# Patient Record
Sex: FEMALE | Race: WHITE | Employment: OTHER | URBAN - METROPOLITAN AREA
[De-identification: names, ages, dates, MRNs, and addresses within clinical notes are randomized per-mention and may not be internally consistent; named-entity substitution may affect disease eponyms.]

---

## 2023-02-03 PROBLEM — B37.31 YEAST VAGINITIS: Status: ACTIVE | Noted: 2023-02-03

## 2023-02-03 PROBLEM — M54.9 BACK PAIN: Status: ACTIVE | Noted: 2023-02-03

## 2023-02-03 PROBLEM — R01.1 CARDIAC MURMUR: Status: ACTIVE | Noted: 2023-02-03

## 2023-02-03 PROBLEM — K21.9 GERD WITHOUT ESOPHAGITIS: Status: ACTIVE | Noted: 2023-02-03

## 2023-02-03 PROBLEM — N39.0 ACUTE UTI: Status: ACTIVE | Noted: 2023-02-03

## 2023-02-03 PROBLEM — N28.9 RENAL INSUFFICIENCY: Status: ACTIVE | Noted: 2023-02-03

## 2023-02-03 PROBLEM — R06.02 EXERTIONAL SHORTNESS OF BREATH: Status: ACTIVE | Noted: 2023-02-03

## 2023-02-03 PROBLEM — I70.1 RENAL ARTERY STENOSIS (CMS-HCC): Status: ACTIVE | Noted: 2023-02-03

## 2023-02-03 PROBLEM — I49.1 ATRIAL PREMATURE COMPLEX: Status: ACTIVE | Noted: 2023-02-03

## 2023-02-03 PROBLEM — E11.9 DIABETES (MULTI): Status: ACTIVE | Noted: 2023-02-03

## 2023-02-03 PROBLEM — R07.9 CHEST PAIN: Status: ACTIVE | Noted: 2023-02-03

## 2023-02-03 PROBLEM — R10.9 ABDOMINAL PAIN: Status: ACTIVE | Noted: 2023-02-03

## 2023-02-03 PROBLEM — I47.10 PAROXYSMAL SVT (SUPRAVENTRICULAR TACHYCARDIA) (CMS-HCC): Status: ACTIVE | Noted: 2023-02-03

## 2023-02-03 PROBLEM — R53.83 FATIGUE: Status: ACTIVE | Noted: 2023-02-03

## 2023-02-03 PROBLEM — R30.0 DYSURIA: Status: ACTIVE | Noted: 2023-02-03

## 2023-02-03 PROBLEM — I10 BENIGN ESSENTIAL HTN: Status: ACTIVE | Noted: 2023-02-03

## 2023-02-03 PROBLEM — R00.2 PALPITATIONS: Status: ACTIVE | Noted: 2023-02-03

## 2023-02-03 PROBLEM — E78.5 DYSLIPIDEMIA: Status: ACTIVE | Noted: 2023-02-03

## 2023-02-03 PROBLEM — B02.9 SHINGLES: Status: ACTIVE | Noted: 2023-02-03

## 2023-02-03 PROBLEM — R51.9 SINUS HEADACHE: Status: ACTIVE | Noted: 2023-02-03

## 2023-02-03 PROBLEM — I49.8 ATRIAL BIGEMINY: Status: ACTIVE | Noted: 2023-02-03

## 2023-02-03 PROBLEM — Z23 ENCOUNTER FOR IMMUNIZATION: Status: ACTIVE | Noted: 2023-02-03

## 2023-02-03 PROBLEM — I35.0 AORTIC STENOSIS: Status: ACTIVE | Noted: 2023-02-03

## 2023-02-03 RX ORDER — LISINOPRIL AND HYDROCHLOROTHIAZIDE 20; 25 MG/1; MG/1
1 TABLET ORAL DAILY
COMMUNITY
Start: 2015-08-31 | End: 2023-07-10 | Stop reason: SINTOL

## 2023-02-03 RX ORDER — CHOLECALCIFEROL (VITAMIN D3) 50 MCG
1 TABLET ORAL DAILY
COMMUNITY
Start: 2018-10-10 | End: 2023-10-03 | Stop reason: ALTCHOICE

## 2023-02-03 RX ORDER — METOPROLOL TARTRATE 50 MG/1
1 TABLET ORAL 2 TIMES DAILY
COMMUNITY
Start: 2015-08-18 | End: 2023-10-03 | Stop reason: ALTCHOICE

## 2023-02-03 RX ORDER — ASCORBIC ACID 500 MG
1 TABLET,CHEWABLE ORAL DAILY
COMMUNITY
End: 2023-10-03 | Stop reason: ALTCHOICE

## 2023-02-03 RX ORDER — INSULIN HUMAN 100 [IU]/ML
40 INJECTION, SUSPENSION SUBCUTANEOUS EVERY MORNING
COMMUNITY
Start: 2015-06-03 | End: 2023-07-10 | Stop reason: SDUPTHER

## 2023-02-03 RX ORDER — LISINOPRIL 10 MG/1
0.5 TABLET ORAL DAILY
COMMUNITY
End: 2023-07-10 | Stop reason: ALTCHOICE

## 2023-02-03 RX ORDER — OMEPRAZOLE 40 MG/1
1 CAPSULE, DELAYED RELEASE ORAL DAILY
COMMUNITY
Start: 2022-12-06 | End: 2023-04-10 | Stop reason: WASHOUT

## 2023-02-03 RX ORDER — BLOOD SUGAR DIAGNOSTIC
3 STRIP MISCELLANEOUS 3 TIMES DAILY
COMMUNITY
Start: 2015-11-12 | End: 2023-05-22 | Stop reason: SDUPTHER

## 2023-03-27 DIAGNOSIS — N39.0 ACUTE UTI: Primary | ICD-10-CM

## 2023-03-27 LAB
ALBUMIN (MG/L) IN URINE: 1255.6 MG/L
ALBUMIN/CREATININE (UG/MG) IN URINE: 6573.8 UG/MG CRT (ref 0–30)
CREATININE (MG/DL) IN URINE: 19.1 MG/DL (ref 20–320)

## 2023-03-29 ENCOUNTER — OFFICE VISIT (OUTPATIENT)
Dept: PRIMARY CARE | Facility: CLINIC | Age: 88
End: 2023-03-29
Payer: COMMERCIAL

## 2023-03-29 VITALS — SYSTOLIC BLOOD PRESSURE: 145 MMHG | DIASTOLIC BLOOD PRESSURE: 79 MMHG | BODY MASS INDEX: 29.41 KG/M2 | WEIGHT: 166 LBS

## 2023-03-29 DIAGNOSIS — N30.00 ACUTE CYSTITIS WITHOUT HEMATURIA: Primary | ICD-10-CM

## 2023-03-29 LAB
POC APPEARANCE, URINE: ABNORMAL
POC BILIRUBIN, URINE: NEGATIVE
POC BLOOD, URINE: ABNORMAL
POC COLOR, URINE: YELLOW
POC GLUCOSE, URINE: ABNORMAL MG/DL
POC KETONES, URINE: NEGATIVE MG/DL
POC LEUKOCYTES, URINE: ABNORMAL
POC NITRITE,URINE: POSITIVE
POC PH, URINE: 6 PH
POC PROTEIN, URINE: ABNORMAL MG/DL
POC SPECIFIC GRAVITY, URINE: 1.02
POC UROBILINOGEN, URINE: 0.2 EU/DL

## 2023-03-29 PROCEDURE — 3078F DIAST BP <80 MM HG: CPT | Performed by: FAMILY MEDICINE

## 2023-03-29 PROCEDURE — 81002 URINALYSIS NONAUTO W/O SCOPE: CPT | Performed by: FAMILY MEDICINE

## 2023-03-29 PROCEDURE — 99213 OFFICE O/P EST LOW 20 MIN: CPT | Performed by: FAMILY MEDICINE

## 2023-03-29 PROCEDURE — 87077 CULTURE AEROBIC IDENTIFY: CPT

## 2023-03-29 PROCEDURE — 3077F SYST BP >= 140 MM HG: CPT | Performed by: FAMILY MEDICINE

## 2023-03-29 PROCEDURE — 87186 SC STD MICRODIL/AGAR DIL: CPT

## 2023-03-29 PROCEDURE — 87086 URINE CULTURE/COLONY COUNT: CPT

## 2023-03-29 RX ORDER — NITROFURANTOIN 25; 75 MG/1; MG/1
100 CAPSULE ORAL 2 TIMES DAILY
Qty: 14 CAPSULE | Refills: 0 | Status: SHIPPED | OUTPATIENT
Start: 2023-03-29 | End: 2023-04-10

## 2023-03-29 ASSESSMENT — PATIENT HEALTH QUESTIONNAIRE - PHQ9
SUM OF ALL RESPONSES TO PHQ9 QUESTIONS 1 AND 2: 0
1. LITTLE INTEREST OR PLEASURE IN DOING THINGS: NOT AT ALL
2. FEELING DOWN, DEPRESSED OR HOPELESS: NOT AT ALL

## 2023-03-29 NOTE — PROGRESS NOTES
Subjective   Patient ID: Melody Martin is a 87 y.o. female who presents for UTI and Results.    HPI   pt noticed low abd tenderness, dysuria, urinary frequency and urgency 5 days ago.  no hematuria, flank pain, fever or chills. No nausea, vomiting. No cp, sob, HA. Normal appetite. Symptoms persisted today   Review of Systems    Objective   Wt 75.3 kg (166 lb)   BMI 29.41 kg/m²     Physical Exam  NAD, well groomed, No sclera icterus. Moist mouth mucosa, neck: supple, lungs: CTA b/l, heart: RRR, abd: soft, there was a mild suprapubic tenderness, no rebound or guarding, BS+, no CVA percussion tenderness.  good balance.   Assessment/Plan     UTI, UA showed + for nitrite and blood. Patient is likely to have UTI due to typical symptoms. Will send urine out for culture. Start antibiotics as above. Increase fluid intake. Call office if worsening symptoms, sob, cp, flank pain, nausea, fever or chills occur.

## 2023-03-31 LAB — URINE CULTURE: ABNORMAL

## 2023-03-31 RX ORDER — SULFAMETHOXAZOLE AND TRIMETHOPRIM 800; 160 MG/1; MG/1
1 TABLET ORAL DAILY
Qty: 3 TABLET | Refills: 0 | Status: SHIPPED | OUTPATIENT
Start: 2023-03-31 | End: 2023-04-10

## 2023-04-03 ENCOUNTER — APPOINTMENT (OUTPATIENT)
Dept: PRIMARY CARE | Facility: CLINIC | Age: 88
End: 2023-04-03
Payer: COMMERCIAL

## 2023-04-10 ENCOUNTER — OFFICE VISIT (OUTPATIENT)
Dept: PRIMARY CARE | Facility: CLINIC | Age: 88
End: 2023-04-10
Payer: COMMERCIAL

## 2023-04-10 VITALS
DIASTOLIC BLOOD PRESSURE: 79 MMHG | RESPIRATION RATE: 14 BRPM | HEART RATE: 78 BPM | WEIGHT: 168 LBS | BODY MASS INDEX: 29.76 KG/M2 | SYSTOLIC BLOOD PRESSURE: 137 MMHG

## 2023-04-10 DIAGNOSIS — E11.9 TYPE 2 DIABETES MELLITUS WITHOUT COMPLICATION, WITH LONG-TERM CURRENT USE OF INSULIN (MULTI): ICD-10-CM

## 2023-04-10 DIAGNOSIS — R42 DIZZINESS: ICD-10-CM

## 2023-04-10 DIAGNOSIS — B37.0 ORAL THRUSH: Primary | ICD-10-CM

## 2023-04-10 DIAGNOSIS — I10 BENIGN ESSENTIAL HTN: ICD-10-CM

## 2023-04-10 DIAGNOSIS — Z79.4 TYPE 2 DIABETES MELLITUS WITHOUT COMPLICATION, WITH LONG-TERM CURRENT USE OF INSULIN (MULTI): ICD-10-CM

## 2023-04-10 DIAGNOSIS — N18.32 STAGE 3B CHRONIC KIDNEY DISEASE (MULTI): ICD-10-CM

## 2023-04-10 DIAGNOSIS — Z00.00 ROUTINE MEDICAL EXAM: ICD-10-CM

## 2023-04-10 DIAGNOSIS — I47.10 PAROXYSMAL SVT (SUPRAVENTRICULAR TACHYCARDIA) (CMS-HCC): ICD-10-CM

## 2023-04-10 PROBLEM — N28.9 RENAL INSUFFICIENCY: Status: RESOLVED | Noted: 2023-02-03 | Resolved: 2023-04-10

## 2023-04-10 PROBLEM — R30.0 DYSURIA: Status: RESOLVED | Noted: 2023-02-03 | Resolved: 2023-04-10

## 2023-04-10 PROBLEM — R10.9 ABDOMINAL PAIN: Status: RESOLVED | Noted: 2023-02-03 | Resolved: 2023-04-10

## 2023-04-10 PROBLEM — I70.1 RENAL ARTERY STENOSIS (CMS-HCC): Status: RESOLVED | Noted: 2023-02-03 | Resolved: 2023-04-10

## 2023-04-10 PROBLEM — B37.31 YEAST VAGINITIS: Status: RESOLVED | Noted: 2023-02-03 | Resolved: 2023-04-10

## 2023-04-10 PROBLEM — N39.0 ACUTE UTI: Status: RESOLVED | Noted: 2023-02-03 | Resolved: 2023-04-10

## 2023-04-10 PROBLEM — R07.9 CHEST PAIN: Status: RESOLVED | Noted: 2023-02-03 | Resolved: 2023-04-10

## 2023-04-10 PROBLEM — K21.9 GERD WITHOUT ESOPHAGITIS: Status: RESOLVED | Noted: 2023-02-03 | Resolved: 2023-04-10

## 2023-04-10 PROBLEM — M54.9 BACK PAIN: Status: RESOLVED | Noted: 2023-02-03 | Resolved: 2023-04-10

## 2023-04-10 PROBLEM — B02.9 SHINGLES: Status: RESOLVED | Noted: 2023-02-03 | Resolved: 2023-04-10

## 2023-04-10 PROCEDURE — 1159F MED LIST DOCD IN RCRD: CPT | Performed by: FAMILY MEDICINE

## 2023-04-10 PROCEDURE — 3075F SYST BP GE 130 - 139MM HG: CPT | Performed by: FAMILY MEDICINE

## 2023-04-10 PROCEDURE — 1170F FXNL STATUS ASSESSED: CPT | Performed by: FAMILY MEDICINE

## 2023-04-10 PROCEDURE — G0439 PPPS, SUBSEQ VISIT: HCPCS | Performed by: FAMILY MEDICINE

## 2023-04-10 PROCEDURE — 99214 OFFICE O/P EST MOD 30 MIN: CPT | Performed by: FAMILY MEDICINE

## 2023-04-10 PROCEDURE — 1036F TOBACCO NON-USER: CPT | Performed by: FAMILY MEDICINE

## 2023-04-10 PROCEDURE — 1160F RVW MEDS BY RX/DR IN RCRD: CPT | Performed by: FAMILY MEDICINE

## 2023-04-10 PROCEDURE — 3078F DIAST BP <80 MM HG: CPT | Performed by: FAMILY MEDICINE

## 2023-04-10 RX ORDER — NYSTATIN 100000 [USP'U]/ML
5 SUSPENSION ORAL 4 TIMES DAILY
Qty: 120 ML | Refills: 1 | Status: SHIPPED | OUTPATIENT
Start: 2023-04-10 | End: 2023-10-09 | Stop reason: HOSPADM

## 2023-04-10 ASSESSMENT — PATIENT HEALTH QUESTIONNAIRE - PHQ9
2. FEELING DOWN, DEPRESSED OR HOPELESS: NOT AT ALL
1. LITTLE INTEREST OR PLEASURE IN DOING THINGS: NOT AT ALL
SUM OF ALL RESPONSES TO PHQ9 QUESTIONS 1 AND 2: 0

## 2023-04-10 ASSESSMENT — ACTIVITIES OF DAILY LIVING (ADL)
GROCERY_SHOPPING: NEEDS ASSISTANCE
BATHING: INDEPENDENT
DRESSING: INDEPENDENT
DOING_HOUSEWORK: NEEDS ASSISTANCE
MANAGING_FINANCES: INDEPENDENT
TAKING_MEDICATION: INDEPENDENT

## 2023-04-10 NOTE — ASSESSMENT & PLAN NOTE
DMII, Continue current medications. Check A1C, advise eye exam by an OD yearly and checking bilateral feet for skin lesions qhs. Healthy diet and regular exercise.

## 2023-04-10 NOTE — PROGRESS NOTES
Subjective   Patient ID: Melody Martin is a 87 y.o. female who presents for Follow-up and Medicare Annual Wellness Visit Subsequent.    HPI   Pt noticed white thrush on her tongue in the past few weeks. Pt tool abx recently. Patient has been compliant with taking all  current medications. Pt had occ hypoglycemic episodes. No polydipsia, polyuria or significant weight changes. No lower extremities skin lesion. No LE numbness or tingling. No blurry vision. No CP, HA, pt cont to have dizziness but no heart palpitation. No  nocturia or LE edema. No falls. Good mood.     Review of Systems    Objective   /79   Pulse 78   Resp 14   Wt 76.2 kg (168 lb)   BMI 29.76 kg/m²     Physical Exam  NAD, well groomed, No sclera icterus. ENT were normal except thrush at tongue, neck: supple, no cervical or axillary lymphadenopathy,  lungs: CTA b/l, heart: RRR, No LE edema, normal pedal pulses, abd: soft, no tenderness, BS+, normal strength and sensation  at bilateral lower extremities. No skin lesions at bilateral feet. Good balance. CNII-XII were grossly intact, good judgment and memory. No depressed mood.    Assessment/Plan   Problem List Items Addressed This Visit          Circulatory    Benign essential HTN     BP has been controlled. Continue BP pills. keep a daily  bp log and bring in the log at the next office visit. Call office if BP is persistently over 130/80. DASH diet and regular exercise.          Paroxysmal SVT (supraventricular tachycardia) (CMS/[facility])     Normal HR today. Fu with cardiologist            Genitourinary    Stage 3b chronic kidney disease     No NSAidS, FU WITH NEPHRO            Endocrine/Metabolic    Diabetes (CMS/[facility])     DMII, Continue current medications. Check A1C, advise eye exam by an OD yearly and checking bilateral feet for skin lesions qhs. Healthy diet and regular exercise.          Relevant Orders    Hemoglobin A1C    Hepatic Function Panel       Infectious/Inflammatory    Oral thrush -  Primary     Nystatin solution as dir. Call office if symptoms do not resolve in 10 days           Relevant Medications    nystatin (Mycostatin) 100,000 unit/mL suspension       Other    Routine medical exam    Dizziness    Relevant Orders    Referral to ENT        Medicare wellness visit: pt was capable of performing all his ADLs and some IADLs. Pt has good memory and cognitive function. pt is overweight,  Recommend healthy diet and regular exercise. pt declined to see a nutritionist for eval. Advise eye exam by an OD yearly for glaucoma screen and dental exam every 6 months.   will monitor blood pressure and weight regularly. Recommend shingle vaccines, tetanus vaccine, pcv20 shot. Recommend pt to clear throw rugs at home and keep good lighting at night to avoid falls. Keep hot water for shower below 120F to avoid burn injury.  recommend grab bar at bathtub.   recommend to update living will and DPOA  pt stated that he had been taking all current  medications as prescribed.

## 2023-04-10 NOTE — ASSESSMENT & PLAN NOTE
BP has been controlled. Continue BP pills. keep a daily  bp log and bring in the log at the next office visit. Call office if BP is persistently over 130/80. DASH diet and regular exercise.

## 2023-05-22 DIAGNOSIS — E11.9 TYPE 2 DIABETES MELLITUS WITHOUT COMPLICATION, WITH LONG-TERM CURRENT USE OF INSULIN (MULTI): Primary | ICD-10-CM

## 2023-05-22 DIAGNOSIS — Z79.4 TYPE 2 DIABETES MELLITUS WITHOUT COMPLICATION, WITH LONG-TERM CURRENT USE OF INSULIN (MULTI): Primary | ICD-10-CM

## 2023-05-22 RX ORDER — BLOOD SUGAR DIAGNOSTIC
1 STRIP MISCELLANEOUS 3 TIMES DAILY
Qty: 300 STRIP | Refills: 3 | Status: SHIPPED | OUTPATIENT
Start: 2023-05-22

## 2023-06-30 ENCOUNTER — OFFICE VISIT (OUTPATIENT)
Dept: PRIMARY CARE | Facility: CLINIC | Age: 88
End: 2023-06-30
Payer: COMMERCIAL

## 2023-06-30 VITALS — SYSTOLIC BLOOD PRESSURE: 132 MMHG | RESPIRATION RATE: 14 BRPM | HEART RATE: 88 BPM | DIASTOLIC BLOOD PRESSURE: 79 MMHG

## 2023-06-30 DIAGNOSIS — N30.00 ACUTE CYSTITIS WITHOUT HEMATURIA: Primary | ICD-10-CM

## 2023-06-30 LAB
POC APPEARANCE, URINE: ABNORMAL
POC BILIRUBIN, URINE: NEGATIVE
POC BLOOD, URINE: NEGATIVE
POC COLOR, URINE: YELLOW
POC GLUCOSE, URINE: NEGATIVE MG/DL
POC KETONES, URINE: NEGATIVE MG/DL
POC LEUKOCYTES, URINE: ABNORMAL
POC NITRITE,URINE: POSITIVE
POC PH, URINE: 6 PH
POC PROTEIN, URINE: ABNORMAL MG/DL
POC SPECIFIC GRAVITY, URINE: 1.02
POC UROBILINOGEN, URINE: 0.2 EU/DL

## 2023-06-30 PROCEDURE — 3078F DIAST BP <80 MM HG: CPT | Performed by: FAMILY MEDICINE

## 2023-06-30 PROCEDURE — 81002 URINALYSIS NONAUTO W/O SCOPE: CPT | Performed by: FAMILY MEDICINE

## 2023-06-30 PROCEDURE — 1159F MED LIST DOCD IN RCRD: CPT | Performed by: FAMILY MEDICINE

## 2023-06-30 PROCEDURE — 1036F TOBACCO NON-USER: CPT | Performed by: FAMILY MEDICINE

## 2023-06-30 PROCEDURE — 3075F SYST BP GE 130 - 139MM HG: CPT | Performed by: FAMILY MEDICINE

## 2023-06-30 PROCEDURE — 1160F RVW MEDS BY RX/DR IN RCRD: CPT | Performed by: FAMILY MEDICINE

## 2023-06-30 PROCEDURE — 99213 OFFICE O/P EST LOW 20 MIN: CPT | Performed by: FAMILY MEDICINE

## 2023-06-30 RX ORDER — NITROFURANTOIN 25; 75 MG/1; MG/1
100 CAPSULE ORAL 2 TIMES DAILY
Qty: 14 CAPSULE | Refills: 0 | Status: SHIPPED | OUTPATIENT
Start: 2023-06-30 | End: 2023-07-10 | Stop reason: ALTCHOICE

## 2023-06-30 NOTE — PROGRESS NOTES
Subjective   Patient ID: Melody Martin is a 87 y.o. female who presents for uti symptoms    HPI   pt noticed  dysuria, urinary frequency and urgency a few days ago.  no hematuria, no flank pain, fever or chills. No nausea, vomiting. No cp, sob, HA. Normal appetite. Symptoms persisted today    Review of Systems    Objective   /79   Pulse 88   Resp 14     Physical Exam  NAD, well groomed, No sclera icterus. Moist mouth mucosa, neck: supple, lungs: CTA b/l, heart: RRR, abd: soft, there was no suprapubic tenderness, no rebound or guarding, BS+, no CVA percussion tenderness.  good balance.   Assessment/Plan       UTI, UA showed + for nitrite and ragini. Patient is likely to have UTI due to typical symptoms.  Start antibiotics as above. Increase fluid intake. Call office if worsening symptoms, sob, cp, flank pain, nausea, fever or chills occur.

## 2023-07-10 ENCOUNTER — LAB (OUTPATIENT)
Dept: LAB | Facility: LAB | Age: 88
End: 2023-07-10
Payer: COMMERCIAL

## 2023-07-10 ENCOUNTER — TELEPHONE (OUTPATIENT)
Dept: PRIMARY CARE | Facility: CLINIC | Age: 88
End: 2023-07-10

## 2023-07-10 ENCOUNTER — OFFICE VISIT (OUTPATIENT)
Dept: PRIMARY CARE | Facility: CLINIC | Age: 88
End: 2023-07-10
Payer: COMMERCIAL

## 2023-07-10 VITALS — DIASTOLIC BLOOD PRESSURE: 78 MMHG | SYSTOLIC BLOOD PRESSURE: 145 MMHG | HEART RATE: 82 BPM

## 2023-07-10 DIAGNOSIS — Z79.4 TYPE 2 DIABETES MELLITUS WITHOUT COMPLICATION, WITH LONG-TERM CURRENT USE OF INSULIN (MULTI): ICD-10-CM

## 2023-07-10 DIAGNOSIS — I10 BENIGN ESSENTIAL HTN: Primary | ICD-10-CM

## 2023-07-10 DIAGNOSIS — I10 BENIGN ESSENTIAL HTN: ICD-10-CM

## 2023-07-10 DIAGNOSIS — N18.32 STAGE 3B CHRONIC KIDNEY DISEASE (MULTI): ICD-10-CM

## 2023-07-10 DIAGNOSIS — E11.9 TYPE 2 DIABETES MELLITUS WITHOUT COMPLICATION, WITH LONG-TERM CURRENT USE OF INSULIN (MULTI): Primary | ICD-10-CM

## 2023-07-10 DIAGNOSIS — E11.9 TYPE 2 DIABETES MELLITUS WITHOUT COMPLICATION, WITH LONG-TERM CURRENT USE OF INSULIN (MULTI): ICD-10-CM

## 2023-07-10 DIAGNOSIS — Z79.4 TYPE 2 DIABETES MELLITUS WITHOUT COMPLICATION, WITH LONG-TERM CURRENT USE OF INSULIN (MULTI): Primary | ICD-10-CM

## 2023-07-10 DIAGNOSIS — R82.90 ABNORMAL URINALYSIS: ICD-10-CM

## 2023-07-10 DIAGNOSIS — N30.00 ACUTE CYSTITIS WITHOUT HEMATURIA: ICD-10-CM

## 2023-07-10 LAB
ALANINE AMINOTRANSFERASE (SGPT) (U/L) IN SER/PLAS: 22 U/L (ref 7–45)
ALBUMIN (G/DL) IN SER/PLAS: 3.4 G/DL (ref 3.4–5)
ALKALINE PHOSPHATASE (U/L) IN SER/PLAS: 73 U/L (ref 33–136)
ANION GAP IN SER/PLAS: 14 MMOL/L (ref 10–20)
APPEARANCE, URINE: ABNORMAL
ASPARTATE AMINOTRANSFERASE (SGOT) (U/L) IN SER/PLAS: 32 U/L (ref 9–39)
BACTERIA, URINE: ABNORMAL /HPF
BILIRUBIN DIRECT (MG/DL) IN SER/PLAS: 0.1 MG/DL (ref 0–0.3)
BILIRUBIN TOTAL (MG/DL) IN SER/PLAS: 0.3 MG/DL (ref 0–1.2)
BILIRUBIN, URINE: NEGATIVE
BLOOD, URINE: NEGATIVE
CALCIUM (MG/DL) IN SER/PLAS: 8.8 MG/DL (ref 8.6–10.3)
CARBON DIOXIDE, TOTAL (MMOL/L) IN SER/PLAS: 25 MMOL/L (ref 21–32)
CHLORIDE (MMOL/L) IN SER/PLAS: 106 MMOL/L (ref 98–107)
COLOR, URINE: YELLOW
CREATININE (MG/DL) IN SER/PLAS: 1.79 MG/DL (ref 0.5–1.05)
GFR FEMALE: 27 ML/MIN/1.73M2
GLUCOSE (MG/DL) IN SER/PLAS: 171 MG/DL (ref 74–99)
GLUCOSE, URINE: NEGATIVE MG/DL
KETONES, URINE: NEGATIVE MG/DL
LEUKOCYTE ESTERASE, URINE: ABNORMAL
MUCUS, URINE: ABNORMAL /LPF
NITRITE, URINE: NEGATIVE
PH, URINE: 5 (ref 5–8)
POTASSIUM (MMOL/L) IN SER/PLAS: 4.5 MMOL/L (ref 3.5–5.3)
PROTEIN TOTAL: 6 G/DL (ref 6.4–8.2)
PROTEIN, URINE: ABNORMAL MG/DL
RBC, URINE: 8 /HPF (ref 0–5)
SODIUM (MMOL/L) IN SER/PLAS: 140 MMOL/L (ref 136–145)
SPECIFIC GRAVITY, URINE: 1.02 (ref 1–1.03)
SQUAMOUS EPITHELIAL CELLS, URINE: 5 /HPF
UREA NITROGEN (MG/DL) IN SER/PLAS: 49 MG/DL (ref 6–23)
UROBILINOGEN, URINE: <2 MG/DL (ref 0–1.9)
WBC CLUMPS, URINE: ABNORMAL /HPF
WBC, URINE: >182 /HPF (ref 0–5)

## 2023-07-10 PROCEDURE — 80053 COMPREHEN METABOLIC PANEL: CPT

## 2023-07-10 PROCEDURE — 81001 URINALYSIS AUTO W/SCOPE: CPT

## 2023-07-10 PROCEDURE — 82248 BILIRUBIN DIRECT: CPT

## 2023-07-10 PROCEDURE — 1159F MED LIST DOCD IN RCRD: CPT | Performed by: FAMILY MEDICINE

## 2023-07-10 PROCEDURE — 99214 OFFICE O/P EST MOD 30 MIN: CPT | Performed by: FAMILY MEDICINE

## 2023-07-10 PROCEDURE — 3078F DIAST BP <80 MM HG: CPT | Performed by: FAMILY MEDICINE

## 2023-07-10 PROCEDURE — 36415 COLL VENOUS BLD VENIPUNCTURE: CPT

## 2023-07-10 PROCEDURE — 3077F SYST BP >= 140 MM HG: CPT | Performed by: FAMILY MEDICINE

## 2023-07-10 PROCEDURE — 1160F RVW MEDS BY RX/DR IN RCRD: CPT | Performed by: FAMILY MEDICINE

## 2023-07-10 PROCEDURE — 83036 HEMOGLOBIN GLYCOSYLATED A1C: CPT

## 2023-07-10 PROCEDURE — 1036F TOBACCO NON-USER: CPT | Performed by: FAMILY MEDICINE

## 2023-07-10 RX ORDER — LISINOPRIL 40 MG/1
40 TABLET ORAL DAILY
Qty: 90 TABLET | Refills: 3 | Status: SHIPPED | OUTPATIENT
Start: 2023-07-10 | End: 2023-10-09 | Stop reason: HOSPADM

## 2023-07-10 RX ORDER — INSULIN HUMAN 100 [IU]/ML
40 INJECTION, SUSPENSION SUBCUTANEOUS EVERY MORNING
Qty: 50 ML | Refills: 3 | Status: SHIPPED | OUTPATIENT
Start: 2023-07-10 | End: 2023-10-03 | Stop reason: ALTCHOICE

## 2023-07-10 RX ORDER — LISINOPRIL 40 MG/1
40 TABLET ORAL DAILY
Qty: 90 TABLET | Refills: 3 | Status: SHIPPED | OUTPATIENT
Start: 2023-07-10 | End: 2023-07-10 | Stop reason: SDUPTHER

## 2023-07-10 NOTE — PROGRESS NOTES
Subjective   Patient ID: Melody Martin is a 87 y.o. female who presents for fu    HPI   Patient has been compliant with taking all  current medications. Pt had more frequent urination while on lisinopril /hydrochlorothiazide.  No hypoglycemia. No CP, HA, dizziness, heart palpitation. No claudication or cold LE.  mild LE edema. No falls. Good mood.     Review of Systems    Objective   /78   Pulse 82     Physical Exam  NAD, well groomed, No sclera icterus. neck: supple, no cervical or axillary lymphadenopathy,  lungs: CTA b/l, heart: RRR, mild  LE edema, normal pedal pulses, abd: soft, no tenderness, BS+, normal strength and sensation  at bilateral lower extremities. No skin lesions at bilateral feet. Good balance. CNII-XII were grossly intact, good judgment and memory. No depressed mood.    Assessment/Plan   Problem List Items Addressed This Visit       Benign essential HTN - Primary     Pt urinated too frequently while on lisinopril-hydrochlorothiazide. Change to lisinopril 40mg. Pt declined to add hydralazine.  Dash diet and call if bp cont to be above 140/90         Relevant Medications    lisinopril 40 mg tablet    lisinopril 40 mg tablet    Diabetes (CMS/[facility])    Relevant Medications    insulin NPH and regular human (HumuLIN 70/30 U-100 Insulin) 100 unit/mL (70-30) injection    Other Relevant Orders    Hemoglobin A1C    Urinalysis with Reflex Microscopic    Stage 3b chronic kidney disease     No NSAIDs, check cr         Relevant Orders    Comprehensive Metabolic Panel

## 2023-07-10 NOTE — ASSESSMENT & PLAN NOTE
Pt urinated too frequently while on lisinopril-hydrochlorothiazide. Change to lisinopril 40mg. Pt declined to add hydralazine.  Dash diet and call if bp cont to be above 140/90

## 2023-07-10 NOTE — ASSESSMENT & PLAN NOTE
DMII, controlled. Continue current medications. Check A1C, urine albumin. advise eye exam by an OD yearly and checking bilateral feet for skin lesions qhs. Healthy diet and regular exercise.

## 2023-07-10 NOTE — TELEPHONE ENCOUNTER
Patients medication was called in to [facility] for Lisinopril 40 mg 1x daily. That needs sent to [facility] instead thank you

## 2023-07-11 ENCOUNTER — LAB (OUTPATIENT)
Dept: LAB | Facility: LAB | Age: 88
End: 2023-07-11
Payer: COMMERCIAL

## 2023-07-11 DIAGNOSIS — R82.90 ABNORMAL URINALYSIS: ICD-10-CM

## 2023-07-11 LAB
ESTIMATED AVERAGE GLUCOSE FOR HBA1C: 183 MG/DL
HEMOGLOBIN A1C/HEMOGLOBIN TOTAL IN BLOOD: 8 %

## 2023-07-11 PROCEDURE — 87086 URINE CULTURE/COLONY COUNT: CPT

## 2023-07-11 PROCEDURE — 87077 CULTURE AEROBIC IDENTIFY: CPT

## 2023-07-11 PROCEDURE — 87186 SC STD MICRODIL/AGAR DIL: CPT

## 2023-07-12 ENCOUNTER — TELEPHONE (OUTPATIENT)
Dept: PRIMARY CARE | Facility: CLINIC | Age: 88
End: 2023-07-12
Payer: COMMERCIAL

## 2023-07-12 NOTE — TELEPHONE ENCOUNTER
From your conversation the other day by phone the patient is under the impression that there should be an antibiotic called in for her UTI. Can you please call the daughter at [phone] Nikole Dumont.

## 2023-07-13 LAB — URINE CULTURE: ABNORMAL

## 2023-07-14 RX ORDER — NITROFURANTOIN 25; 75 MG/1; MG/1
100 CAPSULE ORAL 2 TIMES DAILY
Qty: 14 CAPSULE | Refills: 0 | Status: SHIPPED | OUTPATIENT
Start: 2023-07-14 | End: 2023-07-21

## 2023-08-07 ENCOUNTER — LAB (OUTPATIENT)
Dept: LAB | Facility: LAB | Age: 88
End: 2023-08-07
Payer: COMMERCIAL

## 2023-08-07 ENCOUNTER — TELEPHONE (OUTPATIENT)
Dept: PRIMARY CARE | Facility: CLINIC | Age: 88
End: 2023-08-07
Payer: COMMERCIAL

## 2023-08-07 DIAGNOSIS — N30.00 ACUTE CYSTITIS WITHOUT HEMATURIA: Primary | ICD-10-CM

## 2023-08-07 DIAGNOSIS — R30.0 DYSURIA: ICD-10-CM

## 2023-08-07 LAB
APPEARANCE, URINE: ABNORMAL
BACTERIA, URINE: ABNORMAL /HPF
BILIRUBIN, URINE: NEGATIVE
BLOOD, URINE: NEGATIVE
COLOR, URINE: YELLOW
GLUCOSE, URINE: ABNORMAL MG/DL
KETONES, URINE: NEGATIVE MG/DL
LEUKOCYTE ESTERASE, URINE: ABNORMAL
NITRITE, URINE: NEGATIVE
PH, URINE: 6 (ref 5–8)
PROTEIN, URINE: ABNORMAL MG/DL
RBC, URINE: 1 /HPF (ref 0–5)
SPECIFIC GRAVITY, URINE: 1.01 (ref 1–1.03)
SQUAMOUS EPITHELIAL CELLS, URINE: 3 /HPF
UROBILINOGEN, URINE: <2 MG/DL (ref 0–1.9)
WBC, URINE: 80 /HPF (ref 0–5)

## 2023-08-07 PROCEDURE — 87077 CULTURE AEROBIC IDENTIFY: CPT

## 2023-08-07 PROCEDURE — 87086 URINE CULTURE/COLONY COUNT: CPT

## 2023-08-07 PROCEDURE — 87186 SC STD MICRODIL/AGAR DIL: CPT

## 2023-08-07 PROCEDURE — 81001 URINALYSIS AUTO W/SCOPE: CPT

## 2023-08-07 NOTE — TELEPHONE ENCOUNTER
Melody has a UTI again and would like to know if she can get an order put in for a urine test as she is going to be at the hospital today. Can we put this in for her before 11 am? Thank you.

## 2023-08-09 LAB — URINE CULTURE: ABNORMAL

## 2023-08-09 RX ORDER — NITROFURANTOIN 25; 75 MG/1; MG/1
100 CAPSULE ORAL 2 TIMES DAILY
Qty: 14 CAPSULE | Refills: 0 | Status: SHIPPED | OUTPATIENT
Start: 2023-08-09 | End: 2023-08-16

## 2023-08-14 ENCOUNTER — TELEPHONE (OUTPATIENT)
Dept: PRIMARY CARE | Facility: CLINIC | Age: 88
End: 2023-08-14
Payer: COMMERCIAL

## 2023-08-14 NOTE — TELEPHONE ENCOUNTER
Patient is having dizziness and chills from the medication she is on for her UTI. Can we prescribe her something different? Patient as her son in law was in an accident. Her feet are swollen with the Lisinopril hydrochlorothiazide change we made last time she was in as well. Thank you

## 2023-08-18 LAB
APPEARANCE, URINE: ABNORMAL
BACTERIA, URINE: ABNORMAL /HPF
BILIRUBIN, URINE: NEGATIVE
BLOOD, URINE: NEGATIVE
COLOR, URINE: YELLOW
GLUCOSE, URINE: ABNORMAL MG/DL
HYALINE CASTS, URINE: ABNORMAL /LPF
KETONES, URINE: NEGATIVE MG/DL
LEUKOCYTE ESTERASE, URINE: NEGATIVE
MUCUS, URINE: ABNORMAL /LPF
NITRITE, URINE: NEGATIVE
PH, URINE: 5 (ref 5–8)
PROTEIN, URINE: ABNORMAL MG/DL
RBC, URINE: 1 /HPF (ref 0–5)
SPECIFIC GRAVITY, URINE: 1.02 (ref 1–1.03)
SQUAMOUS EPITHELIAL CELLS, URINE: 5 /HPF
UROBILINOGEN, URINE: <2 MG/DL (ref 0–1.9)
WBC, URINE: 3 /HPF (ref 0–5)

## 2023-08-20 LAB — URINE CULTURE: NORMAL

## 2023-09-25 LAB
ANION GAP IN SER/PLAS: 12 MMOL/L (ref 10–20)
CALCIUM (MG/DL) IN SER/PLAS: 8.4 MG/DL (ref 8.6–10.3)
CARBON DIOXIDE, TOTAL (MMOL/L) IN SER/PLAS: 28 MMOL/L (ref 21–32)
CHLORIDE (MMOL/L) IN SER/PLAS: 100 MMOL/L (ref 98–107)
CREATININE (MG/DL) IN SER/PLAS: 2.95 MG/DL (ref 0.5–1.05)
ERYTHROCYTE DISTRIBUTION WIDTH (RATIO) BY AUTOMATED COUNT: 14.2 % (ref 11.5–14.5)
ERYTHROCYTE MEAN CORPUSCULAR HEMOGLOBIN CONCENTRATION (G/DL) BY AUTOMATED: 32 G/DL (ref 32–36)
ERYTHROCYTE MEAN CORPUSCULAR VOLUME (FL) BY AUTOMATED COUNT: 94 FL (ref 80–100)
ERYTHROCYTES (10*6/UL) IN BLOOD BY AUTOMATED COUNT: 3.21 X10E12/L (ref 4–5.2)
GFR FEMALE: 15 ML/MIN/1.73M2
GLUCOSE (MG/DL) IN SER/PLAS: 178 MG/DL (ref 74–99)
HEMATOCRIT (%) IN BLOOD BY AUTOMATED COUNT: 30.3 % (ref 36–46)
HEMOGLOBIN (G/DL) IN BLOOD: 9.7 G/DL (ref 12–16)
LEUKOCYTES (10*3/UL) IN BLOOD BY AUTOMATED COUNT: 10.8 X10E9/L (ref 4.4–11.3)
PLATELETS (10*3/UL) IN BLOOD AUTOMATED COUNT: 352 X10E9/L (ref 150–450)
POTASSIUM (MMOL/L) IN SER/PLAS: 4.4 MMOL/L (ref 3.5–5.3)
SODIUM (MMOL/L) IN SER/PLAS: 136 MMOL/L (ref 136–145)
UREA NITROGEN (MG/DL) IN SER/PLAS: 30 MG/DL (ref 6–23)

## 2023-09-28 LAB
ANION GAP IN SER/PLAS: 10 MMOL/L (ref 10–20)
CALCIUM (MG/DL) IN SER/PLAS: 7.5 MG/DL (ref 8.6–10.3)
CARBON DIOXIDE, TOTAL (MMOL/L) IN SER/PLAS: 32 MMOL/L (ref 21–32)
CHLORIDE (MMOL/L) IN SER/PLAS: 99 MMOL/L (ref 98–107)
CREATININE (MG/DL) IN SER/PLAS: 1.85 MG/DL (ref 0.5–1.05)
ERYTHROCYTE DISTRIBUTION WIDTH (RATIO) BY AUTOMATED COUNT: 14.3 % (ref 11.5–14.5)
ERYTHROCYTE MEAN CORPUSCULAR HEMOGLOBIN CONCENTRATION (G/DL) BY AUTOMATED: 31.5 G/DL (ref 32–36)
ERYTHROCYTE MEAN CORPUSCULAR VOLUME (FL) BY AUTOMATED COUNT: 94 FL (ref 80–100)
ERYTHROCYTES (10*6/UL) IN BLOOD BY AUTOMATED COUNT: 2.47 X10E12/L (ref 4–5.2)
GFR FEMALE: 26 ML/MIN/1.73M2
GLUCOSE (MG/DL) IN SER/PLAS: 58 MG/DL (ref 74–99)
HEMATOCRIT (%) IN BLOOD BY AUTOMATED COUNT: 23.2 % (ref 36–46)
HEMOGLOBIN (G/DL) IN BLOOD: 7.3 G/DL (ref 12–16)
LEUKOCYTES (10*3/UL) IN BLOOD BY AUTOMATED COUNT: 8.2 X10E9/L (ref 4.4–11.3)
PLATELETS (10*3/UL) IN BLOOD AUTOMATED COUNT: 249 X10E9/L (ref 150–450)
POTASSIUM (MMOL/L) IN SER/PLAS: 3.9 MMOL/L (ref 3.5–5.3)
SODIUM (MMOL/L) IN SER/PLAS: 137 MMOL/L (ref 136–145)
UREA NITROGEN (MG/DL) IN SER/PLAS: 20 MG/DL (ref 6–23)

## 2023-10-02 ENCOUNTER — HOSPITAL ENCOUNTER (INPATIENT)
Facility: HOSPITAL | Age: 88
LOS: 6 days | Discharge: NURSING, REHAB OR LONG-TERM CARE | DRG: 377 | End: 2023-10-09
Attending: EMERGENCY MEDICINE | Admitting: INTERNAL MEDICINE
Payer: COMMERCIAL

## 2023-10-02 ENCOUNTER — APPOINTMENT (OUTPATIENT)
Dept: RADIOLOGY | Facility: HOSPITAL | Age: 88
DRG: 377 | End: 2023-10-02
Payer: COMMERCIAL

## 2023-10-02 DIAGNOSIS — D64.9 ANEMIA: ICD-10-CM

## 2023-10-02 DIAGNOSIS — Z79.4 TYPE 2 DIABETES MELLITUS WITHOUT COMPLICATION, WITH LONG-TERM CURRENT USE OF INSULIN (MULTI): ICD-10-CM

## 2023-10-02 DIAGNOSIS — J18.9 PNEUMONIA OF LEFT LOWER LOBE DUE TO INFECTIOUS ORGANISM: ICD-10-CM

## 2023-10-02 DIAGNOSIS — E11.9 TYPE 2 DIABETES MELLITUS WITHOUT COMPLICATION, WITH LONG-TERM CURRENT USE OF INSULIN (MULTI): ICD-10-CM

## 2023-10-02 DIAGNOSIS — D64.9 ANEMIA, UNSPECIFIED TYPE: ICD-10-CM

## 2023-10-02 DIAGNOSIS — R06.02 EXERTIONAL SHORTNESS OF BREATH: Primary | ICD-10-CM

## 2023-10-02 LAB
ALBUMIN SERPL BCP-MCNC: 2.6 G/DL (ref 3.4–5)
ALP SERPL-CCNC: 99 U/L (ref 33–136)
ALT SERPL W P-5'-P-CCNC: 16 U/L (ref 7–45)
ANION GAP SERPL CALC-SCNC: 11 MMOL/L (ref 10–20)
AST SERPL W P-5'-P-CCNC: 26 U/L (ref 9–39)
BASOPHILS # BLD AUTO: 0.06 X10*3/UL (ref 0–0.1)
BASOPHILS NFR BLD AUTO: 0.4 %
BILIRUB SERPL-MCNC: 0.4 MG/DL (ref 0–1.2)
BUN SERPL-MCNC: 19 MG/DL (ref 6–23)
CALCIUM SERPL-MCNC: 7.4 MG/DL (ref 8.6–10.3)
CHLORIDE SERPL-SCNC: 96 MMOL/L (ref 98–107)
CO2 SERPL-SCNC: 30 MMOL/L (ref 21–32)
CREAT SERPL-MCNC: 1.57 MG/DL (ref 0.5–1.05)
EOSINOPHIL # BLD AUTO: 0.16 X10*3/UL (ref 0–0.4)
EOSINOPHIL NFR BLD AUTO: 1.1 %
ERYTHROCYTE [DISTWIDTH] IN BLOOD BY AUTOMATED COUNT: 14.5 % (ref 11.5–14.5)
GFR SERPL CREATININE-BSD FRML MDRD: 32 ML/MIN/1.73M*2
GLUCOSE SERPL-MCNC: 199 MG/DL (ref 74–99)
HCT VFR BLD AUTO: 21 % (ref 36–46)
HGB BLD-MCNC: 6.8 G/DL (ref 12–16)
HYPOCHROMIA BLD QL SMEAR: NORMAL
IMM GRANULOCYTES # BLD AUTO: 0.21 X10*3/UL (ref 0–0.5)
IMM GRANULOCYTES NFR BLD AUTO: 1.4 % (ref 0–0.9)
LACTATE SERPL-SCNC: 2.6 MMOL/L (ref 0.4–2)
LYMPHOCYTES # BLD AUTO: 2.98 X10*3/UL (ref 0.8–3)
LYMPHOCYTES NFR BLD AUTO: 20.5 %
MCH RBC QN AUTO: 30.2 PG (ref 26–34)
MCHC RBC AUTO-ENTMCNC: 32.4 G/DL (ref 32–36)
MCV RBC AUTO: 93 FL (ref 80–100)
MONOCYTES # BLD AUTO: 1.5 X10*3/UL (ref 0.05–0.8)
MONOCYTES NFR BLD AUTO: 10.3 %
NEUTROPHILS # BLD AUTO: 9.64 X10*3/UL (ref 1.6–5.5)
NEUTROPHILS NFR BLD AUTO: 66.3 %
NRBC BLD-RTO: 0 /100 WBCS (ref 0–0)
PLATELET # BLD AUTO: 243 X10*3/UL (ref 150–450)
PMV BLD AUTO: 9.2 FL (ref 7.5–11.5)
POLYCHROMASIA BLD QL SMEAR: NORMAL
POTASSIUM SERPL-SCNC: 3.9 MMOL/L (ref 3.5–5.3)
PROT SERPL-MCNC: 5.4 G/DL (ref 6.4–8.2)
RBC # BLD AUTO: 2.25 X10*6/UL (ref 4–5.2)
RBC MORPH BLD: NORMAL
RSV RNA RESP QL NAA+PROBE: NOT DETECTED
SARS-COV-2 RNA RESP QL NAA+PROBE: NOT DETECTED
SODIUM SERPL-SCNC: 133 MMOL/L (ref 136–145)
WBC # BLD AUTO: 14.6 X10*3/UL (ref 4.4–11.3)

## 2023-10-02 PROCEDURE — 96375 TX/PRO/DX INJ NEW DRUG ADDON: CPT

## 2023-10-02 PROCEDURE — 71045 X-RAY EXAM CHEST 1 VIEW: CPT | Mod: FY,FR

## 2023-10-02 PROCEDURE — 86900 BLOOD TYPING SEROLOGIC ABO: CPT | Performed by: EMERGENCY MEDICINE

## 2023-10-02 PROCEDURE — 80053 COMPREHEN METABOLIC PANEL: CPT | Performed by: EMERGENCY MEDICINE

## 2023-10-02 PROCEDURE — 99285 EMERGENCY DEPT VISIT HI MDM: CPT | Performed by: EMERGENCY MEDICINE

## 2023-10-02 PROCEDURE — 36415 COLL VENOUS BLD VENIPUNCTURE: CPT | Performed by: EMERGENCY MEDICINE

## 2023-10-02 PROCEDURE — 86920 COMPATIBILITY TEST SPIN: CPT

## 2023-10-02 PROCEDURE — 87634 RSV DNA/RNA AMP PROBE: CPT | Performed by: EMERGENCY MEDICINE

## 2023-10-02 PROCEDURE — 96365 THER/PROPH/DIAG IV INF INIT: CPT

## 2023-10-02 PROCEDURE — 85025 COMPLETE CBC W/AUTO DIFF WBC: CPT | Performed by: EMERGENCY MEDICINE

## 2023-10-02 PROCEDURE — 87635 SARS-COV-2 COVID-19 AMP PRB: CPT | Performed by: EMERGENCY MEDICINE

## 2023-10-02 PROCEDURE — 71045 X-RAY EXAM CHEST 1 VIEW: CPT | Mod: FOREIGN READ | Performed by: RADIOLOGY

## 2023-10-02 PROCEDURE — 96366 THER/PROPH/DIAG IV INF ADDON: CPT

## 2023-10-02 PROCEDURE — 96367 TX/PROPH/DG ADDL SEQ IV INF: CPT

## 2023-10-02 PROCEDURE — 30233N1 TRANSFUSION OF NONAUTOLOGOUS RED BLOOD CELLS INTO PERIPHERAL VEIN, PERCUTANEOUS APPROACH: ICD-10-PCS | Performed by: EMERGENCY MEDICINE

## 2023-10-02 PROCEDURE — 83605 ASSAY OF LACTIC ACID: CPT | Performed by: EMERGENCY MEDICINE

## 2023-10-02 ASSESSMENT — PAIN SCALES - GENERAL
PAINLEVEL_OUTOF10: 4
PAINLEVEL_OUTOF10: 0 - NO PAIN
PAINLEVEL_OUTOF10: 4
PAINLEVEL_OUTOF10: 0 - NO PAIN

## 2023-10-02 ASSESSMENT — COLUMBIA-SUICIDE SEVERITY RATING SCALE - C-SSRS
2. HAVE YOU ACTUALLY HAD ANY THOUGHTS OF KILLING YOURSELF?: NO
1. IN THE PAST MONTH, HAVE YOU WISHED YOU WERE DEAD OR WISHED YOU COULD GO TO SLEEP AND NOT WAKE UP?: NO
6. HAVE YOU EVER DONE ANYTHING, STARTED TO DO ANYTHING, OR PREPARED TO DO ANYTHING TO END YOUR LIFE?: NO

## 2023-10-02 ASSESSMENT — LIFESTYLE VARIABLES
HAVE PEOPLE ANNOYED YOU BY CRITICIZING YOUR DRINKING: NO
EVER FELT BAD OR GUILTY ABOUT YOUR DRINKING: NO
EVER HAD A DRINK FIRST THING IN THE MORNING TO STEADY YOUR NERVES TO GET RID OF A HANGOVER: NO
HAVE YOU EVER FELT YOU SHOULD CUT DOWN ON YOUR DRINKING: NO

## 2023-10-02 ASSESSMENT — PAIN - FUNCTIONAL ASSESSMENT: PAIN_FUNCTIONAL_ASSESSMENT: 0-10

## 2023-10-03 PROBLEM — J18.9 PNEUMONIA: Status: ACTIVE | Noted: 2023-10-03

## 2023-10-03 PROBLEM — D64.9 ANEMIA, UNSPECIFIED TYPE: Status: ACTIVE | Noted: 2023-10-03

## 2023-10-03 PROBLEM — D50.0 IRON DEFICIENCY ANEMIA DUE TO CHRONIC BLOOD LOSS: Status: ACTIVE | Noted: 2023-10-03

## 2023-10-03 LAB
ABO GROUP (TYPE) IN BLOOD: NORMAL
ABO GROUP (TYPE) IN BLOOD: NORMAL
ANION GAP SERPL CALC-SCNC: 8 MMOL/L (ref 10–20)
ANTIBODY SCREEN: NORMAL
ANTIBODY SCREEN: NORMAL
BASOPHILS # BLD AUTO: 0.04 X10*3/UL (ref 0–0.1)
BASOPHILS NFR BLD AUTO: 0.4 %
BLOOD EXPIRATION DATE: NORMAL
BUN SERPL-MCNC: 22 MG/DL (ref 6–23)
CALCIUM SERPL-MCNC: 7.6 MG/DL (ref 8.6–10.3)
CHLORIDE SERPL-SCNC: 97 MMOL/L (ref 98–107)
CO2 SERPL-SCNC: 33 MMOL/L (ref 21–32)
CREAT SERPL-MCNC: 1.81 MG/DL (ref 0.5–1.05)
DISPENSE STATUS: NORMAL
EOSINOPHIL # BLD AUTO: 0.32 X10*3/UL (ref 0–0.4)
EOSINOPHIL NFR BLD AUTO: 3.1 %
ERYTHROCYTE [DISTWIDTH] IN BLOOD BY AUTOMATED COUNT: 14 % (ref 11.5–14.5)
ERYTHROCYTE [DISTWIDTH] IN BLOOD BY AUTOMATED COUNT: 14.4 % (ref 11.5–14.5)
GFR SERPL CREATININE-BSD FRML MDRD: 27 ML/MIN/1.73M*2
GLUCOSE SERPL-MCNC: 194 MG/DL (ref 74–99)
HCT VFR BLD AUTO: 21.8 % (ref 36–46)
HCT VFR BLD AUTO: 21.8 % (ref 36–46)
HCT VFR BLD AUTO: 23.3 % (ref 36–46)
HCT VFR BLD AUTO: 23.7 % (ref 36–46)
HGB BLD-MCNC: 7.1 G/DL (ref 12–16)
HGB BLD-MCNC: 7.1 G/DL (ref 12–16)
HGB BLD-MCNC: 7.5 G/DL (ref 12–16)
HGB BLD-MCNC: 7.8 G/DL (ref 12–16)
IMM GRANULOCYTES # BLD AUTO: 0.15 X10*3/UL (ref 0–0.5)
IMM GRANULOCYTES NFR BLD AUTO: 1.4 % (ref 0–0.9)
INR PPP: 1.5 (ref 0.9–1.1)
LACTATE SERPL-SCNC: 1.2 MMOL/L (ref 0.4–2)
LACTATE SERPL-SCNC: 2.5 MMOL/L (ref 0.4–2)
LYMPHOCYTES # BLD AUTO: 2.15 X10*3/UL (ref 0.8–3)
LYMPHOCYTES NFR BLD AUTO: 20.8 %
MCH RBC QN AUTO: 30 PG (ref 26–34)
MCH RBC QN AUTO: 30.6 PG (ref 26–34)
MCHC RBC AUTO-ENTMCNC: 32.2 G/DL (ref 32–36)
MCHC RBC AUTO-ENTMCNC: 32.6 G/DL (ref 32–36)
MCV RBC AUTO: 93 FL (ref 80–100)
MCV RBC AUTO: 94 FL (ref 80–100)
MONOCYTES # BLD AUTO: 1.14 X10*3/UL (ref 0.05–0.8)
MONOCYTES NFR BLD AUTO: 11 %
NEUTROPHILS # BLD AUTO: 6.55 X10*3/UL (ref 1.6–5.5)
NEUTROPHILS NFR BLD AUTO: 63.3 %
NRBC BLD-RTO: 0 /100 WBCS (ref 0–0)
NRBC BLD-RTO: 0 /100 WBCS (ref 0–0)
PLATELET # BLD AUTO: 242 X10*3/UL (ref 150–450)
PLATELET # BLD AUTO: 254 X10*3/UL (ref 150–450)
PMV BLD AUTO: 9.4 FL (ref 7.5–11.5)
PMV BLD AUTO: 9.4 FL (ref 7.5–11.5)
POTASSIUM SERPL-SCNC: 3.9 MMOL/L (ref 3.5–5.3)
PRODUCT BLOOD TYPE: 6200
PRODUCT CODE: NORMAL
PROTHROMBIN TIME: 16.5 SECONDS (ref 9.8–12.8)
RBC # BLD AUTO: 2.32 X10*6/UL (ref 4–5.2)
RBC # BLD AUTO: 2.5 X10*6/UL (ref 4–5.2)
RH FACTOR (ANTIGEN D): NORMAL
RH FACTOR (ANTIGEN D): NORMAL
SODIUM SERPL-SCNC: 134 MMOL/L (ref 136–145)
UNIT ABO: NORMAL
UNIT NUMBER: NORMAL
UNIT RH: NORMAL
UNIT VOLUME: 350
WBC # BLD AUTO: 10.4 X10*3/UL (ref 4.4–11.3)
WBC # BLD AUTO: 11.6 X10*3/UL (ref 4.4–11.3)
XM INTEP: NORMAL

## 2023-10-03 PROCEDURE — 99233 SBSQ HOSP IP/OBS HIGH 50: CPT | Performed by: PHYSICIAN ASSISTANT

## 2023-10-03 PROCEDURE — 2500000004 HC RX 250 GENERAL PHARMACY W/ HCPCS (ALT 636 FOR OP/ED): Performed by: PHYSICIAN ASSISTANT

## 2023-10-03 PROCEDURE — 85025 COMPLETE CBC W/AUTO DIFF WBC: CPT | Performed by: INTERNAL MEDICINE

## 2023-10-03 PROCEDURE — P9016 RBC LEUKOCYTES REDUCED: HCPCS

## 2023-10-03 PROCEDURE — 36430 TRANSFUSION BLD/BLD COMPNT: CPT

## 2023-10-03 PROCEDURE — 36415 COLL VENOUS BLD VENIPUNCTURE: CPT | Performed by: EMERGENCY MEDICINE

## 2023-10-03 PROCEDURE — 80048 BASIC METABOLIC PNL TOTAL CA: CPT | Performed by: INTERNAL MEDICINE

## 2023-10-03 PROCEDURE — 2500000005 HC RX 250 GENERAL PHARMACY W/O HCPCS: Performed by: EMERGENCY MEDICINE

## 2023-10-03 PROCEDURE — 85018 HEMOGLOBIN: CPT | Performed by: PHYSICIAN ASSISTANT

## 2023-10-03 PROCEDURE — 86920 COMPATIBILITY TEST SPIN: CPT

## 2023-10-03 PROCEDURE — 2500000004 HC RX 250 GENERAL PHARMACY W/ HCPCS (ALT 636 FOR OP/ED): Performed by: INTERNAL MEDICINE

## 2023-10-03 PROCEDURE — 2500000002 HC RX 250 W HCPCS SELF ADMINISTERED DRUGS (ALT 637 FOR MEDICARE OP, ALT 636 FOR OP/ED): Performed by: INTERNAL MEDICINE

## 2023-10-03 PROCEDURE — 87040 BLOOD CULTURE FOR BACTERIA: CPT | Mod: CMCLAB,PORLAB | Performed by: EMERGENCY MEDICINE

## 2023-10-03 PROCEDURE — 2060000001 HC INTERMEDIATE ICU ROOM DAILY

## 2023-10-03 PROCEDURE — 85014 HEMATOCRIT: CPT | Performed by: PHYSICIAN ASSISTANT

## 2023-10-03 PROCEDURE — 2500000004 HC RX 250 GENERAL PHARMACY W/ HCPCS (ALT 636 FOR OP/ED): Performed by: EMERGENCY MEDICINE

## 2023-10-03 PROCEDURE — 87075 CULTR BACTERIA EXCEPT BLOOD: CPT | Mod: CMCLAB,PORLAB | Performed by: EMERGENCY MEDICINE

## 2023-10-03 PROCEDURE — 99254 IP/OBS CNSLTJ NEW/EST MOD 60: CPT | Performed by: INTERNAL MEDICINE

## 2023-10-03 PROCEDURE — 85027 COMPLETE CBC AUTOMATED: CPT | Performed by: INTERNAL MEDICINE

## 2023-10-03 PROCEDURE — 2500000001 HC RX 250 WO HCPCS SELF ADMINISTERED DRUGS (ALT 637 FOR MEDICARE OP): Performed by: INTERNAL MEDICINE

## 2023-10-03 PROCEDURE — 94760 N-INVAS EAR/PLS OXIMETRY 1: CPT

## 2023-10-03 PROCEDURE — 99223 1ST HOSP IP/OBS HIGH 75: CPT | Performed by: INTERNAL MEDICINE

## 2023-10-03 PROCEDURE — 86900 BLOOD TYPING SEROLOGIC ABO: CPT | Performed by: INTERNAL MEDICINE

## 2023-10-03 PROCEDURE — 2500000004 HC RX 250 GENERAL PHARMACY W/ HCPCS (ALT 636 FOR OP/ED)

## 2023-10-03 PROCEDURE — 85610 PROTHROMBIN TIME: CPT | Performed by: INTERNAL MEDICINE

## 2023-10-03 PROCEDURE — C9113 INJ PANTOPRAZOLE SODIUM, VIA: HCPCS | Performed by: EMERGENCY MEDICINE

## 2023-10-03 PROCEDURE — C9113 INJ PANTOPRAZOLE SODIUM, VIA: HCPCS | Performed by: PHYSICIAN ASSISTANT

## 2023-10-03 PROCEDURE — 83605 ASSAY OF LACTIC ACID: CPT | Performed by: PHYSICIAN ASSISTANT

## 2023-10-03 RX ORDER — ATORVASTATIN CALCIUM 80 MG/1
80 TABLET, FILM COATED ORAL NIGHTLY
COMMUNITY
Start: 2023-09-07

## 2023-10-03 RX ORDER — CEFTRIAXONE 1 G/50ML
1 INJECTION, SOLUTION INTRAVENOUS EVERY 24 HOURS
Status: DISCONTINUED | OUTPATIENT
Start: 2023-10-03 | End: 2023-10-09 | Stop reason: HOSPADM

## 2023-10-03 RX ORDER — INSULIN LISPRO 100 [IU]/ML
40 INJECTION, SUSPENSION SUBCUTANEOUS EVERY MORNING
COMMUNITY
Start: 2023-09-07

## 2023-10-03 RX ORDER — GUAIFENESIN/DEXTROMETHORPHAN 100-10MG/5
5 SYRUP ORAL EVERY 4 HOURS PRN
Status: DISCONTINUED | OUTPATIENT
Start: 2023-10-03 | End: 2023-10-06

## 2023-10-03 RX ORDER — ACETAMINOPHEN 325 MG/1
650 TABLET ORAL EVERY 4 HOURS PRN
Status: DISCONTINUED | OUTPATIENT
Start: 2023-10-03 | End: 2023-10-09 | Stop reason: HOSPADM

## 2023-10-03 RX ORDER — METOPROLOL SUCCINATE 50 MG/1
50 TABLET, EXTENDED RELEASE ORAL DAILY
Status: DISCONTINUED | OUTPATIENT
Start: 2023-10-03 | End: 2023-10-09 | Stop reason: HOSPADM

## 2023-10-03 RX ORDER — PANTOPRAZOLE SODIUM 40 MG/10ML
40 INJECTION, POWDER, LYOPHILIZED, FOR SOLUTION INTRAVENOUS
Status: DISCONTINUED | OUTPATIENT
Start: 2023-10-04 | End: 2023-10-05

## 2023-10-03 RX ORDER — CLOPIDOGREL BISULFATE 75 MG/1
75 TABLET ORAL DAILY
COMMUNITY
Start: 2023-09-07

## 2023-10-03 RX ORDER — PANTOPRAZOLE SODIUM 40 MG/1
40 TABLET, DELAYED RELEASE ORAL
COMMUNITY

## 2023-10-03 RX ORDER — ADHESIVE BANDAGE
30 BANDAGE TOPICAL DAILY PRN
COMMUNITY

## 2023-10-03 RX ORDER — CYANOCOBALAMIN (VITAMIN B-12) 500 MCG
2 TABLET ORAL NIGHTLY PRN
COMMUNITY

## 2023-10-03 RX ORDER — CEFTRIAXONE 2 G/50ML
2 INJECTION, SOLUTION INTRAVENOUS ONCE
Status: COMPLETED | OUTPATIENT
Start: 2023-10-03 | End: 2023-10-03

## 2023-10-03 RX ORDER — CLOPIDOGREL BISULFATE 75 MG/1
75 TABLET ORAL DAILY
Status: DISCONTINUED | OUTPATIENT
Start: 2023-10-03 | End: 2023-10-09 | Stop reason: HOSPADM

## 2023-10-03 RX ORDER — ACETAMINOPHEN 650 MG/1
650 SUPPOSITORY RECTAL EVERY 4 HOURS PRN
Status: DISCONTINUED | OUTPATIENT
Start: 2023-10-03 | End: 2023-10-09 | Stop reason: HOSPADM

## 2023-10-03 RX ORDER — ONDANSETRON HYDROCHLORIDE 2 MG/ML
INJECTION, SOLUTION INTRAVENOUS
Status: COMPLETED
Start: 2023-10-03 | End: 2023-10-03

## 2023-10-03 RX ORDER — ACETAMINOPHEN 325 MG/1
650 TABLET ORAL EVERY 6 HOURS PRN
Status: DISCONTINUED | OUTPATIENT
Start: 2023-10-03 | End: 2023-10-09 | Stop reason: HOSPADM

## 2023-10-03 RX ORDER — ACETAMINOPHEN 160 MG/5ML
650 SUSPENSION ORAL EVERY 4 HOURS PRN
Status: DISCONTINUED | OUTPATIENT
Start: 2023-10-03 | End: 2023-10-09 | Stop reason: HOSPADM

## 2023-10-03 RX ORDER — METOPROLOL SUCCINATE 50 MG/1
50 TABLET, EXTENDED RELEASE ORAL DAILY
COMMUNITY

## 2023-10-03 RX ORDER — ISOSORBIDE DINITRATE 10 MG/1
10 TABLET ORAL 3 TIMES DAILY
Status: DISCONTINUED | OUTPATIENT
Start: 2023-10-03 | End: 2023-10-09 | Stop reason: HOSPADM

## 2023-10-03 RX ORDER — PANTOPRAZOLE SODIUM 40 MG/10ML
40 INJECTION, POWDER, LYOPHILIZED, FOR SOLUTION INTRAVENOUS 2 TIMES DAILY
Status: DISCONTINUED | OUTPATIENT
Start: 2023-10-03 | End: 2023-10-05

## 2023-10-03 RX ORDER — SODIUM CHLORIDE 9 MG/ML
75 INJECTION, SOLUTION INTRAVENOUS CONTINUOUS
Status: DISCONTINUED | OUTPATIENT
Start: 2023-10-03 | End: 2023-10-03

## 2023-10-03 RX ORDER — BISACODYL 10 MG/1
10 SUPPOSITORY RECTAL DAILY PRN
COMMUNITY

## 2023-10-03 RX ORDER — POLYETHYLENE GLYCOL 3350 17 G/17G
17 POWDER, FOR SOLUTION ORAL DAILY PRN
COMMUNITY

## 2023-10-03 RX ORDER — ATORVASTATIN CALCIUM 40 MG/1
80 TABLET, FILM COATED ORAL NIGHTLY
Status: DISCONTINUED | OUTPATIENT
Start: 2023-10-03 | End: 2023-10-09 | Stop reason: HOSPADM

## 2023-10-03 RX ORDER — POLYETHYLENE GLYCOL 3350 17 G/17G
17 POWDER, FOR SOLUTION ORAL DAILY
Status: DISCONTINUED | OUTPATIENT
Start: 2023-10-03 | End: 2023-10-09 | Stop reason: HOSPADM

## 2023-10-03 RX ORDER — NYSTATIN 100000 [USP'U]/ML
5 SUSPENSION ORAL 4 TIMES DAILY
Status: DISCONTINUED | OUTPATIENT
Start: 2023-10-03 | End: 2023-10-09 | Stop reason: HOSPADM

## 2023-10-03 RX ORDER — AMIODARONE HYDROCHLORIDE 200 MG/1
200 TABLET ORAL EVERY 24 HOURS
Status: DISCONTINUED | OUTPATIENT
Start: 2023-10-03 | End: 2023-10-09 | Stop reason: HOSPADM

## 2023-10-03 RX ORDER — AMIODARONE HYDROCHLORIDE 200 MG/1
200 TABLET ORAL EVERY 24 HOURS
COMMUNITY
Start: 2023-09-07

## 2023-10-03 RX ORDER — SODIUM CHLORIDE 0.9 % (FLUSH) 0.9 %
SYRINGE (ML) INJECTION
Status: COMPLETED
Start: 2023-10-03 | End: 2023-10-03

## 2023-10-03 RX ORDER — PANTOPRAZOLE SODIUM 40 MG/1
40 TABLET, DELAYED RELEASE ORAL
Status: DISCONTINUED | OUTPATIENT
Start: 2023-10-04 | End: 2023-10-05

## 2023-10-03 RX ORDER — SIMETHICONE 80 MG
80 TABLET,CHEWABLE ORAL EVERY 6 HOURS PRN
COMMUNITY

## 2023-10-03 RX ORDER — LISINOPRIL 20 MG/1
40 TABLET ORAL DAILY
Status: DISCONTINUED | OUTPATIENT
Start: 2023-10-03 | End: 2023-10-09 | Stop reason: HOSPADM

## 2023-10-03 RX ORDER — ACETAMINOPHEN 325 MG/1
650 TABLET ORAL EVERY 6 HOURS PRN
COMMUNITY
Start: 2023-09-07

## 2023-10-03 RX ORDER — PANTOPRAZOLE SODIUM 40 MG/1
40 TABLET, DELAYED RELEASE ORAL
Status: DISCONTINUED | OUTPATIENT
Start: 2023-10-04 | End: 2023-10-03 | Stop reason: SDUPTHER

## 2023-10-03 RX ORDER — ALUMINUM HYDROXIDE, MAGNESIUM HYDROXIDE, AND SIMETHICONE 1200; 120; 1200 MG/30ML; MG/30ML; MG/30ML
30 SUSPENSION ORAL EVERY 4 HOURS PRN
COMMUNITY

## 2023-10-03 RX ORDER — CEFTRIAXONE 2 G/50ML
2 INJECTION, SOLUTION INTRAVENOUS EVERY 24 HOURS
Status: DISCONTINUED | OUTPATIENT
Start: 2023-10-03 | End: 2023-10-03 | Stop reason: SDUPTHER

## 2023-10-03 RX ORDER — PANTOPRAZOLE SODIUM 40 MG/10ML
40 INJECTION, POWDER, LYOPHILIZED, FOR SOLUTION INTRAVENOUS ONCE
Status: COMPLETED | OUTPATIENT
Start: 2023-10-03 | End: 2023-10-03

## 2023-10-03 RX ORDER — ONDANSETRON HYDROCHLORIDE 2 MG/ML
4 INJECTION, SOLUTION INTRAVENOUS ONCE
Status: COMPLETED | OUTPATIENT
Start: 2023-10-03 | End: 2023-10-03

## 2023-10-03 RX ORDER — LIDOCAINE 50 MG/G
1 PATCH TOPICAL DAILY
COMMUNITY

## 2023-10-03 RX ORDER — ISOSORBIDE DINITRATE 10 MG/1
1 TABLET ORAL 3 TIMES DAILY
COMMUNITY
Start: 2023-09-07

## 2023-10-03 RX ORDER — DOXYCYCLINE 100 MG/1
100 CAPSULE ORAL EVERY 12 HOURS SCHEDULED
Status: DISCONTINUED | OUTPATIENT
Start: 2023-10-03 | End: 2023-10-09 | Stop reason: HOSPADM

## 2023-10-03 RX ADMIN — ISOSORBIDE DINITRATE 10 MG: 10 TABLET ORAL at 16:33

## 2023-10-03 RX ADMIN — PANTOPRAZOLE SODIUM 40 MG: 40 INJECTION, POWDER, FOR SOLUTION INTRAVENOUS at 23:10

## 2023-10-03 RX ADMIN — CEFTRIAXONE SODIUM 2 G: 2 INJECTION, SOLUTION INTRAVENOUS at 06:24

## 2023-10-03 RX ADMIN — AMIODARONE HYDROCHLORIDE 200 MG: 200 TABLET ORAL at 16:32

## 2023-10-03 RX ADMIN — PANTOPRAZOLE SODIUM 40 MG: 40 INJECTION, POWDER, FOR SOLUTION INTRAVENOUS at 05:19

## 2023-10-03 RX ADMIN — ONDANSETRON 4 MG: 2 INJECTION INTRAMUSCULAR; INTRAVENOUS at 02:09

## 2023-10-03 RX ADMIN — ONDANSETRON HYDROCHLORIDE 4 MG: 2 INJECTION, SOLUTION INTRAVENOUS at 02:09

## 2023-10-03 RX ADMIN — CLOPIDOGREL BISULFATE 75 MG: 75 TABLET ORAL at 15:00

## 2023-10-03 RX ADMIN — PANTOPRAZOLE SODIUM 40 MG: 40 INJECTION, POWDER, FOR SOLUTION INTRAVENOUS at 15:01

## 2023-10-03 RX ADMIN — NYSTATIN 500000 UNITS: 100000 SUSPENSION ORAL at 23:09

## 2023-10-03 RX ADMIN — LISINOPRIL 40 MG: 20 TABLET ORAL at 16:33

## 2023-10-03 RX ADMIN — ATORVASTATIN CALCIUM 80 MG: 40 TABLET, FILM COATED ORAL at 23:10

## 2023-10-03 RX ADMIN — Medication 10 ML: at 23:12

## 2023-10-03 RX ADMIN — DOXYCYCLINE 100 MG: 100 CAPSULE ORAL at 23:09

## 2023-10-03 RX ADMIN — DOXYCYCLINE 100 MG: 100 INJECTION, POWDER, LYOPHILIZED, FOR SOLUTION INTRAVENOUS at 08:17

## 2023-10-03 RX ADMIN — METOPROLOL SUCCINATE 50 MG: 50 TABLET, EXTENDED RELEASE ORAL at 16:33

## 2023-10-03 RX ADMIN — CEFTRIAXONE SODIUM 1 G: 1 INJECTION, SOLUTION INTRAVENOUS at 15:00

## 2023-10-03 SDOH — SOCIAL STABILITY: SOCIAL INSECURITY: ABUSE: ADULT

## 2023-10-03 SDOH — SOCIAL STABILITY: SOCIAL INSECURITY: HAS ANYONE EVER THREATENED TO HURT YOUR FAMILY OR YOUR PETS?: NO

## 2023-10-03 SDOH — SOCIAL STABILITY: SOCIAL INSECURITY: DO YOU FEEL UNSAFE GOING BACK TO THE PLACE WHERE YOU ARE LIVING?: NO

## 2023-10-03 SDOH — HEALTH STABILITY: MENTAL HEALTH: HOW OFTEN DO YOU HAVE 6 OR MORE DRINKS ON ONE OCCASION?: NEVER

## 2023-10-03 SDOH — SOCIAL STABILITY: SOCIAL INSECURITY: DOES ANYONE TRY TO KEEP YOU FROM HAVING/CONTACTING OTHER FRIENDS OR DOING THINGS OUTSIDE YOUR HOME?: NO

## 2023-10-03 SDOH — SOCIAL STABILITY: SOCIAL INSECURITY: WERE YOU ABLE TO COMPLETE ALL THE BEHAVIORAL HEALTH SCREENINGS?: YES

## 2023-10-03 SDOH — SOCIAL STABILITY: SOCIAL INSECURITY: DO YOU FEEL ANYONE HAS EXPLOITED OR TAKEN ADVANTAGE OF YOU FINANCIALLY OR OF YOUR PERSONAL PROPERTY?: NO

## 2023-10-03 SDOH — HEALTH STABILITY: MENTAL HEALTH: HOW OFTEN DO YOU HAVE A DRINK CONTAINING ALCOHOL?: NEVER

## 2023-10-03 SDOH — HEALTH STABILITY: MENTAL HEALTH: HOW MANY STANDARD DRINKS CONTAINING ALCOHOL DO YOU HAVE ON A TYPICAL DAY?: PATIENT DOES NOT DRINK

## 2023-10-03 SDOH — SOCIAL STABILITY: SOCIAL INSECURITY: HAVE YOU HAD THOUGHTS OF HARMING ANYONE ELSE?: NO

## 2023-10-03 SDOH — SOCIAL STABILITY: SOCIAL INSECURITY: ARE YOU OR HAVE YOU BEEN THREATENED OR ABUSED PHYSICALLY, EMOTIONALLY, OR SEXUALLY BY ANYONE?: NO

## 2023-10-03 SDOH — SOCIAL STABILITY: SOCIAL INSECURITY: ARE THERE ANY APPARENT SIGNS OF INJURIES/BEHAVIORS THAT COULD BE RELATED TO ABUSE/NEGLECT?: NO

## 2023-10-03 ASSESSMENT — ENCOUNTER SYMPTOMS
EYE PAIN: 0
WOUND: 0
POLYDIPSIA: 0
SORE THROAT: 0
FACIAL SWELLING: 0
ABDOMINAL PAIN: 0
BACK PAIN: 0
HALLUCINATIONS: 0
CHILLS: 0
BRUISES/BLEEDS EASILY: 0
SHORTNESS OF BREATH: 1
NECK PAIN: 0
WEAKNESS: 1
COUGH: 1
DIZZINESS: 0
APPETITE CHANGE: 0
SPEECH DIFFICULTY: 0
PALPITATIONS: 0
SEIZURES: 0
CONSTIPATION: 0
DIAPHORESIS: 0
FEVER: 0
FREQUENCY: 0
HEMATURIA: 0
DYSURIA: 0
WHEEZING: 0
EYES NEGATIVE: 1
DIARRHEA: 0
BLOOD IN STOOL: 0
CONFUSION: 0
NUMBNESS: 0
JOINT SWELLING: 0
HEADACHES: 0
FATIGUE: 1
TROUBLE SWALLOWING: 0
VOMITING: 0
CHEST TIGHTNESS: 0
FLANK PAIN: 0
BLOOD IN STOOL: 1
WEAKNESS: 0
SHORTNESS OF BREATH: 0
LIGHT-HEADEDNESS: 0
NAUSEA: 0

## 2023-10-03 ASSESSMENT — COGNITIVE AND FUNCTIONAL STATUS - GENERAL
STANDING UP FROM CHAIR USING ARMS: A LOT
DRESSING REGULAR UPPER BODY CLOTHING: A LOT
DRESSING REGULAR LOWER BODY CLOTHING: TOTAL
MOVING FROM LYING ON BACK TO SITTING ON SIDE OF FLAT BED WITH BEDRAILS: A LOT
TURNING FROM BACK TO SIDE WHILE IN FLAT BAD: A LOT
CLIMB 3 TO 5 STEPS WITH RAILING: TOTAL
DAILY ACTIVITIY SCORE: 13
HELP NEEDED FOR BATHING: A LOT
MOVING TO AND FROM BED TO CHAIR: TOTAL
PATIENT BASELINE BEDBOUND: NO
PERSONAL GROOMING: A LITTLE
TOILETING: A LOT
MOBILITY SCORE: 9
EATING MEALS: A LITTLE
WALKING IN HOSPITAL ROOM: TOTAL

## 2023-10-03 ASSESSMENT — PAIN SCALES - GENERAL
PAINLEVEL_OUTOF10: 0 - NO PAIN

## 2023-10-03 ASSESSMENT — ACTIVITIES OF DAILY LIVING (ADL)
FEEDING YOURSELF: NEEDS ASSISTANCE
HEARING - LEFT EAR: FUNCTIONAL
TOILETING: NEEDS ASSISTANCE
WALKS IN HOME: DEPENDENT
GROOMING: NEEDS ASSISTANCE
ADEQUATE_TO_COMPLETE_ADL: YES
PATIENT'S MEMORY ADEQUATE TO SAFELY COMPLETE DAILY ACTIVITIES?: YES
DRESSING YOURSELF: NEEDS ASSISTANCE
BATHING: NEEDS ASSISTANCE
JUDGMENT_ADEQUATE_SAFELY_COMPLETE_DAILY_ACTIVITIES: YES
ASSISTIVE_DEVICE: WALKER;WHEELCHAIR
HEARING - RIGHT EAR: FUNCTIONAL

## 2023-10-03 ASSESSMENT — LIFESTYLE VARIABLES
HOW OFTEN DO YOU HAVE A DRINK CONTAINING ALCOHOL: NEVER
AUDIT-C TOTAL SCORE: 0
SKIP TO QUESTIONS 9-10: 1
HOW MANY STANDARD DRINKS CONTAINING ALCOHOL DO YOU HAVE ON A TYPICAL DAY: PATIENT DOES NOT DRINK
SKIP TO QUESTIONS 9-10: 1
AUDIT-C TOTAL SCORE: 0
AUDIT-C TOTAL SCORE: 0
HOW OFTEN DO YOU HAVE 6 OR MORE DRINKS ON ONE OCCASION: NEVER

## 2023-10-03 ASSESSMENT — PAIN - FUNCTIONAL ASSESSMENT
PAIN_FUNCTIONAL_ASSESSMENT: 0-10
PAIN_FUNCTIONAL_ASSESSMENT: 0-10

## 2023-10-03 ASSESSMENT — PATIENT HEALTH QUESTIONNAIRE - PHQ9
1. LITTLE INTEREST OR PLEASURE IN DOING THINGS: NOT AT ALL
SUM OF ALL RESPONSES TO PHQ9 QUESTIONS 1 & 2: 0
2. FEELING DOWN, DEPRESSED OR HOPELESS: NOT AT ALL

## 2023-10-03 NOTE — ASSESSMENT & PLAN NOTE
Patient has infiltrate on chest x-ray  Has minimal sputum production no significant hypoxia  Treat empirically with Rocephin and doxycycline  Supportive treatments  Lactic acidosis has resolved

## 2023-10-03 NOTE — ED TRIAGE NOTES
Pt arrives via EMS from the [facility], pt has had SOB and dry cough for roughly 7 days. When EMS arrived pt had a dark tsrry BM. Pt is on eliquis but denies abdominal pain at this time

## 2023-10-03 NOTE — PROGRESS NOTES
Pharmacy Medication History Review    Melody Martin is a 88 y.o. female admitted for No Principal Problem: There is no principal problem currently on the Problem List. Pharmacy reviewed the patient's [id number] medications and allergies for accuracy.    The list below reflects the updated PTA list. Please review each medication in order reconciliation for additional clarification and justification.    No current facility-administered medications on file prior to encounter.     Current Outpatient Medications on File Prior to Encounter   Medication Sig Dispense Refill    acetaminophen (Tylenol) 325 mg tablet Take 2 tablets (650 mg) by mouth every 6 hours if needed for mild pain (1 - 3).      amiodarone (Pacerone) 200 mg tablet Take 1 tablet (200 mg) by mouth once every 24 hours.      apixaban (Eliquis) 2.5 mg tablet Take 1 tablet (2.5 mg) by mouth 2 times a day.      atorvastatin (Lipitor) 80 mg tablet Take 1 tablet (80 mg) by mouth once daily at bedtime.      clopidogrel (Plavix) 75 mg tablet Take 1 tablet (75 mg) by mouth once daily.      insulin lispro protamin-lispro (HumaLOG Mix 75-25) 100 unit/mL (75-25) injection Inject 40 Units under the skin once daily in the morning. And inject 20 units under the skin once daily at night      isosorbide dinitrate (Isordil) 10 mg tablet Take 1 tablet (10 mg) by mouth 3 times a day.      alum-mag hydroxide-simeth (Mylanta) 200-200-20 mg/5 mL oral suspension Take 30 mL by mouth every 4 hours if needed for indigestion or heartburn.      bisacodyl (Dulcolax, bisacodyl,) 10 mg suppository Insert 1 suppository (10 mg) into the rectum once daily as needed for constipation.      blood sugar diagnostic (OneTouch Ultra Test) strip 1 strip by Does not apply route 3 times a day. 300 strip 3    insulin NPH and regular human (HumuLIN 70/30 U-100 Insulin) 100 unit/mL (70-30) injection Inject 40 Units under the skin once daily in the morning. And 20 units with dinner (Patient not  taking: Reported on 10/3/2023) 50 mL 3    lidocaine (Lidoderm) 5 % patch Place 1 patch on the skin once daily. Remove & discard patch within 12 hours or as directed by MD. Apply to left hip.      lisinopril 40 mg tablet Take 1 tablet (40 mg) by mouth once daily. (Patient not taking: Reported on 10/3/2023) 90 tablet 3    lisinopril 40 mg tablet Take 1 tablet (40 mg) by mouth once daily. (Patient not taking: Reported on 10/3/2023) 90 tablet 3    magnesium hydroxide (Milk of Magnesia) 400 mg/5 mL suspension Take 30 mL by mouth once daily as needed for constipation.      melatonin tablet Take 2 tablets (2 mg) by mouth as needed at bedtime for sleep.      metoprolol succinate XL (Toprol-XL) 50 mg 24 hr tablet Take 1 tablet (50 mg) by mouth once daily. Do not crush or chew.      nystatin (Mycostatin) 100,000 unit/mL suspension Take 5 mL (500,000 Units) by mouth in the morning and 5 mL (500,000 Units) at noon and 5 mL (500,000 Units) in the evening and 5 mL (500,000 Units) before bedtime. Swish in mouth and spit out.. (Patient not taking: Reported on 10/3/2023) 120 mL 1    pantoprazole (ProtoNix) 40 mg EC tablet Take 1 tablet (40 mg) by mouth once daily in the morning. Take before meals. Do not crush, chew, or split.      polyethylene glycol (Glycolax, Miralax) 17 gram/dose powder Take 17 g by mouth once daily as needed (constipation).      PSYLLIUM HUSK, ASPARTAME, ORAL Take 1 packet by mouth once daily as needed (constipation).      simethicone (Mylicon) 80 mg chewable tablet Chew 1 tablet (80 mg) every 6 hours if needed for flatulence.      [DISCONTINUED] ascorbic acid (Vitamin C) 500 mg chewable tablet Chew 1 tablet (500 mg) once daily.      [DISCONTINUED] cholecalciferol (Vitamin D-3) 50 MCG (2000 UT) tablet Take 1 tablet (50 mcg) by mouth in the morning.      [DISCONTINUED] metoprolol tartrate (Lopressor) 50 mg tablet Take 1 tablet by mouth 2 times a day.            The list below reflectives the updated allergy  list. Please review each documented allergy for additional clarification and justification.  Allergies  Reviewed by Kaleigh Castillo RN on 10/2/2023        Severity Reactions Comments    Aspirin High Shortness of breath     Amlodipine Not Specified Dizziness     Levofloxacin Not Specified Unknown     Sulfamethoxazole-trimethoprim Not Specified Diarrhea, Other             Sources: [facility] Order Summary Report 10/2/23    Alana Frey, PharmD

## 2023-10-03 NOTE — PROGRESS NOTES
I spoke with patient & daughter, Nikole to discuss discharge planning and prior level of function after introducing myself and explaining role of TCC. Pt currently receiving HD at [facility] using R chest HD cath.  Pt states planning on returning to [place] to complete rehab.  Referral given to [facility].

## 2023-10-03 NOTE — ASSESSMENT & PLAN NOTE
Patient placed on scale insulin coverage  Will allow DM diet  Add lantus 10u nightly given hyperglycemia  A1c 6.1

## 2023-10-03 NOTE — H&P
History Of Present Illness  Melody Martin is a 88 y.o. female with a past medical history that is significant for coronary artery disease with recent stenting, recent lower GI bleed, diabetes mellitus type 2 insulin requiring, hypertension, stroke with residual left-sided weakness, hypertension, accessible A-fib on anticoagulation, aortic stenosis, end-stage renal disease on dialysis Monday Wednesday Friday presenting with increasing amount of shortness of breath cough black tarry stool.  Patient has had increasing amount of fatigue.  Having a mostly dry cough but he had some green-tinged sputum today is not had fevers and chills but is wrapped in four blankets.  Patient was noted to have a black bowel movement today.  Patient is hemodynamically stable upon arrival to the emergency room.  Chest x-ray shows patient have a left lower lobe infiltrate.  Patient's blood work shows 14,600 white count with a H&H of 6.8 and 21.0.  Glucose is 199 sodium 133 potassium 3.9 bicarb is 30 patient's BUN is 19 creatinine of 1.57.  Patient's lactic acid was 2.6 and upon repeat 2.5.  Patient was transfused 1 unit of packed red blood cells in the emergency room.  Patient is being admitted to the hospital for shortness of breath which appears to be multifactorial including possible mild pneumonia patient is not significantly hypoxic contributed to also from patient's anemia.     Past Medical History  See history of present illness for listing of past medical history    She has a past medical history of Chronic kidney disease, Heart murmur, Hypertension, Myocardial infarction (CMS/[facility]), Other conditions influencing health status (12/06/2022), Other conditions influencing health status (04/06/2016), Personal history of other diseases of the circulatory system, Personal history of other diseases of the female genital tract, Personal history of other diseases of the nervous system and sense organs (10/15/2021), Personal history of other  endocrine, nutritional and metabolic disease (01/26/2018), and Stroke (CMS/[facility]).    Surgical History  She has a past surgical history that includes Hysterectomy (10/10/2018); Back surgery (10/10/2018); IR CVC tunneled (8/28/2023); MR angio neck wo IV contrast (8/31/2023); and MR angio head wo IV contrast (8/31/2023).     Social History  She reports that she has never smoked. She has never used smokeless tobacco. She reports that she does not drink alcohol and does not use drugs.    Family History  Family History   Problem Relation Name Age of Onset    No Known Problems Mother      No Known Problems Father          Allergies  Aspirin, Amlodipine, Levofloxacin, and Sulfamethoxazole-trimethoprim    Review of Systems   Constitutional:  Positive for fatigue. Negative for chills and fever.   HENT:  Negative for congestion and sore throat.    Eyes: Negative.    Respiratory:  Positive for cough and shortness of breath. Negative for chest tightness and wheezing.    Cardiovascular:  Negative for palpitations and leg swelling.   Gastrointestinal:  Positive for blood in stool. Negative for abdominal pain, nausea and vomiting.   Endocrine: Negative for polydipsia and polyuria.   Genitourinary:  Negative for dysuria and flank pain.   Musculoskeletal:  Positive for gait problem. Negative for back pain and neck pain.   Skin: Negative.    Neurological:  Positive for weakness (Left sided weakness). Negative for dizziness, seizures, speech difficulty and numbness.   Psychiatric/Behavioral:  Negative for confusion.         Physical Exam  Constitutional:       Appearance: Normal appearance.   HENT:      Head: Normocephalic and atraumatic.      Mouth/Throat:      Mouth: Mucous membranes are moist.   Eyes:      Extraocular Movements: Extraocular movements intact.      Pupils: Pupils are equal, round, and reactive to light.   Cardiovascular:      Rate and Rhythm: Normal rate. Rhythm irregular.      Pulses: Normal pulses.      Heart sounds:  "Murmur (Grade 2 systolic murmur best at apex) heard.   Pulmonary:      Effort: Pulmonary effort is normal.      Breath sounds: Normal breath sounds. No wheezing or rhonchi.   Abdominal:      General: Abdomen is flat. Bowel sounds are normal.      Palpations: Abdomen is soft.      Tenderness: There is no abdominal tenderness.   Musculoskeletal:         General: Normal range of motion.   Skin:     Coloration: Skin is not jaundiced.      Findings: No erythema or rash.   Neurological:      Mental Status: She is alert and oriented to person, place, and time.      Motor: Weakness (1+ strength left leg) present.      Gait: Gait abnormal.   Psychiatric:         Mood and Affect: Mood normal.         Thought Content: Thought content normal.         Judgment: Judgment normal.          Last Recorded Vitals  Blood pressure 139/53, pulse 70, temperature 37 °C (98.6 °F), temperature source Tympanic, resp. rate 18, height 1.626 m (5' 4\"), weight 84 kg (185 lb 3 oz), SpO2 98 %.    Relevant Results    Current Facility-Administered Medications:     cefTRIAXone (Rocephin) 2 g IV in dextrose 5% 50 mL, 2 g, intravenous, Once, Prem Rivas MD    doxycycline (Vibramycin) in dextrose 5 % in water (D5W) 100 mL  mg, 100 mg, intravenous, Once, Prem Rivas MD    Current Outpatient Medications:     acetaminophen (Tylenol) 325 mg tablet, Take 2 tablets (650 mg) by mouth every 6 hours if needed for mild pain (1 - 3)., Disp: , Rfl:     amiodarone (Pacerone) 200 mg tablet, Take 1 tablet (200 mg) by mouth once every 24 hours., Disp: , Rfl:     apixaban (Eliquis) 2.5 mg tablet, Take 1 tablet (2.5 mg) by mouth 2 times a day., Disp: , Rfl:     atorvastatin (Lipitor) 80 mg tablet, Take 1 tablet (80 mg) by mouth once daily at bedtime., Disp: , Rfl:     clopidogrel (Plavix) 75 mg tablet, Take 1 tablet (75 mg) by mouth once daily., Disp: , Rfl:     insulin lispro protamin-lispro (HumaLOG Mix 75-25) 100 unit/mL (75-25) injection, Inject 40 Units " under the skin once daily in the morning. And inject 20 units under the skin once daily at night, Disp: , Rfl:     isosorbide dinitrate (Isordil) 10 mg tablet, Take 1 tablet (10 mg) by mouth 3 times a day., Disp: , Rfl:     alum-mag hydroxide-simeth (Mylanta) 200-200-20 mg/5 mL oral suspension, Take 30 mL by mouth every 4 hours if needed for indigestion or heartburn., Disp: , Rfl:     bisacodyl (Dulcolax, bisacodyl,) 10 mg suppository, Insert 1 suppository (10 mg) into the rectum once daily as needed for constipation., Disp: , Rfl:     blood sugar diagnostic (OneTouch Ultra Test) strip, 1 strip by Does not apply route 3 times a day., Disp: 300 strip, Rfl: 3    lidocaine (Lidoderm) 5 % patch, Place 1 patch on the skin once daily. Remove & discard patch within 12 hours or as directed by MD. Apply to left hip., Disp: , Rfl:     lisinopril 40 mg tablet, Take 1 tablet (40 mg) by mouth once daily. (Patient not taking: Reported on 10/3/2023), Disp: 90 tablet, Rfl: 3    lisinopril 40 mg tablet, Take 1 tablet (40 mg) by mouth once daily. (Patient not taking: Reported on 10/3/2023), Disp: 90 tablet, Rfl: 3    magnesium hydroxide (Milk of Magnesia) 400 mg/5 mL suspension, Take 30 mL by mouth once daily as needed for constipation., Disp: , Rfl:     melatonin tablet, Take 2 tablets (2 mg) by mouth as needed at bedtime for sleep., Disp: , Rfl:     metoprolol succinate XL (Toprol-XL) 50 mg 24 hr tablet, Take 1 tablet (50 mg) by mouth once daily. Do not crush or chew., Disp: , Rfl:     nystatin (Mycostatin) 100,000 unit/mL suspension, Take 5 mL (500,000 Units) by mouth in the morning and 5 mL (500,000 Units) at noon and 5 mL (500,000 Units) in the evening and 5 mL (500,000 Units) before bedtime. Swish in mouth and spit out.. (Patient not taking: Reported on 10/3/2023), Disp: 120 mL, Rfl: 1    pantoprazole (ProtoNix) 40 mg EC tablet, Take 1 tablet (40 mg) by mouth once daily in the morning. Take before meals. Do not crush, chew, or  split., Disp: , Rfl:     polyethylene glycol (Glycolax, Miralax) 17 gram/dose powder, Take 17 g by mouth once daily as needed (constipation)., Disp: , Rfl:     PSYLLIUM HUSK, ASPARTAME, ORAL, Take 1 packet by mouth once daily as needed (constipation)., Disp: , Rfl:     simethicone (Mylicon) 80 mg chewable tablet, Chew 1 tablet (80 mg) every 6 hours if needed for flatulence., Disp: , Rfl:      Results for orders placed or performed during the hospital encounter of 10/02/23 (from the past 24 hour(s))   CBC and Auto Differential   Result Value Ref Range    WBC 14.6 (H) 4.4 - 11.3 x10*3/uL    nRBC 0.0 0.0 - 0.0 /100 WBCs    RBC 2.25 (L) 4.00 - 5.20 x10*6/uL    Hemoglobin 6.8 (L) 12.0 - 16.0 g/dL    Hematocrit 21.0 (L) 36.0 - 46.0 %    MCV 93 80 - 100 fL    MCH 30.2 26.0 - 34.0 pg    MCHC 32.4 32.0 - 36.0 g/dL    RDW 14.5 11.5 - 14.5 %    Platelets 243 150 - 450 x10*3/uL    MPV 9.2 7.5 - 11.5 fL    Neutrophils % 66.3 40.0 - 80.0 %    Immature Granulocytes %, Automated 1.4 (H) 0.0 - 0.9 %    Lymphocytes % 20.5 13.0 - 44.0 %    Monocytes % 10.3 2.0 - 10.0 %    Eosinophils % 1.1 0.0 - 6.0 %    Basophils % 0.4 0.0 - 2.0 %    Neutrophils Absolute 9.64 (H) 1.60 - 5.50 x10*3/uL    Immature Granulocytes Absolute, Automated 0.21 0.00 - 0.50 x10*3/uL    Lymphocytes Absolute 2.98 0.80 - 3.00 x10*3/uL    Monocytes Absolute 1.50 (H) 0.05 - 0.80 x10*3/uL    Eosinophils Absolute 0.16 0.00 - 0.40 x10*3/uL    Basophils Absolute 0.06 0.00 - 0.10 x10*3/uL   Comprehensive metabolic panel   Result Value Ref Range    Glucose 199 (H) 74 - 99 mg/dL    Sodium 133 (L) 136 - 145 mmol/L    Potassium 3.9 3.5 - 5.3 mmol/L    Chloride 96 (L) 98 - 107 mmol/L    Bicarbonate 30 21 - 32 mmol/L    Anion Gap 11 10 - 20 mmol/L    Urea Nitrogen 19 6 - 23 mg/dL    Creatinine 1.57 (H) 0.50 - 1.05 mg/dL    eGFR 32 (L) >60 mL/min/1.73m*2    Calcium 7.4 (L) 8.6 - 10.3 mg/dL    Albumin 2.6 (L) 3.4 - 5.0 g/dL    Alkaline Phosphatase 99 33 - 136 U/L    Total Protein  5.4 (L) 6.4 - 8.2 g/dL    AST 26 9 - 39 U/L    Bilirubin, Total 0.4 0.0 - 1.2 mg/dL    ALT 16 7 - 45 U/L   Lactate   Result Value Ref Range    Lactate 2.6 (H) 0.4 - 2.0 mmol/L   Sars-CoV-2 PCR, Symptomatic   Result Value Ref Range    Coronavirus 2019, PCR Not Detected Not Detected   RSV PCR   Result Value Ref Range    RSV PCR Not Detected Not Detected   Morphology   Result Value Ref Range    RBC Morphology See Below     Polychromasia Mild     Hypochromia Mild    Lactate   Result Value Ref Range    Lactate 2.5 (H) 0.4 - 2.0 mmol/L   Type and screen   Result Value Ref Range    ABO TYPE AB     Rh TYPE POS     ANTIBODY SCREEN NEG    Prepare RBC: 1 Units   Result Value Ref Range    PRODUCT CODE T5521V02     Unit Number [id number]     Unit ABO A     Unit RH POS     XM INTEP COMP     Dispense Status TR     Blood Expiration Date October 19, 2023 23:59 EDT     PRODUCT BLOOD TYPE 6200     UNIT VOLUME 350    Protime-INR   Result Value Ref Range    Protime 16.5 (H) 9.8 - 12.8 seconds    INR 1.5 (H) 0.9 - 1.1           Assessment/Plan   Anemia, unspecified type  Patient with recent GI blood losses had colonoscopy showing just hemorrhoidal bleed last month.  Patient now is having black bowel movements.  Hemoglobin is drifted down to 6.3.  Was transfused 1 unit in the ER.  Patient is on treatment for recent cardiac stenting so is actually on Plavix and will continue his history of recent stent but patient has been on Eliquis for paroxysmal A-fib.  That will be held.  Follow-up patient's H&H is.  Patient on PPI.    Pneumonia  Patient has infiltrate on chest x-ray.  Has minimal sputum production no significant hypoxia.  Will be however treated empirically with Rocephin and doxycycline.  Supportive treatments.    Diabetes (CMS/HCC)  Patient placed on/scale insulin coverage as patient will be n.p.o. for possible GI intervention.    Stage 3b chronic kidney disease (CMS/HCC)  Patient currently is actually receiving hemodialysis  Monday Wednesday Friday after having acute renal failure after heart catheterization with IVP dye.  Patient's renal function appears to be improving.  Patient will have consultation with nephrology service for continued dialysis needs if necessary.       Chase Brown MD

## 2023-10-03 NOTE — CONSULTS
"Inpatient consult to Gastroenterology  Consult performed by: Greg Ford MD  Consult ordered by: Duarte Rick MD        Reason For Consult  \"Black stools, anemia\"      [facility] Gastroenterology Consultation Note    ASSESSMENT and PLAN:       Melody Martin is a 88 y.o. female with a significant past medical history of HTN, DM2, and CKD who was recently hospitalized (discharged 9/9/2023) with ESRD, CAD, NSTEMI, and CVA who presented with shortness of breath. GI was consulted for \"Black stools, anemia\".       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 Unclear if true melena was seen that would be concerning for upper GI bleeding. Hgb has decreased, but has now improved with transfusion and BUN is normal. She has not had any evidence of bleeding since arrival. She is also complaining of cough/shortness of breath and has been found to have pneumonia. Additionally she is anticoagulated on Eliquis as well as Plavix. Without an emergent need for endoscopy I would recommend waiting for effects of Eliquis to clear and for further treatment of her pneumonia before considering endoscopic evaluation. In the meantime would agree with PPI and nursing should monitor BMs closely.  - monitor H&H and transfuse as needed  - twice daily PPI  - nursing should monitor BMs closely and document all BMs in the flowsheet including a description of color (describing BMs as \"bloody\" is not helpful as that reflects nursing interpretation of a finding rather than an actual objective report)  - Eliquis being held          Greg Ford MD        Gastroenterology    [facility]    Clinical   [facility]        HISTORY OF PRESENT ILLNESS:     History Of Present Illness:    Melody Martin is a 88 y.o. female with a significant past medical " "history of HTN, DM2, and CKD who was recently hospitalized (discharged 9/9/2023) with ESRD, CAD, NSTEMI, and CVA who presented with shortness of breath. GI was consulted for \"Black stools, anemia\".    Ms. Martin is a somewhat limited historian. She says that she was told that her bowel movement recently was \"black\". She thinks that this only happened one time. She is not sure if it was tarry/sticky, but she does not think that there was any bright red blood in her stool. She says that recently she has felt more short of breath and she has also been coughing. Her cough has been productive of green sputum and has resulted in muscle soreness from frequent coughing. She denies any fevers.    Admission H&P states that she was \"noted to have a black bowel movement today\", but no further information is available from her nursing facility. Nursing reports that she has not a BM since arrival.      Endoscopy History:  9/21/2023 - Colonoscopy: External hemorrhoids, diverticulosis      Review of systems:     Patient denies any heartburn/GERD, N/V, dysphagia, odynophagia, abdominal pain, diarrhea, constipation, hematemesis, hematochezia, melena, or weight loss.    I performed a complete 10 point review of systems and it is negative except as noted in HPI or above.    All other systems have been reviewed and are negative.        PAST HISTORIES:       Past Medical History:  She has a past medical history of Chronic kidney disease, Heart murmur, Hypertension, Myocardial infarction (CMS/HCC), Other conditions influencing health status (12/06/2022), Other conditions influencing health status (04/06/2016), Personal history of other diseases of the circulatory system, Personal history of other diseases of the female genital tract, Personal history of other diseases of the nervous system and sense organs (10/15/2021), Personal history of other endocrine, nutritional and metabolic disease (01/26/2018), and Stroke (CMS/HCC).    Past " "Surgical History:  She has a past surgical history that includes Hysterectomy (10/10/2018); Back surgery (10/10/2018); IR CVC tunneled (8/28/2023); MR angio neck wo IV contrast (8/31/2023); and MR angio head wo IV contrast (8/31/2023).      Social History:  She reports that she has never smoked. She has never used smokeless tobacco. She reports that she does not drink alcohol and does not use drugs.    Family History:  No known GI disease, specifically denies pancreatitis, Crohn's, colon cancer, gastroesophageal cancer, or ulcerative colitis.    Family History   Problem Relation Name Age of Onset    No Known Problems Mother      No Known Problems Father          Allergies:  Aspirin, Amlodipine, Levofloxacin, and Sulfamethoxazole-trimethoprim      OBJECTIVE:       Last Recorded Vitals:  Vitals:    10/03/23 0524 10/03/23 0600 10/03/23 0615 10/03/23 0650   BP:  150/54 (!) 150/46 (!) 139/45   BP Location:   Left arm    Patient Position:   Lying    Pulse: 68 62 65 64   Resp: (!) 32 23 20 23   Temp:       TempSrc:       SpO2: 98% 100% 100% 100%   Weight:       Height:         BP (!) 139/45   Pulse 64   Temp 37 °C (98.6 °F) (Tympanic)   Resp 23   Ht 1.626 m (5' 4\")   Wt 84 kg (185 lb 3 oz)   SpO2 100%   BMI 31.79 kg/m²      Physical Exam:    Physical Exam  Vitals reviewed.   Constitutional:       General: She is not in acute distress.     Appearance: She is obese. She is not ill-appearing.   HENT:      Head: Normocephalic and atraumatic.   Eyes:      General: No scleral icterus.  Cardiovascular:      Rate and Rhythm: Normal rate. Rhythm irregular.      Pulses: Normal pulses.      Heart sounds: Murmur heard.   Pulmonary:      Effort: Pulmonary effort is normal. No respiratory distress.      Breath sounds: Normal breath sounds. No wheezing.      Comments: Appears short of breath and coughs intermittently  Abdominal:      General: Bowel sounds are normal.      Palpations: Abdomen is soft.      Tenderness: There is no " abdominal tenderness. There is no rebound.   Musculoskeletal:         General: No swelling or deformity.   Skin:     General: Skin is warm and dry.      Coloration: Skin is not jaundiced.      Findings: No rash.   Neurological:      General: No focal deficit present.      Mental Status: She is alert and oriented to person, place, and time.   Psychiatric:         Mood and Affect: Mood normal.         Behavior: Behavior normal.         Thought Content: Thought content normal.         Judgment: Judgment normal.           Inpatient Medications:  doxycycline, 100 mg, intravenous, Once  pantoprazole, 40 mg, intravenous, BID        Outpatient Medications:  Prior to Admission medications    Medication Sig Start Date End Date Taking? Authorizing Provider   acetaminophen (Tylenol) 325 mg tablet Take 2 tablets (650 mg) by mouth every 6 hours if needed for mild pain (1 - 3). 9/7/23  Yes Historical Provider, MD   amiodarone (Pacerone) 200 mg tablet Take 1 tablet (200 mg) by mouth once every 24 hours. 9/7/23  Yes Historical Provider, MD   apixaban (Eliquis) 2.5 mg tablet Take 1 tablet (2.5 mg) by mouth 2 times a day. 9/7/23  Yes Historical Provider, MD   atorvastatin (Lipitor) 80 mg tablet Take 1 tablet (80 mg) by mouth once daily at bedtime. 9/7/23  Yes Historical Provider, MD   clopidogrel (Plavix) 75 mg tablet Take 1 tablet (75 mg) by mouth once daily. 9/7/23  Yes Historical Provider, MD   insulin lispro protamin-lispro (HumaLOG Mix 75-25) 100 unit/mL (75-25) injection Inject 40 Units under the skin once daily in the morning. And inject 20 units under the skin once daily at night 9/7/23  Yes Historical Provider, MD   isosorbide dinitrate (Isordil) 10 mg tablet Take 1 tablet (10 mg) by mouth 3 times a day. 9/7/23  Yes Historical Provider, MD   alum-mag hydroxide-simeth (Mylanta) 200-200-20 mg/5 mL oral suspension Take 30 mL by mouth every 4 hours if needed for indigestion or heartburn.    Historical Provider, MD   bisacodyl  (Dulcolax, bisacodyl,) 10 mg suppository Insert 1 suppository (10 mg) into the rectum once daily as needed for constipation.    Historical Provider, MD   blood sugar diagnostic (OneTouch Ultra Test) strip 1 strip by Does not apply route 3 times a day. 5/22/23   Addy Villela MD PhD   lidocaine (Lidoderm) 5 % patch Place 1 patch on the skin once daily. Remove & discard patch within 12 hours or as directed by MD. Apply to left hip.    Historical Provider, MD   lisinopril 40 mg tablet Take 1 tablet (40 mg) by mouth once daily.  Patient not taking: Reported on 10/3/2023 7/10/23   Addy Villela MD PhD   lisinopril 40 mg tablet Take 1 tablet (40 mg) by mouth once daily.  Patient not taking: Reported on 10/3/2023 7/10/23   Addy Villela MD PhD   magnesium hydroxide (Milk of Magnesia) 400 mg/5 mL suspension Take 30 mL by mouth once daily as needed for constipation.    Historical Provider, MD   melatonin tablet Take 2 tablets (2 mg) by mouth as needed at bedtime for sleep.    Historical Provider, MD   metoprolol succinate XL (Toprol-XL) 50 mg 24 hr tablet Take 1 tablet (50 mg) by mouth once daily. Do not crush or chew.    Historical Provider, MD   nystatin (Mycostatin) 100,000 unit/mL suspension Take 5 mL (500,000 Units) by mouth in the morning and 5 mL (500,000 Units) at noon and 5 mL (500,000 Units) in the evening and 5 mL (500,000 Units) before bedtime. Swish in mouth and spit out..  Patient not taking: Reported on 10/3/2023 4/10/23   Addy Villela MD PhD   pantoprazole (ProtoNix) 40 mg EC tablet Take 1 tablet (40 mg) by mouth once daily in the morning. Take before meals. Do not crush, chew, or split.    Historical Provider, MD   polyethylene glycol (Glycolax, Miralax) 17 gram/dose powder Take 17 g by mouth once daily as needed (constipation).    Historical Provider, MD   PSYLLIUM HUSK, ASPARTAME, ORAL Take 1 packet by mouth once daily as needed (constipation).    Historical Provider, MD   simethicone (Mylicon) 80 mg  chewable tablet Chew 1 tablet (80 mg) every 6 hours if needed for flatulence.    Historical Provider, MD   ascorbic acid (Vitamin C) 500 mg chewable tablet Chew 1 tablet (500 mg) once daily.  10/3/23  Historical Provider, MD   cholecalciferol (Vitamin D-3) 50 MCG (2000 UT) tablet Take 1 tablet (50 mcg) by mouth in the morning. 10/10/18 10/3/23  Historical Provider, MD   insulin NPH and regular human (HumuLIN 70/30 U-100 Insulin) 100 unit/mL (70-30) injection Inject 40 Units under the skin once daily in the morning. And 20 units with dinner  Patient not taking: Reported on 10/3/2023 7/10/23 10/3/23  Addy Villela MD PhD   metoprolol tartrate (Lopressor) 50 mg tablet Take 1 tablet by mouth 2 times a day. 8/18/15 10/3/23  Historical Provider, MD       LABS AND IMAGING:     Last Labs:  CBC - 10/3/2023:  5:13 AM  11.6 7.5 254    23.3      CMP - 10/3/2023:  5:13 AM  7.6 5.4 26 --- 0.4   CANCELED 2.6 16 99      PTT - 9/17/2023: 12:31 PM  1.5   16.5 32       Lab Results   Component Value Date    WBC 11.6 (H) 10/03/2023    WBC 14.6 (H) 10/02/2023    HGB 7.5 (L) 10/03/2023    HGB 6.8 (L) 10/02/2023    HCT 23.3 (L) 10/03/2023    HCT 21.0 (L) 10/02/2023    MCV 93 10/03/2023    MCV 93 10/02/2023     10/03/2023     10/02/2023          XR chest 1 view    Result Date: 10/2/2023  STUDY: Chest Radiograph;  10/2/2023 11:12 PM INDICATION: Shortness of breath. COMPARISON: 8/30/2023 XR Chest one view ACCESSION NUMBER(S): [id number] ORDERING CLINICIAN: CHASE BUTLER TECHNIQUE:  Frontal chest was obtained at 23:12 hours. FINDINGS: CARDIOMEDIASTINAL SILHOUETTE: Cardiomediastinal silhouette is stable in size and configuration. Right IJ central line is unchanged.  LUNGS: Hazy opacity at the left lung base suggests small left-sided pleural effusion.  Left basilar atelectasis and/or airspace disease is not excluded.  Right lung is grossly clear.  No pneumothorax.  ABDOMEN: No remarkable upper abdominal findings.  BONES: No  acute osseous changes.    Hazy opacity at the left lung base suggesting small left-sided pleural effusion.  Left basilar atelectasis and/or airspace disease is not excluded. Signed by Gio Corona MD      CT abdomen pelvis wo IV contrast    Result Date: 9/23/2023  Interpreted By:  ARMEN DESAI DO MRN: [id number] Patient Name: NIRU SIMS  STUDY: CT ABDOMEN AND PELVIS WO CONTRAST;  9/23/2023 11:58 am  INDICATION: New left hip pain after resuming anticoagulation, eval hematoma for change.  COMPARISON: CT abdomen and pelvis 09/17/2023  ACCESSION NUMBER(S): [id number]  ORDERING CLINICIAN: DANY KAT  TECHNIQUE: CT of the abdomen and pelvis was performed without intravenous contrast. Standard contiguous axial images were obtained at 3 mm slice thickness through the abdomen and pelvis. Coronal and sagittal reconstructions at 3 mm slice thickness were performed.  FINDINGS: LOWER CHEST: There is increased size of small right and moderate left pleural effusions with adjacent atelectasis since 09/17/2023.  ABDOMEN:  LIVER: No focal liver lesions are seen.  BILE DUCTS: The intrahepatic and extrahepatic ducts are not dilated.  GALLBLADDER: The gallbladder contains high density material which could represent sludge or micro calculi. No evidence of acute cholecystitis.  PANCREAS: The pancreas appears unremarkable without evidence of ductal dilatation or masses.  SPLEEN: The spleen is normal in size.  ADRENAL GLANDS: There is a stable 3 cm right adrenal adenoma. The left adrenal is unremarkable.  KIDNEYS AND URETERS: Bilateral multifocal cortical scarring is are again seen. There is a stable 1.8 cm right interpolar cyst. An amorphous 5 mm hyperdense focus in the left upper pole may represent a nonobstructing calculus (series 2, image 53), unchanged. No hydronephrosis is seen.  PELVIS:  BLADDER: The bladder is markedly distended with unchanged nondependent air locules which could be postprocedural.  REPRODUCTIVE ORGANS:  Hysterectomy.  BOWEL: There is no bowel wall thickening or dilation. The appendix is not visualized. No pericecal inflammation is seen.  VESSELS: Mild-to-moderate atherosclerotic calcifications are seen within the abdominal aorta without aneurysmal dilation.  PERITONEUM/RETROPERITONEUM/LYMPH NODES: There is interval development of trace nonspecific left paracolic gutter and pelvic ascites without free air or fluid collection. There is no retroperitoneal hematoma.  Multiple upper abdominal and retroperitoneal shotty, likely reactive lymph nodes are again seen. No progressive abdominopelvic lymphadenopathy.  ABDOMINAL WALL AND BONES : There is increased anasarca. Left gluteal fluid collection is not significantly changed, measuring 8.8 x 3.1 x 10 cm (series 2, image 114; image 4, image 65).  No suspicious osseous lesions are identified. There is lumbar scoliosis with moderate-to-severe multilevel degenerative disc disease.      Stable size left gluteal fluid collection/evolving hematoma compared to 09/17/2023.  No intra abdominopelvic or retroperitoneal hematoma.  Increased body wall anasarca and bilateral pleural effusions.  MACRO: None        CT abdomen pelvis wo IV contrast    Result Date: 9/17/2023  Interpreted By:  LORENZO GUADARRAMA MD MRN: [id number] Patient Name: NIRU SIMS  STUDY: CT ABDOMEN AND PELVIS WO CONTRAST;  9/17/2023 1:43 pm  INDICATION: abd pain .  COMPARISON: 08/27/2023  ACCESSION NUMBER(S): [id number]  ORDERING CLINICIAN: DANY COBIAN  TECHNIQUE: CT of the abdomen and pelvis was performed.  Standard contiguous axial images were obtained at 3 mm slice thickness through the abdomen and pelvis. Coronal and sagittal reconstructions at 3 mm slice thickness were performed.  FINDINGS: Limited study without IV contrast.  LOWER CHEST: Lung bases demonstrate small to moderate bilateral pleural effusions and associated atelectasis or infiltrate, worse on the left.  Right breast cutaneous thickening, nonspecific  in etiology.  ABDOMEN:  LIVER: Liver is normal in size. No focal lesions are seen.  BILE DUCTS: Biliary system is nondilated.  GALLBLADDER: The gallbladder is not seen and may have been surgically removed.  PANCREAS: No focal lesions. No peripancreatic fat stranding.  SPLEEN: The spleen is normal in size. No focal abnormalities are seen.  ADRENAL GLANDS: There is a complex mass within the right adrenal gland similar to prior study with fat components which may represent fat containing adrenal adenoma versus myelolipoma measuring up to 3 cm in size, stable.  KIDNEYS AND URETERS: Lobulated contours of bilateral kidneys with associated focal cortical scarring. There is a low-attenuation lesion involving the interpolar region of the right kidney posteriorly measuring approximately 16 mm in size. This is suggestive of a cyst but incompletely characterized due to small size and lack of IV contrast.  PELVIS:  BLADDER: The urinary bladder is over distended. There is air within the bladder which may be related to recent bladder instrumentation although infectious process can not be excluded. No bladder calculi or masses are seen within this limited study.  REPRODUCTIVE ORGANS: The uterus is not seen and may have been surgically removed.  BOWEL: The small and large bowel are nondilated.  The appendix is not positively identified, however, no findings to suggest acute appendicitis is seen.  Normal caliber fluid-filled loops of small bowel and colon with air-fluid levels which may represent an ileus type pattern such as related to enteritis. No bowel obstruction or free air.  Few colonic diverticula but no CT evidence for acute diverticulitis. Findings most prominent in the area of sigmoid.  VESSELS: Diffuse wall calcification of the aorta and its main branches. No aneurysm.  PERITONEUM/RETROPERITONEUM/LYMPH NODES: No retroperitoneal masses are seen.  No lymphadenopathy.  BONES AND ABDOMINAL WALL: There is no evidence for  destructive lytic or blastic bone lesions identified.  Multilevel discogenic degenerative changes throughout the spine.  Diffuse subcutaneous edema particularly within bilateral flanks, worse on the right. There is fluid collection within left posterior gluteal region which may represent a hematoma although incompletely characterized in this study. The area measures approximately 9 x 5 cm in size.      1.  Small-bowel areas probably related to enteritis. No bowel obstruction or free air. Clinical correlation and follow-up recommended to document resolution. 2. Over distended urinary bladder and associated small amount of bladder air which may be due to bladder instrumentation, however, infectious process can not be excluded. Clinical correlation and with urinalysis recommended. 3. Diffuse subcutaneous edema concerning for anasarca. Recommend clinical correlation. 4. Left gluteal fluid collection suggestive of hematoma, slightly decreased in size since previous exam. Recommend clinical correlation and follow-up. 5. Right breast cutaneous edema. Correlation with mammographic findings recommended. 6. Bilateral pleural effusions and associated atelectasis or infiltrate worse on the left. Recommend clinical correlation and follow-up to document resolution. 7. Additional findings as detailed.

## 2023-10-03 NOTE — H&P (VIEW-ONLY)
"Inpatient consult to Gastroenterology  Consult performed by: Greg Ford MD  Consult ordered by: Duarte Rick MD        Reason For Consult  \"Black stools, anemia\"      [facility] Gastroenterology Consultation Note    ASSESSMENT and PLAN:       Melody Martin is a 88 y.o. female with a significant past medical history of HTN, DM2, and CKD who was recently hospitalized (discharged 9/9/2023) with ESRD, CAD, NSTEMI, and CVA who presented with shortness of breath. GI was consulted for \"Black stools, anemia\".       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 Unclear if true melena was seen that would be concerning for upper GI bleeding. Hgb has decreased, but has now improved with transfusion and BUN is normal. She has not had any evidence of bleeding since arrival. She is also complaining of cough/shortness of breath and has been found to have pneumonia. Additionally she is anticoagulated on Eliquis as well as Plavix. Without an emergent need for endoscopy I would recommend waiting for effects of Eliquis to clear and for further treatment of her pneumonia before considering endoscopic evaluation. In the meantime would agree with PPI and nursing should monitor BMs closely.  - monitor H&H and transfuse as needed  - twice daily PPI  - nursing should monitor BMs closely and document all BMs in the flowsheet including a description of color (describing BMs as \"bloody\" is not helpful as that reflects nursing interpretation of a finding rather than an actual objective report)  - Eliquis being held          Greg Ford MD        Gastroenterology    [facility]    Clinical   [facility]        HISTORY OF PRESENT ILLNESS:     History Of Present Illness:    Melody Martin is a 88 y.o. female with a significant past medical " "history of HTN, DM2, and CKD who was recently hospitalized (discharged 9/9/2023) with ESRD, CAD, NSTEMI, and CVA who presented with shortness of breath. GI was consulted for \"Black stools, anemia\".    Ms. Martin is a somewhat limited historian. She says that she was told that her bowel movement recently was \"black\". She thinks that this only happened one time. She is not sure if it was tarry/sticky, but she does not think that there was any bright red blood in her stool. She says that recently she has felt more short of breath and she has also been coughing. Her cough has been productive of green sputum and has resulted in muscle soreness from frequent coughing. She denies any fevers.    Admission H&P states that she was \"noted to have a black bowel movement today\", but no further information is available from her nursing facility. Nursing reports that she has not a BM since arrival.      Endoscopy History:  9/21/2023 - Colonoscopy: External hemorrhoids, diverticulosis      Review of systems:     Patient denies any heartburn/GERD, N/V, dysphagia, odynophagia, abdominal pain, diarrhea, constipation, hematemesis, hematochezia, melena, or weight loss.    I performed a complete 10 point review of systems and it is negative except as noted in HPI or above.    All other systems have been reviewed and are negative.        PAST HISTORIES:       Past Medical History:  She has a past medical history of Chronic kidney disease, Heart murmur, Hypertension, Myocardial infarction (CMS/HCC), Other conditions influencing health status (12/06/2022), Other conditions influencing health status (04/06/2016), Personal history of other diseases of the circulatory system, Personal history of other diseases of the female genital tract, Personal history of other diseases of the nervous system and sense organs (10/15/2021), Personal history of other endocrine, nutritional and metabolic disease (01/26/2018), and Stroke (CMS/HCC).    Past " "Surgical History:  She has a past surgical history that includes Hysterectomy (10/10/2018); Back surgery (10/10/2018); IR CVC tunneled (8/28/2023); MR angio neck wo IV contrast (8/31/2023); and MR angio head wo IV contrast (8/31/2023).      Social History:  She reports that she has never smoked. She has never used smokeless tobacco. She reports that she does not drink alcohol and does not use drugs.    Family History:  No known GI disease, specifically denies pancreatitis, Crohn's, colon cancer, gastroesophageal cancer, or ulcerative colitis.    Family History   Problem Relation Name Age of Onset    No Known Problems Mother      No Known Problems Father          Allergies:  Aspirin, Amlodipine, Levofloxacin, and Sulfamethoxazole-trimethoprim      OBJECTIVE:       Last Recorded Vitals:  Vitals:    10/03/23 0524 10/03/23 0600 10/03/23 0615 10/03/23 0650   BP:  150/54 (!) 150/46 (!) 139/45   BP Location:   Left arm    Patient Position:   Lying    Pulse: 68 62 65 64   Resp: (!) 32 23 20 23   Temp:       TempSrc:       SpO2: 98% 100% 100% 100%   Weight:       Height:         BP (!) 139/45   Pulse 64   Temp 37 °C (98.6 °F) (Tympanic)   Resp 23   Ht 1.626 m (5' 4\")   Wt 84 kg (185 lb 3 oz)   SpO2 100%   BMI 31.79 kg/m²      Physical Exam:    Physical Exam  Vitals reviewed.   Constitutional:       General: She is not in acute distress.     Appearance: She is obese. She is not ill-appearing.   HENT:      Head: Normocephalic and atraumatic.   Eyes:      General: No scleral icterus.  Cardiovascular:      Rate and Rhythm: Normal rate. Rhythm irregular.      Pulses: Normal pulses.      Heart sounds: Murmur heard.   Pulmonary:      Effort: Pulmonary effort is normal. No respiratory distress.      Breath sounds: Normal breath sounds. No wheezing.      Comments: Appears short of breath and coughs intermittently  Abdominal:      General: Bowel sounds are normal.      Palpations: Abdomen is soft.      Tenderness: There is no " abdominal tenderness. There is no rebound.   Musculoskeletal:         General: No swelling or deformity.   Skin:     General: Skin is warm and dry.      Coloration: Skin is not jaundiced.      Findings: No rash.   Neurological:      General: No focal deficit present.      Mental Status: She is alert and oriented to person, place, and time.   Psychiatric:         Mood and Affect: Mood normal.         Behavior: Behavior normal.         Thought Content: Thought content normal.         Judgment: Judgment normal.           Inpatient Medications:  doxycycline, 100 mg, intravenous, Once  pantoprazole, 40 mg, intravenous, BID        Outpatient Medications:  Prior to Admission medications    Medication Sig Start Date End Date Taking? Authorizing Provider   acetaminophen (Tylenol) 325 mg tablet Take 2 tablets (650 mg) by mouth every 6 hours if needed for mild pain (1 - 3). 9/7/23  Yes Historical Provider, MD   amiodarone (Pacerone) 200 mg tablet Take 1 tablet (200 mg) by mouth once every 24 hours. 9/7/23  Yes Historical Provider, MD   apixaban (Eliquis) 2.5 mg tablet Take 1 tablet (2.5 mg) by mouth 2 times a day. 9/7/23  Yes Historical Provider, MD   atorvastatin (Lipitor) 80 mg tablet Take 1 tablet (80 mg) by mouth once daily at bedtime. 9/7/23  Yes Historical Provider, MD   clopidogrel (Plavix) 75 mg tablet Take 1 tablet (75 mg) by mouth once daily. 9/7/23  Yes Historical Provider, MD   insulin lispro protamin-lispro (HumaLOG Mix 75-25) 100 unit/mL (75-25) injection Inject 40 Units under the skin once daily in the morning. And inject 20 units under the skin once daily at night 9/7/23  Yes Historical Provider, MD   isosorbide dinitrate (Isordil) 10 mg tablet Take 1 tablet (10 mg) by mouth 3 times a day. 9/7/23  Yes Historical Provider, MD   alum-mag hydroxide-simeth (Mylanta) 200-200-20 mg/5 mL oral suspension Take 30 mL by mouth every 4 hours if needed for indigestion or heartburn.    Historical Provider, MD   bisacodyl  (Dulcolax, bisacodyl,) 10 mg suppository Insert 1 suppository (10 mg) into the rectum once daily as needed for constipation.    Historical Provider, MD   blood sugar diagnostic (OneTouch Ultra Test) strip 1 strip by Does not apply route 3 times a day. 5/22/23   Addy Villela MD PhD   lidocaine (Lidoderm) 5 % patch Place 1 patch on the skin once daily. Remove & discard patch within 12 hours or as directed by MD. Apply to left hip.    Historical Provider, MD   lisinopril 40 mg tablet Take 1 tablet (40 mg) by mouth once daily.  Patient not taking: Reported on 10/3/2023 7/10/23   Addy Villela MD PhD   lisinopril 40 mg tablet Take 1 tablet (40 mg) by mouth once daily.  Patient not taking: Reported on 10/3/2023 7/10/23   Addy Villela MD PhD   magnesium hydroxide (Milk of Magnesia) 400 mg/5 mL suspension Take 30 mL by mouth once daily as needed for constipation.    Historical Provider, MD   melatonin tablet Take 2 tablets (2 mg) by mouth as needed at bedtime for sleep.    Historical Provider, MD   metoprolol succinate XL (Toprol-XL) 50 mg 24 hr tablet Take 1 tablet (50 mg) by mouth once daily. Do not crush or chew.    Historical Provider, MD   nystatin (Mycostatin) 100,000 unit/mL suspension Take 5 mL (500,000 Units) by mouth in the morning and 5 mL (500,000 Units) at noon and 5 mL (500,000 Units) in the evening and 5 mL (500,000 Units) before bedtime. Swish in mouth and spit out..  Patient not taking: Reported on 10/3/2023 4/10/23   Addy Villela MD PhD   pantoprazole (ProtoNix) 40 mg EC tablet Take 1 tablet (40 mg) by mouth once daily in the morning. Take before meals. Do not crush, chew, or split.    Historical Provider, MD   polyethylene glycol (Glycolax, Miralax) 17 gram/dose powder Take 17 g by mouth once daily as needed (constipation).    Historical Provider, MD   PSYLLIUM HUSK, ASPARTAME, ORAL Take 1 packet by mouth once daily as needed (constipation).    Historical Provider, MD   simethicone (Mylicon) 80 mg  chewable tablet Chew 1 tablet (80 mg) every 6 hours if needed for flatulence.    Historical Provider, MD   ascorbic acid (Vitamin C) 500 mg chewable tablet Chew 1 tablet (500 mg) once daily.  10/3/23  Historical Provider, MD   cholecalciferol (Vitamin D-3) 50 MCG (2000 UT) tablet Take 1 tablet (50 mcg) by mouth in the morning. 10/10/18 10/3/23  Historical Provider, MD   insulin NPH and regular human (HumuLIN 70/30 U-100 Insulin) 100 unit/mL (70-30) injection Inject 40 Units under the skin once daily in the morning. And 20 units with dinner  Patient not taking: Reported on 10/3/2023 7/10/23 10/3/23  Addy Villela MD PhD   metoprolol tartrate (Lopressor) 50 mg tablet Take 1 tablet by mouth 2 times a day. 8/18/15 10/3/23  Historical Provider, MD       LABS AND IMAGING:     Last Labs:  CBC - 10/3/2023:  5:13 AM  11.6 7.5 254    23.3      CMP - 10/3/2023:  5:13 AM  7.6 5.4 26 --- 0.4   CANCELED 2.6 16 99      PTT - 9/17/2023: 12:31 PM  1.5   16.5 32       Lab Results   Component Value Date    WBC 11.6 (H) 10/03/2023    WBC 14.6 (H) 10/02/2023    HGB 7.5 (L) 10/03/2023    HGB 6.8 (L) 10/02/2023    HCT 23.3 (L) 10/03/2023    HCT 21.0 (L) 10/02/2023    MCV 93 10/03/2023    MCV 93 10/02/2023     10/03/2023     10/02/2023          XR chest 1 view    Result Date: 10/2/2023  STUDY: Chest Radiograph;  10/2/2023 11:12 PM INDICATION: Shortness of breath. COMPARISON: 8/30/2023 XR Chest one view ACCESSION NUMBER(S): [id number] ORDERING CLINICIAN: CHASE BUTLER TECHNIQUE:  Frontal chest was obtained at 23:12 hours. FINDINGS: CARDIOMEDIASTINAL SILHOUETTE: Cardiomediastinal silhouette is stable in size and configuration. Right IJ central line is unchanged.  LUNGS: Hazy opacity at the left lung base suggests small left-sided pleural effusion.  Left basilar atelectasis and/or airspace disease is not excluded.  Right lung is grossly clear.  No pneumothorax.  ABDOMEN: No remarkable upper abdominal findings.  BONES: No  acute osseous changes.    Hazy opacity at the left lung base suggesting small left-sided pleural effusion.  Left basilar atelectasis and/or airspace disease is not excluded. Signed by Gio Corona MD      CT abdomen pelvis wo IV contrast    Result Date: 9/23/2023  Interpreted By:  ARMEN DESAI DO MRN: [id number] Patient Name: NIRU SIMS  STUDY: CT ABDOMEN AND PELVIS WO CONTRAST;  9/23/2023 11:58 am  INDICATION: New left hip pain after resuming anticoagulation, eval hematoma for change.  COMPARISON: CT abdomen and pelvis 09/17/2023  ACCESSION NUMBER(S): [id number]  ORDERING CLINICIAN: DANY KAT  TECHNIQUE: CT of the abdomen and pelvis was performed without intravenous contrast. Standard contiguous axial images were obtained at 3 mm slice thickness through the abdomen and pelvis. Coronal and sagittal reconstructions at 3 mm slice thickness were performed.  FINDINGS: LOWER CHEST: There is increased size of small right and moderate left pleural effusions with adjacent atelectasis since 09/17/2023.  ABDOMEN:  LIVER: No focal liver lesions are seen.  BILE DUCTS: The intrahepatic and extrahepatic ducts are not dilated.  GALLBLADDER: The gallbladder contains high density material which could represent sludge or micro calculi. No evidence of acute cholecystitis.  PANCREAS: The pancreas appears unremarkable without evidence of ductal dilatation or masses.  SPLEEN: The spleen is normal in size.  ADRENAL GLANDS: There is a stable 3 cm right adrenal adenoma. The left adrenal is unremarkable.  KIDNEYS AND URETERS: Bilateral multifocal cortical scarring is are again seen. There is a stable 1.8 cm right interpolar cyst. An amorphous 5 mm hyperdense focus in the left upper pole may represent a nonobstructing calculus (series 2, image 53), unchanged. No hydronephrosis is seen.  PELVIS:  BLADDER: The bladder is markedly distended with unchanged nondependent air locules which could be postprocedural.  REPRODUCTIVE ORGANS:  Hysterectomy.  BOWEL: There is no bowel wall thickening or dilation. The appendix is not visualized. No pericecal inflammation is seen.  VESSELS: Mild-to-moderate atherosclerotic calcifications are seen within the abdominal aorta without aneurysmal dilation.  PERITONEUM/RETROPERITONEUM/LYMPH NODES: There is interval development of trace nonspecific left paracolic gutter and pelvic ascites without free air or fluid collection. There is no retroperitoneal hematoma.  Multiple upper abdominal and retroperitoneal shotty, likely reactive lymph nodes are again seen. No progressive abdominopelvic lymphadenopathy.  ABDOMINAL WALL AND BONES : There is increased anasarca. Left gluteal fluid collection is not significantly changed, measuring 8.8 x 3.1 x 10 cm (series 2, image 114; image 4, image 65).  No suspicious osseous lesions are identified. There is lumbar scoliosis with moderate-to-severe multilevel degenerative disc disease.      Stable size left gluteal fluid collection/evolving hematoma compared to 09/17/2023.  No intra abdominopelvic or retroperitoneal hematoma.  Increased body wall anasarca and bilateral pleural effusions.  MACRO: None        CT abdomen pelvis wo IV contrast    Result Date: 9/17/2023  Interpreted By:  LORENZO GUADARRAMA MD MRN: [id number] Patient Name: NIRU SIMS  STUDY: CT ABDOMEN AND PELVIS WO CONTRAST;  9/17/2023 1:43 pm  INDICATION: abd pain .  COMPARISON: 08/27/2023  ACCESSION NUMBER(S): [id number]  ORDERING CLINICIAN: DANY COBIAN  TECHNIQUE: CT of the abdomen and pelvis was performed.  Standard contiguous axial images were obtained at 3 mm slice thickness through the abdomen and pelvis. Coronal and sagittal reconstructions at 3 mm slice thickness were performed.  FINDINGS: Limited study without IV contrast.  LOWER CHEST: Lung bases demonstrate small to moderate bilateral pleural effusions and associated atelectasis or infiltrate, worse on the left.  Right breast cutaneous thickening, nonspecific  in etiology.  ABDOMEN:  LIVER: Liver is normal in size. No focal lesions are seen.  BILE DUCTS: Biliary system is nondilated.  GALLBLADDER: The gallbladder is not seen and may have been surgically removed.  PANCREAS: No focal lesions. No peripancreatic fat stranding.  SPLEEN: The spleen is normal in size. No focal abnormalities are seen.  ADRENAL GLANDS: There is a complex mass within the right adrenal gland similar to prior study with fat components which may represent fat containing adrenal adenoma versus myelolipoma measuring up to 3 cm in size, stable.  KIDNEYS AND URETERS: Lobulated contours of bilateral kidneys with associated focal cortical scarring. There is a low-attenuation lesion involving the interpolar region of the right kidney posteriorly measuring approximately 16 mm in size. This is suggestive of a cyst but incompletely characterized due to small size and lack of IV contrast.  PELVIS:  BLADDER: The urinary bladder is over distended. There is air within the bladder which may be related to recent bladder instrumentation although infectious process can not be excluded. No bladder calculi or masses are seen within this limited study.  REPRODUCTIVE ORGANS: The uterus is not seen and may have been surgically removed.  BOWEL: The small and large bowel are nondilated.  The appendix is not positively identified, however, no findings to suggest acute appendicitis is seen.  Normal caliber fluid-filled loops of small bowel and colon with air-fluid levels which may represent an ileus type pattern such as related to enteritis. No bowel obstruction or free air.  Few colonic diverticula but no CT evidence for acute diverticulitis. Findings most prominent in the area of sigmoid.  VESSELS: Diffuse wall calcification of the aorta and its main branches. No aneurysm.  PERITONEUM/RETROPERITONEUM/LYMPH NODES: No retroperitoneal masses are seen.  No lymphadenopathy.  BONES AND ABDOMINAL WALL: There is no evidence for  destructive lytic or blastic bone lesions identified.  Multilevel discogenic degenerative changes throughout the spine.  Diffuse subcutaneous edema particularly within bilateral flanks, worse on the right. There is fluid collection within left posterior gluteal region which may represent a hematoma although incompletely characterized in this study. The area measures approximately 9 x 5 cm in size.      1.  Small-bowel areas probably related to enteritis. No bowel obstruction or free air. Clinical correlation and follow-up recommended to document resolution. 2. Over distended urinary bladder and associated small amount of bladder air which may be due to bladder instrumentation, however, infectious process can not be excluded. Clinical correlation and with urinalysis recommended. 3. Diffuse subcutaneous edema concerning for anasarca. Recommend clinical correlation. 4. Left gluteal fluid collection suggestive of hematoma, slightly decreased in size since previous exam. Recommend clinical correlation and follow-up. 5. Right breast cutaneous edema. Correlation with mammographic findings recommended. 6. Bilateral pleural effusions and associated atelectasis or infiltrate worse on the left. Recommend clinical correlation and follow-up to document resolution. 7. Additional findings as detailed.

## 2023-10-03 NOTE — ED PROVIDER NOTES
HPI   Chief Complaint   Patient presents with    Cough    Shortness of Breath    Black or Bloody Stool       Patient presents with cough and shortness of breath.  Her symptoms have been going on for the past 3 days.  Patient has a history of end-stage renal disease she is on dialysis Monday, Wednesday and Friday.  She had dialysis today.  Patient is from a skilled nursing facility.  She has a history atrial fibrillation, insulin-dependent diabetes and hypertension.  She anticoagulated on apixaban is also on Plavix.                          Get Coma Scale Score: 15                  Patient History   Past Medical History:   Diagnosis Date    Other conditions influencing health status 12/06/2022    History of cough    Other conditions influencing health status 04/06/2016    Diabetes mellitus type 1, uncontrolled    Personal history of other diseases of the circulatory system     History of hypertension    Personal history of other diseases of the female genital tract     History of endometriosis    Personal history of other diseases of the nervous system and sense organs 10/15/2021    History of acute otitis media    Personal history of other endocrine, nutritional and metabolic disease 01/26/2018    History of diabetes mellitus     Past Surgical History:   Procedure Laterality Date    BACK SURGERY  10/10/2018    Back Surgery    HYSTERECTOMY  10/10/2018    Hysterectomy    IR CVC TUNNELED  8/28/2023    IR CVC TUNNELED 8/28/2023 POR ANGIO    MR HEAD ANGIO WO IV CONTRAST  8/31/2023    MR HEAD ANGIO WO IV CONTRAST 8/31/2023 POR MRI    MR NECK ANGIO WO IV CONTRAST  8/31/2023    MR NECK ANGIO WO IV CONTRAST 8/31/2023 POR MRI     Family History   Problem Relation Name Age of Onset    No Known Problems Mother      No Known Problems Father       Social History     Tobacco Use    Smoking status: Never    Smokeless tobacco: Never   Substance Use Topics    Alcohol use: Never    Drug use: Never       Physical Exam   ED Triage  Vitals   Temp Heart Rate Resp BP   10/02/23 2154 10/02/23 2152 10/02/23 2152 10/02/23 2152   36.6 °C (97.9 °F) 75 20 162/57      SpO2 Temp src Heart Rate Source Patient Position   10/02/23 2152 -- -- --   93 %         BP Location FiO2 (%)     -- --             Physical Exam  Vitals and nursing note reviewed.   Constitutional:       General: She is not in acute distress.     Appearance: She is well-developed.   HENT:      Head: Normocephalic and atraumatic.   Eyes:      Conjunctiva/sclera: Conjunctivae normal.   Cardiovascular:      Rate and Rhythm: Normal rate and regular rhythm.      Heart sounds: No murmur heard.  Pulmonary:      Effort: Pulmonary effort is normal. No respiratory distress.      Breath sounds: Normal breath sounds.   Abdominal:      Palpations: Abdomen is soft.      Tenderness: There is no abdominal tenderness.   Musculoskeletal:         General: No swelling.      Cervical back: Neck supple.   Skin:     General: Skin is warm and dry.      Capillary Refill: Capillary refill takes less than 2 seconds.   Neurological:      Mental Status: She is alert.   Psychiatric:         Mood and Affect: Mood normal.         ED Course & MDM   Diagnoses as of 10/03/23 0403   Anemia, unspecified type   Pneumonia of left lower lobe due to infectious organism       Medical Decision Making  Twelve-lead EKG is normal sinus rhythm with a rate of 75.  There is no ischemia or injury pattern.  Nonspecific ST changes laterally.  Is interpreted by emergency physician.    Differential diagnosis is GI bleed, pneumonia, volume overload, etc.  Patient chest x-ray shows a left lower lobe infiltrate.  She was given Rocephin and doxycycline.  Avoided azithromycin given the fact that she is on amiodarone.  Patient's O2 sat is 93%.  Patient's hemoglobin is less than 7.  They are reporting black stools.  She was given a unit of packed red blood cells.  Patient is anticoagulated on apixaban.  Prior medical records were reviewed.  He was  given Protonix 40 mg IV.  She had a recent colonoscopy was largely unremarkable showed hemorrhoids.  Discussed case with hospitalist and the patient be admitted to the hospital.        Procedure  Procedures     Prem Rivas MD  10/03/23 7572

## 2023-10-03 NOTE — PROGRESS NOTES
Melody Martin is a 88 y.o. female on day 0 of admission presenting with Anemia, unspecified type.      Subjective   10/03/23: No acute events overnight. Vitals stable, placed on undocumented amount of supplemental oxygen. Hemoglobin at 7.5 this morning after 1u PRBC transfusion overnight. Seen by GI, no plans for EGD currently but will monitor stools, BUN, hgb, hold eliquis and continue IV PPI BID.          Review of Systems   Constitutional:  Positive for fatigue. Negative for appetite change, chills, diaphoresis and fever.        Generalized weakness   HENT:  Negative for congestion, ear pain, facial swelling, hearing loss, nosebleeds, sore throat, tinnitus and trouble swallowing.    Eyes:  Negative for pain.   Respiratory:  Positive for cough. Negative for chest tightness, shortness of breath and wheezing.    Cardiovascular:  Negative for chest pain, palpitations and leg swelling.   Gastrointestinal:  Negative for abdominal pain, blood in stool, constipation, diarrhea, nausea and vomiting.   Genitourinary:  Negative for dysuria, flank pain, frequency, hematuria and urgency.   Musculoskeletal:  Negative for back pain and joint swelling.        Edema BUE   Skin:  Negative for rash and wound.   Neurological:  Negative for dizziness, syncope, weakness, light-headedness, numbness and headaches.   Hematological:  Does not bruise/bleed easily.   Psychiatric/Behavioral:  Negative for behavioral problems, hallucinations and suicidal ideas.           Objective     Last Recorded Vitals  /60   Pulse 72   Temp 37 °C (98.6 °F) (Tympanic)   Resp 18   Wt 84 kg (185 lb 3 oz)   SpO2 99%     Image Results  XR chest 1 view    Result Date: 10/2/2023  STUDY: Chest Radiograph;  10/2/2023 11:12 PM INDICATION: Shortness of breath. COMPARISON: 8/30/2023 XR Chest one view ACCESSION NUMBER(S): [id number] ORDERING CLINICIAN: CHASE BUTLER TECHNIQUE:  Frontal chest was obtained at 23:12 hours. FINDINGS: CARDIOMEDIASTINAL  SILHOUETTE: Cardiomediastinal silhouette is stable in size and configuration. Right IJ central line is unchanged.  LUNGS: Hazy opacity at the left lung base suggests small left-sided pleural effusion.  Left basilar atelectasis and/or airspace disease is not excluded.  Right lung is grossly clear.  No pneumothorax.  ABDOMEN: No remarkable upper abdominal findings.  BONES: No acute osseous changes.    Hazy opacity at the left lung base suggesting small left-sided pleural effusion.  Left basilar atelectasis and/or airspace disease is not excluded. Signed by Gio Corona MD    CT abdomen pelvis wo IV contrast    Result Date: 9/23/2023  Interpreted By:  GISELLE KALEEDO ANNA MRN: [id number] Patient Name: NIRU SIMS  STUDY: CT ABDOMEN AND PELVIS WO CONTRAST;  9/23/2023 11:58 am  INDICATION: New left hip pain after resuming anticoagulation, eval hematoma for change.  COMPARISON: CT abdomen and pelvis 09/17/2023  ACCESSION NUMBER(S): [id number]  ORDERING CLINICIAN: DANY KAT  TECHNIQUE: CT of the abdomen and pelvis was performed without intravenous contrast. Standard contiguous axial images were obtained at 3 mm slice thickness through the abdomen and pelvis. Coronal and sagittal reconstructions at 3 mm slice thickness were performed.  FINDINGS: LOWER CHEST: There is increased size of small right and moderate left pleural effusions with adjacent atelectasis since 09/17/2023.  ABDOMEN:  LIVER: No focal liver lesions are seen.  BILE DUCTS: The intrahepatic and extrahepatic ducts are not dilated.  GALLBLADDER: The gallbladder contains high density material which could represent sludge or micro calculi. No evidence of acute cholecystitis.  PANCREAS: The pancreas appears unremarkable without evidence of ductal dilatation or masses.  SPLEEN: The spleen is normal in size.  ADRENAL GLANDS: There is a stable 3 cm right adrenal adenoma. The left adrenal is unremarkable.  KIDNEYS AND URETERS: Bilateral multifocal cortical scarring is  are again seen. There is a stable 1.8 cm right interpolar cyst. An amorphous 5 mm hyperdense focus in the left upper pole may represent a nonobstructing calculus (series 2, image 53), unchanged. No hydronephrosis is seen.  PELVIS:  BLADDER: The bladder is markedly distended with unchanged nondependent air locules which could be postprocedural.  REPRODUCTIVE ORGANS: Hysterectomy.  BOWEL: There is no bowel wall thickening or dilation. The appendix is not visualized. No pericecal inflammation is seen.  VESSELS: Mild-to-moderate atherosclerotic calcifications are seen within the abdominal aorta without aneurysmal dilation.  PERITONEUM/RETROPERITONEUM/LYMPH NODES: There is interval development of trace nonspecific left paracolic gutter and pelvic ascites without free air or fluid collection. There is no retroperitoneal hematoma.  Multiple upper abdominal and retroperitoneal shotty, likely reactive lymph nodes are again seen. No progressive abdominopelvic lymphadenopathy.  ABDOMINAL WALL AND BONES : There is increased anasarca. Left gluteal fluid collection is not significantly changed, measuring 8.8 x 3.1 x 10 cm (series 2, image 114; image 4, image 65).  No suspicious osseous lesions are identified. There is lumbar scoliosis with moderate-to-severe multilevel degenerative disc disease.      Stable size left gluteal fluid collection/evolving hematoma compared to 09/17/2023.  No intra abdominopelvic or retroperitoneal hematoma.  Increased body wall anasarca and bilateral pleural effusions.  MACRO: None    Venous Duplex Ultrasound DVT    Result Date: 9/22/2023  [place] [address] Phone [phone] Fax [phone] Vascular Lab Report Upper Venous Duplex Ultrasound Patient Name:     NIRU Jessica Physician:  [id number] Aayush Hernandez MD, [facility] Study Date:       9/22/2023     Referring           DANY KAT Physician: MRN/PID:           [id number]      PCP: Accession/Order#: [id number]     CC Report to: YOB: 1935     Technologist:       Marissa Monreal RVT Gender:           F             Technologist 2: Admission Status: Outpatient    Location Performed: [facility] Diagnosis/ICD: R60.1-Generalized edema (anasarca) Procedure/CPT: 66230 Peripheral venous duplex scan for DVT Limited-[id number] CONCLUSIONS: Right Upper Venous: No evidence of acute deep vein thrombus visualized in the right upper extremity. There is acute occlusive superficial thomobosis noted in the right median cubital vein just proximal to the iv site. The right subclavian vein was not visualized due to line placement. Unable to compress the left subclavian vein due to high venous pressure; however, spontaneous flow was noted. Left Upper Venous: The subclavian vein demonstrates a normal spontaneous and phasic flow. Imaging & Doppler Findings: Right            Compressible Thrombus Internal Jugular     Yes        None Axillary             Yes        None Brachial             Yes        None Cephalic             Yes        None Basilic              Yes        None Left       Thrombus        Flow Subclavian   None   Spontaneous/Phasic [id number] Aayush Hernandez MD, RPVI Electronically signed by [id number] Aayush Hernandez MD, RPVI on 9/22/2023 at 8:26:46 AM     Colonoscopy    Result Date: 9/21/2023  Patient Name: Melody Martin Procedure Date: 9/21/2023 10:02 AM MRN: [id number] Account Number: [id number] YOB: 1935 Admit Type: Inpatient Site: [place] [facility] Ethnicity: Patient Declined Race: White Attending MD: Enrike Huggins DO, [id number] Procedure:             Colonoscopy Indications:           Rectal bleeding Providers:             Enrike Huggins DO (Doctor) Referring:             Medicines:             Monitored Anesthesia Care, See the Anesthesia note for                        documentation of the administered medications Complications:         No  immediate complications. Procedure:             Pre-Anesthesia Assessment:                        - Prior to the procedure, a History and Physical was                        performed, and patient medications and allergies were                        reviewed. The patient's tolerance of previous                        anesthesia was also reviewed. The risks and benefits                        of the procedure and the sedation options and risks                        were discussed with the patient. All questions were                        answered, and informed consent was obtained. Prior                        Anticoagulants: The patient has taken Plavix                        (clopidogrel), last dose was day of procedure. ASA                        Grade Assessment: III - A patient with severe systemic                        disease. After reviewing the risks and benefits, the                        patient was deemed in satisfactory condition to                        undergo the procedure.                        After I obtained informed consent, the scope was                        passed under direct vision. Throughout the procedure,                        the patient's blood pressure, pulse, and oxygen                        saturations were monitored continuously. The pediatric                        colonoscope was introduced through the anus and                        advanced to 2 cm into the ileum. The colonoscopy was                        performed without difficulty. The patient tolerated                        the procedure well. The quality of the bowel                        preparation was evaluated using the BBPS ([place] Bowel                        Preparation Scale) with scores of: Right Colon = 1                        (portion of mucosa seen, but other areas not well seen                        due to staining, residual stool and/or opaque liquid),                        Transverse Colon = 2  (minor amount of residual                        staining, small fragments of stool and/or opaque                        liquid, but mucosa seen well) and Left Colon = 2                        (minor amount of residual staining, small fragments of                        stool and/or opaque liquid, but mucosa seen well). The                        total BBPS score equals 5. The quality of the bowel                        preparation was fair. The quality of the bowel                        preparation was fair. The terminal ileum, ileocecal                        valve, appendiceal orifice, and rectum were                        photographed. Findings:      Hemorrhoids were found on perianal exam.      A moderate amount of semi-solid stool was found in the rectum,      interfering with visualization. Lavage of the area was performed using a      large amount of sterile water, resulting in clearance with fair      visualization.      Multiple small-mouthed diverticula were found in the sigmoid colon and      descending colon.      The terminal ileum appeared normal.      The exam was otherwise without abnormality on direct and retroflexion      views. Impression:            - Preparation of the colon was fair.                        - Hemorrhoids found on perianal exam.                        - Stool in the rectum.                        - Diverticulosis in the sigmoid colon and in the                        descending colon.                        - The examined portion of the ileum was normal.                        - The examination was otherwise normal on direct and                        retroflexion views.                        - No specimens collected. Recommendation:        - Return patient to hospital [facility] for ongoing care.                        - High fiber diet.                        - Miralax 1 capful (17 grams) in 8 ounces of water PO                        BID.                        - Continue  present medications.                        - Repeat colonoscopy is not recommended due to current                        age (75 years or older) for surveillance. Procedure Code(s):     --- Professional ---                        56471, Colonoscopy, flexible; diagnostic, including                        collection of specimen(s) by brushing or washing, when                        performed (separate procedure) Diagnosis Code(s):     --- Professional ---                        K64.9, Unspecified hemorrhoids                        K62.5, Hemorrhage of anus and rectum                        K57.30, Diverticulosis of large intestine without                        perforation or abscess without bleeding CPT copyright 2021 American [employer] Association. All rights reserved. The codes documented in this report are preliminary and upon  review may be revised to meet current compliance requirements. Attending Participation:      I personally performed the entire procedure. DO Enrike Bartholomew DO 9/21/2023 10:53:31 AM This report has been signed electronically. Number of Addenda: 0 Note Initiated On: 9/21/2023 10:02 AM Scope Withdrawal Time 0 hours 8 minutes 14 seconds Total Procedure Duration Time 0 hours 14 minutes 37 seconds Estimated Blood Loss:      Estimated blood loss: none.    Electrocardiogram 12 Lead    Result Date: 9/20/2023  Sinus rhythm with Premature supraventricular complexes Abnormal QRS-T angle, consider primary T wave abnormality Abnormal ECG When compared with ECG of 28-AUG-2023 17:03, PREVIOUS ECG IS PRESENT Confirmed by Derek Sinha ([id number]) on 9/20/2023 2:38:44 PM    CT abdomen pelvis wo IV contrast    Result Date: 9/17/2023  Interpreted By:  LORENZO GUADARRAMA MD MRN: [id number] Patient Name: NIRU SIMS  STUDY: CT ABDOMEN AND PELVIS WO CONTRAST;  9/17/2023 1:43 pm  INDICATION: abd pain .  COMPARISON: 08/27/2023  ACCESSION NUMBER(S): [id number]  ORDERING CLINICIAN: DANY COBIAN   TECHNIQUE: CT of the abdomen and pelvis was performed.  Standard contiguous axial images were obtained at 3 mm slice thickness through the abdomen and pelvis. Coronal and sagittal reconstructions at 3 mm slice thickness were performed.  FINDINGS: Limited study without IV contrast.  LOWER CHEST: Lung bases demonstrate small to moderate bilateral pleural effusions and associated atelectasis or infiltrate, worse on the left.  Right breast cutaneous thickening, nonspecific in etiology.  ABDOMEN:  LIVER: Liver is normal in size. No focal lesions are seen.  BILE DUCTS: Biliary system is nondilated.  GALLBLADDER: The gallbladder is not seen and may have been surgically removed.  PANCREAS: No focal lesions. No peripancreatic fat stranding.  SPLEEN: The spleen is normal in size. No focal abnormalities are seen.  ADRENAL GLANDS: There is a complex mass within the right adrenal gland similar to prior study with fat components which may represent fat containing adrenal adenoma versus myelolipoma measuring up to 3 cm in size, stable.  KIDNEYS AND URETERS: Lobulated contours of bilateral kidneys with associated focal cortical scarring. There is a low-attenuation lesion involving the interpolar region of the right kidney posteriorly measuring approximately 16 mm in size. This is suggestive of a cyst but incompletely characterized due to small size and lack of IV contrast.  PELVIS:  BLADDER: The urinary bladder is over distended. There is air within the bladder which may be related to recent bladder instrumentation although infectious process can not be excluded. No bladder calculi or masses are seen within this limited study.  REPRODUCTIVE ORGANS: The uterus is not seen and may have been surgically removed.  BOWEL: The small and large bowel are nondilated.  The appendix is not positively identified, however, no findings to suggest acute appendicitis is seen.  Normal caliber fluid-filled loops of small bowel and colon with  air-fluid levels which may represent an ileus type pattern such as related to enteritis. No bowel obstruction or free air.  Few colonic diverticula but no CT evidence for acute diverticulitis. Findings most prominent in the area of sigmoid.  VESSELS: Diffuse wall calcification of the aorta and its main branches. No aneurysm.  PERITONEUM/RETROPERITONEUM/LYMPH NODES: No retroperitoneal masses are seen.  No lymphadenopathy.  BONES AND ABDOMINAL WALL: There is no evidence for destructive lytic or blastic bone lesions identified.  Multilevel discogenic degenerative changes throughout the spine.  Diffuse subcutaneous edema particularly within bilateral flanks, worse on the right. There is fluid collection within left posterior gluteal region which may represent a hematoma although incompletely characterized in this study. The area measures approximately 9 x 5 cm in size.      1.  Small-bowel areas probably related to enteritis. No bowel obstruction or free air. Clinical correlation and follow-up recommended to document resolution. 2. Over distended urinary bladder and associated small amount of bladder air which may be due to bladder instrumentation, however, infectious process can not be excluded. Clinical correlation and with urinalysis recommended. 3. Diffuse subcutaneous edema concerning for anasarca. Recommend clinical correlation. 4. Left gluteal fluid collection suggestive of hematoma, slightly decreased in size since previous exam. Recommend clinical correlation and follow-up. 5. Right breast cutaneous edema. Correlation with mammographic findings recommended. 6. Bilateral pleural effusions and associated atelectasis or infiltrate worse on the left. Recommend clinical correlation and follow-up to document resolution. 7. Additional findings as detailed.    FL replacement tunneled CVC same site    Result Date: 9/8/2023  Interpreted By:  JACKSON DUKE MD MRN: [id number] Patient Name: NIRU SIMS  STUDY: [facility]  CVC SAME SITE;  9/6/2023 2:27 pm  INDICATION: dialysis .  COMPARISON: None.  ACCESSION NUMBER(S): [id number]  ORDERING CLINICIAN: ERNESTINE ESPARZA  TECHNIQUE:  CONSENT: The patient/patient's POA/next of kin was informed of the nature of the proposed procedure. The purposes, alternatives, risks, and benefits were explained and discussed. All questions were answered and consent was obtained.  RADIATION EXPOSURE: Fluoroscopy time: 1.1 min. Dose: 7.9 mGy. Dose Area Product (DAP): 1.7  SEDATION: Moderate conscious IV sedation services (supervision of administration, induction, and maintenance) were provided by the physician performing the procedure with intravenous fentanyl 50 mcg 10 minutes. The physician was assisted by an independent trained observer, an interventional radiology nurse, in the continuous monitoring of patient level of consciousness and physiologic status.  MEDICATION/CONTRAST: No additional  TIME OUT: A time out was performed immediately prior to procedure start with the interventional team, correctly identifying the patient name, date of birth, MRN, procedure, anatomy (including marking of site and side), patient position, procedure consent form, relevant laboratory and imaging test results, antibiotic administration, safety precautions, and procedure-specific equipment needs.  COMPLICATIONS: No immediate adverse events identified.  FINDINGS: Patient presents for conversion of a temporary hemodialysis catheter within the right internal jugular vein to a tunneled hemodialysis catheter. Patient was placed in the supine position on the angiographic table in the right temporary line was prepped and draped in usual sterile fashion. A skin tract along the anterior chest wall was anesthetized and formed. A 19 cm tunneled hemodialysis catheter was then tunneled through the tract to exit the site of the current temporary dialysis catheter. The temporary catheter was removed over a stiff Glidewire which was  advanced into the inferior vena cava under fluoroscopic guidance. A new peel-away sheath was advanced over the wire. The new hemodialysis catheter was advanced through the peel-away sheath and the sheath was removed. Final imaging shows the catheter to be positioned in satisfactory position with the distal tip in the upper right atrium. The port was flushed and loaded with heparin. Single suture was used to secure the line. No immediate complications encountered.      Conversion of the patient's temporary hemodialysis catheter to a tunneled hemodialysis catheter as above. Catheter is ready for immediate use.   Performed and dictated at [facility].  MACRO: None       Lab Results  Results for orders placed or performed during the hospital encounter of 10/02/23 (from the past 24 hour(s))   CBC and Auto Differential   Result Value Ref Range    WBC 14.6 (H) 4.4 - 11.3 x10*3/uL    nRBC 0.0 0.0 - 0.0 /100 WBCs    RBC 2.25 (L) 4.00 - 5.20 x10*6/uL    Hemoglobin 6.8 (L) 12.0 - 16.0 g/dL    Hematocrit 21.0 (L) 36.0 - 46.0 %    MCV 93 80 - 100 fL    MCH 30.2 26.0 - 34.0 pg    MCHC 32.4 32.0 - 36.0 g/dL    RDW 14.5 11.5 - 14.5 %    Platelets 243 150 - 450 x10*3/uL    MPV 9.2 7.5 - 11.5 fL    Neutrophils % 66.3 40.0 - 80.0 %    Immature Granulocytes %, Automated 1.4 (H) 0.0 - 0.9 %    Lymphocytes % 20.5 13.0 - 44.0 %    Monocytes % 10.3 2.0 - 10.0 %    Eosinophils % 1.1 0.0 - 6.0 %    Basophils % 0.4 0.0 - 2.0 %    Neutrophils Absolute 9.64 (H) 1.60 - 5.50 x10*3/uL    Immature Granulocytes Absolute, Automated 0.21 0.00 - 0.50 x10*3/uL    Lymphocytes Absolute 2.98 0.80 - 3.00 x10*3/uL    Monocytes Absolute 1.50 (H) 0.05 - 0.80 x10*3/uL    Eosinophils Absolute 0.16 0.00 - 0.40 x10*3/uL    Basophils Absolute 0.06 0.00 - 0.10 x10*3/uL   Comprehensive metabolic panel   Result Value Ref Range    Glucose 199 (H) 74 - 99 mg/dL    Sodium 133 (L) 136 - 145 mmol/L    Potassium 3.9 3.5 - 5.3 mmol/L     Chloride 96 (L) 98 - 107 mmol/L    Bicarbonate 30 21 - 32 mmol/L    Anion Gap 11 10 - 20 mmol/L    Urea Nitrogen 19 6 - 23 mg/dL    Creatinine 1.57 (H) 0.50 - 1.05 mg/dL    eGFR 32 (L) >60 mL/min/1.73m*2    Calcium 7.4 (L) 8.6 - 10.3 mg/dL    Albumin 2.6 (L) 3.4 - 5.0 g/dL    Alkaline Phosphatase 99 33 - 136 U/L    Total Protein 5.4 (L) 6.4 - 8.2 g/dL    AST 26 9 - 39 U/L    Bilirubin, Total 0.4 0.0 - 1.2 mg/dL    ALT 16 7 - 45 U/L   Lactate   Result Value Ref Range    Lactate 2.6 (H) 0.4 - 2.0 mmol/L   Sars-CoV-2 PCR, Symptomatic   Result Value Ref Range    Coronavirus 2019, PCR Not Detected Not Detected   RSV PCR   Result Value Ref Range    RSV PCR Not Detected Not Detected   Morphology   Result Value Ref Range    RBC Morphology See Below     Polychromasia Mild     Hypochromia Mild    Lactate   Result Value Ref Range    Lactate 2.5 (H) 0.4 - 2.0 mmol/L   Type and screen   Result Value Ref Range    ABO TYPE AB     Rh TYPE POS     ANTIBODY SCREEN NEG    Prepare RBC: 1 Units   Result Value Ref Range    PRODUCT CODE A8187A99     Unit Number [id number]     Unit ABO A     Unit RH POS     XM INTEP COMP     Dispense Status TR     Blood Expiration Date October 19, 2023 23:59 EDT     PRODUCT BLOOD TYPE 6200     UNIT VOLUME 350    Blood Culture    Specimen: Peripheral Venipuncture; Blood culture   Result Value Ref Range    Blood Culture Loaded on Instrument - Culture in progress    Protime-INR   Result Value Ref Range    Protime 16.5 (H) 9.8 - 12.8 seconds    INR 1.5 (H) 0.9 - 1.1   CBC   Result Value Ref Range    WBC 11.6 (H) 4.4 - 11.3 x10*3/uL    nRBC 0.0 0.0 - 0.0 /100 WBCs    RBC 2.50 (L) 4.00 - 5.20 x10*6/uL    Hemoglobin 7.5 (L) 12.0 - 16.0 g/dL    Hematocrit 23.3 (L) 36.0 - 46.0 %    MCV 93 80 - 100 fL    MCH 30.0 26.0 - 34.0 pg    MCHC 32.2 32.0 - 36.0 g/dL    RDW 14.0 11.5 - 14.5 %    Platelets 254 150 - 450 x10*3/uL    MPV 9.4 7.5 - 11.5 fL   Basic metabolic panel   Result Value Ref Range    Glucose 194 (H)  74 - 99 mg/dL    Sodium 134 (L) 136 - 145 mmol/L    Potassium 3.9 3.5 - 5.3 mmol/L    Chloride 97 (L) 98 - 107 mmol/L    Bicarbonate 33 (H) 21 - 32 mmol/L    Anion Gap 8 (L) 10 - 20 mmol/L    Urea Nitrogen 22 6 - 23 mg/dL    Creatinine 1.81 (H) 0.50 - 1.05 mg/dL    eGFR 27 (L) >60 mL/min/1.73m*2    Calcium 7.6 (L) 8.6 - 10.3 mg/dL        Medications  Scheduled medications:  pantoprazole, 40 mg, intravenous, BID      Continuous medications:     PRN medications:       Physical Exam  Constitutional:       General: She is not in acute distress.     Appearance: Normal appearance.   HENT:      Head: Normocephalic and atraumatic.      Right Ear: External ear normal.      Left Ear: External ear normal.      Nose: Nose normal.      Mouth/Throat:      Mouth: Mucous membranes are moist.      Pharynx: Oropharynx is clear.   Eyes:      Extraocular Movements: Extraocular movements intact.      Conjunctiva/sclera: Conjunctivae normal.      Pupils: Pupils are equal, round, and reactive to light.   Neck:      Comments: HD catheter right upper chest  Cardiovascular:      Rate and Rhythm: Normal rate and regular rhythm.      Pulses: Normal pulses.      Heart sounds: Normal heart sounds.      Comments: Edema BUE  Pulmonary:      Effort: Pulmonary effort is normal. No respiratory distress.      Breath sounds: Rales (LLL) present. No wheezing or rhonchi.   Abdominal:      General: Abdomen is flat. Bowel sounds are normal.      Palpations: Abdomen is soft.      Tenderness: There is no abdominal tenderness. There is no right CVA tenderness, left CVA tenderness, guarding or rebound.   Musculoskeletal:         General: No swelling. Normal range of motion.      Cervical back: Normal range of motion and neck supple.   Skin:     General: Skin is warm and dry.      Capillary Refill: Capillary refill takes less than 2 seconds.      Findings: No lesion or rash.   Neurological:      General: No focal deficit present.      Mental Status: She is  alert and oriented to person, place, and time. Mental status is at baseline.   Psychiatric:         Mood and Affect: Mood normal.         Behavior: Behavior normal.                  Code Status  No Order     Assessment/Plan        Anemia, unspecified type  Patient with recent GI blood losses had colonoscopy showing just hemorrhoidal bleed last month  Patient now is having black bowel movements  Was transfused 1 unit in the ER  Patient is on treatment for recent cardiac stenting so is actually on Plavix and will continue his history of recent stent  Patient has been on Eliquis for paroxysmal A-fib- will hold  GI consult  Follow H&H q6h  Monitor vitals closely- appears stable, likely does not need acute scope  IV PPI BID  GI consultation  BUN is relatively normal    Pneumonia  Patient has infiltrate on chest x-ray  Has minimal sputum production no significant hypoxia  Treat empirically with Rocephin and doxycycline  Supportive treatments  Follow lactate    Diabetes (CMS/[facility])  Patient placed on/scale insulin coverage  Will allow DM diet as no plans for scope currently     Stage 3b chronic kidney disease (CMS/[facility])  Patient currently is actually receiving hemodialysis Monday Wednesday Friday after having acute renal failure after heart catheterization with IVP contrast  Patient's renal function appears to be improving  Patient will have consultation with nephrology service for continued dialysis needs if necessary        DVT ppx: Avoid pharmacological ppx given concern for UGIB       Bridget Hayes PA-C

## 2023-10-03 NOTE — CONSULTS
Nephrology Consult Note                                                                                                                                         Inpatient consult to Nephrology  Consult performed by: Mally Lucas DO  Consult ordered by: Duarte Rick MD                                                                                                             Patient is a 88 y.o. female who is admitted to hospital with complaints of   cough, weakness, GI bleed. Nephrology consulted in view of ESRD.  Patient is well-known to me from office in previous admission where she had developed LATANYA requiring dialysis on CKD.  The patient has been in acute rehab and then sent to a SNF and is being transported to [facility] ([facility] for dialysis.  The patient has been volume overloaded.  Her creatinine was 1.5 yesterday and is now 1.8.  She has had some black tarry stools.  I did review her hemoglobin as an outpatient it was 9.5 on 9/25 then 8.5 on 9/27.  Patient admitted with hemoglobin of 6.5.  Patient has productive cough, no nausea/vomiting or diarrhea.  She is not making much urine      Past Medical History:   Diagnosis Date    Chronic kidney disease     Heart murmur     Hypertension     Myocardial infarction (CMS/HCC)     Other conditions influencing health status 12/06/2022    History of cough    Other conditions influencing health status 04/06/2016    Diabetes mellitus type 1, uncontrolled    Personal history of other diseases of the circulatory system     History of hypertension    Personal history of other diseases of the female genital tract     History of endometriosis    Personal history of other diseases of the nervous system and sense organs 10/15/2021    History of acute otitis media    Personal history of other endocrine, nutritional and metabolic disease 01/26/2018     History of diabetes mellitus    Stroke (CMS/[facility])       Social History     Socioeconomic History    Marital status:      Spouse name: Not on file    Number of children: Not on file    Years of education: Not on file    Highest education level: Not on file   Occupational History    Not on file   Tobacco Use    Smoking status: Never    Smokeless tobacco: Never   Substance and Sexual Activity    Alcohol use: Never    Drug use: Never    Sexual activity: Not on file   Other Topics Concern    Not on file   Social History Narrative    Not on file     Social Determinants of Health     Financial Resource Strain: Not on file   Food Insecurity: Not on file   Transportation Needs: Not on file   Physical Activity: Not on file   Stress: Not on file   Social Connections: Not on file   Intimate Partner Violence: Not on file   Housing Stability: Not on file      Family History   Problem Relation Name Age of Onset    No Known Problems Mother      No Known Problems Father        No current facility-administered medications on file prior to encounter.     Current Outpatient Medications on File Prior to Encounter   Medication Sig Dispense Refill    acetaminophen (Tylenol) 325 mg tablet Take 2 tablets (650 mg) by mouth every 6 hours if needed for mild pain (1 - 3).      amiodarone (Pacerone) 200 mg tablet Take 1 tablet (200 mg) by mouth once every 24 hours.      apixaban (Eliquis) 2.5 mg tablet Take 1 tablet (2.5 mg) by mouth 2 times a day.      atorvastatin (Lipitor) 80 mg tablet Take 1 tablet (80 mg) by mouth once daily at bedtime.      clopidogrel (Plavix) 75 mg tablet Take 1 tablet (75 mg) by mouth once daily.      insulin lispro protamin-lispro (HumaLOG Mix 75-25) 100 unit/mL (75-25) injection Inject 40 Units under the skin once daily in the morning. And inject 20 units under the skin once daily at night      isosorbide dinitrate (Isordil) 10 mg tablet Take 1 tablet (10 mg) by mouth 3 times a day.      alum-mag  hydroxide-simeth (Mylanta) 200-200-20 mg/5 mL oral suspension Take 30 mL by mouth every 4 hours if needed for indigestion or heartburn.      bisacodyl (Dulcolax, bisacodyl,) 10 mg suppository Insert 1 suppository (10 mg) into the rectum once daily as needed for constipation.      blood sugar diagnostic (OneTouch Ultra Test) strip 1 strip by Does not apply route 3 times a day. 300 strip 3    lidocaine (Lidoderm) 5 % patch Place 1 patch on the skin once daily. Remove & discard patch within 12 hours or as directed by MD. Apply to left hip.      lisinopril 40 mg tablet Take 1 tablet (40 mg) by mouth once daily. (Patient not taking: Reported on 10/3/2023) 90 tablet 3    lisinopril 40 mg tablet Take 1 tablet (40 mg) by mouth once daily. (Patient not taking: Reported on 10/3/2023) 90 tablet 3    magnesium hydroxide (Milk of Magnesia) 400 mg/5 mL suspension Take 30 mL by mouth once daily as needed for constipation.      melatonin tablet Take 2 tablets (2 mg) by mouth as needed at bedtime for sleep.      metoprolol succinate XL (Toprol-XL) 50 mg 24 hr tablet Take 1 tablet (50 mg) by mouth once daily. Do not crush or chew.      nystatin (Mycostatin) 100,000 unit/mL suspension Take 5 mL (500,000 Units) by mouth in the morning and 5 mL (500,000 Units) at noon and 5 mL (500,000 Units) in the evening and 5 mL (500,000 Units) before bedtime. Swish in mouth and spit out.. (Patient not taking: Reported on 10/3/2023) 120 mL 1    pantoprazole (ProtoNix) 40 mg EC tablet Take 1 tablet (40 mg) by mouth once daily in the morning. Take before meals. Do not crush, chew, or split.      polyethylene glycol (Glycolax, Miralax) 17 gram/dose powder Take 17 g by mouth once daily as needed (constipation).      PSYLLIUM HUSK, ASPARTAME, ORAL Take 1 packet by mouth once daily as needed (constipation).      simethicone (Mylicon) 80 mg chewable tablet Chew 1 tablet (80 mg) every 6 hours if needed for flatulence.      [DISCONTINUED] ascorbic acid  "(Vitamin C) 500 mg chewable tablet Chew 1 tablet (500 mg) once daily.      [DISCONTINUED] cholecalciferol (Vitamin D-3) 50 MCG (2000 UT) tablet Take 1 tablet (50 mcg) by mouth in the morning.      [DISCONTINUED] insulin NPH and regular human (HumuLIN 70/30 U-100 Insulin) 100 unit/mL (70-30) injection Inject 40 Units under the skin once daily in the morning. And 20 units with dinner (Patient not taking: Reported on 10/3/2023) 50 mL 3    [DISCONTINUED] metoprolol tartrate (Lopressor) 50 mg tablet Take 1 tablet by mouth 2 times a day.        Scheduled medications  amiodarone, 200 mg, oral, q24h  atorvastatin, 80 mg, oral, Nightly  cefTRIAXone, 1 g, intravenous, q24h  clopidogrel, 75 mg, oral, Daily  doxycycline, 100 mg, oral, q12h MARIA E  isosorbide dinitrate, 10 mg, oral, TID  lisinopril, 40 mg, oral, Daily  metoprolol succinate XL, 50 mg, oral, Daily  nystatin, 5 mL, oral, 4x daily  [START ON 10/4/2023] pantoprazole, 40 mg, oral, Daily before breakfast   Or  [START ON 10/4/2023] pantoprazole, 40 mg, intravenous, Daily before breakfast  pantoprazole, 40 mg, intravenous, BID  polyethylene glycol, 17 g, oral, Daily      Continuous medications     PRN medications  PRN medications: acetaminophen **OR** acetaminophen **OR** acetaminophen, acetaminophen **OR** acetaminophen **OR** acetaminophen, acetaminophen, benzocaine-menthol, dextromethorphan-guaifenesin, oxygen     Review of systems as per HPI otherwise 10 point review systems negative    /63   Pulse 74   Temp 36.7 °C (98 °F)   Resp 16   Ht 1.626 m (5' 4\")   Wt 84 kg (185 lb 3 oz)   SpO2 96%   BMI 31.79 kg/m²     Input / Output:  24 HR:   Intake/Output Summary (Last 24 hours) at 10/3/2023 9865  Last data filed at 10/3/2023 0338  Gross per 24 hour   Intake 1050 ml   Output --   Net 1050 ml       Physical Exam   Alert and oriented x 32, NAD  RIJ TDC bag  EOMI please call my office today  OP clear  Neck: supple, No JVD  CV: RRR without m/r/g  Lungs: course  Abd: " soft NT/ND +BS  Ext: 2+ lower extremity edema   Neuro: grossly intact  Skin: no rashes    Results from last 7 days   Lab Units 10/03/23  0513 10/02/23  2220   SODIUM mmol/L 134* 133*   POTASSIUM mmol/L 3.9 3.9   CHLORIDE mmol/L 97* 96*   CO2 mmol/L 33* 30   BUN mg/dL 22 19   CREATININE mg/dL 1.81* 1.57*   CALCIUM mg/dL 7.6* 7.4*   PROTEIN TOTAL g/dL  --  5.4*   BILIRUBIN TOTAL mg/dL  --  0.4   ALK PHOS U/L  --  99   ALT U/L  --  16   AST U/L  --  26   GLUCOSE mg/dL 194* 199*           Results from last 7 days   Lab Units 10/03/23  1439 10/03/23  0513 10/02/23  2220 09/28/23  0520   WBC AUTO x10*3/uL  --  11.6* 14.6* 8.2   HEMOGLOBIN g/dL 7.8* 7.5* 6.8* 7.3*   HEMATOCRIT % 23.7* 23.3* 21.0* 23.2*   PLATELETS AUTO x10*3/uL  --  254 243 249        Assessment:   Patient is 88 y.o. female who is admitted to hospital with complaints of  cough, blacktarry stools. Nephrology consulted in view of ESRD.    Acute kidney injury on CKD stage 3b-has been dialysis dependent  Since August 28, 2023  -Access is right [facility]  -Patient on hemodialysis Monday Wednesday Friday at [facility]  -Patient did dialyze yesterday  -Patient does have a history of urinary retention so we will check a bladder scan    Anemia of blood loss and CKD  -Patient had colonoscopy last admission  -May have EGD  -Transfuse PRBCs per primary service    Volume overload    Recommendations  -Hemodialysis tomorrow  -Trying to establish dry weight  -Check bladder scan    Please message me through EPIC chat with any questions or concerns.     Mally Lucas DO  10/3/2023  7:07 PM         [facility]    [address], [address]   [place] [address]  Office: [phone]

## 2023-10-03 NOTE — PROGRESS NOTES
Notified by  Gladys Castillo RN [facility]   , that patient's preference for Skilled nursing facility is   [facility]  .  Referral will be submitted for review.       [employer] has approved ,Auth recieved

## 2023-10-03 NOTE — ASSESSMENT & PLAN NOTE
Patient currently is actually receiving hemodialysis Monday Wednesday Friday after having acute renal failure after heart catheterization with IVP contrast  Patient's renal function appears to be improving  Patient will have consultation with nephrology service for continued dialysis needs  MELVINA GOODRICH

## 2023-10-04 ENCOUNTER — APPOINTMENT (OUTPATIENT)
Dept: DIALYSIS | Facility: HOSPITAL | Age: 88
End: 2023-10-04
Payer: COMMERCIAL

## 2023-10-04 PROBLEM — E87.20 LACTIC ACIDOSIS: Status: ACTIVE | Noted: 2023-10-04

## 2023-10-04 LAB
ANION GAP SERPL CALC-SCNC: 8 MMOL/L (ref 10–20)
BUN SERPL-MCNC: 34 MG/DL (ref 6–23)
CALCIUM SERPL-MCNC: 8 MG/DL (ref 8.6–10.3)
CHLORIDE SERPL-SCNC: 96 MMOL/L (ref 98–107)
CO2 SERPL-SCNC: 33 MMOL/L (ref 21–32)
CREAT SERPL-MCNC: 2.35 MG/DL (ref 0.5–1.05)
GFR SERPL CREATININE-BSD FRML MDRD: 20 ML/MIN/1.73M*2
GLUCOSE SERPL-MCNC: 236 MG/DL (ref 74–99)
HBV SURFACE AB SER-ACNC: <3.1 MIU/ML
HBV SURFACE AG SERPL QL IA: NONREACTIVE
HCT VFR BLD AUTO: 22.7 % (ref 36–46)
HCT VFR BLD AUTO: 23.9 % (ref 36–46)
HGB BLD-MCNC: 7.2 G/DL (ref 12–16)
HGB BLD-MCNC: 7.9 G/DL (ref 12–16)
MAGNESIUM SERPL-MCNC: 2.17 MG/DL (ref 1.6–2.4)
POTASSIUM SERPL-SCNC: 4.1 MMOL/L (ref 3.5–5.3)
SODIUM SERPL-SCNC: 133 MMOL/L (ref 136–145)

## 2023-10-04 PROCEDURE — 2500000002 HC RX 250 W HCPCS SELF ADMINISTERED DRUGS (ALT 637 FOR MEDICARE OP, ALT 636 FOR OP/ED): Performed by: INTERNAL MEDICINE

## 2023-10-04 PROCEDURE — P9016 RBC LEUKOCYTES REDUCED: HCPCS

## 2023-10-04 PROCEDURE — 36415 COLL VENOUS BLD VENIPUNCTURE: CPT | Performed by: INTERNAL MEDICINE

## 2023-10-04 PROCEDURE — 2060000001 HC INTERMEDIATE ICU ROOM DAILY

## 2023-10-04 PROCEDURE — 94760 N-INVAS EAR/PLS OXIMETRY 1: CPT

## 2023-10-04 PROCEDURE — 83735 ASSAY OF MAGNESIUM: CPT | Performed by: INTERNAL MEDICINE

## 2023-10-04 PROCEDURE — 36430 TRANSFUSION BLD/BLD COMPNT: CPT

## 2023-10-04 PROCEDURE — 5A1D70Z PERFORMANCE OF URINARY FILTRATION, INTERMITTENT, LESS THAN 6 HOURS PER DAY: ICD-10-PCS | Performed by: INTERNAL MEDICINE

## 2023-10-04 PROCEDURE — 87340 HEPATITIS B SURFACE AG IA: CPT | Mod: CMCLAB,PORLAB | Performed by: INTERNAL MEDICINE

## 2023-10-04 PROCEDURE — 86706 HEP B SURFACE ANTIBODY: CPT | Mod: CMCLAB,PORLAB | Performed by: INTERNAL MEDICINE

## 2023-10-04 PROCEDURE — 99232 SBSQ HOSP IP/OBS MODERATE 35: CPT | Performed by: PHYSICIAN ASSISTANT

## 2023-10-04 PROCEDURE — 85018 HEMOGLOBIN: CPT | Performed by: PHYSICIAN ASSISTANT

## 2023-10-04 PROCEDURE — 82374 ASSAY BLOOD CARBON DIOXIDE: CPT | Performed by: INTERNAL MEDICINE

## 2023-10-04 PROCEDURE — 2500000004 HC RX 250 GENERAL PHARMACY W/ HCPCS (ALT 636 FOR OP/ED): Performed by: PHYSICIAN ASSISTANT

## 2023-10-04 PROCEDURE — 85014 HEMATOCRIT: CPT | Performed by: PHYSICIAN ASSISTANT

## 2023-10-04 PROCEDURE — 80048 BASIC METABOLIC PNL TOTAL CA: CPT | Performed by: INTERNAL MEDICINE

## 2023-10-04 PROCEDURE — 2500000004 HC RX 250 GENERAL PHARMACY W/ HCPCS (ALT 636 FOR OP/ED): Performed by: INTERNAL MEDICINE

## 2023-10-04 PROCEDURE — 97162 PT EVAL MOD COMPLEX 30 MIN: CPT | Mod: GP

## 2023-10-04 PROCEDURE — 99233 SBSQ HOSP IP/OBS HIGH 50: CPT | Performed by: PHYSICIAN ASSISTANT

## 2023-10-04 PROCEDURE — C9113 INJ PANTOPRAZOLE SODIUM, VIA: HCPCS | Performed by: PHYSICIAN ASSISTANT

## 2023-10-04 PROCEDURE — 8010000001 HC DIALYSIS - HEMODIALYSIS PER DAY

## 2023-10-04 PROCEDURE — 2500000005 HC RX 250 GENERAL PHARMACY W/O HCPCS: Performed by: PHYSICIAN ASSISTANT

## 2023-10-04 PROCEDURE — 2500000001 HC RX 250 WO HCPCS SELF ADMINISTERED DRUGS (ALT 637 FOR MEDICARE OP): Performed by: INTERNAL MEDICINE

## 2023-10-04 PROCEDURE — 2500000001 HC RX 250 WO HCPCS SELF ADMINISTERED DRUGS (ALT 637 FOR MEDICARE OP): Performed by: PHYSICIAN ASSISTANT

## 2023-10-04 RX ORDER — SODIUM CHLORIDE 0.9 % (FLUSH) 0.9 %
SYRINGE (ML) INJECTION
Status: COMPLETED
Start: 2023-10-04 | End: 2023-10-04

## 2023-10-04 RX ORDER — HEPARIN SODIUM 1000 [USP'U]/ML
2000 INJECTION, SOLUTION INTRAVENOUS; SUBCUTANEOUS AS NEEDED
Status: DISCONTINUED | OUTPATIENT
Start: 2023-10-04 | End: 2023-10-09 | Stop reason: HOSPADM

## 2023-10-04 RX ORDER — DEXTROSE MONOHYDRATE 100 MG/ML
0.3 INJECTION, SOLUTION INTRAVENOUS ONCE AS NEEDED
Status: DISCONTINUED | OUTPATIENT
Start: 2023-10-04 | End: 2023-10-09 | Stop reason: HOSPADM

## 2023-10-04 RX ORDER — LIDOCAINE 560 MG/1
1 PATCH PERCUTANEOUS; TOPICAL; TRANSDERMAL DAILY
Status: DISCONTINUED | OUTPATIENT
Start: 2023-10-04 | End: 2023-10-09 | Stop reason: HOSPADM

## 2023-10-04 RX ORDER — INSULIN LISPRO 100 [IU]/ML
0-15 INJECTION, SOLUTION INTRAVENOUS; SUBCUTANEOUS
Status: DISCONTINUED | OUTPATIENT
Start: 2023-10-05 | End: 2023-10-09 | Stop reason: HOSPADM

## 2023-10-04 RX ORDER — SODIUM CHLORIDE 0.9 % (FLUSH) 0.9 %
SYRINGE (ML) INJECTION
Status: DISPENSED
Start: 2023-10-04 | End: 2023-10-04

## 2023-10-04 RX ORDER — OXYCODONE HYDROCHLORIDE 5 MG/1
5 TABLET ORAL EVERY 6 HOURS PRN
Status: DISCONTINUED | OUTPATIENT
Start: 2023-10-04 | End: 2023-10-09 | Stop reason: HOSPADM

## 2023-10-04 RX ORDER — DEXTROSE 50 % IN WATER (D50W) INTRAVENOUS SYRINGE
25
Status: DISCONTINUED | OUTPATIENT
Start: 2023-10-04 | End: 2023-10-09 | Stop reason: HOSPADM

## 2023-10-04 RX ADMIN — LIDOCAINE 1 PATCH: 4 PATCH TOPICAL at 13:11

## 2023-10-04 RX ADMIN — Medication 10 ML: at 20:37

## 2023-10-04 RX ADMIN — NYSTATIN 500000 UNITS: 100000 SUSPENSION ORAL at 12:35

## 2023-10-04 RX ADMIN — NYSTATIN 500000 UNITS: 100000 SUSPENSION ORAL at 20:23

## 2023-10-04 RX ADMIN — OXYCODONE HYDROCHLORIDE 5 MG: 5 TABLET ORAL at 13:10

## 2023-10-04 RX ADMIN — LISINOPRIL 40 MG: 20 TABLET ORAL at 12:36

## 2023-10-04 RX ADMIN — DOXYCYCLINE 100 MG: 100 CAPSULE ORAL at 20:23

## 2023-10-04 RX ADMIN — PANTOPRAZOLE SODIUM 40 MG: 40 INJECTION, POWDER, FOR SOLUTION INTRAVENOUS at 12:35

## 2023-10-04 RX ADMIN — GUAIFENESIN AND DEXTROMETHORPHAN 5 ML: 100; 10 SYRUP ORAL at 02:09

## 2023-10-04 RX ADMIN — ISOSORBIDE DINITRATE 10 MG: 10 TABLET ORAL at 20:23

## 2023-10-04 RX ADMIN — POLYETHYLENE GLYCOL 3350 17 G: 17 POWDER, FOR SOLUTION ORAL at 12:36

## 2023-10-04 RX ADMIN — METOPROLOL SUCCINATE 50 MG: 50 TABLET, EXTENDED RELEASE ORAL at 12:36

## 2023-10-04 RX ADMIN — ISOSORBIDE DINITRATE 10 MG: 10 TABLET ORAL at 16:45

## 2023-10-04 RX ADMIN — DOXYCYCLINE 100 MG: 100 CAPSULE ORAL at 12:36

## 2023-10-04 RX ADMIN — CEFTRIAXONE SODIUM 1 G: 1 INJECTION, SOLUTION INTRAVENOUS at 16:40

## 2023-10-04 RX ADMIN — AMIODARONE HYDROCHLORIDE 200 MG: 200 TABLET ORAL at 16:47

## 2023-10-04 RX ADMIN — ATORVASTATIN CALCIUM 80 MG: 40 TABLET, FILM COATED ORAL at 20:23

## 2023-10-04 RX ADMIN — OXYCODONE HYDROCHLORIDE 5 MG: 5 TABLET ORAL at 20:23

## 2023-10-04 RX ADMIN — NYSTATIN 500000 UNITS: 100000 SUSPENSION ORAL at 16:39

## 2023-10-04 RX ADMIN — ISOSORBIDE DINITRATE 10 MG: 10 TABLET ORAL at 12:36

## 2023-10-04 RX ADMIN — CLOPIDOGREL BISULFATE 75 MG: 75 TABLET ORAL at 12:35

## 2023-10-04 RX ADMIN — PANTOPRAZOLE SODIUM 40 MG: 40 INJECTION, POWDER, FOR SOLUTION INTRAVENOUS at 20:23

## 2023-10-04 ASSESSMENT — COGNITIVE AND FUNCTIONAL STATUS - GENERAL
PERSONAL GROOMING: A LOT
HELP NEEDED FOR BATHING: TOTAL
EATING MEALS: A LITTLE
CLIMB 3 TO 5 STEPS WITH RAILING: TOTAL
MOVING TO AND FROM BED TO CHAIR: TOTAL
STANDING UP FROM CHAIR USING ARMS: TOTAL
CLIMB 3 TO 5 STEPS WITH RAILING: TOTAL
MOBILITY SCORE: 6
MOVING TO AND FROM BED TO CHAIR: TOTAL
HELP NEEDED FOR BATHING: TOTAL
DRESSING REGULAR LOWER BODY CLOTHING: TOTAL
MOVING TO AND FROM BED TO CHAIR: TOTAL
TURNING FROM BACK TO SIDE WHILE IN FLAT BAD: TOTAL
DAILY ACTIVITIY SCORE: 10
MOVING FROM LYING ON BACK TO SITTING ON SIDE OF FLAT BED WITH BEDRAILS: TOTAL
MOVING FROM LYING ON BACK TO SITTING ON SIDE OF FLAT BED WITH BEDRAILS: TOTAL
DRESSING REGULAR LOWER BODY CLOTHING: A LOT
WALKING IN HOSPITAL ROOM: TOTAL
STANDING UP FROM CHAIR USING ARMS: TOTAL
STANDING UP FROM CHAIR USING ARMS: TOTAL
PERSONAL GROOMING: A LOT
DRESSING REGULAR UPPER BODY CLOTHING: TOTAL
TURNING FROM BACK TO SIDE WHILE IN FLAT BAD: A LOT
TOILETING: TOTAL
CLIMB 3 TO 5 STEPS WITH RAILING: TOTAL
MOVING FROM LYING ON BACK TO SITTING ON SIDE OF FLAT BED WITH BEDRAILS: A LOT
EATING MEALS: A LITTLE
TURNING FROM BACK TO SIDE WHILE IN FLAT BAD: TOTAL
WALKING IN HOSPITAL ROOM: TOTAL
TOILETING: TOTAL
WALKING IN HOSPITAL ROOM: TOTAL
MOBILITY SCORE: 8
DAILY ACTIVITIY SCORE: 10
DRESSING REGULAR UPPER BODY CLOTHING: A LOT
MOBILITY SCORE: 6

## 2023-10-04 ASSESSMENT — ENCOUNTER SYMPTOMS
DIARRHEA: 0
CONSTIPATION: 0
FREQUENCY: 0
SORE THROAT: 0
CHEST TIGHTNESS: 0
TROUBLE SWALLOWING: 0
BLOOD IN STOOL: 0
BACK PAIN: 0
HEMATURIA: 0
DIAPHORESIS: 0
WOUND: 0
HALLUCINATIONS: 0
WEAKNESS: 0
HEADACHES: 0
JOINT SWELLING: 0
ABDOMINAL PAIN: 0
WHEEZING: 0
PALPITATIONS: 0
BRUISES/BLEEDS EASILY: 0
FATIGUE: 1
SHORTNESS OF BREATH: 0
COUGH: 1
APPETITE CHANGE: 0
FACIAL SWELLING: 0
FLANK PAIN: 0
NAUSEA: 0
CHILLS: 0
VOMITING: 0
LIGHT-HEADEDNESS: 0
NUMBNESS: 0
DYSURIA: 0
DIZZINESS: 0
FEVER: 0
EYE PAIN: 0

## 2023-10-04 ASSESSMENT — PAIN SCALES - GENERAL
PAINLEVEL_OUTOF10: 3
PAINLEVEL_OUTOF10: 7
PAINLEVEL_OUTOF10: 9
PAINLEVEL_OUTOF10: 0 - NO PAIN
PAINLEVEL_OUTOF10: 8
PAINLEVEL_OUTOF10: 8

## 2023-10-04 ASSESSMENT — PAIN - FUNCTIONAL ASSESSMENT
PAIN_FUNCTIONAL_ASSESSMENT: 0-10

## 2023-10-04 NOTE — PROGRESS NOTES
Melody Martin is a 88 y.o. female on day 1 of admission presenting with Anemia, unspecified type.      Subjective   10/04/23: No acute events overnight. Vitals stable,. Hemoglobin at 7.2 this morning we will give 1 unit packed red blood cell and hemodialysis today.  Lactic acidosis resolved.  Patient remains on 2 L oxygen-we will wean if able.  Follow blood cultures and bladder scan.         Review of Systems   Constitutional:  Positive for fatigue. Negative for appetite change, chills, diaphoresis and fever.        Generalized weakness   HENT:  Negative for congestion, ear pain, facial swelling, hearing loss, nosebleeds, sore throat, tinnitus and trouble swallowing.    Eyes:  Negative for pain.   Respiratory:  Positive for cough. Negative for chest tightness, shortness of breath and wheezing.    Cardiovascular:  Negative for chest pain, palpitations and leg swelling.   Gastrointestinal:  Negative for abdominal pain, blood in stool, constipation, diarrhea, nausea and vomiting.   Genitourinary:  Negative for dysuria, flank pain, frequency, hematuria and urgency.   Musculoskeletal:  Negative for back pain and joint swelling.        Edema BUE   Skin:  Negative for rash and wound.   Neurological:  Negative for dizziness, syncope, weakness, light-headedness, numbness and headaches.   Hematological:  Does not bruise/bleed easily.   Psychiatric/Behavioral:  Negative for behavioral problems, hallucinations and suicidal ideas.           Objective     Last Recorded Vitals  /66 (BP Location: Left arm, Patient Position: Lying)   Pulse 68   Temp 36.8 °C (98.2 °F) (Temporal)   Resp 18   Wt 84 kg (185 lb 3 oz)   SpO2 95%     Image Results  XR chest 1 view    Result Date: 10/2/2023  STUDY: Chest Radiograph;  10/2/2023 11:12 PM INDICATION: Shortness of breath. COMPARISON: 8/30/2023 XR Chest one view ACCESSION NUMBER(S): [id number] ORDERING CLINICIAN: CHASE BUTLER TECHNIQUE:  Frontal chest was obtained at 23:12  hours. FINDINGS: CARDIOMEDIASTINAL SILHOUETTE: Cardiomediastinal silhouette is stable in size and configuration. Right IJ central line is unchanged.  LUNGS: Hazy opacity at the left lung base suggests small left-sided pleural effusion.  Left basilar atelectasis and/or airspace disease is not excluded.  Right lung is grossly clear.  No pneumothorax.  ABDOMEN: No remarkable upper abdominal findings.  BONES: No acute osseous changes.    Hazy opacity at the left lung base suggesting small left-sided pleural effusion.  Left basilar atelectasis and/or airspace disease is not excluded. Signed by Gio Corona MD    CT abdomen pelvis wo IV contrast    Result Date: 9/23/2023  Interpreted By:  KALEE DESAIDO ANNA MRN: [id number] Patient Name: NIRU SIMS  STUDY: CT ABDOMEN AND PELVIS WO CONTRAST;  9/23/2023 11:58 am  INDICATION: New left hip pain after resuming anticoagulation, eval hematoma for change.  COMPARISON: CT abdomen and pelvis 09/17/2023  ACCESSION NUMBER(S): [id number]  ORDERING CLINICIAN: DANY KAT  TECHNIQUE: CT of the abdomen and pelvis was performed without intravenous contrast. Standard contiguous axial images were obtained at 3 mm slice thickness through the abdomen and pelvis. Coronal and sagittal reconstructions at 3 mm slice thickness were performed.  FINDINGS: LOWER CHEST: There is increased size of small right and moderate left pleural effusions with adjacent atelectasis since 09/17/2023.  ABDOMEN:  LIVER: No focal liver lesions are seen.  BILE DUCTS: The intrahepatic and extrahepatic ducts are not dilated.  GALLBLADDER: The gallbladder contains high density material which could represent sludge or micro calculi. No evidence of acute cholecystitis.  PANCREAS: The pancreas appears unremarkable without evidence of ductal dilatation or masses.  SPLEEN: The spleen is normal in size.  ADRENAL GLANDS: There is a stable 3 cm right adrenal adenoma. The left adrenal is unremarkable.  KIDNEYS AND URETERS: Bilateral  multifocal cortical scarring is are again seen. There is a stable 1.8 cm right interpolar cyst. An amorphous 5 mm hyperdense focus in the left upper pole may represent a nonobstructing calculus (series 2, image 53), unchanged. No hydronephrosis is seen.  PELVIS:  BLADDER: The bladder is markedly distended with unchanged nondependent air locules which could be postprocedural.  REPRODUCTIVE ORGANS: Hysterectomy.  BOWEL: There is no bowel wall thickening or dilation. The appendix is not visualized. No pericecal inflammation is seen.  VESSELS: Mild-to-moderate atherosclerotic calcifications are seen within the abdominal aorta without aneurysmal dilation.  PERITONEUM/RETROPERITONEUM/LYMPH NODES: There is interval development of trace nonspecific left paracolic gutter and pelvic ascites without free air or fluid collection. There is no retroperitoneal hematoma.  Multiple upper abdominal and retroperitoneal shotty, likely reactive lymph nodes are again seen. No progressive abdominopelvic lymphadenopathy.  ABDOMINAL WALL AND BONES : There is increased anasarca. Left gluteal fluid collection is not significantly changed, measuring 8.8 x 3.1 x 10 cm (series 2, image 114; image 4, image 65).  No suspicious osseous lesions are identified. There is lumbar scoliosis with moderate-to-severe multilevel degenerative disc disease.      Stable size left gluteal fluid collection/evolving hematoma compared to 09/17/2023.  No intra abdominopelvic or retroperitoneal hematoma.  Increased body wall anasarca and bilateral pleural effusions.  MACRO: None    Venous Duplex Ultrasound DVT    Result Date: 9/22/2023  [place] [id number] Phone [phone] Fax [phone] Vascular Lab Report Upper Venous Duplex Ultrasound Patient Name:     NIRU Jessica Physician:  [id number] Aayush Hernandez MD, [facility] Study Date:       9/22/2023     Referring           DANY KAT  Physician: MRN/PID:          [id number]      PCP: Accession/Order#: [id number]     CC Report to: YOB: 1935     Technologist:       Marissa Monreal RVT Gender:           F             Technologist 2: Admission Status: Outpatient    Location Performed: [facility] Diagnosis/ICD: R60.1-Generalized edema (anasarca) Procedure/CPT: 99230 Peripheral venous duplex scan for DVT Limited-83311 CONCLUSIONS: Right Upper Venous: No evidence of acute deep vein thrombus visualized in the right upper extremity. There is acute occlusive superficial thomobosis noted in the right median cubital vein just proximal to the iv site. The right subclavian vein was not visualized due to line placement. Unable to compress the left subclavian vein due to high venous pressure; however, spontaneous flow was noted. Left Upper Venous: The subclavian vein demonstrates a normal spontaneous and phasic flow. Imaging & Doppler Findings: Right            Compressible Thrombus Internal Jugular     Yes        None Axillary             Yes        None Brachial             Yes        None Cephalic             Yes        None Basilic              Yes        None Left       Thrombus        Flow Subclavian   None   Spontaneous/Phasic [id number] Aayush Hernandez MD, RPVI Electronically signed by [id number] Aayush Hernandez MD, RPVI on 9/22/2023 at 8:26:46 AM     Colonoscopy    Result Date: 9/21/2023  Patient Name: Melody Martin Procedure Date: 9/21/2023 10:02 AM MRN: [id number] Account Number: [id number] YOB: 1935 Admit Type: Inpatient Site: [facility] Ethnicity: Patient Declined Race: White Attending MD: Enrike Huggins DO, [id number] Procedure:             Colonoscopy Indications:           Rectal bleeding Providers:             Enrike Huggins DO (Doctor) Referring:             Medicines:             Monitored Anesthesia Care, See the Anesthesia note for                        documentation of the administered medications  Complications:         No immediate complications. Procedure:             Pre-Anesthesia Assessment:                        - Prior to the procedure, a History and Physical was                        performed, and patient medications and allergies were                        reviewed. The patient's tolerance of previous                        anesthesia was also reviewed. The risks and benefits                        of the procedure and the sedation options and risks                        were discussed with the patient. All questions were                        answered, and informed consent was obtained. Prior                        Anticoagulants: The patient has taken Plavix                        (clopidogrel), last dose was day of procedure. ASA                        Grade Assessment: III - A patient with severe systemic                        disease. After reviewing the risks and benefits, the                        patient was deemed in satisfactory condition to                        undergo the procedure.                        After I obtained informed consent, the scope was                        passed under direct vision. Throughout the procedure,                        the patient's blood pressure, pulse, and oxygen                        saturations were monitored continuously. The pediatric                        colonoscope was introduced through the anus and                        advanced to 2 cm into the ileum. The colonoscopy was                        performed without difficulty. The patient tolerated                        the procedure well. The quality of the bowel                        preparation was evaluated using the BBPS ([place] Bowel                        Preparation Scale) with scores of: Right Colon = 1                        (portion of mucosa seen, but other areas not well seen                        due to staining, residual stool and/or opaque liquid),                         Transverse Colon = 2 (minor amount of residual                        staining, small fragments of stool and/or opaque                        liquid, but mucosa seen well) and Left Colon = 2                        (minor amount of residual staining, small fragments of                        stool and/or opaque liquid, but mucosa seen well). The                        total BBPS score equals 5. The quality of the bowel                        preparation was fair. The quality of the bowel                        preparation was fair. The terminal ileum, ileocecal                        valve, appendiceal orifice, and rectum were                        photographed. Findings:      Hemorrhoids were found on perianal exam.      A moderate amount of semi-solid stool was found in the rectum,      interfering with visualization. Lavage of the area was performed using a      large amount of sterile water, resulting in clearance with fair      visualization.      Multiple small-mouthed diverticula were found in the sigmoid colon and      descending colon.      The terminal ileum appeared normal.      The exam was otherwise without abnormality on direct and retroflexion      views. Impression:            - Preparation of the colon was fair.                        - Hemorrhoids found on perianal exam.                        - Stool in the rectum.                        - Diverticulosis in the sigmoid colon and in the                        descending colon.                        - The examined portion of the ileum was normal.                        - The examination was otherwise normal on direct and                        retroflexion views.                        - No specimens collected. Recommendation:        - Return patient to hospital [facility] for ongoing care.                        - High fiber diet.                        - Miralax 1 capful (17 grams) in 8 ounces of water PO                        BID.                         - Continue present medications.                        - Repeat colonoscopy is not recommended due to current                        age (75 years or older) for surveillance. Procedure Code(s):     --- Professional ---                        99216, Colonoscopy, flexible; diagnostic, including                        collection of specimen(s) by brushing or washing, when                        performed (separate procedure) Diagnosis Code(s):     --- Professional ---                        K64.9, Unspecified hemorrhoids                        K62.5, Hemorrhage of anus and rectum                        K57.30, Diverticulosis of large intestine without                        perforation or abscess without bleeding CPT copyright 2021 [employer]. All rights reserved. The codes documented in this report are preliminary and upon  review may be revised to meet current compliance requirements. Attending Participation:      I personally performed the entire procedure. DO Enrike Bartholomew DO 9/21/2023 10:53:31 AM This report has been signed electronically. Number of Addenda: 0 Note Initiated On: 9/21/2023 10:02 AM Scope Withdrawal Time 0 hours 8 minutes 14 seconds Total Procedure Duration Time 0 hours 14 minutes 37 seconds Estimated Blood Loss:      Estimated blood loss: none.    Electrocardiogram 12 Lead    Result Date: 9/20/2023  Sinus rhythm with Premature supraventricular complexes Abnormal QRS-T angle, consider primary T wave abnormality Abnormal ECG When compared with ECG of 28-AUG-2023 17:03, PREVIOUS ECG IS PRESENT Confirmed by Derek Sinha ([id number]) on 9/20/2023 2:38:44 PM    CT abdomen pelvis wo IV contrast    Result Date: 9/17/2023  Interpreted By:  LORENZO GUADARRAMA MD MRN: [id number] Patient Name: NIRU SIMS  STUDY: CT ABDOMEN AND PELVIS WO CONTRAST;  9/17/2023 1:43 pm  INDICATION: abd pain .  COMPARISON: 08/27/2023  ACCESSION NUMBER(S): [id number]  ORDERING  CLINICIAN: DANY COBIAN  TECHNIQUE: CT of the abdomen and pelvis was performed.  Standard contiguous axial images were obtained at 3 mm slice thickness through the abdomen and pelvis. Coronal and sagittal reconstructions at 3 mm slice thickness were performed.  FINDINGS: Limited study without IV contrast.  LOWER CHEST: Lung bases demonstrate small to moderate bilateral pleural effusions and associated atelectasis or infiltrate, worse on the left.  Right breast cutaneous thickening, nonspecific in etiology.  ABDOMEN:  LIVER: Liver is normal in size. No focal lesions are seen.  BILE DUCTS: Biliary system is nondilated.  GALLBLADDER: The gallbladder is not seen and may have been surgically removed.  PANCREAS: No focal lesions. No peripancreatic fat stranding.  SPLEEN: The spleen is normal in size. No focal abnormalities are seen.  ADRENAL GLANDS: There is a complex mass within the right adrenal gland similar to prior study with fat components which may represent fat containing adrenal adenoma versus myelolipoma measuring up to 3 cm in size, stable.  KIDNEYS AND URETERS: Lobulated contours of bilateral kidneys with associated focal cortical scarring. There is a low-attenuation lesion involving the interpolar region of the right kidney posteriorly measuring approximately 16 mm in size. This is suggestive of a cyst but incompletely characterized due to small size and lack of IV contrast.  PELVIS:  BLADDER: The urinary bladder is over distended. There is air within the bladder which may be related to recent bladder instrumentation although infectious process can not be excluded. No bladder calculi or masses are seen within this limited study.  REPRODUCTIVE ORGANS: The uterus is not seen and may have been surgically removed.  BOWEL: The small and large bowel are nondilated.  The appendix is not positively identified, however, no findings to suggest acute appendicitis is seen.  Normal caliber fluid-filled loops of small bowel  and colon with air-fluid levels which may represent an ileus type pattern such as related to enteritis. No bowel obstruction or free air.  Few colonic diverticula but no CT evidence for acute diverticulitis. Findings most prominent in the area of sigmoid.  VESSELS: Diffuse wall calcification of the aorta and its main branches. No aneurysm.  PERITONEUM/RETROPERITONEUM/LYMPH NODES: No retroperitoneal masses are seen.  No lymphadenopathy.  BONES AND ABDOMINAL WALL: There is no evidence for destructive lytic or blastic bone lesions identified.  Multilevel discogenic degenerative changes throughout the spine.  Diffuse subcutaneous edema particularly within bilateral flanks, worse on the right. There is fluid collection within left posterior gluteal region which may represent a hematoma although incompletely characterized in this study. The area measures approximately 9 x 5 cm in size.      1.  Small-bowel areas probably related to enteritis. No bowel obstruction or free air. Clinical correlation and follow-up recommended to document resolution. 2. Over distended urinary bladder and associated small amount of bladder air which may be due to bladder instrumentation, however, infectious process can not be excluded. Clinical correlation and with urinalysis recommended. 3. Diffuse subcutaneous edema concerning for anasarca. Recommend clinical correlation. 4. Left gluteal fluid collection suggestive of hematoma, slightly decreased in size since previous exam. Recommend clinical correlation and follow-up. 5. Right breast cutaneous edema. Correlation with mammographic findings recommended. 6. Bilateral pleural effusions and associated atelectasis or infiltrate worse on the left. Recommend clinical correlation and follow-up to document resolution. 7. Additional findings as detailed.    FL replacement tunneled CVC same site    Result Date: 9/8/2023  Interpreted By:  JACKSON DUKE MD MRN: [id number] Patient Name: NIRU SIMS   STUDY: REPLACE MERLY CVC SAME SITE;  9/6/2023 2:27 pm  INDICATION: dialysis .  COMPARISON: None.  ACCESSION NUMBER(S): [id number]  ORDERING CLINICIAN: ERNESTINE ESPARZA  TECHNIQUE:  CONSENT: The patient/patient's POA/next of kin was informed of the nature of the proposed procedure. The purposes, alternatives, risks, and benefits were explained and discussed. All questions were answered and consent was obtained.  RADIATION EXPOSURE: Fluoroscopy time: 1.1 min. Dose: 7.9 mGy. Dose Area Product (DAP): 1.7  SEDATION: Moderate conscious IV sedation services (supervision of administration, induction, and maintenance) were provided by the physician performing the procedure with intravenous fentanyl 50 mcg 10 minutes. The physician was assisted by an independent trained observer, an interventional radiology nurse, in the continuous monitoring of patient level of consciousness and physiologic status.  MEDICATION/CONTRAST: No additional  TIME OUT: A time out was performed immediately prior to procedure start with the interventional team, correctly identifying the patient name, date of birth, MRN, procedure, anatomy (including marking of site and side), patient position, procedure consent form, relevant laboratory and imaging test results, antibiotic administration, safety precautions, and procedure-specific equipment needs.  COMPLICATIONS: No immediate adverse events identified.  FINDINGS: Patient presents for conversion of a temporary hemodialysis catheter within the right internal jugular vein to a tunneled hemodialysis catheter. Patient was placed in the supine position on the angiographic table in the right temporary line was prepped and draped in usual sterile fashion. A skin tract along the anterior chest wall was anesthetized and formed. A 19 cm tunneled hemodialysis catheter was then tunneled through the tract to exit the site of the current temporary dialysis catheter. The temporary catheter was removed over a stiff  Glidewire which was advanced into the inferior vena cava under fluoroscopic guidance. A new peel-away sheath was advanced over the wire. The new hemodialysis catheter was advanced through the peel-away sheath and the sheath was removed. Final imaging shows the catheter to be positioned in satisfactory position with the distal tip in the upper right atrium. The port was flushed and loaded with heparin. Single suture was used to secure the line. No immediate complications encountered.      Conversion of the patient's temporary hemodialysis catheter to a tunneled hemodialysis catheter as above. Catheter is ready for immediate use.   Performed and dictated at [facility].  MACRO: None       Lab Results  Results for orders placed or performed during the hospital encounter of 10/02/23 (from the past 24 hour(s))   CBC and Auto Differential   Result Value Ref Range    WBC 10.4 4.4 - 11.3 x10*3/uL    nRBC 0.0 0.0 - 0.0 /100 WBCs    RBC 2.32 (L) 4.00 - 5.20 x10*6/uL    Hemoglobin 7.1 (L) 12.0 - 16.0 g/dL    Hematocrit 21.8 (L) 36.0 - 46.0 %    MCV 94 80 - 100 fL    MCH 30.6 26.0 - 34.0 pg    MCHC 32.6 32.0 - 36.0 g/dL    RDW 14.4 11.5 - 14.5 %    Platelets 242 150 - 450 x10*3/uL    MPV 9.4 7.5 - 11.5 fL    Neutrophils % 63.3 40.0 - 80.0 %    Immature Granulocytes %, Automated 1.4 (H) 0.0 - 0.9 %    Lymphocytes % 20.8 13.0 - 44.0 %    Monocytes % 11.0 2.0 - 10.0 %    Eosinophils % 3.1 0.0 - 6.0 %    Basophils % 0.4 0.0 - 2.0 %    Neutrophils Absolute 6.55 (H) 1.60 - 5.50 x10*3/uL    Immature Granulocytes Absolute, Automated 0.15 0.00 - 0.50 x10*3/uL    Lymphocytes Absolute 2.15 0.80 - 3.00 x10*3/uL    Monocytes Absolute 1.14 (H) 0.05 - 0.80 x10*3/uL    Eosinophils Absolute 0.32 0.00 - 0.40 x10*3/uL    Basophils Absolute 0.04 0.00 - 0.10 x10*3/uL   Hemoglobin and hematocrit, blood   Result Value Ref Range    Hemoglobin 7.1 (L) 12.0 - 16.0 g/dL    Hematocrit 21.8 (L) 36.0 - 46.0 %   Basic  metabolic panel   Result Value Ref Range    Glucose 236 (H) 74 - 99 mg/dL    Sodium 133 (L) 136 - 145 mmol/L    Potassium 4.1 3.5 - 5.3 mmol/L    Chloride 96 (L) 98 - 107 mmol/L    Bicarbonate 33 (H) 21 - 32 mmol/L    Anion Gap 8 (L) 10 - 20 mmol/L    Urea Nitrogen 34 (H) 6 - 23 mg/dL    Creatinine 2.35 (H) 0.50 - 1.05 mg/dL    eGFR 20 (L) >60 mL/min/1.73m*2    Calcium 8.0 (L) 8.6 - 10.3 mg/dL   Magnesium   Result Value Ref Range    Magnesium 2.17 1.60 - 2.40 mg/dL   Hemoglobin and hematocrit, blood   Result Value Ref Range    Hemoglobin 7.2 (L) 12.0 - 16.0 g/dL    Hematocrit 22.7 (L) 36.0 - 46.0 %   Prepare RBC: 1 Units   Result Value Ref Range    PRODUCT CODE P1437L74     Unit Number [id number]     Unit ABO A     Unit RH POS     XM INTEP COMP     Dispense Status IS     Blood Expiration Date October 24, 2023 23:59 EDT     PRODUCT BLOOD TYPE 6200     UNIT VOLUME 350    Hemoglobin and hematocrit, blood   Result Value Ref Range    Hemoglobin 7.9 (L) 12.0 - 16.0 g/dL    Hematocrit 23.9 (L) 36.0 - 46.0 %        Medications  Scheduled medications:  amiodarone, 200 mg, oral, q24h  atorvastatin, 80 mg, oral, Nightly  cefTRIAXone, 1 g, intravenous, q24h  clopidogrel, 75 mg, oral, Daily  doxycycline, 100 mg, oral, q12h MARIA E  isosorbide dinitrate, 10 mg, oral, TID  lidocaine, 1 patch, transdermal, Daily  lisinopril, 40 mg, oral, Daily  metoprolol succinate XL, 50 mg, oral, Daily  nystatin, 5 mL, oral, 4x daily  pantoprazole, 40 mg, oral, Daily before breakfast   Or  pantoprazole, 40 mg, intravenous, Daily before breakfast  pantoprazole, 40 mg, intravenous, BID  polyethylene glycol, 17 g, oral, Daily  sodium chloride 0.9%, , ,       Continuous medications:     PRN medications:       Physical Exam  Constitutional:       General: She is not in acute distress.     Appearance: Normal appearance.   HENT:      Head: Normocephalic and atraumatic.      Right Ear: External ear normal.      Left Ear: External ear normal.      Nose:  Nose normal.      Mouth/Throat:      Mouth: Mucous membranes are moist.      Pharynx: Oropharynx is clear.   Eyes:      Extraocular Movements: Extraocular movements intact.      Conjunctiva/sclera: Conjunctivae normal.      Pupils: Pupils are equal, round, and reactive to light.   Neck:      Comments: HD catheter right upper chest  Cardiovascular:      Rate and Rhythm: Normal rate and regular rhythm.      Pulses: Normal pulses.      Heart sounds: Normal heart sounds.      Comments: Edema BUE  Pulmonary:      Effort: Pulmonary effort is normal. No respiratory distress.      Breath sounds: Rales (LLL) present. No wheezing or rhonchi.   Abdominal:      General: Abdomen is flat. Bowel sounds are normal.      Palpations: Abdomen is soft.      Tenderness: There is no abdominal tenderness. There is no right CVA tenderness, left CVA tenderness, guarding or rebound.   Musculoskeletal:         General: No swelling. Normal range of motion.      Cervical back: Normal range of motion and neck supple.   Skin:     General: Skin is warm and dry.      Capillary Refill: Capillary refill takes less than 2 seconds.      Findings: No lesion or rash.   Neurological:      General: No focal deficit present.      Mental Status: She is alert and oriented to person, place, and time. Mental status is at baseline.   Psychiatric:         Mood and Affect: Mood normal.         Behavior: Behavior normal.                  Code Status  Full Code     Assessment/Plan   This patient currently has cardiac telemetry ordered; if you would like to modify or discontinue the telemetry order, click here to go to the orders activity to modify/discontinue the order.    Anemia, unspecified type  Patient with recent GI blood losses had colonoscopy showing just hemorrhoidal bleed last month  Patient now is having black bowel movements  Was transfused 1 unit in the ER- will transfuse 1u with HD 10/4/23  Patient is on treatment for recent cardiac stenting so is  actually on Plavix and will continue his history of recent stent  Patient has been on Eliquis for paroxysmal A-fib- will hold  GI consult  Follow H&H q6h  Monitor vitals closely- appears stable, likely does not need acute scope  IV PPI BID  GI consultation  BUN is relatively normal    Pneumonia  Patient has infiltrate on chest x-ray  Has minimal sputum production no significant hypoxia  Treat empirically with Rocephin and doxycycline  Supportive treatments  Lactic acidosis has resolved    Diabetes (CMS/[facility])  Patient placed on/scale insulin coverage  Will allow DM diet as no plans for scope currently     Stage 3b chronic kidney disease (CMS/[facility])  Patient currently is actually receiving hemodialysis Monday Wednesday Friday after having acute renal failure after heart catheterization with IVP contrast  Patient's renal function appears to be improving  Patient will have consultation with nephrology service for continued dialysis needs  HD M.W.F    Iron deficiency anemia due to chronic blood loss  As above    Lactic acidosis  Resolved with blood and treatment of PNA        DVT ppx: Avoid pharmacological ppx given concern for UGIB       Bridget Hayes PA-C

## 2023-10-04 NOTE — PROGRESS NOTES
Physical Therapy                 Therapy Communication Note    Patient Name: Melody Martin  MRN: [id number]  Today's Date: 10/4/2023     Discipline: Physical Therapy    Missed Visit Reason:      Missed Time: Attempt    Comment:attempted gladysal [id number], pt. Currently receiving HD, will try back at a later time

## 2023-10-04 NOTE — PROGRESS NOTES
"      Nephrology Progress Note      Nephrology following for ESRD.   Events over night: none  Just dialyzed with 2 L UF      /58   Pulse 64   Temp 36.4 °C (97.5 °F) (Temporal)   Resp 18   Ht 1.626 m (5' 4.02\")   Wt 84 kg (185 lb 3 oz)   SpO2 95%   BMI 31.77 kg/m²     Input / Output:  24 HR:   Intake/Output Summary (Last 24 hours) at 10/4/2023 1305  Last data filed at 10/4/2023 1242  Gross per 24 hour   Intake 750 ml   Output 2000 ml   Net -1250 ml       Physical Exam  Vitals and nursing note reviewed.   Constitutional:       General: She is not in acute distress.     Appearance: She is not ill-appearing or toxic-appearing.   Cardiovascular:      Rate and Rhythm: Normal rate and regular rhythm.      Heart sounds: No murmur heard.     No friction rub. No gallop.   Pulmonary:      Effort: Pulmonary effort is normal.      Breath sounds: No wheezing, rhonchi or rales.   Abdominal:      General: There is no distension.      Palpations: Abdomen is soft.      Tenderness: There is no abdominal tenderness.   Musculoskeletal:      Cervical back: Neck supple.      Right lower leg: Edema present.      Left lower leg: Edema present.   Neurological:      Mental Status: She is alert.          Scheduled medications  amiodarone, 200 mg, oral, q24h  atorvastatin, 80 mg, oral, Nightly  cefTRIAXone, 1 g, intravenous, q24h  clopidogrel, 75 mg, oral, Daily  doxycycline, 100 mg, oral, q12h MARIA E  isosorbide dinitrate, 10 mg, oral, TID  lidocaine, 1 patch, transdermal, Daily  lisinopril, 40 mg, oral, Daily  metoprolol succinate XL, 50 mg, oral, Daily  nystatin, 5 mL, oral, 4x daily  pantoprazole, 40 mg, oral, Daily before breakfast   Or  pantoprazole, 40 mg, intravenous, Daily before breakfast  pantoprazole, 40 mg, intravenous, BID  polyethylene glycol, 17 g, oral, Daily  sodium chloride 0.9%, , ,       Continuous medications     PRN medications  PRN medications: acetaminophen **OR** acetaminophen **OR** acetaminophen, " acetaminophen **OR** acetaminophen **OR** acetaminophen, acetaminophen, benzocaine-menthol, dextromethorphan-guaifenesin, heparin, heparin, oxyCODONE, oxygen, sodium chloride 0.9%   Results from last 7 days   Lab Units 10/04/23  0504 10/03/23  0513 10/02/23  2220   SODIUM mmol/L 133*   < > 133*   POTASSIUM mmol/L 4.1   < > 3.9   CHLORIDE mmol/L 96*   < > 96*   CO2 mmol/L 33*   < > 30   BUN mg/dL 34*   < > 19   CREATININE mg/dL 2.35*   < > 1.57*   CALCIUM mg/dL 8.0*   < > 7.4*   PROTEIN TOTAL g/dL  --   --  5.4*   BILIRUBIN TOTAL mg/dL  --   --  0.4   ALK PHOS U/L  --   --  99   ALT U/L  --   --  16   AST U/L  --   --  26   GLUCOSE mg/dL 236*   < > 199*    < > = values in this interval not displayed.      Results from last 7 days   Lab Units 10/04/23  0504   MAGNESIUM mg/dL 2.17      Results from last 7 days   Lab Units 10/04/23  1107 10/04/23  0504 10/03/23  2326 10/03/23  1439 10/03/23  0513 10/02/23  2220   WBC AUTO x10*3/uL  --   --  10.4  --  11.6* 14.6*   HEMOGLOBIN g/dL 7.9* 7.2* 7.1*  7.1*   < > 7.5* 6.8*   HEMATOCRIT % 23.9* 22.7* 21.8*  21.8*   < > 23.3* 21.0*   PLATELETS AUTO x10*3/uL  --   --  242  --  254 243    < > = values in this interval not displayed.        Assessment & Plan:   Patient is 88 y.o. female who is admitted to hospital with complaints of  cough, blacktarry stools. Nephrology consulted in view of ESRD.     Acute kidney injury on CKD stage 3b-has been dialysis dependent  Since August 28, 2023  -Access is right IJ TDC  -Patient on hemodialysis Monday Wednesday Friday at [facility]    Anemia of blood loss and CKD  -Patient had colonoscopy last admission  -May have EGD  -Transfuse PRBCs per primary service     Volume overload  -cont UF with HD    HTN  -cont lisinopril and metoprolol and Isordil     Recommendations  -Hemodialysis ttoday, next HD 10/6  -Trying to establish dry weight  -Check bladder scan      Please message me through [place] chat with any questions or concerns.     Mally FERMIN  DO Lance  10/4/2023  1:05 PM     [facility]    [address], [address]   [place] [address]  Office: [phone]

## 2023-10-04 NOTE — PROGRESS NOTES
Physical Therapy    Physical Therapy Evaluation    Patient Name: Melody Martin  MRN: [id number]  Today's Date: 10/4/2023   Time Calculation  Start Time: 1430  Stop Time: 1453  Time Calculation (min): 23 min    Assessment/Plan   PT Assessment  PT Assessment Results: Decreased strength, Decreased endurance, Impaired balance, Decreased mobility, Decreased coordination, Impaired hearing, Pain  Rehab Prognosis: Fair  Evaluation/Treatment Tolerance: Patient limited by fatigue, Patient limited by pain  Medical Staff Made Aware: Yes  Strengths: Support of extended family/friends  End of Session Communication: Bedside nurse  Assessment Comment:  (POOR ENDURACNE DECREASED BED MOBITYABLE TO SIT EOB WITH A FOR 4 MIN,  LEANS R IN SITTIGN, MAX X2 FOR ALL BED M OBITLY, HIGH FALLRISK NEEDS SKILLED REHAB ON DISCH)  End of Session Patient Position: Bed, 3 rail up, Alarm on (DAUGHTER PRESENT  SET UP WITH HER FOOD ADN SHE WAS EATING)  IP OR SWING BED PT PLAN  Inpatient or Swing Bed: Inpatient  PT Plan  Treatment/Interventions: Bed mobility, Balance training, Strengthening  PT Plan: Skilled PT, OT consult  PT Frequency: 5 times per week  PT Discharge Recommendations: Moderate intensity level of continued care  Equipment Recommended upon Discharge:  (TBD)  PT Recommended Transfer Status: Assist x2 (BED MOBILTIY, BED INCHAIR POSITION 3X/DAY)      Subjective   General Visit Information:  General  Reason for Referral: IMPAIRED MOBITILY, GAIT TRAINING  Referred By: RICH  Past Medical History Relevant to Rehab: COUGH, TARRY STOOLS; HX: ESRD/HD, AFIB, DM, HTN, BACK SURG, GIB, CVA- RECENT, MI, FALL  Family/Caregiver Present: Yes  Caregiver Feedback:  (DAUGHTER PRESENT DURING SESIN, [facility] STATES OPT. HAS BEEN IN AND OUT OF HOSPITAL AND REHAB FREQUENTLY  IN PAST 1-2 MONTHS)  Prior to Session Communication: Bedside nurse  Patient Position Received: Bed, 3 rail up, Alarm on (2L O2 NC)  General Comment:  (ATTEMPTED EARLIER TODAY AND [facility] WAS  RECEIVING HD, SE AGREES TO THERAPY THIS PM, IS ANXIOUS TO MOBILIZE, SE C/O CONSTIPATION)  Home Living:  Home Living  Home Living Comments: PT. TRANSFERRED FROM [facility],PRIOR TO RECENT ILLNESSES SHE WAS ALONEIN 1 LEVEL HOME, 3 STEP RAIL, INDEPS WITH AMB AND ADL.DIS HER OWN CHORES A FROM [employer]       Precautions:  Precautions  Precautions Comment: O2 NC AND FALL RISK         Objective   Pain:  Pain Assessment  Pain Assessment: 0-10  Pain Score: 7  Pain Type:  (WITH TOUCH TO HER LLE)  Pain Location:  (LLE)  Cognition:  Cognition  Overall Cognitive Status: Within Functional Limits  Orientation Level: Oriented X4  Attention: Within Functional Limits  Memory: Exceptions to WFL  Long-Term Memory:  (WFL)  Short-Term Memory:  (MILD WITH EVENTS SURROUNDING THIS ADM)  Abstract Reasoning: Within Functional Limits  Safety/Judgement: Within Functional Limits  Processing Speed: Within funtional limits                    Activity Tolerance  Endurance: Tolerates less than 10 min exercise, no significant change in vital signs  Activity Tolerance Comments: MILD SOBWITH ACTIVITY, VERY FATIGUED WITH ACTIVITY DONE TODAY    Sensation  Sensation Comment: NO C/O; HAS 3+ EDEMA IN LEGS AND ARMS    Strength  Strength Comments: 3-/5 IN UE'S POOR ENDURANCE, ROM UE'S 3/4 ROM; LEGS ROM WFL; LLE 2-/5 IN PLANTAR FLEXIN, 0/5 IN DORSIFLEXION 2+/5 IN L KNEE EXTENSION AND HIP FLEXION; 3-/5 IN RLE, POOR MUSCULAR ENDURANCE, MOVING  BRINGS ON MILD/MOD SOB, TURNK FAIR- WITH POOR ENDURANCE           Coordination  Coordination Comment: TOO FAITUGED TO ATTEMPT TODAY         Static Sitting Balance  Static Sitting-Comment/Number of Minutes: SAT EOB FOR 4 MIN WITH MOD A FOR BALANCE TENDS TO LEAN R  Dynamic Sitting Balance  Dynamic Sitting-Comments: POOR+         Bed Mobility  Bed Mobility: Yes  Bed Mobility 1  Bed Mobility Comments 1: SUPINE<.SIT MAX X2, SAT EOB 4 MIN MOD A FOR BALACNE, VERY FATIGUED LEANS R,, ROLLING R AND L MAX X2 UP IN BED MAX  X2                      Outcome Measures:  [facility] Basic Mobility  Turning from your back to your side while in a flat bed without using bedrails: Total  Moving from lying on your back to sitting on the side of a flat bed without using bedrails: Total  Moving to and from bed to chair (including a wheelchair): Total  Standing up from a chair using your arms (e.g. wheelchair or bedside chair): Total  To walk in hospital room: Total  Climbing 3-5 steps with railing: Total  Basic Mobility - Total Score: 6    Encounter Problems       Encounter Problems (Active)       Balance       SIT BALANCE (Not Progressing)       Start:  10/04/23    Expected End:  10/18/23       SIT EOB FOR 12 MIN WITH CGA WHILE COMPLETING EX AND FUNCTIONAL ACTIVITIES TO IMPROVE ENDURANCE, BALANCE AND STRENGTH FOR PROGRESSION OF ACTIVITY LEVEL            Mobility       Goal 1 (Not Progressing)       Start:  10/04/23    Expected End:  10/25/23       20+ REPS AAROM/AROM EX INCREASING STRENGTH  FOR PROGRESS OF ACTIVITY LEVEL TOWARDS OOB            Transfers       STG - Patient will roll (Not Progressing)       Start:  10/04/23    Expected End:  10/11/23       MOD X2 A         SUPINE<>SIT (Not Progressing)       Start:  10/04/23    Expected End:  10/18/23       SUPINE<>SIT WITH MOD X1-2 A                Education Documentation  Mobility Training, taught by Jaz Castañeda PT at 10/4/2023  4:46 PM.  Learner: Family, Patient  Readiness: Acceptance  Method: Explanation, Demonstration  Response: Verbalizes Understanding, Demonstrated Understanding  Comment: JOHN PAUL WARD, ROLE OF IP P.T.    Education Comments  No comments found.

## 2023-10-04 NOTE — CARE PLAN
Problem: Balance  Goal: SIT BALANCE  Description: SIT EOB FOR 12 MIN WITH CGA WHILE COMPLETING EX AND FUNCTIONAL ACTIVITIES TO IMPROVE ENDURANCE, BALANCE AND STRENGTH FOR PROGRESSION OF ACTIVITY LEVEL  Outcome: Not Progressing     Problem: Mobility  Goal: Goal 1  Description: 20+ REPS AAROM/AROM EX INCREASING STRENGTH  FOR PROGRESS OF ACTIVITY LEVEL TOWARDS OOB  Outcome: Not Progressing     Problem: Transfers  Goal: STG - Patient will roll  Description: MOD X2 A  Outcome: Not Progressing  Goal: SUPINE<>SIT  Description: SUPINE<>SIT WITH MOD X1-2 A  Outcome: Not Progressing     Problem: Balance  Goal: SIT BALANCE  Description: SIT EOB FOR 12 MIN WITH CGA WHILE COMPLETING EX AND FUNCTIONAL ACTIVITIES TO IMPROVE ENDURANCE, BALANCE AND STRENGTH FOR PROGRESSION OF ACTIVITY LEVEL  Outcome: Not Progressing     Problem: Mobility  Goal: Goal 1  Description: 20+ REPS AAROM/AROM EX INCREASING STRENGTH  FOR PROGRESS OF ACTIVITY LEVEL TOWARDS OOB  Outcome: Not Progressing     Problem: Transfers  Goal: STG - Patient will roll  Description: MOD X2 A  Outcome: Not Progressing  Goal: SUPINE<>SIT  Description: SUPINE<>SIT WITH MOD X1-2 A  Outcome: Not Progressing

## 2023-10-04 NOTE — CARE PLAN
PT TOLERATED TX WELL REMOVED 2 LITERS.                                                                 ALLY ESTRADA

## 2023-10-04 NOTE — PROGRESS NOTES
"Melody Martin is a 88 y.o. female on day 1 of admission presenting with Anemia, unspecified type.    Subjective   Patient seen on dialysis. She reports feeling very cold this morning. She is also hungry and slightly nauseous. There has been no report of GI bleeding since admission. She denies significant SOB, dizziness, vomiting. +slight cough.     10 point ROS obtained and negative other than discussed above.       Objective     Physical Exam  Vitals reviewed.   Constitutional:       General: She is not in acute distress.     Appearance: Normal appearance.      Comments: Currently receiving dialysis at time of visit.   Cardiovascular:      Rate and Rhythm: Normal rate and regular rhythm.   Pulmonary:      Effort: Pulmonary effort is normal.      Comments: Diminished breath sounds bilaterally.  Abdominal:      General: Bowel sounds are normal. There is no distension.      Palpations: Abdomen is soft.      Tenderness: There is no abdominal tenderness. There is no guarding or rebound.   Neurological:      General: No focal deficit present.      Mental Status: She is alert and oriented to person, place, and time.   Psychiatric:         Mood and Affect: Mood normal.         Behavior: Behavior normal.         Last Recorded Vitals  Blood pressure 167/50, pulse 62, temperature 36.2 °C (97.2 °F), temperature source Temporal, resp. rate 18, height 1.626 m (5' 4.02\"), weight 84 kg (185 lb 3 oz), SpO2 95 %.  Intake/Output last 3 Shifts:  I/O last 3 completed shifts:  In: 1050 (12.5 mL/kg) [Blood:1050]  Out: - (0 mL/kg)   Weight: 84 kg     Relevant Results      Scheduled medications  amiodarone, 200 mg, oral, q24h  atorvastatin, 80 mg, oral, Nightly  cefTRIAXone, 1 g, intravenous, q24h  clopidogrel, 75 mg, oral, Daily  doxycycline, 100 mg, oral, q12h MARIA E  isosorbide dinitrate, 10 mg, oral, TID  lisinopril, 40 mg, oral, Daily  metoprolol succinate XL, 50 mg, oral, Daily  nystatin, 5 mL, oral, 4x daily  pantoprazole, 40 mg, " oral, Daily before breakfast   Or  pantoprazole, 40 mg, intravenous, Daily before breakfast  pantoprazole, 40 mg, intravenous, BID  polyethylene glycol, 17 g, oral, Daily  sodium chloride 0.9%, , ,       Continuous medications     PRN medications  PRN medications: acetaminophen **OR** acetaminophen **OR** acetaminophen, acetaminophen **OR** acetaminophen **OR** acetaminophen, acetaminophen, benzocaine-menthol, dextromethorphan-guaifenesin, oxygen, sodium chloride 0.9%  Results for orders placed or performed during the hospital encounter of 10/02/23 (from the past 24 hour(s))   Lactate   Result Value Ref Range    Lactate 1.2 0.4 - 2.0 mmol/L   Hemoglobin and hematocrit, blood   Result Value Ref Range    Hemoglobin 7.8 (L) 12.0 - 16.0 g/dL    Hematocrit 23.7 (L) 36.0 - 46.0 %   Type And Screen   Result Value Ref Range    ABO TYPE AB     Rh TYPE POS     ANTIBODY SCREEN NEG    CBC and Auto Differential   Result Value Ref Range    WBC 10.4 4.4 - 11.3 x10*3/uL    nRBC 0.0 0.0 - 0.0 /100 WBCs    RBC 2.32 (L) 4.00 - 5.20 x10*6/uL    Hemoglobin 7.1 (L) 12.0 - 16.0 g/dL    Hematocrit 21.8 (L) 36.0 - 46.0 %    MCV 94 80 - 100 fL    MCH 30.6 26.0 - 34.0 pg    MCHC 32.6 32.0 - 36.0 g/dL    RDW 14.4 11.5 - 14.5 %    Platelets 242 150 - 450 x10*3/uL    MPV 9.4 7.5 - 11.5 fL    Neutrophils % 63.3 40.0 - 80.0 %    Immature Granulocytes %, Automated 1.4 (H) 0.0 - 0.9 %    Lymphocytes % 20.8 13.0 - 44.0 %    Monocytes % 11.0 2.0 - 10.0 %    Eosinophils % 3.1 0.0 - 6.0 %    Basophils % 0.4 0.0 - 2.0 %    Neutrophils Absolute 6.55 (H) 1.60 - 5.50 x10*3/uL    Immature Granulocytes Absolute, Automated 0.15 0.00 - 0.50 x10*3/uL    Lymphocytes Absolute 2.15 0.80 - 3.00 x10*3/uL    Monocytes Absolute 1.14 (H) 0.05 - 0.80 x10*3/uL    Eosinophils Absolute 0.32 0.00 - 0.40 x10*3/uL    Basophils Absolute 0.04 0.00 - 0.10 x10*3/uL   Hemoglobin and hematocrit, blood   Result Value Ref Range    Hemoglobin 7.1 (L) 12.0 - 16.0 g/dL    Hematocrit 21.8  (L) 36.0 - 46.0 %   Basic metabolic panel   Result Value Ref Range    Glucose 236 (H) 74 - 99 mg/dL    Sodium 133 (L) 136 - 145 mmol/L    Potassium 4.1 3.5 - 5.3 mmol/L    Chloride 96 (L) 98 - 107 mmol/L    Bicarbonate 33 (H) 21 - 32 mmol/L    Anion Gap 8 (L) 10 - 20 mmol/L    Urea Nitrogen 34 (H) 6 - 23 mg/dL    Creatinine 2.35 (H) 0.50 - 1.05 mg/dL    eGFR 20 (L) >60 mL/min/1.73m*2    Calcium 8.0 (L) 8.6 - 10.3 mg/dL   Magnesium   Result Value Ref Range    Magnesium 2.17 1.60 - 2.40 mg/dL   Hemoglobin and hematocrit, blood   Result Value Ref Range    Hemoglobin 7.2 (L) 12.0 - 16.0 g/dL    Hematocrit 22.7 (L) 36.0 - 46.0 %   Prepare RBC: 1 Units   Result Value Ref Range    PRODUCT CODE [id number]     Unit Number [id number]     Unit ABO A     Unit RH POS     XM INTEP COMP     Dispense Status IS     Blood Expiration Date October 24, 2023 23:59 EDT     PRODUCT BLOOD TYPE 6200     UNIT VOLUME 350    Hemoglobin and hematocrit, blood   Result Value Ref Range    Hemoglobin 7.9 (L) 12.0 - 16.0 g/dL    Hematocrit 23.9 (L) 36.0 - 46.0 %       XR chest 1 view    Result Date: 10/2/2023  Hazy opacity at the left lung base suggesting small left-sided pleural effusion.  Left basilar atelectasis and/or airspace disease is not excluded. Signed by Gio Corona MD    CT abdomen pelvis wo IV contrast  Stable size left gluteal fluid collection/evolving hematoma compared to 09/17/2023.  No intra abdominopelvic or retroperitoneal hematoma.  Increased body wall anasarca and bilateral pleural effusions.  MACRO: None      * Cannot find OR log *  Last relevant procedure:        This patient currently has cardiac telemetry ordered; if you would like to modify or discontinue the telemetry order, click here to go to the orders activity to modify/discontinue the order.                 Assessment/Plan   Principal Problem:    Anemia, unspecified type  Active Problems:    Diabetes (CMS/[facility])    Stage 3b chronic kidney disease (CMS/[facility])     "Pneumonia    Iron deficiency anemia due to chronic blood loss    Anemia  Somewhat unclear reports of \"black stool\" recently in the setting of chronic anemia secondary to renal disease/anemia of chronic disease. Unclear if true melena was seen that would be concerning for upper GI bleeding. Hemoglobin stable today at 7.2. She has not had any evidence of bleeding since arrival. She is also complaining of cough/shortness of breath and has been found to have pneumonia. Additionally she is chronically anticoagulated on Eliquis as well as Plavix. Eliquis is currently being held.   - monitor H&H and transfuse as needed  - twice daily PPI  - nursing should monitor BMs closely and document all BMs in the flowsheet including a description of color (describing BMs as \"bloody\" is not helpful as that reflects nursing interpretation of a finding rather than an actual objective report)  - Continue to hold Eliquis  - Plan for EGD tomorrow 10/5. NPO except medications after midnight.         Case was reviewed with Dr. Ford.      Seda Huff PA-C      "

## 2023-10-05 ENCOUNTER — APPOINTMENT (OUTPATIENT)
Dept: OPERATING ROOM | Facility: HOSPITAL | Age: 88
DRG: 377 | End: 2023-10-05
Payer: COMMERCIAL

## 2023-10-05 ENCOUNTER — ANESTHESIA EVENT (OUTPATIENT)
Dept: OPERATING ROOM | Facility: HOSPITAL | Age: 88
DRG: 377 | End: 2023-10-05
Payer: COMMERCIAL

## 2023-10-05 ENCOUNTER — ANESTHESIA (OUTPATIENT)
Dept: OPERATING ROOM | Facility: HOSPITAL | Age: 88
DRG: 377 | End: 2023-10-05
Payer: COMMERCIAL

## 2023-10-05 ENCOUNTER — TELEPHONE (OUTPATIENT)
Dept: CARDIOLOGY | Facility: CLINIC | Age: 88
End: 2023-10-05

## 2023-10-05 LAB
ANION GAP SERPL CALC-SCNC: 10 MMOL/L (ref 10–20)
BLOOD EXPIRATION DATE: NORMAL
BUN SERPL-MCNC: 26 MG/DL (ref 6–23)
CALCIUM SERPL-MCNC: 7.9 MG/DL (ref 8.6–10.3)
CHLORIDE SERPL-SCNC: 100 MMOL/L (ref 98–107)
CO2 SERPL-SCNC: 31 MMOL/L (ref 21–32)
CREAT SERPL-MCNC: 1.87 MG/DL (ref 0.5–1.05)
DISPENSE STATUS: NORMAL
ERYTHROCYTE [DISTWIDTH] IN BLOOD BY AUTOMATED COUNT: 15.2 % (ref 11.5–14.5)
GFR SERPL CREATININE-BSD FRML MDRD: 26 ML/MIN/1.73M*2
GLUCOSE BLD MANUAL STRIP-MCNC: 227 MG/DL (ref 74–99)
GLUCOSE BLD MANUAL STRIP-MCNC: 242 MG/DL (ref 74–99)
GLUCOSE BLD MANUAL STRIP-MCNC: 267 MG/DL (ref 74–99)
GLUCOSE BLD MANUAL STRIP-MCNC: 369 MG/DL (ref 74–99)
GLUCOSE SERPL-MCNC: 337 MG/DL (ref 74–99)
HCT VFR BLD AUTO: 26.4 % (ref 36–46)
HGB BLD-MCNC: 8.5 G/DL (ref 12–16)
MAGNESIUM SERPL-MCNC: 1.98 MG/DL (ref 1.6–2.4)
MCH RBC QN AUTO: 29.7 PG (ref 26–34)
MCHC RBC AUTO-ENTMCNC: 32.2 G/DL (ref 32–36)
MCV RBC AUTO: 92 FL (ref 80–100)
NRBC BLD-RTO: 0 /100 WBCS (ref 0–0)
PLATELET # BLD AUTO: 290 X10*3/UL (ref 150–450)
PMV BLD AUTO: 9.1 FL (ref 7.5–11.5)
POTASSIUM SERPL-SCNC: 3.8 MMOL/L (ref 3.5–5.3)
PRODUCT BLOOD TYPE: 6200
PRODUCT CODE: NORMAL
RBC # BLD AUTO: 2.86 X10*6/UL (ref 4–5.2)
SODIUM SERPL-SCNC: 137 MMOL/L (ref 136–145)
UNIT ABO: NORMAL
UNIT NUMBER: NORMAL
UNIT RH: NORMAL
UNIT VOLUME: 350
WBC # BLD AUTO: 8.4 X10*3/UL (ref 4.4–11.3)
XM INTEP: NORMAL

## 2023-10-05 PROCEDURE — 2500000001 HC RX 250 WO HCPCS SELF ADMINISTERED DRUGS (ALT 637 FOR MEDICARE OP): Performed by: PHYSICIAN ASSISTANT

## 2023-10-05 PROCEDURE — 2500000001 HC RX 250 WO HCPCS SELF ADMINISTERED DRUGS (ALT 637 FOR MEDICARE OP): Performed by: INTERNAL MEDICINE

## 2023-10-05 PROCEDURE — 2500000004 HC RX 250 GENERAL PHARMACY W/ HCPCS (ALT 636 FOR OP/ED): Performed by: NURSE ANESTHETIST, CERTIFIED REGISTERED

## 2023-10-05 PROCEDURE — 2500000005 HC RX 250 GENERAL PHARMACY W/O HCPCS: Performed by: INTERNAL MEDICINE

## 2023-10-05 PROCEDURE — 96372 THER/PROPH/DIAG INJ SC/IM: CPT | Performed by: INTERNAL MEDICINE

## 2023-10-05 PROCEDURE — 82947 ASSAY GLUCOSE BLOOD QUANT: CPT

## 2023-10-05 PROCEDURE — 3700000001 HC GENERAL ANESTHESIA TIME - INITIAL BASE CHARGE: Performed by: INTERNAL MEDICINE

## 2023-10-05 PROCEDURE — 7100000001 HC RECOVERY ROOM TIME - INITIAL BASE CHARGE: Performed by: INTERNAL MEDICINE

## 2023-10-05 PROCEDURE — 7100000002 HC RECOVERY ROOM TIME - EACH INCREMENTAL 1 MINUTE: Performed by: INTERNAL MEDICINE

## 2023-10-05 PROCEDURE — 80048 BASIC METABOLIC PNL TOTAL CA: CPT | Performed by: PHYSICIAN ASSISTANT

## 2023-10-05 PROCEDURE — 2060000001 HC INTERMEDIATE ICU ROOM DAILY

## 2023-10-05 PROCEDURE — 0DJ08ZZ INSPECTION OF UPPER INTESTINAL TRACT, VIA NATURAL OR ARTIFICIAL OPENING ENDOSCOPIC: ICD-10-PCS | Performed by: INTERNAL MEDICINE

## 2023-10-05 PROCEDURE — 85027 COMPLETE CBC AUTOMATED: CPT | Performed by: PHYSICIAN ASSISTANT

## 2023-10-05 PROCEDURE — 6350000001 HC RX 635 EPOETIN >10,000 UNITS: Mod: JZ | Performed by: INTERNAL MEDICINE

## 2023-10-05 PROCEDURE — 43235 EGD DIAGNOSTIC BRUSH WASH: CPT | Performed by: INTERNAL MEDICINE

## 2023-10-05 PROCEDURE — 2580000001 HC RX 258 IV SOLUTIONS

## 2023-10-05 PROCEDURE — 99233 SBSQ HOSP IP/OBS HIGH 50: CPT | Performed by: PHYSICIAN ASSISTANT

## 2023-10-05 PROCEDURE — 83735 ASSAY OF MAGNESIUM: CPT | Performed by: PHYSICIAN ASSISTANT

## 2023-10-05 PROCEDURE — 82374 ASSAY BLOOD CARBON DIOXIDE: CPT | Performed by: PHYSICIAN ASSISTANT

## 2023-10-05 PROCEDURE — 2500000002 HC RX 250 W HCPCS SELF ADMINISTERED DRUGS (ALT 637 FOR MEDICARE OP, ALT 636 FOR OP/ED): Performed by: INTERNAL MEDICINE

## 2023-10-05 PROCEDURE — 97530 THERAPEUTIC ACTIVITIES: CPT | Mod: GP,CQ

## 2023-10-05 PROCEDURE — 2580000001 HC RX 258 IV SOLUTIONS: Performed by: ANESTHESIOLOGY

## 2023-10-05 PROCEDURE — 2500000004 HC RX 250 GENERAL PHARMACY W/ HCPCS (ALT 636 FOR OP/ED): Performed by: ANESTHESIOLOGY

## 2023-10-05 PROCEDURE — 3700000002 HC GENERAL ANESTHESIA TIME - EACH INCREMENTAL 1 MINUTE: Performed by: INTERNAL MEDICINE

## 2023-10-05 PROCEDURE — 2500000004 HC RX 250 GENERAL PHARMACY W/ HCPCS (ALT 636 FOR OP/ED): Performed by: INTERNAL MEDICINE

## 2023-10-05 PROCEDURE — 36415 COLL VENOUS BLD VENIPUNCTURE: CPT | Performed by: PHYSICIAN ASSISTANT

## 2023-10-05 PROCEDURE — 3600000002 HC OR TIME - INITIAL BASE CHARGE - PROCEDURE LEVEL TWO: Performed by: INTERNAL MEDICINE

## 2023-10-05 PROCEDURE — 97110 THERAPEUTIC EXERCISES: CPT | Mod: GP,CQ

## 2023-10-05 RX ORDER — FAMOTIDINE 10 MG/ML
20 INJECTION INTRAVENOUS ONCE
Status: COMPLETED | OUTPATIENT
Start: 2023-10-05 | End: 2023-10-05

## 2023-10-05 RX ORDER — SODIUM CHLORIDE, SODIUM LACTATE, POTASSIUM CHLORIDE, CALCIUM CHLORIDE 600; 310; 30; 20 MG/100ML; MG/100ML; MG/100ML; MG/100ML
100 INJECTION, SOLUTION INTRAVENOUS CONTINUOUS
Status: DISCONTINUED | OUTPATIENT
Start: 2023-10-05 | End: 2023-10-05

## 2023-10-05 RX ORDER — SODIUM CHLORIDE, SODIUM LACTATE, POTASSIUM CHLORIDE, CALCIUM CHLORIDE 600; 310; 30; 20 MG/100ML; MG/100ML; MG/100ML; MG/100ML
100 INJECTION, SOLUTION INTRAVENOUS CONTINUOUS
Status: CANCELLED | OUTPATIENT
Start: 2023-10-05

## 2023-10-05 RX ORDER — LIDOCAINE HYDROCHLORIDE 10 MG/ML
0.1 INJECTION, SOLUTION EPIDURAL; INFILTRATION; INTRACAUDAL; PERINEURAL ONCE
Status: CANCELLED | OUTPATIENT
Start: 2023-10-05 | End: 2023-10-05

## 2023-10-05 RX ORDER — FAMOTIDINE 10 MG/ML
20 INJECTION INTRAVENOUS ONCE
Status: CANCELLED | OUTPATIENT
Start: 2023-10-05 | End: 2023-10-05

## 2023-10-05 RX ORDER — PANTOPRAZOLE SODIUM 40 MG/1
40 TABLET, DELAYED RELEASE ORAL
Status: DISCONTINUED | OUTPATIENT
Start: 2023-10-06 | End: 2023-10-09 | Stop reason: HOSPADM

## 2023-10-05 RX ORDER — SIMETHICONE 80 MG
80 TABLET,CHEWABLE ORAL 4 TIMES DAILY PRN
Status: DISCONTINUED | OUTPATIENT
Start: 2023-10-05 | End: 2023-10-09 | Stop reason: HOSPADM

## 2023-10-05 RX ORDER — INSULIN GLARGINE 100 [IU]/ML
10 INJECTION, SOLUTION SUBCUTANEOUS NIGHTLY
Status: DISCONTINUED | OUTPATIENT
Start: 2023-10-05 | End: 2023-10-06

## 2023-10-05 RX ORDER — SODIUM CHLORIDE, SODIUM LACTATE, POTASSIUM CHLORIDE, CALCIUM CHLORIDE 600; 310; 30; 20 MG/100ML; MG/100ML; MG/100ML; MG/100ML
100 INJECTION, SOLUTION INTRAVENOUS CONTINUOUS
Status: DISCONTINUED | OUTPATIENT
Start: 2023-10-05 | End: 2023-10-05 | Stop reason: HOSPADM

## 2023-10-05 RX ORDER — MORPHINE SULFATE 2 MG/ML
2 INJECTION, SOLUTION INTRAMUSCULAR; INTRAVENOUS EVERY 5 MIN PRN
Status: CANCELLED | OUTPATIENT
Start: 2023-10-05

## 2023-10-05 RX ORDER — MORPHINE SULFATE 2 MG/ML
2 INJECTION, SOLUTION INTRAMUSCULAR; INTRAVENOUS EVERY 5 MIN PRN
Status: DISCONTINUED | OUTPATIENT
Start: 2023-10-05 | End: 2023-10-05 | Stop reason: HOSPADM

## 2023-10-05 RX ORDER — PROPOFOL 10 MG/ML
INJECTION, EMULSION INTRAVENOUS AS NEEDED
Status: DISCONTINUED | OUTPATIENT
Start: 2023-10-05 | End: 2023-10-05

## 2023-10-05 RX ORDER — OXYCODONE HYDROCHLORIDE 5 MG/1
5 TABLET ORAL EVERY 4 HOURS PRN
Status: DISCONTINUED | OUTPATIENT
Start: 2023-10-05 | End: 2023-10-05 | Stop reason: HOSPADM

## 2023-10-05 RX ORDER — BUMETANIDE 1 MG/1
1 TABLET ORAL
Status: DISCONTINUED | OUTPATIENT
Start: 2023-10-05 | End: 2023-10-09 | Stop reason: HOSPADM

## 2023-10-05 RX ORDER — LIDOCAINE HYDROCHLORIDE 10 MG/ML
0.1 INJECTION, SOLUTION EPIDURAL; INFILTRATION; INTRACAUDAL; PERINEURAL ONCE
Status: DISCONTINUED | OUTPATIENT
Start: 2023-10-05 | End: 2023-10-05 | Stop reason: HOSPADM

## 2023-10-05 RX ORDER — OXYCODONE HYDROCHLORIDE 5 MG/1
5 TABLET ORAL EVERY 4 HOURS PRN
Status: CANCELLED | OUTPATIENT
Start: 2023-10-05

## 2023-10-05 RX ADMIN — SIMETHICONE 80 MG: 80 TABLET, CHEWABLE ORAL at 15:30

## 2023-10-05 RX ADMIN — ISOSORBIDE DINITRATE 10 MG: 10 TABLET ORAL at 22:01

## 2023-10-05 RX ADMIN — INSULIN LISPRO 3 UNITS: 100 INJECTION, SOLUTION INTRAVENOUS; SUBCUTANEOUS at 13:59

## 2023-10-05 RX ADMIN — CLOPIDOGREL BISULFATE 75 MG: 75 TABLET ORAL at 13:48

## 2023-10-05 RX ADMIN — METOPROLOL SUCCINATE 50 MG: 50 TABLET, EXTENDED RELEASE ORAL at 13:46

## 2023-10-05 RX ADMIN — ATORVASTATIN CALCIUM 80 MG: 40 TABLET, FILM COATED ORAL at 22:00

## 2023-10-05 RX ADMIN — AMIODARONE HYDROCHLORIDE 200 MG: 200 TABLET ORAL at 13:46

## 2023-10-05 RX ADMIN — CEFTRIAXONE SODIUM 1 G: 1 INJECTION, SOLUTION INTRAVENOUS at 13:48

## 2023-10-05 RX ADMIN — FAMOTIDINE 20 MG: 10 INJECTION, SOLUTION INTRAVENOUS at 10:37

## 2023-10-05 RX ADMIN — INSULIN GLARGINE 10 UNITS: 100 INJECTION, SOLUTION SUBCUTANEOUS at 22:01

## 2023-10-05 RX ADMIN — SODIUM CHLORIDE, POTASSIUM CHLORIDE, SODIUM LACTATE AND CALCIUM CHLORIDE 100 ML/HR: 600; 310; 30; 20 INJECTION, SOLUTION INTRAVENOUS at 10:37

## 2023-10-05 RX ADMIN — INSULIN LISPRO 15 UNITS: 100 INJECTION, SOLUTION INTRAVENOUS; SUBCUTANEOUS at 22:08

## 2023-10-05 RX ADMIN — SODIUM CHLORIDE, POTASSIUM CHLORIDE, SODIUM LACTATE AND CALCIUM CHLORIDE 100 ML/HR: 600; 310; 30; 20 INJECTION, SOLUTION INTRAVENOUS at 10:03

## 2023-10-05 RX ADMIN — BUMETANIDE 1 MG: 1 TABLET ORAL at 15:27

## 2023-10-05 RX ADMIN — NYSTATIN 500000 UNITS: 100000 SUSPENSION ORAL at 17:10

## 2023-10-05 RX ADMIN — DOXYCYCLINE 100 MG: 100 CAPSULE ORAL at 22:30

## 2023-10-05 RX ADMIN — PROPOFOL 20 MG: 10 INJECTION, EMULSION INTRAVENOUS at 10:53

## 2023-10-05 RX ADMIN — LIDOCAINE 1 PATCH: 4 PATCH TOPICAL at 13:59

## 2023-10-05 RX ADMIN — INSULIN LISPRO 6 UNITS: 100 INJECTION, SOLUTION INTRAVENOUS; SUBCUTANEOUS at 17:10

## 2023-10-05 RX ADMIN — POLYETHYLENE GLYCOL 3350 17 G: 17 POWDER, FOR SOLUTION ORAL at 14:03

## 2023-10-05 RX ADMIN — SODIUM CHLORIDE, SODIUM LACTATE, POTASSIUM CHLORIDE, AND CALCIUM CHLORIDE: 600; 310; 30; 20 INJECTION, SOLUTION INTRAVENOUS at 10:40

## 2023-10-05 RX ADMIN — MORPHINE SULFATE 2 MG: 2 INJECTION, SOLUTION INTRAMUSCULAR; INTRAVENOUS at 11:43

## 2023-10-05 RX ADMIN — EPOETIN ALFA-EPBX 10000 UNITS: 10000 INJECTION, SOLUTION INTRAVENOUS; SUBCUTANEOUS at 20:00

## 2023-10-05 RX ADMIN — OXYCODONE HYDROCHLORIDE 5 MG: 5 TABLET ORAL at 22:26

## 2023-10-05 RX ADMIN — LISINOPRIL 40 MG: 20 TABLET ORAL at 13:47

## 2023-10-05 RX ADMIN — NYSTATIN 500000 UNITS: 100000 SUSPENSION ORAL at 13:46

## 2023-10-05 RX ADMIN — PROPOFOL 20 MG: 10 INJECTION, EMULSION INTRAVENOUS at 10:51

## 2023-10-05 RX ADMIN — PROPOFOL 50 MG: 10 INJECTION, EMULSION INTRAVENOUS at 10:50

## 2023-10-05 RX ADMIN — NYSTATIN 500000 UNITS: 100000 SUSPENSION ORAL at 22:00

## 2023-10-05 RX ADMIN — ISOSORBIDE DINITRATE 10 MG: 10 TABLET ORAL at 15:27

## 2023-10-05 SDOH — HEALTH STABILITY: MENTAL HEALTH: CURRENT SMOKER: 0

## 2023-10-05 ASSESSMENT — COGNITIVE AND FUNCTIONAL STATUS - GENERAL
TOILETING: TOTAL
MOVING TO AND FROM BED TO CHAIR: TOTAL
WALKING IN HOSPITAL ROOM: TOTAL
TOILETING: TOTAL
EATING MEALS: A LITTLE
MOBILITY SCORE: 7
WALKING IN HOSPITAL ROOM: TOTAL
DRESSING REGULAR UPPER BODY CLOTHING: TOTAL
DRESSING REGULAR UPPER BODY CLOTHING: TOTAL
MOVING TO AND FROM BED TO CHAIR: TOTAL
CLIMB 3 TO 5 STEPS WITH RAILING: TOTAL
DRESSING REGULAR LOWER BODY CLOTHING: TOTAL
EATING MEALS: A LOT
DAILY ACTIVITIY SCORE: 7
STANDING UP FROM CHAIR USING ARMS: TOTAL
MOVING FROM LYING ON BACK TO SITTING ON SIDE OF FLAT BED WITH BEDRAILS: A LOT
MOBILITY SCORE: 7
CLIMB 3 TO 5 STEPS WITH RAILING: TOTAL
STANDING UP FROM CHAIR USING ARMS: TOTAL
HELP NEEDED FOR BATHING: A LOT
HELP NEEDED FOR BATHING: TOTAL
DAILY ACTIVITIY SCORE: 10
DRESSING REGULAR LOWER BODY CLOTHING: TOTAL
PERSONAL GROOMING: A LOT
STANDING UP FROM CHAIR USING ARMS: TOTAL
TURNING FROM BACK TO SIDE WHILE IN FLAT BAD: TOTAL
WALKING IN HOSPITAL ROOM: TOTAL
MOVING TO AND FROM BED TO CHAIR: TOTAL
MOVING FROM LYING ON BACK TO SITTING ON SIDE OF FLAT BED WITH BEDRAILS: TOTAL
MOVING FROM LYING ON BACK TO SITTING ON SIDE OF FLAT BED WITH BEDRAILS: A LOT
PERSONAL GROOMING: TOTAL
MOBILITY SCORE: 6
TURNING FROM BACK TO SIDE WHILE IN FLAT BAD: TOTAL
TURNING FROM BACK TO SIDE WHILE IN FLAT BAD: TOTAL
CLIMB 3 TO 5 STEPS WITH RAILING: TOTAL

## 2023-10-05 ASSESSMENT — ENCOUNTER SYMPTOMS
BLOOD IN STOOL: 0
CHEST TIGHTNESS: 0
WHEEZING: 0
DIARRHEA: 0
FLANK PAIN: 0
WOUND: 0
FREQUENCY: 0
SHORTNESS OF BREATH: 0
VOMITING: 0
ABDOMINAL PAIN: 0
TROUBLE SWALLOWING: 0
JOINT SWELLING: 0
FACIAL SWELLING: 0
HEMATURIA: 0
FATIGUE: 1
DYSURIA: 0
APPETITE CHANGE: 0
EYE PAIN: 0
NAUSEA: 0
BRUISES/BLEEDS EASILY: 0
FEVER: 0
HALLUCINATIONS: 0
PALPITATIONS: 0
COUGH: 1
NUMBNESS: 0
BACK PAIN: 0
CHILLS: 0
DIAPHORESIS: 0
HEADACHES: 0
DIZZINESS: 0
SORE THROAT: 0
LIGHT-HEADEDNESS: 0
WEAKNESS: 0
CONSTIPATION: 0

## 2023-10-05 ASSESSMENT — PAIN SCALES - GENERAL
PAINLEVEL_OUTOF10: 6
PAINLEVEL_OUTOF10: 0 - NO PAIN
PAINLEVEL_OUTOF10: 3
PAINLEVEL_OUTOF10: 5 - MODERATE PAIN

## 2023-10-05 ASSESSMENT — PAIN - FUNCTIONAL ASSESSMENT: PAIN_FUNCTIONAL_ASSESSMENT: 0-10

## 2023-10-05 NOTE — CONSULTS
Reason For Consult  Decubitus ulcer on left heel    History Of Present Illness  Melody Martin is a 88 y.o. female presenting with a pressure ulcer on the left heel. Her daughter was also present during the visit. She reports the wound occurred during some point in her recent hospitalizations and rehab. She had several procedures, and a reaction to the intravenous dye for testing. The wound was noted soon afterwards. She has not been weightbearing very much since that time due to her decompensated state. Daughter reports patient did not always have the offloading boots on in rehab.     Past Medical History  She has a past medical history of Chronic kidney disease, Heart murmur, Hypertension, Myocardial infarction (CMS/[facility]), Other conditions influencing health status (12/06/2022), Other conditions influencing health status (04/06/2016), Personal history of other diseases of the circulatory system, Personal history of other diseases of the female genital tract, Personal history of other diseases of the nervous system and sense organs (10/15/2021), Personal history of other endocrine, nutritional and metabolic disease (01/26/2018), and Stroke (CMS/[facility]).    Surgical History  She has a past surgical history that includes Hysterectomy (10/10/2018); Back surgery (10/10/2018); IR CVC tunneled (8/28/2023); MR angio neck wo IV contrast (8/31/2023); and MR angio head wo IV contrast (8/31/2023).     Social History  She reports that she has never smoked. She has never used smokeless tobacco. She reports that she does not drink alcohol and does not use drugs.    Family History  Family History   Problem Relation Name Age of Onset    No Known Problems Mother      No Known Problems Father          Allergies  Aspirin, Amlodipine, Levofloxacin, and Sulfamethoxazole-trimethoprim    Review of Systems  Alert and oriented. Stable mood. No acute distress. Pleasant demeanor.   Breathing room air. No respiratory distress.   Denies fever,  "chills, nausea, vomiting, or general malaise.   Reports general weakness, not a new issue.       Physical Exam  Stage 3 pressure ulcer on the left posterior heel. No sign of infection. Stable eschar. Some underlying granulating noted on closer exam.   Early friction/pressure injury on right heel. Area tender to palpation. Skin is otherwise intact.   Periwound skin perfusion is adequate. Pedal pulses are patent. Skin warm.      Last Recorded Vitals  Blood pressure 175/75, pulse 70, temperature 37.1 °C (98.8 °F), temperature source Temporal, resp. rate 18, height 1.626 m (5' 4.02\"), weight 86.3 kg (190 lb 4.1 oz), SpO2 91 %.    Relevant Results      Scheduled medications  amiodarone, 200 mg, oral, q24h  atorvastatin, 80 mg, oral, Nightly  bumetanide, 1 mg, oral, BID  cefTRIAXone, 1 g, intravenous, q24h  clopidogrel, 75 mg, oral, Daily  doxycycline, 100 mg, oral, q12h MARIA E  epoetin jose or biosimilar, 10,000 Units, subcutaneous, Once  insulin glargine, 10 Units, subcutaneous, Nightly  insulin lispro, 0-15 Units, subcutaneous, Before meals & nightly  isosorbide dinitrate, 10 mg, oral, TID  lidocaine, 1 patch, transdermal, Daily  lisinopril, 40 mg, oral, Daily  metoprolol succinate XL, 50 mg, oral, Daily  nystatin, 5 mL, oral, 4x daily  [START ON 10/6/2023] pantoprazole, 40 mg, oral, Daily before breakfast  polyethylene glycol, 17 g, oral, Daily      Continuous medications     PRN medications  PRN medications: acetaminophen **OR** acetaminophen **OR** acetaminophen, acetaminophen **OR** acetaminophen **OR** acetaminophen, acetaminophen, benzocaine-menthol, dextromethorphan-guaifenesin, dextrose 10 % in water (D10W), dextrose, glucagon, heparin, heparin, oxyCODONE, oxygen, simethicone  Results for orders placed or performed during the hospital encounter of 10/02/23 (from the past 24 hour(s))   CBC   Result Value Ref Range    WBC 8.4 4.4 - 11.3 x10*3/uL    nRBC 0.0 0.0 - 0.0 /100 WBCs    RBC 2.86 (L) 4.00 - 5.20 x10*6/uL    " Hemoglobin 8.5 (L) 12.0 - 16.0 g/dL    Hematocrit 26.4 (L) 36.0 - 46.0 %    MCV 92 80 - 100 fL    MCH 29.7 26.0 - 34.0 pg    MCHC 32.2 32.0 - 36.0 g/dL    RDW 15.2 (H) 11.5 - 14.5 %    Platelets 290 150 - 450 x10*3/uL    MPV 9.1 7.5 - 11.5 fL   Basic Metabolic Panel   Result Value Ref Range    Glucose 337 (H) 74 - 99 mg/dL    Sodium 137 136 - 145 mmol/L    Potassium 3.8 3.5 - 5.3 mmol/L    Chloride 100 98 - 107 mmol/L    Bicarbonate 31 21 - 32 mmol/L    Anion Gap 10 10 - 20 mmol/L    Urea Nitrogen 26 (H) 6 - 23 mg/dL    Creatinine 1.87 (H) 0.50 - 1.05 mg/dL    eGFR 26 (L) >60 mL/min/1.73m*2    Calcium 7.9 (L) 8.6 - 10.3 mg/dL   Magnesium   Result Value Ref Range    Magnesium 1.98 1.60 - 2.40 mg/dL   Prepare RBC: 1 Units   Result Value Ref Range    PRODUCT CODE F8614M34     Unit Number [id number]     Unit ABO A     Unit RH POS     XM INTEP COMP     Dispense Status TR     Blood Expiration Date October 24, 2023 23:59 EDT     PRODUCT BLOOD TYPE 6200     UNIT VOLUME 350    POCT GLUCOSE   Result Value Ref Range    POCT Glucose 267 (H) 74 - 99 mg/dL   POCT GLUCOSE   Result Value Ref Range    POCT Glucose 242 (H) 74 - 99 mg/dL        Assessment/Plan     Stage 3 decubitus ulcer on left heel.   -Currently stable. No debridement at this time.   -Continue to pad and protect with bordered foam dressing, as ordered by  nurse  -Use offloading boots, pillows and floating heels.   -Patient can be managed by SNF wound care, and then follow up with  center after discharge. Daughter given the contact information.     Raymond Veloz DPM

## 2023-10-05 NOTE — POST-PROCEDURE NOTE
Upper endoscopy (EGD) completed. Full report in procedure tab.      EGD revealed a small hiatal hernia, but was otherwise normal with no evidence of bleeding or source of blood loss.      Recommendations:  - advance diet as tolerated  - repeat EGD not needed        This patient was on anticoagulation/antiplatelet medications prior to presentation. In practice there is no established pathway to provide “GI clearance” for these medications and there is limited data to guide the optimal timing for resumption of anticoagulant/antiplatelet medications. Risk of recurrent GI bleeding varies widely by etiology and an exact risk is difficult to determine as validated prediction models do not exist.    The decision to restart anticoagulation/antiplatelet medications involves weighing the risk of recurrent bleeding with the risk of poor outcome related to thromboembolic disease. This decision is best made by internal medicine ([facility]) so that both of these considerations can be given proper weight. I would suggest that scoring systems (i.e. HAS-BLED or ATRIA) be considered when deciding if continued use of these medications is indicated. An episode of bleeding is often a good opportunity to examine the indication for these medications in the first place to ensure that they continue to be indicated.     GI guidelines suggest restarting needed antiplatelet medications and Coumadin once hemostasis is achieved assuming that the rebleeding risk is low. Given the rapid onset of action, DOACs (Rivaroxaban, Apixaban, etc) are often held for 48-72 hours after hemostasis, but data in these settings is extremely limited.        I will sign off at this time, but please call with questions.        Greg Ford MD    Gastroenterology    [facility]    Clinical   [facility]

## 2023-10-05 NOTE — ANESTHESIA POSTPROCEDURE EVALUATION
Patient: Melody Martin    Procedure Summary       Date: 10/05/23 Room / Location: [facility] OR    Anesthesia Start: 1046 Anesthesia Stop: 1103    Procedure: EGD Diagnosis: Anemia    Scheduled Providers: Greg Ford MD Responsible Provider: Pollo Bejarano MD    Anesthesia Type: MAC ASA Status: 3            Anesthesia Type: MAC    Vitals Value Taken Time   /52 10/05/23 1104   Temp 97.3 10/05/23 1104   Pulse 63 10/05/23 1104   Resp 18 10/05/23 1104   SpO2 100 10/05/23 1104       Anesthesia Post Evaluation    No notable events documented.

## 2023-10-05 NOTE — PERIOPERATIVE NURSING NOTE
Handoff report given to THIERRY Saravia. Notified RN that patient has continuous cough and recommended tessalon pearls if able to get MD order.

## 2023-10-05 NOTE — PRE-SEDATION DOCUMENTATION
Patient: Melody [facility]  MRN: [id number]    Pre-sedation Evaluation:  Sedation necessary for: Analgesia  Requesting service: GI    History of Present Illness: 88 year old with recent melena.     Past Medical History:   Diagnosis Date    Chronic kidney disease     Heart murmur     Hypertension     Myocardial infarction (CMS/[facility])     Other conditions influencing health status 12/06/2022    History of cough    Other conditions influencing health status 04/06/2016    Diabetes mellitus type 1, uncontrolled    Personal history of other diseases of the circulatory system     History of hypertension    Personal history of other diseases of the female genital tract     History of endometriosis    Personal history of other diseases of the nervous system and sense organs 10/15/2021    History of acute otitis media    Personal history of other endocrine, nutritional and metabolic disease 01/26/2018    History of diabetes mellitus    Stroke (CMS/[facility])        Principle problems:  Patient Active Problem List    Diagnosis Date Noted    Lactic acidosis 10/04/2023    Pneumonia 10/03/2023    Iron deficiency anemia due to chronic blood loss 10/03/2023    Anemia, unspecified type 10/03/2023    Oral thrush 04/10/2023    Stage 3b chronic kidney disease (CMS/HCC) 04/10/2023    Dizziness 04/10/2023    Aortic stenosis 02/03/2023    Atrial bigeminy 02/03/2023    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/2023    Benign essential HTN 02/03/2023    Cardiac murmur 02/03/2023    Diabetes (CMS/HCC) 02/03/2023    Dyslipidemia 02/03/2023    Exertional shortness of breath 02/03/2023    Fatigue 02/03/2023    Palpitations 02/03/2023    Paroxysmal SVT (supraventricular tachycardia) 02/03/2023    Sinus headache 02/03/2023    Routine medical exam 02/03/2023     Allergies:  Allergies   Allergen Reactions    Aspirin Shortness of breath    Amlodipine Dizziness    Levofloxacin Unknown    Sulfamethoxazole-Trimethoprim Diarrhea and Other     PTA/Current  Medications:  Medications Prior to Admission   Medication Sig Dispense Refill Last Dose    acetaminophen (Tylenol) 325 mg tablet Take 2 tablets (650 mg) by mouth every 6 hours if needed for mild pain (1 - 3).   Unknown    amiodarone (Pacerone) 200 mg tablet Take 1 tablet (200 mg) by mouth once every 24 hours.   Unknown    apixaban (Eliquis) 2.5 mg tablet Take 1 tablet (2.5 mg) by mouth 2 times a day.   Unknown    atorvastatin (Lipitor) 80 mg tablet Take 1 tablet (80 mg) by mouth once daily at bedtime.   Unknown    clopidogrel (Plavix) 75 mg tablet Take 1 tablet (75 mg) by mouth once daily.   Unknown    insulin lispro protamin-lispro (HumaLOG Mix 75-25) 100 unit/mL (75-25) injection Inject 40 Units under the skin once daily in the morning. And inject 20 units under the skin once daily at night   Unknown    isosorbide dinitrate (Isordil) 10 mg tablet Take 1 tablet (10 mg) by mouth 3 times a day.   Unknown    alum-mag hydroxide-simeth (Mylanta) 200-200-20 mg/5 mL oral suspension Take 30 mL by mouth every 4 hours if needed for indigestion or heartburn.   Unknown    bisacodyl (Dulcolax, bisacodyl,) 10 mg suppository Insert 1 suppository (10 mg) into the rectum once daily as needed for constipation.   Unknown    blood sugar diagnostic (OneTouch Ultra Test) strip 1 strip by Does not apply route 3 times a day. (Patient not taking: Reported on 10/3/2023) 300 strip 3 Unknown    lidocaine (Lidoderm) 5 % patch Place 1 patch on the skin once daily. Remove & discard patch within 12 hours or as directed by MD. Apply to left hip.   Unknown    lisinopril 40 mg tablet Take 1 tablet (40 mg) by mouth once daily. (Patient not taking: Reported on 10/3/2023) 90 tablet 3 Not Taking    lisinopril 40 mg tablet Take 1 tablet (40 mg) by mouth once daily. (Patient not taking: Reported on 10/3/2023) 90 tablet 3 Not Taking    magnesium hydroxide (Milk of Magnesia) 400 mg/5 mL suspension Take 30 mL by mouth once daily as needed for constipation.        melatonin tablet Take 2 tablets (2 mg) by mouth as needed at bedtime for sleep.       metoprolol succinate XL (Toprol-XL) 50 mg 24 hr tablet Take 1 tablet (50 mg) by mouth once daily. Do not crush or chew.       nystatin (Mycostatin) 100,000 unit/mL suspension Take 5 mL (500,000 Units) by mouth in the morning and 5 mL (500,000 Units) at noon and 5 mL (500,000 Units) in the evening and 5 mL (500,000 Units) before bedtime. Swish in mouth and spit out.. (Patient not taking: Reported on 10/3/2023) 120 mL 1 Not Taking    pantoprazole (ProtoNix) 40 mg EC tablet Take 1 tablet (40 mg) by mouth once daily in the morning. Take before meals. Do not crush, chew, or split.   Unknown    polyethylene glycol (Glycolax, Miralax) 17 gram/dose powder Take 17 g by mouth once daily as needed (constipation).   Unknown    PSYLLIUM HUSK, ASPARTAME, ORAL Take 1 packet by mouth once daily as needed (constipation).   Unknown    simethicone (Mylicon) 80 mg chewable tablet Chew 1 tablet (80 mg) every 6 hours if needed for flatulence.   Unknown     Current Facility-Administered Medications   Medication Dose Route Frequency Provider Last Rate Last Admin    acetaminophen (Tylenol) tablet 650 mg  650 mg oral q4h PRN Chase Brown MD        Or    acetaminophen (Tylenol) suspension 650 mg  650 mg nasogastric tube q4h PRN Chase Brown MD        Or    acetaminophen (Tylenol) suppository 650 mg  650 mg rectal q4h PRN Chase Brown MD        acetaminophen (Tylenol) tablet 650 mg  650 mg oral q4h PRN Chase Brown MD        Or    acetaminophen (Tylenol) suspension 650 mg  650 mg oral q4h PRN Chase Brown MD        Or    acetaminophen (Tylenol) suppository 650 mg  650 mg rectal q4h PRN Chase Brown MD        acetaminophen (Tylenol) tablet 650 mg  650 mg oral q6h PRN Chase Brown MD        amiodarone (Pacerone) tablet 200 mg  200 mg oral q24h Chase Brown MD   200 mg at 10/04/23 1647    atorvastatin (Lipitor) tablet 80 mg   80 mg oral Nightly Chase Brown MD   80 mg at 10/04/23 2023    benzocaine-menthol (Cepastat Sore Throat) 15-3.6 mg lozenge 1 lozenge  1 lozenge Mouth/Throat q2h PRN Chase Brown MD        cefTRIAXone (Rocephin) IVPB 1 g  1 g intravenous q24h Chase Brown MD   Stopped at 10/04/23 1710    clopidogrel (Plavix) tablet 75 mg  75 mg oral Daily Chase Brown MD   75 mg at 10/04/23 1235    dextromethorphan-guaifenesin (Robitussin DM)  mg/5 mL oral liquid 5 mL  5 mL oral q4h PRN Chase Brown MD   5 mL at 10/04/23 0209    dextrose 10 % in water (D10W) infusion  0.3 g/kg/hr intravenous Once PRN Keyana Rico PA-C        dextrose 50 % injection 25 g  25 g intravenous q15 min PRN Keyana Rico PA-C        doxycycline (Vibramycin) capsule 100 mg  100 mg oral q12h MARIA E Chase Brown MD   100 mg at 10/04/23 2023    glucagon (Glucagen) injection 1 mg  1 mg intramuscular q15 min PRN Keyana Rico PA-C        heparin 1,000 unit/mL injection 2,000 Units  2,000 Units intra-catheter PRN Mally Lucas DO        heparin 1,000 unit/mL injection 2,000 Units  2,000 Units intra-catheter PRN Mallyfabio Lucas DO        insulin glargine (Lantus) injection 10 Units  10 Units subcutaneous Nightly Bridget Rodriguez PA-C        insulin lispro (HumaLOG) injection 0-15 Units  0-15 Units subcutaneous Before meals & nightly Keyana Rico PA-C        isosorbide dinitrate (Isordil) tablet 10 mg  10 mg oral TID Chase Brown MD   10 mg at 10/04/23 2023    lactated Ringer's infusion  100 mL/hr intravenous Continuous Pollo Bejarano  mL/hr at 10/05/23 1037 100 mL/hr at 10/05/23 1037    lidocaine 4 % patch 1 patch  1 patch transdermal Daily Bridget Rodriguez PA-C   1 patch at 10/04/23 1311    lisinopril tablet 40 mg  40 mg oral Daily Chase Brown MD   40 mg at 10/04/23 1236    metoprolol succinate XL (Toprol-XL) 24 hr tablet 50 mg  50 mg oral Daily Chase Brown MD   50 mg at 10/04/23  1236    nystatin (Mycostatin) 100,000 unit/mL suspension 500,000 Units  5 mL oral 4x daily Chase Brown MD   500,000 Units at 10/04/23 2023    oxyCODONE (Roxicodone) immediate release tablet 5 mg  5 mg oral q6h PRN Bridget Rodriguez PA-C   5 mg at 10/04/23 2023    oxygen (O2) therapy   inhalation Continuous PRN - O2/gases Chase Brown MD   2 L/min at 10/03/23 1937    pantoprazole (ProtoNix) injection 40 mg  40 mg intravenous BID Bridget Rodriguez PA-C   40 mg at 10/04/23 2023    polyethylene glycol (Glycolax, Miralax) packet 17 g  17 g oral Daily Chase Brown MD   17 g at 10/04/23 1236     Past Surgical History:   has a past surgical history that includes Hysterectomy (10/10/2018); Back surgery (10/10/2018); IR CVC tunneled (8/28/2023); MR angio neck wo IV contrast (8/31/2023); and MR angio head wo IV contrast (8/31/2023).    Recent sedation/surgery (24 hours) No    Review of Systems:  Please check all that apply: Cardiac Disease    Pregnancy test completed prior to procedure on any menstruating female: none        NPO guidelines met: Yes    Physical Exam    Airway  Mallampati: III  Neck ROM: full  Comments: Normal mouth opening.   Cardiovascular   Rhythm: regular  Rate: normal  (-) murmur     Dental    Pulmonary   Breath sounds clear to auscultation  (-) wheezes       Other findings: Patient is calm appearing and in no apparent distress.    Patient is awake and alert and oriented x4.      Plan    ASA 3     Moderate   (Sedation medications to be delivered via monitored anesthesia care (MAC).    This evaluation serves as my H&P.    Outpatient medication list and allergies have been reviewed.  Pre-procedure/stephanie procedure antibiotics not needed.    Pre-procedure evaluation completed by physician.    Surgeon has reviewed key risks related to the risk of camron COVID-19 and if they contract COVID-19 what the risks are.)

## 2023-10-05 NOTE — PROGRESS NOTES
Spiritual Care Visit    Clinical Encounter Type  Visited With: Patient  Routine Visit: Follow-up  Referral From: Nurse

## 2023-10-05 NOTE — CONSULTS
Wound Care Consult     Visit Date: 10/5/2023      Patient Name: Melody Martin         MRN: [id number]           YOB: 1935     Reason for Consult: Left heel DTI     Pertinent Labs:   Albumin   Date Value Ref Range Status   10/02/2023 2.6 (L) 3.4 - 5.0 g/dL Final     ALBUMIN (MG/L) IN URINE   Date Value Ref Range Status   03/27/2023 1,255.6 Not Established mg/L Final       Wound Assessment:  Wound 10/03/23 Pressure Injury Heel Left (Active)   Wound Image   10/05/23 0952   Site Assessment Purple;Eschar 10/05/23 0952   Pascale-Wound Assessment Dry;Red 10/05/23 0952   Pressure Injury Stage DTPI 10/05/23 0952   Shape round 10/04/23 2014   Wound Length (cm) 3.4 cm 10/05/23 0952   Wound Width (cm) 6.5 cm 10/05/23 0952   Wound Surface Area (cm^2) 22.1 cm^2 10/05/23 0952   State of Healing Eschar 10/05/23 0952   Wound Bed Eschar (%) 100 % 10/05/23 0952   Drainage Description Serosanguineous 10/05/23 0952   Drainage Amount Scant 10/05/23 0952   Dressing Non adherent;Foam 10/05/23 0952   Dressing Status Other (Comment) 10/05/23 0952       Wound 10/05/23 Pressure Injury Sacrum (Active)   Wound Image   10/05/23 0952   Site Assessment Non-blanchable erythema 10/05/23 0952   Pressure Injury Stage 1 10/05/23 0952   Wound Length (cm) 4 cm 10/05/23 0952   Wound Width (cm) 9 cm 10/05/23 0952   Wound Surface Area (cm^2) 36 cm^2 10/05/23 0952   Drainage Description None 10/05/23 0952   Dressing Foam 10/05/23 0952   Dressing Status Other (Comment) 10/05/23 0952     Patient seen with bedside RN Rani to assist with positioning during exam. Patient reports pain in Left lower foot, bedside RN aware. Patient alert and oriented, forgetful at times. Patient is known to this RN from previous wound consult on prior admission in August of this year. Deep tissue injury to left heel appears worse then the last time I saw patient. Podiatry consult and dressing care ordered per VMarceO from Bridget Hayes after communicating  recommendations. During exam stage 1 pressure injury noted to sacrum. See above wound care assessment for details. Skin hygiene and dressing care provided.  Preventative foam placed on right heel, heel boots placed on patient during exam.     Plan:  Cleanse sacrum with normal saline, apply mepilex foam border every 3 days/prn if soiled.   2. Cleanse left heel with normal saline, apply adaptic, cover with mepilex foam border every other day/prn if soiled until further recommendations from podiatry.  3. Podiatry consult.  4. Heel boots when in bed. Staff to continue to turn and reposition patient atleast every 2 hours.      Natalie Kauffman RN  10/5/2023  10:30 AM

## 2023-10-05 NOTE — PROGRESS NOTES
"      Nephrology Progress Note      Nephrology following for ESRD.   Events over night: EGD no sign/source of bleeding    Pt feels better today        /80 (BP Location: Left arm, Patient Position: Lying)   Pulse 63   Temp 36.4 °C (97.5 °F) (Temporal)   Resp 18   Ht 1.626 m (5' 4.02\")   Wt 86.3 kg (190 lb 4.1 oz)   SpO2 98%   BMI 32.64 kg/m²     Input / Output:  24 HR:   Intake/Output Summary (Last 24 hours) at 10/5/2023 1451  Last data filed at 10/5/2023 1059  Gross per 24 hour   Intake 940 ml   Output --   Net 940 ml         Physical Exam  Vitals and nursing note reviewed.   Constitutional:       General: She is not in acute distress.     Appearance: She is not ill-appearing or toxic-appearing.   Cardiovascular:      Rate and Rhythm: Normal rate and regular rhythm.      Heart sounds: No murmur heard.     No friction rub. No gallop.   Pulmonary:      Effort: Pulmonary effort is normal.      Breath sounds: No wheezing, rhonchi or rales.   Abdominal:      General: There is no distension.      Palpations: Abdomen is soft.      Tenderness: There is no abdominal tenderness.   Musculoskeletal:      Cervical back: Neck supple.      Right lower leg: Edema present.      Left lower leg: Edema present.   Neurological:      Mental Status: She is alert.          Scheduled medications  amiodarone, 200 mg, oral, q24h  atorvastatin, 80 mg, oral, Nightly  cefTRIAXone, 1 g, intravenous, q24h  clopidogrel, 75 mg, oral, Daily  doxycycline, 100 mg, oral, q12h MARIA E  insulin glargine, 10 Units, subcutaneous, Nightly  insulin lispro, 0-15 Units, subcutaneous, Before meals & nightly  isosorbide dinitrate, 10 mg, oral, TID  lidocaine, 1 patch, transdermal, Daily  lisinopril, 40 mg, oral, Daily  metoprolol succinate XL, 50 mg, oral, Daily  nystatin, 5 mL, oral, 4x daily  [START ON 10/6/2023] pantoprazole, 40 mg, oral, Daily before breakfast  polyethylene glycol, 17 g, oral, Daily      Continuous medications     PRN " medications  PRN medications: acetaminophen **OR** acetaminophen **OR** acetaminophen, acetaminophen **OR** acetaminophen **OR** acetaminophen, acetaminophen, benzocaine-menthol, dextromethorphan-guaifenesin, dextrose 10 % in water (D10W), dextrose, glucagon, heparin, heparin, oxyCODONE, oxygen, simethicone   Results from last 7 days   Lab Units 10/05/23  0500 10/03/23  0513 10/02/23  2220   SODIUM mmol/L 137   < > 133*   POTASSIUM mmol/L 3.8   < > 3.9   CHLORIDE mmol/L 100   < > 96*   CO2 mmol/L 31   < > 30   BUN mg/dL 26*   < > 19   CREATININE mg/dL 1.87*   < > 1.57*   CALCIUM mg/dL 7.9*   < > 7.4*   PROTEIN TOTAL g/dL  --   --  5.4*   BILIRUBIN TOTAL mg/dL  --   --  0.4   ALK PHOS U/L  --   --  99   ALT U/L  --   --  16   AST U/L  --   --  26   GLUCOSE mg/dL 337*   < > 199*    < > = values in this interval not displayed.        Results from last 7 days   Lab Units 10/05/23  0500 10/04/23  0504   MAGNESIUM mg/dL 1.98 2.17        Results from last 7 days   Lab Units 10/05/23  0500 10/04/23  1107 10/04/23  0504 10/03/23  2326 10/03/23  1439 10/03/23  0513   WBC AUTO x10*3/uL 8.4  --   --  10.4  --  11.6*   HEMOGLOBIN g/dL 8.5* 7.9* 7.2* 7.1*  7.1*   < > 7.5*   HEMATOCRIT % 26.4* 23.9* 22.7* 21.8*  21.8*   < > 23.3*   PLATELETS AUTO x10*3/uL 290  --   --  242  --  254    < > = values in this interval not displayed.          Assessment & Plan:   Patient is 88 y.o. female who is admitted to hospital with complaints of  cough, blacktarry stools. Nephrology consulted in view of ESRD.     Acute kidney injury on CKD stage 3b-has been dialysis dependent  Since August 28, 2023  -Access is right [facility]  -Patient on hemodialysis Monday Wednesday Friday at [facility]    Anemia of blood loss and CKD  -Patient had colonoscopy last admission  -May have EGD  -Transfuse PRBCs per primary service  -pt is on IV Fe load as outpatient     Volume overload  -cont UF with HD    HTN  -cont lisinopril and metoprolol and Isordil      Recommendations  -Hemodialysis today, next HD tomorrow  -dose Retacrit x 1  -cont IV Fe load and NOAH as outpatient  -Trying to establish dry weight  -start Bumex po     Please message me through [employer] chat with any questions or concerns.     Mally Lucas DO  10/5/2023  2:51 PM     [facility]    [address], [address]   [place] [address]  Office: [phone]

## 2023-10-05 NOTE — TELEPHONE ENCOUNTER
10/5/23  8255  Daughter of patient called in with concern over patient.  Patient was recently started on Plavix and Eliquis due to an MI with stent and Afib.  Since then patient been anemic with a Hgb <7 per which where PRBC transfusions were needed.    Daughter reported that patient's blood counts appeared to do up when off the medications in preparation for endoscopic procedures and wanted to know the pros and cons of staying on the Plavix Eliquis.    Gave the pros and cons to daughter for staying on and coming off the medications and that nurse would speak with Dr. Peace about this.    Daughter verbalized understanding.    Will ask Dr. Peace about patient.

## 2023-10-05 NOTE — PROGRESS NOTES
Melody Martin is a 88 y.o. female on day 2 of admission presenting with Anemia, unspecified type.      Subjective   10/05/23: No acute events overnight. Vitals stable. Glucose elevated 337- add lantus 10u nightly. Cr 1.87 today- had HD yesterday. Hgb 8.5 after 1u PRBC transfusion yesterday. Planning EGD today. She reports weakness but denies any other complaints.          Review of Systems   Constitutional:  Positive for fatigue. Negative for appetite change, chills, diaphoresis and fever.        Generalized weakness   HENT:  Negative for congestion, ear pain, facial swelling, hearing loss, nosebleeds, sore throat, tinnitus and trouble swallowing.    Eyes:  Negative for pain.   Respiratory:  Positive for cough. Negative for chest tightness, shortness of breath and wheezing.    Cardiovascular:  Negative for chest pain, palpitations and leg swelling.   Gastrointestinal:  Negative for abdominal pain, blood in stool, constipation, diarrhea, nausea and vomiting.   Genitourinary:  Negative for dysuria, flank pain, frequency, hematuria and urgency.   Musculoskeletal:  Negative for back pain and joint swelling.        Edema BUE   Skin:  Negative for rash and wound.   Neurological:  Negative for dizziness, syncope, weakness, light-headedness, numbness and headaches.   Hematological:  Does not bruise/bleed easily.   Psychiatric/Behavioral:  Negative for behavioral problems, hallucinations and suicidal ideas.           Objective     Last Recorded Vitals  /80 (BP Location: Left arm, Patient Position: Lying)   Pulse 63   Temp 36.4 °C (97.5 °F) (Temporal)   Resp 18   Wt 86.3 kg (190 lb 4.1 oz)   SpO2 98%     Image Results  XR chest 1 view    Result Date: 10/2/2023  STUDY: Chest Radiograph;  10/2/2023 11:12 PM INDICATION: Shortness of breath. COMPARISON: 8/30/2023 XR Chest one view ACCESSION NUMBER(S): [id number] ORDERING CLINICIAN: CHASE BUTLER TECHNIQUE:  Frontal chest was obtained at 23:12 hours. FINDINGS:  CARDIOMEDIASTINAL SILHOUETTE: Cardiomediastinal silhouette is stable in size and configuration. Right IJ central line is unchanged.  LUNGS: Hazy opacity at the left lung base suggests small left-sided pleural effusion.  Left basilar atelectasis and/or airspace disease is not excluded.  Right lung is grossly clear.  No pneumothorax.  ABDOMEN: No remarkable upper abdominal findings.  BONES: No acute osseous changes.    Hazy opacity at the left lung base suggesting small left-sided pleural effusion.  Left basilar atelectasis and/or airspace disease is not excluded. Signed by Gio Corona MD    CT abdomen pelvis wo IV contrast    Result Date: 9/23/2023  Interpreted By:  ARMEN DESAI DO MRN: [id number] Patient Name: NIRU SIMS  STUDY: CT ABDOMEN AND PELVIS WO CONTRAST;  9/23/2023 11:58 am  INDICATION: New left hip pain after resuming anticoagulation, eval hematoma for change.  COMPARISON: CT abdomen and pelvis 09/17/2023  ACCESSION NUMBER(S): [id number]  ORDERING CLINICIAN: DANY KAT  TECHNIQUE: CT of the abdomen and pelvis was performed without intravenous contrast. Standard contiguous axial images were obtained at 3 mm slice thickness through the abdomen and pelvis. Coronal and sagittal reconstructions at 3 mm slice thickness were performed.  FINDINGS: LOWER CHEST: There is increased size of small right and moderate left pleural effusions with adjacent atelectasis since 09/17/2023.  ABDOMEN:  LIVER: No focal liver lesions are seen.  BILE DUCTS: The intrahepatic and extrahepatic ducts are not dilated.  GALLBLADDER: The gallbladder contains high density material which could represent sludge or micro calculi. No evidence of acute cholecystitis.  PANCREAS: The pancreas appears unremarkable without evidence of ductal dilatation or masses.  SPLEEN: The spleen is normal in size.  ADRENAL GLANDS: There is a stable 3 cm right adrenal adenoma. The left adrenal is unremarkable.  KIDNEYS AND URETERS: Bilateral multifocal  cortical scarring is are again seen. There is a stable 1.8 cm right interpolar cyst. An amorphous 5 mm hyperdense focus in the left upper pole may represent a nonobstructing calculus (series 2, image 53), unchanged. No hydronephrosis is seen.  PELVIS:  BLADDER: The bladder is markedly distended with unchanged nondependent air locules which could be postprocedural.  REPRODUCTIVE ORGANS: Hysterectomy.  BOWEL: There is no bowel wall thickening or dilation. The appendix is not visualized. No pericecal inflammation is seen.  VESSELS: Mild-to-moderate atherosclerotic calcifications are seen within the abdominal aorta without aneurysmal dilation.  PERITONEUM/RETROPERITONEUM/LYMPH NODES: There is interval development of trace nonspecific left paracolic gutter and pelvic ascites without free air or fluid collection. There is no retroperitoneal hematoma.  Multiple upper abdominal and retroperitoneal shotty, likely reactive lymph nodes are again seen. No progressive abdominopelvic lymphadenopathy.  ABDOMINAL WALL AND BONES : There is increased anasarca. Left gluteal fluid collection is not significantly changed, measuring 8.8 x 3.1 x 10 cm (series 2, image 114; image 4, image 65).  No suspicious osseous lesions are identified. There is lumbar scoliosis with moderate-to-severe multilevel degenerative disc disease.      Stable size left gluteal fluid collection/evolving hematoma compared to 09/17/2023.  No intra abdominopelvic or retroperitoneal hematoma.  Increased body wall anasarca and bilateral pleural effusions.  MACRO: None    Venous Duplex Ultrasound DVT    Result Date: 9/22/2023  [place] [id number] Phone [phone] Fax [phone] Vascular Lab Report Upper Venous Duplex Ultrasound Patient Name:     NIRU Jessica Physician:  [id number] Aayush Hernandez MD, [facility] Study Date:       9/22/2023     Referring           DANY KAT Physician:  MRN/PID:          [id number]      PCP: Accession/Order#: [id number]     CC Report to: YOB: 1935     Technologist:       Marissa Monreal RVT Gender:           F             Technologist 2: Admission Status: Outpatient    Location Performed: [facility] Diagnosis/ICD: R60.1-Generalized edema (anasarca) Procedure/CPT: 11159 Peripheral venous duplex scan for DVT Limited-16285 CONCLUSIONS: Right Upper Venous: No evidence of acute deep vein thrombus visualized in the right upper extremity. There is acute occlusive superficial thomobosis noted in the right median cubital vein just proximal to the iv site. The right subclavian vein was not visualized due to line placement. Unable to compress the left subclavian vein due to high venous pressure; however, spontaneous flow was noted. Left Upper Venous: The subclavian vein demonstrates a normal spontaneous and phasic flow. Imaging & Doppler Findings: Right            Compressible Thrombus Internal Jugular     Yes        None Axillary             Yes        None Brachial             Yes        None Cephalic             Yes        None Basilic              Yes        None Left       Thrombus        Flow Subclavian   None   Spontaneous/Phasic [id number] Aayush Hernandez MD, RPVI Electronically signed by [id number] Aayush Hernandez MD, RPVI on 9/22/2023 at 8:26:46 AM     Colonoscopy    Result Date: 9/21/2023  Patient Name: Melody Martin Procedure Date: 9/21/2023 10:02 AM MRN: [id number] Account Number: [id number] YOB: 1935 Admit Type: Inpatient Site: [place] [facility] Ethnicity: Patient Declined Race: White Attending MD: Enrike Huggins DO, [id number] Procedure:             Colonoscopy Indications:           Rectal bleeding Providers:             Enrike Huggins DO (Doctor) Referring:             Medicines:             Monitored Anesthesia Care, See the Anesthesia note for                        documentation of the administered medications  Complications:         No immediate complications. Procedure:             Pre-Anesthesia Assessment:                        - Prior to the procedure, a History and Physical was                        performed, and patient medications and allergies were                        reviewed. The patient's tolerance of previous                        anesthesia was also reviewed. The risks and benefits                        of the procedure and the sedation options and risks                        were discussed with the patient. All questions were                        answered, and informed consent was obtained. Prior                        Anticoagulants: The patient has taken Plavix                        (clopidogrel), last dose was day of procedure. ASA                        Grade Assessment: III - A patient with severe systemic                        disease. After reviewing the risks and benefits, the                        patient was deemed in satisfactory condition to                        undergo the procedure.                        After I obtained informed consent, the scope was                        passed under direct vision. Throughout the procedure,                        the patient's blood pressure, pulse, and oxygen                        saturations were monitored continuously. The pediatric                        colonoscope was introduced through the anus and                        advanced to 2 cm into the ileum. The colonoscopy was                        performed without difficulty. The patient tolerated                        the procedure well. The quality of the bowel                        preparation was evaluated using the BBPS ([place] Bowel                        Preparation Scale) with scores of: Right Colon = 1                        (portion of mucosa seen, but other areas not well seen                        due to staining, residual stool and/or opaque liquid),                         Transverse Colon = 2 (minor amount of residual                        staining, small fragments of stool and/or opaque                        liquid, but mucosa seen well) and Left Colon = 2                        (minor amount of residual staining, small fragments of                        stool and/or opaque liquid, but mucosa seen well). The                        total BBPS score equals 5. The quality of the bowel                        preparation was fair. The quality of the bowel                        preparation was fair. The terminal ileum, ileocecal                        valve, appendiceal orifice, and rectum were                        photographed. Findings:      Hemorrhoids were found on perianal exam.      A moderate amount of semi-solid stool was found in the rectum,      interfering with visualization. Lavage of the area was performed using a      large amount of sterile water, resulting in clearance with fair      visualization.      Multiple small-mouthed diverticula were found in the sigmoid colon and      descending colon.      The terminal ileum appeared normal.      The exam was otherwise without abnormality on direct and retroflexion      views. Impression:            - Preparation of the colon was fair.                        - Hemorrhoids found on perianal exam.                        - Stool in the rectum.                        - Diverticulosis in the sigmoid colon and in the                        descending colon.                        - The examined portion of the ileum was normal.                        - The examination was otherwise normal on direct and                        retroflexion views.                        - No specimens collected. Recommendation:        - Return patient to hospital [facility] for ongoing care.                        - High fiber diet.                        - Miralax 1 capful (17 grams) in 8 ounces of water PO                        BID.                         - Continue present medications.                        - Repeat colonoscopy is not recommended due to current                        age (75 years or older) for surveillance. Procedure Code(s):     --- Professional ---                        67149, Colonoscopy, flexible; diagnostic, including                        collection of specimen(s) by brushing or washing, when                        performed (separate procedure) Diagnosis Code(s):     --- Professional ---                        K64.9, Unspecified hemorrhoids                        K62.5, Hemorrhage of anus and rectum                        K57.30, Diverticulosis of large intestine without                        perforation or abscess without bleeding CPT copyright 2021 [employer]. All rights reserved. The codes documented in this report are preliminary and upon  review may be revised to meet current compliance requirements. Attending Participation:      I personally performed the entire procedure. DO Enrike Bartholomew DO 9/21/2023 10:53:31 AM This report has been signed electronically. Number of Addenda: 0 Note Initiated On: 9/21/2023 10:02 AM Scope Withdrawal Time 0 hours 8 minutes 14 seconds Total Procedure Duration Time 0 hours 14 minutes 37 seconds Estimated Blood Loss:      Estimated blood loss: none.    Electrocardiogram 12 Lead    Result Date: 9/20/2023  Sinus rhythm with Premature supraventricular complexes Abnormal QRS-T angle, consider primary T wave abnormality Abnormal ECG When compared with ECG of 28-AUG-2023 17:03, PREVIOUS ECG IS PRESENT Confirmed by Derek Sinha ([id number]) on 9/20/2023 2:38:44 PM    CT abdomen pelvis wo IV contrast    Result Date: 9/17/2023  Interpreted By:  LORENZO GUADARRAMA MD MRN: [id number] Patient Name: NIRU SIMS  STUDY: CT ABDOMEN AND PELVIS WO CONTRAST;  9/17/2023 1:43 pm  INDICATION: abd pain .  COMPARISON: 08/27/2023  ACCESSION NUMBER(S): [id number]  ORDERING  CLINICIAN: DANY COBIAN  TECHNIQUE: CT of the abdomen and pelvis was performed.  Standard contiguous axial images were obtained at 3 mm slice thickness through the abdomen and pelvis. Coronal and sagittal reconstructions at 3 mm slice thickness were performed.  FINDINGS: Limited study without IV contrast.  LOWER CHEST: Lung bases demonstrate small to moderate bilateral pleural effusions and associated atelectasis or infiltrate, worse on the left.  Right breast cutaneous thickening, nonspecific in etiology.  ABDOMEN:  LIVER: Liver is normal in size. No focal lesions are seen.  BILE DUCTS: Biliary system is nondilated.  GALLBLADDER: The gallbladder is not seen and may have been surgically removed.  PANCREAS: No focal lesions. No peripancreatic fat stranding.  SPLEEN: The spleen is normal in size. No focal abnormalities are seen.  ADRENAL GLANDS: There is a complex mass within the right adrenal gland similar to prior study with fat components which may represent fat containing adrenal adenoma versus myelolipoma measuring up to 3 cm in size, stable.  KIDNEYS AND URETERS: Lobulated contours of bilateral kidneys with associated focal cortical scarring. There is a low-attenuation lesion involving the interpolar region of the right kidney posteriorly measuring approximately 16 mm in size. This is suggestive of a cyst but incompletely characterized due to small size and lack of IV contrast.  PELVIS:  BLADDER: The urinary bladder is over distended. There is air within the bladder which may be related to recent bladder instrumentation although infectious process can not be excluded. No bladder calculi or masses are seen within this limited study.  REPRODUCTIVE ORGANS: The uterus is not seen and may have been surgically removed.  BOWEL: The small and large bowel are nondilated.  The appendix is not positively identified, however, no findings to suggest acute appendicitis is seen.  Normal caliber fluid-filled loops of small bowel  and colon with air-fluid levels which may represent an ileus type pattern such as related to enteritis. No bowel obstruction or free air.  Few colonic diverticula but no CT evidence for acute diverticulitis. Findings most prominent in the area of sigmoid.  VESSELS: Diffuse wall calcification of the aorta and its main branches. No aneurysm.  PERITONEUM/RETROPERITONEUM/LYMPH NODES: No retroperitoneal masses are seen.  No lymphadenopathy.  BONES AND ABDOMINAL WALL: There is no evidence for destructive lytic or blastic bone lesions identified.  Multilevel discogenic degenerative changes throughout the spine.  Diffuse subcutaneous edema particularly within bilateral flanks, worse on the right. There is fluid collection within left posterior gluteal region which may represent a hematoma although incompletely characterized in this study. The area measures approximately 9 x 5 cm in size.      1.  Small-bowel areas probably related to enteritis. No bowel obstruction or free air. Clinical correlation and follow-up recommended to document resolution. 2. Over distended urinary bladder and associated small amount of bladder air which may be due to bladder instrumentation, however, infectious process can not be excluded. Clinical correlation and with urinalysis recommended. 3. Diffuse subcutaneous edema concerning for anasarca. Recommend clinical correlation. 4. Left gluteal fluid collection suggestive of hematoma, slightly decreased in size since previous exam. Recommend clinical correlation and follow-up. 5. Right breast cutaneous edema. Correlation with mammographic findings recommended. 6. Bilateral pleural effusions and associated atelectasis or infiltrate worse on the left. Recommend clinical correlation and follow-up to document resolution. 7. Additional findings as detailed.    FL replacement tunneled CVC same site    Result Date: 9/8/2023  Interpreted By:  JACKSON DUKE MD MRN: [id number] Patient Name: NIRU SIMS   STUDY: REPLACE MERLY CVC SAME SITE;  9/6/2023 2:27 pm  INDICATION: dialysis .  COMPARISON: None.  ACCESSION NUMBER(S): [id number]  ORDERING CLINICIAN: ERNESTINE ESPARZA  TECHNIQUE:  CONSENT: The patient/patient's POA/next of kin was informed of the nature of the proposed procedure. The purposes, alternatives, risks, and benefits were explained and discussed. All questions were answered and consent was obtained.  RADIATION EXPOSURE: Fluoroscopy time: 1.1 min. Dose: 7.9 mGy. Dose Area Product (DAP): 1.7  SEDATION: Moderate conscious IV sedation services (supervision of administration, induction, and maintenance) were provided by the physician performing the procedure with intravenous fentanyl 50 mcg 10 minutes. The physician was assisted by an independent trained observer, an interventional radiology nurse, in the continuous monitoring of patient level of consciousness and physiologic status.  MEDICATION/CONTRAST: No additional  TIME OUT: A time out was performed immediately prior to procedure start with the interventional team, correctly identifying the patient name, date of birth, MRN, procedure, anatomy (including marking of site and side), patient position, procedure consent form, relevant laboratory and imaging test results, antibiotic administration, safety precautions, and procedure-specific equipment needs.  COMPLICATIONS: No immediate adverse events identified.  FINDINGS: Patient presents for conversion of a temporary hemodialysis catheter within the right internal jugular vein to a tunneled hemodialysis catheter. Patient was placed in the supine position on the angiographic table in the right temporary line was prepped and draped in usual sterile fashion. A skin tract along the anterior chest wall was anesthetized and formed. A 19 cm tunneled hemodialysis catheter was then tunneled through the tract to exit the site of the current temporary dialysis catheter. The temporary catheter was removed over a stiff  Glidewire which was advanced into the inferior vena cava under fluoroscopic guidance. A new peel-away sheath was advanced over the wire. The new hemodialysis catheter was advanced through the peel-away sheath and the sheath was removed. Final imaging shows the catheter to be positioned in satisfactory position with the distal tip in the upper right atrium. The port was flushed and loaded with heparin. Single suture was used to secure the line. No immediate complications encountered.      Conversion of the patient's temporary hemodialysis catheter to a tunneled hemodialysis catheter as above. Catheter is ready for immediate use.   Performed and dictated at [facility].  MACRO: None       Lab Results  Results for orders placed or performed during the hospital encounter of 10/02/23 (from the past 24 hour(s))   CBC   Result Value Ref Range    WBC 8.4 4.4 - 11.3 x10*3/uL    nRBC 0.0 0.0 - 0.0 /100 WBCs    RBC 2.86 (L) 4.00 - 5.20 x10*6/uL    Hemoglobin 8.5 (L) 12.0 - 16.0 g/dL    Hematocrit 26.4 (L) 36.0 - 46.0 %    MCV 92 80 - 100 fL    MCH 29.7 26.0 - 34.0 pg    MCHC 32.2 32.0 - 36.0 g/dL    RDW 15.2 (H) 11.5 - 14.5 %    Platelets 290 150 - 450 x10*3/uL    MPV 9.1 7.5 - 11.5 fL   Basic Metabolic Panel   Result Value Ref Range    Glucose 337 (H) 74 - 99 mg/dL    Sodium 137 136 - 145 mmol/L    Potassium 3.8 3.5 - 5.3 mmol/L    Chloride 100 98 - 107 mmol/L    Bicarbonate 31 21 - 32 mmol/L    Anion Gap 10 10 - 20 mmol/L    Urea Nitrogen 26 (H) 6 - 23 mg/dL    Creatinine 1.87 (H) 0.50 - 1.05 mg/dL    eGFR 26 (L) >60 mL/min/1.73m*2    Calcium 7.9 (L) 8.6 - 10.3 mg/dL   Magnesium   Result Value Ref Range    Magnesium 1.98 1.60 - 2.40 mg/dL   Prepare RBC: 1 Units   Result Value Ref Range    PRODUCT CODE [id number]     Unit Number [id number]     Unit ABO A     Unit RH POS     XM INTEP COMP     Dispense Status TR     Blood Expiration Date October 24, 2023 23:59 EDT     PRODUCT BLOOD TYPE  6200     UNIT VOLUME 350    POCT GLUCOSE   Result Value Ref Range    POCT Glucose 267 (H) 74 - 99 mg/dL   POCT GLUCOSE   Result Value Ref Range    POCT Glucose 242 (H) 74 - 99 mg/dL        Medications  Scheduled medications:  amiodarone, 200 mg, oral, q24h  atorvastatin, 80 mg, oral, Nightly  cefTRIAXone, 1 g, intravenous, q24h  clopidogrel, 75 mg, oral, Daily  doxycycline, 100 mg, oral, q12h MARIA E  insulin glargine, 10 Units, subcutaneous, Nightly  insulin lispro, 0-15 Units, subcutaneous, Before meals & nightly  isosorbide dinitrate, 10 mg, oral, TID  lidocaine, 1 patch, transdermal, Daily  lisinopril, 40 mg, oral, Daily  metoprolol succinate XL, 50 mg, oral, Daily  nystatin, 5 mL, oral, 4x daily  pantoprazole, 40 mg, intravenous, BID  polyethylene glycol, 17 g, oral, Daily      Continuous medications:     PRN medications:       Physical Exam  Constitutional:       General: She is not in acute distress.     Appearance: Normal appearance.   HENT:      Head: Normocephalic and atraumatic.      Right Ear: External ear normal.      Left Ear: External ear normal.      Nose: Nose normal.      Mouth/Throat:      Mouth: Mucous membranes are moist.      Pharynx: Oropharynx is clear.   Eyes:      Extraocular Movements: Extraocular movements intact.      Conjunctiva/sclera: Conjunctivae normal.      Pupils: Pupils are equal, round, and reactive to light.   Neck:      Comments: HD catheter right upper chest  Cardiovascular:      Rate and Rhythm: Normal rate and regular rhythm.      Pulses: Normal pulses.      Heart sounds: Normal heart sounds.      Comments: Edema BUE  Pulmonary:      Effort: Pulmonary effort is normal. No respiratory distress.      Breath sounds: Rales (LLL) present. No wheezing or rhonchi.   Abdominal:      General: Abdomen is flat. Bowel sounds are normal.      Palpations: Abdomen is soft.      Tenderness: There is no abdominal tenderness. There is no right CVA tenderness, left CVA tenderness, guarding or  rebound.   Musculoskeletal:         General: No swelling. Normal range of motion.      Cervical back: Normal range of motion and neck supple.   Skin:     General: Skin is warm and dry.      Capillary Refill: Capillary refill takes less than 2 seconds.      Findings: Bruising present. No lesion or rash.   Neurological:      General: No focal deficit present.      Mental Status: She is alert and oriented to person, place, and time. Mental status is at baseline.   Psychiatric:         Mood and Affect: Mood normal.         Behavior: Behavior normal.                  Code Status  Full Code     Assessment/Plan   This patient currently has cardiac telemetry ordered; if you would like to modify or discontinue the telemetry order, click here to go to the orders activity to modify/discontinue the order.    Anemia, unspecified type  Patient with recent GI blood losses had colonoscopy showing just hemorrhoidal bleed last month  Patient now is having black bowel movements  Was transfused 1 unit in the ER- will transfuse 1u with HD 10/4/23  Patient is on treatment for recent cardiac stenting so is actually on Plavix and will continue his history of recent stent  Patient has been on Eliquis for paroxysmal A-fib- will hold  GI consult  Follow H&H q6h  Monitor vitals closely- appears stable, likely does not need acute scope  IV PPI BID  GI consultation  BUN is relatively normal  Plan EGD 10/5/23    Pneumonia  Patient has infiltrate on chest x-ray  Has minimal sputum production no significant hypoxia  Treat empirically with Rocephin and doxycycline  Supportive treatments  Lactic acidosis has resolved    Diabetes (CMS/HCC)  Patient placed on scale insulin coverage  Will allow DM diet  Add lantus 10u nightly given hyperglycemia  A1c 6.1    Stage 3b chronic kidney disease (CMS/HCC)  Patient currently is actually receiving hemodialysis Monday Wednesday Friday after having acute renal failure after heart catheterization with IVP  contrast  Patient's renal function appears to be improving  Patient will have consultation with nephrology service for continued dialysis needs  HD M.WMarceF    Iron deficiency anemia due to chronic blood loss  As above    Lactic acidosis  Resolved with blood and treatment of PNA        DVT ppx: Avoid pharmacological ppx given concern for UGIB       Bridget Hayes PA-C

## 2023-10-05 NOTE — CONSULTS
Vascular Access Team  Consult     Visit Date: 10/5/2023      Patient Name: Melody Martin         MRN: [id number]                Reason for Consult: Request for US IV due to difficult vascular access       Assessment: US-guided 20g 1.88in peripheral IV placed in right forearm x1 attempt in aseptic fashion. Line flushes and draws without difficultly. Pt tolerated well.        Divya De La Rosa RN  10/5/2023  10:39 AM

## 2023-10-05 NOTE — ANESTHESIA PREPROCEDURE EVALUATION
Patient: Melody Martin    Procedure Information       Date/Time: 10/05/23 1100    Scheduled providers: Greg Ford MD    Procedure: EGD    Location: [facility] OR            Relevant Problems   Cardiovascular   (+) Aortic stenosis   (+) Atrial bigeminy   (+) Atrial premature complex   (+) Benign essential HTN   (+) Cardiac murmur   (+) Paroxysmal SVT (supraventricular tachycardia)      /Renal   (+) Stage 3b chronic kidney disease (CMS/HCC)      Pulmonary   (+) Exertional shortness of breath   (+) Pneumonia      Hematology   (+) Anemia, unspecified type   (+) Iron deficiency anemia due to chronic blood loss      Infectious Disease   (+) Oral thrush       Clinical information reviewed:   Tobacco  Allergies  Meds   Med Hx  Surg Hx   Fam Hx  Soc Hx        NPO Detail:  NPO/Void Status  Date of Last Liquid: 10/05/23  Time of Last Liquid: 1000  Date of Last Solid: 10/04/23  Time of Last Solid: 1200         Physical Exam    Airway  Mallampati: III  TM distance: <3 FB  Neck ROM: limited     Cardiovascular   Rhythm: regular  Rate: normal     Dental        Pulmonary    Abdominal            Anesthesia Plan    ASA 3     MAC     The patient is not a current smoker.  Patient was not previously instructed to abstain from smoking on day of procedure.  Patient did not smoke on day of procedure.    intravenous induction   Anesthetic plan and risks discussed with patient.  Use of blood products discussed with patient who consented to blood products.    Plan discussed with CRNA and attending.

## 2023-10-05 NOTE — PROGRESS NOTES
Physical Therapy    Physical Therapy Treatment    Patient Name: Melody Martin  MRN: [id number]  Today's Date: 10/5/2023  Time Calculation  Start Time: 0924  Stop Time: 0948  Time Calculation (min): 24 min       Assessment/Plan   PT Assessment  PT Assessment Results: Decreased strength, Decreased endurance, Impaired balance, Decreased mobility, Decreased coordination, Impaired hearing, Pain  Rehab Prognosis: Good  Evaluation/Treatment Tolerance: Patient limited by fatigue, Patient limited by pain  Medical Staff Made Aware: Yes  Strengths: Support of extended family/friends  End of Session Communication: Bedside nurse  Assessment Comment:  (pt reports pain in L ankle with movement. pt was positioned onto R side and stated here let hip was bothering her. pt scooted up in bd nadd postioned in bed iwth bed in chair postion.)  End of Session Patient Position: Alarm on, Bed, 3 rail up     PT Plan  Treatment/Interventions: Bed mobility, Balance training, Strengthening  PT Plan: Skilled PT, OT consult  PT Frequency: 5 times per week  PT Discharge Recommendations: Moderate intensity level of continued care  Equipment Recommended upon Discharge:  (TBD)  PT Recommended Transfer Status: Assist x2 (BED MOBILTIY, BED INCHAIR POSITION 3X/DAY)      General Visit Information:   PT  Visit  PT Received On: 10/05/23       Subjective   Precautions:  Precautions  Precautions Comment: O2 NC AND FALL RISK      Objective   Pain:  Pain Assessment  Pain Score:  (L hip was hurting not rated)  Cognition:  Cognition  Orientation Level: Oriented X4    Treatments:  Therapeutic Exercise  Therapeutic Exercise Performed: Yes  Therapeutic Exercise Activity 1:  (AROM RLE, max PROM LLE, heel slides, ankle pumps, SLR, SAQ, abd, x12 each)    Bed Mobility  Bed Mobility: Yes  Bed Mobility 1  Bed Mobility 1: Rolling right, Rolling left, Scooting  Level of Assistance 1: Maximum assistance (x2)    Outcome Measures:  [facility] Basic Mobility  Turning from your back  to your side while in a flat bed without using bedrails: A lot  Moving from lying on your back to sitting on the side of a flat bed without using bedrails: Total  Moving to and from bed to chair (including a wheelchair): Total  Standing up from a chair using your arms (e.g. wheelchair or bedside chair): Total  To walk in hospital room: Total  Climbing 3-5 steps with railing: Total  Basic Mobility - Total Score: 7    Education Documentation  Mobility Training, taught by Olya Moise PTA at 10/5/2023 10:57 AM.  Learner: Patient  Readiness: Acceptance  Method: Demonstration  Response: Verbalizes Understanding, Demonstrated Understanding    Education Comments  No comments found.        Encounter Problems       Encounter Problems (Active)       Balance       SIT BALANCE (Progressing)       Start:  10/04/23    Expected End:  10/18/23       SIT EOB FOR 12 MIN WITH CGA WHILE COMPLETING EX AND FUNCTIONAL ACTIVITIES TO IMPROVE ENDURANCE, BALANCE AND STRENGTH FOR PROGRESSION OF ACTIVITY LEVEL            Mobility       Goal 1 (Progressing)       Start:  10/04/23    Expected End:  10/25/23       20+ REPS AAROM/AROM EX INCREASING STRENGTH  FOR PROGRESS OF ACTIVITY LEVEL TOWARDS OOB            Transfers       STG - Patient will roll (Progressing)       Start:  10/04/23    Expected End:  10/11/23       MOD X2 A         SUPINE<>SIT (Progressing)       Start:  10/04/23    Expected End:  10/18/23       SUPINE<>SIT WITH MOD X1-2 A

## 2023-10-06 ENCOUNTER — TELEPHONE (OUTPATIENT)
Dept: CARDIOLOGY | Facility: CLINIC | Age: 88
End: 2023-10-06

## 2023-10-06 ENCOUNTER — APPOINTMENT (OUTPATIENT)
Dept: DIALYSIS | Facility: HOSPITAL | Age: 88
End: 2023-10-06
Payer: COMMERCIAL

## 2023-10-06 LAB
ANION GAP SERPL CALC-SCNC: 9 MMOL/L (ref 10–20)
BUN SERPL-MCNC: 28 MG/DL (ref 6–23)
CALCIUM SERPL-MCNC: 7.8 MG/DL (ref 8.6–10.3)
CHLORIDE SERPL-SCNC: 101 MMOL/L (ref 98–107)
CO2 SERPL-SCNC: 31 MMOL/L (ref 21–32)
CREAT SERPL-MCNC: 2.18 MG/DL (ref 0.5–1.05)
ERYTHROCYTE [DISTWIDTH] IN BLOOD BY AUTOMATED COUNT: 14.5 % (ref 11.5–14.5)
GFR SERPL CREATININE-BSD FRML MDRD: 21 ML/MIN/1.73M*2
GLUCOSE BLD MANUAL STRIP-MCNC: 202 MG/DL (ref 74–99)
GLUCOSE BLD MANUAL STRIP-MCNC: 295 MG/DL (ref 74–99)
GLUCOSE BLD MANUAL STRIP-MCNC: 300 MG/DL (ref 74–99)
GLUCOSE SERPL-MCNC: 163 MG/DL (ref 74–99)
HCT VFR BLD AUTO: 27.7 % (ref 36–46)
HGB BLD-MCNC: 8.9 G/DL (ref 12–16)
MAGNESIUM SERPL-MCNC: 1.86 MG/DL (ref 1.6–2.4)
MCH RBC QN AUTO: 29.6 PG (ref 26–34)
MCHC RBC AUTO-ENTMCNC: 32.1 G/DL (ref 32–36)
MCV RBC AUTO: 92 FL (ref 80–100)
NRBC BLD-RTO: 0 /100 WBCS (ref 0–0)
PHOSPHATE SERPL-MCNC: 3.2 MG/DL (ref 2.5–4.9)
PLATELET # BLD AUTO: 347 X10*3/UL (ref 150–450)
PMV BLD AUTO: 9 FL (ref 7.5–11.5)
POTASSIUM SERPL-SCNC: 3.7 MMOL/L (ref 3.5–5.3)
RBC # BLD AUTO: 3.01 X10*6/UL (ref 4–5.2)
SODIUM SERPL-SCNC: 137 MMOL/L (ref 136–145)
WBC # BLD AUTO: 9.7 X10*3/UL (ref 4.4–11.3)

## 2023-10-06 PROCEDURE — 85027 COMPLETE CBC AUTOMATED: CPT | Performed by: INTERNAL MEDICINE

## 2023-10-06 PROCEDURE — 36415 COLL VENOUS BLD VENIPUNCTURE: CPT | Performed by: INTERNAL MEDICINE

## 2023-10-06 PROCEDURE — 2500000002 HC RX 250 W HCPCS SELF ADMINISTERED DRUGS (ALT 637 FOR MEDICARE OP, ALT 636 FOR OP/ED): Performed by: INTERNAL MEDICINE

## 2023-10-06 PROCEDURE — 83735 ASSAY OF MAGNESIUM: CPT | Performed by: INTERNAL MEDICINE

## 2023-10-06 PROCEDURE — 2500000001 HC RX 250 WO HCPCS SELF ADMINISTERED DRUGS (ALT 637 FOR MEDICARE OP): Performed by: INTERNAL MEDICINE

## 2023-10-06 PROCEDURE — 82947 ASSAY GLUCOSE BLOOD QUANT: CPT

## 2023-10-06 PROCEDURE — 2500000005 HC RX 250 GENERAL PHARMACY W/O HCPCS: Performed by: INTERNAL MEDICINE

## 2023-10-06 PROCEDURE — 8010000001 HC DIALYSIS - HEMODIALYSIS PER DAY

## 2023-10-06 PROCEDURE — 84100 ASSAY OF PHOSPHORUS: CPT | Performed by: INTERNAL MEDICINE

## 2023-10-06 PROCEDURE — 82435 ASSAY OF BLOOD CHLORIDE: CPT | Performed by: INTERNAL MEDICINE

## 2023-10-06 PROCEDURE — 80048 BASIC METABOLIC PNL TOTAL CA: CPT | Performed by: INTERNAL MEDICINE

## 2023-10-06 PROCEDURE — 2500000001 HC RX 250 WO HCPCS SELF ADMINISTERED DRUGS (ALT 637 FOR MEDICARE OP): Performed by: PHYSICIAN ASSISTANT

## 2023-10-06 PROCEDURE — 96372 THER/PROPH/DIAG INJ SC/IM: CPT | Performed by: INTERNAL MEDICINE

## 2023-10-06 PROCEDURE — 2500000002 HC RX 250 W HCPCS SELF ADMINISTERED DRUGS (ALT 637 FOR MEDICARE OP, ALT 636 FOR OP/ED): Performed by: PHYSICIAN ASSISTANT

## 2023-10-06 PROCEDURE — 87205 SMEAR GRAM STAIN: CPT | Mod: CMCLAB,PORLAB | Performed by: INTERNAL MEDICINE

## 2023-10-06 PROCEDURE — 2500000004 HC RX 250 GENERAL PHARMACY W/ HCPCS (ALT 636 FOR OP/ED): Performed by: INTERNAL MEDICINE

## 2023-10-06 PROCEDURE — 87075 CULTR BACTERIA EXCEPT BLOOD: CPT | Mod: CMCLAB,PORLAB | Performed by: INTERNAL MEDICINE

## 2023-10-06 PROCEDURE — 2060000001 HC INTERMEDIATE ICU ROOM DAILY

## 2023-10-06 PROCEDURE — 96372 THER/PROPH/DIAG INJ SC/IM: CPT | Performed by: PHYSICIAN ASSISTANT

## 2023-10-06 PROCEDURE — 99233 SBSQ HOSP IP/OBS HIGH 50: CPT | Performed by: PHYSICIAN ASSISTANT

## 2023-10-06 PROCEDURE — 2500000004 HC RX 250 GENERAL PHARMACY W/ HCPCS (ALT 636 FOR OP/ED): Performed by: PHYSICIAN ASSISTANT

## 2023-10-06 RX ORDER — INSULIN GLARGINE 100 [IU]/ML
12 INJECTION, SOLUTION SUBCUTANEOUS NIGHTLY
Status: DISCONTINUED | OUTPATIENT
Start: 2023-10-06 | End: 2023-10-07

## 2023-10-06 RX ORDER — HYDRALAZINE HYDROCHLORIDE 20 MG/ML
25 INJECTION INTRAMUSCULAR; INTRAVENOUS EVERY 8 HOURS PRN
Status: DISCONTINUED | OUTPATIENT
Start: 2023-10-06 | End: 2023-10-09 | Stop reason: HOSPADM

## 2023-10-06 RX ORDER — GUAIFENESIN/DEXTROMETHORPHAN 100-10MG/5
5 SYRUP ORAL EVERY 4 HOURS PRN
Status: DISCONTINUED | OUTPATIENT
Start: 2023-10-06 | End: 2023-10-09 | Stop reason: HOSPADM

## 2023-10-06 RX ADMIN — CLOPIDOGREL BISULFATE 75 MG: 75 TABLET ORAL at 09:24

## 2023-10-06 RX ADMIN — BUMETANIDE 1 MG: 1 TABLET ORAL at 15:05

## 2023-10-06 RX ADMIN — GUAIFENESIN AND DEXTROMETHORPHAN 5 ML: 100; 10 SYRUP ORAL at 15:04

## 2023-10-06 RX ADMIN — DOXYCYCLINE 100 MG: 100 CAPSULE ORAL at 09:24

## 2023-10-06 RX ADMIN — INSULIN LISPRO 9 UNITS: 100 INJECTION, SOLUTION INTRAVENOUS; SUBCUTANEOUS at 22:38

## 2023-10-06 RX ADMIN — ISOSORBIDE DINITRATE 10 MG: 10 TABLET ORAL at 09:24

## 2023-10-06 RX ADMIN — ISOSORBIDE DINITRATE 10 MG: 10 TABLET ORAL at 22:44

## 2023-10-06 RX ADMIN — AMIODARONE HYDROCHLORIDE 200 MG: 200 TABLET ORAL at 15:05

## 2023-10-06 RX ADMIN — LIDOCAINE 1 PATCH: 4 PATCH TOPICAL at 09:22

## 2023-10-06 RX ADMIN — NYSTATIN 500000 UNITS: 100000 SUSPENSION ORAL at 09:23

## 2023-10-06 RX ADMIN — POLYETHYLENE GLYCOL 3350 17 G: 17 POWDER, FOR SOLUTION ORAL at 09:23

## 2023-10-06 RX ADMIN — INSULIN GLARGINE 12 UNITS: 100 INJECTION, SOLUTION SUBCUTANEOUS at 22:42

## 2023-10-06 RX ADMIN — BUMETANIDE 1 MG: 1 TABLET ORAL at 09:24

## 2023-10-06 RX ADMIN — PANTOPRAZOLE SODIUM 40 MG: 40 TABLET, DELAYED RELEASE ORAL at 09:24

## 2023-10-06 RX ADMIN — OXYCODONE HYDROCHLORIDE 5 MG: 5 TABLET ORAL at 22:28

## 2023-10-06 RX ADMIN — SIMETHICONE 80 MG: 80 TABLET, CHEWABLE ORAL at 15:12

## 2023-10-06 RX ADMIN — ATORVASTATIN CALCIUM 80 MG: 40 TABLET, FILM COATED ORAL at 22:27

## 2023-10-06 RX ADMIN — INSULIN LISPRO 6 UNITS: 100 INJECTION, SOLUTION INTRAVENOUS; SUBCUTANEOUS at 09:28

## 2023-10-06 RX ADMIN — HYDRALAZINE HYDROCHLORIDE 25 MG: 20 INJECTION INTRAMUSCULAR; INTRAVENOUS at 11:54

## 2023-10-06 RX ADMIN — LISINOPRIL 40 MG: 20 TABLET ORAL at 09:23

## 2023-10-06 RX ADMIN — GUAIFENESIN AND DEXTROMETHORPHAN 5 ML: 100; 10 SYRUP ORAL at 22:30

## 2023-10-06 RX ADMIN — ISOSORBIDE DINITRATE 10 MG: 10 TABLET ORAL at 15:05

## 2023-10-06 RX ADMIN — DOXYCYCLINE 100 MG: 100 CAPSULE ORAL at 22:27

## 2023-10-06 RX ADMIN — METOPROLOL SUCCINATE 50 MG: 50 TABLET, EXTENDED RELEASE ORAL at 09:24

## 2023-10-06 RX ADMIN — INSULIN LISPRO 9 UNITS: 100 INJECTION, SOLUTION INTRAVENOUS; SUBCUTANEOUS at 17:40

## 2023-10-06 RX ADMIN — CEFTRIAXONE SODIUM 1 G: 1 INJECTION, SOLUTION INTRAVENOUS at 15:04

## 2023-10-06 ASSESSMENT — COGNITIVE AND FUNCTIONAL STATUS - GENERAL
MOVING FROM LYING ON BACK TO SITTING ON SIDE OF FLAT BED WITH BEDRAILS: TOTAL
DRESSING REGULAR LOWER BODY CLOTHING: TOTAL
MOBILITY SCORE: 6
HELP NEEDED FOR BATHING: A LOT
DRESSING REGULAR UPPER BODY CLOTHING: TOTAL
TURNING FROM BACK TO SIDE WHILE IN FLAT BAD: TOTAL
DAILY ACTIVITIY SCORE: 10
STANDING UP FROM CHAIR USING ARMS: TOTAL
MOVING TO AND FROM BED TO CHAIR: TOTAL
TOILETING: TOTAL
WALKING IN HOSPITAL ROOM: TOTAL
EATING MEALS: A LITTLE
PERSONAL GROOMING: A LOT
CLIMB 3 TO 5 STEPS WITH RAILING: TOTAL

## 2023-10-06 ASSESSMENT — ENCOUNTER SYMPTOMS
BLOOD IN STOOL: 0
COUGH: 1
FREQUENCY: 0
SHORTNESS OF BREATH: 0
NAUSEA: 0
ABDOMINAL PAIN: 0
NUMBNESS: 0
FACIAL SWELLING: 0
JOINT SWELLING: 0
LIGHT-HEADEDNESS: 0
TROUBLE SWALLOWING: 0
BRUISES/BLEEDS EASILY: 0
BACK PAIN: 0
DIARRHEA: 0
PALPITATIONS: 0
VOMITING: 0
WOUND: 0
WEAKNESS: 0
HEADACHES: 0
FEVER: 0
HALLUCINATIONS: 0
FATIGUE: 1
CHEST TIGHTNESS: 0
DYSURIA: 0
EYE PAIN: 0
HEMATURIA: 0
APPETITE CHANGE: 0
DIZZINESS: 0
SORE THROAT: 0
CONSTIPATION: 0
CHILLS: 0
DIAPHORESIS: 0
WHEEZING: 0
FLANK PAIN: 0

## 2023-10-06 ASSESSMENT — PAIN SCALES - GENERAL: PAINLEVEL_OUTOF10: 6

## 2023-10-06 NOTE — TELEPHONE ENCOUNTER
10/6/23  1101  Talked with Dr. Peace regarding daughter's concern over low blood counts while on Plavix and Eliquis.  Dr. Peace reported patient be able to come off of Plavix in six months.    Called and informed daughter of this. Also discussed with daughter that patient's anemia may also be contributed by her renal status.    Daughter of patient verbalized understanding.

## 2023-10-06 NOTE — CARE PLAN
Problem: Pain  Goal: My pain/discomfort is manageable  Outcome: Progressing     Problem: Safety  Goal: Patient will be injury free during hospitalization  Outcome: Progressing  Goal: I will remain free of falls  Outcome: Progressing     Problem: Daily Care  Goal: Daily care needs are met  Outcome: Progressing     Problem: Psychosocial Needs  Goal: Demonstrates ability to cope with hospitalization/illness  Outcome: Progressing  Goal: Collaborate with me, my family, and caregiver to identify my specific goals  Outcome: Progressing     Problem: Discharge Barriers  Goal: My discharge needs are met  Outcome: Progressing

## 2023-10-06 NOTE — PROGRESS NOTES
Physical Therapy                 Therapy Communication Note    Patient Name: Melody Martin  MRN: [id number]  Today's Date: 10/6/2023     Discipline: Physical Therapy    Missed Visit Reason:  OOR    Missed Time: Attempt    Comment: pt OOR in dialysis at this time

## 2023-10-06 NOTE — PROGRESS NOTES
Patient has acceptance to return to [facility] approved. Patient not medically ready for discharge, Monitor Vitals and labs.

## 2023-10-06 NOTE — PROGRESS NOTES
"      Nephrology Progress Note      Nephrology following for ESRD.   Events over night: none    Seen on dialysis today, she did have a blood pressure spike up to 200 and she was given IV hydralazine  She did receive her blood pressure medicines this morning        BP (!) 199/83   Pulse 70   Temp 36.9 °C (98.4 °F)   Resp 18   Ht 1.626 m (5' 4.02\")   Wt 87.3 kg (192 lb 7.4 oz)   SpO2 91%   BMI 33.02 kg/m²     Input / Output:  24 HR:   Intake/Output Summary (Last 24 hours) at 10/6/2023 1438  Last data filed at 10/6/2023 1319  Gross per 24 hour   Intake 1240 ml   Output 2002 ml   Net -762 ml         Physical Exam  Vitals and nursing note reviewed.   Constitutional:       General: She is not in acute distress.     Appearance: She is not ill-appearing or toxic-appearing.   Cardiovascular:      Rate and Rhythm: Normal rate and regular rhythm.      Heart sounds: No murmur heard.     No friction rub. No gallop.   Pulmonary:      Effort: Pulmonary effort is normal.      Breath sounds: No wheezing, rhonchi or rales.   Abdominal:      General: There is no distension.      Palpations: Abdomen is soft.      Tenderness: There is no abdominal tenderness.   Musculoskeletal:      Cervical back: Neck supple.      Right lower leg: Edema present.      Left lower leg: Edema present.   Neurological:      Mental Status: She is alert.          Scheduled medications  amiodarone, 200 mg, oral, q24h  atorvastatin, 80 mg, oral, Nightly  bumetanide, 1 mg, oral, BID  cefTRIAXone, 1 g, intravenous, q24h  clopidogrel, 75 mg, oral, Daily  doxycycline, 100 mg, oral, q12h MARIA E  insulin glargine, 12 Units, subcutaneous, Nightly  insulin lispro, 0-15 Units, subcutaneous, Before meals & nightly  isosorbide dinitrate, 10 mg, oral, TID  lidocaine, 1 patch, transdermal, Daily  lisinopril, 40 mg, oral, Daily  metoprolol succinate XL, 50 mg, oral, Daily  nystatin, 5 mL, oral, 4x daily  pantoprazole, 40 mg, oral, Daily before breakfast  polyethylene " glycol, 17 g, oral, Daily      Continuous medications     PRN medications  PRN medications: acetaminophen **OR** acetaminophen **OR** acetaminophen, acetaminophen **OR** acetaminophen **OR** acetaminophen, acetaminophen, benzocaine-menthol, dextromethorphan-guaifenesin, dextrose 10 % in water (D10W), dextrose, glucagon, heparin, heparin, hydrALAZINE, oxyCODONE, oxygen, simethicone   Results from last 7 days   Lab Units 10/06/23  0502 10/03/23  0513 10/02/23  2220   SODIUM mmol/L 137   < > 133*   POTASSIUM mmol/L 3.7   < > 3.9   CHLORIDE mmol/L 101   < > 96*   CO2 mmol/L 31   < > 30   BUN mg/dL 28*   < > 19   CREATININE mg/dL 2.18*   < > 1.57*   CALCIUM mg/dL 7.8*   < > 7.4*   PROTEIN TOTAL g/dL  --   --  5.4*   BILIRUBIN TOTAL mg/dL  --   --  0.4   ALK PHOS U/L  --   --  99   ALT U/L  --   --  16   AST U/L  --   --  26   GLUCOSE mg/dL 163*   < > 199*    < > = values in this interval not displayed.        Results from last 7 days   Lab Units 10/06/23  0502 10/05/23  0500 10/04/23  0504   MAGNESIUM mg/dL 1.86 1.98 2.17        Results from last 7 days   Lab Units 10/06/23  0502 10/05/23  0500 10/04/23  1107 10/04/23  0504 10/03/23  2326   WBC AUTO x10*3/uL 9.7 8.4  --   --  10.4   HEMOGLOBIN g/dL 8.9* 8.5* 7.9*   < > 7.1*  7.1*   HEMATOCRIT % 27.7* 26.4* 23.9*   < > 21.8*  21.8*   PLATELETS AUTO x10*3/uL 347 290  --   --  242    < > = values in this interval not displayed.          Assessment & Plan:   Patient is 88 y.o. female who is admitted to hospital with complaints of  cough, blacktarry stools. Nephrology consulted in view of ESRD.     Acute kidney injury on CKD stage 3b-has been dialysis dependent  Since August 28, 2023  -Access is right IJ TDC  -Patient on hemodialysis Monday Wednesday Friday at [facility]  -Remains dialysis dependent to manage volume, oligoanuric    Anemia of blood loss and CKD  -Patient had colonoscopy last admission  -May have EGD  -Transfuse PRBCs per primary service  -pt is on IV Fe load  as outpatient     Volume overload  -cont UF with HD    HTN  -cont lisinopril and metoprolol and Isordil     Recommendations  -Hemodialysis today, next 10/9  -UF goal 2.5 L today   -Trying to establish dry weight  -cont Bumex po   -IV hydralazine added as needed, may need to add another agent such as amlodipine if accelerated hypertension persists    Please message me through [facility] chat with any questions or concerns.     Mally Lucas DO  10/6/2023  2:38 PM     [facility]    [address] [place] [address], [address]   [place] [address]  Office: [phone]

## 2023-10-06 NOTE — PROGRESS NOTES
Melody Martin is a 88 y.o. female on day 3 of admission presenting with Anemia, unspecified type.      Subjective   Melody Martin is a 88 y.o. female with a past medical history that is significant for coronary artery disease with recent stenting, recent lower GI bleed, diabetes mellitus type 2 insulin requiring, hypertension, stroke with residual left-sided weakness, hypertension, accessible A-fib on anticoagulation, aortic stenosis, end-stage renal disease on dialysis Monday Wednesday Friday presented 10/2/23 with increasing amount of shortness of breath cough black tarry stool.   Transfused 2u PRBC in total. Continued HD M/W/F. Rocephin/doxy for LLL Pneumonia. EGD revealed a small hiatal hernia, but was otherwise normal with no evidence of bleeding or source of blood loss.  10/06/23: No acute events overnight. Vitals stable, BP slightly elevated 155/73 (175/75)- add PRN hydralazine, nephro taking off fluid during HD today. Cr rising, due for HD today. Hgb improving at 8.9. Glucose remains elevated, recent A1c 6.1, will adjust insulin dose.          Review of Systems   Constitutional:  Positive for fatigue. Negative for appetite change, chills, diaphoresis and fever.        Generalized weakness   HENT:  Negative for congestion, ear pain, facial swelling, hearing loss, nosebleeds, sore throat, tinnitus and trouble swallowing.    Eyes:  Negative for pain.   Respiratory:  Positive for cough. Negative for chest tightness, shortness of breath and wheezing.    Cardiovascular:  Negative for chest pain, palpitations and leg swelling.   Gastrointestinal:  Negative for abdominal pain, blood in stool, constipation, diarrhea, nausea and vomiting.   Genitourinary:  Negative for dysuria, flank pain, frequency, hematuria and urgency.   Musculoskeletal:  Negative for back pain and joint swelling.        Edema BUE   Skin:  Negative for rash and wound.   Neurological:  Negative for dizziness, syncope, weakness,  light-headedness, numbness and headaches.   Hematological:  Does not bruise/bleed easily.   Psychiatric/Behavioral:  Negative for behavioral problems, hallucinations and suicidal ideas.           Objective     Last Recorded Vitals  BP (!) 199/83   Pulse 70   Temp 36.9 °C (98.4 °F)   Resp 18   Wt 87.3 kg (192 lb 7.4 oz)   SpO2 91%     Image Results  XR chest 1 view    Result Date: 10/2/2023  STUDY: Chest Radiograph;  10/2/2023 11:12 PM INDICATION: Shortness of breath. COMPARISON: 8/30/2023 XR Chest one view ACCESSION NUMBER(S): [id number] ORDERING CLINICIAN: CHASE BUTLER TECHNIQUE:  Frontal chest was obtained at 23:12 hours. FINDINGS: CARDIOMEDIASTINAL SILHOUETTE: Cardiomediastinal silhouette is stable in size and configuration. Right IJ central line is unchanged.  LUNGS: Hazy opacity at the left lung base suggests small left-sided pleural effusion.  Left basilar atelectasis and/or airspace disease is not excluded.  Right lung is grossly clear.  No pneumothorax.  ABDOMEN: No remarkable upper abdominal findings.  BONES: No acute osseous changes.    Hazy opacity at the left lung base suggesting small left-sided pleural effusion.  Left basilar atelectasis and/or airspace disease is not excluded. Signed by Gio Corona MD    CT abdomen pelvis wo IV contrast    Result Date: 9/23/2023  Interpreted By:  ARMEN DESAI DO MRN: [id number] Patient Name: NIRU SIMS  STUDY: CT ABDOMEN AND PELVIS WO CONTRAST;  9/23/2023 11:58 am  INDICATION: New left hip pain after resuming anticoagulation, eval hematoma for change.  COMPARISON: CT abdomen and pelvis 09/17/2023  ACCESSION NUMBER(S): [id number]  ORDERING CLINICIAN: DANY KAT  TECHNIQUE: CT of the abdomen and pelvis was performed without intravenous contrast. Standard contiguous axial images were obtained at 3 mm slice thickness through the abdomen and pelvis. Coronal and sagittal reconstructions at 3 mm slice thickness were performed.  FINDINGS: LOWER CHEST: There is  increased size of small right and moderate left pleural effusions with adjacent atelectasis since 09/17/2023.  ABDOMEN:  LIVER: No focal liver lesions are seen.  BILE DUCTS: The intrahepatic and extrahepatic ducts are not dilated.  GALLBLADDER: The gallbladder contains high density material which could represent sludge or micro calculi. No evidence of acute cholecystitis.  PANCREAS: The pancreas appears unremarkable without evidence of ductal dilatation or masses.  SPLEEN: The spleen is normal in size.  ADRENAL GLANDS: There is a stable 3 cm right adrenal adenoma. The left adrenal is unremarkable.  KIDNEYS AND URETERS: Bilateral multifocal cortical scarring is are again seen. There is a stable 1.8 cm right interpolar cyst. An amorphous 5 mm hyperdense focus in the left upper pole may represent a nonobstructing calculus (series 2, image 53), unchanged. No hydronephrosis is seen.  PELVIS:  BLADDER: The bladder is markedly distended with unchanged nondependent air locules which could be postprocedural.  REPRODUCTIVE ORGANS: Hysterectomy.  BOWEL: There is no bowel wall thickening or dilation. The appendix is not visualized. No pericecal inflammation is seen.  VESSELS: Mild-to-moderate atherosclerotic calcifications are seen within the abdominal aorta without aneurysmal dilation.  PERITONEUM/RETROPERITONEUM/LYMPH NODES: There is interval development of trace nonspecific left paracolic gutter and pelvic ascites without free air or fluid collection. There is no retroperitoneal hematoma.  Multiple upper abdominal and retroperitoneal shotty, likely reactive lymph nodes are again seen. No progressive abdominopelvic lymphadenopathy.  ABDOMINAL WALL AND BONES : There is increased anasarca. Left gluteal fluid collection is not significantly changed, measuring 8.8 x 3.1 x 10 cm (series 2, image 114; image 4, image 65).  No suspicious osseous lesions are identified. There is lumbar scoliosis with moderate-to-severe multilevel  degenerative disc disease.      Stable size left gluteal fluid collection/evolving hematoma compared to 09/17/2023.  No intra abdominopelvic or retroperitoneal hematoma.  Increased body wall anasarca and bilateral pleural effusions.  MACRO: None    Venous Duplex Ultrasound DVT    Result Date: 9/22/2023  [address] [address] Phone [phone] Fax [phone] Vascular Lab Report Upper Venous Duplex Ultrasound Patient Name:     NIRU          [place] Physician:  [id number] Hernandez MD, [facility]                         RPVI Study Date:       9/22/2023     Referring           DANY SHEY Physician: MRN/PID:          [id number]      PCP: Accession/Order#: [id number]     CC Report to: YOB: 1935     Technologist:       Marissa Monreal RVT Gender:           F             Technologist 2: Admission Status: Outpatient    Location Performed: [facility] Diagnosis/ICD: R60.1-Generalized edema (anasarca) Procedure/CPT: 26134 Peripheral venous duplex scan for DVT Limited-02783 CONCLUSIONS: Right Upper Venous: No evidence of acute deep vein thrombus visualized in the right upper extremity. There is acute occlusive superficial thomobosis noted in the right median cubital vein just proximal to the iv site. The right subclavian vein was not visualized due to line placement. Unable to compress the left subclavian vein due to high venous pressure; however, spontaneous flow was noted. Left Upper Venous: The subclavian vein demonstrates a normal spontaneous and phasic flow. Imaging & Doppler Findings: Right            Compressible Thrombus Internal Jugular     Yes        None Axillary             Yes        None Brachial             Yes        None Cephalic             Yes        None Basilic              Yes        None Left       Thrombus        Flow Subclavian   None   Spontaneous/Phasic [id number] Hernandez MD, RPVI Electronically signed by [id number] Hernandez  MD, KEYONNA on 9/22/2023 at 8:26:46 AM     Colonoscopy    Result Date: 9/21/2023  Patient Name: Melody Martin Procedure Date: 9/21/2023 10:02 AM MRN: [id number] Account Number: [id number] YOB: 1935 Admit Type: Inpatient Site: [place] [facility] Ethnicity: Patient Declined Race: White Attending MD: Enrike Huggins DO, [id number] Procedure:             Colonoscopy Indications:           Rectal bleeding Providers:             Enrike Huggins DO (Doctor) Referring:             Medicines:             Monitored Anesthesia Care, See the Anesthesia note for                        documentation of the administered medications Complications:         No immediate complications. Procedure:             Pre-Anesthesia Assessment:                        - Prior to the procedure, a History and Physical was                        performed, and patient medications and allergies were                        reviewed. The patient's tolerance of previous                        anesthesia was also reviewed. The risks and benefits                        of the procedure and the sedation options and risks                        were discussed with the patient. All questions were                        answered, and informed consent was obtained. Prior                        Anticoagulants: The patient has taken Plavix                        (clopidogrel), last dose was day of procedure. ASA                        Grade Assessment: III - A patient with severe systemic                        disease. After reviewing the risks and benefits, the                        patient was deemed in satisfactory condition to                        undergo the procedure.                        After I obtained informed consent, the scope was                        passed under direct vision. Throughout the procedure,                        the patient's blood pressure, pulse, and oxygen                        saturations were monitored  continuously. The pediatric                        colonoscope was introduced through the anus and                        advanced to 2 cm into the ileum. The colonoscopy was                        performed without difficulty. The patient tolerated                        the procedure well. The quality of the bowel                        preparation was evaluated using the BBPS (Loop Bowel                        Preparation Scale) with scores of: Right Colon = 1                        (portion of mucosa seen, but other areas not well seen                        due to staining, residual stool and/or opaque liquid),                        Transverse Colon = 2 (minor amount of residual                        staining, small fragments of stool and/or opaque                        liquid, but mucosa seen well) and Left Colon = 2                        (minor amount of residual staining, small fragments of                        stool and/or opaque liquid, but mucosa seen well). The                        total BBPS score equals 5. The quality of the bowel                        preparation was fair. The quality of the bowel                        preparation was fair. The terminal ileum, ileocecal                        valve, appendiceal orifice, and rectum were                        photographed. Findings:      Hemorrhoids were found on perianal exam.      A moderate amount of semi-solid stool was found in the rectum,      interfering with visualization. Lavage of the area was performed using a      large amount of sterile water, resulting in clearance with fair      visualization.      Multiple small-mouthed diverticula were found in the sigmoid colon and      descending colon.      The terminal ileum appeared normal.      The exam was otherwise without abnormality on direct and retroflexion      views. Impression:            - Preparation of the colon was fair.                        - Hemorrhoids found on  perianal exam.                        - Stool in the rectum.                        - Diverticulosis in the sigmoid colon and in the                        descending colon.                        - The examined portion of the ileum was normal.                        - The examination was otherwise normal on direct and                        retroflexion views.                        - No specimens collected. Recommendation:        - Return patient to hospital [facility] for ongoing care.                        - High fiber diet.                        - Miralax 1 capful (17 grams) in 8 ounces of water PO                        BID.                        - Continue present medications.                        - Repeat colonoscopy is not recommended due to current                        age (75 years or older) for surveillance. Procedure Code(s):     --- Professional ---                        53785, Colonoscopy, flexible; diagnostic, including                        collection of specimen(s) by brushing or washing, when                        performed (separate procedure) Diagnosis Code(s):     --- Professional ---                        K64.9, Unspecified hemorrhoids                        K62.5, Hemorrhage of anus and rectum                        K57.30, Diverticulosis of large intestine without                        perforation or abscess without bleeding CPT copyright 2021 [employer]. All rights reserved. The codes documented in this report are preliminary and upon  review may be revised to meet current compliance requirements. Attending Participation:      I personally performed the entire procedure. DO Enrike Bartholomew DO 9/21/2023 10:53:31 AM This report has been signed electronically. Number of Addenda: 0 Note Initiated On: 9/21/2023 10:02 AM Scope Withdrawal Time 0 hours 8 minutes 14 seconds Total Procedure Duration Time 0 hours 14 minutes 37 seconds Estimated  Blood Loss:      Estimated blood loss: none.    Electrocardiogram 12 Lead    Result Date: 9/20/2023  Sinus rhythm with Premature supraventricular complexes Abnormal QRS-T angle, consider primary T wave abnormality Abnormal ECG When compared with ECG of 28-AUG-2023 17:03, PREVIOUS ECG IS PRESENT Confirmed by Derek Sinha ([id number]) on 9/20/2023 2:38:44 PM    CT abdomen pelvis wo IV contrast    Result Date: 9/17/2023  Interpreted By:  LORENZO GUADARRAMA MD MRN: [id number] Patient Name: NIRU SIMS  STUDY: CT ABDOMEN AND PELVIS WO CONTRAST;  9/17/2023 1:43 pm  INDICATION: abd pain .  COMPARISON: 08/27/2023  ACCESSION NUMBER(S): [id number]  ORDERING CLINICIAN: DANY COBIAN  TECHNIQUE: CT of the abdomen and pelvis was performed.  Standard contiguous axial images were obtained at 3 mm slice thickness through the abdomen and pelvis. Coronal and sagittal reconstructions at 3 mm slice thickness were performed.  FINDINGS: Limited study without IV contrast.  LOWER CHEST: Lung bases demonstrate small to moderate bilateral pleural effusions and associated atelectasis or infiltrate, worse on the left.  Right breast cutaneous thickening, nonspecific in etiology.  ABDOMEN:  LIVER: Liver is normal in size. No focal lesions are seen.  BILE DUCTS: Biliary system is nondilated.  GALLBLADDER: The gallbladder is not seen and may have been surgically removed.  PANCREAS: No focal lesions. No peripancreatic fat stranding.  SPLEEN: The spleen is normal in size. No focal abnormalities are seen.  ADRENAL GLANDS: There is a complex mass within the right adrenal gland similar to prior study with fat components which may represent fat containing adrenal adenoma versus myelolipoma measuring up to 3 cm in size, stable.  KIDNEYS AND URETERS: Lobulated contours of bilateral kidneys with associated focal cortical scarring. There is a low-attenuation lesion involving the interpolar region of the right kidney posteriorly measuring approximately 16 mm in size.  This is suggestive of a cyst but incompletely characterized due to small size and lack of IV contrast.  PELVIS:  BLADDER: The urinary bladder is over distended. There is air within the bladder which may be related to recent bladder instrumentation although infectious process can not be excluded. No bladder calculi or masses are seen within this limited study.  REPRODUCTIVE ORGANS: The uterus is not seen and may have been surgically removed.  BOWEL: The small and large bowel are nondilated.  The appendix is not positively identified, however, no findings to suggest acute appendicitis is seen.  Normal caliber fluid-filled loops of small bowel and colon with air-fluid levels which may represent an ileus type pattern such as related to enteritis. No bowel obstruction or free air.  Few colonic diverticula but no CT evidence for acute diverticulitis. Findings most prominent in the area of sigmoid.  VESSELS: Diffuse wall calcification of the aorta and its main branches. No aneurysm.  PERITONEUM/RETROPERITONEUM/LYMPH NODES: No retroperitoneal masses are seen.  No lymphadenopathy.  BONES AND ABDOMINAL WALL: There is no evidence for destructive lytic or blastic bone lesions identified.  Multilevel discogenic degenerative changes throughout the spine.  Diffuse subcutaneous edema particularly within bilateral flanks, worse on the right. There is fluid collection within left posterior gluteal region which may represent a hematoma although incompletely characterized in this study. The area measures approximately 9 x 5 cm in size.      1.  Small-bowel areas probably related to enteritis. No bowel obstruction or free air. Clinical correlation and follow-up recommended to document resolution. 2. Over distended urinary bladder and associated small amount of bladder air which may be due to bladder instrumentation, however, infectious process can not be excluded. Clinical correlation and with urinalysis recommended. 3. Diffuse  subcutaneous edema concerning for anasarca. Recommend clinical correlation. 4. Left gluteal fluid collection suggestive of hematoma, slightly decreased in size since previous exam. Recommend clinical correlation and follow-up. 5. Right breast cutaneous edema. Correlation with mammographic findings recommended. 6. Bilateral pleural effusions and associated atelectasis or infiltrate worse on the left. Recommend clinical correlation and follow-up to document resolution. 7. Additional findings as detailed.    FL replacement tunneled CVC same site    Result Date: 9/8/2023  Interpreted By:  JACKSON DUKE MD MRN: [id number] Patient Name: NIRU SIMS  STUDY: REPLACE MERLY CVC SAME SITE;  9/6/2023 2:27 pm  INDICATION: dialysis .  COMPARISON: None.  ACCESSION NUMBER(S): [id number]  ORDERING CLINICIAN: ERNESTINE ESPARZA  TECHNIQUE:  CONSENT: The patient/patient's POA/next of kin was informed of the nature of the proposed procedure. The purposes, alternatives, risks, and benefits were explained and discussed. All questions were answered and consent was obtained.  RADIATION EXPOSURE: Fluoroscopy time: 1.1 min. Dose: 7.9 mGy. Dose Area Product (DAP): 1.7  SEDATION: Moderate conscious IV sedation services (supervision of administration, induction, and maintenance) were provided by the physician performing the procedure with intravenous fentanyl 50 mcg 10 minutes. The physician was assisted by an independent trained observer, an interventional radiology nurse, in the continuous monitoring of patient level of consciousness and physiologic status.  MEDICATION/CONTRAST: No additional  TIME OUT: A time out was performed immediately prior to procedure start with the interventional team, correctly identifying the patient name, date of birth, MRN, procedure, anatomy (including marking of site and side), patient position, procedure consent form, relevant laboratory and imaging test results, antibiotic administration, safety precautions, and  procedure-specific equipment needs.  COMPLICATIONS: No immediate adverse events identified.  FINDINGS: Patient presents for conversion of a temporary hemodialysis catheter within the right internal jugular vein to a tunneled hemodialysis catheter. Patient was placed in the supine position on the angiographic table in the right temporary line was prepped and draped in usual sterile fashion. A skin tract along the anterior chest wall was anesthetized and formed. A 19 cm tunneled hemodialysis catheter was then tunneled through the tract to exit the site of the current temporary dialysis catheter. The temporary catheter was removed over a stiff Glidewire which was advanced into the inferior vena cava under fluoroscopic guidance. A new peel-away sheath was advanced over the wire. The new hemodialysis catheter was advanced through the peel-away sheath and the sheath was removed. Final imaging shows the catheter to be positioned in satisfactory position with the distal tip in the upper right atrium. The port was flushed and loaded with heparin. Single suture was used to secure the line. No immediate complications encountered.      Conversion of the patient's temporary hemodialysis catheter to a tunneled hemodialysis catheter as above. Catheter is ready for immediate use.   Performed and dictated at [facility].  MACRO: None       Lab Results  Results for orders placed or performed during the hospital encounter of 10/02/23 (from the past 24 hour(s))   POCT GLUCOSE   Result Value Ref Range    POCT Glucose 227 (H) 74 - 99 mg/dL   POCT GLUCOSE   Result Value Ref Range    POCT Glucose 369 (H) 74 - 99 mg/dL   CBC   Result Value Ref Range    WBC 9.7 4.4 - 11.3 x10*3/uL    nRBC 0.0 0.0 - 0.0 /100 WBCs    RBC 3.01 (L) 4.00 - 5.20 x10*6/uL    Hemoglobin 8.9 (L) 12.0 - 16.0 g/dL    Hematocrit 27.7 (L) 36.0 - 46.0 %    MCV 92 80 - 100 fL    MCH 29.6 26.0 - 34.0 pg    MCHC 32.1 32.0 - 36.0 g/dL    RDW  14.5 11.5 - 14.5 %    Platelets 347 150 - 450 x10*3/uL    MPV 9.0 7.5 - 11.5 fL   Basic Metabolic Panel   Result Value Ref Range    Glucose 163 (H) 74 - 99 mg/dL    Sodium 137 136 - 145 mmol/L    Potassium 3.7 3.5 - 5.3 mmol/L    Chloride 101 98 - 107 mmol/L    Bicarbonate 31 21 - 32 mmol/L    Anion Gap 9 (L) 10 - 20 mmol/L    Urea Nitrogen 28 (H) 6 - 23 mg/dL    Creatinine 2.18 (H) 0.50 - 1.05 mg/dL    eGFR 21 (L) >60 mL/min/1.73m*2    Calcium 7.8 (L) 8.6 - 10.3 mg/dL   Magnesium   Result Value Ref Range    Magnesium 1.86 1.60 - 2.40 mg/dL   Phosphorus   Result Value Ref Range    Phosphorus 3.2 2.5 - 4.9 mg/dL   POCT GLUCOSE   Result Value Ref Range    POCT Glucose 202 (H) 74 - 99 mg/dL        Medications  Scheduled medications:  amiodarone, 200 mg, oral, q24h  atorvastatin, 80 mg, oral, Nightly  bumetanide, 1 mg, oral, BID  cefTRIAXone, 1 g, intravenous, q24h  clopidogrel, 75 mg, oral, Daily  doxycycline, 100 mg, oral, q12h MARIA E  insulin glargine, 12 Units, subcutaneous, Nightly  insulin lispro, 0-15 Units, subcutaneous, Before meals & nightly  isosorbide dinitrate, 10 mg, oral, TID  lidocaine, 1 patch, transdermal, Daily  lisinopril, 40 mg, oral, Daily  metoprolol succinate XL, 50 mg, oral, Daily  nystatin, 5 mL, oral, 4x daily  pantoprazole, 40 mg, oral, Daily before breakfast  polyethylene glycol, 17 g, oral, Daily      Continuous medications:     PRN medications:       Physical Exam  Constitutional:       General: She is not in acute distress.     Appearance: Normal appearance.   HENT:      Head: Normocephalic and atraumatic.      Right Ear: External ear normal.      Left Ear: External ear normal.      Nose: Nose normal.      Mouth/Throat:      Mouth: Mucous membranes are moist.      Pharynx: Oropharynx is clear.   Eyes:      Extraocular Movements: Extraocular movements intact.      Conjunctiva/sclera: Conjunctivae normal.      Pupils: Pupils are equal, round, and reactive to light.   Neck:      Comments: HD  catheter right upper chest  Cardiovascular:      Rate and Rhythm: Normal rate and regular rhythm.      Pulses: Normal pulses.      Heart sounds: Normal heart sounds.      Comments: Edema BUE  Pulmonary:      Effort: Pulmonary effort is normal. No respiratory distress.      Breath sounds: Rales (LLL) present. No wheezing or rhonchi.   Abdominal:      General: Abdomen is flat. Bowel sounds are normal.      Palpations: Abdomen is soft.      Tenderness: There is no abdominal tenderness. There is no right CVA tenderness, left CVA tenderness, guarding or rebound.   Musculoskeletal:         General: No swelling. Normal range of motion.      Cervical back: Normal range of motion and neck supple.   Skin:     General: Skin is warm and dry.      Capillary Refill: Capillary refill takes less than 2 seconds.      Findings: Bruising present. No lesion or rash.      Comments: Stage 3 decubitus ulcer on left heel.   Neurological:      General: No focal deficit present.      Mental Status: She is alert and oriented to person, place, and time. Mental status is at baseline.   Psychiatric:         Mood and Affect: Mood normal.         Behavior: Behavior normal.                  Code Status  Full Code     Assessment/Plan   This patient currently has cardiac telemetry ordered; if you would like to modify or discontinue the telemetry order, click here to go to the orders activity to modify/discontinue the order.    Anemia, unspecified type  Patient with recent GI blood losses had colonoscopy showing just hemorrhoidal bleed last month  Patient now is having black bowel movements  Was transfused 1 unit in the ER- will transfuse 1u with HD 10/4/23  Patient is on treatment for recent cardiac stenting so is actually on Plavix and will continue his history of recent stent  Patient has been on Eliquis for paroxysmal A-fib- will hold  GI consult  Follow H&H  Monitor vitals closely- appears stable, likely does not need acute scope  IV PPI BID  GI  consultation  BUN is relatively normal  EGD 10/5/23 without any source of bleeding    Pneumonia  Patient has infiltrate on chest x-ray  Has minimal sputum production no significant hypoxia  Treat empirically with Rocephin and doxycycline  Supportive treatments  Lactic acidosis has resolved    Diabetes (CMS/[facility])  Patient placed on scale insulin coverage  Will allow DM diet  Add lantus 10u nightly given hyperglycemia  A1c 6.1    Stage 3b chronic kidney disease (CMS/[facility])  Patient currently is actually receiving hemodialysis Monday Wednesday Friday after having acute renal failure after heart catheterization with IVP contrast  Patient's renal function appears to be improving  Patient will have consultation with nephrology service for continued dialysis needs  HD M.W.F    Iron deficiency anemia due to chronic blood loss  As above    Lactic acidosis  Resolved with blood and treatment of PNA        DVT ppx: Avoid pharmacological ppx given concern for UGIB       Bridget Hayes PA-C

## 2023-10-07 LAB
ANION GAP SERPL CALC-SCNC: 9 MMOL/L (ref 10–20)
BACTERIA BLD CULT: NORMAL
BUN SERPL-MCNC: 18 MG/DL (ref 6–23)
CALCIUM SERPL-MCNC: 7.8 MG/DL (ref 8.6–10.3)
CHLORIDE SERPL-SCNC: 103 MMOL/L (ref 98–107)
CO2 SERPL-SCNC: 30 MMOL/L (ref 21–32)
CREAT SERPL-MCNC: 1.75 MG/DL (ref 0.5–1.05)
ERYTHROCYTE [DISTWIDTH] IN BLOOD BY AUTOMATED COUNT: 14.3 % (ref 11.5–14.5)
GFR SERPL CREATININE-BSD FRML MDRD: 28 ML/MIN/1.73M*2
GLUCOSE BLD MANUAL STRIP-MCNC: 199 MG/DL (ref 74–99)
GLUCOSE BLD MANUAL STRIP-MCNC: 213 MG/DL (ref 74–99)
GLUCOSE BLD MANUAL STRIP-MCNC: 251 MG/DL (ref 74–99)
GLUCOSE BLD MANUAL STRIP-MCNC: 275 MG/DL (ref 74–99)
GLUCOSE BLD MANUAL STRIP-MCNC: 311 MG/DL (ref 74–99)
GLUCOSE SERPL-MCNC: 184 MG/DL (ref 74–99)
HCT VFR BLD AUTO: 27.6 % (ref 36–46)
HGB BLD-MCNC: 9.1 G/DL (ref 12–16)
MAGNESIUM SERPL-MCNC: 1.69 MG/DL (ref 1.6–2.4)
MCH RBC QN AUTO: 30.2 PG (ref 26–34)
MCHC RBC AUTO-ENTMCNC: 33 G/DL (ref 32–36)
MCV RBC AUTO: 92 FL (ref 80–100)
NRBC BLD-RTO: 0 /100 WBCS (ref 0–0)
PLATELET # BLD AUTO: 296 X10*3/UL (ref 150–450)
PMV BLD AUTO: 8.9 FL (ref 7.5–11.5)
POTASSIUM SERPL-SCNC: 3.4 MMOL/L (ref 3.5–5.3)
RBC # BLD AUTO: 3.01 X10*6/UL (ref 4–5.2)
SODIUM SERPL-SCNC: 139 MMOL/L (ref 136–145)
WBC # BLD AUTO: 10.9 X10*3/UL (ref 4.4–11.3)

## 2023-10-07 PROCEDURE — 2500000001 HC RX 250 WO HCPCS SELF ADMINISTERED DRUGS (ALT 637 FOR MEDICARE OP): Performed by: INTERNAL MEDICINE

## 2023-10-07 PROCEDURE — 96372 THER/PROPH/DIAG INJ SC/IM: CPT | Performed by: INTERNAL MEDICINE

## 2023-10-07 PROCEDURE — 2500000004 HC RX 250 GENERAL PHARMACY W/ HCPCS (ALT 636 FOR OP/ED): Performed by: PHYSICIAN ASSISTANT

## 2023-10-07 PROCEDURE — 97110 THERAPEUTIC EXERCISES: CPT | Mod: GP,CQ | Performed by: PHYSICAL THERAPY ASSISTANT

## 2023-10-07 PROCEDURE — 2500000004 HC RX 250 GENERAL PHARMACY W/ HCPCS (ALT 636 FOR OP/ED): Performed by: INTERNAL MEDICINE

## 2023-10-07 PROCEDURE — 2500000002 HC RX 250 W HCPCS SELF ADMINISTERED DRUGS (ALT 637 FOR MEDICARE OP, ALT 636 FOR OP/ED): Performed by: INTERNAL MEDICINE

## 2023-10-07 PROCEDURE — 82947 ASSAY GLUCOSE BLOOD QUANT: CPT

## 2023-10-07 PROCEDURE — 2060000001 HC INTERMEDIATE ICU ROOM DAILY

## 2023-10-07 PROCEDURE — 83735 ASSAY OF MAGNESIUM: CPT | Performed by: INTERNAL MEDICINE

## 2023-10-07 PROCEDURE — 85027 COMPLETE CBC AUTOMATED: CPT | Performed by: INTERNAL MEDICINE

## 2023-10-07 PROCEDURE — 36415 COLL VENOUS BLD VENIPUNCTURE: CPT | Performed by: INTERNAL MEDICINE

## 2023-10-07 PROCEDURE — 82435 ASSAY OF BLOOD CHLORIDE: CPT | Performed by: INTERNAL MEDICINE

## 2023-10-07 PROCEDURE — 2500000005 HC RX 250 GENERAL PHARMACY W/O HCPCS: Performed by: INTERNAL MEDICINE

## 2023-10-07 PROCEDURE — 99233 SBSQ HOSP IP/OBS HIGH 50: CPT | Performed by: INTERNAL MEDICINE

## 2023-10-07 RX ORDER — INSULIN GLARGINE 100 [IU]/ML
20 INJECTION, SOLUTION SUBCUTANEOUS NIGHTLY
Status: DISCONTINUED | OUTPATIENT
Start: 2023-10-07 | End: 2023-10-09 | Stop reason: HOSPADM

## 2023-10-07 RX ORDER — SODIUM CHLORIDE 0.9 % (FLUSH) 0.9 %
SYRINGE (ML) INJECTION
Status: COMPLETED
Start: 2023-10-07 | End: 2023-10-07

## 2023-10-07 RX ADMIN — METOPROLOL SUCCINATE 50 MG: 50 TABLET, EXTENDED RELEASE ORAL at 10:05

## 2023-10-07 RX ADMIN — DOXYCYCLINE 100 MG: 100 CAPSULE ORAL at 20:22

## 2023-10-07 RX ADMIN — NYSTATIN 500000 UNITS: 100000 SUSPENSION ORAL at 10:06

## 2023-10-07 RX ADMIN — Medication 21 %: at 12:14

## 2023-10-07 RX ADMIN — INSULIN LISPRO 12 UNITS: 100 INJECTION, SOLUTION INTRAVENOUS; SUBCUTANEOUS at 17:13

## 2023-10-07 RX ADMIN — BUMETANIDE 1 MG: 1 TABLET ORAL at 14:35

## 2023-10-07 RX ADMIN — CEFTRIAXONE SODIUM 1 G: 1 INJECTION, SOLUTION INTRAVENOUS at 14:35

## 2023-10-07 RX ADMIN — AMIODARONE HYDROCHLORIDE 200 MG: 200 TABLET ORAL at 14:35

## 2023-10-07 RX ADMIN — ACETAMINOPHEN 650 MG: 325 TABLET ORAL at 10:04

## 2023-10-07 RX ADMIN — OXYCODONE HYDROCHLORIDE 5 MG: 5 TABLET ORAL at 04:50

## 2023-10-07 RX ADMIN — NYSTATIN 500000 UNITS: 100000 SUSPENSION ORAL at 17:13

## 2023-10-07 RX ADMIN — INSULIN LISPRO 6 UNITS: 100 INJECTION, SOLUTION INTRAVENOUS; SUBCUTANEOUS at 20:23

## 2023-10-07 RX ADMIN — GUAIFENESIN AND DEXTROMETHORPHAN 5 ML: 100; 10 SYRUP ORAL at 17:23

## 2023-10-07 RX ADMIN — NYSTATIN 500000 UNITS: 100000 SUSPENSION ORAL at 20:22

## 2023-10-07 RX ADMIN — CLOPIDOGREL BISULFATE 75 MG: 75 TABLET ORAL at 10:05

## 2023-10-07 RX ADMIN — ISOSORBIDE DINITRATE 10 MG: 10 TABLET ORAL at 10:06

## 2023-10-07 RX ADMIN — ATORVASTATIN CALCIUM 80 MG: 40 TABLET, FILM COATED ORAL at 20:22

## 2023-10-07 RX ADMIN — INSULIN LISPRO 9 UNITS: 100 INJECTION, SOLUTION INTRAVENOUS; SUBCUTANEOUS at 14:36

## 2023-10-07 RX ADMIN — LISINOPRIL 40 MG: 20 TABLET ORAL at 10:05

## 2023-10-07 RX ADMIN — DOXYCYCLINE 100 MG: 100 CAPSULE ORAL at 10:05

## 2023-10-07 RX ADMIN — GUAIFENESIN AND DEXTROMETHORPHAN 5 ML: 100; 10 SYRUP ORAL at 04:50

## 2023-10-07 RX ADMIN — POLYETHYLENE GLYCOL 3350 17 G: 17 POWDER, FOR SOLUTION ORAL at 10:05

## 2023-10-07 RX ADMIN — OXYCODONE HYDROCHLORIDE 5 MG: 5 TABLET ORAL at 10:48

## 2023-10-07 RX ADMIN — INSULIN GLARGINE 20 UNITS: 100 INJECTION, SOLUTION SUBCUTANEOUS at 20:23

## 2023-10-07 RX ADMIN — NYSTATIN 500000 UNITS: 100000 SUSPENSION ORAL at 14:35

## 2023-10-07 RX ADMIN — PANTOPRAZOLE SODIUM 40 MG: 40 TABLET, DELAYED RELEASE ORAL at 10:05

## 2023-10-07 RX ADMIN — LIDOCAINE 1 PATCH: 4 PATCH TOPICAL at 10:06

## 2023-10-07 RX ADMIN — Medication 10 ML: at 10:08

## 2023-10-07 RX ADMIN — ISOSORBIDE DINITRATE 10 MG: 10 TABLET ORAL at 20:22

## 2023-10-07 RX ADMIN — BUMETANIDE 1 MG: 1 TABLET ORAL at 10:05

## 2023-10-07 RX ADMIN — ISOSORBIDE DINITRATE 10 MG: 10 TABLET ORAL at 14:35

## 2023-10-07 ASSESSMENT — COGNITIVE AND FUNCTIONAL STATUS - GENERAL
HELP NEEDED FOR BATHING: TOTAL
MOVING FROM LYING ON BACK TO SITTING ON SIDE OF FLAT BED WITH BEDRAILS: A LOT
PERSONAL GROOMING: A LOT
CLIMB 3 TO 5 STEPS WITH RAILING: TOTAL
MOBILITY SCORE: 8
MOVING TO AND FROM BED TO CHAIR: TOTAL
MOVING TO AND FROM BED TO CHAIR: TOTAL
MOVING FROM LYING ON BACK TO SITTING ON SIDE OF FLAT BED WITH BEDRAILS: A LOT
EATING MEALS: A LITTLE
STANDING UP FROM CHAIR USING ARMS: TOTAL
CLIMB 3 TO 5 STEPS WITH RAILING: TOTAL
STANDING UP FROM CHAIR USING ARMS: TOTAL
CLIMB 3 TO 5 STEPS WITH RAILING: TOTAL
TURNING FROM BACK TO SIDE WHILE IN FLAT BAD: A LOT
DAILY ACTIVITIY SCORE: 9
MOBILITY SCORE: 8
WALKING IN HOSPITAL ROOM: TOTAL
PERSONAL GROOMING: A LOT
MOBILITY SCORE: 6
STANDING UP FROM CHAIR USING ARMS: TOTAL
TURNING FROM BACK TO SIDE WHILE IN FLAT BAD: A LOT
TOILETING: A LOT
WALKING IN HOSPITAL ROOM: TOTAL
MOVING FROM LYING ON BACK TO SITTING ON SIDE OF FLAT BED WITH BEDRAILS: TOTAL
EATING MEALS: A LITTLE
DRESSING REGULAR LOWER BODY CLOTHING: TOTAL
HELP NEEDED FOR BATHING: A LOT
DRESSING REGULAR UPPER BODY CLOTHING: A LOT
DRESSING REGULAR UPPER BODY CLOTHING: TOTAL
MOVING TO AND FROM BED TO CHAIR: TOTAL
DRESSING REGULAR LOWER BODY CLOTHING: A LOT
WALKING IN HOSPITAL ROOM: TOTAL
DAILY ACTIVITIY SCORE: 13
TURNING FROM BACK TO SIDE WHILE IN FLAT BAD: TOTAL
TOILETING: TOTAL

## 2023-10-07 ASSESSMENT — PAIN SCALES - GENERAL
PAINLEVEL_OUTOF10: 3
PAINLEVEL_OUTOF10: 0 - NO PAIN
PAINLEVEL_OUTOF10: 2
PAINLEVEL_OUTOF10: 8
PAINLEVEL_OUTOF10: 0 - NO PAIN
PAINLEVEL_OUTOF10: 8

## 2023-10-07 ASSESSMENT — PAIN - FUNCTIONAL ASSESSMENT
PAIN_FUNCTIONAL_ASSESSMENT: 0-10
PAIN_FUNCTIONAL_ASSESSMENT: 0-10

## 2023-10-07 ASSESSMENT — ENCOUNTER SYMPTOMS
WOUND: 0
FEVER: 0
FACIAL SWELLING: 0
SHORTNESS OF BREATH: 0
DIARRHEA: 0
DIZZINESS: 0
HALLUCINATIONS: 0
BRUISES/BLEEDS EASILY: 0
FREQUENCY: 0
WHEEZING: 0
DYSURIA: 0
SORE THROAT: 0
CHILLS: 0
NUMBNESS: 0
BLOOD IN STOOL: 0
JOINT SWELLING: 0
TROUBLE SWALLOWING: 0
HEADACHES: 0
BACK PAIN: 0
EYE PAIN: 0
ABDOMINAL PAIN: 0
CHEST TIGHTNESS: 0
FLANK PAIN: 0
CONSTIPATION: 0
VOMITING: 0
LIGHT-HEADEDNESS: 0
PALPITATIONS: 0
WEAKNESS: 0
COUGH: 1
APPETITE CHANGE: 0
HEMATURIA: 0
DIAPHORESIS: 0
NAUSEA: 0
FATIGUE: 1

## 2023-10-07 NOTE — PROGRESS NOTES
Melody Martin is a 88 y.o. female on day 4 of admission presenting with Anemia, unspecified type.      Subjective   Melody Martin is a 88 y.o. female with a past medical history that is significant for coronary artery disease with recent stenting, recent lower GI bleed, diabetes mellitus type 2 insulin requiring, hypertension, stroke with residual left-sided weakness, hypertension, accessible A-fib on anticoagulation, aortic stenosis, end-stage renal disease on dialysis Monday Wednesday Friday presented 10/2/23 with increasing amount of shortness of breath cough black tarry stool.   Transfused 2u PRBC in total. Continued HD M/W/F. Rocephin/doxy for LLL Pneumonia. EGD revealed a small hiatal hernia, but was otherwise normal with no evidence of bleeding or source of blood loss.  10/06/23: No acute events overnight. Vitals stable, BP slightly elevated 155/73 (175/75)- add PRN hydralazine, nephro taking off fluid during HD today. Cr rising, due for HD today. Hgb improving at 8.9. Glucose remains elevated, recent A1c 6.1, will adjust insulin dose.   10/07: Patient was seen and examined, continues to have cough but no production of sputum.  Complains of shortness of breath.  No fever or chills, no nausea or vomiting.         Review of Systems   Constitutional:  Positive for fatigue. Negative for appetite change, chills, diaphoresis and fever.        Generalized weakness   HENT:  Negative for congestion, ear pain, facial swelling, hearing loss, nosebleeds, sore throat, tinnitus and trouble swallowing.    Eyes:  Negative for pain.   Respiratory:  Positive for cough. Negative for chest tightness, shortness of breath and wheezing.    Cardiovascular:  Negative for chest pain, palpitations and leg swelling.   Gastrointestinal:  Negative for abdominal pain, blood in stool, constipation, diarrhea, nausea and vomiting.   Genitourinary:  Negative for dysuria, flank pain, frequency, hematuria and urgency.   Musculoskeletal:   Negative for back pain and joint swelling.        Edema BUE   Skin:  Negative for rash and wound.   Neurological:  Negative for dizziness, syncope, weakness, light-headedness, numbness and headaches.   Hematological:  Does not bruise/bleed easily.   Psychiatric/Behavioral:  Negative for behavioral problems, hallucinations and suicidal ideas.           Objective     Last Recorded Vitals  /56   Pulse 67   Temp 36.9 °C (98.5 °F) (Temporal)   Resp 18   Wt 85.9 kg (189 lb 6 oz)   SpO2 91%     Image Results  XR chest 1 view    Result Date: 10/2/2023  STUDY: Chest Radiograph;  10/2/2023 11:12 PM INDICATION: Shortness of breath. COMPARISON: 8/30/2023 XR Chest one view ACCESSION NUMBER(S): [id number] ORDERING CLINICIAN: CHASE BUTLER TECHNIQUE:  Frontal chest was obtained at 23:12 hours. FINDINGS: CARDIOMEDIASTINAL SILHOUETTE: Cardiomediastinal silhouette is stable in size and configuration. Right IJ central line is unchanged.  LUNGS: Hazy opacity at the left lung base suggests small left-sided pleural effusion.  Left basilar atelectasis and/or airspace disease is not excluded.  Right lung is grossly clear.  No pneumothorax.  ABDOMEN: No remarkable upper abdominal findings.  BONES: No acute osseous changes.    Hazy opacity at the left lung base suggesting small left-sided pleural effusion.  Left basilar atelectasis and/or airspace disease is not excluded. Signed by Gio Corona MD    CT abdomen pelvis wo IV contrast    Result Date: 9/23/2023  Interpreted By:  ARMEN DESAI DO MRN: [id number] Patient Name: NIRU SIMS  STUDY: CT ABDOMEN AND PELVIS WO CONTRAST;  9/23/2023 11:58 am  INDICATION: New left hip pain after resuming anticoagulation, eval hematoma for change.  COMPARISON: CT abdomen and pelvis 09/17/2023  ACCESSION NUMBER(S): [id number]  ORDERING CLINICIAN: DANY KAT  TECHNIQUE: CT of the abdomen and pelvis was performed without intravenous contrast. Standard contiguous axial images were obtained at 3  mm slice thickness through the abdomen and pelvis. Coronal and sagittal reconstructions at 3 mm slice thickness were performed.  FINDINGS: LOWER CHEST: There is increased size of small right and moderate left pleural effusions with adjacent atelectasis since 09/17/2023.  ABDOMEN:  LIVER: No focal liver lesions are seen.  BILE DUCTS: The intrahepatic and extrahepatic ducts are not dilated.  GALLBLADDER: The gallbladder contains high density material which could represent sludge or micro calculi. No evidence of acute cholecystitis.  PANCREAS: The pancreas appears unremarkable without evidence of ductal dilatation or masses.  SPLEEN: The spleen is normal in size.  ADRENAL GLANDS: There is a stable 3 cm right adrenal adenoma. The left adrenal is unremarkable.  KIDNEYS AND URETERS: Bilateral multifocal cortical scarring is are again seen. There is a stable 1.8 cm right interpolar cyst. An amorphous 5 mm hyperdense focus in the left upper pole may represent a nonobstructing calculus (series 2, image 53), unchanged. No hydronephrosis is seen.  PELVIS:  BLADDER: The bladder is markedly distended with unchanged nondependent air locules which could be postprocedural.  REPRODUCTIVE ORGANS: Hysterectomy.  BOWEL: There is no bowel wall thickening or dilation. The appendix is not visualized. No pericecal inflammation is seen.  VESSELS: Mild-to-moderate atherosclerotic calcifications are seen within the abdominal aorta without aneurysmal dilation.  PERITONEUM/RETROPERITONEUM/LYMPH NODES: There is interval development of trace nonspecific left paracolic gutter and pelvic ascites without free air or fluid collection. There is no retroperitoneal hematoma.  Multiple upper abdominal and retroperitoneal shotty, likely reactive lymph nodes are again seen. No progressive abdominopelvic lymphadenopathy.  ABDOMINAL WALL AND BONES : There is increased anasarca. Left gluteal fluid collection is not significantly changed, measuring 8.8 x 3.1 x  10 cm (series 2, image 114; image 4, image 65).  No suspicious osseous lesions are identified. There is lumbar scoliosis with moderate-to-severe multilevel degenerative disc disease.      Stable size left gluteal fluid collection/evolving hematoma compared to 09/17/2023.  No intra abdominopelvic or retroperitoneal hematoma.  Increased body wall anasarca and bilateral pleural effusions.  MACRO: None    Venous Duplex Ultrasound DVT    Result Date: 9/22/2023  [place] [address] Phone [phone] Fax [phone] Vascular Lab Report Upper Venous Duplex Ultrasound Patient Name:     NIRU Jessica Physician:  [id number] Aayush Hernandez MD, [facility]                         RPVI Study Date:       9/22/2023     Referring           DANY KAT Physician: MRN/PID:          [id number]      PCP: Accession/Order#: [id number]     CC Report to: YOB: 1935     Technologist:       Marissa Monreal RVLAMAR Gender:           F             Technologist 2: Admission Status: Outpatient    Location Performed: [facility] Diagnosis/ICD: R60.1-Generalized edema (anasarca) Procedure/CPT: 52044 Peripheral venous duplex scan for DVT Limited-53496 CONCLUSIONS: Right Upper Venous: No evidence of acute deep vein thrombus visualized in the right upper extremity. There is acute occlusive superficial thomobosis noted in the right median cubital vein just proximal to the iv site. The right subclavian vein was not visualized due to line placement. Unable to compress the left subclavian vein due to high venous pressure; however, spontaneous flow was noted. Left Upper Venous: The subclavian vein demonstrates a normal spontaneous and phasic flow. Imaging & Doppler Findings: Right            Compressible Thrombus Internal Jugular     Yes        None Axillary             Yes        None Brachial             Yes        None Cephalic             Yes        None Basilic              Yes         None Left       Thrombus        Flow Subclavian   None   Spontaneous/Phasic [id number] Aayush Hernandez MD, RPKVNG Electronically signed by [id number] Aayush Hernandez MD, RPKVNG on 9/22/2023 at 8:26:46 AM     Colonoscopy    Result Date: 9/21/2023  Patient Name: Melody Martin Procedure Date: 9/21/2023 10:02 AM MRN: [id number] Account Number: [id number] YOB: 1935 Admit Type: Inpatient Site: [facility] Ethnicity: Patient Declined Race: White Attending MD: Enrike Huggins DO, [id number] Procedure:             Colonoscopy Indications:           Rectal bleeding Providers:             Enrike Huggins DO (Doctor) Referring:             Medicines:             Monitored Anesthesia Care, See the Anesthesia note for                        documentation of the administered medications Complications:         No immediate complications. Procedure:             Pre-Anesthesia Assessment:                        - Prior to the procedure, a History and Physical was                        performed, and patient medications and allergies were                        reviewed. The patient's tolerance of previous                        anesthesia was also reviewed. The risks and benefits                        of the procedure and the sedation options and risks                        were discussed with the patient. All questions were                        answered, and informed consent was obtained. Prior                        Anticoagulants: The patient has taken Plavix                        (clopidogrel), last dose was day of procedure. ASA                        Grade Assessment: III - A patient with severe systemic                        disease. After reviewing the risks and benefits, the                        patient was deemed in satisfactory condition to                        undergo the procedure.                        After I obtained informed consent, the scope was                        passed under direct  vision. Throughout the procedure,                        the patient's blood pressure, pulse, and oxygen                        saturations were monitored continuously. The pediatric                        colonoscope was introduced through the anus and                        advanced to 2 cm into the ileum. The colonoscopy was                        performed without difficulty. The patient tolerated                        the procedure well. The quality of the bowel                        preparation was evaluated using the BBPS ([place] Bowel                        Preparation Scale) with scores of: Right Colon = 1                        (portion of mucosa seen, but other areas not well seen                        due to staining, residual stool and/or opaque liquid),                        Transverse Colon = 2 (minor amount of residual                        staining, small fragments of stool and/or opaque                        liquid, but mucosa seen well) and Left Colon = 2                        (minor amount of residual staining, small fragments of                        stool and/or opaque liquid, but mucosa seen well). The                        total BBPS score equals 5. The quality of the bowel                        preparation was fair. The quality of the bowel                        preparation was fair. The terminal ileum, ileocecal                        valve, appendiceal orifice, and rectum were                        photographed. Findings:      Hemorrhoids were found on perianal exam.      A moderate amount of semi-solid stool was found in the rectum,      interfering with visualization. Lavage of the area was performed using a      large amount of sterile water, resulting in clearance with fair      visualization.      Multiple small-mouthed diverticula were found in the sigmoid colon and      descending colon.      The terminal ileum appeared normal.      The exam was otherwise without  abnormality on direct and retroflexion      views. Impression:            - Preparation of the colon was fair.                        - Hemorrhoids found on perianal exam.                        - Stool in the rectum.                        - Diverticulosis in the sigmoid colon and in the                        descending colon.                        - The examined portion of the ileum was normal.                        - The examination was otherwise normal on direct and                        retroflexion views.                        - No specimens collected. Recommendation:        - Return patient to hospital [facility] for ongoing care.                        - High fiber diet.                        - Miralax 1 capful (17 grams) in 8 ounces of water PO                        BID.                        - Continue present medications.                        - Repeat colonoscopy is not recommended due to current                        age (75 years or older) for surveillance. Procedure Code(s):     --- Professional ---                        06271, Colonoscopy, flexible; diagnostic, including                        collection of specimen(s) by brushing or washing, when                        performed (separate procedure) Diagnosis Code(s):     --- Professional ---                        K64.9, Unspecified hemorrhoids                        K62.5, Hemorrhage of anus and rectum                        K57.30, Diverticulosis of large intestine without                        perforation or abscess without bleeding CPT copyright 2021 American [employer] Association. All rights reserved. The codes documented in this report are preliminary and upon  review may be revised to meet current compliance requirements. Attending Participation:      I personally performed the entire procedure. DO Enrike Bartholomew DO 9/21/2023 10:53:31 AM This report has been signed electronically. Number of Addenda: 0 Note  Initiated On: 9/21/2023 10:02 AM Scope Withdrawal Time 0 hours 8 minutes 14 seconds Total Procedure Duration Time 0 hours 14 minutes 37 seconds Estimated Blood Loss:      Estimated blood loss: none.    Electrocardiogram 12 Lead    Result Date: 9/20/2023  Sinus rhythm with Premature supraventricular complexes Abnormal QRS-T angle, consider primary T wave abnormality Abnormal ECG When compared with ECG of 28-AUG-2023 17:03, PREVIOUS ECG IS PRESENT Confirmed by Derek Sinha ([id number]) on 9/20/2023 2:38:44 PM    CT abdomen pelvis wo IV contrast    Result Date: 9/17/2023  Interpreted By:  LORENZO GUADARRAMA MD MRN: [id number] Patient Name: NIRU SIMS  STUDY: CT ABDOMEN AND PELVIS WO CONTRAST;  9/17/2023 1:43 pm  INDICATION: abd pain .  COMPARISON: 08/27/2023  ACCESSION NUMBER(S): [id number]  ORDERING CLINICIAN: DANY COBIAN  TECHNIQUE: CT of the abdomen and pelvis was performed.  Standard contiguous axial images were obtained at 3 mm slice thickness through the abdomen and pelvis. Coronal and sagittal reconstructions at 3 mm slice thickness were performed.  FINDINGS: Limited study without IV contrast.  LOWER CHEST: Lung bases demonstrate small to moderate bilateral pleural effusions and associated atelectasis or infiltrate, worse on the left.  Right breast cutaneous thickening, nonspecific in etiology.  ABDOMEN:  LIVER: Liver is normal in size. No focal lesions are seen.  BILE DUCTS: Biliary system is nondilated.  GALLBLADDER: The gallbladder is not seen and may have been surgically removed.  PANCREAS: No focal lesions. No peripancreatic fat stranding.  SPLEEN: The spleen is normal in size. No focal abnormalities are seen.  ADRENAL GLANDS: There is a complex mass within the right adrenal gland similar to prior study with fat components which may represent fat containing adrenal adenoma versus myelolipoma measuring up to 3 cm in size, stable.  KIDNEYS AND URETERS: Lobulated contours of bilateral kidneys with associated focal  cortical scarring. There is a low-attenuation lesion involving the interpolar region of the right kidney posteriorly measuring approximately 16 mm in size. This is suggestive of a cyst but incompletely characterized due to small size and lack of IV contrast.  PELVIS:  BLADDER: The urinary bladder is over distended. There is air within the bladder which may be related to recent bladder instrumentation although infectious process can not be excluded. No bladder calculi or masses are seen within this limited study.  REPRODUCTIVE ORGANS: The uterus is not seen and may have been surgically removed.  BOWEL: The small and large bowel are nondilated.  The appendix is not positively identified, however, no findings to suggest acute appendicitis is seen.  Normal caliber fluid-filled loops of small bowel and colon with air-fluid levels which may represent an ileus type pattern such as related to enteritis. No bowel obstruction or free air.  Few colonic diverticula but no CT evidence for acute diverticulitis. Findings most prominent in the area of sigmoid.  VESSELS: Diffuse wall calcification of the aorta and its main branches. No aneurysm.  PERITONEUM/RETROPERITONEUM/LYMPH NODES: No retroperitoneal masses are seen.  No lymphadenopathy.  BONES AND ABDOMINAL WALL: There is no evidence for destructive lytic or blastic bone lesions identified.  Multilevel discogenic degenerative changes throughout the spine.  Diffuse subcutaneous edema particularly within bilateral flanks, worse on the right. There is fluid collection within left posterior gluteal region which may represent a hematoma although incompletely characterized in this study. The area measures approximately 9 x 5 cm in size.      1.  Small-bowel areas probably related to enteritis. No bowel obstruction or free air. Clinical correlation and follow-up recommended to document resolution. 2. Over distended urinary bladder and associated small amount of bladder air which may  be due to bladder instrumentation, however, infectious process can not be excluded. Clinical correlation and with urinalysis recommended. 3. Diffuse subcutaneous edema concerning for anasarca. Recommend clinical correlation. 4. Left gluteal fluid collection suggestive of hematoma, slightly decreased in size since previous exam. Recommend clinical correlation and follow-up. 5. Right breast cutaneous edema. Correlation with mammographic findings recommended. 6. Bilateral pleural effusions and associated atelectasis or infiltrate worse on the left. Recommend clinical correlation and follow-up to document resolution. 7. Additional findings as detailed.    FL replacement tunneled CVC same site    Result Date: 9/8/2023  Interpreted By:  JACKSON DUKE MD MRN: [id number] Patient Name: NIRU SIMS  STUDY: REPLACE MERLY CVC SAME SITE;  9/6/2023 2:27 pm  INDICATION: dialysis .  COMPARISON: None.  ACCESSION NUMBER(S): [id number]  ORDERING CLINICIAN: ERNESTINE ESPARZA  TECHNIQUE:  CONSENT: The patient/patient's POA/next of kin was informed of the nature of the proposed procedure. The purposes, alternatives, risks, and benefits were explained and discussed. All questions were answered and consent was obtained.  RADIATION EXPOSURE: Fluoroscopy time: 1.1 min. Dose: 7.9 mGy. Dose Area Product (DAP): 1.7  SEDATION: Moderate conscious IV sedation services (supervision of administration, induction, and maintenance) were provided by the physician performing the procedure with intravenous fentanyl 50 mcg 10 minutes. The physician was assisted by an independent trained observer, an interventional radiology nurse, in the continuous monitoring of patient level of consciousness and physiologic status.  MEDICATION/CONTRAST: No additional  TIME OUT: A time out was performed immediately prior to procedure start with the interventional team, correctly identifying the patient name, date of birth, MRN, procedure, anatomy (including marking of site  and side), patient position, procedure consent form, relevant laboratory and imaging test results, antibiotic administration, safety precautions, and procedure-specific equipment needs.  COMPLICATIONS: No immediate adverse events identified.  FINDINGS: Patient presents for conversion of a temporary hemodialysis catheter within the right internal jugular vein to a tunneled hemodialysis catheter. Patient was placed in the supine position on the angiographic table in the right temporary line was prepped and draped in usual sterile fashion. A skin tract along the anterior chest wall was anesthetized and formed. A 19 cm tunneled hemodialysis catheter was then tunneled through the tract to exit the site of the current temporary dialysis catheter. The temporary catheter was removed over a stiff Glidewire which was advanced into the inferior vena cava under fluoroscopic guidance. A new peel-away sheath was advanced over the wire. The new hemodialysis catheter was advanced through the peel-away sheath and the sheath was removed. Final imaging shows the catheter to be positioned in satisfactory position with the distal tip in the upper right atrium. The port was flushed and loaded with heparin. Single suture was used to secure the line. No immediate complications encountered.      Conversion of the patient's temporary hemodialysis catheter to a tunneled hemodialysis catheter as above. Catheter is ready for immediate use.   Performed and dictated at [facility].  MACRO: None       Lab Results  Results for orders placed or performed during the hospital encounter of 10/02/23 (from the past 24 hour(s))   POCT GLUCOSE   Result Value Ref Range    POCT Glucose 295 (H) 74 - 99 mg/dL   POCT GLUCOSE   Result Value Ref Range    POCT Glucose 300 (H) 74 - 99 mg/dL   CBC   Result Value Ref Range    WBC 10.9 4.4 - 11.3 x10*3/uL    nRBC 0.0 0.0 - 0.0 /100 WBCs    RBC 3.01 (L) 4.00 - 5.20 x10*6/uL     Hemoglobin 9.1 (L) 12.0 - 16.0 g/dL    Hematocrit 27.6 (L) 36.0 - 46.0 %    MCV 92 80 - 100 fL    MCH 30.2 26.0 - 34.0 pg    MCHC 33.0 32.0 - 36.0 g/dL    RDW 14.3 11.5 - 14.5 %    Platelets 296 150 - 450 x10*3/uL    MPV 8.9 7.5 - 11.5 fL   Basic Metabolic Panel   Result Value Ref Range    Glucose 184 (H) 74 - 99 mg/dL    Sodium 139 136 - 145 mmol/L    Potassium 3.4 (L) 3.5 - 5.3 mmol/L    Chloride 103 98 - 107 mmol/L    Bicarbonate 30 21 - 32 mmol/L    Anion Gap 9 (L) 10 - 20 mmol/L    Urea Nitrogen 18 6 - 23 mg/dL    Creatinine 1.75 (H) 0.50 - 1.05 mg/dL    eGFR 28 (L) >60 mL/min/1.73m*2    Calcium 7.8 (L) 8.6 - 10.3 mg/dL   Magnesium   Result Value Ref Range    Magnesium 1.69 1.60 - 2.40 mg/dL   POCT GLUCOSE   Result Value Ref Range    POCT Glucose 199 (H) 74 - 99 mg/dL        Medications  Scheduled medications:  amiodarone, 200 mg, oral, q24h  atorvastatin, 80 mg, oral, Nightly  bumetanide, 1 mg, oral, BID  cefTRIAXone, 1 g, intravenous, q24h  clopidogrel, 75 mg, oral, Daily  doxycycline, 100 mg, oral, q12h MARIA E  insulin glargine, 12 Units, subcutaneous, Nightly  insulin lispro, 0-15 Units, subcutaneous, Before meals & nightly  isosorbide dinitrate, 10 mg, oral, TID  lidocaine, 1 patch, transdermal, Daily  lisinopril, 40 mg, oral, Daily  metoprolol succinate XL, 50 mg, oral, Daily  nystatin, 5 mL, oral, 4x daily  pantoprazole, 40 mg, oral, Daily before breakfast  polyethylene glycol, 17 g, oral, Daily      Continuous medications:     PRN medications:       Physical Exam  Constitutional:       General: She is not in acute distress.     Appearance: Normal appearance.   HENT:      Head: Normocephalic and atraumatic.      Right Ear: External ear normal.      Left Ear: External ear normal.      Nose: Nose normal.      Mouth/Throat:      Mouth: Mucous membranes are moist.      Pharynx: Oropharynx is clear.   Eyes:      Extraocular Movements: Extraocular movements intact.      Conjunctiva/sclera: Conjunctivae normal.       Pupils: Pupils are equal, round, and reactive to light.   Neck:      Comments: HD catheter right upper chest  Cardiovascular:      Rate and Rhythm: Normal rate and regular rhythm.      Pulses: Normal pulses.      Heart sounds: Normal heart sounds.      Comments: Edema BUE  Pulmonary:      Effort: Pulmonary effort is normal. No respiratory distress.      Breath sounds: Rales (LLL) present. No wheezing or rhonchi.   Abdominal:      General: Abdomen is flat. Bowel sounds are normal.      Palpations: Abdomen is soft.      Tenderness: There is no abdominal tenderness. There is no right CVA tenderness, left CVA tenderness, guarding or rebound.   Musculoskeletal:         General: No swelling. Normal range of motion.      Cervical back: Normal range of motion and neck supple.   Skin:     General: Skin is warm and dry.      Capillary Refill: Capillary refill takes less than 2 seconds.      Findings: Bruising present. No lesion or rash.      Comments: Stage 3 decubitus ulcer on left heel.   Neurological:      General: No focal deficit present.      Mental Status: She is alert and oriented to person, place, and time. Mental status is at baseline.   Psychiatric:         Mood and Affect: Mood normal.         Behavior: Behavior normal.                  Code Status  Full Code     Assessment/Plan   This patient currently has cardiac telemetry ordered; if you would like to modify or discontinue the telemetry order, click here to go to the orders activity to modify/discontinue the order.    Anemia, unspecified type  Patient with recent GI blood losses had colonoscopy showing just hemorrhoidal bleed last month  Patient presented with black stool  Status post PRBC transfusion in ER  Patient is on treatment for recent cardiac stenting so is actually on Plavix and will continue his history of recent stent  Patient has been on Eliquis for paroxysmal A-fib- will hold  GI consult-underwent EGD which was normal and did not show any active  bleeding  Monitor H&H-has remained fairly stable  Discharge planning back to ECF once patient condition improved and H&H remained stable    Pneumonia:  Patient has infiltrate on chest x-ray  Has minimal sputum production no significant hypoxia  Treat empirically with Rocephin and doxycycline  Supportive treatments  Lactic acidosis has resolved    Diabetes (CMS/[facility])  Patient placed on scale insulin coverage  Will allow DM diet  Add lantus 10u nightly given hyperglycemia  A1c 6.1    Stage 3b chronic kidney disease (CMS/[facility])-has been dialysis dependent  Patient currently is actually receiving hemodialysis Monday Wednesday Friday after having acute renal failure after heart catheterization with IVP contrast  Patient's renal function appears to be improving  Nephrology on board  HD M.W.F    Iron deficiency anemia due to chronic blood loss  As above    Lactic acidosis  Resolved with blood and treatment of PNA        DVT ppx: Avoid pharmacological ppx given concern for UGIB       Keegan Saldana MD

## 2023-10-07 NOTE — PROGRESS NOTES
Physical Therapy    Physical Therapy Treatment    Patient Name: Melody Martin  MRN: [id number]  Today's Date: 10/7/2023  Time Calculation  Start Time: 1530  Stop Time: 1545  Time Calculation (min): 15 min       Assessment/Plan   PT Assessment  PT Assessment Results: Decreased strength, Decreased endurance, Impaired balance, Decreased mobility, Decreased coordination, Impaired hearing, Pain  Rehab Prognosis: Good  Evaluation/Treatment Tolerance: Patient limited by fatigue  Medical Staff Made Aware: Yes  Strengths: Support of extended family/friends  End of Session Communication: PCT/NA/CTA  Assessment Comment: pt was fatigued this date and only agreeable to ther-ex. pt required AAROM on LLE with all exercises. cues were provided for good technique and to assist as able.  End of Session Patient Position:  (bed in chair position with alarm on)     PT Plan  Treatment/Interventions: Bed mobility, Balance training, Strengthening  PT Plan: Skilled PT, OT consult  PT Frequency: 5 times per week  PT Discharge Recommendations: Moderate intensity level of continued care  Equipment Recommended upon Discharge:  (TBD)  PT Recommended Transfer Status: Assist x2 (BED MOBILTIY, BED INCHAIR POSITION 3X/DAY)      General Visit Information:   PT  Visit  PT Received On: 10/07/23  General  Reason for Referral: IMPAIRED MOBITILY, GAIT TRAINING  Referred By: RICH  Past Medical History Relevant to Rehab: COUGH, TARRY STOOLS; HX: ESRD/HD, AFIB, DM, HTN, BACK SURG, GIB, CVA- RECENT, MI, FALL  Prior to Session Communication: PCT/NA/CTA  Patient Position Received: Bed, 3 rail up, Alarm off, not on at start of session  General Comment: pt agreeable to ther-ex. Reported she had not slept so declined to sit EOB.    Subjective pt agreeable to there-ex but to tired to perform bed mobility. Reported she has not been sleeping.   Precautions:  Precautions  Precautions Comment: O2 NC AND FALL RISK         Pain:  Pain Assessment  Pain Assessment:  0-10  Pain Score: 2  Pain Type: Acute pain  Pain Location:  (LEs but felt better during ther-ex. ot reporting the pain feels good because it is like her legs are stiff.)       Treatments:  Therapeutic Exercise  Therapeutic Exercise Performed: Yes  Therapeutic Exercise Activity 1: ankle pumps x15 (AROM on R AAROM on L)  Therapeutic Exercise Activity 2: quad sets x10 (AROM)  Therapeutic Exercise Activity 3: glut setsx10 (AROM)  Therapeutic Exercise Activity 4: hip ab/adduction x10 each (AROM on RLE AAROM on LLE)  Therapeutic Exercise Activity 5: ankle circles x10 clockwise and counter clockwise. (LLE AAROM and RLE AROM)  Therapeutic Exercise Activity 6: SAQx1 (AROM RLE and AAROM LLE)  Therapeutic Exercise Activity 7: heel slides x10 (AROM RLE and AAROM LLE)    Outcome Measures:  [facility] Basic Mobility  Turning from your back to your side while in a flat bed without using bedrails: A lot  Moving from lying on your back to sitting on the side of a flat bed without using bedrails: A lot  Moving to and from bed to chair (including a wheelchair): Total  Standing up from a chair using your arms (e.g. wheelchair or bedside chair): Total  To walk in hospital room: Total  Climbing 3-5 steps with railing: Total  Basic Mobility - Total Score: 8    Education Documentation  Mobility Training, taught by Angella Howell PTA at 10/7/2023  4:08 PM.  Learner: Patient  Readiness: Acceptance  Method: Explanation  Response: Verbalizes Understanding    Mobility Training, taught by Angella Howell PTA at 10/7/2023  4:02 PM.  Learner: Patient  Readiness: Acceptance  Method: Explanation  Response: Verbalizes Understanding    Mobility Training, taught by Angella Howell PTA at 10/7/2023  3:57 PM.  Learner: Patient  Readiness: Acceptance  Method: Explanation  Response: Verbalizes Understanding    Education Comments  No comments found.             Encounter Problems       Encounter Problems (Active)       Balance       SIT BALANCE (Not Progressing)        Start:  10/04/23    Expected End:  10/18/23       SIT EOB FOR 12 MIN WITH CGA WHILE COMPLETING EX AND FUNCTIONAL ACTIVITIES TO IMPROVE ENDURANCE, BALANCE AND STRENGTH FOR PROGRESSION OF ACTIVITY LEVEL            Mobility       Goal 1 (Not Progressing)       Start:  10/04/23    Expected End:  10/25/23       20+ REPS AAROM/AROM EX INCREASING STRENGTH  FOR PROGRESS OF ACTIVITY LEVEL TOWARDS OOB            Transfers       STG - Patient will roll (Not Progressing)       Start:  10/04/23    Expected End:  10/11/23       MOD X2 A         SUPINE<>SIT (Not Progressing)       Start:  10/04/23    Expected End:  10/18/23       SUPINE<>SIT WITH MOD X1-2 A

## 2023-10-07 NOTE — PROGRESS NOTES
"      Nephrology Progress Note      Nephrology following for ESRD.   Events over night: none  Patient has acceptance to return to [facility]       /56   Pulse 67   Temp 36.9 °C (98.5 °F) (Temporal)   Resp 18   Ht 1.626 m (5' 4.02\")   Wt 85.9 kg (189 lb 6 oz)   SpO2 91%   BMI 32.49 kg/m²     Input / Output:  24 HR:   Intake/Output Summary (Last 24 hours) at 10/7/2023 0812  Last data filed at 10/6/2023 1500  Gross per 24 hour   Intake 1360 ml   Output 2002 ml   Net -642 ml         Physical Exam  Vitals and nursing note reviewed.   Constitutional:       General: She is not in acute distress.     Appearance: She is not ill-appearing or toxic-appearing.   Cardiovascular:      Rate and Rhythm: Normal rate and regular rhythm.      Heart sounds: No murmur heard.     No friction rub. No gallop.   Pulmonary:      Effort: Pulmonary effort is normal.      Breath sounds: No wheezing, rhonchi or rales.   Abdominal:      General: There is no distension.      Palpations: Abdomen is soft.      Tenderness: There is no abdominal tenderness.   Musculoskeletal:      Cervical back: Neck supple.      Right lower leg: Edema present.      Left lower leg: Edema present.   Neurological:      Mental Status: She is alert.          Scheduled medications  amiodarone, 200 mg, oral, q24h  atorvastatin, 80 mg, oral, Nightly  bumetanide, 1 mg, oral, BID  cefTRIAXone, 1 g, intravenous, q24h  clopidogrel, 75 mg, oral, Daily  doxycycline, 100 mg, oral, q12h MARIA E  insulin glargine, 12 Units, subcutaneous, Nightly  insulin lispro, 0-15 Units, subcutaneous, Before meals & nightly  isosorbide dinitrate, 10 mg, oral, TID  lidocaine, 1 patch, transdermal, Daily  lisinopril, 40 mg, oral, Daily  metoprolol succinate XL, 50 mg, oral, Daily  nystatin, 5 mL, oral, 4x daily  pantoprazole, 40 mg, oral, Daily before breakfast  polyethylene glycol, 17 g, oral, Daily      Continuous medications     PRN medications  PRN medications: acetaminophen **OR** " acetaminophen **OR** acetaminophen, acetaminophen **OR** acetaminophen **OR** acetaminophen, acetaminophen, benzocaine-menthol, dextromethorphan-guaifenesin, dextrose 10 % in water (D10W), dextrose, glucagon, heparin, heparin, hydrALAZINE, oxyCODONE, oxygen, simethicone   Results from last 7 days   Lab Units 10/07/23  0450 10/03/23  0513 10/02/23  2220   SODIUM mmol/L 139   < > 133*   POTASSIUM mmol/L 3.4*   < > 3.9   CHLORIDE mmol/L 103   < > 96*   CO2 mmol/L 30   < > 30   BUN mg/dL 18   < > 19   CREATININE mg/dL 1.75*   < > 1.57*   CALCIUM mg/dL 7.8*   < > 7.4*   PROTEIN TOTAL g/dL  --   --  5.4*   BILIRUBIN TOTAL mg/dL  --   --  0.4   ALK PHOS U/L  --   --  99   ALT U/L  --   --  16   AST U/L  --   --  26   GLUCOSE mg/dL 184*   < > 199*    < > = values in this interval not displayed.        Results from last 7 days   Lab Units 10/07/23  0450 10/06/23  0502 10/05/23  0500   MAGNESIUM mg/dL 1.69 1.86 1.98        Results from last 7 days   Lab Units 10/07/23  0450 10/06/23  0502 10/05/23  0500   WBC AUTO x10*3/uL 10.9 9.7 8.4   HEMOGLOBIN g/dL 9.1* 8.9* 8.5*   HEMATOCRIT % 27.6* 27.7* 26.4*   PLATELETS AUTO x10*3/uL 296 347 290          Assessment & Plan:   Patient is 88 y.o. female who is admitted to hospital with complaints of  cough, blacktarry stools. Nephrology consulted in view of ESRD.     Acute kidney injury on CKD stage 3b-has been dialysis dependent  Since August 28, 2023  -Access is right IJ TDC  -Patient on hemodialysis Monday Wednesday Friday at [facility]  -Remains dialysis dependent to manage volume, oligoanuric    Anemia of blood loss and CKD  -Patient had colonoscopy last admission  -May have EGD  -Transfuse PRBCs per primary service  -pt is on IV Fe load as outpatient     Volume overload  -cont UF with HD    HTN  -cont lisinopril and metoprolol and Isordil     Recommendations  -Hemodialysis next 10/9  -Trying to establish dry weight  -cont Bumex po   -IV hydralazine added as needed, may need to add  another agent such as amlodipine if accelerated hypertension persists    Please message me through EPIC chat with any questions or concerns.     Jose Henry MD  10/7/2023  8:12 AM     [facility]    [address], [address]   [place] [address]  Office: [phone]

## 2023-10-08 LAB
ANION GAP SERPL CALC-SCNC: 9 MMOL/L (ref 10–20)
BASOPHILS # BLD AUTO: 0.07 X10*3/UL (ref 0–0.1)
BASOPHILS NFR BLD AUTO: 0.6 %
BUN SERPL-MCNC: 26 MG/DL (ref 6–23)
CALCIUM SERPL-MCNC: 7.9 MG/DL (ref 8.6–10.3)
CHLORIDE SERPL-SCNC: 103 MMOL/L (ref 98–107)
CO2 SERPL-SCNC: 31 MMOL/L (ref 21–32)
CREAT SERPL-MCNC: 2.22 MG/DL (ref 0.5–1.05)
EOSINOPHIL # BLD AUTO: 0.44 X10*3/UL (ref 0–0.4)
EOSINOPHIL NFR BLD AUTO: 4.1 %
ERYTHROCYTE [DISTWIDTH] IN BLOOD BY AUTOMATED COUNT: 14.3 % (ref 11.5–14.5)
GFR SERPL CREATININE-BSD FRML MDRD: 21 ML/MIN/1.73M*2
GLUCOSE BLD MANUAL STRIP-MCNC: 162 MG/DL (ref 74–99)
GLUCOSE BLD MANUAL STRIP-MCNC: 180 MG/DL (ref 74–99)
GLUCOSE BLD MANUAL STRIP-MCNC: 234 MG/DL (ref 74–99)
GLUCOSE BLD MANUAL STRIP-MCNC: 250 MG/DL (ref 74–99)
GLUCOSE SERPL-MCNC: 178 MG/DL (ref 74–99)
HCT VFR BLD AUTO: 27.7 % (ref 36–46)
HGB BLD-MCNC: 9.1 G/DL (ref 12–16)
IMM GRANULOCYTES # BLD AUTO: 0.21 X10*3/UL (ref 0–0.5)
IMM GRANULOCYTES NFR BLD AUTO: 1.9 % (ref 0–0.9)
LYMPHOCYTES # BLD AUTO: 2.49 X10*3/UL (ref 0.8–3)
LYMPHOCYTES NFR BLD AUTO: 23.1 %
MAGNESIUM SERPL-MCNC: 1.66 MG/DL (ref 1.6–2.4)
MCH RBC QN AUTO: 30.4 PG (ref 26–34)
MCHC RBC AUTO-ENTMCNC: 32.9 G/DL (ref 32–36)
MCV RBC AUTO: 93 FL (ref 80–100)
MONOCYTES # BLD AUTO: 0.9 X10*3/UL (ref 0.05–0.8)
MONOCYTES NFR BLD AUTO: 8.3 %
NEUTROPHILS # BLD AUTO: 6.67 X10*3/UL (ref 1.6–5.5)
NEUTROPHILS NFR BLD AUTO: 62 %
NRBC BLD-RTO: 0 /100 WBCS (ref 0–0)
PLATELET # BLD AUTO: 304 X10*3/UL (ref 150–450)
PMV BLD AUTO: 9 FL (ref 7.5–11.5)
POTASSIUM SERPL-SCNC: 3.6 MMOL/L (ref 3.5–5.3)
RBC # BLD AUTO: 2.99 X10*6/UL (ref 4–5.2)
SODIUM SERPL-SCNC: 139 MMOL/L (ref 136–145)
WBC # BLD AUTO: 10.8 X10*3/UL (ref 4.4–11.3)

## 2023-10-08 PROCEDURE — 2500000001 HC RX 250 WO HCPCS SELF ADMINISTERED DRUGS (ALT 637 FOR MEDICARE OP): Performed by: PHYSICIAN ASSISTANT

## 2023-10-08 PROCEDURE — 83735 ASSAY OF MAGNESIUM: CPT | Performed by: INTERNAL MEDICINE

## 2023-10-08 PROCEDURE — 80048 BASIC METABOLIC PNL TOTAL CA: CPT | Performed by: INTERNAL MEDICINE

## 2023-10-08 PROCEDURE — 99233 SBSQ HOSP IP/OBS HIGH 50: CPT | Performed by: INTERNAL MEDICINE

## 2023-10-08 PROCEDURE — 36415 COLL VENOUS BLD VENIPUNCTURE: CPT | Performed by: INTERNAL MEDICINE

## 2023-10-08 PROCEDURE — 2500000001 HC RX 250 WO HCPCS SELF ADMINISTERED DRUGS (ALT 637 FOR MEDICARE OP): Performed by: INTERNAL MEDICINE

## 2023-10-08 PROCEDURE — 2500000004 HC RX 250 GENERAL PHARMACY W/ HCPCS (ALT 636 FOR OP/ED): Performed by: PHYSICIAN ASSISTANT

## 2023-10-08 PROCEDURE — 96372 THER/PROPH/DIAG INJ SC/IM: CPT | Performed by: INTERNAL MEDICINE

## 2023-10-08 PROCEDURE — 82947 ASSAY GLUCOSE BLOOD QUANT: CPT

## 2023-10-08 PROCEDURE — 97530 THERAPEUTIC ACTIVITIES: CPT | Mod: GP,CQ

## 2023-10-08 PROCEDURE — 85025 COMPLETE CBC W/AUTO DIFF WBC: CPT | Performed by: INTERNAL MEDICINE

## 2023-10-08 PROCEDURE — 2500000002 HC RX 250 W HCPCS SELF ADMINISTERED DRUGS (ALT 637 FOR MEDICARE OP, ALT 636 FOR OP/ED): Performed by: INTERNAL MEDICINE

## 2023-10-08 PROCEDURE — 2500000005 HC RX 250 GENERAL PHARMACY W/O HCPCS: Performed by: INTERNAL MEDICINE

## 2023-10-08 PROCEDURE — 2500000004 HC RX 250 GENERAL PHARMACY W/ HCPCS (ALT 636 FOR OP/ED): Performed by: INTERNAL MEDICINE

## 2023-10-08 PROCEDURE — 2060000001 HC INTERMEDIATE ICU ROOM DAILY

## 2023-10-08 RX ORDER — BISACODYL 10 MG/1
10 SUPPOSITORY RECTAL ONCE
Status: COMPLETED | OUTPATIENT
Start: 2023-10-08 | End: 2023-10-08

## 2023-10-08 RX ADMIN — INSULIN LISPRO 3 UNITS: 100 INJECTION, SOLUTION INTRAVENOUS; SUBCUTANEOUS at 08:48

## 2023-10-08 RX ADMIN — METOPROLOL SUCCINATE 50 MG: 50 TABLET, EXTENDED RELEASE ORAL at 08:45

## 2023-10-08 RX ADMIN — LIDOCAINE 1 PATCH: 4 PATCH TOPICAL at 08:44

## 2023-10-08 RX ADMIN — AMIODARONE HYDROCHLORIDE 200 MG: 200 TABLET ORAL at 16:00

## 2023-10-08 RX ADMIN — INSULIN LISPRO 6 UNITS: 100 INJECTION, SOLUTION INTRAVENOUS; SUBCUTANEOUS at 16:09

## 2023-10-08 RX ADMIN — ISOSORBIDE DINITRATE 10 MG: 10 TABLET ORAL at 16:00

## 2023-10-08 RX ADMIN — NYSTATIN 500000 UNITS: 100000 SUSPENSION ORAL at 21:04

## 2023-10-08 RX ADMIN — ACETAMINOPHEN 650 MG: 325 TABLET ORAL at 21:04

## 2023-10-08 RX ADMIN — PANTOPRAZOLE SODIUM 40 MG: 40 TABLET, DELAYED RELEASE ORAL at 08:44

## 2023-10-08 RX ADMIN — DOXYCYCLINE 100 MG: 100 CAPSULE ORAL at 21:05

## 2023-10-08 RX ADMIN — BUMETANIDE 1 MG: 1 TABLET ORAL at 16:00

## 2023-10-08 RX ADMIN — INSULIN LISPRO 6 UNITS: 100 INJECTION, SOLUTION INTRAVENOUS; SUBCUTANEOUS at 21:05

## 2023-10-08 RX ADMIN — CLOPIDOGREL BISULFATE 75 MG: 75 TABLET ORAL at 08:45

## 2023-10-08 RX ADMIN — POLYETHYLENE GLYCOL 3350 17 G: 17 POWDER, FOR SOLUTION ORAL at 08:44

## 2023-10-08 RX ADMIN — ATORVASTATIN CALCIUM 80 MG: 40 TABLET, FILM COATED ORAL at 21:05

## 2023-10-08 RX ADMIN — INSULIN GLARGINE 20 UNITS: 100 INJECTION, SOLUTION SUBCUTANEOUS at 21:05

## 2023-10-08 RX ADMIN — ISOSORBIDE DINITRATE 10 MG: 10 TABLET ORAL at 09:00

## 2023-10-08 RX ADMIN — SIMETHICONE 80 MG: 80 TABLET, CHEWABLE ORAL at 08:37

## 2023-10-08 RX ADMIN — GUAIFENESIN AND DEXTROMETHORPHAN 5 ML: 100; 10 SYRUP ORAL at 05:24

## 2023-10-08 RX ADMIN — GUAIFENESIN AND DEXTROMETHORPHAN 5 ML: 100; 10 SYRUP ORAL at 21:17

## 2023-10-08 RX ADMIN — BUMETANIDE 1 MG: 1 TABLET ORAL at 08:45

## 2023-10-08 RX ADMIN — CEFTRIAXONE SODIUM 1 G: 1 INJECTION, SOLUTION INTRAVENOUS at 16:00

## 2023-10-08 RX ADMIN — DOXYCYCLINE 100 MG: 100 CAPSULE ORAL at 08:45

## 2023-10-08 RX ADMIN — BISACODYL 10 MG: 10 SUPPOSITORY RECTAL at 11:13

## 2023-10-08 RX ADMIN — INSULIN LISPRO 3 UNITS: 100 INJECTION, SOLUTION INTRAVENOUS; SUBCUTANEOUS at 13:14

## 2023-10-08 RX ADMIN — NYSTATIN 500000 UNITS: 100000 SUSPENSION ORAL at 16:00

## 2023-10-08 RX ADMIN — NYSTATIN 500000 UNITS: 100000 SUSPENSION ORAL at 08:44

## 2023-10-08 RX ADMIN — LISINOPRIL 40 MG: 20 TABLET ORAL at 08:45

## 2023-10-08 ASSESSMENT — COGNITIVE AND FUNCTIONAL STATUS - GENERAL
DRESSING REGULAR LOWER BODY CLOTHING: A LOT
STANDING UP FROM CHAIR USING ARMS: A LOT
CLIMB 3 TO 5 STEPS WITH RAILING: TOTAL
WALKING IN HOSPITAL ROOM: A LOT
TURNING FROM BACK TO SIDE WHILE IN FLAT BAD: A LOT
TURNING FROM BACK TO SIDE WHILE IN FLAT BAD: A LOT
PERSONAL GROOMING: A LITTLE
MOVING TO AND FROM BED TO CHAIR: A LOT
WALKING IN HOSPITAL ROOM: TOTAL
CLIMB 3 TO 5 STEPS WITH RAILING: TOTAL
DRESSING REGULAR UPPER BODY CLOTHING: A LITTLE
HELP NEEDED FOR BATHING: A LOT
DAILY ACTIVITIY SCORE: 16
MOBILITY SCORE: 11
TOILETING: A LOT
MOVING FROM LYING ON BACK TO SITTING ON SIDE OF FLAT BED WITH BEDRAILS: A LOT
STANDING UP FROM CHAIR USING ARMS: A LOT
MOVING FROM LYING ON BACK TO SITTING ON SIDE OF FLAT BED WITH BEDRAILS: A LOT
MOBILITY SCORE: 10
MOVING TO AND FROM BED TO CHAIR: A LOT

## 2023-10-08 ASSESSMENT — PAIN SCALES - GENERAL
PAINLEVEL_OUTOF10: 9
PAINLEVEL_OUTOF10: 0 - NO PAIN
PAINLEVEL_OUTOF10: 2
PAINLEVEL_OUTOF10: 0 - NO PAIN
PAINLEVEL_OUTOF10: 5 - MODERATE PAIN
PAINLEVEL_OUTOF10: 5 - MODERATE PAIN
PAINLEVEL_OUTOF10: 2

## 2023-10-08 ASSESSMENT — PAIN - FUNCTIONAL ASSESSMENT
PAIN_FUNCTIONAL_ASSESSMENT: 0-10

## 2023-10-08 NOTE — PROGRESS NOTES
"Physical Therapy    Physical Therapy Treatment    Patient Name: Melody Martin  MRN: [id number]  Today's Date: 10/8/2023  Time Calculation  Start Time: 1320  Stop Time: 1358  Time Calculation (min): 38 min       Assessment/Plan   PT Assessment  PT Assessment Results:  (pt.up in recliner upon arrival. pt. increased mobiilty today tolerating 2 transfers with Max A of 2, and 3 assist to provide hygiene and equipment support. pt. limited by decreased strength, and motivation, but is very thankful for therapists assistance.)  Rehab Prognosis: Good  Evaluation/Treatment Tolerance: Patient limited by fatigue  Medical Staff Made Aware: Yes  Strengths: Support of extended family/friends  End of Session Communication: Bedside nurse  Assessment Comment: pt was fatiqued this date and only agreeabole to ther-ex. pt required AAROM on LLE with all exercises. cues were provided for good technqiue and to assist as able.  End of Session Patient Position:  (Recliner chair with seat alarmed, call light in reach.)     PT Plan  Treatment/Interventions: Bed mobility, Balance training, Strengthening  PT Plan: Skilled PT, OT consult  PT Frequency: 5 times per week  PT Discharge Recommendations: Moderate intensity level of continued care  Equipment Recommended upon Discharge:  (TBD)  PT Recommended Transfer Status: Assist x2 (BED MOBILTIY, BED INCHAIR POSITION 3X/DAY)      General Visit Information:   PT  Visit  PT Received On: 10/08/23  General  Prior to Session Communication: Bedside nurse    Subjective   Precautions:  Precautions  Precautions Comment: O2 NC AND FALL RISK  Vital Signs:       Objective   Pain:  Pain Assessment  Pain Assessment:  (Moans with all movement but does not quantify on pain scale. Reports that \"my rectum hurts\", and reports she was given an enema.)  Cognition:  Cognition  Overall Cognitive Status: Within Functional Limits        Activity Tolerance:  Activity Tolerance  Endurance: Tolerates 10 - 20 min exercise with " multiple rests  Treatments:       Transfers  Transfer:  (Sit<--->Stand transfers from recliner chair, SPT to and from [facility] x's 2  Max A of 2 with vc's for safe hand placement.  Stood x's about 1-2 minutes prior to transferring back to recliner chair with mod A of 2 and Poor static standing balance.)    Outcome Measures:  [facility] Basic Mobility  Turning from your back to your side while in a flat bed without using bedrails: A lot  Moving from lying on your back to sitting on the side of a flat bed without using bedrails: A lot  Moving to and from bed to chair (including a wheelchair): A lot  Standing up from a chair using your arms (e.g. wheelchair or bedside chair): A lot  To walk in hospital room: Total  Climbing 3-5 steps with railing: Total  Basic Mobility - Total Score: 10    Education Documentation  Mobility Training, taught by Kerry Guevara PTA at 10/8/2023  3:18 PM.  Learner: Patient  Readiness: Acceptance  Method: Explanation  Response: Verbalizes Understanding, Needs Reinforcement  Comment: Increase Moblity    Education Comments  No comments found.        OP EDUCATION:       Encounter Problems       Encounter Problems (Active)       Balance       SIT BALANCE (Not Progressing)       Start:  10/04/23    Expected End:  10/18/23       SIT EOB FOR 12 MIN WITH CGA WHILE COMPLETING EX AND FUNCTIONAL ACTIVITIES TO IMPROVE ENDURANCE, BALANCE AND STRENGTH FOR PROGRESSION OF ACTIVITY LEVEL            Mobility       Goal 1 (Not Progressing)       Start:  10/04/23    Expected End:  10/25/23       20+ REPS AAROM/AROM EX INCREASING STRENGTH  FOR PROGRESS OF ACTIVITY LEVEL TOWARDS OOB            Transfers       STG - Patient will roll (Progressing)       Start:  10/04/23    Expected End:  10/11/23       MOD X2 A         SUPINE<>SIT (Progressing)       Start:  10/04/23    Expected End:  10/18/23       SUPINE<>SIT WITH MOD X1-2 A

## 2023-10-08 NOTE — PROGRESS NOTES
Melody Martni is a 88 y.o. female on day 5 of admission presenting with Anemia, unspecified type.      Subjective   Melody Martin is a 88 y.o. female with a past medical history that is significant for coronary artery disease with recent stenting, recent lower GI bleed, diabetes mellitus type 2 insulin requiring, hypertension, stroke with residual left-sided weakness, hypertension, accessible A-fib on anticoagulation, aortic stenosis, end-stage renal disease on dialysis Monday Wednesday Friday presented 10/2/23 with increasing amount of shortness of breath cough black tarry stool.   Transfused 2u PRBC in total. Continued HD M/W/F. Rocephin/doxy for LLL Pneumonia. EGD revealed a small hiatal hernia, but was otherwise normal with no evidence of bleeding or source of blood loss.  10/06/23: No acute events overnight. Vitals stable, BP slightly elevated 155/73 (175/75)- add PRN hydralazine, nephro taking off fluid during HD today. Cr rising, due for HD today. Hgb improving at 8.9. Glucose remains elevated, recent A1c 6.1, will adjust insulin dose.   10/07: Patient was seen and examined, continues to have cough but no production of sputum.  Complains of shortness of breath.  No fever or chills, no nausea or vomiting.  10/8: No acute event overnight, patient complains of abdominal discomfort secondary to constipation and want something for it.  No nausea or vomiting, no fever or chills.  Cough is improving.         Objective     Last Recorded Vitals  BP (!) 193/78 (BP Location: Left arm, Patient Position: Sitting)   Pulse 70   Temp 37.2 °C (99 °F) (Temporal)   Resp 18   Wt 85.3 kg (188 lb 0.8 oz)   SpO2 94%     Image Results  XR chest 1 view    Result Date: 10/2/2023  STUDY: Chest Radiograph;  10/2/2023 11:12 PM INDICATION: Shortness of breath. COMPARISON: 8/30/2023 XR Chest one view ACCESSION NUMBER(S): [id number] ORDERING CLINICIAN: CHASE BUTLER TECHNIQUE:  Frontal chest was obtained at 23:12 hours.  FINDINGS: CARDIOMEDIASTINAL SILHOUETTE: Cardiomediastinal silhouette is stable in size and configuration. Right IJ central line is unchanged.  LUNGS: Hazy opacity at the left lung base suggests small left-sided pleural effusion.  Left basilar atelectasis and/or airspace disease is not excluded.  Right lung is grossly clear.  No pneumothorax.  ABDOMEN: No remarkable upper abdominal findings.  BONES: No acute osseous changes.    Hazy opacity at the left lung base suggesting small left-sided pleural effusion.  Left basilar atelectasis and/or airspace disease is not excluded. Signed by Gio Corona MD    CT abdomen pelvis wo IV contrast    RLab Results  Results for orders placed or performed during the hospital encounter of 10/02/23 (from the past 24 hour(s))   POCT GLUCOSE   Result Value Ref Range    POCT Glucose 311 (H) 74 - 99 mg/dL   POCT GLUCOSE   Result Value Ref Range    POCT Glucose 213 (H) 74 - 99 mg/dL   POCT GLUCOSE   Result Value Ref Range    POCT Glucose 251 (H) 74 - 99 mg/dL   Basic Metabolic Panel   Result Value Ref Range    Glucose 178 (H) 74 - 99 mg/dL    Sodium 139 136 - 145 mmol/L    Potassium 3.6 3.5 - 5.3 mmol/L    Chloride 103 98 - 107 mmol/L    Bicarbonate 31 21 - 32 mmol/L    Anion Gap 9 (L) 10 - 20 mmol/L    Urea Nitrogen 26 (H) 6 - 23 mg/dL    Creatinine 2.22 (H) 0.50 - 1.05 mg/dL    eGFR 21 (L) >60 mL/min/1.73m*2    Calcium 7.9 (L) 8.6 - 10.3 mg/dL   CBC and Auto Differential   Result Value Ref Range    WBC 10.8 4.4 - 11.3 x10*3/uL    nRBC 0.0 0.0 - 0.0 /100 WBCs    RBC 2.99 (L) 4.00 - 5.20 x10*6/uL    Hemoglobin 9.1 (L) 12.0 - 16.0 g/dL    Hematocrit 27.7 (L) 36.0 - 46.0 %    MCV 93 80 - 100 fL    MCH 30.4 26.0 - 34.0 pg    MCHC 32.9 32.0 - 36.0 g/dL    RDW 14.3 11.5 - 14.5 %    Platelets 304 150 - 450 x10*3/uL    MPV 9.0 7.5 - 11.5 fL    Neutrophils % 62.0 40.0 - 80.0 %    Immature Granulocytes %, Automated 1.9 (H) 0.0 - 0.9 %    Lymphocytes % 23.1 13.0 - 44.0 %    Monocytes % 8.3 2.0 -  10.0 %    Eosinophils % 4.1 0.0 - 6.0 %    Basophils % 0.6 0.0 - 2.0 %    Neutrophils Absolute 6.67 (H) 1.60 - 5.50 x10*3/uL    Immature Granulocytes Absolute, Automated 0.21 0.00 - 0.50 x10*3/uL    Lymphocytes Absolute 2.49 0.80 - 3.00 x10*3/uL    Monocytes Absolute 0.90 (H) 0.05 - 0.80 x10*3/uL    Eosinophils Absolute 0.44 (H) 0.00 - 0.40 x10*3/uL    Basophils Absolute 0.07 0.00 - 0.10 x10*3/uL   Magnesium   Result Value Ref Range    Magnesium 1.66 1.60 - 2.40 mg/dL   POCT GLUCOSE   Result Value Ref Range    POCT Glucose 180 (H) 74 - 99 mg/dL   POCT GLUCOSE   Result Value Ref Range    POCT Glucose 162 (H) 74 - 99 mg/dL        Medications  Scheduled medications:  amiodarone, 200 mg, oral, q24h  atorvastatin, 80 mg, oral, Nightly  bumetanide, 1 mg, oral, BID  cefTRIAXone, 1 g, intravenous, q24h  clopidogrel, 75 mg, oral, Daily  doxycycline, 100 mg, oral, q12h MARIA E  insulin glargine, 20 Units, subcutaneous, Nightly  insulin lispro, 0-15 Units, subcutaneous, Before meals & nightly  isosorbide dinitrate, 10 mg, oral, TID  lidocaine, 1 patch, transdermal, Daily  lisinopril, 40 mg, oral, Daily  metoprolol succinate XL, 50 mg, oral, Daily  nystatin, 5 mL, oral, 4x daily  pantoprazole, 40 mg, oral, Daily before breakfast  polyethylene glycol, 17 g, oral, Daily      Continuous medications:     PRN medications:       Physical Exam  Constitutional:       General: She is not in acute distress.     Appearance: Normal appearance.   HENT:      Head: Normocephalic and atraumatic.      Right Ear: External ear normal.      Left Ear: External ear normal.      Nose: Nose normal.      Mouth/Throat:      Mouth: Mucous membranes are moist.      Pharynx: Oropharynx is clear.   Eyes:      Extraocular Movements: Extraocular movements intact.      Conjunctiva/sclera: Conjunctivae normal.      Pupils: Pupils are equal, round, and reactive to light.   Neck:      Comments: HD catheter right upper chest  Cardiovascular:      Rate and Rhythm:  Normal rate and regular rhythm.      Pulses: Normal pulses.      Heart sounds: Normal heart sounds.      Comments: Edema BUE  Pulmonary:      Effort: Pulmonary effort is normal. No respiratory distress.      Breath sounds: Rales (LLL) present. No wheezing or rhonchi.   Abdominal:      General: Abdomen is flat. Bowel sounds are normal.      Palpations: Abdomen is soft.      Tenderness: There is no abdominal tenderness. There is no right CVA tenderness, left CVA tenderness, guarding or rebound.   Musculoskeletal:         General: No swelling. Normal range of motion.      Cervical back: Normal range of motion and neck supple.   Skin:     General: Skin is warm and dry.      Capillary Refill: Capillary refill takes less than 2 seconds.      Findings: Bruising present. No lesion or rash.      Comments: Stage 3 decubitus ulcer on left heel.   Neurological:      General: No focal deficit present.      Mental Status: She is alert and oriented to person, place, and time. Mental status is at baseline.   Psychiatric:         Mood and Affect: Mood normal.         Behavior: Behavior normal.                  Code Status  Full Code     Assessment/Plan   This patient currently has cardiac telemetry ordered; if you would like to modify or discontinue the telemetry order, click here to go to the orders activity to modify/discontinue the order.    Anemia, unspecified type  Patient with recent GI blood losses had colonoscopy showing just hemorrhoidal bleed last month  Patient presented with black stool  Status post PRBC transfusion in ER  Patient is on treatment for recent cardiac stenting so is actually on Plavix and will continue his history of recent stent  Patient has been on Eliquis for paroxysmal A-fib- will hold  GI consult-underwent EGD which was normal and did not show any active bleeding  Monitor H&H-has remained fairly stable  Discharge planning back to ECF once patient condition improved and H&H remained  stable    Pneumonia:  Patient has infiltrate on chest x-ray  Has minimal sputum production no significant hypoxia  Treat empirically with Rocephin and doxycycline  Supportive treatments  Lactic acidosis has resolved    Diabetes (CMS/[facility])  Patient placed on scale insulin coverage  Will allow DM diet  Add lantus 10u nightly given hyperglycemia  A1c 6.1    Stage 3b chronic kidney disease (CMS/[facility])-has been dialysis dependent  Patient currently is actually receiving hemodialysis Monday Wednesday Friday after having acute renal failure after heart catheterization with IVP contrast  Patient's renal function appears to be improving  Nephrology on board  HD [facility]    Iron deficiency anemia due to chronic blood loss  As above    Lactic acidosis  Resolved with blood and treatment of PNA     Constipation    Adequate bowel regimen       DVT ppx: Avoid pharmacological ppx given concern for UGIB       Keegan Saldana MD

## 2023-10-08 NOTE — PROGRESS NOTES
"      Nephrology Progress Note      Nephrology following for ESRD.   Events over night: none  Patient has acceptance to return to [facility]   Discharge planning back to [facility] once patient condition improved and H&H remained stable       BP (!) 193/78 (BP Location: Left arm, Patient Position: Sitting)   Pulse 70   Temp 37.2 °C (99 °F) (Temporal)   Resp 18   Ht 1.626 m (5' 4.02\")   Wt 85.3 kg (188 lb 0.8 oz)   SpO2 93%   BMI 32.26 kg/m²     Input / Output:  24 HR: No intake or output data in the 24 hours ending 10/08/23 1546    Physical Exam  Vitals and nursing note reviewed.   Constitutional:       General: She is not in acute distress.     Appearance: She is not ill-appearing or toxic-appearing.   Cardiovascular:      Rate and Rhythm: Normal rate and regular rhythm.      Heart sounds: No murmur heard.     No friction rub. No gallop.   Pulmonary:      Effort: Pulmonary effort is normal.      Breath sounds: No wheezing, rhonchi or rales.   Abdominal:      General: There is no distension.      Palpations: Abdomen is soft.      Tenderness: There is no abdominal tenderness.   Musculoskeletal:      Cervical back: Neck supple.      Right lower leg: Edema present.      Left lower leg: Edema present.   Neurological:      Mental Status: She is alert.          Scheduled medications  amiodarone, 200 mg, oral, q24h  atorvastatin, 80 mg, oral, Nightly  bumetanide, 1 mg, oral, BID  cefTRIAXone, 1 g, intravenous, q24h  clopidogrel, 75 mg, oral, Daily  doxycycline, 100 mg, oral, q12h MARIA E  insulin glargine, 20 Units, subcutaneous, Nightly  insulin lispro, 0-15 Units, subcutaneous, Before meals & nightly  isosorbide dinitrate, 10 mg, oral, TID  lidocaine, 1 patch, transdermal, Daily  lisinopril, 40 mg, oral, Daily  metoprolol succinate XL, 50 mg, oral, Daily  nystatin, 5 mL, oral, 4x daily  pantoprazole, 40 mg, oral, Daily before breakfast  polyethylene glycol, 17 g, oral, Daily      Continuous medications     PRN " medications  PRN medications: acetaminophen **OR** acetaminophen **OR** acetaminophen, acetaminophen **OR** acetaminophen **OR** acetaminophen, acetaminophen, benzocaine-menthol, dextromethorphan-guaifenesin, dextrose 10 % in water (D10W), dextrose, glucagon, heparin, heparin, hydrALAZINE, oxyCODONE, oxygen, simethicone   Results from last 7 days   Lab Units 10/08/23  0522 10/03/23  0513 10/02/23  2220   SODIUM mmol/L 139   < > 133*   POTASSIUM mmol/L 3.6   < > 3.9   CHLORIDE mmol/L 103   < > 96*   CO2 mmol/L 31   < > 30   BUN mg/dL 26*   < > 19   CREATININE mg/dL 2.22*   < > 1.57*   CALCIUM mg/dL 7.9*   < > 7.4*   PROTEIN TOTAL g/dL  --   --  5.4*   BILIRUBIN TOTAL mg/dL  --   --  0.4   ALK PHOS U/L  --   --  99   ALT U/L  --   --  16   AST U/L  --   --  26   GLUCOSE mg/dL 178*   < > 199*    < > = values in this interval not displayed.        Results from last 7 days   Lab Units 10/08/23  0522 10/07/23  0450 10/06/23  0502   MAGNESIUM mg/dL 1.66 1.69 1.86        Results from last 7 days   Lab Units 10/08/23  0522 10/07/23  0450 10/06/23  0502   WBC AUTO x10*3/uL 10.8 10.9 9.7   HEMOGLOBIN g/dL 9.1* 9.1* 8.9*   HEMATOCRIT % 27.7* 27.6* 27.7*   PLATELETS AUTO x10*3/uL 304 296 347          Assessment & Plan:   Patient is 88 y.o. female who is admitted to hospital with complaints of  cough, blacktarry stools. Nephrology consulted in view of ESRD.     Acute kidney injury on CKD stage 3b-has been dialysis dependent  Since August 28, 2023  -Access is right [facility]  -Patient on hemodialysis Monday Wednesday Friday at [facility]  -Remains dialysis dependent to manage volume, oligoanuric  -Discharge planning back to ECF once patient condition improved and H&H remained stable       Anemia of blood loss and CKD  -Patient had colonoscopy last admission  -May have EGD  -Transfuse PRBCs per primary service  -pt is on IV Fe load as outpatient     Volume overload  -cont UF with HD    HTN  -cont lisinopril and metoprolol and Isordil      Recommendations  -Hemodialysis next 10/9  -Trying to establish dry weight  -Cont Bumex PO  -IV hydralazine added as needed, may need to add another agent such as amlodipine if accelerated hypertension persists    Please message me through [employer] chat with any questions or concerns.     Jose Henry MD  10/8/2023  3:46 PM     [facility]    [address], [address]   [place] [id number]  Office: [phone]

## 2023-10-08 NOTE — CARE PLAN
Problem: Pain  Goal: My pain/discomfort is manageable  10/8/2023 0017 by Mary Jane Starks RN  Outcome: Progressing  10/8/2023 0015 by Mary Jane Starks RN  Outcome: Progressing     Problem: Safety  Goal: Patient will be injury free during hospitalization  10/8/2023 0017 by Mary Jane Starks RN  Outcome: Progressing  10/8/2023 0015 by Mary Jane Starks RN  Outcome: Progressing  Goal: I will remain free of falls  10/8/2023 0017 by Mary Jane Starks RN  Outcome: Progressing  10/8/2023 0015 by Mary Jane Starks RN  Outcome: Progressing     Problem: Daily Care  Goal: Daily care needs are met  10/8/2023 0017 by Mary Jane Starks RN  Outcome: Progressing  10/8/2023 0015 by Mary Jane Starks RN  Outcome: Progressing     Problem: Psychosocial Needs  Goal: Demonstrates ability to cope with hospitalization/illness  10/8/2023 0017 by Mary Jane Starks RN  Outcome: Progressing  10/8/2023 0015 by Mary Jane Starks RN  Outcome: Progressing  Goal: Collaborate with me, my family, and caregiver to identify my specific goals  10/8/2023 0017 by Mary Jane Starks RN  Outcome: Progressing  10/8/2023 0015 by Mary Jane Starks RN  Outcome: Progressing     Problem: Discharge Barriers  Goal: My discharge needs are met  10/8/2023 0017 by Mary Jane Starks RN  Outcome: Progressing  10/8/2023 0015 by Mary Jane Starks RN  Outcome: Progressing   The patient's goals for the shift include      The clinical goals for the shift include remain hemodynamically stable    Over the shift, the patient did not make progress toward the following goals. Barriers to progression include    . Recommendations to address these barriers include   Problem: Discharge Barriers  Goal: My discharge needs are met  10/8/2023 0020 by Mary Jane Starks RN  Outcome: Progressing  10/8/2023 0018 by Mary Jane Starks RN  Outcome: Progressing  10/8/2023 0017 by Mary Jane Starks RN  Outcome: Progressing  10/8/2023 0015 by Mary Jane Starks RN  Outcome: Progressing     Problem: Psychosocial Needs  Goal: Demonstrates ability to cope with  hospitalization/illness  10/8/2023 0020 by Mary Jane Starks RN  Outcome: Progressing  10/8/2023 0018 by Mary Jane Starks RN  Outcome: Progressing  10/8/2023 0017 by Mary Jane Starks RN  Outcome: Progressing  10/8/2023 0015 by Mary Jane Starks RN  Outcome: Progressing     Problem: Discharge Barriers  Goal: My discharge needs are met  10/8/2023 0020 by Mary Jane Starks RN  Outcome: Progressing  10/8/2023 0018 by Mary Jane Starks RN  Outcome: Progressing  10/8/2023 0017 by Mary Jane Starks RN  Outcome: Progressing  10/8/2023 0015 by Mary Jane Starks RN  Outcome: Progressing     Problem: Safety  Goal: Patient will be injury free during hospitalization  10/8/2023 0020 by Mary Jane Starks RN  Outcome: Progressing  10/8/2023 0018 by Mary Jane Starks RN  Outcome: Progressing  10/8/2023 0017 by Mary Jane Starks RN  Outcome: Progressing  10/8/2023 0015 by Mary Jane Starks RN  Outcome: Progressing  Goal: I will remain free of falls  10/8/2023 0020 by Mary Jane Starks RN  Outcome: Progressing  10/8/2023 0018 by Mary Jane Starks RN  Outcome: Progressing  10/8/2023 0017 by Mary Jane Starks RN  Outcome: Progressing  10/8/2023 0015 by Mary Jane Starks RN  Outcome: Progressing   .  .

## 2023-10-09 ENCOUNTER — HOSPITAL ENCOUNTER (OUTPATIENT)
Dept: CARDIOLOGY | Facility: HOSPITAL | Age: 88
Discharge: HOME | End: 2023-10-09
Payer: COMMERCIAL

## 2023-10-09 ENCOUNTER — APPOINTMENT (OUTPATIENT)
Dept: DIALYSIS | Facility: HOSPITAL | Age: 88
End: 2023-10-09
Payer: COMMERCIAL

## 2023-10-09 VITALS
HEART RATE: 71 BPM | TEMPERATURE: 98.4 F | DIASTOLIC BLOOD PRESSURE: 68 MMHG | WEIGHT: 185.19 LBS | OXYGEN SATURATION: 94 % | BODY MASS INDEX: 31.62 KG/M2 | SYSTOLIC BLOOD PRESSURE: 156 MMHG | HEIGHT: 64 IN | RESPIRATION RATE: 16 BRPM

## 2023-10-09 PROBLEM — J18.9 PNEUMONIA: Status: RESOLVED | Noted: 2023-10-03 | Resolved: 2023-10-09

## 2023-10-09 PROBLEM — E87.20 LACTIC ACIDOSIS: Status: RESOLVED | Noted: 2023-10-04 | Resolved: 2023-10-09

## 2023-10-09 LAB
ANION GAP SERPL CALC-SCNC: 11 MMOL/L (ref 10–20)
ATRIAL RATE: 65 BPM
BACTERIA SPEC RESP CULT: NORMAL
BASOPHILS # BLD AUTO: 0.07 X10*3/UL (ref 0–0.1)
BASOPHILS NFR BLD AUTO: 0.7 %
BUN SERPL-MCNC: 33 MG/DL (ref 6–23)
CALCIUM SERPL-MCNC: 8.3 MG/DL (ref 8.6–10.3)
CHLORIDE SERPL-SCNC: 104 MMOL/L (ref 98–107)
CO2 SERPL-SCNC: 27 MMOL/L (ref 21–32)
CREAT SERPL-MCNC: 2.32 MG/DL (ref 0.5–1.05)
EOSINOPHIL # BLD AUTO: 0.35 X10*3/UL (ref 0–0.4)
EOSINOPHIL NFR BLD AUTO: 3.3 %
ERYTHROCYTE [DISTWIDTH] IN BLOOD BY AUTOMATED COUNT: 14.2 % (ref 11.5–14.5)
GFR SERPL CREATININE-BSD FRML MDRD: 20 ML/MIN/1.73M*2
GLUCOSE BLD MANUAL STRIP-MCNC: 242 MG/DL (ref 74–99)
GLUCOSE BLD MANUAL STRIP-MCNC: 94 MG/DL (ref 74–99)
GLUCOSE SERPL-MCNC: 156 MG/DL (ref 74–99)
GRAM STN SPEC: NORMAL
GRAM STN SPEC: NORMAL
HCT VFR BLD AUTO: 28.1 % (ref 36–46)
HGB BLD-MCNC: 9.1 G/DL (ref 12–16)
IMM GRANULOCYTES # BLD AUTO: 0.19 X10*3/UL (ref 0–0.5)
IMM GRANULOCYTES NFR BLD AUTO: 1.8 % (ref 0–0.9)
LYMPHOCYTES # BLD AUTO: 2.63 X10*3/UL (ref 0.8–3)
LYMPHOCYTES NFR BLD AUTO: 25 %
MAGNESIUM SERPL-MCNC: 1.56 MG/DL (ref 1.6–2.4)
MCH RBC QN AUTO: 29.8 PG (ref 26–34)
MCHC RBC AUTO-ENTMCNC: 32.4 G/DL (ref 32–36)
MCV RBC AUTO: 92 FL (ref 80–100)
MONOCYTES # BLD AUTO: 1.16 X10*3/UL (ref 0.05–0.8)
MONOCYTES NFR BLD AUTO: 11 %
NEUTROPHILS # BLD AUTO: 6.12 X10*3/UL (ref 1.6–5.5)
NEUTROPHILS NFR BLD AUTO: 58.2 %
NRBC BLD-RTO: 0 /100 WBCS (ref 0–0)
P OFFSET: 190 MS
P ONSET: 146 MS
PLATELET # BLD AUTO: 329 X10*3/UL (ref 150–450)
PMV BLD AUTO: 9 FL (ref 7.5–11.5)
POTASSIUM SERPL-SCNC: 3.6 MMOL/L (ref 3.5–5.3)
PR INTERVAL: 146 MS
Q ONSET: 219 MS
QRS COUNT: 10 BEATS
QRS DURATION: 86 MS
QT INTERVAL: 502 MS
QTC CALCULATION(BAZETT): 522 MS
QTC FREDERICIA: 515 MS
R AXIS: 15 DEGREES
RBC # BLD AUTO: 3.05 X10*6/UL (ref 4–5.2)
SODIUM SERPL-SCNC: 138 MMOL/L (ref 136–145)
T AXIS: 91 DEGREES
T OFFSET: 470 MS
VENTRICULAR RATE: 65 BPM
WBC # BLD AUTO: 10.5 X10*3/UL (ref 4.4–11.3)

## 2023-10-09 PROCEDURE — 2500000005 HC RX 250 GENERAL PHARMACY W/O HCPCS: Performed by: INTERNAL MEDICINE

## 2023-10-09 PROCEDURE — 82947 ASSAY GLUCOSE BLOOD QUANT: CPT

## 2023-10-09 PROCEDURE — 2500000001 HC RX 250 WO HCPCS SELF ADMINISTERED DRUGS (ALT 637 FOR MEDICARE OP): Performed by: PHYSICIAN ASSISTANT

## 2023-10-09 PROCEDURE — 8010000001 HC DIALYSIS - HEMODIALYSIS PER DAY

## 2023-10-09 PROCEDURE — 99239 HOSP IP/OBS DSCHRG MGMT >30: CPT | Performed by: INTERNAL MEDICINE

## 2023-10-09 PROCEDURE — 2500000004 HC RX 250 GENERAL PHARMACY W/ HCPCS (ALT 636 FOR OP/ED): Performed by: INTERNAL MEDICINE

## 2023-10-09 PROCEDURE — 2500000001 HC RX 250 WO HCPCS SELF ADMINISTERED DRUGS (ALT 637 FOR MEDICARE OP): Performed by: INTERNAL MEDICINE

## 2023-10-09 PROCEDURE — 36415 COLL VENOUS BLD VENIPUNCTURE: CPT | Performed by: INTERNAL MEDICINE

## 2023-10-09 PROCEDURE — 2500000002 HC RX 250 W HCPCS SELF ADMINISTERED DRUGS (ALT 637 FOR MEDICARE OP, ALT 636 FOR OP/ED): Performed by: INTERNAL MEDICINE

## 2023-10-09 PROCEDURE — 96372 THER/PROPH/DIAG INJ SC/IM: CPT | Performed by: INTERNAL MEDICINE

## 2023-10-09 PROCEDURE — 93010 ELECTROCARDIOGRAM REPORT: CPT | Performed by: INTERNAL MEDICINE

## 2023-10-09 PROCEDURE — 83735 ASSAY OF MAGNESIUM: CPT | Performed by: INTERNAL MEDICINE

## 2023-10-09 PROCEDURE — 82374 ASSAY BLOOD CARBON DIOXIDE: CPT | Performed by: INTERNAL MEDICINE

## 2023-10-09 PROCEDURE — 93005 ELECTROCARDIOGRAM TRACING: CPT

## 2023-10-09 PROCEDURE — 85025 COMPLETE CBC W/AUTO DIFF WBC: CPT | Performed by: INTERNAL MEDICINE

## 2023-10-09 PROCEDURE — 80048 BASIC METABOLIC PNL TOTAL CA: CPT | Performed by: INTERNAL MEDICINE

## 2023-10-09 RX ORDER — SODIUM CHLORIDE 0.9 % (FLUSH) 0.9 %
SYRINGE (ML) INJECTION
Status: COMPLETED
Start: 2023-10-09 | End: 2023-10-09

## 2023-10-09 RX ORDER — BUMETANIDE 1 MG/1
1 TABLET ORAL
Start: 2023-10-09

## 2023-10-09 RX ORDER — INSULIN GLARGINE 100 [IU]/ML
20 INJECTION, SOLUTION SUBCUTANEOUS NIGHTLY
Start: 2023-10-09

## 2023-10-09 RX ORDER — ACETAMINOPHEN 325 MG/1
650 TABLET ORAL EVERY 4 HOURS PRN
Qty: 30 TABLET | Refills: 0 | Status: SHIPPED | OUTPATIENT
Start: 2023-10-09

## 2023-10-09 RX ORDER — INSULIN LISPRO 100 [IU]/ML
0-15 INJECTION, SOLUTION INTRAVENOUS; SUBCUTANEOUS
Start: 2023-10-09

## 2023-10-09 RX ADMIN — CLOPIDOGREL BISULFATE 75 MG: 75 TABLET ORAL at 08:41

## 2023-10-09 RX ADMIN — BUMETANIDE 1 MG: 1 TABLET ORAL at 14:15

## 2023-10-09 RX ADMIN — POLYETHYLENE GLYCOL 3350 17 G: 17 POWDER, FOR SOLUTION ORAL at 08:41

## 2023-10-09 RX ADMIN — INSULIN LISPRO 6 UNITS: 100 INJECTION, SOLUTION INTRAVENOUS; SUBCUTANEOUS at 08:42

## 2023-10-09 RX ADMIN — BUMETANIDE 1 MG: 1 TABLET ORAL at 08:41

## 2023-10-09 RX ADMIN — METOPROLOL SUCCINATE 50 MG: 50 TABLET, EXTENDED RELEASE ORAL at 11:32

## 2023-10-09 RX ADMIN — ISOSORBIDE DINITRATE 10 MG: 10 TABLET ORAL at 11:32

## 2023-10-09 RX ADMIN — SIMETHICONE 80 MG: 80 TABLET, CHEWABLE ORAL at 04:14

## 2023-10-09 RX ADMIN — NYSTATIN 500000 UNITS: 100000 SUSPENSION ORAL at 08:41

## 2023-10-09 RX ADMIN — SODIUM PHOSPHATE 1 ENEMA: 7; 19 ENEMA RECTAL at 15:04

## 2023-10-09 RX ADMIN — LIDOCAINE 1 PATCH: 4 PATCH TOPICAL at 08:41

## 2023-10-09 RX ADMIN — AMIODARONE HYDROCHLORIDE 200 MG: 200 TABLET ORAL at 14:15

## 2023-10-09 RX ADMIN — LISINOPRIL 40 MG: 20 TABLET ORAL at 11:32

## 2023-10-09 RX ADMIN — Medication: at 08:30

## 2023-10-09 RX ADMIN — DOXYCYCLINE 100 MG: 100 CAPSULE ORAL at 08:00

## 2023-10-09 ASSESSMENT — COGNITIVE AND FUNCTIONAL STATUS - GENERAL
DRESSING REGULAR LOWER BODY CLOTHING: A LOT
PERSONAL GROOMING: A LOT
HELP NEEDED FOR BATHING: A LOT
DRESSING REGULAR UPPER BODY CLOTHING: A LOT
CLIMB 3 TO 5 STEPS WITH RAILING: TOTAL
STANDING UP FROM CHAIR USING ARMS: A LOT
WALKING IN HOSPITAL ROOM: TOTAL
CLIMB 3 TO 5 STEPS WITH RAILING: TOTAL
MOVING FROM LYING ON BACK TO SITTING ON SIDE OF FLAT BED WITH BEDRAILS: A LOT
DAILY ACTIVITIY SCORE: 15
DAILY ACTIVITIY SCORE: 13
DRESSING REGULAR UPPER BODY CLOTHING: A LITTLE
HELP NEEDED FOR BATHING: A LOT
MOBILITY SCORE: 10
TOILETING: A LOT
TURNING FROM BACK TO SIDE WHILE IN FLAT BAD: A LOT
MOBILITY SCORE: 11
TOILETING: A LOT
DRESSING REGULAR LOWER BODY CLOTHING: A LOT
MOVING TO AND FROM BED TO CHAIR: A LOT
EATING MEALS: A LITTLE
MOVING TO AND FROM BED TO CHAIR: A LOT
EATING MEALS: A LITTLE
TURNING FROM BACK TO SIDE WHILE IN FLAT BAD: A LOT
STANDING UP FROM CHAIR USING ARMS: A LOT
MOVING FROM LYING ON BACK TO SITTING ON SIDE OF FLAT BED WITH BEDRAILS: A LOT
PERSONAL GROOMING: A LITTLE
WALKING IN HOSPITAL ROOM: A LOT

## 2023-10-09 ASSESSMENT — PAIN - FUNCTIONAL ASSESSMENT
PAIN_FUNCTIONAL_ASSESSMENT: 0-10
PAIN_FUNCTIONAL_ASSESSMENT: 0-10

## 2023-10-09 ASSESSMENT — PAIN SCALES - GENERAL
PAINLEVEL_OUTOF10: 0 - NO PAIN
PAINLEVEL_OUTOF10: 0 - NO PAIN

## 2023-10-09 NOTE — PROGRESS NOTES
Physical Therapy                 Therapy Communication Note    Patient Name: Melody Martin  MRN: [id number]  Today's Date: 10/9/2023     Discipline: Physical Therapy    Missed Visit Reason:  OOR    Missed Time: Attempt    Comment: pt OOR in dialysis at this time

## 2023-10-09 NOTE — CARE PLAN
The patient's goals for the shift include   Problem: Pain  Goal: My pain/discomfort is manageable  Outcome: Progressing     Problem: Safety  Goal: Patient will be injury free during hospitalization  Outcome: Progressing  Goal: I will remain free of falls  Outcome: Progressing     Problem: Daily Care  Goal: Daily care needs are met  Outcome: Progressing     Problem: Psychosocial Needs  Goal: Demonstrates ability to cope with hospitalization/illness  Outcome: Progressing  Goal: Collaborate with me, my family, and caregiver to identify my specific goals  Outcome: Progressing        The clinical goals for the shift include remain hemodynamically stable

## 2023-10-09 NOTE — PROGRESS NOTES
"      Nephrology Progress Note      Nephrology following for ESRD.   Events over night: none    Patient seen on dialysis today  Complains that she has been constipated all weekend but did have some bowel movement today       /68 (Patient Position: Lying)   Pulse 88   Temp 36.9 °C (98.4 °F) (Temporal)   Resp 16   Ht 1.626 m (5' 4.02\")   Wt 84 kg (185 lb 3 oz)   SpO2 94%   BMI 31.77 kg/m²     Input / Output:  24 HR:   Intake/Output Summary (Last 24 hours) at 10/9/2023 1134  Last data filed at 10/9/2023 0845  Gross per 24 hour   Intake 200 ml   Output 0 ml   Net 200 ml       Physical Exam  Vitals and nursing note reviewed.   Constitutional:       General: She is not in acute distress.     Appearance: She is not ill-appearing or toxic-appearing.   Cardiovascular:      Rate and Rhythm: Normal rate and regular rhythm.      Heart sounds: No murmur heard.     No friction rub. No gallop.   Pulmonary:      Effort: Pulmonary effort is normal.      Breath sounds: No wheezing, rhonchi or rales.   Abdominal:      General: There is no distension.      Palpations: Abdomen is soft.      Tenderness: There is no abdominal tenderness.   Musculoskeletal:      Cervical back: Neck supple.      Right lower leg: Edema present.      Left lower leg: Edema present.   Neurological:      Mental Status: She is alert.     Edema is improved    Scheduled medications  amiodarone, 200 mg, oral, q24h  atorvastatin, 80 mg, oral, Nightly  bumetanide, 1 mg, oral, BID  cefTRIAXone, 1 g, intravenous, q24h  clopidogrel, 75 mg, oral, Daily  doxycycline, 100 mg, oral, q12h MARIA E  insulin glargine, 20 Units, subcutaneous, Nightly  insulin lispro, 0-15 Units, subcutaneous, Before meals & nightly  isosorbide dinitrate, 10 mg, oral, TID  lidocaine, 1 patch, transdermal, Daily  lisinopril, 40 mg, oral, Daily  metoprolol succinate XL, 50 mg, oral, Daily  nystatin, 5 mL, oral, 4x daily  pantoprazole, 40 mg, oral, Daily before breakfast  polyethylene " glycol, 17 g, oral, Daily      Continuous medications     PRN medications  PRN medications: acetaminophen **OR** acetaminophen **OR** acetaminophen, acetaminophen **OR** acetaminophen **OR** acetaminophen, acetaminophen, benzocaine-menthol, dextromethorphan-guaifenesin, dextrose 10 % in water (D10W), dextrose, glucagon, heparin, heparin, hydrALAZINE, oxyCODONE, oxygen, simethicone   Results from last 7 days   Lab Units 10/09/23  0437 10/03/23  0513 10/02/23  2220   SODIUM mmol/L 138   < > 133*   POTASSIUM mmol/L 3.6   < > 3.9   CHLORIDE mmol/L 104   < > 96*   CO2 mmol/L 27   < > 30   BUN mg/dL 33*   < > 19   CREATININE mg/dL 2.32*   < > 1.57*   CALCIUM mg/dL 8.3*   < > 7.4*   PROTEIN TOTAL g/dL  --   --  5.4*   BILIRUBIN TOTAL mg/dL  --   --  0.4   ALK PHOS U/L  --   --  99   ALT U/L  --   --  16   AST U/L  --   --  26   GLUCOSE mg/dL 156*   < > 199*    < > = values in this interval not displayed.        Results from last 7 days   Lab Units 10/09/23  0437 10/08/23  0522 10/07/23  0450   MAGNESIUM mg/dL 1.56* 1.66 1.69        Results from last 7 days   Lab Units 10/09/23  0437 10/08/23  0522 10/07/23  0450   WBC AUTO x10*3/uL 10.5 10.8 10.9   HEMOGLOBIN g/dL 9.1* 9.1* 9.1*   HEMATOCRIT % 28.1* 27.7* 27.6*   PLATELETS AUTO x10*3/uL 329 304 296          Assessment & Plan:   Patient is 88 y.o. female who is admitted to hospital with complaints of  cough, blacktarry stools. Nephrology consulted in view of ESRD.     Acute kidney injury on CKD stage 3b-has been dialysis dependent  Since August 28, 2023  -Access is right [facility]  -Patient on hemodialysis Monday Wednesday Friday at [facility]  -Remains dialysis dependent to manage volume, oligoanuric-prehemodialysis creatinine 2.3  -Discharge planning back to ECF once patient condition improved and H&H remained stable       Anemia of blood loss and CKD  -Patient had colonoscopy last admission  -May have EGD  -Transfuse PRBCs per primary service  -pt is on IV Fe load as  outpatient     Volume overload  -cont UF with HD    HTN  -cont lisinopril and metoprolol and Isordil     Recommendations  -Hemodialysis today  -Trying to establish dry weight  -Cont Bumex PO  -IV hydralazine added as needed, may need to add another agent such as amlodipine if accelerated hypertension persists but seems to have been improved over the last 24 hours on lisinopril and metoprolol    Please message me through [employer] chat with any questions or concerns.     Mally Lucas DO  10/9/2023  11:34 AM     [facility]    [address] [place] [address], [address]   [place] [address]  Office: [phone]

## 2023-10-09 NOTE — PROGRESS NOTES
Spiritual Care Visit    Clinical Encounter Type  Visited With: Patient  Routine Visit: Follow-up  Continue Visiting: Yes    Bahai Encounters  Bahai Needs: Prayer                                               Skin normal color for race, warm, dry and intact. No evidence of rash.

## 2023-10-09 NOTE — DISCHARGE SUMMARY
Discharge Diagnosis  Anemia, unspecified type  Bacterial pneumonia, unspecified  Acute on chronic diastolic congestive heart failure  LATANYA/CKD stage III requiring dialysis      Issues Requiring Follow-Up      Discharge Meds     Your medication list        START taking these medications        Instructions Last Dose Given Next Dose Due   bumetanide 1 mg tablet  Commonly known as: Bumex      Take 1 tablet (1 mg) by mouth 2 times a day.       insulin glargine 100 unit/mL injection  Commonly known as: Lantus      Inject 20 Units under the skin once daily at bedtime. Take as directed per insulin instructions.       insulin lispro 100 unit/mL injection  Commonly known as: HumaLOG      Inject 0-0.15 mL (0-15 Units) under the skin 4 times a day before meals. Take as directed per insulin instructions.       OneTouch Ultra Test strip  Generic drug: blood sugar diagnostic      1 strip by Does not apply route 3 times a day.              CHANGE how you take these medications        Instructions Last Dose Given Next Dose Due   acetaminophen 325 mg tablet  Commonly known as: Tylenol  What changed: Another medication with the same name was added. Make sure you understand how and when to take each.           acetaminophen 325 mg tablet  Commonly known as: Tylenol  What changed: You were already taking a medication with the same name, and this prescription was added. Make sure you understand how and when to take each.      Take 2 tablets (650 mg) by mouth every 4 hours if needed for fever (temp greater than 38.0 C) (greater than or equal to 38 C).              CONTINUE taking these medications        Instructions Last Dose Given Next Dose Due   alum-mag hydroxide-simeth 200-200-20 mg/5 mL oral suspension  Commonly known as: Mylanta           amiodarone 200 mg tablet  Commonly known as: Pacerone           apixaban 2.5 mg tablet  Commonly known as: Eliquis           atorvastatin 80 mg tablet  Commonly known as: Lipitor            clopidogrel 75 mg tablet  Commonly known as: Plavix           Dulcolax (bisacodyl) 10 mg suppository  Generic drug: bisacodyl           insulin lispro protamin-lispro 100 unit/mL (75-25) injection  Commonly known as: HumaLOG Mix 75-25           isosorbide dinitrate 10 mg tablet  Commonly known as: Isordil           lidocaine 5 % patch  Commonly known as: Lidoderm           magnesium hydroxide 400 mg/5 mL suspension  Commonly known as: Milk of Magnesia           melatonin tablet           metoprolol succinate XL 50 mg 24 hr tablet  Commonly known as: Toprol-XL           pantoprazole 40 mg EC tablet  Commonly known as: ProtoNix           polyethylene glycol 17 gram/dose powder  Commonly known as: Glycolax, Miralax           PSYLLIUM HUSK (ASPARTAME) ORAL           simethicone 80 mg chewable tablet  Commonly known as: Mylicon                  STOP taking these medications      lisinopril 40 mg tablet        nystatin 100,000 unit/mL suspension  Commonly known as: Mycostatin                  Where to Get Your Medications        These medications were sent to [id number] - [place] [address] [address] [address]      Phone: [phone]   acetaminophen 325 mg tablet       Information about where to get these medications is not yet available    Ask your nurse or doctor about these medications  bumetanide 1 mg tablet  insulin glargine 100 unit/mL injection  insulin lispro 100 unit/mL injection         Test Results Pending At Discharge  Pending Labs       No current pending labs.            Hospital Course   Melody Martin is a 88 y.o. female with a past medical history that is significant for coronary artery disease with recent stenting, recent lower GI bleed, diabetes mellitus type 2 insulin requiring, hypertension, stroke with residual left-sided weakness, hypertension, accessible A-fib on anticoagulation, aortic stenosis, end-stage renal disease on dialysis Monday Wednesday  Friday presented 10/2/23 with increasing amount of shortness of breath cough black tarry stool.   Transfused 2u PRBC in total. Continued HD M/W/F. Rocephin/doxy for LLL Pneumonia. EGD revealed a small hiatal hernia, but was otherwise normal with no evidence of bleeding or source of blood loss.  10/06/23: No acute events overnight. Vitals stable, BP slightly elevated 155/73 (175/75)- add PRN hydralazine, nephro taking off fluid during HD today. Cr rising, due for HD today. Hgb improving at 8.9. Glucose remains elevated, recent A1c 6.1, will adjust insulin dose.   10/07: Patient was seen and examined, continues to have cough but no production of sputum.  Complains of shortness of breath.  No fever or chills, no nausea or vomiting.  10/8: No acute event overnight, patient complains of abdominal discomfort secondary to constipation and want something for it.  No nausea or vomiting, no fever or chills.  Cough is improving.  10/9: Completed antibiotic treatment for pneumonia.  Received hemodialysis for fluid management, blood pressure medication adjusted for better BP control.  Patient will be discharged back to [facility] for rehabilitation.  She will follow-up with PCP and nephrology as an outpatient. Total time spent was 40 mins.     Pertinent Physical Exam At Time of Discharge  Physical Exam  Patient is awake and orient, not in apparent distress  Eyes: PERRLA, no conjunctival congestion  Chest: Bilateral Air entry, no crackles or wheezing  Heart: s1S2 regular, no murmur  Abdomen: Soft, non tender, BS present  Ext:    Outpatient Follow-Up  Future Appointments   Date Time Provider Department Center   10/17/2023  2:30 PM Marnie Morales, JANELL-CNP [id number] [facility]   12/4/2023 12:15 PM Matr Peace MD [id number] [facility]         Keegan Saldana MD

## 2023-10-10 ENCOUNTER — APPOINTMENT (OUTPATIENT)
Dept: PRIMARY CARE | Facility: CLINIC | Age: 88
End: 2023-10-10
Payer: COMMERCIAL

## 2023-10-12 ENCOUNTER — LAB REQUISITION (OUTPATIENT)
Dept: LAB | Facility: HOSPITAL | Age: 88
End: 2023-10-12
Payer: COMMERCIAL

## 2023-10-12 DIAGNOSIS — I10 ESSENTIAL (PRIMARY) HYPERTENSION: ICD-10-CM

## 2023-10-12 DIAGNOSIS — E11.9 TYPE 2 DIABETES MELLITUS WITHOUT COMPLICATIONS (MULTI): ICD-10-CM

## 2023-10-12 PROBLEM — R79.89 ELEVATED TROPONIN: Status: ACTIVE | Noted: 2023-08-24

## 2023-10-12 PROBLEM — E46 UNSPECIFIED PROTEIN-CALORIE MALNUTRITION (MULTI): Status: ACTIVE | Noted: 2023-09-27

## 2023-10-12 PROBLEM — E11.22 TYPE 2 DIABETES MELLITUS WITH DIABETIC CHRONIC KIDNEY DISEASE (MULTI): Status: ACTIVE | Noted: 2023-02-03

## 2023-10-12 PROBLEM — Z86.73 PERSONAL HISTORY OF TRANSIENT ISCHEMIC ATTACK (TIA), AND CEREBRAL INFARCTION WITHOUT RESIDUAL DEFICITS: Status: ACTIVE | Noted: 2023-09-20

## 2023-10-12 PROBLEM — G81.94 LEFT HEMIPARESIS (MULTI): Status: ACTIVE | Noted: 2023-10-12

## 2023-10-12 PROBLEM — H66.90 ACUTE OTITIS MEDIA: Status: ACTIVE | Noted: 2023-10-12

## 2023-10-12 PROBLEM — Z99.2 DEPENDENCE ON RENAL DIALYSIS (CMS-HCC): Status: ACTIVE | Noted: 2023-09-20

## 2023-10-12 PROBLEM — I47.20 VENTRICULAR TACHYCARDIA, UNSPECIFIED (MULTI): Status: ACTIVE | Noted: 2023-09-09

## 2023-10-12 PROBLEM — E78.5 HYPERLIPIDEMIA, UNSPECIFIED: Status: ACTIVE | Noted: 2023-09-20

## 2023-10-12 PROBLEM — D68.9 COAGULATION DEFECT, UNSPECIFIED (MULTI): Status: ACTIVE | Noted: 2023-09-20

## 2023-10-12 PROBLEM — R05.9 COUGH: Status: ACTIVE | Noted: 2023-10-12

## 2023-10-12 PROBLEM — T78.40XS ALLERGY, UNSPECIFIED, SEQUELA: Status: ACTIVE | Noted: 2023-09-20

## 2023-10-12 PROBLEM — R09.89 CAROTID BRUIT: Status: ACTIVE | Noted: 2023-10-12

## 2023-10-12 PROBLEM — I69.359 HEMIPARESIS AS LATE EFFECT OF CEREBROVASCULAR ACCIDENT (CVA) (MULTI): Status: ACTIVE | Noted: 2023-09-24

## 2023-10-12 PROBLEM — Z20.822 CONTACT WITH AND (SUSPECTED) EXPOSURE TO COVID-19: Status: ACTIVE | Noted: 2023-09-09

## 2023-10-12 PROBLEM — I25.10 ATHEROSCLEROTIC HEART DISEASE OF NATIVE CORONARY ARTERY WITHOUT ANGINA PECTORIS: Status: ACTIVE | Noted: 2023-09-20

## 2023-10-12 PROBLEM — E87.5 HYPERKALEMIA: Status: ACTIVE | Noted: 2023-10-12

## 2023-10-12 PROBLEM — N17.9 ACUTE KIDNEY FAILURE, UNSPECIFIED (CMS-HCC): Status: ACTIVE | Noted: 2023-09-20

## 2023-10-12 PROBLEM — Z86.79 HISTORY OF HYPERTENSION: Status: ACTIVE | Noted: 2023-10-12

## 2023-10-12 PROBLEM — R52 PAIN, UNSPECIFIED: Status: ACTIVE | Noted: 2023-09-20

## 2023-10-12 PROBLEM — I63.9 CEREBROVASCULAR ACCIDENT (CVA) DUE TO EMBOLISM (MULTI): Status: ACTIVE | Noted: 2023-09-09

## 2023-10-12 PROBLEM — T78.2XXS ANAPHYLACTIC SHOCK, UNSPECIFIED, SEQUELA: Status: ACTIVE | Noted: 2023-09-20

## 2023-10-12 PROBLEM — R11.0 NAUSEA: Status: ACTIVE | Noted: 2023-09-28

## 2023-10-12 PROBLEM — D50.0 IRON DEFICIENCY ANEMIA DUE TO CHRONIC BLOOD LOSS: Status: ACTIVE | Noted: 2023-09-09

## 2023-10-12 PROBLEM — I12.0 HYPERTENSIVE CHRONIC KIDNEY DISEASE WITH STAGE 5 CHRONIC KIDNEY DISEASE OR END STAGE RENAL DISEASE (MULTI): Status: ACTIVE | Noted: 2023-09-20

## 2023-10-12 PROBLEM — I42.9 CARDIOMYOPATHY (MULTI): Status: ACTIVE | Noted: 2023-08-29

## 2023-10-12 PROBLEM — L97.429 ULCER OF LEFT HEEL (MULTI): Status: ACTIVE | Noted: 2023-10-12

## 2023-10-12 PROBLEM — R53.1 GENERALIZED WEAKNESS: Status: ACTIVE | Noted: 2023-08-23

## 2023-10-12 PROBLEM — I25.2 OLD MYOCARDIAL INFARCTION: Status: ACTIVE | Noted: 2023-09-09

## 2023-10-12 PROBLEM — I63.9 CVA (CEREBRAL VASCULAR ACCIDENT) (MULTI): Status: ACTIVE | Noted: 2023-10-12

## 2023-10-12 LAB
ANION GAP SERPL CALC-SCNC: 9 MMOL/L (ref 10–20)
BUN SERPL-MCNC: 22 MG/DL (ref 6–23)
CALCIUM SERPL-MCNC: 7.8 MG/DL (ref 8.6–10.3)
CHLORIDE SERPL-SCNC: 101 MMOL/L (ref 98–107)
CO2 SERPL-SCNC: 33 MMOL/L (ref 21–32)
CREAT SERPL-MCNC: 1.64 MG/DL (ref 0.5–1.05)
ERYTHROCYTE [DISTWIDTH] IN BLOOD BY AUTOMATED COUNT: 14.5 % (ref 11.5–14.5)
GFR SERPL CREATININE-BSD FRML MDRD: 30 ML/MIN/1.73M*2
GLUCOSE SERPL-MCNC: 55 MG/DL (ref 74–99)
HCT VFR BLD AUTO: 26.8 % (ref 36–46)
HGB BLD-MCNC: 8.7 G/DL (ref 12–16)
MCH RBC QN AUTO: 30.3 PG (ref 26–34)
MCHC RBC AUTO-ENTMCNC: 32.5 G/DL (ref 32–36)
MCV RBC AUTO: 93 FL (ref 80–100)
NRBC BLD-RTO: 0 /100 WBCS (ref 0–0)
PLATELET # BLD AUTO: 185 X10*3/UL (ref 150–450)
PMV BLD AUTO: 9.5 FL (ref 7.5–11.5)
POTASSIUM SERPL-SCNC: 3.5 MMOL/L (ref 3.5–5.3)
RBC # BLD AUTO: 2.87 X10*6/UL (ref 4–5.2)
SODIUM SERPL-SCNC: 139 MMOL/L (ref 136–145)
WBC # BLD AUTO: 9.5 X10*3/UL (ref 4.4–11.3)

## 2023-10-12 PROCEDURE — 80048 BASIC METABOLIC PNL TOTAL CA: CPT | Mod: OUT | Performed by: INTERNAL MEDICINE

## 2023-10-12 PROCEDURE — 85027 COMPLETE CBC AUTOMATED: CPT | Mod: OUT | Performed by: INTERNAL MEDICINE

## 2023-10-12 RX ORDER — OXYCODONE HYDROCHLORIDE 5 MG/1
5 TABLET ORAL EVERY 6 HOURS PRN
COMMUNITY
Start: 2023-10-04 | End: 2023-11-20 | Stop reason: ALTCHOICE

## 2023-10-12 RX ORDER — HEPARIN SODIUM 1000 [USP'U]/ML
2000 INJECTION, SOLUTION INTRAVENOUS; SUBCUTANEOUS
COMMUNITY
Start: 2023-10-04

## 2023-10-12 RX ORDER — ISOSORBIDE MONONITRATE 10 MG/1
TABLET ORAL EVERY 8 HOURS
COMMUNITY

## 2023-10-12 RX ORDER — BENZONATATE 100 MG/1
CAPSULE ORAL
COMMUNITY
Start: 2021-03-02

## 2023-10-12 RX ORDER — HYDROCHLOROTHIAZIDE 12.5 MG/1
1 TABLET ORAL
COMMUNITY
Start: 2023-08-18

## 2023-10-12 RX ORDER — METOPROLOL TARTRATE 100 MG/1
1 TABLET ORAL 2 TIMES DAILY
COMMUNITY
Start: 2015-08-18

## 2023-10-12 RX ORDER — HYDRALAZINE HYDROCHLORIDE 10 MG/1
TABLET, FILM COATED ORAL EVERY 8 HOURS
COMMUNITY
Start: 2023-09-07

## 2023-10-12 RX ORDER — LISINOPRIL 20 MG/1
1 TABLET ORAL NIGHTLY
COMMUNITY
Start: 2022-12-28

## 2023-10-12 RX ORDER — CILOSTAZOL 50 MG/1
TABLET ORAL EVERY 12 HOURS
COMMUNITY
Start: 2023-09-07

## 2023-10-12 RX ORDER — DEXTROSE MONOHYDRATE 100 MG/ML
0.3 INJECTION, SOLUTION INTRAVENOUS
COMMUNITY
Start: 2023-10-04

## 2023-10-12 RX ORDER — HYDRALAZINE HYDROCHLORIDE 20 MG/ML
25 INJECTION INTRAMUSCULAR; INTRAVENOUS EVERY 8 HOURS PRN
COMMUNITY
Start: 2023-09-07

## 2023-10-17 ENCOUNTER — OFFICE VISIT (OUTPATIENT)
Dept: CARDIOLOGY | Facility: CLINIC | Age: 88
End: 2023-10-17
Payer: COMMERCIAL

## 2023-10-17 VITALS
SYSTOLIC BLOOD PRESSURE: 138 MMHG | HEART RATE: 74 BPM | BODY MASS INDEX: 29.37 KG/M2 | DIASTOLIC BLOOD PRESSURE: 60 MMHG | HEIGHT: 64 IN | WEIGHT: 172 LBS

## 2023-10-17 DIAGNOSIS — I35.0 AORTIC VALVE STENOSIS, ETIOLOGY OF CARDIAC VALVE DISEASE UNSPECIFIED: ICD-10-CM

## 2023-10-17 DIAGNOSIS — I25.10 ATHEROSCLEROSIS OF NATIVE CORONARY ARTERY WITHOUT ANGINA PECTORIS, UNSPECIFIED WHETHER NATIVE OR TRANSPLANTED HEART: Primary | ICD-10-CM

## 2023-10-17 DIAGNOSIS — I48.91 ATRIAL FIBRILLATION, UNSPECIFIED TYPE (MULTI): ICD-10-CM

## 2023-10-17 DIAGNOSIS — I10 BENIGN ESSENTIAL HTN: ICD-10-CM

## 2023-10-17 PROCEDURE — 1126F AMNT PAIN NOTED NONE PRSNT: CPT | Performed by: NURSE PRACTITIONER

## 2023-10-17 PROCEDURE — 1036F TOBACCO NON-USER: CPT | Performed by: NURSE PRACTITIONER

## 2023-10-17 PROCEDURE — 1160F RVW MEDS BY RX/DR IN RCRD: CPT | Performed by: NURSE PRACTITIONER

## 2023-10-17 PROCEDURE — 99214 OFFICE O/P EST MOD 30 MIN: CPT | Performed by: NURSE PRACTITIONER

## 2023-10-17 PROCEDURE — 1159F MED LIST DOCD IN RCRD: CPT | Performed by: NURSE PRACTITIONER

## 2023-10-17 PROCEDURE — 3078F DIAST BP <80 MM HG: CPT | Performed by: NURSE PRACTITIONER

## 2023-10-17 PROCEDURE — 3075F SYST BP GE 130 - 139MM HG: CPT | Performed by: NURSE PRACTITIONER

## 2023-10-17 PROCEDURE — 1111F DSCHRG MED/CURRENT MED MERGE: CPT | Performed by: NURSE PRACTITIONER

## 2023-10-17 ASSESSMENT — ENCOUNTER SYMPTOMS
HEMATURIA: 0
IRREGULAR HEARTBEAT: 0
COUGH: 1
CHILLS: 0
SYNCOPE: 0
FEVER: 0
WHEEZING: 0
NEAR-SYNCOPE: 0
SHORTNESS OF BREATH: 0
HEMATOCHEZIA: 0
ORTHOPNEA: 0
ALTERED MENTAL STATUS: 0
NAUSEA: 0
PALPITATIONS: 0
DYSPNEA ON EXERTION: 0
VOMITING: 0

## 2023-10-17 NOTE — PROGRESS NOTES
Chief Complaint/Reason for Visit:  No chief complaint on file.  Status post hospitalization follow up    History Of Present Illness:    Melody Martin is a 88 y.o. female that presents to the office with her daughter for Status post hospitalization follow up.  Taking medications as prescribed.     She was admitted to [facility] 10/3/2023 through 10/9/2023 and was treated for anemia, bacterial pneumonia and acute on chronic diastolic heart failure.  She had presented to the ED with shortness of breath, cough and black tarry stools.  She received 2 unit PRBC.  She she has now been undergoing hemodialysis.    During the course of admission she had EGD 10/5/23 which showed a small hiatal hernia, but was otherwise normal with no evidence of bleeding or source of blood loss.  She had previously had a recent colonoscopy which showed hemorrhoidal bleed.  She has been since restarted on apixaban 2.5 mg twice daily along with clopidogrel 75 mg daily.  Pletal was discontinued.    Reviewed medication list that sent from [facility].  I do not see apixaban on the list.  Currently in a wheelchair for this visit.    Past Medical History:  She has a past medical history of Chronic kidney disease, Heart murmur, Hypertension, Myocardial infarction (CMS/HCC), Other conditions influencing health status (12/06/2022), Other conditions influencing health status (04/06/2016), Personal history of other diseases of the circulatory system, Personal history of other diseases of the female genital tract, Personal history of other diseases of the nervous system and sense organs (10/15/2021), Personal history of other endocrine, nutritional and metabolic disease (01/26/2018), and Stroke (CMS/HCC).    Past Surgical History:  She has a past surgical history that includes Hysterectomy (10/10/2018); Back surgery (10/10/2018); IR CVC tunneled (8/28/2023); MR angio neck wo IV contrast (8/31/2023); and MR angio head wo IV contrast  (8/31/2023).      Social History:  She reports that she has never smoked. She has never used smokeless tobacco. She reports that she does not drink alcohol and does not use drugs.    Family History:  Family History   Problem Relation Name Age of Onset    No Known Problems Mother      No Known Problems Father          Allergies:  Aspirin, Amlodipine, Levofloxacin, and Sulfamethoxazole-trimethoprim    Review of Systems   Constitutional: Negative for chills and fever.   HENT:  Positive for congestion (nasal congestion, PND).    Cardiovascular:  Negative for chest pain, dyspnea on exertion, irregular heartbeat, leg swelling, near-syncope, orthopnea, palpitations and syncope.   Respiratory:  Positive for cough. Negative for shortness of breath and wheezing.    Gastrointestinal:  Negative for hematochezia, melena, nausea and vomiting.   Genitourinary:  Negative for hematuria.   Psychiatric/Behavioral:  Negative for altered mental status.        Objective      Vitals reviewed.   Constitutional:       Appearance: Not in distress. Frail.   Pulmonary:      Effort: Pulmonary effort is normal.      Breath sounds: Normal breath sounds.   Cardiovascular:      PMI at left midclavicular line. Normal rate. Regular rhythm. S1 with normal intensity. S2 with normal intensity.       Murmurs: There is no murmur.   Edema:     Peripheral edema present.     Pretibial: bilateral trace edema of the pretibial area.     Ankle: bilateral trace edema of the ankle.     Feet: bilateral trace edema of the feet.  Abdominal:      General: Bowel sounds are normal.   Neurological:      Mental Status: Alert and oriented to person, place and time.         Current Outpatient Medications   Medication Instructions    acetaminophen (TYLENOL) 650 mg, oral, Every 6 hours PRN    acetaminophen (TYLENOL) 650 mg, oral, Every 4 hours PRN    alum-mag hydroxide-simeth (Mylanta) 200-200-20 mg/5 mL oral suspension 30 mL, oral, Every 4 hours PRN    amiodarone (PACERONE)  200 mg, oral, Every 24 hours    apixaban (ELIQUIS) 2.5 mg, oral, 2 times daily    atorvastatin (LIPITOR) 80 mg, oral, Nightly    benzocaine-menthol (Cepastat Sore Throat) 15-3.6 mg lozenge 1 lozenge, Mouth/Throat    benzonatate (Tessalon) 100 mg capsule oral    bisacodyl (DULCOLAX (BISACODYL)) 10 mg, rectal, Daily PRN    blood sugar diagnostic (OneTouch Ultra Test) strip 1 strip, Does not apply, 3 times daily    bumetanide (BUMEX) 1 mg, oral, 2 times daily (0900,1400)    cilostazol (Pletal) 50 mg tablet oral, Every 12 hours    clopidogrel (PLAVIX) 75 mg, oral, Daily    dextrose 10 % in water, D10W, 10 % infusion 0.3 g/kg/hr, intravenous    glucagon (GLUCAGEN) 1 mg, intramuscular    heparin 2,000 Units, intra-catheter    hydrALAZINE (Apresoline) 10 mg tablet oral, Every 8 hours    hydrALAZINE (APRESOLINE) 25 mg, intravenous, Every 8 hours PRN    hydroCHLOROthiazide (HYDRODiuril) 12.5 mg tablet 1 tablet, oral, Daily before breakfast    insulin glargine (LANTUS) 20 Units, subcutaneous, Nightly, Take as directed per insulin instructions.    insulin lispro (HUMALOG) 0-15 Units, subcutaneous, 4 times daily before meals and nightly, Take as directed per insulin instructions.    insulin lispro protamin-lispro (HumaLOG Mix 75-25) 100 unit/mL (75-25) injection 40 Units, subcutaneous, Every morning, And inject 20 units under the skin once daily at night    isosorbide dinitrate (Isordil) 10 mg tablet 1 tablet, oral, 3 times daily    isosorbide mononitrate 10 mg tablet Every 8 hours    lidocaine (Lidoderm) 5 % patch 1 patch, transdermal, Daily, Remove & discard patch within 12 hours or as directed by MD. Apply to left hip.    lisinopril 20 mg tablet 1 tablet, oral, Nightly    magnesium hydroxide (Milk of Magnesia) 400 mg/5 mL suspension 30 mL, oral, Daily PRN    melatonin 2 mg, oral, Nightly PRN    methoxy peg-epoetin beta (MIRCERA INJ) 50 mcg, Every 14 days    metoprolol succinate XL (TOPROL-XL) 50 mg, oral, Daily, Do not crush  or chew.    metoprolol tartrate (Lopressor) 100 mg tablet 1 tablet, oral, 2 times daily    oxyCODONE (ROXICODONE) 5 mg, oral, Every 6 hours PRN    pantoprazole (PROTONIX) 40 mg, oral, Daily before breakfast, Do not crush, chew, or split.    polyethylene glycol (GLYCOLAX, MIRALAX) 17 g, oral, Daily PRN    PSYLLIUM HUSK, ASPARTAME, ORAL 1 packet, oral, Daily PRN    simethicone (MYLICON) 80 mg, oral, Every 6 hours PRN    tuberculin,purif.prot.deriv. (TUBERSOL IDRM) 0.1 mL, intradermal        Last Labs:  CBC -  Lab Results   Component Value Date    WBC 9.5 10/12/2023    HGB 8.7 (L) 10/12/2023    HCT 26.8 (L) 10/12/2023    MCV 93 10/12/2023     10/12/2023       CMP -  Lab Results   Component Value Date    CALCIUM 7.8 (L) 10/12/2023    PHOS 3.2 10/06/2023    PROT 5.4 (L) 10/02/2023    ALBUMIN 2.6 (L) 10/02/2023    AST 26 10/02/2023    ALT 16 10/02/2023    ALKPHOS 99 10/02/2023    BILITOT 0.4 10/02/2023       LIPID PANEL -   Lab Results   Component Value Date    CHOL 139 08/24/2023    TRIG 105 08/24/2023    HDL 43.9 08/24/2023    CHHDL 3.2 08/24/2023    LDLF 74 08/24/2023    VLDL 21 08/24/2023       RENAL FUNCTION PANEL -   Lab Results   Component Value Date    GLUCOSE 55 (L) 10/12/2023     10/12/2023    K 3.5 10/12/2023     10/12/2023    CO2 33 (H) 10/12/2023    ANIONGAP 9 (L) 10/12/2023    BUN 22 10/12/2023    CREATININE 1.64 (H) 10/12/2023    GFRMALE CANCELED 09/25/2023    CALCIUM 7.8 (L) 10/12/2023    PHOS 3.2 10/06/2023    ALBUMIN 2.6 (L) 10/02/2023        Lab Results   Component Value Date     (H) 08/23/2023    HGBA1C 6.1 (H) 09/11/2023     Lab Results   Component Value Date    TSH 2.20 08/27/2023      No results found for this or any previous visit.     Last Cardiology Tests:    Echo 8/29/23:   1. Left ventricular systolic function is normal with a 50-55% estimated ejection fraction.  2. There is a large pleural effusion.    TTE 8/7/23:   1. Left ventricular systolic function is low normal  "with a 60-65% estimated ejection fraction.  2. Spectral Doppler shows an impaired relaxation pattern of left ventricular diastolic filling.  3. There is low normal right ventricular systolic function.  4. RVSP within normal limits.  5. Mild aortic valve stenosis.  6. Mild aortic valve regurgitation.  7. There is global hypokinesis of the left ventricle with minor regional variations.    [facility] 8/25/23:   1. Single vessel disease.  2. Culprit vessel(s): left anterior descending.  3. Successful PCI of LAD.  4. Left Ventricular end-diastolic pressure = 12.  5. Normal LV filling pressures.      Visit Vitals  /60   Pulse 74   Ht 1.626 m (5' 4\")   Wt 78 kg (172 lb)   BMI 29.52 kg/m²   OB Status Postmenopausal   Smoking Status Never   BSA 1.88 m²       Assessment/Plan   The primary encounter diagnosis was Atherosclerosis of native coronary artery without angina pectoris, unspecified whether native or transplanted heart. Diagnoses of Atrial fibrillation, unspecified type (CMS/HCC), Benign essential HTN, and Aortic valve stenosis, etiology of cardiac valve disease unspecified were also pertinent to this visit.    1.  CAD s/p NSTEMI with PCI LAD Aug 2023  Continue clopidogrel 75 mg daily. Pletal stopped previously d/t anemia and she is also on apixaban    2.  Paroxysmal atrial fibrillation  She does have history of stroke  [id number] score is  Continue apixaban at 2.5 mg twice daily (on HD, but has had issues with anemia requiring PRBC recently)  I do not see apixaban on medication list from the [facility].  Wrote in patient instructions to take apixaban 2.5 mg BID and daughter is going to check with nursing staff.  Continue metoprolol succinate 50 mg daily and amiodarone    3. HTN  Stable  Renal artery duplex 12/30/2022 with findings consistent with greater than 60% stenosis of the right renal artery  Renal angio 02/10/2023 with no significant renal artery stenosis  Amlodipine stopped in the past d/t dizziness  Continue " lisinopril-HCTZ 20-25 mg a.m., metoprolol 100 mg BID, lisinopril 20 mg p.m.  Continue home BP monitoring - monitor BP at times of dizziness to ensure no drops in BP to account for dizziness  14 day Holter monitor March 2023 with short runs of paroxysmal SVT - continue beta blocker  Carotid Duplex with less than 50% stenosis bilateral proximal ICA     4. Aortic valve stenosis  Mild to moderate AS, mild aortic valve regurgitation TTE July 2022  TTE Aug 2023 with mild AS    5. CKD on HD  Management per nephrology - Dr. Lucas    6. Anemia  Management per PCP  Hgb 8.7 on 10/12/23  Denies any active bleeding    I have spent 40 minutes on the care of this patient.    Marnie Morales, APRN-CNP

## 2023-10-17 NOTE — PATIENT INSTRUCTIONS
Recommend Mediterranean style of eating  Take apixaban/Eliquis 2.5 mg twice a day.  Make sure they are giving this to you at [place]  Continue clopidogrel 75 mg daily  Follow-up with Dr. Peace in 2 months  If you have any questions or cardiac concerns, please call our office at [phone].

## 2023-10-23 ENCOUNTER — LAB REQUISITION (OUTPATIENT)
Dept: LAB | Facility: HOSPITAL | Age: 88
End: 2023-10-23
Payer: COMMERCIAL

## 2023-10-23 DIAGNOSIS — I10 ESSENTIAL (PRIMARY) HYPERTENSION: ICD-10-CM

## 2023-10-23 DIAGNOSIS — D64.9 ANEMIA, UNSPECIFIED: ICD-10-CM

## 2023-10-23 LAB
ANION GAP SERPL CALC-SCNC: 13 MMOL/L (ref 10–20)
BUN SERPL-MCNC: 52 MG/DL (ref 6–23)
CALCIUM SERPL-MCNC: 8.9 MG/DL (ref 8.6–10.3)
CHLORIDE SERPL-SCNC: 95 MMOL/L (ref 98–107)
CO2 SERPL-SCNC: 30 MMOL/L (ref 21–32)
CREAT SERPL-MCNC: 2.22 MG/DL (ref 0.5–1.05)
ERYTHROCYTE [DISTWIDTH] IN BLOOD BY AUTOMATED COUNT: 14.6 % (ref 11.5–14.5)
GFR SERPL CREATININE-BSD FRML MDRD: 21 ML/MIN/1.73M*2
GLUCOSE SERPL-MCNC: 245 MG/DL (ref 74–99)
HCT VFR BLD AUTO: 29.6 % (ref 36–46)
HGB BLD-MCNC: 9.8 G/DL (ref 12–16)
MCH RBC QN AUTO: 30.6 PG (ref 26–34)
MCHC RBC AUTO-ENTMCNC: 33.1 G/DL (ref 32–36)
MCV RBC AUTO: 93 FL (ref 80–100)
NRBC BLD-RTO: 0 /100 WBCS (ref 0–0)
PLATELET # BLD AUTO: 345 X10*3/UL (ref 150–450)
PMV BLD AUTO: 9.3 FL (ref 7.5–11.5)
POTASSIUM SERPL-SCNC: 4.6 MMOL/L (ref 3.5–5.3)
RBC # BLD AUTO: 3.2 X10*6/UL (ref 4–5.2)
SODIUM SERPL-SCNC: 133 MMOL/L (ref 136–145)
WBC # BLD AUTO: 10.7 X10*3/UL (ref 4.4–11.3)

## 2023-10-23 PROCEDURE — 80048 BASIC METABOLIC PNL TOTAL CA: CPT | Mod: OUT | Performed by: INTERNAL MEDICINE

## 2023-10-23 PROCEDURE — 85027 COMPLETE CBC AUTOMATED: CPT | Mod: OUT | Performed by: INTERNAL MEDICINE

## 2023-10-23 PROCEDURE — 80048 BASIC METABOLIC PNL TOTAL CA: CPT

## 2023-10-23 PROCEDURE — 85027 COMPLETE CBC AUTOMATED: CPT

## 2023-11-09 ENCOUNTER — OFFICE VISIT (OUTPATIENT)
Dept: VASCULAR SURGERY | Facility: CLINIC | Age: 88
End: 2023-11-09
Payer: COMMERCIAL

## 2023-11-09 VITALS
BODY MASS INDEX: 29.52 KG/M2 | SYSTOLIC BLOOD PRESSURE: 165 MMHG | WEIGHT: 172 LBS | HEART RATE: 71 BPM | DIASTOLIC BLOOD PRESSURE: 57 MMHG

## 2023-11-09 DIAGNOSIS — S81.802A WOUND OF LEFT LOWER EXTREMITY, INITIAL ENCOUNTER: Primary | ICD-10-CM

## 2023-11-09 DIAGNOSIS — M79.89 LEG SWELLING: ICD-10-CM

## 2023-11-09 DIAGNOSIS — I73.9 LEFT LEG CLAUDICATION (CMS-HCC): ICD-10-CM

## 2023-11-09 PROCEDURE — 1036F TOBACCO NON-USER: CPT | Performed by: INTERNAL MEDICINE

## 2023-11-09 PROCEDURE — 1126F AMNT PAIN NOTED NONE PRSNT: CPT | Performed by: INTERNAL MEDICINE

## 2023-11-09 PROCEDURE — 99203 OFFICE O/P NEW LOW 30 MIN: CPT | Performed by: INTERNAL MEDICINE

## 2023-11-09 PROCEDURE — 3078F DIAST BP <80 MM HG: CPT | Performed by: INTERNAL MEDICINE

## 2023-11-09 PROCEDURE — 3077F SYST BP >= 140 MM HG: CPT | Performed by: INTERNAL MEDICINE

## 2023-11-09 PROCEDURE — 1160F RVW MEDS BY RX/DR IN RCRD: CPT | Performed by: INTERNAL MEDICINE

## 2023-11-09 PROCEDURE — 1159F MED LIST DOCD IN RCRD: CPT | Performed by: INTERNAL MEDICINE

## 2023-11-09 ASSESSMENT — ENCOUNTER SYMPTOMS
EYES NEGATIVE: 1
RESPIRATORY NEGATIVE: 1
ENDOCRINE NEGATIVE: 1
WOUND: 1
NEUROLOGICAL NEGATIVE: 1
HEMATOLOGIC/LYMPHATIC NEGATIVE: 1
CARDIOVASCULAR NEGATIVE: 1
GASTROINTESTINAL NEGATIVE: 1
PSYCHIATRIC NEGATIVE: 1
ALLERGIC/IMMUNOLOGIC NEGATIVE: 1
MUSCULOSKELETAL NEGATIVE: 1
CONSTITUTIONAL NEGATIVE: 1

## 2023-11-09 NOTE — PROGRESS NOTES
Subjective   Patient ID: Melody Martin is a 88 y.o. female who presents for New Patient Visit (Non healing wound on foot).  HPI  88 year old female with a past medical history of CAD s/p PCI, CKD s/p temporary dialysis with right upper chest port, stroke, HTN, afib and diabetes mellitus that presents to the office for initial consult for a wound on her left heel. She presents to the office with her daughter. Daughter states that her health has declined in Aug this year. She had an MI, stroke and placed on temporary dialysis. She is in a wheelchair, remains in the [facility]. She states the wound in her left heel- has been there since Aug. Does appear to be a pressure ulcer. She does have swelling in her legs b/l. Has a palpable pulse    Review of Systems   Constitutional: Negative.    HENT: Negative.     Eyes: Negative.    Respiratory: Negative.     Cardiovascular: Negative.    Gastrointestinal: Negative.    Endocrine: Negative.    Genitourinary: Negative.    Musculoskeletal: Negative.    Skin:  Positive for wound.   Allergic/Immunologic: Negative.    Neurological: Negative.    Hematological: Negative.    Psychiatric/Behavioral: Negative.         Objective   Physical Exam  Constitutional:       Appearance: She is obese.   Eyes:      Pupils: Pupils are equal, round, and reactive to light.   Cardiovascular:      Rate and Rhythm: Normal rate.   Pulmonary:      Effort: Pulmonary effort is normal.   Abdominal:      General: Bowel sounds are normal.   Musculoskeletal:         General: Normal range of motion.      Cervical back: Normal range of motion.   Skin:     General: Skin is warm and dry.      Capillary Refill: Capillary refill takes less than 2 seconds.   Neurological:      General: No focal deficit present.      Mental Status: She is alert.   Psychiatric:         Mood and Affect: Mood normal.         Assessment/Plan   88 year old female with a past medical history of CAD s/p PCI, CKD s/p temporary dialysis with  right upper chest port, stroke, HTN, afib and diabetes mellitus that presents to the office for initial consult for a wound on her left heel.    Plan:  Will order a PVR to rule out PAD  Due to leg swelling will check a left leg venous insufficiency   Will have you follow up with Dr. De Luna after testing  I do suspect her wound on her left heel to be a pressure ulcer  Recommend compression stockings 20-30 mmhg, rest and elevation  Referral to the wound clinic

## 2023-11-20 ENCOUNTER — NURSING HOME VISIT (OUTPATIENT)
Dept: POST ACUTE CARE | Facility: EXTERNAL LOCATION | Age: 88
End: 2023-11-20
Payer: COMMERCIAL

## 2023-11-20 DIAGNOSIS — Z99.2 TYPE 2 DIABETES MELLITUS WITH CHRONIC KIDNEY DISEASE ON CHRONIC DIALYSIS, WITH LONG-TERM CURRENT USE OF INSULIN (MULTI): ICD-10-CM

## 2023-11-20 DIAGNOSIS — Z99.2 HYPERTENSIVE HEART AND KIDNEY DISEASE WITH CHRONIC DIASTOLIC CONGESTIVE HEART FAILURE AND STAGE 5 CHRONIC KIDNEY DISEASE ON CHRONIC DIALYSIS (MULTI): ICD-10-CM

## 2023-11-20 DIAGNOSIS — R53.1 WEAKNESS: Primary | ICD-10-CM

## 2023-11-20 DIAGNOSIS — N18.6 TYPE 2 DIABETES MELLITUS WITH CHRONIC KIDNEY DISEASE ON CHRONIC DIALYSIS, WITH LONG-TERM CURRENT USE OF INSULIN (MULTI): ICD-10-CM

## 2023-11-20 DIAGNOSIS — N18.6 HYPERTENSIVE HEART AND KIDNEY DISEASE WITH CHRONIC DIASTOLIC CONGESTIVE HEART FAILURE AND STAGE 5 CHRONIC KIDNEY DISEASE ON CHRONIC DIALYSIS (MULTI): ICD-10-CM

## 2023-11-20 DIAGNOSIS — E11.22 TYPE 2 DIABETES MELLITUS WITH CHRONIC KIDNEY DISEASE ON CHRONIC DIALYSIS, WITH LONG-TERM CURRENT USE OF INSULIN (MULTI): ICD-10-CM

## 2023-11-20 DIAGNOSIS — I13.2 HYPERTENSIVE HEART AND KIDNEY DISEASE WITH CHRONIC DIASTOLIC CONGESTIVE HEART FAILURE AND STAGE 5 CHRONIC KIDNEY DISEASE ON CHRONIC DIALYSIS (MULTI): ICD-10-CM

## 2023-11-20 DIAGNOSIS — I48.91 ATRIAL FIBRILLATION, UNSPECIFIED TYPE (MULTI): ICD-10-CM

## 2023-11-20 DIAGNOSIS — Z79.4 TYPE 2 DIABETES MELLITUS WITH CHRONIC KIDNEY DISEASE ON CHRONIC DIALYSIS, WITH LONG-TERM CURRENT USE OF INSULIN (MULTI): ICD-10-CM

## 2023-11-20 DIAGNOSIS — L97.429 HEEL ULCERATION, LEFT, WITH UNSPECIFIED SEVERITY (MULTI): ICD-10-CM

## 2023-11-20 DIAGNOSIS — I50.32 HYPERTENSIVE HEART AND KIDNEY DISEASE WITH CHRONIC DIASTOLIC CONGESTIVE HEART FAILURE AND STAGE 5 CHRONIC KIDNEY DISEASE ON CHRONIC DIALYSIS (MULTI): ICD-10-CM

## 2023-11-20 DIAGNOSIS — M54.9 BACK PAIN, UNSPECIFIED BACK LOCATION, UNSPECIFIED BACK PAIN LATERALITY, UNSPECIFIED CHRONICITY: Primary | ICD-10-CM

## 2023-11-20 DIAGNOSIS — Z86.73 HISTORY OF CVA (CEREBROVASCULAR ACCIDENT): ICD-10-CM

## 2023-11-20 PROCEDURE — 99309 SBSQ NF CARE MODERATE MDM 30: CPT | Performed by: NURSE PRACTITIONER

## 2023-11-20 RX ORDER — TRAMADOL HYDROCHLORIDE 50 MG/1
25 TABLET ORAL EVERY 12 HOURS PRN
Qty: 30 TABLET | Refills: 1 | Status: SHIPPED | OUTPATIENT
Start: 2023-11-20 | End: 2024-01-15 | Stop reason: SDUPTHER

## 2023-11-20 NOTE — ASSESSMENT & PLAN NOTE
BP at goal  CHF stable, no sob  Remove extra fluid in dialysis  HD per nephrology, tolerating treatments  Isosorbide Dinitrate  Metoprolol   Renal cap  Renal diet

## 2023-11-20 NOTE — PROGRESS NOTES
PROGRESS NOTE    Subjective   Chief complaint: Melody Martin is a 88 y.o. female who is an acute skilled patient being seen and evaluated for weakness    HPI:  HPI  This patient was admitted to SNF for therapy due to generalized weakness and for medical management after recent hospitalization for left heel wound.  Patient with a diagnosis of ESRD, on HD, tolerating well.  Therapy to evaluate and treat patient seen and examined at bedside in no apparent distress.  Nursing staff voices no new concerns today.  Patient denies pain nausea vomiting fever chills.     Objective   Vital signs: 132/70, 98.3, 70, 18, blood sugar 225    Physical Exam  Constitutional:       General: She is not in acute distress.  Eyes:      Extraocular Movements: Extraocular movements intact.   Pulmonary:      Effort: Pulmonary effort is normal.   Musculoskeletal:      Cervical back: Neck supple.      Right lower leg: Edema present.      Left lower leg: Edema present.      Comments: Generalized weakness   Skin:     Comments: Dressing to left foot clean dry and intact   Neurological:      Mental Status: She is alert.   Psychiatric:         Mood and Affect: Mood normal.         Behavior: Behavior is cooperative.         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)     Wound doctor has been consulted and will be responsible for the evaluation and comprehensive management of of the wound including appropriate control of complicating factors such as unrelieved pressure, infection, vascular and/or uncontrolled metabolic derangement, and/or nutritional deficiency in addition to appropriate debridement.    Wound care per wound doctor  Consult dietitian  Turn in position when in bed  Offloading boots  Air mattress  Therapy           Weakness - Primary     Therapy to evaluate and treat          Medications, treatments, and labs reviewed  Continue medications and treatments as listed in EMR      Scribe Attestation  I, Phoebe Martinez   attest that this documentation has been prepared under the direction and in the presence of MAR Johnson    Provider Attestation - Scribe documentation  All medical record entries made by the Scribe were at my direction and personally dictated by me. I have reviewed the chart and agree that the record accurately reflects my personal performance of the history, physical exam, discussion and plan.   MAR Johnson

## 2023-11-20 NOTE — LETTER
Patient: Melody Martin  : 1935    Encounter Date: 2023    PROGRESS NOTE    Subjective  Chief complaint: Melody Martin is a 88 y.o. female who is an acute skilled patient being seen and evaluated for weakness    HPI:  HPI  This patient was admitted to SNF for therapy due to generalized weakness and for medical management after recent hospitalization for left heel wound.  Patient with a diagnosis of ESRD, on HD, tolerating well.  Therapy to evaluate and treat patient seen and examined at bedside in no apparent distress.  Nursing staff voices no new concerns today.  Patient denies pain nausea vomiting fever chills.     Objective  Vital signs: 132/70, 98.3, 70, 18, blood sugar 225    Physical Exam  Constitutional:       General: She is not in acute distress.  Eyes:      Extraocular Movements: Extraocular movements intact.   Pulmonary:      Effort: Pulmonary effort is normal.   Musculoskeletal:      Cervical back: Neck supple.      Right lower leg: Edema present.      Left lower leg: Edema present.      Comments: Generalized weakness   Skin:     Comments: Dressing to left foot clean dry and intact   Neurological:      Mental Status: She is alert.   Psychiatric:         Mood and Affect: Mood normal.         Behavior: Behavior is cooperative.         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 (CMS/HCC)     BP at goal  CHF stable, no sob  Remove extra fluid in dialysis  HD per nephrology, tolerating treatments  Isosorbide Dinitrate  Metoprolol   Renal cap  Renal diet           Type 2 diabetes mellitus with chronic kidney disease on chronic dialysis, with long-term current  use of insulin (CMS/[facility])     Monitor Glucoscan  Lantus  Humalog         Ulcer of left heel (CMS/[facility])     Wound doctor has been consulted and will be responsible for the evaluation and comprehensive management of of the wound including appropriate control of complicating factors such as unrelieved pressure, infection, vascular and/or uncontrolled metabolic derangement, and/or nutritional deficiency in addition to appropriate debridement.    Wound care per wound doctor  Consult dietitian  Turn in position when in bed  Offloading boots  Air mattress  Therapy           Weakness - Primary     Therapy to evaluate and treat          Medications, treatments, and labs reviewed  Continue medications and treatments as listed in EMR      Scribe Attestation  I, Phoebe Martinez   attest that this documentation has been prepared under the direction and in the presence of MAR Johnson    Provider Attestation - Scribe documentation  All medical record entries made by the Scribe were at my direction and personally dictated by me. I have reviewed the chart and agree that the record accurately reflects my personal performance of the history, physical exam, discussion and plan.   MAR Johnson        Electronically Signed By: MAR Johnson   11/23/23  9:17 AM

## 2023-11-20 NOTE — ASSESSMENT & PLAN NOTE
Wound doctor has been consulted and will be responsible for the evaluation and comprehensive management of of the wound including appropriate control of complicating factors such as unrelieved pressure, infection, vascular and/or uncontrolled metabolic derangement, and/or nutritional deficiency in addition to appropriate debridement.    Wound care per wound doctor  Consult dietitian  Turn in position when in bed  Offloading boots  Air mattress  Therapy

## 2023-11-21 ENCOUNTER — NURSING HOME VISIT (OUTPATIENT)
Dept: POST ACUTE CARE | Facility: EXTERNAL LOCATION | Age: 88
End: 2023-11-21
Payer: COMMERCIAL

## 2023-11-21 DIAGNOSIS — I12.0 HYPERTENSIVE CHRONIC KIDNEY DISEASE WITH STAGE 5 CHRONIC KIDNEY DISEASE OR END STAGE RENAL DISEASE (MULTI): Primary | ICD-10-CM

## 2023-11-21 DIAGNOSIS — E11.22 TYPE 2 DIABETES MELLITUS WITH CHRONIC KIDNEY DISEASE, WITHOUT LONG-TERM CURRENT USE OF INSULIN, UNSPECIFIED CKD STAGE (MULTI): ICD-10-CM

## 2023-11-21 DIAGNOSIS — D50.0 IRON DEFICIENCY ANEMIA DUE TO CHRONIC BLOOD LOSS: ICD-10-CM

## 2023-11-21 DIAGNOSIS — L97.429 HEEL ULCERATION, LEFT, WITH UNSPECIFIED SEVERITY (MULTI): ICD-10-CM

## 2023-11-21 DIAGNOSIS — R53.1 WEAKNESS: ICD-10-CM

## 2023-11-21 PROBLEM — S91.301A NON-HEALING OPEN WOUND OF RIGHT HEEL: Status: ACTIVE | Noted: 2023-11-21

## 2023-11-21 PROCEDURE — 99305 1ST NF CARE MODERATE MDM 35: CPT | Performed by: INTERNAL MEDICINE

## 2023-11-21 NOTE — PROGRESS NOTES
HISTORY & PHYSICAL    Subjective   Chief complaint: Melody Martin is a 88 y.o. female who is a acute skilled care patient being seen and evaluated for multiple medical problems.  Patient presents for weakness.    HPI:  Patient is a 88 year old female with a past medical history of diabetes, HTN, and LATANYA. Patient was admitted to the skilled nursing facility after being in the hospital. Patient presented to the ED for a left heel wound with concern for osteomyelitis. Patient was consulted by wound care. Patient was evaluated by PT/OT and discharged to a SNF.        Past Medical History:   Diagnosis Date    Chronic kidney disease     Heart murmur     Hypertension     Myocardial infarction (CMS/HCC)     Other conditions influencing health status 12/06/2022    History of cough    Other conditions influencing health status 04/06/2016    Diabetes mellitus type 1, uncontrolled    Personal history of other diseases of the circulatory system     History of hypertension    Personal history of other diseases of the female genital tract     History of endometriosis    Personal history of other diseases of the nervous system and sense organs 10/15/2021    History of acute otitis media    Personal history of other endocrine, nutritional and metabolic disease 01/26/2018    History of diabetes mellitus    Stroke (CMS/HCC)        Past Surgical History:   Procedure Laterality Date    BACK SURGERY  10/10/2018    Back Surgery    HYSTERECTOMY  10/10/2018    Hysterectomy    IR CVC TUNNELED  8/28/2023    IR CVC TUNNELED 8/28/2023 P[facility] ANGIO    MR HEAD ANGIO WO IV CONTRAST  8/31/2023    MR HEAD ANGIO WO IV CONTRAST 8/31/2023 POR MRI    MR NECK ANGIO WO IV CONTRAST  8/31/2023    MR NECK ANGIO WO IV CONTRAST 8/31/2023 P[facility] MRI       Family History   Problem Relation Name Age of Onset    No Known Problems Mother      No Known Problems Father         Social History     Socioeconomic History    Marital status:      Spouse name: Not on file     Number of children: Not on file    Years of education: Not on file    Highest education level: Not on file   Occupational History    Not on file   Tobacco Use    Smoking status: Never    Smokeless tobacco: Never   Substance and Sexual Activity    Alcohol use: Never    Drug use: Never    Sexual activity: Not on file   Other Topics Concern    Not on file   Social History Narrative    Not on file     Social Determinants of Health     Financial Resource Strain: Not on file   Food Insecurity: Not on file   Transportation Needs: Not on file   Physical Activity: Not on file   Stress: Not on file   Social Connections: Not on file   Intimate Partner Violence: Not on file   Housing Stability: Not on file       Vital signs: 132/70, 98.3, 70, 18, 209.0, 93%    Objective   Physical Exam  Vitals reviewed.   Constitutional:       Appearance: Normal appearance.   HENT:      Head: Normocephalic and atraumatic.   Cardiovascular:      Rate and Rhythm: Normal rate and regular rhythm.   Pulmonary:      Effort: Pulmonary effort is normal.      Breath sounds: Normal breath sounds.   Abdominal:      General: Bowel sounds are normal.      Palpations: Abdomen is soft.   Musculoskeletal:      Cervical back: Neck supple.   Skin:     General: Skin is warm and dry.      Comments: Right heel wound with a dressing on it I did not take the dressing off since the wound care team is following this patient   Neurological:      General: No focal deficit present.      Mental Status: She is alert.   Psychiatric:         Mood and Affect: Mood normal.         Behavior: Behavior is cooperative.         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) - Primary    Ulcer of left heel (CMS/HCC)     Wound doctor  has been consulted and will be responsible for the evaluation and comprehensive management of the wound including appropriate control of complicating factors such as unrelieved pressure, infection, vascular and/or uncontrolled metabolic derangement, and/or nutritional deficiency in addition to appropriate debridement         Weakness     PT and OT          Medications, treatments, and labs reviewed  Continue medications and treatments as listed in PCC    Scribe Attestation  I, Carl Solisiboseas   attest that this documentation has been prepared under the direction and in the presence of Wilbert Lock MD.    Provider Attestation - Scribe documentation  All medical record entries made by the Scribe were at my direction and personally dictated by me. I have reviewed the chart and agree that the record accurately reflects my personal performance of the history, physical exam, discussion and plan.    Wilbert Lock MD

## 2023-11-21 NOTE — LETTER
Patient: Melody Martin  : 1935    Encounter Date: 2023    HISTORY & PHYSICAL    Subjective  Chief complaint: eMlody Martin is a 88 y.o. female who is a acute skilled care patient being seen and evaluated for multiple medical problems.  Patient presents for weakness.    HPI:  Patient is a 88 year old female with a past medical history of diabetes, HTN, and LATANYA. Patient was admitted to the skilled nursing facility after being in the hospital. Patient presented to the ED for a left heel wound with concern for osteomyelitis. Patient was consulted by wound care. Patient was evaluated by PT/OT and discharged to a SNF.        Past Medical History:   Diagnosis Date   • Chronic kidney disease    • Heart murmur    • Hypertension    • Myocardial infarction (CMS/HCC)    • Other conditions influencing health status 2022    History of cough   • Other conditions influencing health status 2016    Diabetes mellitus type 1, uncontrolled   • Personal history of other diseases of the circulatory system     History of hypertension   • Personal history of other diseases of the female genital tract     History of endometriosis   • Personal history of other diseases of the nervous system and sense organs 10/15/2021    History of acute otitis media   • Personal history of other endocrine, nutritional and metabolic disease 2018    History of diabetes mellitus   • Stroke (CMS/HCC)        Past Surgical History:   Procedure Laterality Date   • BACK SURGERY  10/10/2018    Back Surgery   • HYSTERECTOMY  10/10/2018    Hysterectomy   • IR CVC TUNNELED  2023    IR CVC TUNNELED 2023 POR ANGIO   • MR HEAD ANGIO WO IV CONTRAST  2023    MR HEAD ANGIO WO IV CONTRAST 2023 POR MRI   • MR NECK ANGIO WO IV CONTRAST  2023    MR NECK ANGIO WO IV CONTRAST 2023 POR MRI       Family History   Problem Relation Name Age of Onset   • No Known Problems Mother     • No Known Problems Father         Social  History     Socioeconomic History   • Marital status:      Spouse name: Not on file   • Number of children: Not on file   • Years of education: Not on file   • Highest education level: Not on file   Occupational History   • Not on file   Tobacco Use   • Smoking status: Never   • Smokeless tobacco: Never   Substance and Sexual Activity   • Alcohol use: Never   • Drug use: Never   • Sexual activity: Not on file   Other Topics Concern   • Not on file   Social History Narrative   • Not on file     Social Determinants of Health     Financial Resource Strain: Not on file   Food Insecurity: Not on file   Transportation Needs: Not on file   Physical Activity: Not on file   Stress: Not on file   Social Connections: Not on file   Intimate Partner Violence: Not on file   Housing Stability: Not on file       Vital signs: 132/70, 98.3, 70, 18, 209.0, 93%    Objective  Physical Exam  Vitals reviewed.   Constitutional:       Appearance: Normal appearance.   HENT:      Head: Normocephalic and atraumatic.   Cardiovascular:      Rate and Rhythm: Normal rate and regular rhythm.   Pulmonary:      Effort: Pulmonary effort is normal.      Breath sounds: Normal breath sounds.   Abdominal:      General: Bowel sounds are normal.      Palpations: Abdomen is soft.   Musculoskeletal:      Cervical back: Neck supple.   Skin:     General: Skin is warm and dry.      Comments: Right heel wound with a dressing on it I did not take the dressing off since the wound care team is following this patient   Neurological:      General: No focal deficit present.      Mental Status: She is alert.   Psychiatric:         Mood and Affect: Mood normal.         Behavior: Behavior is cooperative.         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)    Iron deficiency anemia due to chronic blood loss    Hypertensive chronic kidney disease with stage 5 chronic kidney  disease or end stage renal disease (CMS/HCC) - Primary    Ulcer of left heel (CMS/HCC)     Wound doctor has been consulted and will be responsible for the evaluation and comprehensive management of the wound including appropriate control of complicating factors such as unrelieved pressure, infection, vascular and/or uncontrolled metabolic derangement, and/or nutritional deficiency in addition to appropriate debridement         Weakness     PT and OT          Medications, treatments, and labs reviewed  Continue medications and treatments as listed in PCC    Scribe Attestation  I, Carl Solisibe   attest that this documentation has been prepared under the direction and in the presence of Wilbert Lock MD.    Provider Attestation - Scribe documentation  All medical record entries made by the Scribe were at my direction and personally dictated by me. I have reviewed the chart and agree that the record accurately reflects my personal performance of the history, physical exam, discussion and plan.    Wilbert Lock MD          Electronically Signed By: Wilbert Lock MD   11/21/23  7:56 PM

## 2023-11-22 ENCOUNTER — NURSING HOME VISIT (OUTPATIENT)
Dept: POST ACUTE CARE | Facility: EXTERNAL LOCATION | Age: 88
End: 2023-11-22
Payer: COMMERCIAL

## 2023-11-22 DIAGNOSIS — I63.10 CEREBROVASCULAR ACCIDENT (CVA) DUE TO EMBOLISM OF PRECEREBRAL ARTERY (MULTI): ICD-10-CM

## 2023-11-22 DIAGNOSIS — R53.1 WEAKNESS: Primary | ICD-10-CM

## 2023-11-22 DIAGNOSIS — I12.0 HYPERTENSIVE CHRONIC KIDNEY DISEASE WITH STAGE 5 CHRONIC KIDNEY DISEASE OR END STAGE RENAL DISEASE (MULTI): ICD-10-CM

## 2023-11-22 DIAGNOSIS — E11.22 TYPE 2 DIABETES MELLITUS WITH DIABETIC CHRONIC KIDNEY DISEASE, UNSPECIFIED CKD STAGE, UNSPECIFIED WHETHER LONG TERM INSULIN USE (MULTI): ICD-10-CM

## 2023-11-22 PROCEDURE — 99309 SBSQ NF CARE MODERATE MDM 30: CPT | Performed by: NURSE PRACTITIONER

## 2023-11-22 NOTE — PROGRESS NOTES
PROGRESS NOTE    Subjective   Chief complaint: Melody Martin is a 88 y.o. female who is a acute skilled care patient being seen and evaluated for weakness.    HPI:  HPI  Nurse called on 11/20 to report patient's nephrologist recommended ultrasound for right upper extremity swelling.  Ultrasound was negative.  In addition, patient is skilled for therapy due to weakness.    Working on therapy exercises, balance activities, bed mobility and toileting.  Patient is total assist for toileting.  Attempted to stand and was unable due to weakness.  Patient has no complaints today.  Denies n/v/f/c.      Objective   Vital signs:   127/68, 98.0, 78, 18, 97%,   Physical Exam  Constitutional:       General: She is not in acute distress.  Eyes:      Extraocular Movements: Extraocular movements intact.   Pulmonary:      Effort: Pulmonary effort is normal.   Musculoskeletal:      Cervical back: Neck supple.      Right lower leg: Edema present.      Left lower leg: Edema present.      Comments: Generalized weakness   Neurological:      Mental Status: She is alert.   Psychiatric:         Mood and Affect: Mood normal.         Behavior: Behavior is cooperative.         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 I have reviewed the chart and agree that the record accurately reflects my personal performance of the history, physical exam, discussion and plan.   Karina Patel, JANELL-CNP

## 2023-11-22 NOTE — ASSESSMENT & PLAN NOTE
Wound doctor has been consulted and will be responsible for the evaluation and comprehensive management of the wound including appropriate control of complicating factors such as unrelieved pressure, infection, vascular and/or uncontrolled metabolic derangement, and/or nutritional deficiency in addition to appropriate debridement

## 2023-11-23 ENCOUNTER — NURSING HOME VISIT (OUTPATIENT)
Dept: POST ACUTE CARE | Facility: EXTERNAL LOCATION | Age: 88
End: 2023-11-23
Payer: COMMERCIAL

## 2023-11-23 DIAGNOSIS — I13.2 HYPERTENSIVE HEART AND KIDNEY DISEASE WITH CHRONIC DIASTOLIC CONGESTIVE HEART FAILURE AND STAGE 5 CHRONIC KIDNEY DISEASE ON CHRONIC DIALYSIS (MULTI): ICD-10-CM

## 2023-11-23 DIAGNOSIS — N18.6 TYPE 2 DIABETES MELLITUS WITH CHRONIC KIDNEY DISEASE ON CHRONIC DIALYSIS, WITH LONG-TERM CURRENT USE OF INSULIN (MULTI): ICD-10-CM

## 2023-11-23 DIAGNOSIS — R53.1 WEAKNESS: Primary | ICD-10-CM

## 2023-11-23 DIAGNOSIS — Z99.2 TYPE 2 DIABETES MELLITUS WITH CHRONIC KIDNEY DISEASE ON CHRONIC DIALYSIS, WITH LONG-TERM CURRENT USE OF INSULIN (MULTI): ICD-10-CM

## 2023-11-23 DIAGNOSIS — I50.32 HYPERTENSIVE HEART AND KIDNEY DISEASE WITH CHRONIC DIASTOLIC CONGESTIVE HEART FAILURE AND STAGE 5 CHRONIC KIDNEY DISEASE ON CHRONIC DIALYSIS (MULTI): ICD-10-CM

## 2023-11-23 DIAGNOSIS — E11.22 TYPE 2 DIABETES MELLITUS WITH CHRONIC KIDNEY DISEASE ON CHRONIC DIALYSIS, WITH LONG-TERM CURRENT USE OF INSULIN (MULTI): ICD-10-CM

## 2023-11-23 DIAGNOSIS — Z79.4 TYPE 2 DIABETES MELLITUS WITH CHRONIC KIDNEY DISEASE ON CHRONIC DIALYSIS, WITH LONG-TERM CURRENT USE OF INSULIN (MULTI): ICD-10-CM

## 2023-11-23 DIAGNOSIS — N18.6 HYPERTENSIVE HEART AND KIDNEY DISEASE WITH CHRONIC DIASTOLIC CONGESTIVE HEART FAILURE AND STAGE 5 CHRONIC KIDNEY DISEASE ON CHRONIC DIALYSIS (MULTI): ICD-10-CM

## 2023-11-23 DIAGNOSIS — Z86.73 HISTORY OF CVA (CEREBROVASCULAR ACCIDENT): ICD-10-CM

## 2023-11-23 DIAGNOSIS — Z99.2 HYPERTENSIVE HEART AND KIDNEY DISEASE WITH CHRONIC DIASTOLIC CONGESTIVE HEART FAILURE AND STAGE 5 CHRONIC KIDNEY DISEASE ON CHRONIC DIALYSIS (MULTI): ICD-10-CM

## 2023-11-23 PROBLEM — I69.359 HEMIPARESIS AS LATE EFFECT OF CEREBROVASCULAR ACCIDENT (CVA) (MULTI): Status: RESOLVED | Noted: 2023-09-24 | Resolved: 2023-11-23

## 2023-11-23 PROBLEM — N28.9 RENAL INSUFFICIENCY: Status: RESOLVED | Noted: 2023-02-03 | Resolved: 2023-11-23

## 2023-11-23 PROBLEM — Z87.892 HISTORY OF ANAPHYLACTIC SHOCK: Status: ACTIVE | Noted: 2023-09-20

## 2023-11-23 PROBLEM — R42 DIZZINESS: Status: RESOLVED | Noted: 2023-04-10 | Resolved: 2023-11-23

## 2023-11-23 PROBLEM — Z86.79 HISTORY OF HYPERTENSION: Status: RESOLVED | Noted: 2023-10-12 | Resolved: 2023-11-23

## 2023-11-23 PROBLEM — B37.0 ORAL THRUSH: Status: RESOLVED | Noted: 2023-04-10 | Resolved: 2023-11-23

## 2023-11-23 PROBLEM — N18.32 STAGE 3B CHRONIC KIDNEY DISEASE (MULTI): Status: RESOLVED | Noted: 2023-04-10 | Resolved: 2023-11-23

## 2023-11-23 PROBLEM — I10 BENIGN ESSENTIAL HTN: Status: RESOLVED | Noted: 2023-02-03 | Resolved: 2023-11-23

## 2023-11-23 PROBLEM — R07.9 CHEST PAIN: Status: RESOLVED | Noted: 2023-02-03 | Resolved: 2023-11-23

## 2023-11-23 PROBLEM — R30.0 DYSURIA: Status: RESOLVED | Noted: 2023-02-03 | Resolved: 2023-11-23

## 2023-11-23 PROBLEM — I63.9 CVA (CEREBRAL VASCULAR ACCIDENT) (MULTI): Status: RESOLVED | Noted: 2023-10-12 | Resolved: 2023-11-23

## 2023-11-23 PROBLEM — R00.2 PALPITATIONS: Status: RESOLVED | Noted: 2023-02-03 | Resolved: 2023-11-23

## 2023-11-23 PROBLEM — R05.9 COUGH: Status: RESOLVED | Noted: 2023-10-12 | Resolved: 2023-11-23

## 2023-11-23 PROBLEM — R52 PAIN, UNSPECIFIED: Status: RESOLVED | Noted: 2023-09-20 | Resolved: 2023-11-23

## 2023-11-23 PROBLEM — M54.9 BACK PAIN: Status: RESOLVED | Noted: 2023-02-03 | Resolved: 2023-11-23

## 2023-11-23 PROBLEM — Z20.822 CONTACT WITH AND (SUSPECTED) EXPOSURE TO COVID-19: Status: RESOLVED | Noted: 2023-09-09 | Resolved: 2023-11-23

## 2023-11-23 PROBLEM — R06.02 SHORTNESS OF BREATH: Status: RESOLVED | Noted: 2023-02-03 | Resolved: 2023-11-23

## 2023-11-23 PROBLEM — H66.90 ACUTE OTITIS MEDIA: Status: RESOLVED | Noted: 2023-10-12 | Resolved: 2023-11-23

## 2023-11-23 PROBLEM — R10.9 ABDOMINAL PAIN: Status: RESOLVED | Noted: 2023-02-03 | Resolved: 2023-11-23

## 2023-11-23 PROBLEM — I25.2 OLD MYOCARDIAL INFARCTION: Status: RESOLVED | Noted: 2023-09-09 | Resolved: 2023-11-23

## 2023-11-23 PROBLEM — I10 HYPERTENSION: Status: RESOLVED | Noted: 2023-10-12 | Resolved: 2023-11-23

## 2023-11-23 PROBLEM — R11.0 NAUSEA: Status: RESOLVED | Noted: 2023-09-28 | Resolved: 2023-11-23

## 2023-11-23 PROBLEM — R79.89 ELEVATED TROPONIN: Status: RESOLVED | Noted: 2023-08-24 | Resolved: 2023-11-23

## 2023-11-23 PROBLEM — N17.9 ACUTE KIDNEY FAILURE, UNSPECIFIED (CMS-HCC): Status: RESOLVED | Noted: 2023-09-20 | Resolved: 2023-11-23

## 2023-11-23 PROBLEM — B02.9 SHINGLES: Status: RESOLVED | Noted: 2023-02-03 | Resolved: 2023-11-23

## 2023-11-23 PROBLEM — Z00.00 ROUTINE MEDICAL EXAM: Status: RESOLVED | Noted: 2023-02-03 | Resolved: 2023-11-23

## 2023-11-23 PROBLEM — R53.83 FATIGUE: Status: RESOLVED | Noted: 2023-02-03 | Resolved: 2023-11-23

## 2023-11-23 PROBLEM — E87.5 HYPERKALEMIA: Status: RESOLVED | Noted: 2023-10-12 | Resolved: 2023-11-23

## 2023-11-23 PROBLEM — D64.9 ANEMIA: Status: RESOLVED | Noted: 2023-10-03 | Resolved: 2023-11-23

## 2023-11-23 PROBLEM — R51.9 SINUS HEADACHE: Status: RESOLVED | Noted: 2023-02-03 | Resolved: 2023-11-23

## 2023-11-23 PROBLEM — S91.301A NON-HEALING OPEN WOUND OF RIGHT HEEL: Status: RESOLVED | Noted: 2023-11-21 | Resolved: 2023-11-23

## 2023-11-23 PROBLEM — N39.0 ACUTE UTI: Status: RESOLVED | Noted: 2023-02-03 | Resolved: 2023-11-23

## 2023-11-23 PROBLEM — B37.31 YEAST VAGINITIS: Status: RESOLVED | Noted: 2023-02-03 | Resolved: 2023-11-23

## 2023-11-23 PROBLEM — T78.40XS ALLERGY, UNSPECIFIED, SEQUELA: Status: RESOLVED | Noted: 2023-09-20 | Resolved: 2023-11-23

## 2023-11-23 PROCEDURE — 99309 SBSQ NF CARE MODERATE MDM 30: CPT | Performed by: NURSE PRACTITIONER

## 2023-11-23 NOTE — LETTER
Patient: Melody Martin  : 1935    Encounter Date: 2023    PROGRESS NOTE    Subjective  Chief complaint: Melody Martin is a 88 y.o. female who is an acute skilled patient being seen and evaluated for weakness    HPI:  HPI  Patient is working with therapy.  Patient is nonweightbearing to left lower extremity.  Therapy working with patient on core strengthening, balance and therapeutic exercises and activities.  Patient requires assistance for ADLs, transfers and mobility.  Patient was seen and examined at bedside.  Denies chest pain or shortness of breath.  Denies nausea or vomiting.    Objective  Vital signs: 118/42, 98.5, 18, 65, blood sugar 214    Physical Exam  Constitutional:       General: She is not in acute distress.  Eyes:      Extraocular Movements: Extraocular movements intact.   Pulmonary:      Effort: Pulmonary effort is normal.   Musculoskeletal:      Cervical back: Neck supple.      Right lower leg: Edema present.      Left lower leg: Edema present.      Comments: Generalized weakness  Bilateral hands with mild swelling.  Good range of motion, nontender   Neurological:      Mental Status: She is alert.   Psychiatric:         Mood and Affect: Mood normal.         Behavior: Behavior is cooperative.         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 (CMS/HCC)     On HD per nephrology  Antihypertensives  Renal diet  Monitor BP            Endocrine/Metabolic    Type 2 diabetes mellitus with chronic kidney disease on chronic dialysis, with long-term current use of insulin (CMS/[facility])     Monitor Glucoscan  Lantus  Humalog            Neuro    History of CVA (cerebrovascular accident)     Therapy  Statin  Monitor            Symptoms and Signs    Weakness - Primary     Continue working with therapy           Medications, treatments, and labs reviewed  Continue medications and treatments as listed in EMR      Scribe Attestation  I, Phoebe Martinez   attest that this documentation has been prepared under the direction and in the presence of MAR Johnson    Provider Attestation - Scribe documentation  All medical record entries made by the Scribe were at my direction and personally dictated by me. I have reviewed the chart and agree that the record accurately reflects my personal performance of the history, physical exam, discussion and plan.   MAR Johnson        Electronically Signed By: MAR Johnson   11/29/23  6:39 PM

## 2023-11-24 ENCOUNTER — NURSING HOME VISIT (OUTPATIENT)
Dept: POST ACUTE CARE | Facility: EXTERNAL LOCATION | Age: 88
End: 2023-11-24
Payer: COMMERCIAL

## 2023-11-24 DIAGNOSIS — I13.2 HYPERTENSIVE HEART AND KIDNEY DISEASE WITH CHRONIC DIASTOLIC CONGESTIVE HEART FAILURE AND STAGE 5 CHRONIC KIDNEY DISEASE ON CHRONIC DIALYSIS (MULTI): ICD-10-CM

## 2023-11-24 DIAGNOSIS — R53.1 WEAKNESS: Primary | ICD-10-CM

## 2023-11-24 DIAGNOSIS — I50.32 HYPERTENSIVE HEART AND KIDNEY DISEASE WITH CHRONIC DIASTOLIC CONGESTIVE HEART FAILURE AND STAGE 5 CHRONIC KIDNEY DISEASE ON CHRONIC DIALYSIS (MULTI): ICD-10-CM

## 2023-11-24 DIAGNOSIS — N18.6 HYPERTENSIVE HEART AND KIDNEY DISEASE WITH CHRONIC DIASTOLIC CONGESTIVE HEART FAILURE AND STAGE 5 CHRONIC KIDNEY DISEASE ON CHRONIC DIALYSIS (MULTI): ICD-10-CM

## 2023-11-24 DIAGNOSIS — Z86.73 HISTORY OF CVA (CEREBROVASCULAR ACCIDENT): ICD-10-CM

## 2023-11-24 DIAGNOSIS — Z99.2 HYPERTENSIVE HEART AND KIDNEY DISEASE WITH CHRONIC DIASTOLIC CONGESTIVE HEART FAILURE AND STAGE 5 CHRONIC KIDNEY DISEASE ON CHRONIC DIALYSIS (MULTI): ICD-10-CM

## 2023-11-24 PROCEDURE — 99308 SBSQ NF CARE LOW MDM 20: CPT | Performed by: INTERNAL MEDICINE

## 2023-11-24 NOTE — LETTER
Patient: Melody Martin  : 1935    Encounter Date: 2023    PROGRESS NOTE    Subjective  Chief complaint: Melody Martin is a 88 y.o. female who is an acute skilled patient being seen and evaluated for weakness    HPI:  Patient working in therapy due to weakness and debility. Patient has hx of CVA and denies increased weakness or changes in speech. Denies chest pain or headache. Continues on HD for ESRD and tolerates it well. No acute distress.        Objective  Vital signs: 126/74, 98%    Physical Exam  Constitutional:       General: She is not in acute distress.  Eyes:      Extraocular Movements: Extraocular movements intact.   Cardiovascular:      Rate and Rhythm: Normal rate and regular rhythm.   Pulmonary:      Effort: Pulmonary effort is normal.      Breath sounds: Normal breath sounds.   Abdominal:      General: Bowel sounds are normal.      Palpations: Abdomen is soft.   Musculoskeletal:      Cervical back: Neck supple.      Right lower leg: No edema.      Left lower leg: No edema.   Neurological:      Mental Status: She is alert.   Psychiatric:         Mood and Affect: Mood normal.         Behavior: Behavior is cooperative.         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    Provider Attestation - Scribe documentation  All medical record entries  made by the Scribe were at my direction and personally dictated by me. I have reviewed the chart and agree that the record accurately reflects my personal performance of the history, physical exam, discussion and plan.  1. Weakness        2. 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 History of CVA (cerebrovascular accident)            Electronically Signed By: Wilbert Lock MD   11/26/23  9:52 AM

## 2023-11-24 NOTE — PROGRESS NOTES
PROGRESS NOTE    Subjective   Chief complaint: Melody Martin is a 88 y.o. female who is an acute skilled patient being seen and evaluated for weakness    HPI:  Patient working in therapy due to weakness and debility. Patient has hx of CVA and denies increased weakness or changes in speech. Denies chest pain or headache. Continues on HD for ESRD and tolerates it well. No acute distress.        Objective   Vital signs: 126/74, 98%    Physical Exam  Constitutional:       General: She is not in acute distress.  Eyes:      Extraocular Movements: Extraocular movements intact.   Cardiovascular:      Rate and Rhythm: Normal rate and regular rhythm.   Pulmonary:      Effort: Pulmonary effort is normal.      Breath sounds: Normal breath sounds.   Abdominal:      General: Bowel sounds are normal.      Palpations: Abdomen is soft.   Musculoskeletal:      Cervical back: Neck supple.      Right lower leg: No edema.      Left lower leg: No edema.   Neurological:      Mental Status: She is alert.   Psychiatric:         Mood and Affect: Mood normal.         Behavior: Behavior is cooperative.         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    Provider Attestation - Scribe documentation  All medical record entries made by the Scribe were at my direction and personally dictated by  me. I have reviewed the chart and agree that the record accurately reflects my personal performance of the history, physical exam, discussion and plan.  1. Weakness        2. 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 History of CVA (cerebrovascular accident)

## 2023-11-27 ENCOUNTER — NURSING HOME VISIT (OUTPATIENT)
Dept: POST ACUTE CARE | Facility: EXTERNAL LOCATION | Age: 88
End: 2023-11-27
Payer: COMMERCIAL

## 2023-11-27 DIAGNOSIS — R53.1 WEAKNESS: Primary | ICD-10-CM

## 2023-11-27 DIAGNOSIS — I50.32 HYPERTENSIVE HEART AND KIDNEY DISEASE WITH CHRONIC DIASTOLIC CONGESTIVE HEART FAILURE AND STAGE 5 CHRONIC KIDNEY DISEASE ON CHRONIC DIALYSIS (MULTI): ICD-10-CM

## 2023-11-27 DIAGNOSIS — I13.2 HYPERTENSIVE HEART AND KIDNEY DISEASE WITH CHRONIC DIASTOLIC CONGESTIVE HEART FAILURE AND STAGE 5 CHRONIC KIDNEY DISEASE ON CHRONIC DIALYSIS (MULTI): ICD-10-CM

## 2023-11-27 DIAGNOSIS — N18.6 HYPERTENSIVE HEART AND KIDNEY DISEASE WITH CHRONIC DIASTOLIC CONGESTIVE HEART FAILURE AND STAGE 5 CHRONIC KIDNEY DISEASE ON CHRONIC DIALYSIS (MULTI): ICD-10-CM

## 2023-11-27 DIAGNOSIS — Z86.73 HISTORY OF CVA (CEREBROVASCULAR ACCIDENT): ICD-10-CM

## 2023-11-27 DIAGNOSIS — I48.91 ATRIAL FIBRILLATION, UNSPECIFIED TYPE (MULTI): ICD-10-CM

## 2023-11-27 DIAGNOSIS — Z99.2 HYPERTENSIVE HEART AND KIDNEY DISEASE WITH CHRONIC DIASTOLIC CONGESTIVE HEART FAILURE AND STAGE 5 CHRONIC KIDNEY DISEASE ON CHRONIC DIALYSIS (MULTI): ICD-10-CM

## 2023-11-27 PROCEDURE — 99309 SBSQ NF CARE MODERATE MDM 30: CPT | Performed by: NURSE PRACTITIONER

## 2023-11-27 NOTE — LETTER
Patient: Melody Martin  : 1935    Encounter Date: 2023    PROGRESS NOTE    Subjective  Chief complaint: Melody Martin is a 88 y.o. female who is an acute skilled patient being seen and evaluated for weakness    HPI:  HPI  Patient working in therapy due to weakness.  Patient is nonweightbearing to left lower extremity.  Therapy working with patient on bed mobility, rolls right to left with max assist, requires max assist x1 for supine to sit and is YURI for transfers from bed to HD chair.  Patient is on HD per nephrology.  Patient seen and examined, in no acute distress.  Patient has no new complaints today.  Denies nausea, vomiting, fever, chills.    Objective  Vital signs: 145/98, 80, 16, 98.2, blood sugar 285    Physical Exam  Constitutional:       General: She is not in acute distress.  Eyes:      Extraocular Movements: Extraocular movements intact.   Pulmonary:      Effort: Pulmonary effort is normal.   Musculoskeletal:      Cervical back: Neck supple.      Right lower leg: Edema present.      Left lower leg: Edema present.      Comments: Generalized weakness   Neurological:      Mental Status: She is alert.   Psychiatric:         Mood and Affect: Mood normal.         Behavior: Behavior is cooperative.         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)     HR controlled  Amiodarone  Apixaban   Bleeding precautions            Neuro    History of CVA (cerebrovascular accident)     Therapy  Statin  Monitor            Symptoms and Signs    Weakness - Primary     Continue working with therapy          Medications, treatments, and labs reviewed  Continue medications and treatments as listed in  EMR      Scribe Attestation  I, Phoebe Martinez   attest that this documentation has been prepared under the direction and in the presence of MAR Johnson    Provider Attestation - Scribe documentation  All medical record entries made by the Scribe were at my direction and personally dictated by me. I have reviewed the chart and agree that the record accurately reflects my personal performance of the history, physical exam, discussion and plan.   MAR Johnson        Electronically Signed By: MAR Johnson   11/29/23  6:35 PM

## 2023-11-28 ENCOUNTER — NURSING HOME VISIT (OUTPATIENT)
Dept: POST ACUTE CARE | Facility: EXTERNAL LOCATION | Age: 88
End: 2023-11-28
Payer: COMMERCIAL

## 2023-11-28 DIAGNOSIS — N18.6 HYPERTENSIVE HEART AND KIDNEY DISEASE WITH CHRONIC DIASTOLIC CONGESTIVE HEART FAILURE AND STAGE 5 CHRONIC KIDNEY DISEASE ON CHRONIC DIALYSIS (MULTI): ICD-10-CM

## 2023-11-28 DIAGNOSIS — R53.1 WEAKNESS: ICD-10-CM

## 2023-11-28 DIAGNOSIS — I50.32 HYPERTENSIVE HEART AND KIDNEY DISEASE WITH CHRONIC DIASTOLIC CONGESTIVE HEART FAILURE AND STAGE 5 CHRONIC KIDNEY DISEASE ON CHRONIC DIALYSIS (MULTI): ICD-10-CM

## 2023-11-28 DIAGNOSIS — N18.6 TYPE 2 DIABETES MELLITUS WITH CHRONIC KIDNEY DISEASE ON CHRONIC DIALYSIS, WITH LONG-TERM CURRENT USE OF INSULIN (MULTI): ICD-10-CM

## 2023-11-28 DIAGNOSIS — I13.2 HYPERTENSIVE HEART AND KIDNEY DISEASE WITH CHRONIC DIASTOLIC CONGESTIVE HEART FAILURE AND STAGE 5 CHRONIC KIDNEY DISEASE ON CHRONIC DIALYSIS (MULTI): ICD-10-CM

## 2023-11-28 DIAGNOSIS — Z79.4 TYPE 2 DIABETES MELLITUS WITH CHRONIC KIDNEY DISEASE ON CHRONIC DIALYSIS, WITH LONG-TERM CURRENT USE OF INSULIN (MULTI): ICD-10-CM

## 2023-11-28 DIAGNOSIS — Z99.2 HYPERTENSIVE HEART AND KIDNEY DISEASE WITH CHRONIC DIASTOLIC CONGESTIVE HEART FAILURE AND STAGE 5 CHRONIC KIDNEY DISEASE ON CHRONIC DIALYSIS (MULTI): ICD-10-CM

## 2023-11-28 DIAGNOSIS — Z99.2 TYPE 2 DIABETES MELLITUS WITH CHRONIC KIDNEY DISEASE ON CHRONIC DIALYSIS, WITH LONG-TERM CURRENT USE OF INSULIN (MULTI): ICD-10-CM

## 2023-11-28 DIAGNOSIS — E11.22 TYPE 2 DIABETES MELLITUS WITH CHRONIC KIDNEY DISEASE ON CHRONIC DIALYSIS, WITH LONG-TERM CURRENT USE OF INSULIN (MULTI): ICD-10-CM

## 2023-11-28 DIAGNOSIS — I48.91 ATRIAL FIBRILLATION, UNSPECIFIED TYPE (MULTI): ICD-10-CM

## 2023-11-28 PROCEDURE — 99309 SBSQ NF CARE MODERATE MDM 30: CPT | Performed by: INTERNAL MEDICINE

## 2023-11-28 NOTE — LETTER
Patient: Melody Martin  : 1935    Encounter Date: 2023    PROGRESS NOTE    Subjective  Chief complaint: Melody Martin is a 88 y.o. female who is an acute skilled patient being seen and evaluated for weakness.    HPI:  Patient presents for general medical care and f/u.  Patient seen and examined at bedside. HTN, denies fatigue, sob, and palpitations. T2DM, Patient denies vision changes, excessive thirst, sweating, urinary frequency.  Afib, denies palpitation or chest pain. Patient continues working with therapy to reach goals. She sits EOB 10 minutes with CGA to min A.  Patient completing multiple therapeutic exercises to increase strength, mobility and flexibility. Patient still requires total assist with ADLs and transfers. No issues per nursing.  Patient has no acute complaints.      Objective  Vital signs: 145/98,80,95%,     Physical Exam  Constitutional:       General: She is not in acute distress.  Eyes:      Extraocular Movements: Extraocular movements intact.   Cardiovascular:      Rate and Rhythm: Normal rate and regular rhythm.   Pulmonary:      Effort: Pulmonary effort is normal.      Breath sounds: Normal breath sounds.   Abdominal:      General: Bowel sounds are normal.      Palpations: Abdomen is soft.   Musculoskeletal:      Cervical back: Neck supple.      Right lower leg: No edema.      Left lower leg: No edema.   Neurological:      Mental Status: She is alert.   Psychiatric:         Mood and Affect: Mood normal.         Behavior: Behavior is cooperative.         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     Provider Attestation - Scribe documentation  All medical record entries made by the Scribe were at my direction and personally dictated by me. I have reviewed the chart and agree that the record accurately reflects my personal performance of the history, physical exam, discussion and plan.   Wilbert Lock MD      Electronically Signed By: Wilbert Lock MD   11/28/23  8:17 PM

## 2023-11-28 NOTE — PROGRESS NOTES
PROGRESS NOTE    Subjective   Chief complaint: Melody Martin is a 88 y.o. female who is an acute skilled patient being seen and evaluated for weakness    HPI:  HPI  Patient is working with therapy.  Patient is nonweightbearing to left lower extremity.  Therapy working with patient on core strengthening, balance and therapeutic exercises and activities.  Patient requires assistance for ADLs, transfers and mobility.  Patient was seen and examined at bedside.  Denies chest pain or shortness of breath.  Denies nausea or vomiting.    Objective   Vital signs: 118/42, 98.5, 18, 65, blood sugar 214    Physical Exam  Constitutional:       General: She is not in acute distress.  Eyes:      Extraocular Movements: Extraocular movements intact.   Pulmonary:      Effort: Pulmonary effort is normal.   Musculoskeletal:      Cervical back: Neck supple.      Right lower leg: Edema present.      Left lower leg: Edema present.      Comments: Generalized weakness  Bilateral hands with mild swelling.  Good range of motion, nontender   Neurological:      Mental Status: She is alert.   Psychiatric:         Mood and Affect: Mood normal.         Behavior: Behavior is cooperative.         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 (CMS/[facility])     On HD per nephrology  Antihypertensives  Renal diet  Monitor BP            Endocrine/Metabolic    Type 2 diabetes mellitus with chronic kidney disease on chronic dialysis, with long-term current use of insulin (CMS/[facility])     Monitor Glucoscan  Lantus  Humalog            Neuro    History of CVA (cerebrovascular accident)     Therapy  Statin  Monitor            Symptoms and Signs    Weakness - Primary     Continue working with therapy          Medications, treatments, and labs reviewed  Continue medications and  treatments as listed in EMR      Scribe Attestation  IHien Scribe   attest that this documentation has been prepared under the direction and in the presence of MAR Johnson    Provider Attestation - Scribe documentation  All medical record entries made by the Scribe were at my direction and personally dictated by me. I have reviewed the chart and agree that the record accurately reflects my personal performance of the history, physical exam, discussion and plan.   MAR Johnson

## 2023-11-28 NOTE — PROGRESS NOTES
PROGRESS NOTE    Subjective   Chief complaint: Melody Martin is a 88 y.o. female who is an acute skilled patient being seen and evaluated for weakness.    HPI:  Patient presents for general medical care and f/u.  Patient seen and examined at bedside. HTN, denies fatigue, sob, and palpitations. T2DM, Patient denies vision changes, excessive thirst, sweating, urinary frequency.  Afib, denies palpitation or chest pain. Patient continues working with therapy to reach goals. She sits EOB 10 minutes with CGA to min A.  Patient completing multiple therapeutic exercises to increase strength, mobility and flexibility. Patient still requires total assist with ADLs and transfers. No issues per nursing.  Patient has no acute complaints.      Objective   Vital signs: 145/98,80,95%,     Physical Exam  Constitutional:       General: She is not in acute distress.  Eyes:      Extraocular Movements: Extraocular movements intact.   Cardiovascular:      Rate and Rhythm: Normal rate and regular rhythm.   Pulmonary:      Effort: Pulmonary effort is normal.      Breath sounds: Normal breath sounds.   Abdominal:      General: Bowel sounds are normal.      Palpations: Abdomen is soft.   Musculoskeletal:      Cervical back: Neck supple.      Right lower leg: No edema.      Left lower leg: No edema.   Neurological:      Mental Status: She is alert.   Psychiatric:         Mood and Affect: Mood normal.         Behavior: Behavior is cooperative.         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     Provider Attestation - Scribe documentation  All medical record entries made by the Scribe were at my direction and personally dictated by me. I have reviewed the chart and agree that the record accurately reflects my personal performance of the history, physical exam, discussion and plan.   Wilbert Lock MD

## 2023-11-29 ENCOUNTER — NURSING HOME VISIT (OUTPATIENT)
Dept: POST ACUTE CARE | Facility: EXTERNAL LOCATION | Age: 88
End: 2023-11-29
Payer: COMMERCIAL

## 2023-11-29 DIAGNOSIS — N18.6 HYPERTENSIVE HEART AND KIDNEY DISEASE WITH CHRONIC DIASTOLIC CONGESTIVE HEART FAILURE AND STAGE 5 CHRONIC KIDNEY DISEASE ON CHRONIC DIALYSIS (MULTI): ICD-10-CM

## 2023-11-29 DIAGNOSIS — R53.1 WEAKNESS: Primary | ICD-10-CM

## 2023-11-29 DIAGNOSIS — I13.2 HYPERTENSIVE HEART AND KIDNEY DISEASE WITH CHRONIC DIASTOLIC CONGESTIVE HEART FAILURE AND STAGE 5 CHRONIC KIDNEY DISEASE ON CHRONIC DIALYSIS (MULTI): ICD-10-CM

## 2023-11-29 DIAGNOSIS — Z86.73 HISTORY OF CVA (CEREBROVASCULAR ACCIDENT): ICD-10-CM

## 2023-11-29 DIAGNOSIS — I50.32 HYPERTENSIVE HEART AND KIDNEY DISEASE WITH CHRONIC DIASTOLIC CONGESTIVE HEART FAILURE AND STAGE 5 CHRONIC KIDNEY DISEASE ON CHRONIC DIALYSIS (MULTI): ICD-10-CM

## 2023-11-29 DIAGNOSIS — Z99.2 HYPERTENSIVE HEART AND KIDNEY DISEASE WITH CHRONIC DIASTOLIC CONGESTIVE HEART FAILURE AND STAGE 5 CHRONIC KIDNEY DISEASE ON CHRONIC DIALYSIS (MULTI): ICD-10-CM

## 2023-11-29 PROCEDURE — 99309 SBSQ NF CARE MODERATE MDM 30: CPT | Performed by: INTERNAL MEDICINE

## 2023-11-29 NOTE — LETTER
Patient: Melody Martin  : 1935    Encounter Date: 2023    PROGRESS NOTE    Subjective  Chief complaint: Melody Martin is a 88 y.o. female who is an acute skilled patient being seen and evaluated for weakness    HPI:  Patient   With history of CVA and ESRD continues to work in therapy.  Patient requires moderate to maximum assist for roles and is totally dependent for supine to sit. denies changes in speech or increased weakness.  Denies chest pain or headache.  No acute distress.      Objective  Vital signs: 136/60, 97%    Physical Exam  Constitutional:       General: She is not in acute distress.  Eyes:      Extraocular Movements: Extraocular movements intact.   Pulmonary:      Effort: Pulmonary effort is normal.   Musculoskeletal:      Cervical back: Neck supple.      Right lower leg: Edema present.      Left lower leg: Edema present.   Neurological:      Mental Status: She is alert.      Motor: Weakness present.   Psychiatric:         Mood and Affect: Mood normal.         Behavior: Behavior is cooperative.         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    Provider Attestation - Scribe documentation  All medical record entries made by the Scribe were at my direction and personally dictated by me. I  have reviewed the chart and agree that the record accurately reflects my personal performance of the history, physical exam, discussion and plan.  1. Weakness        2. History of CVA (cerebrovascular accident)        3. 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      Electronically Signed By: Wilbert oLck MD   12/15/23  7:46 PM

## 2023-11-29 NOTE — PROGRESS NOTES
PROGRESS NOTE    Subjective   Chief complaint: Melody Martin is a 88 y.o. female who is an acute skilled patient being seen and evaluated for weakness    HPI:  HPI  Patient working in therapy due to weakness.  Patient is nonweightbearing to left lower extremity.  Therapy working with patient on bed mobility, rolls right to left with max assist, requires max assist x1 for supine to sit and is YURI for transfers from bed to HD chair.  Patient is on HD per nephrology.  Patient seen and examined, in no acute distress.  Patient has no new complaints today.  Denies nausea, vomiting, fever, chills.    Objective   Vital signs: 145/98, 80, 16, 98.2, blood sugar 285    Physical Exam  Constitutional:       General: She is not in acute distress.  Eyes:      Extraocular Movements: Extraocular movements intact.   Pulmonary:      Effort: Pulmonary effort is normal.   Musculoskeletal:      Cervical back: Neck supple.      Right lower leg: Edema present.      Left lower leg: Edema present.      Comments: Generalized weakness   Neurological:      Mental Status: She is alert.   Psychiatric:         Mood and Affect: Mood normal.         Behavior: Behavior is cooperative.         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 (CMS/HCC)     On HD per nephrology  Antihypertensives  Renal diet  Monitor BP         Atrial fibrillation (CMS/[facility])     HR controlled  Amiodarone  Apixaban   Bleeding precautions            Neuro    History of CVA (cerebrovascular accident)     Therapy  Statin  Monitor            Symptoms and Signs    Weakness - Primary     Continue working with therapy          Medications, treatments, and labs reviewed  Continue medications and treatments as listed in EMR      Scribe Attestation  I, Phoebe Martinez   attest that this  documentation has been prepared under the direction and in the presence of MAR Johnson    Provider Attestation - Scribe documentation  All medical record entries made by the Scribe were at my direction and personally dictated by me. I have reviewed the chart and agree that the record accurately reflects my personal performance of the history, physical exam, discussion and plan.   MAR Johnson

## 2023-11-30 ENCOUNTER — NURSING HOME VISIT (OUTPATIENT)
Dept: POST ACUTE CARE | Facility: EXTERNAL LOCATION | Age: 88
End: 2023-11-30
Payer: COMMERCIAL

## 2023-11-30 DIAGNOSIS — I13.2 HYPERTENSIVE HEART AND KIDNEY DISEASE WITH CHRONIC DIASTOLIC CONGESTIVE HEART FAILURE AND STAGE 5 CHRONIC KIDNEY DISEASE ON CHRONIC DIALYSIS (MULTI): ICD-10-CM

## 2023-11-30 DIAGNOSIS — R53.1 WEAKNESS: Primary | ICD-10-CM

## 2023-11-30 DIAGNOSIS — I48.91 ATRIAL FIBRILLATION, UNSPECIFIED TYPE (MULTI): ICD-10-CM

## 2023-11-30 DIAGNOSIS — Z99.2 HYPERTENSIVE HEART AND KIDNEY DISEASE WITH CHRONIC DIASTOLIC CONGESTIVE HEART FAILURE AND STAGE 5 CHRONIC KIDNEY DISEASE ON CHRONIC DIALYSIS (MULTI): ICD-10-CM

## 2023-11-30 DIAGNOSIS — Z79.4 TYPE 2 DIABETES MELLITUS WITH CHRONIC KIDNEY DISEASE ON CHRONIC DIALYSIS, WITH LONG-TERM CURRENT USE OF INSULIN (MULTI): ICD-10-CM

## 2023-11-30 DIAGNOSIS — E11.22 TYPE 2 DIABETES MELLITUS WITH CHRONIC KIDNEY DISEASE ON CHRONIC DIALYSIS, WITH LONG-TERM CURRENT USE OF INSULIN (MULTI): ICD-10-CM

## 2023-11-30 DIAGNOSIS — N18.6 TYPE 2 DIABETES MELLITUS WITH CHRONIC KIDNEY DISEASE ON CHRONIC DIALYSIS, WITH LONG-TERM CURRENT USE OF INSULIN (MULTI): ICD-10-CM

## 2023-11-30 DIAGNOSIS — Z99.2 TYPE 2 DIABETES MELLITUS WITH CHRONIC KIDNEY DISEASE ON CHRONIC DIALYSIS, WITH LONG-TERM CURRENT USE OF INSULIN (MULTI): ICD-10-CM

## 2023-11-30 DIAGNOSIS — I50.32 HYPERTENSIVE HEART AND KIDNEY DISEASE WITH CHRONIC DIASTOLIC CONGESTIVE HEART FAILURE AND STAGE 5 CHRONIC KIDNEY DISEASE ON CHRONIC DIALYSIS (MULTI): ICD-10-CM

## 2023-11-30 DIAGNOSIS — N18.6 HYPERTENSIVE HEART AND KIDNEY DISEASE WITH CHRONIC DIASTOLIC CONGESTIVE HEART FAILURE AND STAGE 5 CHRONIC KIDNEY DISEASE ON CHRONIC DIALYSIS (MULTI): ICD-10-CM

## 2023-11-30 PROCEDURE — 99309 SBSQ NF CARE MODERATE MDM 30: CPT | Performed by: NURSE PRACTITIONER

## 2023-11-30 NOTE — LETTER
Patient: Melody Martin  : 1935    Encounter Date: 2023    PROGRESS NOTE    Subjective  Chief complaint: Melody Martin is a 88 y.o. female who is an acute skilled patient being seen and evaluated for weakness    HPI:  HPI  Patient continues to work with therapy.  Patient performed seated LE there ex [place] and isometrics in unsupported sitting edge of bed, performed 2 sets x10 reps.  Patient performed supine to sit with max assist<slide board transfers TD.  Patient was seen and examined at bedside, in no acute distress.  Denies chest pain or shortness of breath.  Denies nausea or vomiting.  Denies fever or chills.    Objective  Vital signs: 136/69, 98.2, 69, 18, blood sugar 156    Physical Exam  Constitutional:       General: She is not in acute distress.  Eyes:      Extraocular Movements: Extraocular movements intact.   Pulmonary:      Effort: Pulmonary effort is normal.      Comments: Diminished  Musculoskeletal:      Cervical back: Neck supple.      Right lower leg: Edema present.      Left lower leg: Edema present.      Comments: Generalized weakness   Neurological:      Mental Status: She is alert.   Psychiatric:         Mood and Affect: Mood normal.         Behavior: Behavior is cooperative.         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 (CMS/[facility])     On HD per nephrology  Antihypertensives  Renal diet  Monitor BP         Type 2 diabetes mellitus with chronic kidney disease on chronic dialysis, with long-term current use of insulin (CMS/[facility])     Monitor Glucoscan  Lantus  Humalog  FBG at goal         Weakness - Primary     Continue working with therapy          Medications, treatments, and labs reviewed  Continue medications and  treatments as listed in EMR      Scribe Attestation  IHien Scribe   attest that this documentation has been prepared under the direction and in the presence of MAR Johnson    Provider Attestation - Scribe documentation  All medical record entries made by the Scribe were at my direction and personally dictated by me. I have reviewed the chart and agree that the record accurately reflects my personal performance of the history, physical exam, discussion and plan.   MAR Johnson        Electronically Signed By: MAR Johnson   12/12/23  5:10 PM

## 2023-12-01 ENCOUNTER — NURSING HOME VISIT (OUTPATIENT)
Dept: POST ACUTE CARE | Facility: EXTERNAL LOCATION | Age: 88
End: 2023-12-01
Payer: COMMERCIAL

## 2023-12-01 DIAGNOSIS — R53.1 WEAKNESS: Primary | ICD-10-CM

## 2023-12-01 DIAGNOSIS — E11.22 TYPE 2 DIABETES MELLITUS WITH CHRONIC KIDNEY DISEASE ON CHRONIC DIALYSIS, WITH LONG-TERM CURRENT USE OF INSULIN (MULTI): ICD-10-CM

## 2023-12-01 DIAGNOSIS — N18.6 HYPERTENSIVE HEART AND KIDNEY DISEASE WITH CHRONIC DIASTOLIC CONGESTIVE HEART FAILURE AND STAGE 5 CHRONIC KIDNEY DISEASE ON CHRONIC DIALYSIS (MULTI): ICD-10-CM

## 2023-12-01 DIAGNOSIS — N18.6 TYPE 2 DIABETES MELLITUS WITH CHRONIC KIDNEY DISEASE ON CHRONIC DIALYSIS, WITH LONG-TERM CURRENT USE OF INSULIN (MULTI): ICD-10-CM

## 2023-12-01 DIAGNOSIS — I48.91 ATRIAL FIBRILLATION, UNSPECIFIED TYPE (MULTI): ICD-10-CM

## 2023-12-01 DIAGNOSIS — Z99.2 HYPERTENSIVE HEART AND KIDNEY DISEASE WITH CHRONIC DIASTOLIC CONGESTIVE HEART FAILURE AND STAGE 5 CHRONIC KIDNEY DISEASE ON CHRONIC DIALYSIS (MULTI): ICD-10-CM

## 2023-12-01 DIAGNOSIS — Z86.73 HISTORY OF CVA (CEREBROVASCULAR ACCIDENT): ICD-10-CM

## 2023-12-01 DIAGNOSIS — Z99.2 TYPE 2 DIABETES MELLITUS WITH CHRONIC KIDNEY DISEASE ON CHRONIC DIALYSIS, WITH LONG-TERM CURRENT USE OF INSULIN (MULTI): ICD-10-CM

## 2023-12-01 DIAGNOSIS — I13.2 HYPERTENSIVE HEART AND KIDNEY DISEASE WITH CHRONIC DIASTOLIC CONGESTIVE HEART FAILURE AND STAGE 5 CHRONIC KIDNEY DISEASE ON CHRONIC DIALYSIS (MULTI): ICD-10-CM

## 2023-12-01 DIAGNOSIS — Z79.4 TYPE 2 DIABETES MELLITUS WITH CHRONIC KIDNEY DISEASE ON CHRONIC DIALYSIS, WITH LONG-TERM CURRENT USE OF INSULIN (MULTI): ICD-10-CM

## 2023-12-01 DIAGNOSIS — I50.32 HYPERTENSIVE HEART AND KIDNEY DISEASE WITH CHRONIC DIASTOLIC CONGESTIVE HEART FAILURE AND STAGE 5 CHRONIC KIDNEY DISEASE ON CHRONIC DIALYSIS (MULTI): ICD-10-CM

## 2023-12-01 PROCEDURE — 99308 SBSQ NF CARE LOW MDM 20: CPT | Performed by: INTERNAL MEDICINE

## 2023-12-01 NOTE — ASSESSMENT & PLAN NOTE
H&P reviewed. The patient was examined and there are no changes to the H&P.   Patient with recent GI blood losses had colonoscopy showing just hemorrhoidal bleed last month  Patient now is having black bowel movements  Was transfused 1 unit in the ER- will transfuse 1u with HD 10/4/23  Patient is on treatment for recent cardiac stenting so is actually on Plavix and will continue his history of recent stent  Patient has been on Eliquis for paroxysmal A-fib- will hold  GI consult  Follow H&H  Monitor vitals closely- appears stable, likely does not need acute scope  IV PPI BID  GI consultation  BUN is relatively normal  EGD 10/5/23 without any source of bleeding

## 2023-12-01 NOTE — LETTER
Patient: Melody Martin  : 1935    Encounter Date: 2023    PROGRESS NOTE    Subjective  Chief complaint: Melody Martin is a 88 y.o. female who is an acute skilled patient being seen and evaluated for weakness    HPI:  HPI  Patient is working in therapy. able to 6 patient is able to sit on edge of bed x15 minutes with min to CGA.  Patient with ESRD, on hemodialysis.  Patient seen and examined at bedside, no acute distress.  Denies chest pain or shortness of breath.  Denies nausea or vomiting.  Nursing staff report no new concerns at this time.    Objective  Vital signs: 129/71, 98.3, 70, 16, 186 blood sugar    Physical Exam  Constitutional:       General: She is not in acute distress.  Eyes:      Extraocular Movements: Extraocular movements intact.   Cardiovascular:      Rate and Rhythm: Normal rate and regular rhythm.   Pulmonary:      Effort: Pulmonary effort is normal.      Breath sounds: Normal breath sounds.   Abdominal:      General: Bowel sounds are normal.      Palpations: Abdomen is soft.   Musculoskeletal:      Cervical back: Neck supple.      Right lower leg: Edema present.      Left lower leg: Edema present.   Neurological:      Mental Status: She is alert.   Psychiatric:         Mood and Affect: Mood normal.         Behavior: Behavior is cooperative.         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 (CMS/[facility])     On HD per nephrology  Antihypertensives  Renal diet  Monitor BP         Weakness - Primary     Continue working with therapy         Atrial fibrillation (CMS/[facility])      HR controlled  Amiodarone  Apixaban   Bleeding precautions          Medications, treatments, and labs reviewed  Continue medications and treatments as listed in EMR      Scribe Attestation  I, Phoebe Martinez   attest that this documentation has been prepared under the direction and in the presence of Wilbert Lock MD    Provider Attestation - Scribe documentation  All medical record entries made by the Scribe were at my direction and personally dictated by me. I have reviewed the chart and agree that the record accurately reflects my personal performance of the history, physical exam, discussion and plan.   Wilbert Lock MD        Electronically Signed By: Wilbert Lock MD   12/4/23  2:27 PM

## 2023-12-02 NOTE — PROGRESS NOTES
PROGRESS NOTE    Subjective   Chief complaint: Melody Martin is a 88 y.o. female who is an acute skilled patient being seen and evaluated for weakness    HPI:  Patient   With history of CVA and ESRD continues to work in therapy.  Patient requires moderate to maximum assist for roles and is totally dependent for supine to sit. denies changes in speech or increased weakness.  Denies chest pain or headache.  No acute distress.      Objective   Vital signs: 136/60, 97%    Physical Exam  Constitutional:       General: She is not in acute distress.  Eyes:      Extraocular Movements: Extraocular movements intact.   Pulmonary:      Effort: Pulmonary effort is normal.   Musculoskeletal:      Cervical back: Neck supple.      Right lower leg: Edema present.      Left lower leg: Edema present.   Neurological:      Mental Status: She is alert.      Motor: Weakness present.   Psychiatric:         Mood and Affect: Mood normal.         Behavior: Behavior is cooperative.         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    Provider Attestation - Scribe documentation  All medical record entries made by the Scribe were at my direction and personally dictated by me. I have reviewed the chart and agree that the record accurately reflects my  personal performance of the history, physical exam, discussion and plan.  1. Weakness        2. History of CVA (cerebrovascular accident)        3. 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

## 2023-12-04 ENCOUNTER — APPOINTMENT (OUTPATIENT)
Dept: VASCULAR MEDICINE | Facility: HOSPITAL | Age: 88
End: 2023-12-04
Payer: COMMERCIAL

## 2023-12-04 ENCOUNTER — APPOINTMENT (OUTPATIENT)
Dept: CARDIOLOGY | Facility: CLINIC | Age: 88
End: 2023-12-04
Payer: COMMERCIAL

## 2023-12-04 ENCOUNTER — NURSING HOME VISIT (OUTPATIENT)
Dept: POST ACUTE CARE | Facility: EXTERNAL LOCATION | Age: 88
End: 2023-12-04

## 2023-12-04 DIAGNOSIS — I50.32 HYPERTENSIVE HEART AND KIDNEY DISEASE WITH CHRONIC DIASTOLIC CONGESTIVE HEART FAILURE AND STAGE 5 CHRONIC KIDNEY DISEASE ON CHRONIC DIALYSIS (MULTI): ICD-10-CM

## 2023-12-04 DIAGNOSIS — Z99.2 HYPERTENSIVE HEART AND KIDNEY DISEASE WITH CHRONIC DIASTOLIC CONGESTIVE HEART FAILURE AND STAGE 5 CHRONIC KIDNEY DISEASE ON CHRONIC DIALYSIS (MULTI): ICD-10-CM

## 2023-12-04 DIAGNOSIS — R05.1 ACUTE COUGH: ICD-10-CM

## 2023-12-04 DIAGNOSIS — I13.2 HYPERTENSIVE HEART AND KIDNEY DISEASE WITH CHRONIC DIASTOLIC CONGESTIVE HEART FAILURE AND STAGE 5 CHRONIC KIDNEY DISEASE ON CHRONIC DIALYSIS (MULTI): ICD-10-CM

## 2023-12-04 DIAGNOSIS — I48.91 ATRIAL FIBRILLATION, UNSPECIFIED TYPE (MULTI): ICD-10-CM

## 2023-12-04 DIAGNOSIS — N18.6 HYPERTENSIVE HEART AND KIDNEY DISEASE WITH CHRONIC DIASTOLIC CONGESTIVE HEART FAILURE AND STAGE 5 CHRONIC KIDNEY DISEASE ON CHRONIC DIALYSIS (MULTI): ICD-10-CM

## 2023-12-04 DIAGNOSIS — R53.1 WEAKNESS: Primary | ICD-10-CM

## 2023-12-04 PROCEDURE — 99309 SBSQ NF CARE MODERATE MDM 30: CPT | Performed by: NURSE PRACTITIONER

## 2023-12-04 NOTE — PROGRESS NOTES
PROGRESS NOTE    Subjective   Chief complaint: Melody Martin is a 88 y.o. female who is an acute skilled patient being seen and evaluated for weakness    HPI:  HPI  Patient continues to work with therapy.  Patient performed seated LE there ex [place] and isometrics in unsupported sitting edge of bed, performed 2 sets x10 reps.  Patient performed supine to sit with max assist<slide board transfers TD.  Patient was seen and examined at bedside, in no acute distress.  Denies chest pain or shortness of breath.  Denies nausea or vomiting.  Denies fever or chills.    Objective   Vital signs: 136/69, 98.2, 69, 18, blood sugar 156    Physical Exam  Constitutional:       General: She is not in acute distress.  Eyes:      Extraocular Movements: Extraocular movements intact.   Pulmonary:      Effort: Pulmonary effort is normal.      Comments: Diminished  Musculoskeletal:      Cervical back: Neck supple.      Right lower leg: Edema present.      Left lower leg: Edema present.      Comments: Generalized weakness   Neurological:      Mental Status: She is alert.   Psychiatric:         Mood and Affect: Mood normal.         Behavior: Behavior is cooperative.         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 (CMS/[facility])     On HD per nephrology  Antihypertensives  Renal diet  Monitor BP         Type 2 diabetes mellitus with chronic kidney disease on chronic dialysis, with long-term current use of insulin (CMS/[facility])     Monitor Glucoscan  Lantus  Humalog  FBG at goal         Weakness - Primary     Continue working with therapy          Medications, treatments, and labs reviewed  Continue medications and treatments as listed in EMR      Scribe Attestation  I, Hien Conn,  Scribe   attest that this documentation has been prepared under the direction and in the presence of MAR Johnson    Provider Attestation - Scribe documentation  All medical record entries made by the Scribe were at my direction and personally dictated by me. I have reviewed the chart and agree that the record accurately reflects my personal performance of the history, physical exam, discussion and plan.   MAR Johnson

## 2023-12-04 NOTE — LETTER
Patient: Melody Martin  : 1935    Encounter Date: 2023    PROGRESS NOTE    Subjective  Chief complaint: Melody Martin is a 88 y.o. female who is an acute skilled patient being seen and evaluated for weakness    HPI:  HPI  Patient is working with therapy, remains nonweightbearing to left lower extremity.  Patient performs supine and seated lower extremity there-ex exercises to build strength to aid functional skills.  Therapy working with patient on bed mobility and transfer training.  Patient requires mod to max assist for supine to sit.  Patient seen and examined at bedside, patient with complaints of cough.  Patient was negative for COVID, afebrile.  Started aerosol treatments yesterday which does help.  Of note nephrology discontinued nifedipine and torsemide due to low BPs.     Objective  Vital signs: 127/58, 97.8, 90, 18, 94%, blood sugar 198    Physical Exam  Constitutional:       General: She is not in acute distress.  Eyes:      Extraocular Movements: Extraocular movements intact.   Pulmonary:      Effort: Pulmonary effort is normal.      Breath sounds: Wheezing present.   Musculoskeletal:      Cervical back: Neck supple.      Right lower leg: Edema present.      Left lower leg: Edema present.      Comments: Generalized weakness   Neurological:      Mental Status: She is alert.   Psychiatric:         Mood and Affect: Mood normal.         Behavior: Behavior is cooperative.         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 (CMS/HCC)     On HD per nephrology  Antihypertensives  Renal diet  Monitor BP         Weakness - Primary     Continue with therapy         Atrial fibrillation (CMS/HCC)     HR  controlled  Amiodarone  Apixaban   Bleeding precautions          Medications, treatments, and labs reviewed  Continue medications and treatments as listed in EMR      Scribe Attestation  I, Phoebe Martinez   attest that this documentation has been prepared under the direction and in the presence of MAR Johnson    Provider Attestation - Scribe documentation  All medical record entries made by the Scribe were at my direction and personally dictated by me. I have reviewed the chart and agree that the record accurately reflects my personal performance of the history, physical exam, discussion and plan.   MAR Johnson        Electronically Signed By: MAR Johnson   12/13/23  4:34 PM

## 2023-12-04 NOTE — ASSESSMENT & PLAN NOTE
Glucoscan with SS  Continue Lantus and Humalog    [FreeTextEntry1] : I, Ibeth Jamain, acted solely as a scribe for Dr. Chino Miramontes on this date 08/05/2021.\par \par All medical record entries made by the Scribe were at my, Dr. Chino Miramontes, direction and personally dictated by me on 08/05/2021. I have reviewed the chart and agree that the record accurately reflects my personal performance of the history, physical exam, assessment and plan.  I have also personally directed, reviewed and agreed with the chart.

## 2023-12-04 NOTE — PROGRESS NOTES
PROGRESS NOTE    Subjective   Chief complaint: Melody Martin is a 88 y.o. female who is an acute skilled patient being seen and evaluated for weakness    HPI:  HPI  Patient is working in therapy. able to 6 patient is able to sit on edge of bed x15 minutes with min to CGA.  Patient with ESRD, on hemodialysis.  Patient seen and examined at bedside, no acute distress.  Denies chest pain or shortness of breath.  Denies nausea or vomiting.  Nursing staff report no new concerns at this time.    Objective   Vital signs: 129/71, 98.3, 70, 16, 186 blood sugar    Physical Exam  Constitutional:       General: She is not in acute distress.  Eyes:      Extraocular Movements: Extraocular movements intact.   Cardiovascular:      Rate and Rhythm: Normal rate and regular rhythm.   Pulmonary:      Effort: Pulmonary effort is normal.      Breath sounds: Normal breath sounds.   Abdominal:      General: Bowel sounds are normal.      Palpations: Abdomen is soft.   Musculoskeletal:      Cervical back: Neck supple.      Right lower leg: Edema present.      Left lower leg: Edema present.   Neurological:      Mental Status: She is alert.   Psychiatric:         Mood and Affect: Mood normal.         Behavior: Behavior is cooperative.         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 (CMS/HCC)     On HD per nephrology  Antihypertensives  Renal diet  Monitor BP         Weakness - Primary     Continue working with therapy         Atrial fibrillation (CMS/[facility])     HR controlled  Amiodarone  Apixaban   Bleeding precautions           Medications, treatments, and labs reviewed  Continue medications and treatments as listed in EMR      Scribe Attestation  I, Phoebe Martinez   attest that this documentation has been prepared under the direction and in the presence of Wilbert Lock MD    Provider Attestation - Scribe documentation  All medical record entries made by the Scribe were at my direction and personally dictated by me. I have reviewed the chart and agree that the record accurately reflects my personal performance of the history, physical exam, discussion and plan.   Wilbert Lock MD

## 2023-12-05 ENCOUNTER — APPOINTMENT (OUTPATIENT)
Dept: VASCULAR MEDICINE | Facility: HOSPITAL | Age: 88
End: 2023-12-05
Payer: COMMERCIAL

## 2023-12-05 ENCOUNTER — NURSING HOME VISIT (OUTPATIENT)
Dept: POST ACUTE CARE | Facility: EXTERNAL LOCATION | Age: 88
End: 2023-12-05
Payer: COMMERCIAL

## 2023-12-05 DIAGNOSIS — N18.6 HYPERTENSIVE HEART AND KIDNEY DISEASE WITH CHRONIC DIASTOLIC CONGESTIVE HEART FAILURE AND STAGE 5 CHRONIC KIDNEY DISEASE ON CHRONIC DIALYSIS (MULTI): ICD-10-CM

## 2023-12-05 DIAGNOSIS — R09.81 COMPLAINT OF NASAL CONGESTION: ICD-10-CM

## 2023-12-05 DIAGNOSIS — Z99.2 HYPERTENSIVE HEART AND KIDNEY DISEASE WITH CHRONIC DIASTOLIC CONGESTIVE HEART FAILURE AND STAGE 5 CHRONIC KIDNEY DISEASE ON CHRONIC DIALYSIS (MULTI): ICD-10-CM

## 2023-12-05 DIAGNOSIS — I50.32 HYPERTENSIVE HEART AND KIDNEY DISEASE WITH CHRONIC DIASTOLIC CONGESTIVE HEART FAILURE AND STAGE 5 CHRONIC KIDNEY DISEASE ON CHRONIC DIALYSIS (MULTI): ICD-10-CM

## 2023-12-05 DIAGNOSIS — I13.2 HYPERTENSIVE HEART AND KIDNEY DISEASE WITH CHRONIC DIASTOLIC CONGESTIVE HEART FAILURE AND STAGE 5 CHRONIC KIDNEY DISEASE ON CHRONIC DIALYSIS (MULTI): ICD-10-CM

## 2023-12-05 DIAGNOSIS — N18.6 TYPE 2 DIABETES MELLITUS WITH CHRONIC KIDNEY DISEASE ON CHRONIC DIALYSIS, WITH LONG-TERM CURRENT USE OF INSULIN (MULTI): ICD-10-CM

## 2023-12-05 DIAGNOSIS — Z99.2 TYPE 2 DIABETES MELLITUS WITH CHRONIC KIDNEY DISEASE ON CHRONIC DIALYSIS, WITH LONG-TERM CURRENT USE OF INSULIN (MULTI): ICD-10-CM

## 2023-12-05 DIAGNOSIS — R53.1 WEAKNESS: ICD-10-CM

## 2023-12-05 DIAGNOSIS — Z79.4 TYPE 2 DIABETES MELLITUS WITH CHRONIC KIDNEY DISEASE ON CHRONIC DIALYSIS, WITH LONG-TERM CURRENT USE OF INSULIN (MULTI): ICD-10-CM

## 2023-12-05 DIAGNOSIS — E11.22 TYPE 2 DIABETES MELLITUS WITH CHRONIC KIDNEY DISEASE ON CHRONIC DIALYSIS, WITH LONG-TERM CURRENT USE OF INSULIN (MULTI): ICD-10-CM

## 2023-12-05 PROCEDURE — 99309 SBSQ NF CARE MODERATE MDM 30: CPT | Performed by: INTERNAL MEDICINE

## 2023-12-05 NOTE — PROGRESS NOTES
PROGRESS NOTE    Subjective   Chief complaint: Melody Martin is a 88 y.o. female who is an acute skilled patient being seen and evaluated for weakness    HPI:  HPI  Patient is working with therapy, remains nonweightbearing to left lower extremity.  Patient performs supine and seated lower extremity there-ex exercises to build strength to aid functional skills.  Therapy working with patient on bed mobility and transfer training.  Patient requires mod to max assist for supine to sit.  Patient seen and examined at bedside, patient with complaints of cough.  Patient was negative for COVID, afebrile.  Started aerosol treatments yesterday which does help.  Of note nephrology discontinued nifedipine and torsemide due to low BPs.     Objective   Vital signs: 127/58, 97.8, 90, 18, 94%, blood sugar 198    Physical Exam  Constitutional:       General: She is not in acute distress.  Eyes:      Extraocular Movements: Extraocular movements intact.   Pulmonary:      Effort: Pulmonary effort is normal.      Breath sounds: Wheezing present.   Musculoskeletal:      Cervical back: Neck supple.      Right lower leg: Edema present.      Left lower leg: Edema present.      Comments: Generalized weakness   Neurological:      Mental Status: She is alert.   Psychiatric:         Mood and Affect: Mood normal.         Behavior: Behavior is cooperative.         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 (CMS/HCC)     On HD per nephrology  Antihypertensives  Renal diet  Monitor BP         Weakness - Primary     Continue with therapy         Atrial fibrillation (CMS/HCC)     HR controlled  Amiodarone  Apixaban   Bleeding precautions          Medications,  treatments, and labs reviewed  Continue medications and treatments as listed in EMR      Scribe Attestation  I, Phoebe Martinez   attest that this documentation has been prepared under the direction and in the presence of MAR Johnson    Provider Attestation - Scribe documentation  All medical record entries made by the Scribe were at my direction and personally dictated by me. I have reviewed the chart and agree that the record accurately reflects my personal performance of the history, physical exam, discussion and plan.   MAR Johnson

## 2023-12-05 NOTE — LETTER
Patient: Melody Martin  : 1935    Encounter Date: 2023    PROGRESS NOTE    Subjective  Chief complaint: Melody Martin is a 88 y.o. female who is an acute skilled patient being seen and evaluated for weakness    HPI:  Therapy has been working with the patient to improve strength and endurance with ADLs, transfers, and mobility.  Patient continues to work toward goals. TD assist for full ADL this date. TD assist with transfer and unable to complete slide board without TD assist. Patient continues with cough\congestion, treatment in place. She has dry\dark spot to left heel. Wound doctor has been consulted and will be responsible for the evaluation and comprehensive management of the wound including appropriate control of complicating factors such as unrelieved pressure, infections, vascular and/or uncontrolled metabolic derangement, and/or nutritional deficiency in addition to appropriate debridement. Patient continues on ATB therapy with no adverse reactions noted, remains afebrile. Patient is stable and has no new complaints.  Nursing staff voices no new concerns today.      Objective  Vital signs: 127/58,90,94%,     Physical Exam  Constitutional:       General: She is not in acute distress.  Eyes:      Extraocular Movements: Extraocular movements intact.   Cardiovascular:      Rate and Rhythm: Normal rate and regular rhythm.   Pulmonary:      Effort: Pulmonary effort is normal.      Breath sounds: Normal breath sounds.   Abdominal:      General: Bowel sounds are normal.      Palpations: Abdomen is soft.   Musculoskeletal:      Cervical back: Neck supple.      Right lower leg: Edema present.      Left lower leg: Edema present.   Feet:      Comments: Left heel - dry\dark spot  Neurological:      Mental Status: She is alert.   Psychiatric:         Mood and Affect: Mood normal.         Behavior: Behavior is cooperative.         Assessment/Plan  Problem List Items Addressed This Visit       Type 2  diabetes mellitus with chronic kidney disease on chronic dialysis, with long-term current use of insulin (CMS/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)     On HD per nephrology  Antihypertensives  Renal diet  Monitor BP         Weakness     Continue therapy         Complaint of nasal congestion     Continue medrol pack, aerosol tx and mucinex  Monitor symptoms          Medications, treatments, and labs reviewed  Continue medications and treatments as listed in EMR    Scribe Attestation  ISachi Scriboseas   attest that this documentation has been prepared under the direction and in the presence of Wilbert Lock MD.     Provider Attestation - Scribe documentation  All medical record entries made by the Scribe were at my direction and personally dictated by me. I have reviewed the chart and agree that the record accurately reflects my personal performance of the history, physical exam, discussion and plan.   Wilbert Lock MD      Electronically Signed By: Wilbert Lock MD   12/5/23  6:38 PM

## 2023-12-05 NOTE — PROGRESS NOTES
PROGRESS NOTE    Subjective   Chief complaint: Melody Martin is a 88 y.o. female who is an acute skilled patient being seen and evaluated for weakness    HPI:  Therapy has been working with the patient to improve strength and endurance with ADLs, transfers, and mobility.  Patient continues to work toward goals. TD assist for full ADL this date. TD assist with transfer and unable to complete slide board without TD assist. Patient continues with cough\congestion, treatment in place. She has dry\dark spot to left heel. Wound doctor has been consulted and will be responsible for the evaluation and comprehensive management of the wound including appropriate control of complicating factors such as unrelieved pressure, infections, vascular and/or uncontrolled metabolic derangement, and/or nutritional deficiency in addition to appropriate debridement. Patient continues on ATB therapy with no adverse reactions noted, remains afebrile. Patient is stable and has no new complaints.  Nursing staff voices no new concerns today.      Objective   Vital signs: 127/58,90,94%,     Physical Exam  Constitutional:       General: She is not in acute distress.  Eyes:      Extraocular Movements: Extraocular movements intact.   Cardiovascular:      Rate and Rhythm: Normal rate and regular rhythm.   Pulmonary:      Effort: Pulmonary effort is normal.      Breath sounds: Normal breath sounds.   Abdominal:      General: Bowel sounds are normal.      Palpations: Abdomen is soft.   Musculoskeletal:      Cervical back: Neck supple.      Right lower leg: Edema present.      Left lower leg: Edema present.   Feet:      Comments: Left heel - dry\dark spot  Neurological:      Mental Status: She is alert.   Psychiatric:         Mood and Affect: Mood normal.         Behavior: Behavior is cooperative.         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     Provider Attestation - Scribe documentation  All medical record entries made by the Scribe were at my direction and personally dictated by me. I have reviewed the chart and agree that the record accurately reflects my personal performance of the history, physical exam, discussion and plan.   Wilbert Lock MD

## 2023-12-05 NOTE — ASSESSMENT & PLAN NOTE
Obtain chest x-ray  Change aerosol treatments to 3 times daily x5 days  Start Mucinex, Z-Carlos, Medrol Dosepak   right knee

## 2023-12-06 ENCOUNTER — NURSING HOME VISIT (OUTPATIENT)
Dept: POST ACUTE CARE | Facility: EXTERNAL LOCATION | Age: 88
End: 2023-12-06
Payer: COMMERCIAL

## 2023-12-06 DIAGNOSIS — I50.32 HYPERTENSIVE HEART AND KIDNEY DISEASE WITH CHRONIC DIASTOLIC CONGESTIVE HEART FAILURE AND STAGE 5 CHRONIC KIDNEY DISEASE ON CHRONIC DIALYSIS (MULTI): ICD-10-CM

## 2023-12-06 DIAGNOSIS — Z99.2 HYPERTENSIVE HEART AND KIDNEY DISEASE WITH CHRONIC DIASTOLIC CONGESTIVE HEART FAILURE AND STAGE 5 CHRONIC KIDNEY DISEASE ON CHRONIC DIALYSIS (MULTI): ICD-10-CM

## 2023-12-06 DIAGNOSIS — I48.91 ATRIAL FIBRILLATION, UNSPECIFIED TYPE (MULTI): ICD-10-CM

## 2023-12-06 DIAGNOSIS — I13.2 HYPERTENSIVE HEART AND KIDNEY DISEASE WITH CHRONIC DIASTOLIC CONGESTIVE HEART FAILURE AND STAGE 5 CHRONIC KIDNEY DISEASE ON CHRONIC DIALYSIS (MULTI): ICD-10-CM

## 2023-12-06 DIAGNOSIS — R53.1 WEAKNESS: Primary | ICD-10-CM

## 2023-12-06 DIAGNOSIS — J18.9 PNEUMONIA DUE TO INFECTIOUS ORGANISM, UNSPECIFIED LATERALITY, UNSPECIFIED PART OF LUNG: ICD-10-CM

## 2023-12-06 DIAGNOSIS — N18.6 HYPERTENSIVE HEART AND KIDNEY DISEASE WITH CHRONIC DIASTOLIC CONGESTIVE HEART FAILURE AND STAGE 5 CHRONIC KIDNEY DISEASE ON CHRONIC DIALYSIS (MULTI): ICD-10-CM

## 2023-12-06 PROCEDURE — 99309 SBSQ NF CARE MODERATE MDM 30: CPT | Performed by: NURSE PRACTITIONER

## 2023-12-06 NOTE — LETTER
Patient: Melody Martin  : 1935    Encounter Date: 2023    PROGRESS NOTE    Subjective  Chief complaint: Melody Martin is a 88 y.o. female who is an acute skilled patient being seen and evaluated for weakness and f/u pneumonia    HPI:  HPI  Patient presents for f/u therapy and general medical care. Therapy has been working with the patient to improve strength, endurance, ADLs, and transfers d/t generalized weakness.  Patient remains nonweightbearing to left lower extremity.  Patient seen and examined at bedside, in no acute distress.  Also seeing patient for follow-up of PNA, on ATB Medrol Dosepak aerosol treatment and Mucinex, tolerating with no adverse effects.  Patient's cough is improving.  Denies shortness of breath.  Denies fever or chills.    Objective  Vital signs: 138/68, 97.5, 74, 16, blood sugar 344    Physical Exam  Constitutional:       General: She is not in acute distress.  Eyes:      Extraocular Movements: Extraocular movements intact.   Pulmonary:      Effort: Pulmonary effort is normal.      Comments: Diminished  Musculoskeletal:      Cervical back: Neck supple.      Right lower leg: Edema present.      Left lower leg: Edema present.      Comments: Generalized weakness   Neurological:      Mental Status: She is alert.   Psychiatric:         Mood and Affect: Mood normal.         Behavior: Behavior is cooperative.         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 (CMS/HCC)     On HD per nephrology  Remove extra fluid in dialysis  Antihypertensives  Blood pressure at goal  Renal diet  Monitor BP         Pneumonia     Improving  Continue Augmentin, Mucinex, aerosol treatment, Medrol Dosepak         Weakness - Primary     Continue  to work with PT and OT to progress towards goals          Medications, treatments, and labs reviewed  Continue medications and treatments as listed in EMR      Scribe Attestation  I, Phoebe Martinez   attest that this documentation has been prepared under the direction and in the presence of MAR Johnson    Provider Attestation - Scribe documentation  All medical record entries made by the Scribe were at my direction and personally dictated by me. I have reviewed the chart and agree that the record accurately reflects my personal performance of the history, physical exam, discussion and plan.   MAR Johnson        Electronically Signed By: MAR Johnson   12/12/23  6:09 PM

## 2023-12-07 ENCOUNTER — NURSING HOME VISIT (OUTPATIENT)
Dept: POST ACUTE CARE | Facility: EXTERNAL LOCATION | Age: 88
End: 2023-12-07
Payer: COMMERCIAL

## 2023-12-07 DIAGNOSIS — N18.6 HYPERTENSIVE HEART AND KIDNEY DISEASE WITH CHRONIC DIASTOLIC CONGESTIVE HEART FAILURE AND STAGE 5 CHRONIC KIDNEY DISEASE ON CHRONIC DIALYSIS (MULTI): ICD-10-CM

## 2023-12-07 DIAGNOSIS — I48.91 ATRIAL FIBRILLATION, UNSPECIFIED TYPE (MULTI): ICD-10-CM

## 2023-12-07 DIAGNOSIS — J18.9 PNEUMONIA DUE TO INFECTIOUS ORGANISM, UNSPECIFIED LATERALITY, UNSPECIFIED PART OF LUNG: ICD-10-CM

## 2023-12-07 DIAGNOSIS — R53.1 WEAKNESS: Primary | ICD-10-CM

## 2023-12-07 DIAGNOSIS — I13.2 HYPERTENSIVE HEART AND KIDNEY DISEASE WITH CHRONIC DIASTOLIC CONGESTIVE HEART FAILURE AND STAGE 5 CHRONIC KIDNEY DISEASE ON CHRONIC DIALYSIS (MULTI): ICD-10-CM

## 2023-12-07 DIAGNOSIS — Z99.2 HYPERTENSIVE HEART AND KIDNEY DISEASE WITH CHRONIC DIASTOLIC CONGESTIVE HEART FAILURE AND STAGE 5 CHRONIC KIDNEY DISEASE ON CHRONIC DIALYSIS (MULTI): ICD-10-CM

## 2023-12-07 DIAGNOSIS — I50.32 HYPERTENSIVE HEART AND KIDNEY DISEASE WITH CHRONIC DIASTOLIC CONGESTIVE HEART FAILURE AND STAGE 5 CHRONIC KIDNEY DISEASE ON CHRONIC DIALYSIS (MULTI): ICD-10-CM

## 2023-12-07 PROCEDURE — 99309 SBSQ NF CARE MODERATE MDM 30: CPT | Performed by: NURSE PRACTITIONER

## 2023-12-07 NOTE — LETTER
Patient: Melody Martin  : 1935    Encounter Date: 2023    PROGRESS NOTE    Subjective  Chief complaint: Melody Martin is a 88 y.o. female who is an acute skilled patient being seen and evaluated for weakness    HPI:  HPI  Patient working in therapy, NWB to left lower extremity.  Patient working on core strengthening to improve postural stability to aid in functional transfers.  Patient also working on therapeutic activities to increase functional task performance.  Patient with a Dx of ESRD on HD.  Also seeing patient to follow-up on PNA, was started on Z-Carlos, Medrol Dosepak, aerosol treatment and Mucinex and added Augmentin.  Patient is feeling better.  Denies chest pain or shortness of breath.    Objective  Vital signs: 140/72, 98.5, 80, 18,  blood sugar 316    Physical Exam  Constitutional:       General: She is not in acute distress.  Eyes:      Extraocular Movements: Extraocular movements intact.   Pulmonary:      Effort: Pulmonary effort is normal.      Breath sounds: Wheezing present.   Musculoskeletal:      Cervical back: Neck supple.      Right lower leg: Edema present.      Left lower leg: Edema present.      Comments: Generalized weakness   Neurological:      Mental Status: She is alert.   Psychiatric:         Mood and Affect: Mood normal.         Behavior: Behavior is cooperative.         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 (CMS/HCC)     On HD per nephrology  Removed extra fluid in dialysis  Antihypertensives  Renal diet  Monitor BP and weight         Pneumonia     Improving   Continue Augmentin, Mucinex, aerosol treatment, Medrol Dosepak         Weakness - Primary     Continue to work towards goals with PT and OT,            Medications, treatments, and labs reviewed  Continue medications and treatments as listed in EMR      Scribe Attestation  I, Phoebe Martinez   attest that this documentation has been prepared under the direction and in the presence of MAR Johnson    Provider Attestation - Scribe documentation  All medical record entries made by the Scribe were at my direction and personally dictated by me. I have reviewed the chart and agree that the record accurately reflects my personal performance of the history, physical exam, discussion and plan.   MAR Johnson        Electronically Signed By: MAR Johnson   12/15/23  5:12 PM

## 2023-12-08 ENCOUNTER — NURSING HOME VISIT (OUTPATIENT)
Dept: POST ACUTE CARE | Facility: EXTERNAL LOCATION | Age: 88
End: 2023-12-08
Payer: COMMERCIAL

## 2023-12-08 DIAGNOSIS — Z79.4 TYPE 2 DIABETES MELLITUS WITH CHRONIC KIDNEY DISEASE ON CHRONIC DIALYSIS, WITH LONG-TERM CURRENT USE OF INSULIN (MULTI): ICD-10-CM

## 2023-12-08 DIAGNOSIS — Z99.2 TYPE 2 DIABETES MELLITUS WITH CHRONIC KIDNEY DISEASE ON CHRONIC DIALYSIS, WITH LONG-TERM CURRENT USE OF INSULIN (MULTI): ICD-10-CM

## 2023-12-08 DIAGNOSIS — I50.32 HYPERTENSIVE HEART AND KIDNEY DISEASE WITH CHRONIC DIASTOLIC CONGESTIVE HEART FAILURE AND STAGE 5 CHRONIC KIDNEY DISEASE ON CHRONIC DIALYSIS (MULTI): ICD-10-CM

## 2023-12-08 DIAGNOSIS — N18.6 TYPE 2 DIABETES MELLITUS WITH CHRONIC KIDNEY DISEASE ON CHRONIC DIALYSIS, WITH LONG-TERM CURRENT USE OF INSULIN (MULTI): ICD-10-CM

## 2023-12-08 DIAGNOSIS — N18.6 HYPERTENSIVE HEART AND KIDNEY DISEASE WITH CHRONIC DIASTOLIC CONGESTIVE HEART FAILURE AND STAGE 5 CHRONIC KIDNEY DISEASE ON CHRONIC DIALYSIS (MULTI): ICD-10-CM

## 2023-12-08 DIAGNOSIS — Z99.2 HYPERTENSIVE HEART AND KIDNEY DISEASE WITH CHRONIC DIASTOLIC CONGESTIVE HEART FAILURE AND STAGE 5 CHRONIC KIDNEY DISEASE ON CHRONIC DIALYSIS (MULTI): ICD-10-CM

## 2023-12-08 DIAGNOSIS — I13.2 HYPERTENSIVE HEART AND KIDNEY DISEASE WITH CHRONIC DIASTOLIC CONGESTIVE HEART FAILURE AND STAGE 5 CHRONIC KIDNEY DISEASE ON CHRONIC DIALYSIS (MULTI): ICD-10-CM

## 2023-12-08 DIAGNOSIS — I48.91 ATRIAL FIBRILLATION, UNSPECIFIED TYPE (MULTI): ICD-10-CM

## 2023-12-08 DIAGNOSIS — E11.22 TYPE 2 DIABETES MELLITUS WITH CHRONIC KIDNEY DISEASE ON CHRONIC DIALYSIS, WITH LONG-TERM CURRENT USE OF INSULIN (MULTI): ICD-10-CM

## 2023-12-08 DIAGNOSIS — R53.1 WEAKNESS: Primary | ICD-10-CM

## 2023-12-08 DIAGNOSIS — J18.9 PNEUMONIA DUE TO INFECTIOUS ORGANISM, UNSPECIFIED LATERALITY, UNSPECIFIED PART OF LUNG: ICD-10-CM

## 2023-12-08 PROCEDURE — 99308 SBSQ NF CARE LOW MDM 20: CPT | Performed by: INTERNAL MEDICINE

## 2023-12-08 NOTE — LETTER
Patient: Melody Maritn  : 1935    Encounter Date: 2023    PROGRESS NOTE    Subjective  Chief complaint: Melody Martin is a 88 y.o. female who is an acute skilled patient being seen and evaluated for weakness    HPI:  HPI  Patient continues to work with therapy, nonweightbearing to left lower extremity due to left heel wound.  Patient performed sitting open chain and isometrics to increase stability and strength in lower extremities, 2 sets x 12 reps.  Therapy also working on core strengthening to improve postural stability to improve functional transfers.  Patient was seen and examined at bedside.  Also seen patient to follow-up on pneumonia.  Patient was started on a Z-Carlos, Medrol Dosepak aerosol treatment Mucinex and Augmentin.  Patient tolerating without adverse effects.  Patient is feeling a bit better.  Patient in no acute distress    Objective  Vital signs: 135/77, 98.1, 77, 18, 97%, blood sugar 400    Physical Exam  Constitutional:       General: She is not in acute distress.  Eyes:      Extraocular Movements: Extraocular movements intact.   Cardiovascular:      Rate and Rhythm: Normal rate and regular rhythm.   Pulmonary:      Effort: Pulmonary effort is normal.      Breath sounds: Normal breath sounds.   Abdominal:      General: Bowel sounds are normal.      Palpations: Abdomen is soft.   Musculoskeletal:      Cervical back: Neck supple.      Right lower leg: Edema present.      Left lower leg: Edema present.   Feet:      Comments: Left heel - dry\dark spot  Neurological:      Mental Status: She is alert.   Psychiatric:         Mood and Affect: Mood normal.         Behavior: Behavior is cooperative.         Assessment/Plan  Problem List Items Addressed This Visit       Type 2 diabetes mellitus with chronic kidney disease on chronic dialysis, with long-term current use of insulin (CMS/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 (CMS/[facility])     On HD per nephrology  Antihypertensives  Renal diet  Monitor BP         Weakness - Primary     Continue to work with therapy         Atrial fibrillation (CMS/[facility])     HR controlled  Amiodarone  Apixaban   Bleeding precautions          Medications, treatments, and labs reviewed  Continue medications and treatments as listed in EMR      Scribe Attestation  I, Carl Martineziboseas   attest that this documentation has been prepared under the direction and in the presence of Wilbert Lock MD    Provider Attestation - Scribe documentation  All medical record entries made by the Scribe were at my direction and personally dictated by me. I have reviewed the chart and agree that the record accurately reflects my personal performance of the history, physical exam, discussion and plan.   Wilbert Lock MD        Electronically Signed By: Wilbert Lock MD   12/9/23  1:12 PM

## 2023-12-08 NOTE — PROGRESS NOTES
PROGRESS NOTE    Subjective   Chief complaint: Melody Martin is a 88 y.o. female who is an acute skilled patient being seen and evaluated for weakness    HPI:  HPI  Patient continues to work with therapy, nonweightbearing to left lower extremity due to left heel wound.  Patient performed sitting open chain and isometrics to increase stability and strength in lower extremities, 2 sets x 12 reps.  Therapy also working on core strengthening to improve postural stability to improve functional transfers.  Patient was seen and examined at bedside.  Also seen patient to follow-up on pneumonia.  Patient was started on a Z-Carlos, Medrol Dosepak aerosol treatment Mucinex and Augmentin.  Patient tolerating without adverse effects.  Patient is feeling a bit better.  Patient in no acute distress    Objective   Vital signs: 135/77, 98.1, 77, 18, 97%, blood sugar 400    Physical Exam  Constitutional:       General: She is not in acute distress.  Eyes:      Extraocular Movements: Extraocular movements intact.   Cardiovascular:      Rate and Rhythm: Normal rate and regular rhythm.   Pulmonary:      Effort: Pulmonary effort is normal.      Breath sounds: Normal breath sounds.   Abdominal:      General: Bowel sounds are normal.      Palpations: Abdomen is soft.   Musculoskeletal:      Cervical back: Neck supple.      Right lower leg: Edema present.      Left lower leg: Edema present.   Feet:      Comments: Left heel - dry\dark spot  Neurological:      Mental Status: She is alert.   Psychiatric:         Mood and Affect: Mood normal.         Behavior: Behavior is cooperative.         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 (CMS/[facility])     On HD per nephrology  Antihypertensives  Renal diet  Monitor BP         Weakness - Primary     Continue to work with therapy         Atrial fibrillation (CMS/[facility])     HR controlled  Amiodarone  Apixaban   Bleeding precautions          Medications, treatments, and labs reviewed  Continue medications and treatments as listed in EMR      Scribe Attestation  I, Carl Martinezibe   attest that this documentation has been prepared under the direction and in the presence of Wilbert Lock MD    Provider Attestation - Scribe documentation  All medical record entries made by the Scribe were at my direction and personally dictated by me. I have reviewed the chart and agree that the record accurately reflects my personal performance of the history, physical exam, discussion and plan.   Wilbert Lock MD

## 2023-12-11 ENCOUNTER — NURSING HOME VISIT (OUTPATIENT)
Dept: POST ACUTE CARE | Facility: EXTERNAL LOCATION | Age: 88
End: 2023-12-11
Payer: COMMERCIAL

## 2023-12-11 DIAGNOSIS — Z86.73 HISTORY OF CVA (CEREBROVASCULAR ACCIDENT): ICD-10-CM

## 2023-12-11 DIAGNOSIS — I50.32 HYPERTENSIVE HEART AND KIDNEY DISEASE WITH CHRONIC DIASTOLIC CONGESTIVE HEART FAILURE AND STAGE 5 CHRONIC KIDNEY DISEASE ON CHRONIC DIALYSIS (MULTI): ICD-10-CM

## 2023-12-11 DIAGNOSIS — N18.6 HYPERTENSIVE HEART AND KIDNEY DISEASE WITH CHRONIC DIASTOLIC CONGESTIVE HEART FAILURE AND STAGE 5 CHRONIC KIDNEY DISEASE ON CHRONIC DIALYSIS (MULTI): ICD-10-CM

## 2023-12-11 DIAGNOSIS — J18.9 PNEUMONIA DUE TO INFECTIOUS ORGANISM, UNSPECIFIED LATERALITY, UNSPECIFIED PART OF LUNG: ICD-10-CM

## 2023-12-11 DIAGNOSIS — I13.2 HYPERTENSIVE HEART AND KIDNEY DISEASE WITH CHRONIC DIASTOLIC CONGESTIVE HEART FAILURE AND STAGE 5 CHRONIC KIDNEY DISEASE ON CHRONIC DIALYSIS (MULTI): ICD-10-CM

## 2023-12-11 DIAGNOSIS — R53.1 WEAKNESS: Primary | ICD-10-CM

## 2023-12-11 DIAGNOSIS — Z99.2 HYPERTENSIVE HEART AND KIDNEY DISEASE WITH CHRONIC DIASTOLIC CONGESTIVE HEART FAILURE AND STAGE 5 CHRONIC KIDNEY DISEASE ON CHRONIC DIALYSIS (MULTI): ICD-10-CM

## 2023-12-11 DIAGNOSIS — I48.91 ATRIAL FIBRILLATION, UNSPECIFIED TYPE (MULTI): ICD-10-CM

## 2023-12-11 PROCEDURE — 99309 SBSQ NF CARE MODERATE MDM 30: CPT | Performed by: NURSE PRACTITIONER

## 2023-12-11 NOTE — ASSESSMENT & PLAN NOTE
On HD per nephrology  Remove extra fluid in dialysis  Antihypertensives  Blood pressure at goal  Renal diet  Monitor BP

## 2023-12-11 NOTE — ASSESSMENT & PLAN NOTE
On HD per nephrology  Removed extra fluid in dialysis  Antihypertensives  Renal diet  Monitor BP and weight

## 2023-12-11 NOTE — PROGRESS NOTES
PROGRESS NOTE    Subjective   Chief complaint: Melody Martin is a 88 y.o. female who is an acute skilled patient being seen and evaluated for weakness and f/u pneumonia    HPI:  HPI  Patient presents for f/u therapy and general medical care. Therapy has been working with the patient to improve strength, endurance, ADLs, and transfers d/t generalized weakness.  Patient remains nonweightbearing to left lower extremity.  Patient seen and examined at bedside, in no acute distress.  Also seeing patient for follow-up of PNA, on ATB Medrol Dosepak aerosol treatment and Mucinex, tolerating with no adverse effects.  Patient's cough is improving.  Denies shortness of breath.  Denies fever or chills.    Objective   Vital signs: 138/68, 97.5, 74, 16, blood sugar 344    Physical Exam  Constitutional:       General: She is not in acute distress.  Eyes:      Extraocular Movements: Extraocular movements intact.   Pulmonary:      Effort: Pulmonary effort is normal.      Comments: Diminished  Musculoskeletal:      Cervical back: Neck supple.      Right lower leg: Edema present.      Left lower leg: Edema present.      Comments: Generalized weakness   Neurological:      Mental Status: She is alert.   Psychiatric:         Mood and Affect: Mood normal.         Behavior: Behavior is cooperative.         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 (CMS/HCC)     On HD per nephrology  Remove extra fluid in dialysis  Antihypertensives  Blood pressure at goal  Renal diet  Monitor BP         Pneumonia     Improving  Continue Augmentin, Mucinex, aerosol treatment, Medrol Dosepak         Weakness - Primary     Continue to work with PT and OT to progress towards goals           Medications, treatments, and labs reviewed  Continue medications and treatments as listed in EMR      Scribe Attestation  I, Phoebe Martinez   attest that this documentation has been prepared under the direction and in the presence of MAR Johnson    Provider Attestation - Scribe documentation  All medical record entries made by the Scribe were at my direction and personally dictated by me. I have reviewed the chart and agree that the record accurately reflects my personal performance of the history, physical exam, discussion and plan.   MAR Johnson

## 2023-12-11 NOTE — LETTER
Patient: Meldoy Martin  : 1935    Encounter Date: 2023    PROGRESS NOTE    Subjective  Chief complaint: Melody Martin is a 88 y.o. female who is an acute skilled patient being seen and evaluated for weakness    HPI:  HPI  Patient with a diagnosis of ESRD, on HD.  Patient seen and examined at bedside, in f/u to PNA, patient had started Z-Carlos, Medrol Dosepak aerosol treatment Mucinex and Augmentin.  Patient is feeling better, still has an occasional nonproductive cough.  Patient does continue to work with therapy.  Therapy working with patient on therapeutic exercises, neuromuscular reeducation and therapeutic activities.  Patient requires max assist for supine to sit.  Patient also requires max assist x 2 for slide board with bed in high position.  Patient denies chest pain or shortness of breath.  Denies nausea or vomiting.    Objective  Vital signs: 153/65, 98.2, 66, 20, 96%, blood sugar 201    Physical Exam  Constitutional:       General: She is not in acute distress.  Eyes:      Extraocular Movements: Extraocular movements intact.   Cardiovascular:      Rate and Rhythm: Normal rate and regular rhythm.   Pulmonary:      Effort: Pulmonary effort is normal.      Breath sounds: Normal breath sounds.   Abdominal:      General: Bowel sounds are normal.      Palpations: Abdomen is soft.   Musculoskeletal:      Cervical back: Neck supple.      Right lower leg: Edema present.      Left lower leg: Edema present.      Comments: Generalized weakness   Neurological:      Mental Status: She is alert.   Psychiatric:         Mood and Affect: Mood normal.         Behavior: Behavior is cooperative.         Assessment/Plan  Problem List Items Addressed This Visit       Atrial fibrillation (CMS/HCC)     HR controlled  Amiodarone  Apixaban   Bleeding precautions         History of CVA (cerebrovascular accident)     Therapy  Statin  Monitor         Hypertensive heart and kidney disease with chronic diastolic congestive  heart failure and stage 5 chronic kidney disease on chronic dialysis (CMS/[facility])     On HD per nephrology  Antihypertensives  Renal diet  Monitor BP and weight  Remove extra fluid in dialysis         Pneumonia     Improving         Weakness - Primary     Continue with therapy to progress towards goals          Medications, treatments, and labs reviewed  Continue medications and treatments as listed in EMR      Scribe Attestation  Hien MARRERO Scribe   attest that this documentation has been prepared under the direction and in the presence of MAR Johnson    Provider Attestation - Scribe documentation  All medical record entries made by the Scribe were at my direction and personally dictated by me. I have reviewed the chart and agree that the record accurately reflects my personal performance of the history, physical exam, discussion and plan.   MAR Johnson        Electronically Signed By: MAR Johnson   12/16/23  5:59 PM

## 2023-12-11 NOTE — PROGRESS NOTES
PROGRESS NOTE    Subjective   Chief complaint: Melody Martin is a 88 y.o. female who is an acute skilled patient being seen and evaluated for weakness    HPI:  HPI  Patient working in therapy, NWB to left lower extremity.  Patient working on core strengthening to improve postural stability to aid in functional transfers.  Patient also working on therapeutic activities to increase functional task performance.  Patient with a Dx of ESRD on HD.  Also seeing patient to follow-up on PNA, was started on Z-Carlos, Medrol Dosepak, aerosol treatment and Mucinex and added Augmentin.  Patient is feeling better.  Denies chest pain or shortness of breath.    Objective   Vital signs: 140/72, 98.5, 80, 18,  blood sugar 316    Physical Exam  Constitutional:       General: She is not in acute distress.  Eyes:      Extraocular Movements: Extraocular movements intact.   Pulmonary:      Effort: Pulmonary effort is normal.      Breath sounds: Wheezing present.   Musculoskeletal:      Cervical back: Neck supple.      Right lower leg: Edema present.      Left lower leg: Edema present.      Comments: Generalized weakness   Neurological:      Mental Status: She is alert.   Psychiatric:         Mood and Affect: Mood normal.         Behavior: Behavior is cooperative.         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 (CMS/HCC)     On HD per nephrology  Removed extra fluid in dialysis  Antihypertensives  Renal diet  Monitor BP and weight         Pneumonia     Improving   Continue Augmentin, Mucinex, aerosol treatment, Medrol Dosepak         Weakness - Primary     Continue to work towards goals with PT and OT,           Medications, treatments, and labs reviewed  Continue medications and  treatments as listed in EMR      Scribe Attestation  IHien Scribe   attest that this documentation has been prepared under the direction and in the presence of MAR Johnson    Provider Attestation - Scribe documentation  All medical record entries made by the Scribe were at my direction and personally dictated by me. I have reviewed the chart and agree that the record accurately reflects my personal performance of the history, physical exam, discussion and plan.   MAR Johnson

## 2023-12-12 ENCOUNTER — NURSING HOME VISIT (OUTPATIENT)
Dept: POST ACUTE CARE | Facility: EXTERNAL LOCATION | Age: 88
End: 2023-12-12

## 2023-12-12 ENCOUNTER — APPOINTMENT (OUTPATIENT)
Dept: VASCULAR SURGERY | Facility: CLINIC | Age: 88
End: 2023-12-12
Payer: COMMERCIAL

## 2023-12-12 DIAGNOSIS — R53.1 WEAKNESS: ICD-10-CM

## 2023-12-12 DIAGNOSIS — Z99.2 HYPERTENSIVE HEART AND KIDNEY DISEASE WITH CHRONIC DIASTOLIC CONGESTIVE HEART FAILURE AND STAGE 5 CHRONIC KIDNEY DISEASE ON CHRONIC DIALYSIS (MULTI): ICD-10-CM

## 2023-12-12 DIAGNOSIS — I50.32 HYPERTENSIVE HEART AND KIDNEY DISEASE WITH CHRONIC DIASTOLIC CONGESTIVE HEART FAILURE AND STAGE 5 CHRONIC KIDNEY DISEASE ON CHRONIC DIALYSIS (MULTI): ICD-10-CM

## 2023-12-12 DIAGNOSIS — N18.6 HYPERTENSIVE HEART AND KIDNEY DISEASE WITH CHRONIC DIASTOLIC CONGESTIVE HEART FAILURE AND STAGE 5 CHRONIC KIDNEY DISEASE ON CHRONIC DIALYSIS (MULTI): ICD-10-CM

## 2023-12-12 DIAGNOSIS — Z99.2 TYPE 2 DIABETES MELLITUS WITH CHRONIC KIDNEY DISEASE ON CHRONIC DIALYSIS, WITH LONG-TERM CURRENT USE OF INSULIN (MULTI): ICD-10-CM

## 2023-12-12 DIAGNOSIS — N18.6 TYPE 2 DIABETES MELLITUS WITH CHRONIC KIDNEY DISEASE ON CHRONIC DIALYSIS, WITH LONG-TERM CURRENT USE OF INSULIN (MULTI): ICD-10-CM

## 2023-12-12 DIAGNOSIS — I13.2 HYPERTENSIVE HEART AND KIDNEY DISEASE WITH CHRONIC DIASTOLIC CONGESTIVE HEART FAILURE AND STAGE 5 CHRONIC KIDNEY DISEASE ON CHRONIC DIALYSIS (MULTI): ICD-10-CM

## 2023-12-12 DIAGNOSIS — I48.91 ATRIAL FIBRILLATION, UNSPECIFIED TYPE (MULTI): ICD-10-CM

## 2023-12-12 DIAGNOSIS — Z79.4 TYPE 2 DIABETES MELLITUS WITH CHRONIC KIDNEY DISEASE ON CHRONIC DIALYSIS, WITH LONG-TERM CURRENT USE OF INSULIN (MULTI): ICD-10-CM

## 2023-12-12 DIAGNOSIS — E11.22 TYPE 2 DIABETES MELLITUS WITH CHRONIC KIDNEY DISEASE ON CHRONIC DIALYSIS, WITH LONG-TERM CURRENT USE OF INSULIN (MULTI): ICD-10-CM

## 2023-12-12 PROCEDURE — 99309 SBSQ NF CARE MODERATE MDM 30: CPT | Performed by: INTERNAL MEDICINE

## 2023-12-12 NOTE — PROGRESS NOTES
PROGRESS NOTE    Subjective   Chief complaint: Melody Martin is a 88 y.o. female who is an acute skilled patient being seen and evaluated for weakness    HPI:  Patient continues working with therapy to improve strength and mobility. Pt performed unsupported seated and long seated ther ex with AAROM at times due to weakness. She sat unsupported on the EOB and worked on good upright posture and sidebending act. She required Max A x 2 for a slideboard with the bed in a high position to allow for gravity to assist.  She is totally dependent on assist with ADLs. She continues on ATB therapy with no adverse reactions noted. Remains afebrile. No new nursing concerns.         Objective   Vital signs: 153/65,66,96%,     Physical Exam  Constitutional:       General: She is not in acute distress.  Eyes:      Extraocular Movements: Extraocular movements intact.   Cardiovascular:      Rate and Rhythm: Normal rate and regular rhythm.   Pulmonary:      Effort: Pulmonary effort is normal.      Breath sounds: Normal breath sounds.   Abdominal:      General: Bowel sounds are normal.      Palpations: Abdomen is soft.   Musculoskeletal:      Cervical back: Neck supple.      Right lower leg: Edema present.      Left lower leg: Edema present.   Feet:      Comments: Left heel - dry\dark spot  Neurological:      Mental Status: She is alert.   Psychiatric:         Mood and Affect: Mood normal.         Behavior: Behavior is cooperative.         Assessment/Plan   Problem List Items Addressed This Visit       Type 2 diabetes mellitus with chronic kidney disease on chronic dialysis, with long-term current use of insulin (CMS/HCC)     Glucoscan with SS  Continue Lantus and Humalog          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 (CMS/HCC)     On HD per nephrology  Antihypertensives  Renal diet  Monitor BP         Weakness     Continue  therapy         Atrial fibrillation (CMS/[facility])     HR controlled  Amiodarone  Apixaban   Bleeding precautions          Medications, treatments, and labs reviewed  Continue medications and treatments as listed in EMR    Scribe Attestation  I, Phoebe Ann   attest that this documentation has been prepared under the direction and in the presence of Wilbert Lock MD.     Provider Attestation - Scribe documentation  All medical record entries made by the Scribe were at my direction and personally dictated by me. I have reviewed the chart and agree that the record accurately reflects my personal performance of the history, physical exam, discussion and plan.   Wilbert Lock MD

## 2023-12-12 NOTE — ASSESSMENT & PLAN NOTE
On HD per nephrology  Antihypertensives  Renal diet  Monitor BP and weight  Remove extra fluid in dialysis

## 2023-12-12 NOTE — PROGRESS NOTES
PROGRESS NOTE    Subjective   Chief complaint: Melody Martin is a 88 y.o. female who is an acute skilled patient being seen and evaluated for weakness    HPI:  HPI  Patient with a diagnosis of ESRD, on HD.  Patient seen and examined at bedside, in f/u to PNA, patient had started Z-Carlos, Medrol Dosepak aerosol treatment Mucinex and Augmentin.  Patient is feeling better, still has an occasional nonproductive cough.  Patient does continue to work with therapy.  Therapy working with patient on therapeutic exercises, neuromuscular reeducation and therapeutic activities.  Patient requires max assist for supine to sit.  Patient also requires max assist x 2 for slide board with bed in high position.  Patient denies chest pain or shortness of breath.  Denies nausea or vomiting.    Objective   Vital signs: 153/65, 98.2, 66, 20, 96%, blood sugar 201    Physical Exam  Constitutional:       General: She is not in acute distress.  Eyes:      Extraocular Movements: Extraocular movements intact.   Cardiovascular:      Rate and Rhythm: Normal rate and regular rhythm.   Pulmonary:      Effort: Pulmonary effort is normal.      Breath sounds: Normal breath sounds.   Abdominal:      General: Bowel sounds are normal.      Palpations: Abdomen is soft.   Musculoskeletal:      Cervical back: Neck supple.      Right lower leg: Edema present.      Left lower leg: Edema present.      Comments: Generalized weakness   Neurological:      Mental Status: She is alert.   Psychiatric:         Mood and Affect: Mood normal.         Behavior: Behavior is cooperative.         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  (CMS/[facility])     On HD per nephrology  Antihypertensives  Renal diet  Monitor BP and weight  Remove extra fluid in dialysis         Pneumonia     Improving         Weakness - Primary     Continue with therapy to progress towards goals          Medications, treatments, and labs reviewed  Continue medications and treatments as listed in EMR      Scribe Attestation  IHien Scribe   attest that this documentation has been prepared under the direction and in the presence of MAR Johnson    Provider Attestation - Scribe documentation  All medical record entries made by the Scribe were at my direction and personally dictated by me. I have reviewed the chart and agree that the record accurately reflects my personal performance of the history, physical exam, discussion and plan.   MAR Johnson

## 2023-12-12 NOTE — LETTER
Patient: Melody Martin  : 1935    Encounter Date: 2023    PROGRESS NOTE    Subjective  Chief complaint: Melody Martin is a 88 y.o. female who is an acute skilled patient being seen and evaluated for weakness    HPI:  Patient continues working with therapy to improve strength and mobility. Pt performed unsupported seated and long seated ther ex with AAROM at times due to weakness. She sat unsupported on the EOB and worked on good upright posture and sidebending act. She required Max A x 2 for a slideboard with the bed in a high position to allow for gravity to assist.  She is totally dependent on assist with ADLs. She continues on ATB therapy with no adverse reactions noted. Remains afebrile. No new nursing concerns.         Objective  Vital signs: 153/65,66,96%,     Physical Exam  Constitutional:       General: She is not in acute distress.  Eyes:      Extraocular Movements: Extraocular movements intact.   Cardiovascular:      Rate and Rhythm: Normal rate and regular rhythm.   Pulmonary:      Effort: Pulmonary effort is normal.      Breath sounds: Normal breath sounds.   Abdominal:      General: Bowel sounds are normal.      Palpations: Abdomen is soft.   Musculoskeletal:      Cervical back: Neck supple.      Right lower leg: Edema present.      Left lower leg: Edema present.   Feet:      Comments: Left heel - dry\dark spot  Neurological:      Mental Status: She is alert.   Psychiatric:         Mood and Affect: Mood normal.         Behavior: Behavior is cooperative.         Assessment/Plan  Problem List Items Addressed This Visit       Type 2 diabetes mellitus with chronic kidney disease on chronic dialysis, with long-term current use of insulin (CMS/HCC)     Glucoscan with SS  Continue Lantus and Humalog          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 (CMS/HCC)     On HD per  nephrology  Antihypertensives  Renal diet  Monitor BP         Weakness     Continue therapy         Atrial fibrillation (CMS/[facility])     HR controlled  Amiodarone  Apixaban   Bleeding precautions          Medications, treatments, and labs reviewed  Continue medications and treatments as listed in EMR    Scribe Attestation  Sachi MARRERO Scribe   attest that this documentation has been prepared under the direction and in the presence of Wilbert Lock MD.     Provider Attestation - Scribe documentation  All medical record entries made by the Scribe were at my direction and personally dictated by me. I have reviewed the chart and agree that the record accurately reflects my personal performance of the history, physical exam, discussion and plan.   Wilbert Lock MD      Electronically Signed By: Wilbert Lock MD   12/12/23  5:27 PM

## 2023-12-13 ENCOUNTER — NURSING HOME VISIT (OUTPATIENT)
Dept: POST ACUTE CARE | Facility: EXTERNAL LOCATION | Age: 88
End: 2023-12-13
Payer: COMMERCIAL

## 2023-12-13 DIAGNOSIS — I48.91 ATRIAL FIBRILLATION, UNSPECIFIED TYPE (MULTI): ICD-10-CM

## 2023-12-13 DIAGNOSIS — N18.6 HYPERTENSIVE HEART AND KIDNEY DISEASE WITH CHRONIC DIASTOLIC CONGESTIVE HEART FAILURE AND STAGE 5 CHRONIC KIDNEY DISEASE ON CHRONIC DIALYSIS (MULTI): ICD-10-CM

## 2023-12-13 DIAGNOSIS — Z99.2 HYPERTENSIVE HEART AND KIDNEY DISEASE WITH CHRONIC DIASTOLIC CONGESTIVE HEART FAILURE AND STAGE 5 CHRONIC KIDNEY DISEASE ON CHRONIC DIALYSIS (MULTI): ICD-10-CM

## 2023-12-13 DIAGNOSIS — I50.32 HYPERTENSIVE HEART AND KIDNEY DISEASE WITH CHRONIC DIASTOLIC CONGESTIVE HEART FAILURE AND STAGE 5 CHRONIC KIDNEY DISEASE ON CHRONIC DIALYSIS (MULTI): ICD-10-CM

## 2023-12-13 DIAGNOSIS — J18.9 PNEUMONIA DUE TO INFECTIOUS ORGANISM, UNSPECIFIED LATERALITY, UNSPECIFIED PART OF LUNG: ICD-10-CM

## 2023-12-13 DIAGNOSIS — Z86.73 HISTORY OF CVA (CEREBROVASCULAR ACCIDENT): ICD-10-CM

## 2023-12-13 DIAGNOSIS — R53.1 WEAKNESS: Primary | ICD-10-CM

## 2023-12-13 DIAGNOSIS — I13.2 HYPERTENSIVE HEART AND KIDNEY DISEASE WITH CHRONIC DIASTOLIC CONGESTIVE HEART FAILURE AND STAGE 5 CHRONIC KIDNEY DISEASE ON CHRONIC DIALYSIS (MULTI): ICD-10-CM

## 2023-12-13 PROCEDURE — 99309 SBSQ NF CARE MODERATE MDM 30: CPT | Performed by: NURSE PRACTITIONER

## 2023-12-13 NOTE — LETTER
Patient: Melody Martin  : 1935    Encounter Date: 2023    PROGRESS NOTE    Subjective  Chief complaint: Melody Martin is a 88 y.o. female who is an acute skilled patient being seen and evaluated for weakness    HPI:  HPI  Patient continues to work in therapy, patient requires max assist with upper body dressing and supported sitting, Poor sitting balance.  Patient is total dependence with lower body dressing and toileting.  Patient unable to get on the commode due to total assist with slide board transfer.  Patient is feeling better following ATB treatment for pneumonia.  Patient with a diagnosis of ESRD, on HD.  Patient seen and examined at bedside, no acute distress.  Denies chest pain or shortness of breath.  Denies nausea or vomiting.    Objective  Vital signs: 142/62, 98.2, 68, 18, blood sugar 277, 96%    Physical Exam  Constitutional:       General: She is not in acute distress.  Eyes:      Extraocular Movements: Extraocular movements intact.   Cardiovascular:      Rate and Rhythm: Normal rate and regular rhythm.   Pulmonary:      Effort: Pulmonary effort is normal.      Breath sounds: Normal breath sounds.   Abdominal:      General: Bowel sounds are normal.      Palpations: Abdomen is soft.   Musculoskeletal:      Cervical back: Neck supple.      Right lower leg: Edema present.      Left lower leg: Edema present.      Comments: Generalized weakness   Neurological:      Mental Status: She is alert.   Psychiatric:         Mood and Affect: Mood normal.         Behavior: Behavior is cooperative.         Assessment/Plan  Problem List Items Addressed This Visit       Atrial fibrillation (CMS/HCC)     HR controlled  Amiodarone  Apixaban   Bleeding precautions         History of CVA (cerebrovascular accident)     Therapy  Statin  Monitor         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 (CMS/[facility])     On  HD per nephrology  Antihypertensives  Renal diet  Monitor BP and weight  Remove extra fluid in dialysis         Pneumonia     Improving, completed ATB         Weakness - Primary     Patient continues to work with therapy          Medications, treatments, and labs reviewed  Continue medications and treatments as listed in EMR      Scribe Attestation  IHien Scribe   attest that this documentation has been prepared under the direction and in the presence of MAR Johnson    Provider Attestation - Scribe documentation  All medical record entries made by the Scribe were at my direction and personally dictated by me. I have reviewed the chart and agree that the record accurately reflects my personal performance of the history, physical exam, discussion and plan.   MAR Johnson        Electronically Signed By: MAR Johnson   12/19/23  4:47 PM

## 2023-12-14 ENCOUNTER — APPOINTMENT (OUTPATIENT)
Dept: CARDIOLOGY | Facility: CLINIC | Age: 88
End: 2023-12-14
Payer: COMMERCIAL

## 2023-12-14 ENCOUNTER — NURSING HOME VISIT (OUTPATIENT)
Dept: POST ACUTE CARE | Facility: EXTERNAL LOCATION | Age: 88
End: 2023-12-14

## 2023-12-14 DIAGNOSIS — I50.32 HYPERTENSIVE HEART AND KIDNEY DISEASE WITH CHRONIC DIASTOLIC CONGESTIVE HEART FAILURE AND STAGE 5 CHRONIC KIDNEY DISEASE ON CHRONIC DIALYSIS (MULTI): ICD-10-CM

## 2023-12-14 DIAGNOSIS — I13.2 HYPERTENSIVE HEART AND KIDNEY DISEASE WITH CHRONIC DIASTOLIC CONGESTIVE HEART FAILURE AND STAGE 5 CHRONIC KIDNEY DISEASE ON CHRONIC DIALYSIS (MULTI): ICD-10-CM

## 2023-12-14 DIAGNOSIS — R53.1 WEAKNESS: Primary | ICD-10-CM

## 2023-12-14 DIAGNOSIS — Z99.2 HYPERTENSIVE HEART AND KIDNEY DISEASE WITH CHRONIC DIASTOLIC CONGESTIVE HEART FAILURE AND STAGE 5 CHRONIC KIDNEY DISEASE ON CHRONIC DIALYSIS (MULTI): ICD-10-CM

## 2023-12-14 DIAGNOSIS — N18.6 HYPERTENSIVE HEART AND KIDNEY DISEASE WITH CHRONIC DIASTOLIC CONGESTIVE HEART FAILURE AND STAGE 5 CHRONIC KIDNEY DISEASE ON CHRONIC DIALYSIS (MULTI): ICD-10-CM

## 2023-12-14 DIAGNOSIS — I48.91 ATRIAL FIBRILLATION, UNSPECIFIED TYPE (MULTI): ICD-10-CM

## 2023-12-14 DIAGNOSIS — Z86.73 HISTORY OF CVA (CEREBROVASCULAR ACCIDENT): ICD-10-CM

## 2023-12-14 PROCEDURE — 99309 SBSQ NF CARE MODERATE MDM 30: CPT | Performed by: NURSE PRACTITIONER

## 2023-12-14 NOTE — LETTER
Patient: Melody Martin  : 1935    Encounter Date: 2023    PROGRESS NOTE    Subjective  Chief complaint: Melody Martin is a 88 y.o. female who is an acute skilled patient being seen and evaluated for weakness    HPI:  HPI  Therapy has been working with the patient to improve strength and endurance with ADLs, transfers, and mobility.  Patient continues to work toward goals.  Patient with ESRD, on HD.  Patient is stable and has no new complaints.  Nursing staff voices no new concerns today.    Objective  Vital signs: 142/63, 98.2, 80, 18, blood sugar 317    Physical Exam  Constitutional:       General: She is not in acute distress.  Eyes:      Extraocular Movements: Extraocular movements intact.   Cardiovascular:      Rate and Rhythm: Normal rate and regular rhythm.   Pulmonary:      Effort: Pulmonary effort is normal.      Breath sounds: Normal breath sounds.   Musculoskeletal:      Cervical back: Neck supple.      Right lower leg: Edema present.      Left lower leg: Edema present.      Comments: Generalized weakness   Neurological:      Mental Status: She is alert.   Psychiatric:         Mood and Affect: Mood normal.         Behavior: Behavior is cooperative.         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 I have reviewed the chart and agree that the record accurately reflects my personal performance of the history, physical exam, discussion and plan.   MAR Johnson        Electronically Signed By: MAR Johnson   12/20/23  1:57 PM

## 2023-12-15 ENCOUNTER — NURSING HOME VISIT (OUTPATIENT)
Dept: POST ACUTE CARE | Facility: EXTERNAL LOCATION | Age: 88
End: 2023-12-15
Payer: COMMERCIAL

## 2023-12-15 DIAGNOSIS — Z86.73 HISTORY OF CVA (CEREBROVASCULAR ACCIDENT): ICD-10-CM

## 2023-12-15 DIAGNOSIS — N18.6 HYPERTENSIVE HEART AND KIDNEY DISEASE WITH CHRONIC DIASTOLIC CONGESTIVE HEART FAILURE AND STAGE 5 CHRONIC KIDNEY DISEASE ON CHRONIC DIALYSIS (MULTI): ICD-10-CM

## 2023-12-15 DIAGNOSIS — Z99.2 TYPE 2 DIABETES MELLITUS WITH CHRONIC KIDNEY DISEASE ON CHRONIC DIALYSIS, WITH LONG-TERM CURRENT USE OF INSULIN (MULTI): ICD-10-CM

## 2023-12-15 DIAGNOSIS — R53.1 WEAKNESS: Primary | ICD-10-CM

## 2023-12-15 DIAGNOSIS — J18.9 PNEUMONIA DUE TO INFECTIOUS ORGANISM, UNSPECIFIED LATERALITY, UNSPECIFIED PART OF LUNG: ICD-10-CM

## 2023-12-15 DIAGNOSIS — I13.2 HYPERTENSIVE HEART AND KIDNEY DISEASE WITH CHRONIC DIASTOLIC CONGESTIVE HEART FAILURE AND STAGE 5 CHRONIC KIDNEY DISEASE ON CHRONIC DIALYSIS (MULTI): ICD-10-CM

## 2023-12-15 DIAGNOSIS — I48.91 ATRIAL FIBRILLATION, UNSPECIFIED TYPE (MULTI): ICD-10-CM

## 2023-12-15 DIAGNOSIS — I50.32 HYPERTENSIVE HEART AND KIDNEY DISEASE WITH CHRONIC DIASTOLIC CONGESTIVE HEART FAILURE AND STAGE 5 CHRONIC KIDNEY DISEASE ON CHRONIC DIALYSIS (MULTI): ICD-10-CM

## 2023-12-15 DIAGNOSIS — Z99.2 HYPERTENSIVE HEART AND KIDNEY DISEASE WITH CHRONIC DIASTOLIC CONGESTIVE HEART FAILURE AND STAGE 5 CHRONIC KIDNEY DISEASE ON CHRONIC DIALYSIS (MULTI): ICD-10-CM

## 2023-12-15 DIAGNOSIS — Z79.4 TYPE 2 DIABETES MELLITUS WITH CHRONIC KIDNEY DISEASE ON CHRONIC DIALYSIS, WITH LONG-TERM CURRENT USE OF INSULIN (MULTI): ICD-10-CM

## 2023-12-15 DIAGNOSIS — N18.6 TYPE 2 DIABETES MELLITUS WITH CHRONIC KIDNEY DISEASE ON CHRONIC DIALYSIS, WITH LONG-TERM CURRENT USE OF INSULIN (MULTI): ICD-10-CM

## 2023-12-15 DIAGNOSIS — E11.22 TYPE 2 DIABETES MELLITUS WITH CHRONIC KIDNEY DISEASE ON CHRONIC DIALYSIS, WITH LONG-TERM CURRENT USE OF INSULIN (MULTI): ICD-10-CM

## 2023-12-15 PROCEDURE — 99308 SBSQ NF CARE LOW MDM 20: CPT | Performed by: INTERNAL MEDICINE

## 2023-12-15 NOTE — LETTER
Patient: Melody Martin  : 1935    Encounter Date: 12/15/2023    PROGRESS NOTE    Subjective  Chief complaint: Melody Martin is a 88 y.o. female who is an acute skilled patient being seen and evaluated for weakness    HPI:  HPI  Patient is working in therapy, requiring max assist for upper body dressing and supported sitting, poor plus balance.  Patient is total assist for lower body dressing and toileting.  Patient recently completed ATB's for PNA, improved.  Patient seen and examined at bedside, no acute distress.  Denies chest pain or shortness of breath.  Denies nausea or vomiting    Objective  Vital signs: 137/61, 97.9, 78, 16, blood sugar 236, 98%    Physical Exam  Constitutional:       General: She is not in acute distress.  Eyes:      Extraocular Movements: Extraocular movements intact.   Cardiovascular:      Rate and Rhythm: Normal rate and regular rhythm.   Pulmonary:      Effort: Pulmonary effort is normal.      Breath sounds: Normal breath sounds.   Abdominal:      General: Bowel sounds are normal.      Palpations: Abdomen is soft.   Musculoskeletal:      Cervical back: Neck supple.      Right lower leg: Edema present.      Left lower leg: Edema present.      Comments: Generalized weakness   Neurological:      Mental Status: She is alert.   Psychiatric:         Mood and Affect: Mood normal.         Behavior: Behavior is cooperative.         Assessment/Plan  Problem List Items Addressed This Visit       Type 2 diabetes mellitus with chronic kidney disease on chronic dialysis, with long-term current use of insulin (CMS/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)     On HD per  nephrology  Antihypertensives  Renal diet  Monitor BP         Weakness - Primary     Continue with therapy to progress towards goals         Atrial fibrillation (CMS/[facility])     HR controlled  Amiodarone  Apixaban   Bleeding precautions          Medications, treatments, and labs reviewed  Continue medications and treatments as listed in EMR      Scribe Attestation  IHien Scribe   attest that this documentation has been prepared under the direction and in the presence of Wilbert Lock MD    Provider Attestation - Scribe documentation  All medical record entries made by the Scribe were at my direction and personally dictated by me. I have reviewed the chart and agree that the record accurately reflects my personal performance of the history, physical exam, discussion and plan.   Wilbert Lock MD        Electronically Signed By: Wilbert Lock MD   12/16/23  8:16 AM

## 2023-12-15 NOTE — PROGRESS NOTES
PROGRESS NOTE    Subjective   Chief complaint: Melody Martin is a 88 y.o. female who is an acute skilled patient being seen and evaluated for weakness    HPI:  HPI  Patient is working in therapy, requiring max assist for upper body dressing and supported sitting, poor plus balance.  Patient is total assist for lower body dressing and toileting.  Patient recently completed ATB's for PNA, improved.  Patient seen and examined at bedside, no acute distress.  Denies chest pain or shortness of breath.  Denies nausea or vomiting    Objective   Vital signs: 137/61, 97.9, 78, 16, blood sugar 236, 98%    Physical Exam  Constitutional:       General: She is not in acute distress.  Eyes:      Extraocular Movements: Extraocular movements intact.   Cardiovascular:      Rate and Rhythm: Normal rate and regular rhythm.   Pulmonary:      Effort: Pulmonary effort is normal.      Breath sounds: Normal breath sounds.   Abdominal:      General: Bowel sounds are normal.      Palpations: Abdomen is soft.   Musculoskeletal:      Cervical back: Neck supple.      Right lower leg: Edema present.      Left lower leg: Edema present.      Comments: Generalized weakness   Neurological:      Mental Status: She is alert.   Psychiatric:         Mood and Affect: Mood normal.         Behavior: Behavior is cooperative.         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 (CMS/HCC)     On HD per nephrology  Antihypertensives  Renal diet  Monitor BP         Weakness - Primary     Continue with  therapy to progress towards goals         Atrial fibrillation (CMS/[facility])     HR controlled  Amiodarone  Apixaban   Bleeding precautions          Medications, treatments, and labs reviewed  Continue medications and treatments as listed in EMR      Scribe Attestation  I, Phoebe Martinez   attest that this documentation has been prepared under the direction and in the presence of Wilbert Lock MD    Provider Attestation - Scribe documentation  All medical record entries made by the Scribe were at my direction and personally dictated by me. I have reviewed the chart and agree that the record accurately reflects my personal performance of the history, physical exam, discussion and plan.   Wilbert Lock MD

## 2023-12-18 ENCOUNTER — NURSING HOME VISIT (OUTPATIENT)
Dept: POST ACUTE CARE | Facility: EXTERNAL LOCATION | Age: 88
End: 2023-12-18
Payer: COMMERCIAL

## 2023-12-18 DIAGNOSIS — J18.9 PNEUMONIA DUE TO INFECTIOUS ORGANISM, UNSPECIFIED LATERALITY, UNSPECIFIED PART OF LUNG: ICD-10-CM

## 2023-12-18 DIAGNOSIS — I50.32 HYPERTENSIVE HEART AND KIDNEY DISEASE WITH CHRONIC DIASTOLIC CONGESTIVE HEART FAILURE AND STAGE 5 CHRONIC KIDNEY DISEASE ON CHRONIC DIALYSIS (MULTI): ICD-10-CM

## 2023-12-18 DIAGNOSIS — N18.6 HYPERTENSIVE HEART AND KIDNEY DISEASE WITH CHRONIC DIASTOLIC CONGESTIVE HEART FAILURE AND STAGE 5 CHRONIC KIDNEY DISEASE ON CHRONIC DIALYSIS (MULTI): ICD-10-CM

## 2023-12-18 DIAGNOSIS — Z99.2 HYPERTENSIVE HEART AND KIDNEY DISEASE WITH CHRONIC DIASTOLIC CONGESTIVE HEART FAILURE AND STAGE 5 CHRONIC KIDNEY DISEASE ON CHRONIC DIALYSIS (MULTI): ICD-10-CM

## 2023-12-18 DIAGNOSIS — R53.1 WEAKNESS: Primary | ICD-10-CM

## 2023-12-18 DIAGNOSIS — I13.2 HYPERTENSIVE HEART AND KIDNEY DISEASE WITH CHRONIC DIASTOLIC CONGESTIVE HEART FAILURE AND STAGE 5 CHRONIC KIDNEY DISEASE ON CHRONIC DIALYSIS (MULTI): ICD-10-CM

## 2023-12-18 PROCEDURE — 99309 SBSQ NF CARE MODERATE MDM 30: CPT | Performed by: NURSE PRACTITIONER

## 2023-12-18 NOTE — LETTER
Patient: Melody Martin  : 1935    Encounter Date: 2023    PROGRESS NOTE    Subjective  Chief complaint: Melody Martin is a 88 y.o. female who is an acute skilled patient being seen and evaluated for weakness    HPI:  HPI Patient working in therapy du eto weakness and debility. She requires max a for UB dressing in supported sitting with poor sitting balance, is totally dependent for LB dressing and toileting. She is unable to roll and continues to work in slide board transfers. She recently completed course of ATB for PNA and reports that she is feeling better. No new issues at this time. No acute distress.     Objective  Vital signs: 132/68, 96%    Physical Exam  Constitutional:       General: She is not in acute distress.  Eyes:      Extraocular Movements: Extraocular movements intact.   Cardiovascular:      Rate and Rhythm: Normal rate and regular rhythm.   Pulmonary:      Effort: Pulmonary effort is normal.      Breath sounds: Rhonchi present.   Abdominal:      General: Bowel sounds are normal.      Palpations: Abdomen is soft.   Musculoskeletal:      Cervical back: Neck supple.      Right lower leg: Edema present.      Left lower leg: Edema present.   Neurological:      Mental Status: She is alert.   Psychiatric:         Mood and Affect: Mood normal.         Behavior: Behavior is cooperative.         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    Provider Attestation - Scribe documentation  All medical record entries made by the Scribe were at my direction and personally dictated by me. I have reviewed the chart and agree that the record accurately reflects my personal performance of the history, physical exam, discussion and plan.      Electronically Signed By: MAR Johnson   12/24/23  6:59 PM

## 2023-12-19 ENCOUNTER — NURSING HOME VISIT (OUTPATIENT)
Dept: POST ACUTE CARE | Facility: EXTERNAL LOCATION | Age: 88
End: 2023-12-19
Payer: COMMERCIAL

## 2023-12-19 DIAGNOSIS — I50.32 HYPERTENSIVE HEART AND KIDNEY DISEASE WITH CHRONIC DIASTOLIC CONGESTIVE HEART FAILURE AND STAGE 5 CHRONIC KIDNEY DISEASE ON CHRONIC DIALYSIS (MULTI): ICD-10-CM

## 2023-12-19 DIAGNOSIS — N18.6 HYPERTENSIVE HEART AND KIDNEY DISEASE WITH CHRONIC DIASTOLIC CONGESTIVE HEART FAILURE AND STAGE 5 CHRONIC KIDNEY DISEASE ON CHRONIC DIALYSIS (MULTI): ICD-10-CM

## 2023-12-19 DIAGNOSIS — Z99.2 TYPE 2 DIABETES MELLITUS WITH CHRONIC KIDNEY DISEASE ON CHRONIC DIALYSIS, WITH LONG-TERM CURRENT USE OF INSULIN (MULTI): ICD-10-CM

## 2023-12-19 DIAGNOSIS — Z99.2 HYPERTENSIVE HEART AND KIDNEY DISEASE WITH CHRONIC DIASTOLIC CONGESTIVE HEART FAILURE AND STAGE 5 CHRONIC KIDNEY DISEASE ON CHRONIC DIALYSIS (MULTI): ICD-10-CM

## 2023-12-19 DIAGNOSIS — I48.91 ATRIAL FIBRILLATION, UNSPECIFIED TYPE (MULTI): ICD-10-CM

## 2023-12-19 DIAGNOSIS — E11.22 TYPE 2 DIABETES MELLITUS WITH CHRONIC KIDNEY DISEASE ON CHRONIC DIALYSIS, WITH LONG-TERM CURRENT USE OF INSULIN (MULTI): ICD-10-CM

## 2023-12-19 DIAGNOSIS — R53.1 WEAKNESS: ICD-10-CM

## 2023-12-19 DIAGNOSIS — N18.6 TYPE 2 DIABETES MELLITUS WITH CHRONIC KIDNEY DISEASE ON CHRONIC DIALYSIS, WITH LONG-TERM CURRENT USE OF INSULIN (MULTI): ICD-10-CM

## 2023-12-19 DIAGNOSIS — Z79.4 TYPE 2 DIABETES MELLITUS WITH CHRONIC KIDNEY DISEASE ON CHRONIC DIALYSIS, WITH LONG-TERM CURRENT USE OF INSULIN (MULTI): ICD-10-CM

## 2023-12-19 DIAGNOSIS — I13.2 HYPERTENSIVE HEART AND KIDNEY DISEASE WITH CHRONIC DIASTOLIC CONGESTIVE HEART FAILURE AND STAGE 5 CHRONIC KIDNEY DISEASE ON CHRONIC DIALYSIS (MULTI): ICD-10-CM

## 2023-12-19 PROCEDURE — 99308 SBSQ NF CARE LOW MDM 20: CPT | Performed by: INTERNAL MEDICINE

## 2023-12-19 NOTE — PROGRESS NOTES
PROGRESS NOTE    Subjective   Chief complaint: Melody Martin is a 88 y.o. female who is an acute skilled patient being seen and evaluated for weakness    HPI:  HPI  Patient continues to work in therapy, patient requires max assist with upper body dressing and supported sitting, Poor sitting balance.  Patient is total dependence with lower body dressing and toileting.  Patient unable to get on the commode due to total assist with slide board transfer.  Patient is feeling better following ATB treatment for pneumonia.  Patient with a diagnosis of ESRD, on HD.  Patient seen and examined at bedside, no acute distress.  Denies chest pain or shortness of breath.  Denies nausea or vomiting.    Objective   Vital signs: 142/62, 98.2, 68, 18, blood sugar 277, 96%    Physical Exam  Constitutional:       General: She is not in acute distress.  Eyes:      Extraocular Movements: Extraocular movements intact.   Cardiovascular:      Rate and Rhythm: Normal rate and regular rhythm.   Pulmonary:      Effort: Pulmonary effort is normal.      Breath sounds: Normal breath sounds.   Abdominal:      General: Bowel sounds are normal.      Palpations: Abdomen is soft.   Musculoskeletal:      Cervical back: Neck supple.      Right lower leg: Edema present.      Left lower leg: Edema present.      Comments: Generalized weakness   Neurological:      Mental Status: She is alert.   Psychiatric:         Mood and Affect: Mood normal.         Behavior: Behavior is cooperative.         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 (CMS/HCC)     On HD per nephrology  Antihypertensives  Renal diet  Monitor BP and  weight  Remove extra fluid in dialysis         Pneumonia     Improving, completed ATB         Weakness - Primary     Patient continues to work with therapy          Medications, treatments, and labs reviewed  Continue medications and treatments as listed in EMR      Scribe Attestation  I, Phoebe Martinez   attest that this documentation has been prepared under the direction and in the presence of MAR Johnson    Provider Attestation - Scribe documentation  All medical record entries made by the Scribe were at my direction and personally dictated by me. I have reviewed the chart and agree that the record accurately reflects my personal performance of the history, physical exam, discussion and plan.   MAR Johnson

## 2023-12-19 NOTE — LETTER
Patient: Melody Martin  : 1935    Encounter Date: 2023    PROGRESS NOTE    Subjective  Chief complaint: Melody Martin is a 88 y.o. female who is an acute skilled patient being seen and evaluated for weakness    HPI:  Patient continues with therapy. She remains at TD assist level for toileting and LB dressing/bathing. Mod/Max A with UB dressing/bathing; TD+ assist with slide board transfers & with bed mobility. She has poor+ sitting balance; and Min/mod A with hygiene and grooming. No new nursing concerns.       Objective  Vital signs: 154/63,74,97%,    Physical Exam  Constitutional:       General: She is not in acute distress.  Eyes:      Extraocular Movements: Extraocular movements intact.   Cardiovascular:      Rate and Rhythm: Normal rate and regular rhythm.   Pulmonary:      Effort: Pulmonary effort is normal.      Breath sounds: Rhonchi present.   Abdominal:      General: Bowel sounds are normal.      Palpations: Abdomen is soft.   Musculoskeletal:      Cervical back: Neck supple.      Right lower leg: Edema present.      Left lower leg: Edema present.   Neurological:      Mental Status: She is alert.   Psychiatric:         Mood and Affect: Mood normal.         Behavior: Behavior is cooperative.         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     Provider Attestation - Scribe documentation  All medical record entries made by the Scribe were at my direction and personally dictated by me. I have reviewed the chart and agree that the record accurately reflects my personal performance of the history, physical exam, discussion and plan.   Wilbert Lock MD      Electronically Signed By: Wilbert Lock MD   12/19/23  4:51 PM

## 2023-12-19 NOTE — PROGRESS NOTES
PROGRESS NOTE    Subjective   Chief complaint: Melody Martin is a 88 y.o. female who is an acute skilled patient being seen and evaluated for weakness    HPI:  Patient continues with therapy. She remains at TD assist level for toileting and LB dressing/bathing. Mod/Max A with UB dressing/bathing; TD+ assist with slide board transfers & with bed mobility. She has poor+ sitting balance; and Min/mod A with hygiene and grooming. No new nursing concerns.       Objective   Vital signs: 154/63,74,97%,    Physical Exam  Constitutional:       General: She is not in acute distress.  Eyes:      Extraocular Movements: Extraocular movements intact.   Cardiovascular:      Rate and Rhythm: Normal rate and regular rhythm.   Pulmonary:      Effort: Pulmonary effort is normal.      Breath sounds: Rhonchi present.   Abdominal:      General: Bowel sounds are normal.      Palpations: Abdomen is soft.   Musculoskeletal:      Cervical back: Neck supple.      Right lower leg: Edema present.      Left lower leg: Edema present.   Neurological:      Mental Status: She is alert.   Psychiatric:         Mood and Affect: Mood normal.         Behavior: Behavior is cooperative.         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     Provider Attestation - Scribe documentation  All medical record entries made by the Scribe were at my direction and personally dictated by me. I have reviewed the chart and agree that the record accurately reflects my personal performance of the history, physical exam, discussion and plan.   Wilbert Lock MD

## 2023-12-19 NOTE — PROGRESS NOTES
PROGRESS NOTE    Subjective   Chief complaint: Melody Martin is a 88 y.o. female who is an acute skilled patient being seen and evaluated for weakness    HPI:  HPI  Therapy has been working with the patient to improve strength and endurance with ADLs, transfers, and mobility.  Patient continues to work toward goals.  Patient with ESRD, on HD.  Patient is stable and has no new complaints.  Nursing staff voices no new concerns today.    Objective   Vital signs: 142/63, 98.2, 80, 18, blood sugar 317    Physical Exam  Constitutional:       General: She is not in acute distress.  Eyes:      Extraocular Movements: Extraocular movements intact.   Cardiovascular:      Rate and Rhythm: Normal rate and regular rhythm.   Pulmonary:      Effort: Pulmonary effort is normal.      Breath sounds: Normal breath sounds.   Musculoskeletal:      Cervical back: Neck supple.      Right lower leg: Edema present.      Left lower leg: Edema present.      Comments: Generalized weakness   Neurological:      Mental Status: She is alert.   Psychiatric:         Mood and Affect: Mood normal.         Behavior: Behavior is cooperative.         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 I have reviewed the chart and agree that the record accurately reflects my personal performance of the history, physical exam, discussion and plan.   JANELL Johnson-CNP

## 2023-12-19 NOTE — ASSESSMENT & PLAN NOTE
On HD per nephrology  Remove extra fluid in dialysis  Antihypertensives  Renal diet  Monitor BP and weight

## 2023-12-19 NOTE — PROGRESS NOTES
PROGRESS NOTE    Subjective   Chief complaint: Melody Martin is a 88 y.o. female who is an acute skilled patient being seen and evaluated for weakness    HPI:  HPI Patient working in therapy du eto weakness and debility. She requires max a for UB dressing in supported sitting with poor sitting balance, is totally dependent for LB dressing and toileting. She is unable to roll and continues to work in slide board transfers. She recently completed course of ATB for PNA and reports that she is feeling better. No new issues at this time. No acute distress.     Objective   Vital signs: 132/68, 96%    Physical Exam  Constitutional:       General: She is not in acute distress.  Eyes:      Extraocular Movements: Extraocular movements intact.   Cardiovascular:      Rate and Rhythm: Normal rate and regular rhythm.   Pulmonary:      Effort: Pulmonary effort is normal.      Breath sounds: Rhonchi present.   Abdominal:      General: Bowel sounds are normal.      Palpations: Abdomen is soft.   Musculoskeletal:      Cervical back: Neck supple.      Right lower leg: Edema present.      Left lower leg: Edema present.   Neurological:      Mental Status: She is alert.   Psychiatric:         Mood and Affect: Mood normal.         Behavior: Behavior is cooperative.         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    Provider Attestation - Scribe documentation  All medical record entries made by the Scribe were at my direction and personally dictated by me. I have reviewed the chart and agree that the record accurately reflects my personal performance of the history, physical exam, discussion and plan.

## 2023-12-20 ENCOUNTER — NURSING HOME VISIT (OUTPATIENT)
Dept: POST ACUTE CARE | Facility: EXTERNAL LOCATION | Age: 88
End: 2023-12-20
Payer: COMMERCIAL

## 2023-12-20 ENCOUNTER — LAB REQUISITION (OUTPATIENT)
Dept: LAB | Facility: HOSPITAL | Age: 88
End: 2023-12-20
Payer: COMMERCIAL

## 2023-12-20 DIAGNOSIS — D64.9 ANEMIA, UNSPECIFIED: ICD-10-CM

## 2023-12-20 DIAGNOSIS — I13.2 HYPERTENSIVE HEART AND KIDNEY DISEASE WITH CHRONIC DIASTOLIC CONGESTIVE HEART FAILURE AND STAGE 5 CHRONIC KIDNEY DISEASE ON CHRONIC DIALYSIS (MULTI): ICD-10-CM

## 2023-12-20 DIAGNOSIS — Z86.73 HISTORY OF CVA (CEREBROVASCULAR ACCIDENT): ICD-10-CM

## 2023-12-20 DIAGNOSIS — I50.32 HYPERTENSIVE HEART AND KIDNEY DISEASE WITH CHRONIC DIASTOLIC CONGESTIVE HEART FAILURE AND STAGE 5 CHRONIC KIDNEY DISEASE ON CHRONIC DIALYSIS (MULTI): ICD-10-CM

## 2023-12-20 DIAGNOSIS — D64.9 ANEMIA, UNSPECIFIED TYPE: ICD-10-CM

## 2023-12-20 DIAGNOSIS — R53.1 WEAKNESS: Primary | ICD-10-CM

## 2023-12-20 DIAGNOSIS — Z99.2 HYPERTENSIVE HEART AND KIDNEY DISEASE WITH CHRONIC DIASTOLIC CONGESTIVE HEART FAILURE AND STAGE 5 CHRONIC KIDNEY DISEASE ON CHRONIC DIALYSIS (MULTI): ICD-10-CM

## 2023-12-20 DIAGNOSIS — N18.6 HYPERTENSIVE HEART AND KIDNEY DISEASE WITH CHRONIC DIASTOLIC CONGESTIVE HEART FAILURE AND STAGE 5 CHRONIC KIDNEY DISEASE ON CHRONIC DIALYSIS (MULTI): ICD-10-CM

## 2023-12-20 DIAGNOSIS — I48.91 ATRIAL FIBRILLATION, UNSPECIFIED TYPE (MULTI): ICD-10-CM

## 2023-12-20 LAB
ERYTHROCYTE [DISTWIDTH] IN BLOOD BY AUTOMATED COUNT: 15.8 % (ref 11.5–14.5)
HCT VFR BLD AUTO: 19.5 % (ref 36–46)
HGB BLD-MCNC: 6 G/DL (ref 12–16)
MCH RBC QN AUTO: 30.9 PG (ref 26–34)
MCHC RBC AUTO-ENTMCNC: 30.8 G/DL (ref 32–36)
MCV RBC AUTO: 101 FL (ref 80–100)
NRBC BLD-RTO: 0 /100 WBCS (ref 0–0)
PLATELET # BLD AUTO: 292 X10*3/UL (ref 150–450)
RBC # BLD AUTO: 1.94 X10*6/UL (ref 4–5.2)
WBC # BLD AUTO: 13.1 X10*3/UL (ref 4.4–11.3)

## 2023-12-20 PROCEDURE — 85027 COMPLETE CBC AUTOMATED: CPT

## 2023-12-20 PROCEDURE — 99309 SBSQ NF CARE MODERATE MDM 30: CPT | Performed by: NURSE PRACTITIONER

## 2023-12-20 NOTE — LETTER
Patient: Melody Martin  : 1935    Encounter Date: 2023    PROGRESS NOTE    Subjective  Chief complaint: Melody Martin is a 88 y.o. female who is an acute skilled patient being seen and evaluated for weakness    HPI:  HPI  Patient continues to work in therapy.  Patient requiring max assist with upper body dressing unsupported sitting.  Patient requires total dependence with lower body dressing and toileting, unable to get onto commode due to total assistance with slide board transfers.  Patient was seen and examined at bedside.  Patient had labs that revealed Hgb 5.9 on Monday drawn in dialysis and resulted today.  Patient denies shortness of breath or chest pain.    Objective  Vital signs: 140/64, 97.9, 78, 18, blood sugar 372, 96%    Physical Exam  Constitutional:       General: She is not in acute distress.  Eyes:      Extraocular Movements: Extraocular movements intact.   Cardiovascular:      Rate and Rhythm: Normal rate and regular rhythm.   Pulmonary:      Effort: Pulmonary effort is normal.   Abdominal:      General: Bowel sounds are normal.      Palpations: Abdomen is soft.   Musculoskeletal:      Cervical back: Neck supple.      Right lower leg: Edema present.      Left lower leg: Edema present.   Neurological:      Mental Status: She is alert.      Motor: Weakness present.   Psychiatric:         Mood and Affect: Mood normal.         Behavior: Behavior is cooperative.         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 (CMS/HCC)     On HD per nephrology  Antihypertensives  Renal diet  Monitor BP         Weakness - Primary     Continue working in therapy         Atrial fibrillation (CMS/HCC)     HR  controlled  Amiodarone  Apixaban   Bleeding precautions          Medications, treatments, and labs reviewed  Continue medications and treatments as listed in EMR      Scribe Attestation  I, Phoebe Martinez   attest that this documentation has been prepared under the direction and in the presence of MAR Johnson    Provider Attestation - Scribe documentation  All medical record entries made by the Scribe were at my direction and personally dictated by me. I have reviewed the chart and agree that the record accurately reflects my personal performance of the history, physical exam, discussion and plan.   MAR Johnson        Electronically Signed By: MAR Johnson   12/27/23  2:39 PM

## 2023-12-21 ENCOUNTER — NURSING HOME VISIT (OUTPATIENT)
Dept: POST ACUTE CARE | Facility: EXTERNAL LOCATION | Age: 88
End: 2023-12-21
Payer: COMMERCIAL

## 2023-12-21 DIAGNOSIS — I13.2 HYPERTENSIVE HEART AND KIDNEY DISEASE WITH CHRONIC DIASTOLIC CONGESTIVE HEART FAILURE AND STAGE 5 CHRONIC KIDNEY DISEASE ON CHRONIC DIALYSIS (MULTI): ICD-10-CM

## 2023-12-21 DIAGNOSIS — Z99.2 HYPERTENSIVE HEART AND KIDNEY DISEASE WITH CHRONIC DIASTOLIC CONGESTIVE HEART FAILURE AND STAGE 5 CHRONIC KIDNEY DISEASE ON CHRONIC DIALYSIS (MULTI): ICD-10-CM

## 2023-12-21 DIAGNOSIS — I50.32 HYPERTENSIVE HEART AND KIDNEY DISEASE WITH CHRONIC DIASTOLIC CONGESTIVE HEART FAILURE AND STAGE 5 CHRONIC KIDNEY DISEASE ON CHRONIC DIALYSIS (MULTI): ICD-10-CM

## 2023-12-21 DIAGNOSIS — R53.1 WEAKNESS: Primary | ICD-10-CM

## 2023-12-21 DIAGNOSIS — D64.9 ANEMIA, UNSPECIFIED TYPE: ICD-10-CM

## 2023-12-21 DIAGNOSIS — I48.91 ATRIAL FIBRILLATION, UNSPECIFIED TYPE (MULTI): ICD-10-CM

## 2023-12-21 DIAGNOSIS — N18.6 HYPERTENSIVE HEART AND KIDNEY DISEASE WITH CHRONIC DIASTOLIC CONGESTIVE HEART FAILURE AND STAGE 5 CHRONIC KIDNEY DISEASE ON CHRONIC DIALYSIS (MULTI): ICD-10-CM

## 2023-12-21 PROCEDURE — 99310 SBSQ NF CARE HIGH MDM 45: CPT | Performed by: NURSE PRACTITIONER

## 2023-12-21 NOTE — LETTER
Patient: Melody Martin  : 1935    Encounter Date: 2023    PROGRESS NOTE    Subjective  Chief complaint: Melody Martin is a 88 y.o. female who is an acute skilled patient being seen and evaluated for weakness    HPI:  HPI  Patient is working in therapy.  Patient requires max assist for upper body dressing in a supported position.  Patient is total dependence for lower body dressing.  Patient requires total dependence with toileting.  Patient also seen for follow-up of anemia.  Patient's hemoglobin was 5.9 on 1218, resulted 1220 ordered repeat stat.  Patient's labs stable however patient nephrology states unable to do HD until patient is transfused.  Patient seen and examined at bedside, in no apparent distress.    Objective  Vital signs: 139/62, 98.2, 63, 16, blood sugar 359, 96%    Physical Exam  Constitutional:       General: She is not in acute distress.  Eyes:      Extraocular Movements: Extraocular movements intact.   Cardiovascular:      Rate and Rhythm: Normal rate and regular rhythm.   Pulmonary:      Effort: Pulmonary effort is normal.   Abdominal:      General: Bowel sounds are normal.      Palpations: Abdomen is soft.   Musculoskeletal:      Cervical back: Neck supple.      Right lower leg: Edema present.      Left lower leg: Edema present.   Neurological:      Mental Status: She is alert.      Motor: Weakness present.   Psychiatric:         Mood and Affect: Mood normal.         Behavior: Behavior is cooperative.         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 (CMS/HCC)     On HD per nephrology  Antihypertensives  Renal diet  Monitor BP         Weakness - Primary     Continue therapy         Atrial fibrillation (CMS/HCC)     HR controlled  Amiodarone  Apixaban    Bleeding precautions          Medications, treatments, and labs reviewed  Continue medications and treatments as listed in EMR      Scribe Attestation  I, Phoebe Martinez   attest that this documentation has been prepared under the direction and in the presence of MAR Johnson    Provider Attestation - Scribe documentation  All medical record entries made by the Scribe were at my direction and personally dictated by me. I have reviewed the chart and agree that the record accurately reflects my personal performance of the history, physical exam, discussion and plan.   MAR Johnson        Electronically Signed By: MAR Johnson   12/27/23  3:40 PM   Labs/EKG/Imaging Studies/Medications

## 2023-12-26 NOTE — PROGRESS NOTES
PROGRESS NOTE    Subjective   Chief complaint: Melody Martin is a 88 y.o. female who is an acute skilled patient being seen and evaluated for weakness    HPI:  HPI  Patient continues to work in therapy.  Patient requiring max assist with upper body dressing unsupported sitting.  Patient requires total dependence with lower body dressing and toileting, unable to get onto commode due to total assistance with slide board transfers.  Patient was seen and examined at bedside.  Patient had labs that revealed Hgb 5.9 on Monday drawn in dialysis and resulted today.  Patient denies shortness of breath or chest pain.    Objective   Vital signs: 140/64, 97.9, 78, 18, blood sugar 372, 96%    Physical Exam  Constitutional:       General: She is not in acute distress.  Eyes:      Extraocular Movements: Extraocular movements intact.   Cardiovascular:      Rate and Rhythm: Normal rate and regular rhythm.   Pulmonary:      Effort: Pulmonary effort is normal.   Abdominal:      General: Bowel sounds are normal.      Palpations: Abdomen is soft.   Musculoskeletal:      Cervical back: Neck supple.      Right lower leg: Edema present.      Left lower leg: Edema present.   Neurological:      Mental Status: She is alert.      Motor: Weakness present.   Psychiatric:         Mood and Affect: Mood normal.         Behavior: Behavior is cooperative.         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 (CMS/[facility])     On HD per nephrology  Antihypertensives  Renal diet  Monitor BP         Weakness - Primary     Continue working in therapy         Atrial fibrillation (CMS/[facility])     HR controlled  Amiodarone  Apixaban   Bleeding precautions          Medications, treatments, and  labs reviewed  Continue medications and treatments as listed in EMR      Scribe Attestation  I, Phoebe Martinez   attest that this documentation has been prepared under the direction and in the presence of MAR Johnson    Provider Attestation - Scribe documentation  All medical record entries made by the Scribe were at my direction and personally dictated by me. I have reviewed the chart and agree that the record accurately reflects my personal performance of the history, physical exam, discussion and plan.   MAR Johnson

## 2023-12-26 NOTE — PROGRESS NOTES
PROGRESS NOTE    Subjective   Chief complaint: Melody Martni is a 88 y.o. female who is an acute skilled patient being seen and evaluated for weakness    HPI:  HPI  Patient is working in therapy.  Patient requires max assist for upper body dressing in a supported position.  Patient is total dependence for lower body dressing.  Patient requires total dependence with toileting.  Patient also seen for follow-up of anemia.  Patient's hemoglobin was 5.9 on 1218, resulted 1220 ordered repeat stat.  Patient's labs stable however patient nephrology states unable to do HD until patient is transfused.  Patient seen and examined at bedside, in no apparent distress.    Objective   Vital signs: 139/62, 98.2, 63, 16, blood sugar 359, 96%    Physical Exam  Constitutional:       General: She is not in acute distress.  Eyes:      Extraocular Movements: Extraocular movements intact.   Cardiovascular:      Rate and Rhythm: Normal rate and regular rhythm.   Pulmonary:      Effort: Pulmonary effort is normal.   Abdominal:      General: Bowel sounds are normal.      Palpations: Abdomen is soft.   Musculoskeletal:      Cervical back: Neck supple.      Right lower leg: Edema present.      Left lower leg: Edema present.   Neurological:      Mental Status: She is alert.      Motor: Weakness present.   Psychiatric:         Mood and Affect: Mood normal.         Behavior: Behavior is cooperative.         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 (CMS/HCC)     On HD per nephrology  Antihypertensives  Renal diet  Monitor BP         Weakness - Primary     Continue therapy         Atrial fibrillation (CMS/[facility])     HR controlled  Amiodarone  Apixaban   Bleeding precautions          Medications, treatments, and labs  reviewed  Continue medications and treatments as listed in EMR      Scribe Attestation  IHien Scribe   attest that this documentation has been prepared under the direction and in the presence of MAR Johnson    Provider Attestation - Scribe documentation  All medical record entries made by the Scribe were at my direction and personally dictated by me. I have reviewed the chart and agree that the record accurately reflects my personal performance of the history, physical exam, discussion and plan.   MAR Johnson

## 2024-01-09 ENCOUNTER — NURSING HOME VISIT (OUTPATIENT)
Dept: POST ACUTE CARE | Facility: EXTERNAL LOCATION | Age: 89
End: 2024-01-09
Payer: COMMERCIAL

## 2024-01-09 DIAGNOSIS — R53.1 WEAKNESS: Primary | ICD-10-CM

## 2024-01-09 DIAGNOSIS — I13.2 HYPERTENSIVE HEART AND KIDNEY DISEASE WITH CHRONIC DIASTOLIC CONGESTIVE HEART FAILURE AND STAGE 5 CHRONIC KIDNEY DISEASE ON CHRONIC DIALYSIS (MULTI): ICD-10-CM

## 2024-01-09 DIAGNOSIS — I48.91 ATRIAL FIBRILLATION, UNSPECIFIED TYPE (MULTI): ICD-10-CM

## 2024-01-09 DIAGNOSIS — Z99.2 TYPE 2 DIABETES MELLITUS WITH CHRONIC KIDNEY DISEASE ON CHRONIC DIALYSIS, WITH LONG-TERM CURRENT USE OF INSULIN (MULTI): ICD-10-CM

## 2024-01-09 DIAGNOSIS — Z99.2 HYPERTENSIVE HEART AND KIDNEY DISEASE WITH CHRONIC DIASTOLIC CONGESTIVE HEART FAILURE AND STAGE 5 CHRONIC KIDNEY DISEASE ON CHRONIC DIALYSIS (MULTI): ICD-10-CM

## 2024-01-09 DIAGNOSIS — N18.6 TYPE 2 DIABETES MELLITUS WITH CHRONIC KIDNEY DISEASE ON CHRONIC DIALYSIS, WITH LONG-TERM CURRENT USE OF INSULIN (MULTI): ICD-10-CM

## 2024-01-09 DIAGNOSIS — I50.32 HYPERTENSIVE HEART AND KIDNEY DISEASE WITH CHRONIC DIASTOLIC CONGESTIVE HEART FAILURE AND STAGE 5 CHRONIC KIDNEY DISEASE ON CHRONIC DIALYSIS (MULTI): ICD-10-CM

## 2024-01-09 DIAGNOSIS — N18.6 HYPERTENSIVE HEART AND KIDNEY DISEASE WITH CHRONIC DIASTOLIC CONGESTIVE HEART FAILURE AND STAGE 5 CHRONIC KIDNEY DISEASE ON CHRONIC DIALYSIS (MULTI): ICD-10-CM

## 2024-01-09 DIAGNOSIS — E11.22 TYPE 2 DIABETES MELLITUS WITH CHRONIC KIDNEY DISEASE ON CHRONIC DIALYSIS, WITH LONG-TERM CURRENT USE OF INSULIN (MULTI): ICD-10-CM

## 2024-01-09 DIAGNOSIS — Z79.4 TYPE 2 DIABETES MELLITUS WITH CHRONIC KIDNEY DISEASE ON CHRONIC DIALYSIS, WITH LONG-TERM CURRENT USE OF INSULIN (MULTI): ICD-10-CM

## 2024-01-09 PROCEDURE — 99309 SBSQ NF CARE MODERATE MDM 30: CPT | Performed by: NURSE PRACTITIONER

## 2024-01-09 NOTE — LETTER
Patient: Melody Martin  : 1935    Encounter Date: 2024    PROGRESS NOTE    Subjective  Chief complaint: Melody Martin is a 88 y.o. female who is an acute skilled patient being seen and evaluated for weakness    HPI:  HPI  This patient was admitted to SNF for therapy due to generalized weakness and for medical management after recent hospitalization for anemia.  Patient was given blood transfusion and then later developed COVID.  She did improved and is now admitted to the nursing facility.  Therapy to evaluate and treat patient.  Patient seen and examined at bedside in no apparent distress.  Nursing staff voices no new concerns today.  Patient denies nausea vomiting fever chills.     Objective  Vital signs: 136/78, 97.8, 88, 18, blood sugar 122    Physical Exam  Constitutional:       General: She is not in acute distress.  Eyes:      Extraocular Movements: Extraocular movements intact.   Cardiovascular:      Rate and Rhythm: Normal rate and regular rhythm.   Pulmonary:      Effort: Pulmonary effort is normal.      Breath sounds: Normal breath sounds.   Abdominal:      General: Bowel sounds are normal.      Palpations: Abdomen is soft.   Musculoskeletal:      Cervical back: Neck supple.      Right lower leg: Edema present.      Left lower leg: Edema present.   Neurological:      Mental Status: She is alert.      Motor: Weakness present.   Psychiatric:         Mood and Affect: Mood normal.         Behavior: Behavior is cooperative.         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  Glucoscan with SS  Continue Lantus and Humalog   Monitor labs         Weakness - Primary     Therapy to evaluate and treat          Medications, treatments, and labs reviewed  Continue medications and treatments as listed in EMR      Scribe Attestation  Hien MARRERO Scribe   attest that this documentation has been prepared under the direction and in the presence of MAR Johnson    Provider Attestation - Scribe documentation  All medical record entries made by the Scribe were at my direction and personally dictated by me. I have reviewed the chart and agree that the record accurately reflects my personal performance of the history, physical exam, discussion and plan.   MAR Johnson        Electronically Signed By: MAR Johnson   1/19/24  8:52 AM

## 2024-01-10 ENCOUNTER — NURSING HOME VISIT (OUTPATIENT)
Dept: POST ACUTE CARE | Facility: EXTERNAL LOCATION | Age: 89
End: 2024-01-10
Payer: COMMERCIAL

## 2024-01-10 DIAGNOSIS — N18.6 HYPERTENSIVE HEART AND KIDNEY DISEASE WITH CHRONIC DIASTOLIC CONGESTIVE HEART FAILURE AND STAGE 5 CHRONIC KIDNEY DISEASE ON CHRONIC DIALYSIS (MULTI): ICD-10-CM

## 2024-01-10 DIAGNOSIS — E11.22 TYPE 2 DIABETES MELLITUS WITH CHRONIC KIDNEY DISEASE ON CHRONIC DIALYSIS, WITH LONG-TERM CURRENT USE OF INSULIN (MULTI): ICD-10-CM

## 2024-01-10 DIAGNOSIS — I13.2 HYPERTENSIVE HEART AND KIDNEY DISEASE WITH CHRONIC DIASTOLIC CONGESTIVE HEART FAILURE AND STAGE 5 CHRONIC KIDNEY DISEASE ON CHRONIC DIALYSIS (MULTI): ICD-10-CM

## 2024-01-10 DIAGNOSIS — Z99.2 TYPE 2 DIABETES MELLITUS WITH CHRONIC KIDNEY DISEASE ON CHRONIC DIALYSIS, WITH LONG-TERM CURRENT USE OF INSULIN (MULTI): ICD-10-CM

## 2024-01-10 DIAGNOSIS — D64.9 ANEMIA, UNSPECIFIED TYPE: Primary | ICD-10-CM

## 2024-01-10 DIAGNOSIS — Z99.2 HYPERTENSIVE HEART AND KIDNEY DISEASE WITH CHRONIC DIASTOLIC CONGESTIVE HEART FAILURE AND STAGE 5 CHRONIC KIDNEY DISEASE ON CHRONIC DIALYSIS (MULTI): ICD-10-CM

## 2024-01-10 DIAGNOSIS — I48.91 ATRIAL FIBRILLATION, UNSPECIFIED TYPE (MULTI): ICD-10-CM

## 2024-01-10 DIAGNOSIS — I50.32 HYPERTENSIVE HEART AND KIDNEY DISEASE WITH CHRONIC DIASTOLIC CONGESTIVE HEART FAILURE AND STAGE 5 CHRONIC KIDNEY DISEASE ON CHRONIC DIALYSIS (MULTI): ICD-10-CM

## 2024-01-10 DIAGNOSIS — Z79.4 TYPE 2 DIABETES MELLITUS WITH CHRONIC KIDNEY DISEASE ON CHRONIC DIALYSIS, WITH LONG-TERM CURRENT USE OF INSULIN (MULTI): ICD-10-CM

## 2024-01-10 DIAGNOSIS — N18.6 TYPE 2 DIABETES MELLITUS WITH CHRONIC KIDNEY DISEASE ON CHRONIC DIALYSIS, WITH LONG-TERM CURRENT USE OF INSULIN (MULTI): ICD-10-CM

## 2024-01-10 DIAGNOSIS — Z86.73 HISTORY OF CVA (CEREBROVASCULAR ACCIDENT): ICD-10-CM

## 2024-01-10 PROCEDURE — 99305 1ST NF CARE MODERATE MDM 35: CPT | Performed by: INTERNAL MEDICINE

## 2024-01-10 NOTE — PROGRESS NOTES
HISTORY & PHYSICAL    Subjective   Chief complaint: Melody Martin is a 88 y.o. female who is being seen and evaluated for multiple medical problems.  Patient admitted to SNF for therapy due to weakness after recent hospitalization.    HPI:  HPI  Patient was admitted to the hospital with acute on chronic anemia, felt to be possibly due to hemorrhoids.  Patient was given 1 unit of PRBC. labs were monitored. Patient's Eliquis was held and then resumed Plavix per cardiology due to recent stent.  Patient with a diagnosis of ESRD on HD, nephrology consulted, continued with dialysis.  Patient will follow-up with cardiology outpatient for possibility of Watchman procedure.  On imaging patient was found to have a right adrenal nodule, no intervention at this time.  PT OT evaluated patient recommended SNF for therapy and continued medical management.  Patient was deemed stable for discharge to skilled nursing facility for further medical management and therapy.  Patient was seen and examined at bedside, no apparent distress.  Denies chest pain or shortness of breath.  Denies nausea or vomiting.    Past Medical History:   Diagnosis Date    Chronic kidney disease     Elevated troponin 08/24/2023    Heart murmur     Hypertension     Myocardial infarction (CMS/HCC)     Old myocardial infarction 09/09/2023    Other conditions influencing health status 12/06/2022    History of cough    Other conditions influencing health status 04/06/2016    Diabetes mellitus type 1, uncontrolled    Personal history of other diseases of the circulatory system     History of hypertension    Personal history of other diseases of the female genital tract     History of endometriosis    Personal history of other diseases of the nervous system and sense organs 10/15/2021    History of acute otitis media    Personal history of other endocrine, nutritional and metabolic disease 01/26/2018    History of diabetes mellitus    Personal history of transient  ischemic attack (TIA), and cerebral infarction without residual deficits 09/20/2023    Stroke (CMS/HCC)        Past Surgical History:   Procedure Laterality Date    BACK SURGERY  10/10/2018    Back Surgery    HYSTERECTOMY  10/10/2018    Hysterectomy    IR CVC TUNNELED  8/28/2023    IR CVC TUNNELED 8/28/2023 POR ANGIO    MR HEAD ANGIO WO IV CONTRAST  8/31/2023    MR HEAD ANGIO WO IV CONTRAST 8/31/2023 POR MRI    MR NECK ANGIO WO IV CONTRAST  8/31/2023    MR NECK ANGIO WO IV CONTRAST 8/31/2023 POR MRI       Family History   Problem Relation Name Age of Onset    No Known Problems Mother      No Known Problems Father         Social History     Socioeconomic History    Marital status:      Spouse name: Not on file    Number of children: Not on file    Years of education: Not on file    Highest education level: Not on file   Occupational History    Not on file   Tobacco Use    Smoking status: Never    Smokeless tobacco: Never   Substance and Sexual Activity    Alcohol use: Never    Drug use: Never    Sexual activity: Not on file   Other Topics Concern    Not on file   Social History Narrative    Not on file     Social Determinants of Health     Financial Resource Strain: Not on file   Food Insecurity: Not on file   Transportation Needs: Not on file   Physical Activity: Not on file   Stress: Not on file   Social Connections: Not on file   Intimate Partner Violence: Not on file   Housing Stability: Not on file       Vital signs: 158/78, 97.8, 88, 16, blood sugar 216, 93%    Objective   Physical Exam  Constitutional:       General: She is not in acute distress.  Eyes:      Extraocular Movements: Extraocular movements intact.   Cardiovascular:      Rate and Rhythm: Normal rate and regular rhythm.   Pulmonary:      Effort: Pulmonary effort is normal.      Breath sounds: Normal breath sounds.   Abdominal:      General: Bowel sounds are normal.      Palpations: Abdomen is soft.   Musculoskeletal:      Cervical back: Neck  supple.      Right lower leg: Edema present.      Left lower leg: Edema present.   Neurological:      Mental Status: She is alert.   Psychiatric:         Mood and Affect: Mood normal.         Behavior: Behavior is cooperative.         Assessment/Plan   Problem List Items Addressed This Visit       Type 2 diabetes mellitus with chronic kidney disease on chronic dialysis, with long-term current use of insulin (CMS/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)     On HD per nephrology  Antihypertensives  Renal diet  Monitor BP         Atrial fibrillation (CMS/[facility])     Monitor heart rate, controlled  Amiodarone  Apixaban  Bleeding precautions          Hospital records reviewed  Medications, treatments, and labs reviewed  Continue medications and treatments as listed in EMR  Discussed with nursing and therapy      Scribe Attestation  Hien MARRERO Scribe   attest that this documentation has been prepared under the direction and in the presence of Wilbert Lock MD    Provider Attestation - Scribe documentation  All medical record entries made by the Scribe were at my direction and personally dictated by me. I have reviewed the chart and agree that the record accurately reflects my personal performance of the history, physical exam, discussion and plan.   Wilbert Lock MD

## 2024-01-10 NOTE — LETTER
Patient: Melody Martin  : 1935    Encounter Date: 01/10/2024    HISTORY & PHYSICAL    Subjective  Chief complaint: Melody Martin is a 88 y.o. female who is being seen and evaluated for multiple medical problems.  Patient admitted to SNF for therapy due to weakness after recent hospitalization.    HPI:  HPI  Patient was admitted to the hospital with acute on chronic anemia, felt to be possibly due to hemorrhoids.  Patient was given 1 unit of PRBC. labs were monitored. Patient's Eliquis was held and then resumed Plavix per cardiology due to recent stent.  Patient with a diagnosis of ESRD on HD, nephrology consulted, continued with dialysis.  Patient will follow-up with cardiology outpatient for possibility of Watchman procedure.  On imaging patient was found to have a right adrenal nodule, no intervention at this time.  PT OT evaluated patient recommended SNF for therapy and continued medical management.  Patient was deemed stable for discharge to skilled nursing facility for further medical management and therapy.  Patient was seen and examined at bedside, no apparent distress.  Denies chest pain or shortness of breath.  Denies nausea or vomiting.    Past Medical History:   Diagnosis Date   • Chronic kidney disease    • Elevated troponin 2023   • Heart murmur    • Hypertension    • Myocardial infarction (CMS/HCC)    • Old myocardial infarction 2023   • Other conditions influencing health status 2022    History of cough   • Other conditions influencing health status 2016    Diabetes mellitus type 1, uncontrolled   • Personal history of other diseases of the circulatory system     History of hypertension   • Personal history of other diseases of the female genital tract     History of endometriosis   • Personal history of other diseases of the nervous system and sense organs 10/15/2021    History of acute otitis media   • Personal history of other endocrine, nutritional and metabolic  disease 01/26/2018    History of diabetes mellitus   • Personal history of transient ischemic attack (TIA), and cerebral infarction without residual deficits 09/20/2023   • Stroke (CMS/HCC)        Past Surgical History:   Procedure Laterality Date   • BACK SURGERY  10/10/2018    Back Surgery   • HYSTERECTOMY  10/10/2018    Hysterectomy   • IR CVC TUNNELED  8/28/2023    IR CVC TUNNELED 8/28/2023 POR ANGIO   • MR HEAD ANGIO WO IV CONTRAST  8/31/2023    MR HEAD ANGIO WO IV CONTRAST 8/31/2023 POR MRI   • MR NECK ANGIO WO IV CONTRAST  8/31/2023    MR NECK ANGIO WO IV CONTRAST 8/31/2023 POR MRI       Family History   Problem Relation Name Age of Onset   • No Known Problems Mother     • No Known Problems Father         Social History     Socioeconomic History   • Marital status:      Spouse name: Not on file   • Number of children: Not on file   • Years of education: Not on file   • Highest education level: Not on file   Occupational History   • Not on file   Tobacco Use   • Smoking status: Never   • Smokeless tobacco: Never   Substance and Sexual Activity   • Alcohol use: Never   • Drug use: Never   • Sexual activity: Not on file   Other Topics Concern   • Not on file   Social History Narrative   • Not on file     Social Determinants of Health     Financial Resource Strain: Not on file   Food Insecurity: Not on file   Transportation Needs: Not on file   Physical Activity: Not on file   Stress: Not on file   Social Connections: Not on file   Intimate Partner Violence: Not on file   Housing Stability: Not on file       Vital signs: 158/78, 97.8, 88, 16, blood sugar 216, 93%    Objective  Physical Exam  Constitutional:       General: She is not in acute distress.  Eyes:      Extraocular Movements: Extraocular movements intact.   Cardiovascular:      Rate and Rhythm: Normal rate and regular rhythm.   Pulmonary:      Effort: Pulmonary effort is normal.      Breath sounds: Normal breath sounds.   Abdominal:      General:  Bowel sounds are normal.      Palpations: Abdomen is soft.   Musculoskeletal:      Cervical back: Neck supple.      Right lower leg: Edema present.      Left lower leg: Edema present.   Neurological:      Mental Status: She is alert.   Psychiatric:         Mood and Affect: Mood normal.         Behavior: Behavior is cooperative.         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 (CMS/[facility])     On HD per nephrology  Antihypertensives  Renal diet  Monitor BP         Atrial fibrillation (CMS/[facility])     Monitor heart rate, controlled  Amiodarone  Apixaban  Bleeding precautions          Hospital records reviewed  Medications, treatments, and labs reviewed  Continue medications and treatments as listed in EMR  Discussed with nursing and therapy      Scribe Attestation  IHien Scribe   attest that this documentation has been prepared under the direction and in the presence of Wilbert Lock MD    Provider Attestation - Scribe documentation  All medical record entries made by the Scribe were at my direction and personally dictated by me. I have reviewed the chart and agree that the record accurately reflects my personal performance of the history, physical exam, discussion and plan.   Wilbert Lock MD      Electronically Signed By: Wilbert Lock MD   1/10/24  3:16 PM

## 2024-01-11 ENCOUNTER — NURSING HOME VISIT (OUTPATIENT)
Dept: POST ACUTE CARE | Facility: EXTERNAL LOCATION | Age: 89
End: 2024-01-11
Payer: COMMERCIAL

## 2024-01-11 DIAGNOSIS — E11.22 TYPE 2 DIABETES MELLITUS WITH CHRONIC KIDNEY DISEASE ON CHRONIC DIALYSIS, WITH LONG-TERM CURRENT USE OF INSULIN (MULTI): ICD-10-CM

## 2024-01-11 DIAGNOSIS — Z99.2 TYPE 2 DIABETES MELLITUS WITH CHRONIC KIDNEY DISEASE ON CHRONIC DIALYSIS, WITH LONG-TERM CURRENT USE OF INSULIN (MULTI): ICD-10-CM

## 2024-01-11 DIAGNOSIS — R53.1 WEAKNESS: Primary | ICD-10-CM

## 2024-01-11 DIAGNOSIS — Z79.4 TYPE 2 DIABETES MELLITUS WITH CHRONIC KIDNEY DISEASE ON CHRONIC DIALYSIS, WITH LONG-TERM CURRENT USE OF INSULIN (MULTI): ICD-10-CM

## 2024-01-11 DIAGNOSIS — L89.620 PRESSURE ULCER OF LEFT HEEL, UNSTAGEABLE (MULTI): ICD-10-CM

## 2024-01-11 DIAGNOSIS — I13.2 HYPERTENSIVE HEART AND KIDNEY DISEASE WITH CHRONIC DIASTOLIC CONGESTIVE HEART FAILURE AND STAGE 5 CHRONIC KIDNEY DISEASE ON CHRONIC DIALYSIS (MULTI): ICD-10-CM

## 2024-01-11 DIAGNOSIS — L89.150 PRESSURE ULCER OF SACRAL REGION, UNSTAGEABLE (MULTI): ICD-10-CM

## 2024-01-11 DIAGNOSIS — N18.6 HYPERTENSIVE HEART AND KIDNEY DISEASE WITH CHRONIC DIASTOLIC CONGESTIVE HEART FAILURE AND STAGE 5 CHRONIC KIDNEY DISEASE ON CHRONIC DIALYSIS (MULTI): ICD-10-CM

## 2024-01-11 DIAGNOSIS — Z99.2 HYPERTENSIVE HEART AND KIDNEY DISEASE WITH CHRONIC DIASTOLIC CONGESTIVE HEART FAILURE AND STAGE 5 CHRONIC KIDNEY DISEASE ON CHRONIC DIALYSIS (MULTI): ICD-10-CM

## 2024-01-11 DIAGNOSIS — N18.6 TYPE 2 DIABETES MELLITUS WITH CHRONIC KIDNEY DISEASE ON CHRONIC DIALYSIS, WITH LONG-TERM CURRENT USE OF INSULIN (MULTI): ICD-10-CM

## 2024-01-11 DIAGNOSIS — I50.32 HYPERTENSIVE HEART AND KIDNEY DISEASE WITH CHRONIC DIASTOLIC CONGESTIVE HEART FAILURE AND STAGE 5 CHRONIC KIDNEY DISEASE ON CHRONIC DIALYSIS (MULTI): ICD-10-CM

## 2024-01-11 DIAGNOSIS — I48.91 ATRIAL FIBRILLATION, UNSPECIFIED TYPE (MULTI): ICD-10-CM

## 2024-01-11 PROCEDURE — 99309 SBSQ NF CARE MODERATE MDM 30: CPT | Performed by: NURSE PRACTITIONER

## 2024-01-11 NOTE — LETTER
Patient: Melody Martin  : 1935    Encounter Date: 2024    PROGRESS NOTE    Subjective  Chief complaint: Melody Martin is a 88 y.o. female who is an acute skilled patient being seen and evaluated for weakness    HPI:  HPI  Patient is skilled for therapy due to weakness after recent hospitalization.  Therapy evaluated patient and goals have been established.  Patient does have a diagnosis of ESRD on HD.  Patient is seen for wounds to sacrum and left heel which were present upon admission.  Patient was seen and examined at bedside, in no apparent distress.  Denies chest pain or shortness of breath.  Afebrile.    Objective  Vital signs: 142/82, 98.2, 64, 18, blood sugar 391    Physical Exam  Constitutional:       General: She is not in acute distress.  Eyes:      Extraocular Movements: Extraocular movements intact.   Cardiovascular:      Rate and Rhythm: Normal rate and regular rhythm.   Pulmonary:      Effort: Pulmonary effort is normal.      Breath sounds: Normal breath sounds.   Abdominal:      General: Bowel sounds are normal.      Palpations: Abdomen is soft.   Musculoskeletal:      Cervical back: Neck supple.      Right lower leg: Edema present.      Left lower leg: Edema present.   Skin:     Comments: Wound location - Sacrum  Etiology - unstageable  Granulation - M  Slough - M  Edges - flat  Odor - no  Drainage - M, serosanguineous  Size - 7 x 9 by UTD centimeters  Undermining -none    Wound location - left heel  Etiology - unstageable pressure ulcer  Large dry stable eschar  Granulation - none  Edges - flat  Odor - no  Drainage - none  Size - 4 x 4 by UTD centimeters  Undermining -no   Neurological:      Mental Status: She is alert.      Motor: Weakness present.   Psychiatric:         Mood and Affect: Mood normal.         Behavior: Behavior is cooperative.         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  On HD per nephrology  Antihypertensives  Renal diet  Monitor BP and weight  Remove extra fluid in dialysis         Pressure ulcer of left heel, unstageable (CMS/[facility])     TX: Irrigate with normal saline, pat dry, paint with Betadine, apply ABD and Kerlix qd    Turn every 2 hours  Prevalon boots  Air mattress  Cushion to chair  Dietitian consult  Supplements per dietitian  Grab bars to bed to assist with bed mobility and repositioning  Weekly skin checks  House barrier cream to buttocks         Pressure ulcer of sacral region, unstageable (CMS/[facility])     TX: Irrigate with normal saline, pat dry, apply CA Alg, and cover with silicone border foam dressing daily and as needed    Turn every 2 hours  Prevalon boots  Air mattress  Cushion to chair  Dietitian consult  Supplements per dietitian  Grab bars to bed to assist with bed mobility and repositioning  Weekly skin checks  House barrier cream to buttocks         Type 2 diabetes mellitus with chronic kidney disease on chronic dialysis, with long-term current use of insulin (CMS/[facility])     Carb controlled diet  Monitor Glucoscan  Glargine  Humalog  FBG elevated today and has been in the 200s-300s  Will increase glargine insulin         Weakness - Primary     Therapy evaluated and goals have been established          Medications, treatments, and labs reviewed  Continue medications and treatments as listed in EMR      Scribe Attestation  I, Phoebe Martinez   attest that this documentation has been prepared under the direction and in the presence of JANELL Johnson-CNP    Provider Attestation - Scribe documentation  All medical record entries made by the Scribe were at my direction and personally dictated by me. I  have reviewed the chart and agree that the record accurately reflects my personal performance of the history, physical exam, discussion and plan.   MAR Johnson        Electronically Signed By: MAR Johnson   1/24/24  3:52 PM

## 2024-01-12 ENCOUNTER — NURSING HOME VISIT (OUTPATIENT)
Dept: POST ACUTE CARE | Facility: EXTERNAL LOCATION | Age: 89
End: 2024-01-12
Payer: COMMERCIAL

## 2024-01-12 DIAGNOSIS — Z99.2 HYPERTENSIVE HEART AND KIDNEY DISEASE WITH CHRONIC DIASTOLIC CONGESTIVE HEART FAILURE AND STAGE 5 CHRONIC KIDNEY DISEASE ON CHRONIC DIALYSIS (MULTI): ICD-10-CM

## 2024-01-12 DIAGNOSIS — I48.91 ATRIAL FIBRILLATION, UNSPECIFIED TYPE (MULTI): ICD-10-CM

## 2024-01-12 DIAGNOSIS — N18.6 HYPERTENSIVE HEART AND KIDNEY DISEASE WITH CHRONIC DIASTOLIC CONGESTIVE HEART FAILURE AND STAGE 5 CHRONIC KIDNEY DISEASE ON CHRONIC DIALYSIS (MULTI): ICD-10-CM

## 2024-01-12 DIAGNOSIS — Z86.73 HISTORY OF CVA (CEREBROVASCULAR ACCIDENT): ICD-10-CM

## 2024-01-12 DIAGNOSIS — R53.1 WEAKNESS: Primary | ICD-10-CM

## 2024-01-12 DIAGNOSIS — D64.9 ANEMIA, UNSPECIFIED TYPE: ICD-10-CM

## 2024-01-12 DIAGNOSIS — I50.32 HYPERTENSIVE HEART AND KIDNEY DISEASE WITH CHRONIC DIASTOLIC CONGESTIVE HEART FAILURE AND STAGE 5 CHRONIC KIDNEY DISEASE ON CHRONIC DIALYSIS (MULTI): ICD-10-CM

## 2024-01-12 DIAGNOSIS — I13.2 HYPERTENSIVE HEART AND KIDNEY DISEASE WITH CHRONIC DIASTOLIC CONGESTIVE HEART FAILURE AND STAGE 5 CHRONIC KIDNEY DISEASE ON CHRONIC DIALYSIS (MULTI): ICD-10-CM

## 2024-01-12 PROCEDURE — 99309 SBSQ NF CARE MODERATE MDM 30: CPT | Performed by: INTERNAL MEDICINE

## 2024-01-12 NOTE — ASSESSMENT & PLAN NOTE
On HD per nephrology  Antihypertensives  Renal diet  Monitor BP  Remove extra fluid in dialysis  Recent labs stable, continue to monitor

## 2024-01-12 NOTE — LETTER
Patient: Melody Martin  : 1935    Encounter Date: 2024    PROGRESS NOTE    Subjective  Chief complaint: Melody Martin is a 88 y.o. female who is an acute skilled patient being seen and evaluated for weakness and labs.    HPI:  HPI  Patient is working in therapy due to generalized weakness.  Therapy working with patient to address weakness and difficulty in walking.  Therapy to include therapeutic exercise and activities, gait training.  Patient also had labs that revealed BUN and creatinine of 24 and 1.5, H&H 9.4 and 28, stable, within normal limits.  Patient seen and examined at bedside, in no apparent distress.  Denies chest pain or shortness of breath.  Denies nausea or vomiting.    Objective  Vital signs: 146/54, 97.4, 51, 20, blood sugar 306, 94%    Physical Exam  Constitutional:       General: She is not in acute distress.  Eyes:      Extraocular Movements: Extraocular movements intact.   Cardiovascular:      Rate and Rhythm: Normal rate and regular rhythm.      Comments: Arrhythmia  Pulmonary:      Effort: Pulmonary effort is normal.      Breath sounds: Normal breath sounds.   Abdominal:      General: Bowel sounds are normal.      Palpations: Abdomen is soft.   Musculoskeletal:      Cervical back: Neck supple.      Right lower leg: Edema present.      Left lower leg: Edema present.      Comments: Hemiplegia\hemiparesis   Neurological:      Mental Status: She is alert.      Motor: Weakness present.   Psychiatric:         Mood and Affect: Mood normal.         Behavior: Behavior is cooperative.         Assessment/Plan  Problem List Items Addressed This Visit       Anemia     Stable on recent labs.  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 (CMS/[facility])     On HD per  nephrology  Antihypertensives  Renal diet  Monitor BP  Remove extra fluid in dialysis  Recent labs stable, continue to monitor         Weakness - Primary     Continue to work in therapy towards goals         Atrial fibrillation (CMS/[facility])     Monitor heart rate, controlled  Amiodarone  Apixaban  Bleeding precautions          Medications, treatments, and labs reviewed  Continue medications and treatments as listed in EMR      Scribe Attestation  IHien Scribe   attest that this documentation has been prepared under the direction and in the presence of Wilbert Lock MD    Provider Attestation - Scribe documentation  All medical record entries made by the Scribe were at my direction and personally dictated by me. I have reviewed the chart and agree that the record accurately reflects my personal performance of the history, physical exam, discussion and plan.   Wilbert Lock MD        Electronically Signed By: Wilbert Lock MD   1/13/24  8:15 AM

## 2024-01-12 NOTE — PROGRESS NOTES
PROGRESS NOTE    Subjective   Chief complaint: Melody Martin is a 88 y.o. female who is an acute skilled patient being seen and evaluated for weakness and labs.    HPI:  HPI  Patient is working in therapy due to generalized weakness.  Therapy working with patient to address weakness and difficulty in walking.  Therapy to include therapeutic exercise and activities, gait training.  Patient also had labs that revealed BUN and creatinine of 24 and 1.5, H&H 9.4 and 28, stable, within normal limits.  Patient seen and examined at bedside, in no apparent distress.  Denies chest pain or shortness of breath.  Denies nausea or vomiting.    Objective   Vital signs: 146/54, 97.4, 51, 20, blood sugar 306, 94%    Physical Exam  Constitutional:       General: She is not in acute distress.  Eyes:      Extraocular Movements: Extraocular movements intact.   Cardiovascular:      Rate and Rhythm: Normal rate and regular rhythm.      Comments: Arrhythmia  Pulmonary:      Effort: Pulmonary effort is normal.      Breath sounds: Normal breath sounds.   Abdominal:      General: Bowel sounds are normal.      Palpations: Abdomen is soft.   Musculoskeletal:      Cervical back: Neck supple.      Right lower leg: Edema present.      Left lower leg: Edema present.      Comments: Hemiplegia\hemiparesis   Neurological:      Mental Status: She is alert.      Motor: Weakness present.   Psychiatric:         Mood and Affect: Mood normal.         Behavior: Behavior is cooperative.         Assessment/Plan   Problem List Items Addressed This Visit       Anemia     Stable on recent labs.  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 (CMS/[facility])     On HD per nephrology  Antihypertensives  Renal diet  Monitor BP  Remove extra fluid in  dialysis  Recent labs stable, continue to monitor         Weakness - Primary     Continue to work in therapy towards goals         Atrial fibrillation (CMS/[facility])     Monitor heart rate, controlled  Amiodarone  Apixaban  Bleeding precautions          Medications, treatments, and labs reviewed  Continue medications and treatments as listed in EMR      Scribe Attestation  I, Phoebe Martinez   attest that this documentation has been prepared under the direction and in the presence of Wilbert Lock MD    Provider Attestation - Scribe documentation  All medical record entries made by the Scribe were at my direction and personally dictated by me. I have reviewed the chart and agree that the record accurately reflects my personal performance of the history, physical exam, discussion and plan.   Wilbert Lock MD

## 2024-01-15 ENCOUNTER — NURSING HOME VISIT (OUTPATIENT)
Dept: POST ACUTE CARE | Facility: EXTERNAL LOCATION | Age: 89
End: 2024-01-15
Payer: COMMERCIAL

## 2024-01-15 DIAGNOSIS — N18.6 HYPERTENSIVE HEART AND KIDNEY DISEASE WITH CHRONIC DIASTOLIC CONGESTIVE HEART FAILURE AND STAGE 5 CHRONIC KIDNEY DISEASE ON CHRONIC DIALYSIS (MULTI): ICD-10-CM

## 2024-01-15 DIAGNOSIS — Z99.2 HYPERTENSIVE HEART AND KIDNEY DISEASE WITH CHRONIC DIASTOLIC CONGESTIVE HEART FAILURE AND STAGE 5 CHRONIC KIDNEY DISEASE ON CHRONIC DIALYSIS (MULTI): ICD-10-CM

## 2024-01-15 DIAGNOSIS — R53.1 WEAKNESS: Primary | ICD-10-CM

## 2024-01-15 DIAGNOSIS — I48.91 ATRIAL FIBRILLATION, UNSPECIFIED TYPE (MULTI): ICD-10-CM

## 2024-01-15 DIAGNOSIS — M54.9 BACK PAIN, UNSPECIFIED BACK LOCATION, UNSPECIFIED BACK PAIN LATERALITY, UNSPECIFIED CHRONICITY: ICD-10-CM

## 2024-01-15 DIAGNOSIS — I13.2 HYPERTENSIVE HEART AND KIDNEY DISEASE WITH CHRONIC DIASTOLIC CONGESTIVE HEART FAILURE AND STAGE 5 CHRONIC KIDNEY DISEASE ON CHRONIC DIALYSIS (MULTI): ICD-10-CM

## 2024-01-15 DIAGNOSIS — I50.32 HYPERTENSIVE HEART AND KIDNEY DISEASE WITH CHRONIC DIASTOLIC CONGESTIVE HEART FAILURE AND STAGE 5 CHRONIC KIDNEY DISEASE ON CHRONIC DIALYSIS (MULTI): ICD-10-CM

## 2024-01-15 DIAGNOSIS — S31.000A WOUND OF SACRAL REGION, INITIAL ENCOUNTER: ICD-10-CM

## 2024-01-15 PROBLEM — J18.9 PNEUMONIA: Status: RESOLVED | Noted: 2023-10-03 | Resolved: 2024-01-15

## 2024-01-15 PROCEDURE — 99309 SBSQ NF CARE MODERATE MDM 30: CPT | Performed by: NURSE PRACTITIONER

## 2024-01-15 RX ORDER — TRAMADOL HYDROCHLORIDE 50 MG/1
25 TABLET ORAL EVERY 12 HOURS PRN
Qty: 30 TABLET | Refills: 5 | Status: SHIPPED | OUTPATIENT
Start: 2024-01-15 | End: 2024-04-23 | Stop reason: SDUPTHER

## 2024-01-15 NOTE — PROGRESS NOTES
PROGRESS NOTE    Subjective   Chief complaint: Melody Martin is a 88 y.o. female who is an acute skilled patient being seen and evaluated for weakness    HPI:  HPI  This patient was admitted to SNF for therapy due to generalized weakness and for medical management after recent hospitalization for anemia.  Patient was given blood transfusion and then later developed COVID.  She did improved and is now admitted to the nursing facility.  Therapy to evaluate and treat patient.  Patient seen and examined at bedside in no apparent distress.  Nursing staff voices no new concerns today.  Patient denies nausea vomiting fever chills.     Objective   Vital signs: 136/78, 97.8, 88, 18, blood sugar 122    Physical Exam  Constitutional:       General: She is not in acute distress.  Eyes:      Extraocular Movements: Extraocular movements intact.   Cardiovascular:      Rate and Rhythm: Normal rate and regular rhythm.   Pulmonary:      Effort: Pulmonary effort is normal.      Breath sounds: Normal breath sounds.   Abdominal:      General: Bowel sounds are normal.      Palpations: Abdomen is soft.   Musculoskeletal:      Cervical back: Neck supple.      Right lower leg: Edema present.      Left lower leg: Edema present.   Neurological:      Mental Status: She is alert.      Motor: Weakness present.   Psychiatric:         Mood and Affect: Mood normal.         Behavior: Behavior is cooperative.         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  Glucoscan with SS  Continue Lantus and Humalog   Monitor labs         Weakness - Primary     Therapy to evaluate and treat          Medications, treatments, and labs reviewed  Continue medications and treatments as listed in EMR      Scribe Attestation  Hien MARRERO Scribe   attest that this documentation has been prepared under the direction and in the presence of MAR Johnson    Provider Attestation - Scribe documentation  All medical record entries made by the Scribe were at my direction and personally dictated by me. I have reviewed the chart and agree that the record accurately reflects my personal performance of the history, physical exam, discussion and plan.   MAR Johnson

## 2024-01-15 NOTE — LETTER
Patient: Melody Martin  : 1935    Encounter Date: 01/15/2024    PROGRESS NOTE    Subjective  Chief complaint: Melody Martin is a 88 y.o. female who is an acute skilled patient being seen and evaluated for weakness    HPI:  HPI  Patient is working in therapy due to generalized weakness.  Therapy is focusing on therapeutic exercise and activities, gait training, education, self-care management to address weakness and difficulty in walking.  Speech therapy also working with patient.  Patient was seen and examined at bedside.  Patient states she does not want air mattress and prefers pressure reducing mattress.  She is aware of the risks and understands.  Patient is in no acute distress.  Denies chest pain or shortness of breath.    Objective  Vital signs: 125/45, 98.0, 55, 18, blood sugar 285    Physical Exam  Constitutional:       General: She is not in acute distress.  Eyes:      Extraocular Movements: Extraocular movements intact.   Cardiovascular:      Rate and Rhythm: Normal rate and regular rhythm.   Pulmonary:      Effort: Pulmonary effort is normal.      Breath sounds: Normal breath sounds.   Abdominal:      General: Bowel sounds are normal.      Palpations: Abdomen is soft.   Musculoskeletal:      Cervical back: Neck supple.      Right lower leg: Edema present.      Left lower leg: Edema present.   Neurological:      Mental Status: She is alert.      Motor: Weakness present.   Psychiatric:         Mood and Affect: Mood normal.         Behavior: Behavior is cooperative.         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  Aware of risks and understands. Will order pressure reducing mattress         Weakness - Primary     Continue to work in therapy          Medications, treatments, and labs reviewed  Continue medications and treatments as listed in EMR      Scribe Attestation  Hien MARRERO Scribe   attest that this documentation has been prepared under the direction and in the presence of MAR Johnson    Provider Attestation - Scribe documentation  All medical record entries made by the Scribe were at my direction and personally dictated by me. I have reviewed the chart and agree that the record accurately reflects my personal performance of the history, physical exam, discussion and plan.   MAR Johnson        Electronically Signed By: MAR Johnson   1/20/24 10:03 AM

## 2024-01-16 ENCOUNTER — NURSING HOME VISIT (OUTPATIENT)
Dept: POST ACUTE CARE | Facility: EXTERNAL LOCATION | Age: 89
End: 2024-01-16
Payer: COMMERCIAL

## 2024-01-16 DIAGNOSIS — I13.2 HYPERTENSIVE HEART AND KIDNEY DISEASE WITH CHRONIC DIASTOLIC CONGESTIVE HEART FAILURE AND STAGE 5 CHRONIC KIDNEY DISEASE ON CHRONIC DIALYSIS (MULTI): ICD-10-CM

## 2024-01-16 DIAGNOSIS — Z86.73 HISTORY OF CVA (CEREBROVASCULAR ACCIDENT): ICD-10-CM

## 2024-01-16 DIAGNOSIS — N18.6 HYPERTENSIVE HEART AND KIDNEY DISEASE WITH CHRONIC DIASTOLIC CONGESTIVE HEART FAILURE AND STAGE 5 CHRONIC KIDNEY DISEASE ON CHRONIC DIALYSIS (MULTI): ICD-10-CM

## 2024-01-16 DIAGNOSIS — Z99.2 HYPERTENSIVE HEART AND KIDNEY DISEASE WITH CHRONIC DIASTOLIC CONGESTIVE HEART FAILURE AND STAGE 5 CHRONIC KIDNEY DISEASE ON CHRONIC DIALYSIS (MULTI): ICD-10-CM

## 2024-01-16 DIAGNOSIS — I50.32 HYPERTENSIVE HEART AND KIDNEY DISEASE WITH CHRONIC DIASTOLIC CONGESTIVE HEART FAILURE AND STAGE 5 CHRONIC KIDNEY DISEASE ON CHRONIC DIALYSIS (MULTI): ICD-10-CM

## 2024-01-16 DIAGNOSIS — R53.1 WEAKNESS: Primary | ICD-10-CM

## 2024-01-16 DIAGNOSIS — I48.91 ATRIAL FIBRILLATION, UNSPECIFIED TYPE (MULTI): ICD-10-CM

## 2024-01-16 PROBLEM — S31.000A SACRAL WOUND: Status: ACTIVE | Noted: 2024-01-16

## 2024-01-16 PROCEDURE — 99309 SBSQ NF CARE MODERATE MDM 30: CPT | Performed by: NURSE PRACTITIONER

## 2024-01-16 NOTE — ASSESSMENT & PLAN NOTE
Patient refusing air mattress.  Aware of risks and understands. Will order pressure reducing mattress

## 2024-01-16 NOTE — PROGRESS NOTES
PROGRESS NOTE    Subjective   Chief complaint: Melody Martin is a 88 y.o. female who is an acute skilled patient being seen and evaluated for weakness    HPI:  HPI  Patient is working in therapy due to generalized weakness.  Therapy is focusing on therapeutic exercise and activities, gait training, education, self-care management to address weakness and difficulty in walking.  Speech therapy also working with patient.  Patient was seen and examined at bedside.  Patient states she does not want air mattress and prefers pressure reducing mattress.  She is aware of the risks and understands.  Patient is in no acute distress.  Denies chest pain or shortness of breath.    Objective   Vital signs: 125/45, 98.0, 55, 18, blood sugar 285    Physical Exam  Constitutional:       General: She is not in acute distress.  Eyes:      Extraocular Movements: Extraocular movements intact.   Cardiovascular:      Rate and Rhythm: Normal rate and regular rhythm.   Pulmonary:      Effort: Pulmonary effort is normal.      Breath sounds: Normal breath sounds.   Abdominal:      General: Bowel sounds are normal.      Palpations: Abdomen is soft.   Musculoskeletal:      Cervical back: Neck supple.      Right lower leg: Edema present.      Left lower leg: Edema present.   Neurological:      Mental Status: She is alert.      Motor: Weakness present.   Psychiatric:         Mood and Affect: Mood normal.         Behavior: Behavior is cooperative.         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  Aware of risks and understands. Will order pressure reducing mattress         Weakness - Primary     Continue to work in therapy          Medications, treatments, and labs reviewed  Continue medications and treatments as listed in EMR      Scribe Attestation  IHien Scribe   attest that this documentation has been prepared under the direction and in the presence of MAR Johnson    Provider Attestation - Scribe documentation  All medical record entries made by the Scribe were at my direction and personally dictated by me. I have reviewed the chart and agree that the record accurately reflects my personal performance of the history, physical exam, discussion and plan.   MAR Johnson       Negative

## 2024-01-16 NOTE — LETTER
Patient: Melody Martin  : 1935    Encounter Date: 2024    PROGRESS NOTE    Subjective   Chief complaint: Melody Martin is a 88 y.o. female who is an acute skilled patient being seen and evaluated for weakness    HPI:  HPI  Patient is working in therapy due to generalized weakness.  Therapy is working with patient focusing on increasing independence with ADLs, dynamic functional activities to increase strength and ROM and flexibility.  Therapy is also working with patient on close kinetic chain activities to increase functional skills and also strengthening activities to increase functional task performance.  Patient was seen and examined at bedside, no apparent distress.  Denies chest pain or shortness of breath.  Afebrile.  Nursing staff reporting no new concerns at this time.    Objective   Vital signs: 150/62, 97.8, 70, 18, blood sugar 202    Physical Exam  Constitutional:       General: She is not in acute distress.  Eyes:      Extraocular Movements: Extraocular movements intact.   Cardiovascular:      Rate and Rhythm: Normal rate and regular rhythm.   Pulmonary:      Effort: Pulmonary effort is normal.      Breath sounds: Normal breath sounds.   Abdominal:      General: Bowel sounds are normal.      Palpations: Abdomen is soft.   Musculoskeletal:      Cervical back: Neck supple.      Right lower leg: Edema present.      Left lower leg: Edema present.   Neurological:      Mental Status: She is alert.      Motor: Weakness present.   Psychiatric:         Mood and Affect: Mood normal.         Behavior: Behavior is cooperative.         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 (CMS/HCC)     On HD per nephrology  Antihypertensives  Renal  diet  Monitor BP  Remove extra fluid in dialysis  Recent labs stable, continue to monitor         Weakness - Primary     Continue working in therapy         Atrial fibrillation (CMS/[facility])     Monitor heart rate, controlled  Amiodarone  Apixaban  Bleeding precautions          Medications, treatments, and labs reviewed  Continue medications and treatments as listed in EMR      Scribe Attestation  Hien MARRERO Scribe   attest that this documentation has been prepared under the direction and in the presence of MAR Johnson    Provider Attestation - Scribe documentation  All medical record entries made by the Scribe were at my direction and personally dictated by me. I have reviewed the chart and agree that the record accurately reflects my personal performance of the history, physical exam, discussion and plan.   MAR Johnson        Electronically Signed By: MAR Johnson   1/27/24 11:56 AM

## 2024-01-16 NOTE — ASSESSMENT & PLAN NOTE
BP at goal  On HD per nephrology  Antihypertensives  Renal diet  Monitor BP and weight  Remove extra fluid in dialysis  Recent labs stable, continue to monitor

## 2024-01-17 ENCOUNTER — NURSING HOME VISIT (OUTPATIENT)
Dept: POST ACUTE CARE | Facility: EXTERNAL LOCATION | Age: 89
End: 2024-01-17
Payer: COMMERCIAL

## 2024-01-17 DIAGNOSIS — I13.2 HYPERTENSIVE HEART AND KIDNEY DISEASE WITH CHRONIC DIASTOLIC CONGESTIVE HEART FAILURE AND STAGE 5 CHRONIC KIDNEY DISEASE ON CHRONIC DIALYSIS (MULTI): ICD-10-CM

## 2024-01-17 DIAGNOSIS — R53.1 WEAKNESS: Primary | ICD-10-CM

## 2024-01-17 DIAGNOSIS — E11.22 TYPE 2 DIABETES MELLITUS WITH CHRONIC KIDNEY DISEASE ON CHRONIC DIALYSIS, WITH LONG-TERM CURRENT USE OF INSULIN (MULTI): ICD-10-CM

## 2024-01-17 DIAGNOSIS — Z99.2 HYPERTENSIVE HEART AND KIDNEY DISEASE WITH CHRONIC DIASTOLIC CONGESTIVE HEART FAILURE AND STAGE 5 CHRONIC KIDNEY DISEASE ON CHRONIC DIALYSIS (MULTI): ICD-10-CM

## 2024-01-17 DIAGNOSIS — I50.32 HYPERTENSIVE HEART AND KIDNEY DISEASE WITH CHRONIC DIASTOLIC CONGESTIVE HEART FAILURE AND STAGE 5 CHRONIC KIDNEY DISEASE ON CHRONIC DIALYSIS (MULTI): ICD-10-CM

## 2024-01-17 DIAGNOSIS — N18.6 TYPE 2 DIABETES MELLITUS WITH CHRONIC KIDNEY DISEASE ON CHRONIC DIALYSIS, WITH LONG-TERM CURRENT USE OF INSULIN (MULTI): ICD-10-CM

## 2024-01-17 DIAGNOSIS — Z79.4 TYPE 2 DIABETES MELLITUS WITH CHRONIC KIDNEY DISEASE ON CHRONIC DIALYSIS, WITH LONG-TERM CURRENT USE OF INSULIN (MULTI): ICD-10-CM

## 2024-01-17 DIAGNOSIS — D64.9 ANEMIA, UNSPECIFIED TYPE: ICD-10-CM

## 2024-01-17 DIAGNOSIS — Z99.2 TYPE 2 DIABETES MELLITUS WITH CHRONIC KIDNEY DISEASE ON CHRONIC DIALYSIS, WITH LONG-TERM CURRENT USE OF INSULIN (MULTI): ICD-10-CM

## 2024-01-17 DIAGNOSIS — N18.6 HYPERTENSIVE HEART AND KIDNEY DISEASE WITH CHRONIC DIASTOLIC CONGESTIVE HEART FAILURE AND STAGE 5 CHRONIC KIDNEY DISEASE ON CHRONIC DIALYSIS (MULTI): ICD-10-CM

## 2024-01-17 DIAGNOSIS — Z86.73 HISTORY OF CVA (CEREBROVASCULAR ACCIDENT): ICD-10-CM

## 2024-01-17 DIAGNOSIS — I48.91 ATRIAL FIBRILLATION, UNSPECIFIED TYPE (MULTI): ICD-10-CM

## 2024-01-17 PROCEDURE — 99309 SBSQ NF CARE MODERATE MDM 30: CPT | Performed by: INTERNAL MEDICINE

## 2024-01-17 NOTE — PROGRESS NOTES
PROGRESS NOTE    Subjective   Chief complaint: Melody Martin is a 88 y.o. female who is an acute skilled patient being seen and evaluated for weakness and monthly general medical care and follow-up    HPI:  HPI  Patient presents for general medical care and f/u.  Patient seen and examined at bedside.  No issues per nursing.  Patient has no acute complaints.  HTN BP at goal.  Denies chest pain and headache.  CHF stable, denies sob, orthopnea, weight gain.  Hx CVA, denies changes in weakness and speech.  AFIB stable, denies palpitations and chest pain.  Anemia stable, denies fatigue, sob, and palpitations.  DM, denies vision changes, excessive thirst, sweating, urinary frequency.  Patient also continues to work in therapy.  Therapy is focusing on analysis and training and cueing hierarchy to increase independence with ADLs.  Therapy is also working on dynamic functional activities and strengthening activities to increase strength and ROM and flexibility.  Mentation at baseline patient no acute distress.    Objective   Vital signs: 187/63, 97.9, 56, 18, blood sugar 123, 97%    Physical Exam    Assessment/Plan   Problem List Items Addressed This Visit       Type 2 diabetes mellitus with chronic kidney disease on chronic dialysis, with long-term current use of insulin (CMS/[facility])     Glucoscan with SS  Continue Lantus and Humalog   Monitor labs         Anemia     Stable on recent labs.  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 (CMS/[facility])     On HD per nephrology  Antihypertensives  Renal diet  Monitor BP  Remove extra fluid in dialysis  Recent labs stable, continue to monitor         Weakness - Primary     Continue to work in therapy towards goals         Atrial fibrillation (CMS/[facility])     Monitor  heart rate, controlled  Amiodarone  Apixaban  Bleeding precautions          Medications, treatments, and labs reviewed  Continue medications and treatments as listed in EMR      Scribe Attestation  I, Phoebe Martinez   attest that this documentation has been prepared under the direction and in the presence of Wilbert Lock MD    Provider Attestation - Scribe documentation  All medical record entries made by the Scribe were at my direction and personally dictated by me. I have reviewed the chart and agree that the record accurately reflects my personal performance of the history, physical exam, discussion and plan.   Wilbert Lock MD

## 2024-01-17 NOTE — LETTER
Patient: Melody Martin  : 1935    Encounter Date: 2024    PROGRESS NOTE    Subjective  Chief complaint: Melody Martin is a 88 y.o. female who is an acute skilled patient being seen and evaluated for weakness and monthly general medical care and follow-up    HPI:  HPI  Patient presents for general medical care and f/u.  Patient seen and examined at bedside.  No issues per nursing.  Patient has no acute complaints.  HTN BP at goal.  Denies chest pain and headache.  CHF stable, denies sob, orthopnea, weight gain.  Hx CVA, denies changes in weakness and speech.  AFIB stable, denies palpitations and chest pain.  Anemia stable, denies fatigue, sob, and palpitations.  DM, denies vision changes, excessive thirst, sweating, urinary frequency.  Patient also continues to work in therapy.  Therapy is focusing on analysis and training and cueing hierarchy to increase independence with ADLs.  Therapy is also working on dynamic functional activities and strengthening activities to increase strength and ROM and flexibility.  Mentation at baseline patient no acute distress.    Objective  Vital signs: 187/63, 97.9, 56, 18, blood sugar 123, 97%    Physical Exam    Assessment/Plan  Problem List Items Addressed This Visit       Type 2 diabetes mellitus with chronic kidney disease on chronic dialysis, with long-term current use of insulin (CMS/[facility])     Glucoscan with SS  Continue Lantus and Humalog   Monitor labs         Anemia     Stable on recent labs.  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 (CMS/[facility])     On HD per nephrology  Antihypertensives  Renal diet  Monitor BP  Remove extra fluid in dialysis  Recent labs stable, continue to monitor         Weakness - Primary     Continue to work in  therapy towards goals         Atrial fibrillation (CMS/[facility])     Monitor heart rate, controlled  Amiodarone  Apixaban  Bleeding precautions          Medications, treatments, and labs reviewed  Continue medications and treatments as listed in EMR      Scribe Attestation  I, Phoebe Martinez   attest that this documentation has been prepared under the direction and in the presence of Wilbert Lock MD    Provider Attestation - Scribe documentation  All medical record entries made by the Scribe were at my direction and personally dictated by me. I have reviewed the chart and agree that the record accurately reflects my personal performance of the history, physical exam, discussion and plan.   Wilbert Lock MD        Electronically Signed By: Wilbert Lock MD   1/17/24  2:10 PM

## 2024-01-18 ENCOUNTER — NURSING HOME VISIT (OUTPATIENT)
Dept: POST ACUTE CARE | Facility: EXTERNAL LOCATION | Age: 89
End: 2024-01-18
Payer: COMMERCIAL

## 2024-01-18 DIAGNOSIS — E11.22 TYPE 2 DIABETES MELLITUS WITH CHRONIC KIDNEY DISEASE ON CHRONIC DIALYSIS, WITH LONG-TERM CURRENT USE OF INSULIN (MULTI): ICD-10-CM

## 2024-01-18 DIAGNOSIS — R53.1 WEAKNESS: Primary | ICD-10-CM

## 2024-01-18 DIAGNOSIS — Z79.4 TYPE 2 DIABETES MELLITUS WITH CHRONIC KIDNEY DISEASE ON CHRONIC DIALYSIS, WITH LONG-TERM CURRENT USE OF INSULIN (MULTI): ICD-10-CM

## 2024-01-18 DIAGNOSIS — L89.620 PRESSURE ULCER OF LEFT HEEL, UNSTAGEABLE (MULTI): ICD-10-CM

## 2024-01-18 DIAGNOSIS — I50.32 HYPERTENSIVE HEART AND KIDNEY DISEASE WITH CHRONIC DIASTOLIC CONGESTIVE HEART FAILURE AND STAGE 5 CHRONIC KIDNEY DISEASE ON CHRONIC DIALYSIS (MULTI): ICD-10-CM

## 2024-01-18 DIAGNOSIS — N18.6 HYPERTENSIVE HEART AND KIDNEY DISEASE WITH CHRONIC DIASTOLIC CONGESTIVE HEART FAILURE AND STAGE 5 CHRONIC KIDNEY DISEASE ON CHRONIC DIALYSIS (MULTI): ICD-10-CM

## 2024-01-18 DIAGNOSIS — I13.2 HYPERTENSIVE HEART AND KIDNEY DISEASE WITH CHRONIC DIASTOLIC CONGESTIVE HEART FAILURE AND STAGE 5 CHRONIC KIDNEY DISEASE ON CHRONIC DIALYSIS (MULTI): ICD-10-CM

## 2024-01-18 DIAGNOSIS — Z99.2 HYPERTENSIVE HEART AND KIDNEY DISEASE WITH CHRONIC DIASTOLIC CONGESTIVE HEART FAILURE AND STAGE 5 CHRONIC KIDNEY DISEASE ON CHRONIC DIALYSIS (MULTI): ICD-10-CM

## 2024-01-18 DIAGNOSIS — N18.6 TYPE 2 DIABETES MELLITUS WITH CHRONIC KIDNEY DISEASE ON CHRONIC DIALYSIS, WITH LONG-TERM CURRENT USE OF INSULIN (MULTI): ICD-10-CM

## 2024-01-18 DIAGNOSIS — Z99.2 TYPE 2 DIABETES MELLITUS WITH CHRONIC KIDNEY DISEASE ON CHRONIC DIALYSIS, WITH LONG-TERM CURRENT USE OF INSULIN (MULTI): ICD-10-CM

## 2024-01-18 DIAGNOSIS — L89.150 PRESSURE ULCER OF SACRAL REGION, UNSTAGEABLE (MULTI): ICD-10-CM

## 2024-01-18 PROCEDURE — 11045 DBRDMT SUBQ TISS EACH ADDL: CPT | Performed by: NURSE PRACTITIONER

## 2024-01-18 PROCEDURE — 11042 DBRDMT SUBQ TIS 1ST 20SQCM/<: CPT | Performed by: NURSE PRACTITIONER

## 2024-01-18 PROCEDURE — 99309 SBSQ NF CARE MODERATE MDM 30: CPT | Performed by: NURSE PRACTITIONER

## 2024-01-18 NOTE — LETTER
Patient: Melody Martin  : 1935    Encounter Date: 2024    PROGRESS NOTE    Subjective  Chief complaint: Melody Martin is a 88 y.o. female who is an acute skilled patient being seen and evaluated for weakness and fu wounds    HPI:  Patient presents for f/u therapy and general medical care.  Patient seen and examined at beside.  Therapy has been working with the patient to improve strength, endurance, ADLs, and transfers d/t generalized weakness. Patient completing multiple therapeutic exercises to increase strength, mobility and flexibility. Follow up on wounds. Patient is reconsidering air mattress. No other concerns.       Objective  Vital signs: 178/50,60,96%,     Physical Exam  Constitutional:       General: She is not in acute distress.  Eyes:      Extraocular Movements: Extraocular movements intact.   Cardiovascular:      Rate and Rhythm: Normal rate and regular rhythm.   Pulmonary:      Effort: Pulmonary effort is normal.      Breath sounds: Normal breath sounds.   Abdominal:      General: Bowel sounds are normal.      Palpations: Abdomen is soft.   Musculoskeletal:      Cervical back: Neck supple.      Right lower leg: Edema present.      Left lower leg: Edema present.   Skin:     Comments: Wound location - Sacrum  Etiology - Unstageable pressure ulcer  Granulation - M  Slough - M  Edges - Flat  Odor - None  Drainage - M serosang  Size - 7 X 9 X UTD cm  Undermining - None    Wound location - Left heel  Etiology - Unstageable pressure ulcer  Large stable eschar   Size - 3 X 3 X UTD cm  Undermining - None   Neurological:      Mental Status: She is alert.      Motor: Weakness present.   Psychiatric:         Mood and Affect: Mood normal.         Behavior: Behavior is cooperative.         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  (CMS/[facility])     On HD per nephrology  Antihypertensives  Renal diet  Monitor BP  Remove extra fluid in dialysis           Pressure ulcer of left heel, unstageable (CMS/[facility])     TX: Irrigate with normal saline, pat dry, paint with Betadine, apply ABD and Kerlix qd     Turn every 2 hours  Prevalon boots  Pressure reducing mattress, patient refusing air mattress, discussed benefits/risks  Cushion to chair  Dietitian consult  Supplements per dietitian  Grab bars to bed to assist with bed mobility and repositioning  Weekly skin checks  House barrier cream to buttocks            Pressure ulcer of sacral region, unstageable (CMS/[facility])     I performed subcutaneous tissue debridement with a total area of 63 cm2 with curette to remove viable and non-viable tissue/material including subcutaneous tissue, slough, biofilm, and fibrin/exudate after achieving pain control with 2% lidocaine topical gel.  A minimal amount of bleeding was controlled with pressure. The procedure was tolerated well with a pain level of 0 throughout and a pain level of 0 following the procedure.  Post-debridement measurements unchanged. Character of wound/ulcer post-debridement is needs further debridement.     TX: Irrigate with normal saline, pat dry, apply antony blue, and cover with silicone border foam dressing daily and as needed     Turn every 2 hours  Prevalon boots  Pressure reducing mattress, patient refusing air mattress, discussed benefits/risks  Cushion to chair  Dietitian consult  Supplements per dietitian  Grab bars to bed to assist with bed mobility and repositioning  Weekly skin checks  House barrier cream to buttocks            Type 2 diabetes mellitus with chronic kidney disease on chronic dialysis, with long-term current use of insulin (CMS/[facility])     Carb controlled diet  Monitor Glucoscan  Glargine  Humalog           Weakness - Primary     Continue to work in therapy          Medications, treatments, and labs reviewed  Continue  medications and treatments as listed in EMR    Scribe Attestation  I, Phoebe Ann   attest that this documentation has been prepared under the direction and in the presence of MAR Johnson.     Provider Attestation - Scribe documentation  All medical record entries made by the Scribe were at my direction and personally dictated by me. I have reviewed the chart and agree that the record accurately reflects my personal performance of the history, physical exam, discussion and plan.   MAR Johnson      Electronically Signed By: MAR Johnson   1/31/24  3:11 PM

## 2024-01-19 ENCOUNTER — NURSING HOME VISIT (OUTPATIENT)
Dept: POST ACUTE CARE | Facility: EXTERNAL LOCATION | Age: 89
End: 2024-01-19
Payer: COMMERCIAL

## 2024-01-19 DIAGNOSIS — I50.32 HYPERTENSIVE HEART AND KIDNEY DISEASE WITH CHRONIC DIASTOLIC CONGESTIVE HEART FAILURE AND STAGE 5 CHRONIC KIDNEY DISEASE ON CHRONIC DIALYSIS (MULTI): ICD-10-CM

## 2024-01-19 DIAGNOSIS — R53.1 WEAKNESS: Primary | ICD-10-CM

## 2024-01-19 DIAGNOSIS — I48.91 ATRIAL FIBRILLATION, UNSPECIFIED TYPE (MULTI): ICD-10-CM

## 2024-01-19 DIAGNOSIS — Z86.73 HISTORY OF CVA (CEREBROVASCULAR ACCIDENT): ICD-10-CM

## 2024-01-19 DIAGNOSIS — I13.2 HYPERTENSIVE HEART AND KIDNEY DISEASE WITH CHRONIC DIASTOLIC CONGESTIVE HEART FAILURE AND STAGE 5 CHRONIC KIDNEY DISEASE ON CHRONIC DIALYSIS (MULTI): ICD-10-CM

## 2024-01-19 DIAGNOSIS — N18.6 HYPERTENSIVE HEART AND KIDNEY DISEASE WITH CHRONIC DIASTOLIC CONGESTIVE HEART FAILURE AND STAGE 5 CHRONIC KIDNEY DISEASE ON CHRONIC DIALYSIS (MULTI): ICD-10-CM

## 2024-01-19 DIAGNOSIS — Z99.2 HYPERTENSIVE HEART AND KIDNEY DISEASE WITH CHRONIC DIASTOLIC CONGESTIVE HEART FAILURE AND STAGE 5 CHRONIC KIDNEY DISEASE ON CHRONIC DIALYSIS (MULTI): ICD-10-CM

## 2024-01-19 PROCEDURE — 99309 SBSQ NF CARE MODERATE MDM 30: CPT | Performed by: INTERNAL MEDICINE

## 2024-01-19 NOTE — LETTER
Patient: Melody Martin  : 1935    Encounter Date: 2024    PROGRESS NOTE    Subjective  Chief complaint: Melody Martin is a 88 y.o. female who is an acute skilled patient being seen and evaluated for weakness    HPI:  HPI  Patient continues to work in therapy due to generalized weakness.  Therapy is working with patient to address weakness and difficulty in walking with treatment including therapeutic exercise and activities, gait training, education, self-care management.  Patient was seen and examined at bedside, in no apparent distress.  Denies chest pain or shortness of breath.  Denies nausea or vomiting.  Afebrile.     Objective  Vital signs: 128/61, 97.4, 68, 16, 96%, blood sugar 300    Physical Exam  Constitutional:       General: She is not in acute distress.  Eyes:      Extraocular Movements: Extraocular movements intact.   Cardiovascular:      Rate and Rhythm: Normal rate and regular rhythm.   Pulmonary:      Effort: Pulmonary effort is normal.      Breath sounds: Normal breath sounds.   Abdominal:      General: Bowel sounds are normal.      Palpations: Abdomen is soft.   Musculoskeletal:      Cervical back: Neck supple.      Right lower leg: Edema present.      Left lower leg: Edema present.   Neurological:      Mental Status: She is alert.      Motor: Weakness present.   Psychiatric:         Mood and Affect: Mood normal.         Behavior: Behavior is cooperative.         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 (CMS/[facility])     On HD per nephrology  Antihypertensives  Renal diet  Monitor BP  Remove extra fluid in dialysis  Recent labs stable, continue to monitor         Weakness - Primary     Continue working in therapy towards  goals         Atrial fibrillation (CMS/[facility])     Monitor heart rate, controlled  Amiodarone  Apixaban  Bleeding precautions          Medications, treatments, and labs reviewed  Continue medications and treatments as listed in EMR      Scribe Attestation  I, Phoebe Martinez   attest that this documentation has been prepared under the direction and in the presence of Wilbert Lock MD    Provider Attestation - Scribe documentation  All medical record entries made by the Scribe were at my direction and personally dictated by me. I have reviewed the chart and agree that the record accurately reflects my personal performance of the history, physical exam, discussion and plan.   Wilbert Lock MD        Electronically Signed By: Wilbert Lock MD   1/19/24 10:05 PM

## 2024-01-19 NOTE — PROGRESS NOTES
PROGRESS NOTE    Subjective   Chief complaint: Melody Martin is a 88 y.o. female who is an acute skilled patient being seen and evaluated for weakness    HPI:  HPI  Patient continues to work in therapy due to generalized weakness.  Therapy is working with patient to address weakness and difficulty in walking with treatment including therapeutic exercise and activities, gait training, education, self-care management.  Patient was seen and examined at bedside, in no apparent distress.  Denies chest pain or shortness of breath.  Denies nausea or vomiting.  Afebrile.     Objective   Vital signs: 128/61, 97.4, 68, 16, 96%, blood sugar 300    Physical Exam  Constitutional:       General: She is not in acute distress.  Eyes:      Extraocular Movements: Extraocular movements intact.   Cardiovascular:      Rate and Rhythm: Normal rate and regular rhythm.   Pulmonary:      Effort: Pulmonary effort is normal.      Breath sounds: Normal breath sounds.   Abdominal:      General: Bowel sounds are normal.      Palpations: Abdomen is soft.   Musculoskeletal:      Cervical back: Neck supple.      Right lower leg: Edema present.      Left lower leg: Edema present.   Neurological:      Mental Status: She is alert.      Motor: Weakness present.   Psychiatric:         Mood and Affect: Mood normal.         Behavior: Behavior is cooperative.         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 (CMS/HCC)     On HD per nephrology  Antihypertensives  Renal diet  Monitor BP  Remove extra fluid in dialysis  Recent labs stable, continue to monitor         Weakness - Primary     Continue working in therapy towards goals         Atrial fibrillation (CMS/[facility])     Monitor heart rate,  controlled  Amiodarone  Apixaban  Bleeding precautions          Medications, treatments, and labs reviewed  Continue medications and treatments as listed in EMR      Scribe Attestation  I, Phoebe Martinez   attest that this documentation has been prepared under the direction and in the presence of Wilbert Lock MD    Provider Attestation - Scribe documentation  All medical record entries made by the Scribe were at my direction and personally dictated by me. I have reviewed the chart and agree that the record accurately reflects my personal performance of the history, physical exam, discussion and plan.   Wilbert Lock MD

## 2024-01-22 ENCOUNTER — NURSING HOME VISIT (OUTPATIENT)
Dept: POST ACUTE CARE | Facility: EXTERNAL LOCATION | Age: 89
End: 2024-01-22
Payer: COMMERCIAL

## 2024-01-22 ENCOUNTER — LAB REQUISITION (OUTPATIENT)
Dept: LAB | Facility: HOSPITAL | Age: 89
End: 2024-01-22
Payer: COMMERCIAL

## 2024-01-22 DIAGNOSIS — E11.22 TYPE 2 DIABETES MELLITUS WITH CHRONIC KIDNEY DISEASE ON CHRONIC DIALYSIS, WITH LONG-TERM CURRENT USE OF INSULIN (MULTI): ICD-10-CM

## 2024-01-22 DIAGNOSIS — N18.6 TYPE 2 DIABETES MELLITUS WITH CHRONIC KIDNEY DISEASE ON CHRONIC DIALYSIS, WITH LONG-TERM CURRENT USE OF INSULIN (MULTI): ICD-10-CM

## 2024-01-22 DIAGNOSIS — Z99.2 TYPE 2 DIABETES MELLITUS WITH CHRONIC KIDNEY DISEASE ON CHRONIC DIALYSIS, WITH LONG-TERM CURRENT USE OF INSULIN (MULTI): ICD-10-CM

## 2024-01-22 DIAGNOSIS — Z99.2 HYPERTENSIVE HEART AND KIDNEY DISEASE WITH CHRONIC DIASTOLIC CONGESTIVE HEART FAILURE AND STAGE 5 CHRONIC KIDNEY DISEASE ON CHRONIC DIALYSIS (MULTI): ICD-10-CM

## 2024-01-22 DIAGNOSIS — R53.1 WEAKNESS: ICD-10-CM

## 2024-01-22 DIAGNOSIS — L89.150 PRESSURE ULCER OF SACRAL REGION, UNSTAGEABLE (MULTI): ICD-10-CM

## 2024-01-22 DIAGNOSIS — I50.32 HYPERTENSIVE HEART AND KIDNEY DISEASE WITH CHRONIC DIASTOLIC CONGESTIVE HEART FAILURE AND STAGE 5 CHRONIC KIDNEY DISEASE ON CHRONIC DIALYSIS (MULTI): ICD-10-CM

## 2024-01-22 DIAGNOSIS — N18.6 HYPERTENSIVE HEART AND KIDNEY DISEASE WITH CHRONIC DIASTOLIC CONGESTIVE HEART FAILURE AND STAGE 5 CHRONIC KIDNEY DISEASE ON CHRONIC DIALYSIS (MULTI): ICD-10-CM

## 2024-01-22 DIAGNOSIS — I13.2 HYPERTENSIVE HEART AND KIDNEY DISEASE WITH CHRONIC DIASTOLIC CONGESTIVE HEART FAILURE AND STAGE 5 CHRONIC KIDNEY DISEASE ON CHRONIC DIALYSIS (MULTI): ICD-10-CM

## 2024-01-22 DIAGNOSIS — Z79.4 TYPE 2 DIABETES MELLITUS WITH CHRONIC KIDNEY DISEASE ON CHRONIC DIALYSIS, WITH LONG-TERM CURRENT USE OF INSULIN (MULTI): ICD-10-CM

## 2024-01-22 DIAGNOSIS — N18.6 END STAGE RENAL DISEASE (MULTI): ICD-10-CM

## 2024-01-22 LAB
ANION GAP SERPL CALC-SCNC: 13 MMOL/L (ref 10–20)
BASOPHILS # BLD AUTO: 0.04 X10*3/UL (ref 0–0.1)
BASOPHILS NFR BLD AUTO: 0.3 %
BUN SERPL-MCNC: 43 MG/DL (ref 6–23)
CALCIUM SERPL-MCNC: 7.4 MG/DL (ref 8.6–10.3)
CHLORIDE SERPL-SCNC: 93 MMOL/L (ref 98–107)
CO2 SERPL-SCNC: 28 MMOL/L (ref 21–32)
CREAT SERPL-MCNC: 2.63 MG/DL (ref 0.5–1.05)
EGFRCR SERPLBLD CKD-EPI 2021: 17 ML/MIN/1.73M*2
EOSINOPHIL # BLD AUTO: 0.43 X10*3/UL (ref 0–0.4)
EOSINOPHIL NFR BLD AUTO: 3.6 %
ERYTHROCYTE [DISTWIDTH] IN BLOOD BY AUTOMATED COUNT: 17.2 % (ref 11.5–14.5)
GLUCOSE SERPL-MCNC: 262 MG/DL (ref 74–99)
HCT VFR BLD AUTO: 28.1 % (ref 36–46)
HGB BLD-MCNC: 9.9 G/DL (ref 12–16)
IMM GRANULOCYTES # BLD AUTO: 0.17 X10*3/UL (ref 0–0.5)
IMM GRANULOCYTES NFR BLD AUTO: 1.4 % (ref 0–0.9)
LYMPHOCYTES # BLD AUTO: 1.24 X10*3/UL (ref 0.8–3)
LYMPHOCYTES NFR BLD AUTO: 10.4 %
MCH RBC QN AUTO: 34.6 PG (ref 26–34)
MCHC RBC AUTO-ENTMCNC: 35.2 G/DL (ref 32–36)
MCV RBC AUTO: 98 FL (ref 80–100)
MONOCYTES # BLD AUTO: 0.53 X10*3/UL (ref 0.05–0.8)
MONOCYTES NFR BLD AUTO: 4.4 %
NEUTROPHILS # BLD AUTO: 9.51 X10*3/UL (ref 1.6–5.5)
NEUTROPHILS NFR BLD AUTO: 79.9 %
NRBC BLD-RTO: 0 /100 WBCS (ref 0–0)
PLATELET # BLD AUTO: 288 X10*3/UL (ref 150–450)
POTASSIUM SERPL-SCNC: 4.4 MMOL/L (ref 3.5–5.3)
RBC # BLD AUTO: 2.86 X10*6/UL (ref 4–5.2)
SODIUM SERPL-SCNC: 130 MMOL/L (ref 136–145)
WBC # BLD AUTO: 11.9 X10*3/UL (ref 4.4–11.3)

## 2024-01-22 PROCEDURE — 80048 BASIC METABOLIC PNL TOTAL CA: CPT

## 2024-01-22 PROCEDURE — 85025 COMPLETE CBC W/AUTO DIFF WBC: CPT

## 2024-01-22 PROCEDURE — 99309 SBSQ NF CARE MODERATE MDM 30: CPT | Performed by: NURSE PRACTITIONER

## 2024-01-22 NOTE — LETTER
Patient: Melody Martin  : 1935    Encounter Date: 2024    PROGRESS NOTE    Subjective  Chief complaint: Melody Martin is a 88 y.o. female who is an acute skilled patient being seen and evaluated for weakness    HPI:  Patient seen and examined at bedside. She has c\o increase pain at sacral wound site. She refuses air mattress though she is aware of risks and understands them. She is working with therapy to improve strength and mobility. Wound evaluated with treatment and orders in place. No other concerns at this time.       Objective  Vital signs: 151/58,60,98%,     Physical Exam  Constitutional:       General: She is not in acute distress.  Eyes:      Extraocular Movements: Extraocular movements intact.   Cardiovascular:      Rate and Rhythm: Normal rate and regular rhythm.   Pulmonary:      Effort: Pulmonary effort is normal.      Breath sounds: Normal breath sounds.   Abdominal:      General: Bowel sounds are normal.      Palpations: Abdomen is soft.   Musculoskeletal:      Cervical back: Neck supple.      Right lower leg: Edema present.      Left lower leg: Edema present.   Skin:     Comments: Wound location - Sacrum  Etiology - Unstageable pressure ulcer  Granulation - Medium  Slough - Medium  Edges - Flat  Odor - None  Drainage - Large serosanguinous  Undermining - none    Neurological:      Mental Status: She is alert.      Motor: Weakness present.   Psychiatric:         Mood and Affect: Mood normal.         Behavior: Behavior is cooperative.         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 (CMS/HCC)     CHF stable, no sob  On HD per nephrology  Antihypertensives  Renal diet  Monitor BP, labs, and weight  Remove extra fluid in dialysis           Pressure ulcer of sacral region, unstageable (CMS/HCC)     Increased wound  pain  Obtain xray   Culture  BMP, CBC  Monitor  Continue wound care  Pt refusing air mattress, maintain pressure reducing mattress         Type 2 diabetes mellitus with chronic kidney disease on chronic dialysis, with long-term current use of insulin (CMS/[facility])     FBG at goal  Carb controlled diet  Monitor Glucoscan  Glargine  Humalog           Weakness     Continue to work in therapy          Medications, treatments, and labs reviewed  Continue medications and treatments as listed in EMR    Scribe Attestation  I, Phoebe Ann   attest that this documentation has been prepared under the direction and in the presence of MAR Johnson.     Provider Attestation - Scribe documentation  All medical record entries made by the Scribe were at my direction and personally dictated by me. I have reviewed the chart and agree that the record accurately reflects my personal performance of the history, physical exam, discussion and plan.   MAR Johnson      Electronically Signed By: MAR Johnson   2/2/24 10:07 AM

## 2024-01-23 ENCOUNTER — NURSING HOME VISIT (OUTPATIENT)
Dept: POST ACUTE CARE | Facility: EXTERNAL LOCATION | Age: 89
End: 2024-01-23
Payer: COMMERCIAL

## 2024-01-23 DIAGNOSIS — I50.32 HYPERTENSIVE HEART AND KIDNEY DISEASE WITH CHRONIC DIASTOLIC CONGESTIVE HEART FAILURE AND STAGE 5 CHRONIC KIDNEY DISEASE ON CHRONIC DIALYSIS (MULTI): ICD-10-CM

## 2024-01-23 DIAGNOSIS — I13.2 HYPERTENSIVE HEART AND KIDNEY DISEASE WITH CHRONIC DIASTOLIC CONGESTIVE HEART FAILURE AND STAGE 5 CHRONIC KIDNEY DISEASE ON CHRONIC DIALYSIS (MULTI): ICD-10-CM

## 2024-01-23 DIAGNOSIS — N18.6 HYPERTENSIVE HEART AND KIDNEY DISEASE WITH CHRONIC DIASTOLIC CONGESTIVE HEART FAILURE AND STAGE 5 CHRONIC KIDNEY DISEASE ON CHRONIC DIALYSIS (MULTI): ICD-10-CM

## 2024-01-23 DIAGNOSIS — Z86.73 HISTORY OF CVA (CEREBROVASCULAR ACCIDENT): ICD-10-CM

## 2024-01-23 DIAGNOSIS — Z99.2 HYPERTENSIVE HEART AND KIDNEY DISEASE WITH CHRONIC DIASTOLIC CONGESTIVE HEART FAILURE AND STAGE 5 CHRONIC KIDNEY DISEASE ON CHRONIC DIALYSIS (MULTI): ICD-10-CM

## 2024-01-23 DIAGNOSIS — R53.1 WEAKNESS: ICD-10-CM

## 2024-01-23 DIAGNOSIS — I48.91 ATRIAL FIBRILLATION, UNSPECIFIED TYPE (MULTI): ICD-10-CM

## 2024-01-23 PROCEDURE — 99309 SBSQ NF CARE MODERATE MDM 30: CPT | Performed by: NURSE PRACTITIONER

## 2024-01-23 NOTE — ASSESSMENT & PLAN NOTE
TX: Irrigate with normal saline, pat dry, apply CA Alg, and cover with silicone border foam dressing daily and as needed    Turn every 2 hours  Prevalon boots  Air mattress  Cushion to chair  Dietitian consult  Supplements per dietitian  Grab bars to bed to assist with bed mobility and repositioning  Weekly skin checks  House barrier cream to buttocks

## 2024-01-23 NOTE — ASSESSMENT & PLAN NOTE
TX: Irrigate with normal saline, pat dry, paint with Betadine, apply ABD and Kerlix qd    Turn every 2 hours  Prevalon boots  Air mattress  Cushion to chair  Dietitian consult  Supplements per dietitian  Grab bars to bed to assist with bed mobility and repositioning  Weekly skin checks  House barrier cream to buttocks

## 2024-01-23 NOTE — PROGRESS NOTES
PROGRESS NOTE    Subjective   Chief complaint: Melody Martin is a 88 y.o. female who is an acute skilled patient being seen and evaluated for weakness    HPI:  HPI  Patient is working in therapy due to generalized weakness.  Therapy is working with patient focusing on increasing independence with ADLs, dynamic functional activities to increase strength and ROM and flexibility.  Therapy is also working with patient on close kinetic chain activities to increase functional skills and also strengthening activities to increase functional task performance.  Patient was seen and examined at bedside, no apparent distress.  Denies chest pain or shortness of breath.  Afebrile.  Nursing staff reporting no new concerns at this time.    Objective   Vital signs: 150/62, 97.8, 70, 18, blood sugar 202    Physical Exam  Constitutional:       General: She is not in acute distress.  Eyes:      Extraocular Movements: Extraocular movements intact.   Cardiovascular:      Rate and Rhythm: Normal rate and regular rhythm.   Pulmonary:      Effort: Pulmonary effort is normal.      Breath sounds: Normal breath sounds.   Abdominal:      General: Bowel sounds are normal.      Palpations: Abdomen is soft.   Musculoskeletal:      Cervical back: Neck supple.      Right lower leg: Edema present.      Left lower leg: Edema present.   Neurological:      Mental Status: She is alert.      Motor: Weakness present.   Psychiatric:         Mood and Affect: Mood normal.         Behavior: Behavior is cooperative.         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 (CMS/HCC)     On HD per nephrology  Antihypertensives  Renal diet  Monitor BP  Remove extra fluid in dialysis  Recent labs stable,  continue to monitor         Weakness - Primary     Continue working in therapy         Atrial fibrillation (CMS/[facility])     Monitor heart rate, controlled  Amiodarone  Apixaban  Bleeding precautions          Medications, treatments, and labs reviewed  Continue medications and treatments as listed in EMR      Scribe Attestation  IHien Scribe   attest that this documentation has been prepared under the direction and in the presence of MAR Johnson    Provider Attestation - Scribe documentation  All medical record entries made by the Scribe were at my direction and personally dictated by me. I have reviewed the chart and agree that the record accurately reflects my personal performance of the history, physical exam, discussion and plan.   MAR Johnson

## 2024-01-23 NOTE — PROGRESS NOTES
PROGRESS NOTE    Subjective   Chief complaint: Melody Martin is a 88 y.o. female who is an acute skilled patient being seen and evaluated for weakness    HPI:  HPI  Patient is skilled for therapy due to weakness after recent hospitalization.  Therapy evaluated patient and goals have been established.  Patient does have a diagnosis of ESRD on HD.  Patient is seen for wounds to sacrum and left heel which were present upon admission.  Patient was seen and examined at bedside, in no apparent distress.  Denies chest pain or shortness of breath.  Afebrile.    Objective   Vital signs: 142/82, 98.2, 64, 18, blood sugar 391    Physical Exam  Constitutional:       General: She is not in acute distress.  Eyes:      Extraocular Movements: Extraocular movements intact.   Cardiovascular:      Rate and Rhythm: Normal rate and regular rhythm.   Pulmonary:      Effort: Pulmonary effort is normal.      Breath sounds: Normal breath sounds.   Abdominal:      General: Bowel sounds are normal.      Palpations: Abdomen is soft.   Musculoskeletal:      Cervical back: Neck supple.      Right lower leg: Edema present.      Left lower leg: Edema present.   Skin:     Comments: Wound location - Sacrum  Etiology - unstageable  Granulation - M  Slough - M  Edges - flat  Odor - no  Drainage - M, serosanguineous  Size - 7 x 9 by UTD centimeters  Undermining -none    Wound location - left heel  Etiology - unstageable pressure ulcer  Large dry stable eschar  Granulation - none  Edges - flat  Odor - no  Drainage - none  Size - 4 x 4 by UTD centimeters  Undermining -no   Neurological:      Mental Status: She is alert.      Motor: Weakness present.   Psychiatric:         Mood and Affect: Mood normal.         Behavior: Behavior is cooperative.         Assessment/Plan   Problem List Items Addressed This Visit       Atrial fibrillation (CMS/[facility])     Monitor heart rate, controlled  Amiodarone  Apixaban  Bleeding precautions         Hypertensive heart and  kidney disease with chronic diastolic congestive heart failure and stage 5 chronic kidney disease on chronic dialysis (CMS/[facility])     CHF stable, no shortness of breath.  On HD per nephrology  Antihypertensives  Renal diet  Monitor BP and weight  Remove extra fluid in dialysis         Pressure ulcer of left heel, unstageable (CMS/[facility])     TX: Irrigate with normal saline, pat dry, paint with Betadine, apply ABD and Kerlix qd    Turn every 2 hours  Prevalon boots  Air mattress  Cushion to chair  Dietitian consult  Supplements per dietitian  Grab bars to bed to assist with bed mobility and repositioning  Weekly skin checks  House barrier cream to buttocks         Pressure ulcer of sacral region, unstageable (CMS/[facility])     TX: Irrigate with normal saline, pat dry, apply CA Alg, and cover with silicone border foam dressing daily and as needed    Turn every 2 hours  Prevalon boots  Air mattress  Cushion to chair  Dietitian consult  Supplements per dietitian  Grab bars to bed to assist with bed mobility and repositioning  Weekly skin checks  House barrier cream to buttocks         Type 2 diabetes mellitus with chronic kidney disease on chronic dialysis, with long-term current use of insulin (CMS/[facility])     Carb controlled diet  Monitor Glucoscan  Glargine  Humalog  FBG elevated today and has been in the 200s-300s  Will increase glargine insulin         Weakness - Primary     Therapy evaluated and goals have been established          Medications, treatments, and labs reviewed  Continue medications and treatments as listed in EMR      Scribe Attestation  IHien Scribe   attest that this documentation has been prepared under the direction and in the presence of JANELL Johnson-CNP    Provider Attestation - Scribe documentation  All medical record entries made by the Scribe were at my direction and personally dictated by me. I have reviewed the chart and agree that the record accurately reflects my personal  performance of the history, physical exam, discussion and plan.   Karina Patel, APRN-CNP

## 2024-01-23 NOTE — LETTER
Patient: Melody Martin  : 1935    Encounter Date: 2024    PROGRESS NOTE    Subjective  Chief complaint: Melody Martin is a 88 y.o. female who is an acute skilled patient being seen and evaluated for weakness    HPI:  Patient seen and examined at bedside.  Patient denies n/v/f/c.  Continues working in therapy. Xray results reviewed which showed no osseus erosion identified to suggest osteomyelitis. Nursing reports no new concerns.       Objective  Vital signs: 151/58,62,99%    Physical Exam  Constitutional:       General: She is not in acute distress.  Eyes:      Extraocular Movements: Extraocular movements intact.   Cardiovascular:      Rate and Rhythm: Normal rate and regular rhythm.   Pulmonary:      Effort: Pulmonary effort is normal.      Breath sounds: Normal breath sounds.   Abdominal:      General: Bowel sounds are normal.      Palpations: Abdomen is soft.   Musculoskeletal:      Cervical back: Neck supple.      Right lower leg: Edema present.      Left lower leg: Edema present.   Neurological:      Mental Status: She is alert.      Motor: Weakness present.   Psychiatric:         Mood and Affect: Mood normal.         Behavior: Behavior is cooperative.         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     Provider Attestation - Scribe documentation  All medical record entries made by the Scribe were at my direction and personally dictated by me. I have reviewed the chart and agree that the record accurately reflects my personal performance of the history, physical exam, discussion and plan.   MAR Johnson      Electronically Signed By: MAR Johnson   2/2/24 10:58 AM

## 2024-01-24 ENCOUNTER — NURSING HOME VISIT (OUTPATIENT)
Dept: POST ACUTE CARE | Facility: EXTERNAL LOCATION | Age: 89
End: 2024-01-24
Payer: COMMERCIAL

## 2024-01-24 DIAGNOSIS — R53.1 WEAKNESS: Primary | ICD-10-CM

## 2024-01-24 DIAGNOSIS — I48.91 ATRIAL FIBRILLATION, UNSPECIFIED TYPE (MULTI): ICD-10-CM

## 2024-01-24 DIAGNOSIS — N18.6 HYPERTENSIVE HEART AND KIDNEY DISEASE WITH CHRONIC DIASTOLIC CONGESTIVE HEART FAILURE AND STAGE 5 CHRONIC KIDNEY DISEASE ON CHRONIC DIALYSIS (MULTI): ICD-10-CM

## 2024-01-24 DIAGNOSIS — I13.2 HYPERTENSIVE HEART AND KIDNEY DISEASE WITH CHRONIC DIASTOLIC CONGESTIVE HEART FAILURE AND STAGE 5 CHRONIC KIDNEY DISEASE ON CHRONIC DIALYSIS (MULTI): ICD-10-CM

## 2024-01-24 DIAGNOSIS — Z99.2 HYPERTENSIVE HEART AND KIDNEY DISEASE WITH CHRONIC DIASTOLIC CONGESTIVE HEART FAILURE AND STAGE 5 CHRONIC KIDNEY DISEASE ON CHRONIC DIALYSIS (MULTI): ICD-10-CM

## 2024-01-24 DIAGNOSIS — I50.32 HYPERTENSIVE HEART AND KIDNEY DISEASE WITH CHRONIC DIASTOLIC CONGESTIVE HEART FAILURE AND STAGE 5 CHRONIC KIDNEY DISEASE ON CHRONIC DIALYSIS (MULTI): ICD-10-CM

## 2024-01-24 DIAGNOSIS — R52 PAIN: ICD-10-CM

## 2024-01-24 PROBLEM — L89.150 PRESSURE ULCER OF SACRAL REGION, UNSTAGEABLE (MULTI): Status: ACTIVE | Noted: 2024-01-16

## 2024-01-24 PROBLEM — L89.620 PRESSURE ULCER OF LEFT HEEL, UNSTAGEABLE (MULTI): Status: ACTIVE | Noted: 2023-10-12

## 2024-01-24 PROCEDURE — 99309 SBSQ NF CARE MODERATE MDM 30: CPT | Performed by: INTERNAL MEDICINE

## 2024-01-24 NOTE — ASSESSMENT & PLAN NOTE
As needed Tylenol for mild to moderate pain.  As needed tramadol for severe pain  Lidocaine patch to be applied to shoulder for pain

## 2024-01-24 NOTE — LETTER
Patient: Melody Martin  : 1935    Encounter Date: 2024    PROGRESS NOTE    Subjective  Chief complaint: Melody Martin is a 88 y.o. female who is an acute skilled patient being seen and evaluated for weakness    HPI:  HPI  Patient is working in therapy due to generalized weakness.  Therapy is working with patient on bed mobility, requiring max assist.  Patient is working on bilateral upper extremity strengthening.  Speech therapy is working with patient.  SLP noted that with stroke and lingual impairments.  Patient is unable to move food to the right side of oral cavity for compensations.  Patient's diet was downgraded to increase overall intake.  SLP educated patient on dietary texture.  Patient also had wound culture ordered and pending BMP and CBC.  Also seeing patient for clarification of medications.  Patient has a diagnosis of A-fib and is on amiodarone.  For shoulder pain, patient does have lidocaine patch 4% to be applied.  Patient does have as needed tramadol and Tylenol for pain, placing parameters for orders of Tylenol for mild to moderate pain and for severe pain to be given as needed tramadol.    Objective  Vital signs: 169/56, 98.1, 16, 65, blood sugar 171, 99%    Physical Exam  Constitutional:       General: She is not in acute distress.  Eyes:      Extraocular Movements: Extraocular movements intact.   Cardiovascular:      Rate and Rhythm: Normal rate and regular rhythm.   Pulmonary:      Effort: Pulmonary effort is normal.      Breath sounds: Normal breath sounds.   Abdominal:      General: Bowel sounds are normal.      Palpations: Abdomen is soft.   Musculoskeletal:      Cervical back: Neck supple.      Right lower leg: Edema present.      Left lower leg: Edema present.   Neurological:      Mental Status: She is alert.      Motor: Weakness present.   Psychiatric:         Mood and Affect: Mood normal.         Behavior: Behavior is cooperative.         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  As needed tramadol for severe pain  Lidocaine patch to be applied to shoulder for pain          Medications, treatments, and labs reviewed  Continue medications and treatments as listed in EMR      Scribe Attestation  I, Phoebe Martinez   attest that this documentation has been prepared under the direction and in the presence of Wilbert Lock MD    Provider Attestation - Scribe documentation  All medical record entries made by the Scribe were at my direction and personally dictated by me. I have reviewed the chart and agree that the record accurately reflects my personal performance of the history, physical exam, discussion and plan.   Wilbert Lock MD        Electronically Signed By: Wilbert Lock MD   1/24/24  3:26 PM

## 2024-01-24 NOTE — PROGRESS NOTES
PROGRESS NOTE    Subjective   Chief complaint: Melody Martin is a 88 y.o. female who is an acute skilled patient being seen and evaluated for weakness    HPI:  HPI  Patient is working in therapy due to generalized weakness.  Therapy is working with patient on bed mobility, requiring max assist.  Patient is working on bilateral upper extremity strengthening.  Speech therapy is working with patient.  SLP noted that with stroke and lingual impairments.  Patient is unable to move food to the right side of oral cavity for compensations.  Patient's diet was downgraded to increase overall intake.  SLP educated patient on dietary texture.  Patient also had wound culture ordered and pending BMP and CBC.  Also seeing patient for clarification of medications.  Patient has a diagnosis of A-fib and is on amiodarone.  For shoulder pain, patient does have lidocaine patch 4% to be applied.  Patient does have as needed tramadol and Tylenol for pain, placing parameters for orders of Tylenol for mild to moderate pain and for severe pain to be given as needed tramadol.    Objective   Vital signs: 169/56, 98.1, 16, 65, blood sugar 171, 99%    Physical Exam  Constitutional:       General: She is not in acute distress.  Eyes:      Extraocular Movements: Extraocular movements intact.   Cardiovascular:      Rate and Rhythm: Normal rate and regular rhythm.   Pulmonary:      Effort: Pulmonary effort is normal.      Breath sounds: Normal breath sounds.   Abdominal:      General: Bowel sounds are normal.      Palpations: Abdomen is soft.   Musculoskeletal:      Cervical back: Neck supple.      Right lower leg: Edema present.      Left lower leg: Edema present.   Neurological:      Mental Status: She is alert.      Motor: Weakness present.   Psychiatric:         Mood and Affect: Mood normal.         Behavior: Behavior is cooperative.         Assessment/Plan   Problem List Items Addressed This Visit       Hypertensive heart and kidney disease  with chronic diastolic congestive heart failure and stage 5 chronic kidney disease on chronic dialysis (CMS/[facility])     On HD per nephrology  Antihypertensives  Renal diet  Monitor BP  Remove extra fluid in dialysis  Recent labs stable, continue to monitor         Weakness - Primary     Continue to work in therapy towards established goals         Atrial fibrillation (CMS/[facility])     Monitor heart rate, controlled  Amiodarone  Apixaban  Bleeding precautions         Pain     As needed Tylenol for mild to moderate pain.  As needed tramadol for severe pain  Lidocaine patch to be applied to shoulder for pain          Medications, treatments, and labs reviewed  Continue medications and treatments as listed in EMR      Scribe Attestation  I, Carl Martineziboseas   attest that this documentation has been prepared under the direction and in the presence of Wilbert Lock MD    Provider Attestation - Scribe documentation  All medical record entries made by the Scribe were at my direction and personally dictated by me. I have reviewed the chart and agree that the record accurately reflects my personal performance of the history, physical exam, discussion and plan.   Wilbert Lock MD

## 2024-01-24 NOTE — ASSESSMENT & PLAN NOTE
Carb controlled diet  Monitor Glucoscan  Glargine  Humalog  FBG elevated today and has been in the 200s-300s  Will increase glargine insulin

## 2024-01-25 ENCOUNTER — NURSING HOME VISIT (OUTPATIENT)
Dept: POST ACUTE CARE | Facility: EXTERNAL LOCATION | Age: 89
End: 2024-01-25
Payer: COMMERCIAL

## 2024-01-25 DIAGNOSIS — Z99.2 HYPERTENSIVE HEART AND KIDNEY DISEASE WITH CHRONIC DIASTOLIC CONGESTIVE HEART FAILURE AND STAGE 5 CHRONIC KIDNEY DISEASE ON CHRONIC DIALYSIS (MULTI): ICD-10-CM

## 2024-01-25 DIAGNOSIS — R53.1 WEAKNESS: Primary | ICD-10-CM

## 2024-01-25 DIAGNOSIS — Z86.73 HISTORY OF CVA (CEREBROVASCULAR ACCIDENT): ICD-10-CM

## 2024-01-25 DIAGNOSIS — L89.620 PRESSURE ULCER OF LEFT HEEL, UNSTAGEABLE (MULTI): ICD-10-CM

## 2024-01-25 DIAGNOSIS — I13.2 HYPERTENSIVE HEART AND KIDNEY DISEASE WITH CHRONIC DIASTOLIC CONGESTIVE HEART FAILURE AND STAGE 5 CHRONIC KIDNEY DISEASE ON CHRONIC DIALYSIS (MULTI): ICD-10-CM

## 2024-01-25 DIAGNOSIS — I50.32 HYPERTENSIVE HEART AND KIDNEY DISEASE WITH CHRONIC DIASTOLIC CONGESTIVE HEART FAILURE AND STAGE 5 CHRONIC KIDNEY DISEASE ON CHRONIC DIALYSIS (MULTI): ICD-10-CM

## 2024-01-25 DIAGNOSIS — L89.150 PRESSURE ULCER OF SACRAL REGION, UNSTAGEABLE (MULTI): ICD-10-CM

## 2024-01-25 DIAGNOSIS — N18.6 HYPERTENSIVE HEART AND KIDNEY DISEASE WITH CHRONIC DIASTOLIC CONGESTIVE HEART FAILURE AND STAGE 5 CHRONIC KIDNEY DISEASE ON CHRONIC DIALYSIS (MULTI): ICD-10-CM

## 2024-01-25 DIAGNOSIS — R13.10 DYSPHAGIA, UNSPECIFIED TYPE: ICD-10-CM

## 2024-01-25 PROCEDURE — 11045 DBRDMT SUBQ TISS EACH ADDL: CPT | Performed by: NURSE PRACTITIONER

## 2024-01-25 PROCEDURE — 11042 DBRDMT SUBQ TIS 1ST 20SQCM/<: CPT | Performed by: NURSE PRACTITIONER

## 2024-01-25 PROCEDURE — 99309 SBSQ NF CARE MODERATE MDM 30: CPT | Performed by: NURSE PRACTITIONER

## 2024-01-25 NOTE — LETTER
Patient: Melody Martin  : 1935    Encounter Date: 2024    PROGRESS NOTE    Subjective  Chief complaint: Melody Martin is a 88 y.o. female who is an acute skilled patient being seen and evaluated for weakness    HPI:  HPI  Patient is working in therapy due to generalized weakness.  Patient is working on bed mobility and upper extremity strengthening.  Speech therapy is also working with patient on diet, patient with increased difficulty with soft solids such as [place] toast due to tooth pain.  Patient has noted that due to stroke and lingual impairment.  Patient is unable to move food to the right side of oral cavity for compensation.  Patient's diet is downgraded to increase overall intake.  Patient seen and examined at bedside, appears to be in no apparent distress.  Denies chest pain or shortness of breath.  Afebrile.    Objective  Vital signs: 189/60, 97.1, 77, 18, blood sugar 294, 96%    Physical Exam  Constitutional:       General: She is not in acute distress.  Eyes:      Extraocular Movements: Extraocular movements intact.   Cardiovascular:      Rate and Rhythm: Normal rate and regular rhythm.   Pulmonary:      Effort: Pulmonary effort is normal.      Breath sounds: Normal breath sounds.   Abdominal:      General: Bowel sounds are normal.      Palpations: Abdomen is soft.   Musculoskeletal:      Cervical back: Neck supple.      Right lower leg: Edema present.      Left lower leg: Edema present.   Skin:     Comments: Wound location -left heel  Etiology - unstageable pressure ulcer  Granulation - none  Slough - Large adherent eschar  Edges - flat   Odor - none  Drainage - none  Size - 3 X 3 X UTD cm  Undermining -none    Wound location -sacrum  Etiology - unstageable pressure ulcer  Granulation - Small  Slough - Large  Edges - flat  Odor - none  Drainage - Medium serosanguineous  Size - 8 X 9.5 X UTD cm  Undermining -none   Neurological:      Mental Status: She is alert.      Motor: Weakness  present.   Psychiatric:         Mood and Affect: Mood normal.         Behavior: Behavior is cooperative.         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  Blood pressure fluctuates, will continue to monitor  On HD per nephrology  Antihypertensives  Renal diet  Monitor BP  Remove extra fluid in dialysis         Pressure ulcer of left heel, unstageable (CMS/HCC)     TX: Irrigate with normal saline, pat dry, paint with Betadine, apply ABD and Kerlix qd     Turn every 2 hours  Prevalon boots  Pressure reducing mattress, patient refusing air mattress, discussed benefits/risks  Cushion to chair  Dietitian consult  Supplements per dietitian  Grab bars to bed to assist with bed mobility and repositioning  Weekly skin checks  House barrier cream to buttocks         Pressure ulcer of sacral region, unstageable (CMS/HCC)     I performed subcutaneous tissue debridement with a total area of 76 cm2 with curette to remove viable and non-viable tissue/material including subcutaneous tissue, slough, biofilm, and fibrin/exudate after achieving pain control with 2% lidocaine topical gel.  A minimal amount of bleeding was controlled with pressure. The procedure was tolerated well with a pain level of 0 throughout and a pain level of 0 following the procedure.  Post-debridement measurements unchanged. Character of wound/ulcer post-debridement is improved.     TX: Irrigate with normal saline, pat dry, apply santyl, pack with Ca Alg, and cover with silicone border foam dressing daily and as needed     Turn every 2 hours  Prevalon boots  Air mattress  Cushion to chair  Dietitian  following  Supplements per dietitian  Grab bars to bed to assist with bed mobility and repositioning  Weekly skin checks  House barrier cream to buttocks         Weakness - Primary     Continue to work in therapy towards goals          Medications, treatments, and labs reviewed  Continue medications and treatments as listed in EMR      Scribe Attestation  Hien MARRERO Scribe   attest that this documentation has been prepared under the direction and in the presence of MAR Johnson    Provider Attestation - Scribe documentation  All medical record entries made by the Scribe were at my direction and personally dictated by me. I have reviewed the chart and agree that the record accurately reflects my personal performance of the history, physical exam, discussion and plan.   MAR Johnson        Electronically Signed By: MAR Johnson   2/23/24  8:58 AM

## 2024-01-26 ENCOUNTER — NURSING HOME VISIT (OUTPATIENT)
Dept: POST ACUTE CARE | Facility: EXTERNAL LOCATION | Age: 89
End: 2024-01-26
Payer: COMMERCIAL

## 2024-01-26 DIAGNOSIS — E87.6 HYPOKALEMIA: ICD-10-CM

## 2024-01-26 DIAGNOSIS — Z79.4 TYPE 2 DIABETES MELLITUS WITH CHRONIC KIDNEY DISEASE ON CHRONIC DIALYSIS, WITH LONG-TERM CURRENT USE OF INSULIN (MULTI): ICD-10-CM

## 2024-01-26 DIAGNOSIS — N18.6 TYPE 2 DIABETES MELLITUS WITH CHRONIC KIDNEY DISEASE ON CHRONIC DIALYSIS, WITH LONG-TERM CURRENT USE OF INSULIN (MULTI): ICD-10-CM

## 2024-01-26 DIAGNOSIS — R13.10 DYSPHAGIA, UNSPECIFIED TYPE: ICD-10-CM

## 2024-01-26 DIAGNOSIS — Z86.73 HISTORY OF CVA (CEREBROVASCULAR ACCIDENT): ICD-10-CM

## 2024-01-26 DIAGNOSIS — Z99.2 TYPE 2 DIABETES MELLITUS WITH CHRONIC KIDNEY DISEASE ON CHRONIC DIALYSIS, WITH LONG-TERM CURRENT USE OF INSULIN (MULTI): ICD-10-CM

## 2024-01-26 DIAGNOSIS — I48.91 ATRIAL FIBRILLATION, UNSPECIFIED TYPE (MULTI): ICD-10-CM

## 2024-01-26 DIAGNOSIS — R53.1 WEAKNESS: Primary | ICD-10-CM

## 2024-01-26 DIAGNOSIS — E11.22 TYPE 2 DIABETES MELLITUS WITH CHRONIC KIDNEY DISEASE ON CHRONIC DIALYSIS, WITH LONG-TERM CURRENT USE OF INSULIN (MULTI): ICD-10-CM

## 2024-01-26 PROCEDURE — 99308 SBSQ NF CARE LOW MDM 20: CPT | Performed by: INTERNAL MEDICINE

## 2024-01-26 NOTE — PROGRESS NOTES
PROGRESS NOTE    Subjective   Chief complaint: Melody Martin is a 88 y.o. female who is an acute skilled patient being seen and evaluated for weakness    HPI:  HPI  Patient is continue to work in therapy due to generalized weakness.  Patient is working on bed mobility and requiring max assistance.  Therapy continues to work with patient on bilateral upper extremity strengthening.  Speech therapy working with patient on dysphagia.  Speech therapy downgraded patient's diet to increase overall safety intake.  Patient seen and examined at bedside.  Patient is to have f/u K level after receiving replacement.  Patient's glargine was also increased for diabetes.  Patient denies chest pain or shortness of breath.  Denies nausea or vomiting.  Afebrile.    Objective   Vital signs: 160/80, 98.0, 70, 20, blood sugar is 130, 96%    Physical Exam  Constitutional:       General: She is not in acute distress.  Eyes:      Extraocular Movements: Extraocular movements intact.   Cardiovascular:      Rate and Rhythm: Normal rate and regular rhythm.   Pulmonary:      Effort: Pulmonary effort is normal.      Breath sounds: Normal breath sounds.   Abdominal:      General: Bowel sounds are normal.      Palpations: Abdomen is soft.   Musculoskeletal:      Cervical back: Neck supple.      Right lower leg: Edema present.      Left lower leg: Edema present.   Neurological:      Mental Status: She is alert.      Motor: Weakness present.   Psychiatric:         Mood and Affect: Mood normal.         Behavior: Behavior is cooperative.         Assessment/Plan   Problem List Items Addressed This Visit       Type 2 diabetes mellitus with chronic kidney disease on chronic dialysis, with long-term current use of insulin (CMS/[facility])     Carb controlled diet  Monitor Glucoscan  Glargine  Humalog  FBG elevated today and has been in the 200s-300s  Will increase glargine insulin         History of CVA (cerebrovascular accident)     Statin  Plavix  Monitor for  changes and weakness         Weakness - Primary     Continue to work in therapy         Atrial fibrillation (CMS/[facility])     Monitor heart rate, controlled  Amiodarone  Apixaban  Bleeding precautions         Hypokalemia     Potassium given  Repeat K level         Dysphagia     Speech therapy  Altered diet          Medications, treatments, and labs reviewed  Continue medications and treatments as listed in EMR      Scribe Attestation  Hien MARRERO Scribe   attest that this documentation has been prepared under the direction and in the presence of Wilbert Lock MD    Provider Attestation - Scribe documentation  All medical record entries made by the Scribe were at my direction and personally dictated by me. I have reviewed the chart and agree that the record accurately reflects my personal performance of the history, physical exam, discussion and plan.   Wilbert Lock MD

## 2024-01-26 NOTE — LETTER
Patient: Melody Martin  : 1935    Encounter Date: 2024    PROGRESS NOTE    Subjective  Chief complaint: Melody Martin is a 88 y.o. female who is an acute skilled patient being seen and evaluated for weakness    HPI:  HPI  Patient is continue to work in therapy due to generalized weakness.  Patient is working on bed mobility and requiring max assistance.  Therapy continues to work with patient on bilateral upper extremity strengthening.  Speech therapy working with patient on dysphagia.  Speech therapy downgraded patient's diet to increase overall safety intake.  Patient seen and examined at bedside.  Patient is to have f/u K level after receiving replacement.  Patient's glargine was also increased for diabetes.  Patient denies chest pain or shortness of breath.  Denies nausea or vomiting.  Afebrile.    Objective  Vital signs: 160/80, 98.0, 70, 20, blood sugar is 130, 96%    Physical Exam  Constitutional:       General: She is not in acute distress.  Eyes:      Extraocular Movements: Extraocular movements intact.   Cardiovascular:      Rate and Rhythm: Normal rate and regular rhythm.   Pulmonary:      Effort: Pulmonary effort is normal.      Breath sounds: Normal breath sounds.   Abdominal:      General: Bowel sounds are normal.      Palpations: Abdomen is soft.   Musculoskeletal:      Cervical back: Neck supple.      Right lower leg: Edema present.      Left lower leg: Edema present.   Neurological:      Mental Status: She is alert.      Motor: Weakness present.   Psychiatric:         Mood and Affect: Mood normal.         Behavior: Behavior is cooperative.         Assessment/Plan  Problem List Items Addressed This Visit       Type 2 diabetes mellitus with chronic kidney disease on chronic dialysis, with long-term current use of insulin (CMS/[facility])     Carb controlled diet  Monitor Glucoscan  Glargine  Humalog  FBG elevated today and has been in the 200s-300s  Will increase glargine insulin          History of CVA (cerebrovascular accident)     Statin  Plavix  Monitor for changes and weakness         Weakness - Primary     Continue to work in therapy         Atrial fibrillation (CMS/[facility])     Monitor heart rate, controlled  Amiodarone  Apixaban  Bleeding precautions         Hypokalemia     Potassium given  Repeat K level         Dysphagia     Speech therapy  Altered diet          Medications, treatments, and labs reviewed  Continue medications and treatments as listed in EMR      Scribe Attestation  I, Carl Martinezibe   attest that this documentation has been prepared under the direction and in the presence of Wilbert Lock MD    Provider Attestation - Scribe documentation  All medical record entries made by the Scribe were at my direction and personally dictated by me. I have reviewed the chart and agree that the record accurately reflects my personal performance of the history, physical exam, discussion and plan.   Wilbert Lock MD        Electronically Signed By: Wilbert Lock MD   1/27/24  6:25 PM

## 2024-01-29 ENCOUNTER — NURSING HOME VISIT (OUTPATIENT)
Dept: POST ACUTE CARE | Facility: EXTERNAL LOCATION | Age: 89
End: 2024-01-29
Payer: COMMERCIAL

## 2024-01-29 DIAGNOSIS — I50.32 HYPERTENSIVE HEART AND KIDNEY DISEASE WITH CHRONIC DIASTOLIC CONGESTIVE HEART FAILURE AND STAGE 5 CHRONIC KIDNEY DISEASE ON CHRONIC DIALYSIS (MULTI): ICD-10-CM

## 2024-01-29 DIAGNOSIS — T14.8XXA WOUND INFECTION: Primary | ICD-10-CM

## 2024-01-29 DIAGNOSIS — R53.1 WEAKNESS: ICD-10-CM

## 2024-01-29 DIAGNOSIS — L08.9 WOUND INFECTION: Primary | ICD-10-CM

## 2024-01-29 DIAGNOSIS — I13.2 HYPERTENSIVE HEART AND KIDNEY DISEASE WITH CHRONIC DIASTOLIC CONGESTIVE HEART FAILURE AND STAGE 5 CHRONIC KIDNEY DISEASE ON CHRONIC DIALYSIS (MULTI): ICD-10-CM

## 2024-01-29 DIAGNOSIS — Z99.2 HYPERTENSIVE HEART AND KIDNEY DISEASE WITH CHRONIC DIASTOLIC CONGESTIVE HEART FAILURE AND STAGE 5 CHRONIC KIDNEY DISEASE ON CHRONIC DIALYSIS (MULTI): ICD-10-CM

## 2024-01-29 DIAGNOSIS — N18.6 HYPERTENSIVE HEART AND KIDNEY DISEASE WITH CHRONIC DIASTOLIC CONGESTIVE HEART FAILURE AND STAGE 5 CHRONIC KIDNEY DISEASE ON CHRONIC DIALYSIS (MULTI): ICD-10-CM

## 2024-01-29 DIAGNOSIS — Z86.73 HISTORY OF CVA (CEREBROVASCULAR ACCIDENT): ICD-10-CM

## 2024-01-29 DIAGNOSIS — L89.150 PRESSURE ULCER OF SACRAL REGION, UNSTAGEABLE (MULTI): ICD-10-CM

## 2024-01-29 PROCEDURE — 99309 SBSQ NF CARE MODERATE MDM 30: CPT | Performed by: NURSE PRACTITIONER

## 2024-01-29 NOTE — LETTER
Patient: Melody Martin  : 1935    Encounter Date: 2024    PROGRESS NOTE    Subjective  Chief complaint: Melody Martin is a 88 y.o. female who is an acute skilled patient being seen and evaluated for weakness    HPI:  Nursing called  to report results of wound culture were positive. An order was given to start IV rocephin. Patient has agreed to air mattress which is intact. She continues working with therapy to improve strength and mobility. She is completing dynamic functional activities. ST is working with patient. SLP noted that pt required mild-moderate cues to increase accuracy to WFL. SLP noted that pt had the most difficulty with Valsva with inability to hold breath for more than 1/2 of one second. Denies n,v,f,c,pain.       Objective  Vital signs: 135\51,60,96%    Physical Exam  Constitutional:       General: She is not in acute distress.  Eyes:      Extraocular Movements: Extraocular movements intact.   Cardiovascular:      Rate and Rhythm: Normal rate and regular rhythm.   Pulmonary:      Effort: Pulmonary effort is normal.      Breath sounds: Normal breath sounds.   Abdominal:      General: Bowel sounds are normal.      Palpations: Abdomen is soft.   Musculoskeletal:      Cervical back: Neck supple.      Right lower leg: No edema.      Left lower leg: No edema.   Neurological:      Mental Status: She is alert.      Motor: Weakness present.   Psychiatric:         Mood and Affect: Mood normal.         Behavior: Behavior is cooperative.         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 (CMS/[facility])     On HD per nephrology  Antihypertensives  Renal diet  Monitor BP and weight  Remove extra fluid in dialysis  No sob  BP at goal            Pressure ulcer of sacral region, unstageable (CMS/[facility])     IV Rocephin         Weakness     Continue to work in therapy         Wound infection - Primary     Rocephin           Medications, treatments, and labs reviewed  Continue medications and treatments as listed in EMR    Scribe Attestation  I, Phoebe Ann   attest that this documentation has been prepared under the direction and in the presence of MAR Johnson.     Provider Attestation - Scribe documentation  All medical record entries made by the Scribe were at my direction and personally dictated by me. I have reviewed the chart and agree that the record accurately reflects my personal performance of the history, physical exam, discussion and plan.   MAR Johnson      Electronically Signed By: MAR Johnson   2/12/24  6:26 PM

## 2024-01-30 ENCOUNTER — NURSING HOME VISIT (OUTPATIENT)
Dept: POST ACUTE CARE | Facility: EXTERNAL LOCATION | Age: 89
End: 2024-01-30
Payer: COMMERCIAL

## 2024-01-30 DIAGNOSIS — Z99.2 TYPE 2 DIABETES MELLITUS WITH CHRONIC KIDNEY DISEASE ON CHRONIC DIALYSIS, WITH LONG-TERM CURRENT USE OF INSULIN (MULTI): ICD-10-CM

## 2024-01-30 DIAGNOSIS — I13.2 HYPERTENSIVE HEART AND KIDNEY DISEASE WITH CHRONIC DIASTOLIC CONGESTIVE HEART FAILURE AND STAGE 5 CHRONIC KIDNEY DISEASE ON CHRONIC DIALYSIS (MULTI): ICD-10-CM

## 2024-01-30 DIAGNOSIS — E11.22 TYPE 2 DIABETES MELLITUS WITH CHRONIC KIDNEY DISEASE ON CHRONIC DIALYSIS, WITH LONG-TERM CURRENT USE OF INSULIN (MULTI): ICD-10-CM

## 2024-01-30 DIAGNOSIS — N18.6 HYPERTENSIVE HEART AND KIDNEY DISEASE WITH CHRONIC DIASTOLIC CONGESTIVE HEART FAILURE AND STAGE 5 CHRONIC KIDNEY DISEASE ON CHRONIC DIALYSIS (MULTI): ICD-10-CM

## 2024-01-30 DIAGNOSIS — N18.6 TYPE 2 DIABETES MELLITUS WITH CHRONIC KIDNEY DISEASE ON CHRONIC DIALYSIS, WITH LONG-TERM CURRENT USE OF INSULIN (MULTI): ICD-10-CM

## 2024-01-30 DIAGNOSIS — Z86.73 HISTORY OF CVA (CEREBROVASCULAR ACCIDENT): ICD-10-CM

## 2024-01-30 DIAGNOSIS — Z79.4 TYPE 2 DIABETES MELLITUS WITH CHRONIC KIDNEY DISEASE ON CHRONIC DIALYSIS, WITH LONG-TERM CURRENT USE OF INSULIN (MULTI): ICD-10-CM

## 2024-01-30 DIAGNOSIS — Z99.2 HYPERTENSIVE HEART AND KIDNEY DISEASE WITH CHRONIC DIASTOLIC CONGESTIVE HEART FAILURE AND STAGE 5 CHRONIC KIDNEY DISEASE ON CHRONIC DIALYSIS (MULTI): ICD-10-CM

## 2024-01-30 DIAGNOSIS — R53.1 WEAKNESS: Primary | ICD-10-CM

## 2024-01-30 DIAGNOSIS — I50.32 HYPERTENSIVE HEART AND KIDNEY DISEASE WITH CHRONIC DIASTOLIC CONGESTIVE HEART FAILURE AND STAGE 5 CHRONIC KIDNEY DISEASE ON CHRONIC DIALYSIS (MULTI): ICD-10-CM

## 2024-01-30 PROCEDURE — 99309 SBSQ NF CARE MODERATE MDM 30: CPT | Performed by: NURSE PRACTITIONER

## 2024-01-30 NOTE — LETTER
Patient: Melody Martin  : 1935    Encounter Date: 2024    PROGRESS NOTE    Subjective  Chief complaint: Melody Martin is a 88 y.o. female who is an acute skilled patient being seen and evaluated for weakness    HPI:  HPI  Patient continues to work in therapy due to generalized weakness.  Therapy is working with patient on bilateral upper extremity strengthening and bed mobility.  Patient does require max assist for bed mobility.  Patient is also seen in follow-up for diabetes, glargine insulin was increased on .  Reported that blood sugars are still high.  Patient seen and examined at bedside, appears to be in no apparent distress.  Denies polydipsia, polyuria or polyphagia.    Objective  Vital signs: 156/65, 98.3, 84, 18, blood sugar 365    Physical Exam  Constitutional:       General: She is not in acute distress.  Eyes:      Extraocular Movements: Extraocular movements intact.   Cardiovascular:      Rate and Rhythm: Normal rate and regular rhythm.   Pulmonary:      Effort: Pulmonary effort is normal.      Breath sounds: Normal breath sounds.   Musculoskeletal:      Cervical back: Neck supple.      Right lower leg: No edema.      Left lower leg: No edema.   Neurological:      Mental Status: She is alert.      Motor: Weakness present.   Psychiatric:         Mood and Affect: Mood normal.         Behavior: Behavior is cooperative.         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 and stage 5 chronic kidney disease on chronic  dialysis (CMS/[facility])     On HD per nephrology  Antihypertensives  Renal diet  Monitor BP  Remove extra fluid in dialysis         Weakness - Primary     Continue to work towards goals in therapy          Medications, treatments, and labs reviewed  Continue medications and treatments as listed in EMR      Scribe Attestation  IHien Scribe   attest that this documentation has been prepared under the direction and in the presence of MAR Johnson    Provider Attestation - Scribe documentation  All medical record entries made by the Scribe were at my direction and personally dictated by me. I have reviewed the chart and agree that the record accurately reflects my personal performance of the history, physical exam, discussion and plan.   MAR Johnson        Electronically Signed By: MAR Johnson   2/7/24  9:37 AM

## 2024-01-30 NOTE — PROGRESS NOTES
PROGRESS NOTE    Subjective   Chief complaint: Melody Martin is a 88 y.o. female who is an acute skilled patient being seen and evaluated for weakness    HPI:  Patient seen and examined at bedside. She has c\o increase pain at sacral wound site. She refuses air mattress though she is aware of risks and understands them. She is working with therapy to improve strength and mobility. Wound evaluated with treatment and orders in place. No other concerns at this time.       Objective   Vital signs: 151/58,60,98%,     Physical Exam  Constitutional:       General: She is not in acute distress.  Eyes:      Extraocular Movements: Extraocular movements intact.   Cardiovascular:      Rate and Rhythm: Normal rate and regular rhythm.   Pulmonary:      Effort: Pulmonary effort is normal.      Breath sounds: Normal breath sounds.   Abdominal:      General: Bowel sounds are normal.      Palpations: Abdomen is soft.   Musculoskeletal:      Cervical back: Neck supple.      Right lower leg: Edema present.      Left lower leg: Edema present.   Skin:     Comments: Wound location - Sacrum  Etiology - Unstageable pressure ulcer  Granulation - Medium  Slough - Medium  Edges - Flat  Odor - None  Drainage - Large serosanguinous  Undermining - none    Neurological:      Mental Status: She is alert.      Motor: Weakness present.   Psychiatric:         Mood and Affect: Mood normal.         Behavior: Behavior is cooperative.         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 (CMS/HCC)     CHF stable, no sob  On HD per nephrology  Antihypertensives  Renal diet  Monitor BP, labs, and weight  Remove extra fluid in dialysis           Pressure ulcer of sacral region, unstageable (CMS/HCC)     Increased wound pain  Obtain xray   Culture  BMP, CBC  Monitor  Continue wound care  Pt  refusing air mattress, maintain pressure reducing mattress         Type 2 diabetes mellitus with chronic kidney disease on chronic dialysis, with long-term current use of insulin (CMS/[facility])     FBG at goal  Carb controlled diet  Monitor Glucoscan  Glargine  Humalog           Weakness     Continue to work in therapy          Medications, treatments, and labs reviewed  Continue medications and treatments as listed in EMR    Scribe Attestation  I, Sachi Perry, Scribe   attest that this documentation has been prepared under the direction and in the presence of MAR Johnson.     Provider Attestation - Scribe documentation  All medical record entries made by the Scribe were at my direction and personally dictated by me. I have reviewed the chart and agree that the record accurately reflects my personal performance of the history, physical exam, discussion and plan.   MAR Johnson

## 2024-01-30 NOTE — ASSESSMENT & PLAN NOTE
CHF stable, no sob  On HD per nephrology  Antihypertensives  Renal diet  Monitor BP, labs, and weight  Remove extra fluid in dialysis

## 2024-01-30 NOTE — PROGRESS NOTES
PROGRESS NOTE    Subjective   Chief complaint: Melody Martin is a 88 y.o. female who is an acute skilled patient being seen and evaluated for weakness    HPI:  Patient seen and examined at bedside.  Patient denies n/v/f/c.  Continues working in therapy. Xray results reviewed which showed no osseus erosion identified to suggest osteomyelitis. Nursing reports no new concerns.       Objective   Vital signs: 151/58,62,99%    Physical Exam  Constitutional:       General: She is not in acute distress.  Eyes:      Extraocular Movements: Extraocular movements intact.   Cardiovascular:      Rate and Rhythm: Normal rate and regular rhythm.   Pulmonary:      Effort: Pulmonary effort is normal.      Breath sounds: Normal breath sounds.   Abdominal:      General: Bowel sounds are normal.      Palpations: Abdomen is soft.   Musculoskeletal:      Cervical back: Neck supple.      Right lower leg: Edema present.      Left lower leg: Edema present.   Neurological:      Mental Status: She is alert.      Motor: Weakness present.   Psychiatric:         Mood and Affect: Mood normal.         Behavior: Behavior is cooperative.         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     Provider Attestation - Scribe documentation  All medical record entries made by the Scribe were at my direction and personally dictated by me. I have reviewed the chart and agree that the record accurately reflects my personal performance of the history, physical exam, discussion and plan.   MAR Johnson

## 2024-01-30 NOTE — ASSESSMENT & PLAN NOTE
I performed subcutaneous tissue debridement with a total area of 63 cm2 with curette to remove viable and non-viable tissue/material including subcutaneous tissue, slough, biofilm, and fibrin/exudate after achieving pain control with 2% lidocaine topical gel.  A minimal amount of bleeding was controlled with pressure. The procedure was tolerated well with a pain level of 0 throughout and a pain level of 0 following the procedure.  Post-debridement measurements unchanged. Character of wound/ulcer post-debridement is needs further debridement.     TX: Irrigate with normal saline, pat dry, apply antony blue, and cover with silicone border foam dressing daily and as needed     Turn every 2 hours  Prevalon boots  Pressure reducing mattress, patient refusing air mattress, discussed benefits/risks  Cushion to chair  Dietitian consult  Supplements per dietitian  Grab bars to bed to assist with bed mobility and repositioning  Weekly skin checks  House barrier cream to buttocks

## 2024-01-30 NOTE — ASSESSMENT & PLAN NOTE
TX: Irrigate with normal saline, pat dry, paint with Betadine, apply ABD and Kerlix qd     Turn every 2 hours  Prevalon boots  Pressure reducing mattress, patient refusing air mattress, discussed benefits/risks  Cushion to chair  Dietitian consult  Supplements per dietitian  Grab bars to bed to assist with bed mobility and repositioning  Weekly skin checks  House barrier cream to buttocks

## 2024-01-30 NOTE — ASSESSMENT & PLAN NOTE
Increased wound pain  Obtain xray   Culture  BMP, CBC  Monitor  Continue wound care  Pt refusing air mattress, maintain pressure reducing mattress

## 2024-01-30 NOTE — PROGRESS NOTES
PROGRESS NOTE    Subjective   Chief complaint: Melody Martin is a 88 y.o. female who is an acute skilled patient being seen and evaluated for weakness and fu wounds    HPI:  Patient presents for f/u therapy and general medical care.  Patient seen and examined at beside.  Therapy has been working with the patient to improve strength, endurance, ADLs, and transfers d/t generalized weakness. Patient completing multiple therapeutic exercises to increase strength, mobility and flexibility. Follow up on wounds. Patient is reconsidering air mattress. No other concerns.       Objective   Vital signs: 178/50,60,96%,     Physical Exam  Constitutional:       General: She is not in acute distress.  Eyes:      Extraocular Movements: Extraocular movements intact.   Cardiovascular:      Rate and Rhythm: Normal rate and regular rhythm.   Pulmonary:      Effort: Pulmonary effort is normal.      Breath sounds: Normal breath sounds.   Abdominal:      General: Bowel sounds are normal.      Palpations: Abdomen is soft.   Musculoskeletal:      Cervical back: Neck supple.      Right lower leg: Edema present.      Left lower leg: Edema present.   Skin:     Comments: Wound location - Sacrum  Etiology - Unstageable pressure ulcer  Granulation - M  Slough - M  Edges - Flat  Odor - None  Drainage - M serosang  Size - 7 X 9 X UTD cm  Undermining - None    Wound location - Left heel  Etiology - Unstageable pressure ulcer  Large stable eschar   Size - 3 X 3 X UTD cm  Undermining - None   Neurological:      Mental Status: She is alert.      Motor: Weakness present.   Psychiatric:         Mood and Affect: Mood normal.         Behavior: Behavior is cooperative.         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 (CMS/HCC)     On HD per nephrology  Antihypertensives  Renal  diet  Monitor BP  Remove extra fluid in dialysis           Pressure ulcer of left heel, unstageable (CMS/HCC)     TX: Irrigate with normal saline, pat dry, paint with Betadine, apply ABD and Kerlix qd     Turn every 2 hours  Prevalon boots  Pressure reducing mattress, patient refusing air mattress, discussed benefits/risks  Cushion to chair  Dietitian consult  Supplements per dietitian  Grab bars to bed to assist with bed mobility and repositioning  Weekly skin checks  House barrier cream to buttocks            Pressure ulcer of sacral region, unstageable (CMS/HCC)     I performed subcutaneous tissue debridement with a total area of 63 cm2 with curette to remove viable and non-viable tissue/material including subcutaneous tissue, slough, biofilm, and fibrin/exudate after achieving pain control with 2% lidocaine topical gel.  A minimal amount of bleeding was controlled with pressure. The procedure was tolerated well with a pain level of 0 throughout and a pain level of 0 following the procedure.  Post-debridement measurements unchanged. Character of wound/ulcer post-debridement is needs further debridement.     TX: Irrigate with normal saline, pat dry, apply antony blue, and cover with silicone border foam dressing daily and as needed     Turn every 2 hours  Prevalon boots  Pressure reducing mattress, patient refusing air mattress, discussed benefits/risks  Cushion to chair  Dietitian consult  Supplements per dietitian  Grab bars to bed to assist with bed mobility and repositioning  Weekly skin checks  House barrier cream to buttocks            Type 2 diabetes mellitus with chronic kidney disease on chronic dialysis, with long-term current use of insulin (CMS/HCC)     Carb controlled diet  Monitor Glucoscan  Glargine  Humalog           Weakness - Primary     Continue to work in therapy          Medications, treatments, and labs reviewed  Continue medications and treatments as listed in EMR    Scribe Attestation  I,  Carl Anniboseas   attest that this documentation has been prepared under the direction and in the presence of MAR Johnson.     Provider Attestation - Scribe documentation  All medical record entries made by the Scribe were at my direction and personally dictated by me. I have reviewed the chart and agree that the record accurately reflects my personal performance of the history, physical exam, discussion and plan.   MAR Johnson

## 2024-01-31 ENCOUNTER — NURSING HOME VISIT (OUTPATIENT)
Dept: POST ACUTE CARE | Facility: EXTERNAL LOCATION | Age: 89
End: 2024-01-31
Payer: COMMERCIAL

## 2024-01-31 DIAGNOSIS — Z99.2 HYPERTENSIVE HEART AND KIDNEY DISEASE WITH CHRONIC DIASTOLIC CONGESTIVE HEART FAILURE AND STAGE 5 CHRONIC KIDNEY DISEASE ON CHRONIC DIALYSIS (MULTI): ICD-10-CM

## 2024-01-31 DIAGNOSIS — Z99.2 TYPE 2 DIABETES MELLITUS WITH CHRONIC KIDNEY DISEASE ON CHRONIC DIALYSIS, WITH LONG-TERM CURRENT USE OF INSULIN (MULTI): ICD-10-CM

## 2024-01-31 DIAGNOSIS — N18.6 HYPERTENSIVE HEART AND KIDNEY DISEASE WITH CHRONIC DIASTOLIC CONGESTIVE HEART FAILURE AND STAGE 5 CHRONIC KIDNEY DISEASE ON CHRONIC DIALYSIS (MULTI): ICD-10-CM

## 2024-01-31 DIAGNOSIS — I48.91 ATRIAL FIBRILLATION, UNSPECIFIED TYPE (MULTI): ICD-10-CM

## 2024-01-31 DIAGNOSIS — I50.32 HYPERTENSIVE HEART AND KIDNEY DISEASE WITH CHRONIC DIASTOLIC CONGESTIVE HEART FAILURE AND STAGE 5 CHRONIC KIDNEY DISEASE ON CHRONIC DIALYSIS (MULTI): ICD-10-CM

## 2024-01-31 DIAGNOSIS — N18.6 TYPE 2 DIABETES MELLITUS WITH CHRONIC KIDNEY DISEASE ON CHRONIC DIALYSIS, WITH LONG-TERM CURRENT USE OF INSULIN (MULTI): ICD-10-CM

## 2024-01-31 DIAGNOSIS — R53.1 WEAKNESS: Primary | ICD-10-CM

## 2024-01-31 DIAGNOSIS — Z79.4 TYPE 2 DIABETES MELLITUS WITH CHRONIC KIDNEY DISEASE ON CHRONIC DIALYSIS, WITH LONG-TERM CURRENT USE OF INSULIN (MULTI): ICD-10-CM

## 2024-01-31 DIAGNOSIS — E11.22 TYPE 2 DIABETES MELLITUS WITH CHRONIC KIDNEY DISEASE ON CHRONIC DIALYSIS, WITH LONG-TERM CURRENT USE OF INSULIN (MULTI): ICD-10-CM

## 2024-01-31 DIAGNOSIS — E87.6 HYPOKALEMIA: ICD-10-CM

## 2024-01-31 DIAGNOSIS — I13.2 HYPERTENSIVE HEART AND KIDNEY DISEASE WITH CHRONIC DIASTOLIC CONGESTIVE HEART FAILURE AND STAGE 5 CHRONIC KIDNEY DISEASE ON CHRONIC DIALYSIS (MULTI): ICD-10-CM

## 2024-01-31 PROCEDURE — 99309 SBSQ NF CARE MODERATE MDM 30: CPT | Performed by: INTERNAL MEDICINE

## 2024-01-31 NOTE — PROGRESS NOTES
PROGRESS NOTE    Subjective   Chief complaint: Melody Martin is a 88 y.o. female who is an acute skilled patient being seen and evaluated for weakness    HPI:  Nursing called 1\26 to report results of wound culture were positive. An order was given to start IV rocephin. Patient has agreed to air mattress which is intact. She continues working with therapy to improve strength and mobility. She is completing dynamic functional activities. ST is working with patient. SLP noted that pt required mild-moderate cues to increase accuracy to WFL. SLP noted that pt had the most difficulty with Valsva with inability to hold breath for more than 1/2 of one second. Denies n,v,f,c,pain.       Objective   Vital signs: 135\51,60,96%    Physical Exam  Constitutional:       General: She is not in acute distress.  Eyes:      Extraocular Movements: Extraocular movements intact.   Cardiovascular:      Rate and Rhythm: Normal rate and regular rhythm.   Pulmonary:      Effort: Pulmonary effort is normal.      Breath sounds: Normal breath sounds.   Abdominal:      General: Bowel sounds are normal.      Palpations: Abdomen is soft.   Musculoskeletal:      Cervical back: Neck supple.      Right lower leg: No edema.      Left lower leg: No edema.   Neurological:      Mental Status: She is alert.      Motor: Weakness present.   Psychiatric:         Mood and Affect: Mood normal.         Behavior: Behavior is cooperative.         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     Provider Attestation - Scribe documentation  All medical record entries made by the Scribe were at my direction and personally dictated by me. I have reviewed the chart and agree that the record accurately reflects my personal performance of the history, physical exam, discussion and plan.   MAR Johnson

## 2024-01-31 NOTE — ASSESSMENT & PLAN NOTE
On HD per nephrology  Antihypertensives  Renal diet  Monitor BP and weight  Remove extra fluid in dialysis  No sob  BP at goal

## 2024-01-31 NOTE — PROGRESS NOTES
PROGRESS NOTE    Subjective   Chief complaint: Melody Martin is a 88 y.o. female who is an acute skilled patient being seen and evaluated for weakness    HPI:  HPI  Patient presents for f/u therapy and general medical care.  Patient seen and examined at bedside.  Therapy has been working with the patient to improve strength, endurance, ADLs, and transfers d/t generalized weakness.  Also seeing patient in follow-up to potassium level, given 20 mEq x 3 and repeat level.  Patient also had increasing glargine to 10 units 2 times a day.  Patient tolerating.  Patient has no acute concerns today.    Objective   Vital signs: 152/79, 98.2, 7019, blood sugar 296, 95%    Physical Exam  Constitutional:       General: She is not in acute distress.  Eyes:      Extraocular Movements: Extraocular movements intact.   Cardiovascular:      Rate and Rhythm: Normal rate and regular rhythm.   Pulmonary:      Effort: Pulmonary effort is normal.      Breath sounds: Normal breath sounds.   Abdominal:      General: Bowel sounds are normal.      Palpations: Abdomen is soft.   Musculoskeletal:      Cervical back: Neck supple.      Right lower leg: No edema.      Left lower leg: No edema.   Skin:     Comments: Wound to sacrum and left heel   Neurological:      Mental Status: She is alert.      Motor: Weakness present.   Psychiatric:         Mood and Affect: Mood normal.         Behavior: Behavior is cooperative.         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 (CMS/HCC)     On HD per nephrology  Antihypertensives  Renal diet  Monitor BP  Remove extra fluid in dialysis          Weakness - Primary     Continue to work in therapy towards goals         Atrial fibrillation (CMS/[facility])     Monitor heart rate, controlled  Amiodarone  Apixaban  Bleeding precautions         Hypokalemia     Potassium given  Repeat K level          Medications, treatments, and labs reviewed  Continue medications and treatments as listed in EMR      Scribe Attestation  Hien MARRERO Scribe   attest that this documentation has been prepared under the direction and in the presence of Wilbert Lock MD    Provider Attestation - Scribe documentation  All medical record entries made by the Scribe were at my direction and personally dictated by me. I have reviewed the chart and agree that the record accurately reflects my personal performance of the history, physical exam, discussion and plan.   Wilbert Lock MD

## 2024-01-31 NOTE — LETTER
Patient: Melody Martin  : 1935    Encounter Date: 2024    PROGRESS NOTE    Subjective  Chief complaint: Melody Martin is a 88 y.o. female who is an acute skilled patient being seen and evaluated for weakness    HPI:  HPI  Patient presents for f/u therapy and general medical care.  Patient seen and examined at bedside.  Therapy has been working with the patient to improve strength, endurance, ADLs, and transfers d/t generalized weakness.  Also seeing patient in follow-up to potassium level, given 20 mEq x 3 and repeat level.  Patient also had increasing glargine to 10 units 2 times a day.  Patient tolerating.  Patient has no acute concerns today.    Objective  Vital signs: 152/79, 98.2, 7019, blood sugar 296, 95%    Physical Exam  Constitutional:       General: She is not in acute distress.  Eyes:      Extraocular Movements: Extraocular movements intact.   Cardiovascular:      Rate and Rhythm: Normal rate and regular rhythm.   Pulmonary:      Effort: Pulmonary effort is normal.      Breath sounds: Normal breath sounds.   Abdominal:      General: Bowel sounds are normal.      Palpations: Abdomen is soft.   Musculoskeletal:      Cervical back: Neck supple.      Right lower leg: No edema.      Left lower leg: No edema.   Skin:     Comments: Wound to sacrum and left heel   Neurological:      Mental Status: She is alert.      Motor: Weakness present.   Psychiatric:         Mood and Affect: Mood normal.         Behavior: Behavior is cooperative.         Assessment/Plan  Problem List Items Addressed This Visit       Type 2 diabetes mellitus with chronic kidney disease on chronic dialysis, with long-term current use of insulin (CMS/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 (CMS/HCC)     On HD per  nephrology  Antihypertensives  Renal diet  Monitor BP  Remove extra fluid in dialysis         Weakness - Primary     Continue to work in therapy towards goals         Atrial fibrillation (CMS/[facility])     Monitor heart rate, controlled  Amiodarone  Apixaban  Bleeding precautions         Hypokalemia     Potassium given  Repeat K level          Medications, treatments, and labs reviewed  Continue medications and treatments as listed in EMR      Scribe Attestation  Hien MARRERO Scribe   attest that this documentation has been prepared under the direction and in the presence of Wilbert Lock MD    Provider Attestation - Scribe documentation  All medical record entries made by the Scribe were at my direction and personally dictated by me. I have reviewed the chart and agree that the record accurately reflects my personal performance of the history, physical exam, discussion and plan.   Wilbert Lock MD        Electronically Signed By: Wilbert Lock MD   1/31/24  5:45 PM

## 2024-02-01 ENCOUNTER — NURSING HOME VISIT (OUTPATIENT)
Dept: POST ACUTE CARE | Facility: EXTERNAL LOCATION | Age: 89
End: 2024-02-01
Payer: COMMERCIAL

## 2024-02-01 DIAGNOSIS — I48.91 ATRIAL FIBRILLATION, UNSPECIFIED TYPE (MULTI): ICD-10-CM

## 2024-02-01 DIAGNOSIS — K59.00 CONSTIPATION, UNSPECIFIED CONSTIPATION TYPE: ICD-10-CM

## 2024-02-01 DIAGNOSIS — Z99.2 HYPERTENSIVE HEART AND KIDNEY DISEASE WITH CHRONIC DIASTOLIC CONGESTIVE HEART FAILURE AND STAGE 5 CHRONIC KIDNEY DISEASE ON CHRONIC DIALYSIS (MULTI): ICD-10-CM

## 2024-02-01 DIAGNOSIS — L89.620 PRESSURE ULCER OF LEFT HEEL, UNSTAGEABLE (MULTI): ICD-10-CM

## 2024-02-01 DIAGNOSIS — L89.150 PRESSURE ULCER OF SACRAL REGION, UNSTAGEABLE (MULTI): ICD-10-CM

## 2024-02-01 DIAGNOSIS — R53.1 WEAKNESS: Primary | ICD-10-CM

## 2024-02-01 DIAGNOSIS — I13.2 HYPERTENSIVE HEART AND KIDNEY DISEASE WITH CHRONIC DIASTOLIC CONGESTIVE HEART FAILURE AND STAGE 5 CHRONIC KIDNEY DISEASE ON CHRONIC DIALYSIS (MULTI): ICD-10-CM

## 2024-02-01 DIAGNOSIS — I50.32 HYPERTENSIVE HEART AND KIDNEY DISEASE WITH CHRONIC DIASTOLIC CONGESTIVE HEART FAILURE AND STAGE 5 CHRONIC KIDNEY DISEASE ON CHRONIC DIALYSIS (MULTI): ICD-10-CM

## 2024-02-01 DIAGNOSIS — N18.6 HYPERTENSIVE HEART AND KIDNEY DISEASE WITH CHRONIC DIASTOLIC CONGESTIVE HEART FAILURE AND STAGE 5 CHRONIC KIDNEY DISEASE ON CHRONIC DIALYSIS (MULTI): ICD-10-CM

## 2024-02-01 PROCEDURE — 11045 DBRDMT SUBQ TISS EACH ADDL: CPT | Performed by: NURSE PRACTITIONER

## 2024-02-01 PROCEDURE — 11042 DBRDMT SUBQ TIS 1ST 20SQCM/<: CPT | Performed by: NURSE PRACTITIONER

## 2024-02-01 PROCEDURE — 99309 SBSQ NF CARE MODERATE MDM 30: CPT | Performed by: NURSE PRACTITIONER

## 2024-02-01 NOTE — LETTER
Patient: Melody Martin  : 1935    Encounter Date: 2024    PROGRESS NOTE    Subjective  Chief complaint: Melody Martin is a 88 y.o. female who is an acute skilled patient being seen and evaluated for weakness    HPI:  HPI  Patient is working in therapy due to generalized weakness.  Therapy is working with patient on skilled interventions for bed mobility.  Patient is working on therapeutic exercises focused on ankle pumps, hip abduction, adduction and bed, supine to strengthen and enhance muscles.  Patient was seen and examined at bedside.  Patient is seen also for complaints of constipation.  Denies abdominal pain.  Denies nausea or vomiting.  Patient did have a small BM today.  Patient also seen for follow-up of sacral wound and left heel wound.  Patient continues on antibiotic, tolerating without adverse effects.    Objective  Vital signs: 189/54, 97.9, 68, 18, blood sugar 385, 97%    Physical Exam  Constitutional:       General: She is not in acute distress.  Eyes:      Extraocular Movements: Extraocular movements intact.   Cardiovascular:      Rate and Rhythm: Normal rate and regular rhythm.   Pulmonary:      Effort: Pulmonary effort is normal.      Breath sounds: Normal breath sounds.   Abdominal:      General: Bowel sounds are normal.      Palpations: Abdomen is soft.   Musculoskeletal:      Cervical back: Neck supple.      Right lower leg: No edema.      Left lower leg: No edema.   Skin:     Comments: Wound location -sacrum  Etiology - unstageable pressure ulcer  Granulation - Small  Slough - Large  Edges - flat  Odor - none  Drainage - medium serosanguineous  Size - 8 x 9 by UTD centimeters  Undermining -none    Wound location -left heel  Etiology - unstageable pressure ulcer  Large stable eschar  Edges - flat  Odor - none  Drainage - none  Size - 3 x 3 by UTD centimeters   Neurological:      Mental Status: She is alert.      Motor: Weakness present.   Psychiatric:         Mood and Affect:  Mood normal.         Behavior: Behavior is cooperative.         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 (CMS/[facility])     On HD per nephrology  Antihypertensives  Renal diet  Monitor BP  Remove extra fluid in dialysis         Pressure ulcer of left heel, unstageable (CMS/[facility])     TX: Irrigate with normal saline, pat dry, paint with Betadine, apply ABD and Kerlix qd     Turn every 2 hours  Prevalon boots  Pressure reducing mattress, patient refusing air mattress, discussed benefits/risks  Cushion to chair  Dietitian consult  Supplements per dietitian  Grab bars to bed to assist with bed mobility and repositioning  Weekly skin checks  House barrier cream to buttocks         Pressure ulcer of sacral region, unstageable (CMS/[facility])     I performed subcutaneous tissue debridement with a total area of 72 cm2 with curette to remove viable and non-viable tissue/material including subcutaneous tissue, slough, biofilm, and fibrin/exudate after achieving pain control with 2% lidocaine topical gel.  A minimal amount of bleeding was controlled with pressure. The procedure was tolerated well with a pain level of 0 throughout and a pain level of 0 following the procedure.  Post-debridement measurements unchanged. Character of wound/ulcer post-debridement is improved.     TX: Irrigate with normal saline, pat dry, apply santyl, pack with Ca Alg, and cover with silicone border foam dressing daily and as needed     Turn every 2 hours  Prevalon boots  Air mattress  Cushion to chair  Dietitian following  Supplements per dietitian  Grab bars to bed to assist with bed mobility and repositioning  Weekly skin checks  House  barrier cream to buttocks         Weakness - Primary     Continue working toward established goals in therapy          Medications, treatments, and labs reviewed  Continue medications and treatments as listed in EMR      Scribe Attestation  I, Phoebe Martinez   attest that this documentation has been prepared under the direction and in the presence of MAR Johnson    Provider Attestation - Scribe documentation  All medical record entries made by the Scribe were at my direction and personally dictated by me. I have reviewed the chart and agree that the record accurately reflects my personal performance of the history, physical exam, discussion and plan.   MAR Johnson        Electronically Signed By: MAR Johnson   2/9/24 12:23 PM

## 2024-02-01 NOTE — PROGRESS NOTES
PROGRESS NOTE    Subjective   Chief complaint: Melody Martin is a 88 y.o. female who is an acute skilled patient being seen and evaluated for weakness    HPI:  HPI  Patient is working in therapy due to generalized weakness.  Therapy is working with patient on skilled interventions for bed mobility.  Patient is working on therapeutic exercises focused on ankle pumps, hip abduction, adduction and bed, supine to strengthen and enhance muscles.  Patient was seen and examined at bedside.  Patient is seen also for complaints of constipation.  Denies abdominal pain.  Denies nausea or vomiting.  Patient did have a small BM today.  Patient also seen for follow-up of sacral wound and left heel wound.  Patient continues on antibiotic, tolerating without adverse effects.    Objective   Vital signs: 189/54, 97.9, 68, 18, blood sugar 385, 97%    Physical Exam  Constitutional:       General: She is not in acute distress.  Eyes:      Extraocular Movements: Extraocular movements intact.   Cardiovascular:      Rate and Rhythm: Normal rate and regular rhythm.   Pulmonary:      Effort: Pulmonary effort is normal.      Breath sounds: Normal breath sounds.   Abdominal:      General: Bowel sounds are normal.      Palpations: Abdomen is soft.   Musculoskeletal:      Cervical back: Neck supple.      Right lower leg: No edema.      Left lower leg: No edema.   Skin:     Comments: Wound location -sacrum  Etiology - unstageable pressure ulcer  Granulation - Small  Slough - Large  Edges - flat  Odor - none  Drainage - medium serosanguineous  Size - 8 x 9 by UTD centimeters  Undermining -none    Wound location -left heel  Etiology - unstageable pressure ulcer  Large stable eschar  Edges - flat  Odor - none  Drainage - none  Size - 3 x 3 by UTD centimeters   Neurological:      Mental Status: She is alert.      Motor: Weakness present.   Psychiatric:         Mood and Affect: Mood normal.         Behavior: Behavior is cooperative.          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 (CMS/[facility])     On HD per nephrology  Antihypertensives  Renal diet  Monitor BP  Remove extra fluid in dialysis         Pressure ulcer of left heel, unstageable (CMS/[facility])     TX: Irrigate with normal saline, pat dry, paint with Betadine, apply ABD and Kerlix qd     Turn every 2 hours  Prevalon boots  Pressure reducing mattress, patient refusing air mattress, discussed benefits/risks  Cushion to chair  Dietitian consult  Supplements per dietitian  Grab bars to bed to assist with bed mobility and repositioning  Weekly skin checks  House barrier cream to buttocks         Pressure ulcer of sacral region, unstageable (CMS/[facility])     I performed subcutaneous tissue debridement with a total area of 72 cm2 with curette to remove viable and non-viable tissue/material including subcutaneous tissue, slough, biofilm, and fibrin/exudate after achieving pain control with 2% lidocaine topical gel.  A minimal amount of bleeding was controlled with pressure. The procedure was tolerated well with a pain level of 0 throughout and a pain level of 0 following the procedure.  Post-debridement measurements unchanged. Character of wound/ulcer post-debridement is improved.     TX: Irrigate with normal saline, pat dry, apply santyl, pack with Ca Alg, and cover with silicone border foam dressing daily and as needed     Turn every 2 hours  Prevalon boots  Air mattress  Cushion to chair  Dietitian following  Supplements per dietitian  Grab bars to bed to assist with bed mobility and repositioning  Weekly skin checks  House barrier cream to buttocks         Weakness - Primary      Continue working toward established goals in therapy          Medications, treatments, and labs reviewed  Continue medications and treatments as listed in EMR      Scribe Attestation  I, Phoebe Martinez   attest that this documentation has been prepared under the direction and in the presence of MAR Johnson    Provider Attestation - Scribe documentation  All medical record entries made by the Scribe were at my direction and personally dictated by me. I have reviewed the chart and agree that the record accurately reflects my personal performance of the history, physical exam, discussion and plan.   MAR Johnson

## 2024-02-01 NOTE — ASSESSMENT & PLAN NOTE
I performed subcutaneous tissue debridement with a total area of 72 cm2 with curette to remove viable and non-viable tissue/material including subcutaneous tissue, slough, biofilm, and fibrin/exudate after achieving pain control with 2% lidocaine topical gel.  A minimal amount of bleeding was controlled with pressure. The procedure was tolerated well with a pain level of 0 throughout and a pain level of 0 following the procedure.  Post-debridement measurements unchanged. Character of wound/ulcer post-debridement is improved.     TX: Irrigate with normal saline, pat dry, apply santyl, pack with Ca Alg, and cover with silicone border foam dressing daily and as needed     Turn every 2 hours  Prevalon boots  Air mattress  Cushion to chair  Dietitian following  Supplements per dietitian  Grab bars to bed to assist with bed mobility and repositioning  Weekly skin checks  House barrier cream to buttocks

## 2024-02-02 ENCOUNTER — NURSING HOME VISIT (OUTPATIENT)
Dept: POST ACUTE CARE | Facility: EXTERNAL LOCATION | Age: 89
End: 2024-02-02
Payer: COMMERCIAL

## 2024-02-02 DIAGNOSIS — R53.1 WEAKNESS: Primary | ICD-10-CM

## 2024-02-02 DIAGNOSIS — N18.6 TYPE 2 DIABETES MELLITUS WITH CHRONIC KIDNEY DISEASE ON CHRONIC DIALYSIS, WITH LONG-TERM CURRENT USE OF INSULIN (MULTI): ICD-10-CM

## 2024-02-02 DIAGNOSIS — Z99.2 HYPERTENSIVE HEART AND KIDNEY DISEASE WITH CHRONIC DIASTOLIC CONGESTIVE HEART FAILURE AND STAGE 5 CHRONIC KIDNEY DISEASE ON CHRONIC DIALYSIS (MULTI): ICD-10-CM

## 2024-02-02 DIAGNOSIS — E11.22 TYPE 2 DIABETES MELLITUS WITH CHRONIC KIDNEY DISEASE ON CHRONIC DIALYSIS, WITH LONG-TERM CURRENT USE OF INSULIN (MULTI): ICD-10-CM

## 2024-02-02 DIAGNOSIS — I13.2 HYPERTENSIVE HEART AND KIDNEY DISEASE WITH CHRONIC DIASTOLIC CONGESTIVE HEART FAILURE AND STAGE 5 CHRONIC KIDNEY DISEASE ON CHRONIC DIALYSIS (MULTI): ICD-10-CM

## 2024-02-02 DIAGNOSIS — R13.10 DYSPHAGIA, UNSPECIFIED TYPE: ICD-10-CM

## 2024-02-02 DIAGNOSIS — Z79.4 TYPE 2 DIABETES MELLITUS WITH CHRONIC KIDNEY DISEASE ON CHRONIC DIALYSIS, WITH LONG-TERM CURRENT USE OF INSULIN (MULTI): ICD-10-CM

## 2024-02-02 DIAGNOSIS — Z99.2 TYPE 2 DIABETES MELLITUS WITH CHRONIC KIDNEY DISEASE ON CHRONIC DIALYSIS, WITH LONG-TERM CURRENT USE OF INSULIN (MULTI): ICD-10-CM

## 2024-02-02 DIAGNOSIS — I48.91 ATRIAL FIBRILLATION, UNSPECIFIED TYPE (MULTI): ICD-10-CM

## 2024-02-02 DIAGNOSIS — I50.32 HYPERTENSIVE HEART AND KIDNEY DISEASE WITH CHRONIC DIASTOLIC CONGESTIVE HEART FAILURE AND STAGE 5 CHRONIC KIDNEY DISEASE ON CHRONIC DIALYSIS (MULTI): ICD-10-CM

## 2024-02-02 DIAGNOSIS — N18.6 HYPERTENSIVE HEART AND KIDNEY DISEASE WITH CHRONIC DIASTOLIC CONGESTIVE HEART FAILURE AND STAGE 5 CHRONIC KIDNEY DISEASE ON CHRONIC DIALYSIS (MULTI): ICD-10-CM

## 2024-02-02 PROCEDURE — 99309 SBSQ NF CARE MODERATE MDM 30: CPT | Performed by: INTERNAL MEDICINE

## 2024-02-02 NOTE — LETTER
Patient: Melody Martin  : 1935    Encounter Date: 2024    PROGRESS NOTE    Subjective  Chief complaint: Melody Martin is a 88 y.o. female who is an acute skilled patient being seen and evaluated for weakness    HPI:  HPI  Patient is skilled for therapy due to weakness after recent hospitalization.  Therapy is working with patient on close kinetic chain activities to increase functional skills.  Continues to work towards goals.  Patient has no complaints at this time.  Nursing reported patient was having blood sugars over 500.  Orders placed to increase insulin glargine to 16 units and insulin lispro to 15 units.  Patient is to continue sliding scale insulin.  Denies chest pain or shortness of breath.  Objective  Vital signs: 189/54, 97.9, 68, 18, 97%    Physical Exam  Constitutional:       General: She is not in acute distress.  Eyes:      Extraocular Movements: Extraocular movements intact.   Cardiovascular:      Rate and Rhythm: Normal rate and regular rhythm.   Pulmonary:      Effort: Pulmonary effort is normal.      Breath sounds: Normal breath sounds.   Abdominal:      General: Bowel sounds are normal.      Palpations: Abdomen is soft.   Musculoskeletal:      Cervical back: Neck supple.      Right lower leg: No edema.      Left lower leg: No edema.   Neurological:      Mental Status: She is alert.   Psychiatric:         Mood and Affect: Mood normal.         Behavior: Behavior is cooperative.         Assessment/Plan  Problem List Items Addressed This Visit       Type 2 diabetes mellitus with chronic kidney disease on chronic dialysis, with long-term current use of insulin (CMS/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)      On HD per nephrology  Antihypertensives  Renal diet  Monitor BP  Remove extra fluid in dialysis         Weakness - Primary     Continue working toward established goals in therapy         Atrial fibrillation (CMS/[facility])     Monitor heart rate, controlled  Amiodarone  Apixaban  Bleeding precautions         Dysphagia     Speech therapy  Altered diet          Medications, treatments, and labs reviewed  Continue medications and treatments as listed in EMR      Scribe Attestation  Hien MARRERO Scribe   attest that this documentation has been prepared under the direction and in the presence of Wilbert Lock MD    Provider Attestation - Scribe documentation  All medical record entries made by the Scribe were at my direction and personally dictated by me. I have reviewed the chart and agree that the record accurately reflects my personal performance of the history, physical exam, discussion and plan.   Wilbert Lock MD        Electronically Signed By: Wilbert Lock MD   2/2/24  2:33 PM

## 2024-02-02 NOTE — ASSESSMENT & PLAN NOTE
Carb controlled diet  Monitor Glucoscan  Glargine insulin increased to 16 units   Humalog insulin increased to 15 units  Continue sliding scale insulin

## 2024-02-02 NOTE — PROGRESS NOTES
PROGRESS NOTE    Subjective   Chief complaint: Melody Martin is a 88 y.o. female who is an acute skilled patient being seen and evaluated for weakness    HPI:  HPI  Patient is skilled for therapy due to weakness after recent hospitalization.  Therapy is working with patient on close kinetic chain activities to increase functional skills.  Continues to work towards goals.  Patient has no complaints at this time.  Nursing reported patient was having blood sugars over 500.  Orders placed to increase insulin glargine to 16 units and insulin lispro to 15 units.  Patient is to continue sliding scale insulin.  Denies chest pain or shortness of breath.  Objective   Vital signs: 189/54, 97.9, 68, 18, 97%    Physical Exam  Constitutional:       General: She is not in acute distress.  Eyes:      Extraocular Movements: Extraocular movements intact.   Cardiovascular:      Rate and Rhythm: Normal rate and regular rhythm.   Pulmonary:      Effort: Pulmonary effort is normal.      Breath sounds: Normal breath sounds.   Abdominal:      General: Bowel sounds are normal.      Palpations: Abdomen is soft.   Musculoskeletal:      Cervical back: Neck supple.      Right lower leg: No edema.      Left lower leg: No edema.   Neurological:      Mental Status: She is alert.   Psychiatric:         Mood and Affect: Mood normal.         Behavior: Behavior is cooperative.         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 (CMS/[facility])     On HD per nephrology  Antihypertensives  Renal diet  Monitor BP  Remove  extra fluid in dialysis         Weakness - Primary     Continue working toward established goals in therapy         Atrial fibrillation (CMS/[facility])     Monitor heart rate, controlled  Amiodarone  Apixaban  Bleeding precautions         Dysphagia     Speech therapy  Altered diet          Medications, treatments, and labs reviewed  Continue medications and treatments as listed in EMR      Scribe Attestation  Hien MARRERO Scribe   attest that this documentation has been prepared under the direction and in the presence of Wilbert Lock MD    Provider Attestation - Scribe documentation  All medical record entries made by the Scribe were at my direction and personally dictated by me. I have reviewed the chart and agree that the record accurately reflects my personal performance of the history, physical exam, discussion and plan.   Wilbert Lock MD

## 2024-02-05 ENCOUNTER — NURSING HOME VISIT (OUTPATIENT)
Dept: POST ACUTE CARE | Facility: EXTERNAL LOCATION | Age: 89
End: 2024-02-05
Payer: COMMERCIAL

## 2024-02-05 DIAGNOSIS — R13.10 DYSPHAGIA, UNSPECIFIED TYPE: ICD-10-CM

## 2024-02-05 DIAGNOSIS — N18.6 HYPERTENSIVE HEART AND KIDNEY DISEASE WITH CHRONIC DIASTOLIC CONGESTIVE HEART FAILURE AND STAGE 5 CHRONIC KIDNEY DISEASE ON CHRONIC DIALYSIS (MULTI): ICD-10-CM

## 2024-02-05 DIAGNOSIS — Z86.73 HISTORY OF CVA (CEREBROVASCULAR ACCIDENT): ICD-10-CM

## 2024-02-05 DIAGNOSIS — R53.1 WEAKNESS: Primary | ICD-10-CM

## 2024-02-05 DIAGNOSIS — Z99.2 HYPERTENSIVE HEART AND KIDNEY DISEASE WITH CHRONIC DIASTOLIC CONGESTIVE HEART FAILURE AND STAGE 5 CHRONIC KIDNEY DISEASE ON CHRONIC DIALYSIS (MULTI): ICD-10-CM

## 2024-02-05 DIAGNOSIS — I50.32 HYPERTENSIVE HEART AND KIDNEY DISEASE WITH CHRONIC DIASTOLIC CONGESTIVE HEART FAILURE AND STAGE 5 CHRONIC KIDNEY DISEASE ON CHRONIC DIALYSIS (MULTI): ICD-10-CM

## 2024-02-05 DIAGNOSIS — I13.2 HYPERTENSIVE HEART AND KIDNEY DISEASE WITH CHRONIC DIASTOLIC CONGESTIVE HEART FAILURE AND STAGE 5 CHRONIC KIDNEY DISEASE ON CHRONIC DIALYSIS (MULTI): ICD-10-CM

## 2024-02-05 DIAGNOSIS — F41.9 ANXIETY: ICD-10-CM

## 2024-02-05 PROCEDURE — 99309 SBSQ NF CARE MODERATE MDM 30: CPT | Performed by: NURSE PRACTITIONER

## 2024-02-05 NOTE — PROGRESS NOTES
PROGRESS NOTE    Subjective   Chief complaint: Melody Martin is a 88 y.o. female who is an acute skilled patient being seen and evaluated for weakness    HPI:  HPI  Patient continues to work in therapy due to generalized weakness.  Therapy is working with patient on bilateral upper extremity strengthening and bed mobility.  Patient does require max assist for bed mobility.  Patient is also seen in follow-up for diabetes, glargine insulin was increased on 1/24.  Reported that blood sugars are still high.  Patient seen and examined at bedside, appears to be in no apparent distress.  Denies polydipsia, polyuria or polyphagia.    Objective   Vital signs: 156/65, 98.3, 84, 18, blood sugar 365    Physical Exam  Constitutional:       General: She is not in acute distress.  Eyes:      Extraocular Movements: Extraocular movements intact.   Cardiovascular:      Rate and Rhythm: Normal rate and regular rhythm.   Pulmonary:      Effort: Pulmonary effort is normal.      Breath sounds: Normal breath sounds.   Musculoskeletal:      Cervical back: Neck supple.      Right lower leg: No edema.      Left lower leg: No edema.   Neurological:      Mental Status: She is alert.      Motor: Weakness present.   Psychiatric:         Mood and Affect: Mood normal.         Behavior: Behavior is cooperative.         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 I have reviewed the chart and agree that the record accurately reflects my personal performance of the history, physical exam, discussion and plan.   MAR Johnson

## 2024-02-05 NOTE — ASSESSMENT & PLAN NOTE
Carb controlled diet  Monitor Glucoscan  Change glargine to 10 units twice daily  Continue sliding scale insulin  Humalog insulin

## 2024-02-05 NOTE — LETTER
Patient: Melody Martin  : 1935    Encounter Date: 2024    PROGRESS NOTE    Subjective  Chief complaint: Melody Martin is a 88 y.o. female who is an acute skilled patient being seen and evaluated for weakness    HPI:  HPI  Patient is working in therapy due to generalized weakness.  Therapy is working with patient on therapeutic exercise and activities, neuromuscular reeducation, transfers, bed mobility, ADLs and self-care.  Speech therapy also continues to work and patient for dysphagia.  Staff is reporting patient's daughter  unexpectedly.  Patient is anxious at baseline and family to break the news to her later today.  Patient seen and examined at bedside, appears to be in no apparent distress.  Denies chest pain or shortness of breath.    Objective  Vital signs: 163/57, 97.8, 63, 18, blood sugar 219    Physical Exam  Constitutional:       General: She is not in acute distress.  Eyes:      Extraocular Movements: Extraocular movements intact.   Cardiovascular:      Rate and Rhythm: Normal rate and regular rhythm.   Pulmonary:      Effort: Pulmonary effort is normal.      Breath sounds: Normal breath sounds.   Abdominal:      General: Bowel sounds are normal.      Palpations: Abdomen is soft.   Musculoskeletal:      Cervical back: Neck supple.      Right lower leg: No edema.      Left lower leg: No edema.   Neurological:      Mental Status: She is alert.      Motor: Weakness present.   Psychiatric:         Mood and Affect: Mood normal.         Behavior: Behavior is cooperative.         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 (CMS/[facility])     On HD per nephrology  Antihypertensives  Renal diet  Monitor BP  Remove extra fluid in dialysis          Weakness - Primary     Continue working in therapy         Dysphagia     Speech therapy  Altered diet         Anxiety     Start BuSpar          Medications, treatments, and labs reviewed  Continue medications and treatments as listed in EMR      Scribe Attestation  IHien Scribe   attest that this documentation has been prepared under the direction and in the presence of MAR Johnson    Provider Attestation - Scribe documentation  All medical record entries made by the Scribe were at my direction and personally dictated by me. I have reviewed the chart and agree that the record accurately reflects my personal performance of the history, physical exam, discussion and plan.   MAR Johnson        Electronically Signed By: MAR Johnson   2/14/24 12:03 PM

## 2024-02-06 ENCOUNTER — NURSING HOME VISIT (OUTPATIENT)
Dept: POST ACUTE CARE | Facility: EXTERNAL LOCATION | Age: 89
End: 2024-02-06
Payer: COMMERCIAL

## 2024-02-06 DIAGNOSIS — Z86.73 HISTORY OF CVA (CEREBROVASCULAR ACCIDENT): ICD-10-CM

## 2024-02-06 DIAGNOSIS — I13.2 HYPERTENSIVE HEART AND KIDNEY DISEASE WITH CHRONIC DIASTOLIC CONGESTIVE HEART FAILURE AND STAGE 5 CHRONIC KIDNEY DISEASE ON CHRONIC DIALYSIS (MULTI): ICD-10-CM

## 2024-02-06 DIAGNOSIS — I50.32 HYPERTENSIVE HEART AND KIDNEY DISEASE WITH CHRONIC DIASTOLIC CONGESTIVE HEART FAILURE AND STAGE 5 CHRONIC KIDNEY DISEASE ON CHRONIC DIALYSIS (MULTI): ICD-10-CM

## 2024-02-06 DIAGNOSIS — Z99.2 HYPERTENSIVE HEART AND KIDNEY DISEASE WITH CHRONIC DIASTOLIC CONGESTIVE HEART FAILURE AND STAGE 5 CHRONIC KIDNEY DISEASE ON CHRONIC DIALYSIS (MULTI): ICD-10-CM

## 2024-02-06 DIAGNOSIS — R13.10 DYSPHAGIA, UNSPECIFIED TYPE: ICD-10-CM

## 2024-02-06 DIAGNOSIS — R53.1 WEAKNESS: Primary | ICD-10-CM

## 2024-02-06 DIAGNOSIS — N18.6 HYPERTENSIVE HEART AND KIDNEY DISEASE WITH CHRONIC DIASTOLIC CONGESTIVE HEART FAILURE AND STAGE 5 CHRONIC KIDNEY DISEASE ON CHRONIC DIALYSIS (MULTI): ICD-10-CM

## 2024-02-06 DIAGNOSIS — I48.91 ATRIAL FIBRILLATION, UNSPECIFIED TYPE (MULTI): ICD-10-CM

## 2024-02-06 PROCEDURE — 99309 SBSQ NF CARE MODERATE MDM 30: CPT | Performed by: NURSE PRACTITIONER

## 2024-02-06 NOTE — LETTER
Patient: Melody Martin  : 1935    Encounter Date: 2024    PROGRESS NOTE    Subjective  Chief complaint: Melody Martin is a 88 y.o. female who is an acute skilled patient being seen and evaluated for weakness    HPI:  HPI  Therapy continue to work with patient.  Therapy is addressing weakness and difficulty in walking.  Therapy is focusing on treatment including therapeutic exercise and activities, gait training, self-care management, and ADLs.  Speech therapy also working with patient to address dysphagia.  Patient seen and examined at bedside, appears to be in no apparent distress.  Denies chest pain or shortness of breath.  Afebrile.    Objective  Vital signs: 134/58, 97.8, 63, 18, blood sugar 113, 96%    Physical Exam  Constitutional:       General: She is not in acute distress.  Eyes:      Extraocular Movements: Extraocular movements intact.   Cardiovascular:      Rate and Rhythm: Normal rate and regular rhythm.   Pulmonary:      Effort: Pulmonary effort is normal.      Breath sounds: Normal breath sounds.   Abdominal:      General: Bowel sounds are normal.      Palpations: Abdomen is soft.   Musculoskeletal:      Cervical back: Neck supple.      Right lower leg: No edema.      Left lower leg: No edema.   Neurological:      Mental Status: She is alert.      Motor: Weakness present.   Psychiatric:         Mood and Affect: Mood normal.         Behavior: Behavior is cooperative.         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  Blood pressure at goal  On HD per nephrology  Antihypertensives  Renal diet  Monitor BP  Remove extra fluid in dialysis         Weakness - Primary     Continue to work in therapy towards established goals          Medications, treatments, and labs reviewed  Continue medications and treatments as listed in EMR      Scribe Attestation  IHien Scribe   attest that this documentation has been prepared under the direction and in the presence of MAR Johnson    Provider Attestation - Scribe documentation  All medical record entries made by the Scribe were at my direction and personally dictated by me. I have reviewed the chart and agree that the record accurately reflects my personal performance of the history, physical exam, discussion and plan.   MAR Johnson        Electronically Signed By: MAR Johnson   2/14/24 12:26 PM

## 2024-02-07 ENCOUNTER — NURSING HOME VISIT (OUTPATIENT)
Dept: POST ACUTE CARE | Facility: EXTERNAL LOCATION | Age: 89
End: 2024-02-07
Payer: COMMERCIAL

## 2024-02-07 DIAGNOSIS — N18.6 HYPERTENSIVE HEART AND KIDNEY DISEASE WITH CHRONIC DIASTOLIC CONGESTIVE HEART FAILURE AND STAGE 5 CHRONIC KIDNEY DISEASE ON CHRONIC DIALYSIS (MULTI): ICD-10-CM

## 2024-02-07 DIAGNOSIS — F41.9 ANXIETY: ICD-10-CM

## 2024-02-07 DIAGNOSIS — Z86.73 HISTORY OF CVA (CEREBROVASCULAR ACCIDENT): ICD-10-CM

## 2024-02-07 DIAGNOSIS — Z99.2 HYPERTENSIVE HEART AND KIDNEY DISEASE WITH CHRONIC DIASTOLIC CONGESTIVE HEART FAILURE AND STAGE 5 CHRONIC KIDNEY DISEASE ON CHRONIC DIALYSIS (MULTI): ICD-10-CM

## 2024-02-07 DIAGNOSIS — I48.91 ATRIAL FIBRILLATION, UNSPECIFIED TYPE (MULTI): ICD-10-CM

## 2024-02-07 DIAGNOSIS — R53.1 WEAKNESS: Primary | ICD-10-CM

## 2024-02-07 DIAGNOSIS — I13.2 HYPERTENSIVE HEART AND KIDNEY DISEASE WITH CHRONIC DIASTOLIC CONGESTIVE HEART FAILURE AND STAGE 5 CHRONIC KIDNEY DISEASE ON CHRONIC DIALYSIS (MULTI): ICD-10-CM

## 2024-02-07 DIAGNOSIS — I50.32 HYPERTENSIVE HEART AND KIDNEY DISEASE WITH CHRONIC DIASTOLIC CONGESTIVE HEART FAILURE AND STAGE 5 CHRONIC KIDNEY DISEASE ON CHRONIC DIALYSIS (MULTI): ICD-10-CM

## 2024-02-07 PROCEDURE — 99309 SBSQ NF CARE MODERATE MDM 30: CPT | Performed by: INTERNAL MEDICINE

## 2024-02-07 NOTE — PROGRESS NOTES
PROGRESS NOTE    Subjective   Chief complaint: Melody Martin is a 88 y.o. female who is an acute skilled patient being seen and evaluated for weakness    HPI:  HPI  Patient is continue to work in therapy focusing on bilateral upper extremity strengthening with moderate resistant therapy bands in all planes, 20 reps x 2.  Patient did complete functional reaching side-to-side to increase side rail use to self position in bed.  Patient is seen in follow-up for anxiety, patient's daughter recently passed away, patient was started on BuSpar.  Continue to monitor.  Patient was seen and examined at bedside, appears to be in no apparent distress.  Denies chest pain or shortness of breath.  Afebrile.    Objective   Vital signs: 179/77, 98.2, 73, 18, blood sugar 310, 98%    Physical Exam  Constitutional:       General: She is not in acute distress.  Eyes:      Extraocular Movements: Extraocular movements intact.   Cardiovascular:      Rate and Rhythm: Normal rate and regular rhythm.   Pulmonary:      Effort: Pulmonary effort is normal.      Breath sounds: Normal breath sounds.   Abdominal:      General: Bowel sounds are normal.      Palpations: Abdomen is soft.   Musculoskeletal:      Cervical back: Neck supple.      Right lower leg: No edema.      Left lower leg: No edema.   Neurological:      Mental Status: She is alert.      Motor: Weakness present.   Psychiatric:         Mood and Affect: Mood normal.         Behavior: Behavior is cooperative.         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 (CMS/HCC)     On HD per nephrology  Antihypertensives  Renal diet  Monitor BP  Remove extra fluid in dialysis         Weakness - Primary     Continue working in therapy          Atrial fibrillation (CMS/[facility])     Monitor heart rate, controlled  Amiodarone  Apixaban  Bleeding precautions         Anxiety     Continue BuSpar  Monitor symptoms          Medications, treatments, and labs reviewed  Continue medications and treatments as listed in EMR      Scribe Attestation  JANES, Phoebe Martinez   attest that this documentation has been prepared under the direction and in the presence of Wilbert Lock MD    Provider Attestation - Scribe documentation  All medical record entries made by the Scribe were at my direction and personally dictated by me. I have reviewed the chart and agree that the record accurately reflects my personal performance of the history, physical exam, discussion and plan.   Wilbert Lock MD

## 2024-02-07 NOTE — LETTER
Patient: Melody Martin  : 1935    Encounter Date: 2024    PROGRESS NOTE    Subjective  Chief complaint: Melody Martin is a 88 y.o. female who is an acute skilled patient being seen and evaluated for weakness    HPI:  HPI  Patient is continue to work in therapy focusing on bilateral upper extremity strengthening with moderate resistant therapy bands in all planes, 20 reps x 2.  Patient did complete functional reaching side-to-side to increase side rail use to self position in bed.  Patient is seen in follow-up for anxiety, patient's daughter recently passed away, patient was started on BuSpar.  Continue to monitor.  Patient was seen and examined at bedside, appears to be in no apparent distress.  Denies chest pain or shortness of breath.  Afebrile.    Objective  Vital signs: 179/77, 98.2, 73, 18, blood sugar 310, 98%    Physical Exam  Constitutional:       General: She is not in acute distress.  Eyes:      Extraocular Movements: Extraocular movements intact.   Cardiovascular:      Rate and Rhythm: Normal rate and regular rhythm.   Pulmonary:      Effort: Pulmonary effort is normal.      Breath sounds: Normal breath sounds.   Abdominal:      General: Bowel sounds are normal.      Palpations: Abdomen is soft.   Musculoskeletal:      Cervical back: Neck supple.      Right lower leg: No edema.      Left lower leg: No edema.   Neurological:      Mental Status: She is alert.      Motor: Weakness present.   Psychiatric:         Mood and Affect: Mood normal.         Behavior: Behavior is cooperative.         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)     On HD per nephrology  Antihypertensives  Renal diet  Monitor BP  Remove extra fluid in  dialysis         Weakness - Primary     Continue working in therapy         Atrial fibrillation (CMS/[facility])     Monitor heart rate, controlled  Amiodarone  Apixaban  Bleeding precautions         Anxiety     Continue BuSpar  Monitor symptoms          Medications, treatments, and labs reviewed  Continue medications and treatments as listed in EMR      Scribe Attestation  IHien Scribe   attest that this documentation has been prepared under the direction and in the presence of Wilbert Lock MD    Provider Attestation - Scribe documentation  All medical record entries made by the Scribe were at my direction and personally dictated by me. I have reviewed the chart and agree that the record accurately reflects my personal performance of the history, physical exam, discussion and plan.   Wilbert Lock MD        Electronically Signed By: Wilbert Lock MD   2/7/24  4:23 PM

## 2024-02-08 ENCOUNTER — NURSING HOME VISIT (OUTPATIENT)
Dept: POST ACUTE CARE | Facility: EXTERNAL LOCATION | Age: 89
End: 2024-02-08
Payer: COMMERCIAL

## 2024-02-08 DIAGNOSIS — L89.150 PRESSURE ULCER OF SACRAL REGION, UNSTAGEABLE (MULTI): ICD-10-CM

## 2024-02-08 DIAGNOSIS — I13.2 HYPERTENSIVE HEART AND KIDNEY DISEASE WITH CHRONIC DIASTOLIC CONGESTIVE HEART FAILURE AND STAGE 5 CHRONIC KIDNEY DISEASE ON CHRONIC DIALYSIS (MULTI): ICD-10-CM

## 2024-02-08 DIAGNOSIS — R52 PAIN: ICD-10-CM

## 2024-02-08 DIAGNOSIS — I50.32 HYPERTENSIVE HEART AND KIDNEY DISEASE WITH CHRONIC DIASTOLIC CONGESTIVE HEART FAILURE AND STAGE 5 CHRONIC KIDNEY DISEASE ON CHRONIC DIALYSIS (MULTI): ICD-10-CM

## 2024-02-08 DIAGNOSIS — L89.620 PRESSURE ULCER OF LEFT HEEL, UNSTAGEABLE (MULTI): ICD-10-CM

## 2024-02-08 DIAGNOSIS — K08.89 TOOTH PAIN: ICD-10-CM

## 2024-02-08 DIAGNOSIS — N18.6 HYPERTENSIVE HEART AND KIDNEY DISEASE WITH CHRONIC DIASTOLIC CONGESTIVE HEART FAILURE AND STAGE 5 CHRONIC KIDNEY DISEASE ON CHRONIC DIALYSIS (MULTI): ICD-10-CM

## 2024-02-08 DIAGNOSIS — R13.10 DYSPHAGIA, UNSPECIFIED TYPE: ICD-10-CM

## 2024-02-08 DIAGNOSIS — Z99.2 HYPERTENSIVE HEART AND KIDNEY DISEASE WITH CHRONIC DIASTOLIC CONGESTIVE HEART FAILURE AND STAGE 5 CHRONIC KIDNEY DISEASE ON CHRONIC DIALYSIS (MULTI): ICD-10-CM

## 2024-02-08 DIAGNOSIS — R53.1 WEAKNESS: Primary | ICD-10-CM

## 2024-02-08 PROCEDURE — 99309 SBSQ NF CARE MODERATE MDM 30: CPT | Performed by: NURSE PRACTITIONER

## 2024-02-08 PROCEDURE — 11042 DBRDMT SUBQ TIS 1ST 20SQCM/<: CPT | Performed by: NURSE PRACTITIONER

## 2024-02-08 PROCEDURE — 11045 DBRDMT SUBQ TISS EACH ADDL: CPT | Performed by: NURSE PRACTITIONER

## 2024-02-08 NOTE — LETTER
Patient: Melody Martin  : 1935    Encounter Date: 2024    PROGRESS NOTE    Subjective  Chief complaint: Melody Martin is a 88 y.o. female who is an acute skilled patient being seen and evaluated for weakness    HPI:  HPI  Patient is continue to work in therapy on bilateral upper extremity strengthening with moderate resistant bands with rest periods.  Patient also working with speech therapy to address dysphagia on altered diet, tolerating well.  Patient seen and examined at bedside.  Patient with complaints of tooth pain.  Patient is on the list for the dentist.  Also patient complains of back pain which is not new.  Patient denies chest pain or shortness of breath.  Patient is afebrile.    Objective  Vital signs: 156/70, 98.2, 18, 71, blood sugar 390 96%    Physical Exam  Constitutional:       General: She is not in acute distress.  HENT:      Mouth/Throat:      Comments: No inflammation or pus noted to gums, gums tender to palpation  Eyes:      Extraocular Movements: Extraocular movements intact.   Cardiovascular:      Rate and Rhythm: Normal rate and regular rhythm.   Pulmonary:      Effort: Pulmonary effort is normal.      Breath sounds: Normal breath sounds.   Abdominal:      General: Bowel sounds are normal.      Palpations: Abdomen is soft.   Musculoskeletal:      Cervical back: Neck supple.      Right lower leg: No edema.      Left lower leg: No edema.      Comments:  to palpation   Skin:     Comments: Wound location -sacrum  Etiology - unstageable pressure ulcer  Granulation - Large  Slough - Small  Edges - flat  Odor - none  Drainage - Large serosanguineous  Size - 9 X 13 by UTD centimeters  Undermining -none    Wound location -left heel  Etiology - unstageable pressure ulcer  Large stable eschar  Edges - flat  Odor - none  Drainage - none  Size - 3 x 3 by UTD centimeters   Neurological:      Mental Status: She is alert.      Motor: Weakness present.   Psychiatric:         Mood  and Affect: Mood normal.         Behavior: Behavior is cooperative.         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 (CMS/HCC)     On HD per nephrology  Antihypertensives  Renal diet  Monitor BP  Remove extra fluid in dialysis         Pain     Back and gum pain  Schedule Tylenol         Pressure ulcer of left heel, unstageable (CMS/HCC)     TX: Irrigate with normal saline, pat dry, paint with Betadine, apply ABD and Kerlix qd     Turn every 2 hours  Prevalon boots  Pressure reducing mattress, patient refusing air mattress, discussed benefits/risks  Cushion to chair  Dietitian consult  Supplements per dietitian  Grab bars to bed to assist with bed mobility and repositioning  Weekly skin checks  House barrier cream to buttocks         Pressure ulcer of sacral region, unstageable (CMS/HCC)     I performed subcutaneous tissue debridement with a total area of 117 cm2 with curette to remove viable and non-viable tissue/material including subcutaneous tissue, slough, biofilm, and fibrin/exudate after achieving pain control with 2% lidocaine topical gel.  A minimal amount of bleeding was controlled with pressure. The procedure was tolerated well with a pain level of 0 throughout and a pain level of 0 following the procedure.  Post-debridement measurements unchanged. Character of wound/ulcer post-debridement is improved.     TX: Irrigate with normal saline, pat dry, apply santyl, pack with Ca Alg, and cover with silicone border foam dressing daily and as needed     Turn every 2 hours  Prevalon boots  Air mattress  Cushion to chair  Dietitian following  Supplements per dietitian  Grab bars to bed to assist with bed mobility and repositioning  Weekly skin checks  House barrier cream to buttocks         Tooth pain      With possible infection.  Start amoxicillin         Weakness - Primary     Continue to work in therapy towards goals          Medications, treatments, and labs reviewed  Continue medications and treatments as listed in EMR      Scribe Attestation  I, Phoebe Martinez   attest that this documentation has been prepared under the direction and in the presence of MAR Johnson    Provider Attestation - Scribe documentation  All medical record entries made by the Scribe were at my direction and personally dictated by me. I have reviewed the chart and agree that the record accurately reflects my personal performance of the history, physical exam, discussion and plan.   MAR Johnson        Electronically Signed By: MAR Johnson   2/16/24  6:43 AM

## 2024-02-09 ENCOUNTER — NURSING HOME VISIT (OUTPATIENT)
Dept: POST ACUTE CARE | Facility: EXTERNAL LOCATION | Age: 89
End: 2024-02-09
Payer: COMMERCIAL

## 2024-02-09 DIAGNOSIS — N18.6 TYPE 2 DIABETES MELLITUS WITH CHRONIC KIDNEY DISEASE ON CHRONIC DIALYSIS, WITH LONG-TERM CURRENT USE OF INSULIN (MULTI): ICD-10-CM

## 2024-02-09 DIAGNOSIS — I48.91 ATRIAL FIBRILLATION, UNSPECIFIED TYPE (MULTI): ICD-10-CM

## 2024-02-09 DIAGNOSIS — Z79.4 TYPE 2 DIABETES MELLITUS WITH CHRONIC KIDNEY DISEASE ON CHRONIC DIALYSIS, WITH LONG-TERM CURRENT USE OF INSULIN (MULTI): ICD-10-CM

## 2024-02-09 DIAGNOSIS — K59.00 CONSTIPATION, UNSPECIFIED CONSTIPATION TYPE: ICD-10-CM

## 2024-02-09 DIAGNOSIS — R53.1 WEAKNESS: Primary | ICD-10-CM

## 2024-02-09 DIAGNOSIS — E11.22 TYPE 2 DIABETES MELLITUS WITH CHRONIC KIDNEY DISEASE ON CHRONIC DIALYSIS, WITH LONG-TERM CURRENT USE OF INSULIN (MULTI): ICD-10-CM

## 2024-02-09 DIAGNOSIS — Z86.73 HISTORY OF CVA (CEREBROVASCULAR ACCIDENT): ICD-10-CM

## 2024-02-09 DIAGNOSIS — Z99.2 TYPE 2 DIABETES MELLITUS WITH CHRONIC KIDNEY DISEASE ON CHRONIC DIALYSIS, WITH LONG-TERM CURRENT USE OF INSULIN (MULTI): ICD-10-CM

## 2024-02-09 DIAGNOSIS — F41.9 ANXIETY: ICD-10-CM

## 2024-02-09 DIAGNOSIS — R52 PAIN: ICD-10-CM

## 2024-02-09 PROCEDURE — 99309 SBSQ NF CARE MODERATE MDM 30: CPT | Performed by: INTERNAL MEDICINE

## 2024-02-09 NOTE — LETTER
Patient: Melody Martin  : 1935    Encounter Date: 2024    PROGRESS NOTE    Subjective  Chief complaint: Melody Martin is a 88 y.o. female who is an acute skilled patient being seen and evaluated for weakness    HPI:  HPI  Patient is working in therapy due to generalized weakness.  Therapy is focusing on bilateral upper extremity strengthening with moderate resistant bands in all planes and joint levels.  Patient requires rest periods.  Patient had complaints of constipation, was started on Senokot and Colace twice daily.  Patient also seen for follow-up of anxiety and was started on BuSpar a few days prior.  Patient still continues with increased anxiety, orders placed to increase BuSpar to 3 times daily and obtaining psych consult.  Patient was reported to have increased pain, change tramadol to 4 times daily.  Patient was seen and examined at bedside, appears to be in no apparent distress.  Denies chest pain or shortness of breath.  Afebrile.    Objective  Vital signs: 180/70, 97.8, 70, 20, blood sugar 208, 98%    Physical Exam  Constitutional:       General: She is not in acute distress.  Eyes:      Extraocular Movements: Extraocular movements intact.   Cardiovascular:      Rate and Rhythm: Normal rate and regular rhythm.   Pulmonary:      Effort: Pulmonary effort is normal.      Breath sounds: Normal breath sounds.   Abdominal:      General: Bowel sounds are normal.      Palpations: Abdomen is soft.   Musculoskeletal:      Cervical back: Neck supple.      Right lower leg: No edema.      Left lower leg: No edema.   Neurological:      Mental Status: She is alert.      Motor: Weakness present.   Psychiatric:         Mood and Affect: Mood normal.         Behavior: Behavior is cooperative.         Assessment/Plan  Problem List Items Addressed This Visit       Type 2 diabetes mellitus with chronic kidney disease on chronic dialysis, with long-term current use of insulin (CMS/[facility])     Carb controlled  diet  Monitor Glucoscan  Change glargine to 10 units twice daily  Continue sliding scale insulin  Humalog insulin         History of CVA (cerebrovascular accident)     Statin  Plavix  Monitor for changes and weakness         Weakness - Primary     Continue working in therapy         Atrial fibrillation (CMS/[facility])     Monitor heart rate, controlled  Amiodarone  Apixaban  Bleeding precautions         Pain     Increase tramadol to 4 times daily  Monitor         Constipation     Start senna and Colace twice daily         Anxiety     Increase BuSpar to 3 times daily  Obtain psych consult  Monitor          Medications, treatments, and labs reviewed  Continue medications and treatments as listed in EMR      Scribe Attestation  IHien Scribe   attest that this documentation has been prepared under the direction and in the presence of Wilbert Lock MD    Provider Attestation - Scribe documentation  All medical record entries made by the Scribe were at my direction and personally dictated by me. I have reviewed the chart and agree that the record accurately reflects my personal performance of the history, physical exam, discussion and plan.   Wilbert Lock MD        Electronically Signed By: Wilbert Lock MD   2/10/24  9:30 AM

## 2024-02-09 NOTE — PROGRESS NOTES
PROGRESS NOTE    Subjective   Chief complaint: Melody Martin is a 88 y.o. female who is an acute skilled patient being seen and evaluated for weakness    HPI:  HPI  Patient is working in therapy due to generalized weakness.  Therapy is focusing on bilateral upper extremity strengthening with moderate resistant bands in all planes and joint levels.  Patient requires rest periods.  Patient had complaints of constipation, was started on Senokot and Colace twice daily.  Patient also seen for follow-up of anxiety and was started on BuSpar a few days prior.  Patient still continues with increased anxiety, orders placed to increase BuSpar to 3 times daily and obtaining psych consult.  Patient was reported to have increased pain, change tramadol to 4 times daily.  Patient was seen and examined at bedside, appears to be in no apparent distress.  Denies chest pain or shortness of breath.  Afebrile.    Objective   Vital signs: 180/70, 97.8, 70, 20, blood sugar 208, 98%    Physical Exam  Constitutional:       General: She is not in acute distress.  Eyes:      Extraocular Movements: Extraocular movements intact.   Cardiovascular:      Rate and Rhythm: Normal rate and regular rhythm.   Pulmonary:      Effort: Pulmonary effort is normal.      Breath sounds: Normal breath sounds.   Abdominal:      General: Bowel sounds are normal.      Palpations: Abdomen is soft.   Musculoskeletal:      Cervical back: Neck supple.      Right lower leg: No edema.      Left lower leg: No edema.   Neurological:      Mental Status: She is alert.      Motor: Weakness present.   Psychiatric:         Mood and Affect: Mood normal.         Behavior: Behavior is cooperative.         Assessment/Plan   Problem List Items Addressed This Visit       Type 2 diabetes mellitus with chronic kidney disease on chronic dialysis, with long-term current use of insulin (CMS/[facility])     Carb controlled diet  Monitor Glucoscan  Change glargine to 10 units twice  daily  Continue sliding scale insulin  Humalog insulin         History of CVA (cerebrovascular accident)     Statin  Plavix  Monitor for changes and weakness         Weakness - Primary     Continue working in therapy         Atrial fibrillation (CMS/[facility])     Monitor heart rate, controlled  Amiodarone  Apixaban  Bleeding precautions         Pain     Increase tramadol to 4 times daily  Monitor         Constipation     Start senna and Colace twice daily         Anxiety     Increase BuSpar to 3 times daily  Obtain psych consult  Monitor          Medications, treatments, and labs reviewed  Continue medications and treatments as listed in EMR      Scribe Attestation  I, Carl Martinezibe   attest that this documentation has been prepared under the direction and in the presence of Wilbert Lock MD    Provider Attestation - Scribe documentation  All medical record entries made by the Scribe were at my direction and personally dictated by me. I have reviewed the chart and agree that the record accurately reflects my personal performance of the history, physical exam, discussion and plan.   Wilbert Lock MD

## 2024-02-12 ENCOUNTER — NURSING HOME VISIT (OUTPATIENT)
Dept: POST ACUTE CARE | Facility: EXTERNAL LOCATION | Age: 89
End: 2024-02-12
Payer: COMMERCIAL

## 2024-02-12 DIAGNOSIS — Z99.2 HYPERTENSIVE HEART AND KIDNEY DISEASE WITH CHRONIC DIASTOLIC CONGESTIVE HEART FAILURE AND STAGE 5 CHRONIC KIDNEY DISEASE ON CHRONIC DIALYSIS (MULTI): ICD-10-CM

## 2024-02-12 DIAGNOSIS — N89.8 VAGINAL DISCHARGE: ICD-10-CM

## 2024-02-12 DIAGNOSIS — Z99.2 TYPE 2 DIABETES MELLITUS WITH CHRONIC KIDNEY DISEASE ON CHRONIC DIALYSIS, WITH LONG-TERM CURRENT USE OF INSULIN (MULTI): ICD-10-CM

## 2024-02-12 DIAGNOSIS — I48.91 ATRIAL FIBRILLATION, UNSPECIFIED TYPE (MULTI): ICD-10-CM

## 2024-02-12 DIAGNOSIS — N18.6 TYPE 2 DIABETES MELLITUS WITH CHRONIC KIDNEY DISEASE ON CHRONIC DIALYSIS, WITH LONG-TERM CURRENT USE OF INSULIN (MULTI): ICD-10-CM

## 2024-02-12 DIAGNOSIS — N18.6 HYPERTENSIVE HEART AND KIDNEY DISEASE WITH CHRONIC DIASTOLIC CONGESTIVE HEART FAILURE AND STAGE 5 CHRONIC KIDNEY DISEASE ON CHRONIC DIALYSIS (MULTI): ICD-10-CM

## 2024-02-12 DIAGNOSIS — Z79.4 TYPE 2 DIABETES MELLITUS WITH CHRONIC KIDNEY DISEASE ON CHRONIC DIALYSIS, WITH LONG-TERM CURRENT USE OF INSULIN (MULTI): ICD-10-CM

## 2024-02-12 DIAGNOSIS — E11.22 TYPE 2 DIABETES MELLITUS WITH CHRONIC KIDNEY DISEASE ON CHRONIC DIALYSIS, WITH LONG-TERM CURRENT USE OF INSULIN (MULTI): ICD-10-CM

## 2024-02-12 DIAGNOSIS — R53.1 WEAKNESS: Primary | ICD-10-CM

## 2024-02-12 DIAGNOSIS — I13.2 HYPERTENSIVE HEART AND KIDNEY DISEASE WITH CHRONIC DIASTOLIC CONGESTIVE HEART FAILURE AND STAGE 5 CHRONIC KIDNEY DISEASE ON CHRONIC DIALYSIS (MULTI): ICD-10-CM

## 2024-02-12 DIAGNOSIS — I50.32 HYPERTENSIVE HEART AND KIDNEY DISEASE WITH CHRONIC DIASTOLIC CONGESTIVE HEART FAILURE AND STAGE 5 CHRONIC KIDNEY DISEASE ON CHRONIC DIALYSIS (MULTI): ICD-10-CM

## 2024-02-12 PROBLEM — T14.8XXA WOUND INFECTION: Status: ACTIVE | Noted: 2024-02-12

## 2024-02-12 PROBLEM — K08.89 TOOTH PAIN: Status: ACTIVE | Noted: 2024-02-12

## 2024-02-12 PROBLEM — L08.9 WOUND INFECTION: Status: ACTIVE | Noted: 2024-02-12

## 2024-02-12 PROCEDURE — 99309 SBSQ NF CARE MODERATE MDM 30: CPT | Performed by: NURSE PRACTITIONER

## 2024-02-12 NOTE — PROGRESS NOTES
PROGRESS NOTE    Subjective   Chief complaint: Melody Martin is a 88 y.o. female who is an acute skilled patient being seen and evaluated for weakness    HPI:  HPI  Patient is continue to work in therapy on bilateral upper extremity strengthening with moderate resistant bands with rest periods.  Patient also working with speech therapy to address dysphagia on altered diet, tolerating well.  Patient seen and examined at bedside.  Patient with complaints of tooth pain.  Patient is on the list for the dentist.  Also patient complains of back pain which is not new.  Patient denies chest pain or shortness of breath.  Patient is afebrile.    Objective   Vital signs: 156/70, 98.2, 18, 71, blood sugar 390 96%    Physical Exam  Constitutional:       General: She is not in acute distress.  HENT:      Mouth/Throat:      Comments: No inflammation or pus noted to gums, gums tender to palpation  Eyes:      Extraocular Movements: Extraocular movements intact.   Cardiovascular:      Rate and Rhythm: Normal rate and regular rhythm.   Pulmonary:      Effort: Pulmonary effort is normal.      Breath sounds: Normal breath sounds.   Abdominal:      General: Bowel sounds are normal.      Palpations: Abdomen is soft.   Musculoskeletal:      Cervical back: Neck supple.      Right lower leg: No edema.      Left lower leg: No edema.      Comments:  to palpation   Skin:     Comments: Wound location -sacrum  Etiology - unstageable pressure ulcer  Granulation - Large  Slough - Small  Edges - flat  Odor - none  Drainage - Large serosanguineous  Size - 9 X 13 by UTD centimeters  Undermining -none    Wound location -left heel  Etiology - unstageable pressure ulcer  Large stable eschar  Edges - flat  Odor - none  Drainage - none  Size - 3 x 3 by UTD centimeters   Neurological:      Mental Status: She is alert.      Motor: Weakness present.   Psychiatric:         Mood and Affect: Mood normal.         Behavior: Behavior is cooperative.          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 (CMS/HCC)     On HD per nephrology  Antihypertensives  Renal diet  Monitor BP  Remove extra fluid in dialysis         Pain     Back and gum pain  Schedule Tylenol         Pressure ulcer of left heel, unstageable (CMS/[facility])     TX: Irrigate with normal saline, pat dry, paint with Betadine, apply ABD and Kerlix qd     Turn every 2 hours  Prevalon boots  Pressure reducing mattress, patient refusing air mattress, discussed benefits/risks  Cushion to chair  Dietitian consult  Supplements per dietitian  Grab bars to bed to assist with bed mobility and repositioning  Weekly skin checks  House barrier cream to buttocks         Pressure ulcer of sacral region, unstageable (CMS/[facility])     I performed subcutaneous tissue debridement with a total area of 117 cm2 with curette to remove viable and non-viable tissue/material including subcutaneous tissue, slough, biofilm, and fibrin/exudate after achieving pain control with 2% lidocaine topical gel.  A minimal amount of bleeding was controlled with pressure. The procedure was tolerated well with a pain level of 0 throughout and a pain level of 0 following the procedure.  Post-debridement measurements unchanged. Character of wound/ulcer post-debridement is improved.     TX: Irrigate with normal saline, pat dry, apply santyl, pack with Ca Alg, and cover with silicone border foam dressing daily and as needed     Turn every 2 hours  Prevalon boots  Air mattress  Cushion to chair  Dietitian following  Supplements per dietitian  Grab bars to bed to assist with bed mobility and repositioning  Weekly skin checks  House barrier cream to buttocks         Tooth pain     With possible infection.  Start amoxicillin         Weakness -  Primary     Continue to work in therapy towards goals          Medications, treatments, and labs reviewed  Continue medications and treatments as listed in EMR      Scribe Attestation  I, Phoebe Martinez   attest that this documentation has been prepared under the direction and in the presence of MAR Johnson    Provider Attestation - Scribe documentation  All medical record entries made by the Scribe were at my direction and personally dictated by me. I have reviewed the chart and agree that the record accurately reflects my personal performance of the history, physical exam, discussion and plan.   MAR Johnson

## 2024-02-12 NOTE — ASSESSMENT & PLAN NOTE
CHF stable, no shortness of breath.  Blood pressure fluctuates, will continue to monitor  On HD per nephrology  Antihypertensives  Renal diet  Monitor BP  Remove extra fluid in dialysis

## 2024-02-12 NOTE — PROGRESS NOTES
PROGRESS NOTE    Subjective   Chief complaint: Melody Martin is a 88 y.o. female who is an acute skilled patient being seen and evaluated for weakness    HPI:  HPI  Patient is working in therapy due to generalized weakness.  Therapy is working with patient on therapeutic exercise and activities, neuromuscular reeducation, transfers, bed mobility, ADLs and self-care.  Speech therapy also continues to work and patient for dysphagia.  Staff is reporting patient's daughter  unexpectedly.  Patient is anxious at baseline and family to break the news to her later today.  Patient seen and examined at bedside, appears to be in no apparent distress.  Denies chest pain or shortness of breath.    Objective   Vital signs: 163/57, 97.8, 63, 18, blood sugar 219    Physical Exam  Constitutional:       General: She is not in acute distress.  Eyes:      Extraocular Movements: Extraocular movements intact.   Cardiovascular:      Rate and Rhythm: Normal rate and regular rhythm.   Pulmonary:      Effort: Pulmonary effort is normal.      Breath sounds: Normal breath sounds.   Abdominal:      General: Bowel sounds are normal.      Palpations: Abdomen is soft.   Musculoskeletal:      Cervical back: Neck supple.      Right lower leg: No edema.      Left lower leg: No edema.   Neurological:      Mental Status: She is alert.      Motor: Weakness present.   Psychiatric:         Mood and Affect: Mood normal.         Behavior: Behavior is cooperative.         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 (CMS/[facility])     On HD per nephrology  Antihypertensives  Renal diet  Monitor BP  Remove extra fluid in dialysis         Weakness - Primary     Continue working in therapy          Dysphagia     Speech therapy  Altered diet         Anxiety     Start BuSpar          Medications, treatments, and labs reviewed  Continue medications and treatments as listed in EMR      Scribe Attestation  I, Phoebe Martinez   attest that this documentation has been prepared under the direction and in the presence of MAR Johnson    Provider Attestation - Scribe documentation  All medical record entries made by the Scribe were at my direction and personally dictated by me. I have reviewed the chart and agree that the record accurately reflects my personal performance of the history, physical exam, discussion and plan.   MAR Johnson

## 2024-02-12 NOTE — LETTER
Patient: Melody Martin  : 1935    Encounter Date: 2024    PROGRESS NOTE    Subjective  Chief complaint: Melody Martin is a 88 y.o. female who is an acute skilled patient being seen and evaluated for weakness    HPI:  HPI  Patient does continue to work in therapy.  Therapy is continue to work with patient and completing therapeutic exercise and activities, self-care management, gait training and education.  Nurse reporting patient with thick yellow vaginal discharge and blood pressures are high.  Blood pressures are usually 150s to 170s systolic.  Patient seen and examined at bedside, appears to be in no apparent distress.  Denies chest pain or shortness of breath.  Afebrile.    Objective  Vital signs: 168/80, 97.7, 80, 18, blood sugar 112    Physical Exam  Constitutional:       General: She is not in acute distress.  Eyes:      Extraocular Movements: Extraocular movements intact.   Cardiovascular:      Rate and Rhythm: Normal rate and regular rhythm.   Pulmonary:      Effort: Pulmonary effort is normal.      Breath sounds: Normal breath sounds.   Abdominal:      General: Bowel sounds are normal.      Palpations: Abdomen is soft.   Musculoskeletal:      Cervical back: Neck supple.      Right lower leg: No edema.      Left lower leg: No edema.   Neurological:      Mental Status: She is alert.      Motor: Weakness present.   Psychiatric:         Mood and Affect: Mood normal.         Behavior: Behavior is cooperative.         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  On HD per nephrology  Antihypertensives  Start Norvasc 5 mg daily  Renal diet  Monitor BP  Remove extra fluid in  dialysis         Type 2 diabetes mellitus with chronic kidney disease on chronic dialysis, with long-term current use of insulin (CMS/[facility])     Carb controlled diet  Monitor Glucoscan  Glargine  Humalog  FBG at goal         Vaginal discharge     Obtain culture         Weakness - Primary     Continue to work towards goals          Medications, treatments, and labs reviewed  Continue medications and treatments as listed in EMR      Scribe Attestation  Hien MARRERO Scribe   attest that this documentation has been prepared under the direction and in the presence of MAR Johnson    Provider Attestation - Scribe documentation  All medical record entries made by the Scribe were at my direction and personally dictated by me. I have reviewed the chart and agree that the record accurately reflects my personal performance of the history, physical exam, discussion and plan.   MAR Johnson        Electronically Signed By: MAR Johnson   2/20/24  8:00 AM

## 2024-02-12 NOTE — ASSESSMENT & PLAN NOTE
I performed subcutaneous tissue debridement with a total area of 117 cm2 with curette to remove viable and non-viable tissue/material including subcutaneous tissue, slough, biofilm, and fibrin/exudate after achieving pain control with 2% lidocaine topical gel.  A minimal amount of bleeding was controlled with pressure. The procedure was tolerated well with a pain level of 0 throughout and a pain level of 0 following the procedure.  Post-debridement measurements unchanged. Character of wound/ulcer post-debridement is improved.     TX: Irrigate with normal saline, pat dry, apply santyl, pack with Ca Alg, and cover with silicone border foam dressing daily and as needed     Turn every 2 hours  Prevalon boots  Air mattress  Cushion to chair  Dietitian following  Supplements per dietitian  Grab bars to bed to assist with bed mobility and repositioning  Weekly skin checks  House barrier cream to buttocks

## 2024-02-12 NOTE — PROGRESS NOTES
PROGRESS NOTE    Subjective   Chief complaint: Melody Martin is a 88 y.o. female who is an acute skilled patient being seen and evaluated for weakness    HPI:  HPI  Patient is working in therapy due to generalized weakness.  Patient is working on bed mobility and upper extremity strengthening.  Speech therapy is also working with patient on diet, patient with increased difficulty with soft solids such as Croatian toast due to tooth pain.  Patient has noted that due to stroke and lingual impairment.  Patient is unable to move food to the right side of oral cavity for compensation.  Patient's diet is downgraded to increase overall intake.  Patient seen and examined at bedside, appears to be in no apparent distress.  Denies chest pain or shortness of breath.  Afebrile.    Objective   Vital signs: 189/60, 97.1, 77, 18, blood sugar 294, 96%    Physical Exam  Constitutional:       General: She is not in acute distress.  Eyes:      Extraocular Movements: Extraocular movements intact.   Cardiovascular:      Rate and Rhythm: Normal rate and regular rhythm.   Pulmonary:      Effort: Pulmonary effort is normal.      Breath sounds: Normal breath sounds.   Abdominal:      General: Bowel sounds are normal.      Palpations: Abdomen is soft.   Musculoskeletal:      Cervical back: Neck supple.      Right lower leg: Edema present.      Left lower leg: Edema present.   Skin:     Comments: Wound location -left heel  Etiology - unstageable pressure ulcer  Granulation - none  Slough - Large adherent eschar  Edges - flat   Odor - none  Drainage - none  Size - 3 X 3 X UTD cm  Undermining -none    Wound location -sacrum  Etiology - unstageable pressure ulcer  Granulation - Small  Slough - Large  Edges - flat  Odor - none  Drainage - Medium serosanguineous  Size - 8 X 9.5 X UTD cm  Undermining -none   Neurological:      Mental Status: She is alert.      Motor: Weakness present.   Psychiatric:         Mood and Affect: Mood normal.          Behavior: Behavior is cooperative.         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  Blood pressure fluctuates, will continue to monitor  On HD per nephrology  Antihypertensives  Renal diet  Monitor BP  Remove extra fluid in dialysis         Pressure ulcer of left heel, unstageable (CMS/[facility])     TX: Irrigate with normal saline, pat dry, paint with Betadine, apply ABD and Kerlix qd     Turn every 2 hours  Prevalon boots  Pressure reducing mattress, patient refusing air mattress, discussed benefits/risks  Cushion to chair  Dietitian consult  Supplements per dietitian  Grab bars to bed to assist with bed mobility and repositioning  Weekly skin checks  House barrier cream to buttocks         Pressure ulcer of sacral region, unstageable (CMS/[facility])     I performed subcutaneous tissue debridement with a total area of 76 cm2 with curette to remove viable and non-viable tissue/material including subcutaneous tissue, slough, biofilm, and fibrin/exudate after achieving pain control with 2% lidocaine topical gel.  A minimal amount of bleeding was controlled with pressure. The procedure was tolerated well with a pain level of 0 throughout and a pain level of 0 following the procedure.  Post-debridement measurements unchanged. Character of wound/ulcer post-debridement is improved.     TX: Irrigate with normal saline, pat dry, apply santyl, pack with Ca Alg, and cover with silicone border foam dressing daily and as needed     Turn every 2 hours  Prevalon boots  Air mattress  Cushion to chair  Dietitian following  Supplements per dietitian  Grab bars to bed to assist with  bed mobility and repositioning  Weekly skin checks  House barrier cream to buttocks         Weakness - Primary     Continue to work in therapy towards goals          Medications, treatments, and labs reviewed  Continue medications and treatments as listed in EMR      Scribe Attestation  I, Phoebe Martinez   attest that this documentation has been prepared under the direction and in the presence of MAR Johnson    Provider Attestation - Scribe documentation  All medical record entries made by the Scribe were at my direction and personally dictated by me. I have reviewed the chart and agree that the record accurately reflects my personal performance of the history, physical exam, discussion and plan.   MAR Johnson

## 2024-02-12 NOTE — ASSESSMENT & PLAN NOTE
I performed subcutaneous tissue debridement with a total area of 76 cm2 with curette to remove viable and non-viable tissue/material including subcutaneous tissue, slough, biofilm, and fibrin/exudate after achieving pain control with 2% lidocaine topical gel.  A minimal amount of bleeding was controlled with pressure. The procedure was tolerated well with a pain level of 0 throughout and a pain level of 0 following the procedure.  Post-debridement measurements unchanged. Character of wound/ulcer post-debridement is improved.     TX: Irrigate with normal saline, pat dry, apply santyl, pack with Ca Alg, and cover with silicone border foam dressing daily and as needed     Turn every 2 hours  Prevalon boots  Air mattress  Cushion to chair  Dietitian following  Supplements per dietitian  Grab bars to bed to assist with bed mobility and repositioning  Weekly skin checks  House barrier cream to buttocks

## 2024-02-13 ENCOUNTER — NURSING HOME VISIT (OUTPATIENT)
Dept: POST ACUTE CARE | Facility: EXTERNAL LOCATION | Age: 89
End: 2024-02-13
Payer: COMMERCIAL

## 2024-02-13 DIAGNOSIS — Z86.73 HISTORY OF CVA (CEREBROVASCULAR ACCIDENT): ICD-10-CM

## 2024-02-13 DIAGNOSIS — I50.32 HYPERTENSIVE HEART AND KIDNEY DISEASE WITH CHRONIC DIASTOLIC CONGESTIVE HEART FAILURE AND STAGE 5 CHRONIC KIDNEY DISEASE ON CHRONIC DIALYSIS (MULTI): ICD-10-CM

## 2024-02-13 DIAGNOSIS — Z99.2 HYPERTENSIVE HEART AND KIDNEY DISEASE WITH CHRONIC DIASTOLIC CONGESTIVE HEART FAILURE AND STAGE 5 CHRONIC KIDNEY DISEASE ON CHRONIC DIALYSIS (MULTI): ICD-10-CM

## 2024-02-13 DIAGNOSIS — R53.1 WEAKNESS: Primary | ICD-10-CM

## 2024-02-13 DIAGNOSIS — N18.6 HYPERTENSIVE HEART AND KIDNEY DISEASE WITH CHRONIC DIASTOLIC CONGESTIVE HEART FAILURE AND STAGE 5 CHRONIC KIDNEY DISEASE ON CHRONIC DIALYSIS (MULTI): ICD-10-CM

## 2024-02-13 DIAGNOSIS — I48.91 ATRIAL FIBRILLATION, UNSPECIFIED TYPE (MULTI): ICD-10-CM

## 2024-02-13 DIAGNOSIS — I13.2 HYPERTENSIVE HEART AND KIDNEY DISEASE WITH CHRONIC DIASTOLIC CONGESTIVE HEART FAILURE AND STAGE 5 CHRONIC KIDNEY DISEASE ON CHRONIC DIALYSIS (MULTI): ICD-10-CM

## 2024-02-13 PROCEDURE — 99309 SBSQ NF CARE MODERATE MDM 30: CPT | Performed by: NURSE PRACTITIONER

## 2024-02-13 NOTE — LETTER
Patient: Melody Martin  : 1935    Encounter Date: 2024    PROGRESS NOTE    Subjective  Chief complaint: Melody Martin is a 88 y.o. female who is an acute skilled patient being seen and evaluated for weakness    HPI:  HPI  Patient continues to work in therapy due to generalized weakness and difficulty walking.  Therapy working with patient focusing on therapeutic exercise and activities, gait training, education and self-care management.  Patient seen and examined at bedside, appears to be in no apparent distress.  Patient does continue with sacral and left heel wounds, being managed.  Patient is afebrile at this time.    Objective  Vital signs: 132/56, 97.7, 60, 17, blood sugar 140    Physical Exam  Constitutional:       General: She is not in acute distress.  Eyes:      Extraocular Movements: Extraocular movements intact.   Cardiovascular:      Rate and Rhythm: Normal rate and regular rhythm.   Pulmonary:      Effort: Pulmonary effort is normal.      Breath sounds: Normal breath sounds.   Abdominal:      General: Bowel sounds are normal.      Palpations: Abdomen is soft.   Musculoskeletal:      Cervical back: Neck supple.      Right lower leg: No edema.      Left lower leg: No edema.   Neurological:      Mental Status: She is alert.      Motor: Weakness present.   Psychiatric:         Mood and Affect: Mood normal.         Behavior: Behavior is cooperative.         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  Blood pressure at goal  On HD per nephrology  Antihypertensives  Renal diet  Monitor BP  Remove extra fluid in dialysis         Weakness - Primary     Continue to work towards goals in  therapy         Atrial fibrillation (CMS/[facility])     Monitor heart rate, controlled  Amiodarone  Apixaban  Bleeding precautions          Medications, treatments, and labs reviewed  Continue medications and treatments as listed in EMR      Scribe Attestation  I, Phoebe Martinez   attest that this documentation has been prepared under the direction and in the presence of MAR Jonhson    Provider Attestation - Scribe documentation  All medical record entries made by the Scribe were at my direction and personally dictated by me. I have reviewed the chart and agree that the record accurately reflects my personal performance of the history, physical exam, discussion and plan.   MAR Johnson        Electronically Signed By: MAR Johnson   2/20/24  8:44 AM

## 2024-02-13 NOTE — ASSESSMENT & PLAN NOTE
Stable, no shortness of breath.  Blood pressure at goal  On HD per nephrology  Antihypertensives  Renal diet  Monitor BP  Remove extra fluid in dialysis

## 2024-02-13 NOTE — PROGRESS NOTES
PROGRESS NOTE    Subjective   Chief complaint: Melody Martin is a 88 y.o. female who is an acute skilled patient being seen and evaluated for weakness    HPI:  HPI  Therapy continue to work with patient.  Therapy is addressing weakness and difficulty in walking.  Therapy is focusing on treatment including therapeutic exercise and activities, gait training, self-care management, and ADLs.  Speech therapy also working with patient to address dysphagia.  Patient seen and examined at bedside, appears to be in no apparent distress.  Denies chest pain or shortness of breath.  Afebrile.    Objective   Vital signs: 134/58, 97.8, 63, 18, blood sugar 113, 96%    Physical Exam  Constitutional:       General: She is not in acute distress.  Eyes:      Extraocular Movements: Extraocular movements intact.   Cardiovascular:      Rate and Rhythm: Normal rate and regular rhythm.   Pulmonary:      Effort: Pulmonary effort is normal.      Breath sounds: Normal breath sounds.   Abdominal:      General: Bowel sounds are normal.      Palpations: Abdomen is soft.   Musculoskeletal:      Cervical back: Neck supple.      Right lower leg: No edema.      Left lower leg: No edema.   Neurological:      Mental Status: She is alert.      Motor: Weakness present.   Psychiatric:         Mood and Affect: Mood normal.         Behavior: Behavior is cooperative.         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  Blood pressure at goal  On HD per nephrology  Antihypertensives  Renal  diet  Monitor BP  Remove extra fluid in dialysis         Weakness - Primary     Continue to work in therapy towards established goals          Medications, treatments, and labs reviewed  Continue medications and treatments as listed in EMR      Scribe Attestation  I, Phoebe Martinez   attest that this documentation has been prepared under the direction and in the presence of MAR Johnson    Provider Attestation - Scribe documentation  All medical record entries made by the Scribe were at my direction and personally dictated by me. I have reviewed the chart and agree that the record accurately reflects my personal performance of the history, physical exam, discussion and plan.   MAR Johnson

## 2024-02-14 ENCOUNTER — NURSING HOME VISIT (OUTPATIENT)
Dept: POST ACUTE CARE | Facility: EXTERNAL LOCATION | Age: 89
End: 2024-02-14
Payer: COMMERCIAL

## 2024-02-14 DIAGNOSIS — I13.2 HYPERTENSIVE HEART AND KIDNEY DISEASE WITH CHRONIC DIASTOLIC CONGESTIVE HEART FAILURE AND STAGE 5 CHRONIC KIDNEY DISEASE ON CHRONIC DIALYSIS (MULTI): Primary | ICD-10-CM

## 2024-02-14 DIAGNOSIS — N18.6 TYPE 2 DIABETES MELLITUS WITH CHRONIC KIDNEY DISEASE ON CHRONIC DIALYSIS, WITH LONG-TERM CURRENT USE OF INSULIN (MULTI): ICD-10-CM

## 2024-02-14 DIAGNOSIS — Z79.4 TYPE 2 DIABETES MELLITUS WITH CHRONIC KIDNEY DISEASE ON CHRONIC DIALYSIS, WITH LONG-TERM CURRENT USE OF INSULIN (MULTI): ICD-10-CM

## 2024-02-14 DIAGNOSIS — F41.9 ANXIETY: ICD-10-CM

## 2024-02-14 DIAGNOSIS — Z99.2 HYPERTENSIVE HEART AND KIDNEY DISEASE WITH CHRONIC DIASTOLIC CONGESTIVE HEART FAILURE AND STAGE 5 CHRONIC KIDNEY DISEASE ON CHRONIC DIALYSIS (MULTI): Primary | ICD-10-CM

## 2024-02-14 DIAGNOSIS — N18.6 HYPERTENSIVE HEART AND KIDNEY DISEASE WITH CHRONIC DIASTOLIC CONGESTIVE HEART FAILURE AND STAGE 5 CHRONIC KIDNEY DISEASE ON CHRONIC DIALYSIS (MULTI): Primary | ICD-10-CM

## 2024-02-14 DIAGNOSIS — I50.32 HYPERTENSIVE HEART AND KIDNEY DISEASE WITH CHRONIC DIASTOLIC CONGESTIVE HEART FAILURE AND STAGE 5 CHRONIC KIDNEY DISEASE ON CHRONIC DIALYSIS (MULTI): Primary | ICD-10-CM

## 2024-02-14 DIAGNOSIS — N89.8 VAGINAL DISCHARGE: ICD-10-CM

## 2024-02-14 DIAGNOSIS — K08.89 TOOTH PAIN: ICD-10-CM

## 2024-02-14 DIAGNOSIS — I48.91 ATRIAL FIBRILLATION, UNSPECIFIED TYPE (MULTI): ICD-10-CM

## 2024-02-14 DIAGNOSIS — E11.22 TYPE 2 DIABETES MELLITUS WITH CHRONIC KIDNEY DISEASE ON CHRONIC DIALYSIS, WITH LONG-TERM CURRENT USE OF INSULIN (MULTI): ICD-10-CM

## 2024-02-14 DIAGNOSIS — Z99.2 TYPE 2 DIABETES MELLITUS WITH CHRONIC KIDNEY DISEASE ON CHRONIC DIALYSIS, WITH LONG-TERM CURRENT USE OF INSULIN (MULTI): ICD-10-CM

## 2024-02-14 PROCEDURE — 99309 SBSQ NF CARE MODERATE MDM 30: CPT | Performed by: INTERNAL MEDICINE

## 2024-02-14 NOTE — ASSESSMENT & PLAN NOTE
Stable, no shortness of breath.  Blood pressure at goal  On HD per nephrology  Antihypertensives  Discontinued Norvasc due to allergy  Start Cardizem 180 mg daily  Renal diet  Monitor BP  Remove extra fluid in dialysis

## 2024-02-14 NOTE — PROGRESS NOTES
PROGRESS NOTE    Subjective   Chief complaint: Melody Martin is a 88 y.o. female who is a long term care patient being seen and evaluated for multiple medical problems.    HPI:  HPI  Patient is seen in follow-up of anxiety.  Improved with initiation of BuSpar.  Patient is on the list for the dentist for tooth pain and continued on amoxicillin.  Tolerating without adverse effects.  Patient was reported to have a thick yellow vaginal discharge, culture is pending.  Patient was originally started on Norvasc for hypertension, was discontinued due to allergy and was started on Cardizem 180 mg daily.  Patient seen and examined at bedside, appears to be in no apparent distress.    Objective   Vital signs: 159/62, 98.2, 69, 18, 96%    Physical Exam  Constitutional:       General: She is not in acute distress.  Eyes:      Extraocular Movements: Extraocular movements intact.   Cardiovascular:      Rate and Rhythm: Normal rate and regular rhythm.   Pulmonary:      Effort: Pulmonary effort is normal.      Breath sounds: Normal breath sounds.   Abdominal:      General: Bowel sounds are normal.      Palpations: Abdomen is soft.   Musculoskeletal:      Cervical back: Neck supple.      Right lower leg: No edema.      Left lower leg: No edema.   Neurological:      Mental Status: She is alert.      Motor: Weakness present.   Psychiatric:         Mood and Affect: Mood normal.         Behavior: Behavior is cooperative.         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  Blood pressure at goal  On HD per nephrology  Antihypertensives  Discontinued Norvasc due to allergy  Start Cardizem 180 mg daily  Renal diet  Monitor BP  Remove extra fluid in dialysis         Atrial fibrillation (CMS/[facility])     Monitor heart rate, controlled  Amiodarone  Apixaban  Bleeding precautions         Anxiety     Improved  Continue BuSpar         Tooth pain     With possible infection.  Start amoxicillin         Vaginal discharge     Culture pending          Medications, treatments, and labs reviewed  Continue medications and treatments as listed in EMR    Scribe Attestation  I, Hien Conn Scribe   attest that this documentation has been prepared under the direction and in the presence of Wilbert Lock MD    Provider Attestation - Scribe documentation  All medical record entries made by the Scribe were at my direction and personally dictated by me. I have reviewed the chart and agree that the record accurately reflects my personal performance of the history, physical exam, discussion and plan.   Wilbert Lock MD

## 2024-02-14 NOTE — LETTER
Patient: Melody Martin  : 1935    Encounter Date: 2024    PROGRESS NOTE    Subjective  Chief complaint: Melody Martin is a 88 y.o. female who is a long term care patient being seen and evaluated for multiple medical problems.    HPI:  HPI  Patient is seen in follow-up of anxiety.  Improved with initiation of BuSpar.  Patient is on the list for the dentist for tooth pain and continued on amoxicillin.  Tolerating without adverse effects.  Patient was reported to have a thick yellow vaginal discharge, culture is pending.  Patient was originally started on Norvasc for hypertension, was discontinued due to allergy and was started on Cardizem 180 mg daily.  Patient seen and examined at bedside, appears to be in no apparent distress.    Objective  Vital signs: 159/62, 98.2, 69, 18, 96%    Physical Exam  Constitutional:       General: She is not in acute distress.  Eyes:      Extraocular Movements: Extraocular movements intact.   Cardiovascular:      Rate and Rhythm: Normal rate and regular rhythm.   Pulmonary:      Effort: Pulmonary effort is normal.      Breath sounds: Normal breath sounds.   Abdominal:      General: Bowel sounds are normal.      Palpations: Abdomen is soft.   Musculoskeletal:      Cervical back: Neck supple.      Right lower leg: No edema.      Left lower leg: No edema.   Neurological:      Mental Status: She is alert.      Motor: Weakness present.   Psychiatric:         Mood and Affect: Mood normal.         Behavior: Behavior is cooperative.         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  Blood pressure at goal  On HD per nephrology  Antihypertensives  Discontinued Norvasc due to allergy  Start Cardizem 180 mg daily  Renal diet  Monitor BP  Remove extra fluid in dialysis         Atrial fibrillation (CMS/[facility])     Monitor heart rate, controlled  Amiodarone  Apixaban  Bleeding precautions         Anxiety     Improved  Continue BuSpar         Tooth pain     With possible infection.  Start amoxicillin         Vaginal discharge     Culture pending          Medications, treatments, and labs reviewed  Continue medications and treatments as listed in EMR    Scribe Attestation  I, Carl Martinezibe   attest that this documentation has been prepared under the direction and in the presence of Wilbert Lock MD    Provider Attestation - Scribe documentation  All medical record entries made by the Scribe were at my direction and personally dictated by me. I have reviewed the chart and agree that the record accurately reflects my personal performance of the history, physical exam, discussion and plan.   Wilbert Lock MD            Electronically Signed By: Wilbert Lock MD   2/14/24  5:19 PM

## 2024-02-15 ENCOUNTER — NURSING HOME VISIT (OUTPATIENT)
Dept: POST ACUTE CARE | Facility: EXTERNAL LOCATION | Age: 89
End: 2024-02-15
Payer: COMMERCIAL

## 2024-02-15 DIAGNOSIS — L89.620 PRESSURE ULCER OF LEFT HEEL, UNSTAGEABLE (MULTI): ICD-10-CM

## 2024-02-15 DIAGNOSIS — Z99.2 HYPERTENSIVE HEART AND KIDNEY DISEASE WITH CHRONIC DIASTOLIC CONGESTIVE HEART FAILURE AND STAGE 5 CHRONIC KIDNEY DISEASE ON CHRONIC DIALYSIS (MULTI): ICD-10-CM

## 2024-02-15 DIAGNOSIS — I50.32 HYPERTENSIVE HEART AND KIDNEY DISEASE WITH CHRONIC DIASTOLIC CONGESTIVE HEART FAILURE AND STAGE 5 CHRONIC KIDNEY DISEASE ON CHRONIC DIALYSIS (MULTI): ICD-10-CM

## 2024-02-15 DIAGNOSIS — L89.150 PRESSURE ULCER OF SACRAL REGION, UNSTAGEABLE (MULTI): Primary | ICD-10-CM

## 2024-02-15 DIAGNOSIS — N18.6 HYPERTENSIVE HEART AND KIDNEY DISEASE WITH CHRONIC DIASTOLIC CONGESTIVE HEART FAILURE AND STAGE 5 CHRONIC KIDNEY DISEASE ON CHRONIC DIALYSIS (MULTI): ICD-10-CM

## 2024-02-15 DIAGNOSIS — I13.2 HYPERTENSIVE HEART AND KIDNEY DISEASE WITH CHRONIC DIASTOLIC CONGESTIVE HEART FAILURE AND STAGE 5 CHRONIC KIDNEY DISEASE ON CHRONIC DIALYSIS (MULTI): ICD-10-CM

## 2024-02-15 PROCEDURE — 99309 SBSQ NF CARE MODERATE MDM 30: CPT | Performed by: NURSE PRACTITIONER

## 2024-02-15 NOTE — LETTER
Patient: Melody Martin  : 1935    Encounter Date: 02/15/2024    PROGRESS NOTE    Subjective  Chief complaint: Melody Martin is a 88 y.o. female who is a long term care patient being seen and evaluated for follow-up of wounds.    HPI:  HPI  Patient is seen in follow-up to wounds.  Patient continues with wound, unstageable pressure ulcer to sacrum and left heel unstageable pressure ulcer.  Patient was seen and examined at bedside, appears to be in no apparent distress.  Afebrile at this time.    Objective  Vital signs: 154/53, 98.0, 55, 18, blood sugar 200, 96%    Physical Exam  Constitutional:       General: She is not in acute distress.  Eyes:      Extraocular Movements: Extraocular movements intact.   Cardiovascular:      Rate and Rhythm: Normal rate and regular rhythm.   Pulmonary:      Effort: Pulmonary effort is normal.      Breath sounds: Normal breath sounds.   Abdominal:      General: Bowel sounds are normal.      Palpations: Abdomen is soft.   Musculoskeletal:      Cervical back: Neck supple.      Right lower leg: No edema.      Left lower leg: No edema.      Comments:  to palpation   Skin:     Comments: Wound location -sacrum  Etiology - unstageable pressure ulcer  Granulation - M  Slough - M  Edges - flat  Odor - none  Drainage - M, serosanguineous  Size - 8.5 X 7 X UTD centimeters  Undermining -none    Wound location -left heel  Etiology - unstageable pressure ulcer  Large stable eschar  Odor - none  Drainage - none  Size - 3 x 3 by UTD centimeters   Neurological:      Mental Status: She is alert.      Motor: Weakness present.   Psychiatric:         Mood and Affect: Mood normal.         Behavior: Behavior is cooperative.         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  Blood  pressure at goal  On HD per nephrology  Antihypertensives  Renal diet  Monitor BP  Remove extra fluid in dialysis         Pressure ulcer of left heel, unstageable (CMS/HCC)     TX: Irrigate with normal saline, pat dry, paint with Betadine, apply ABD and Kerlix qd     Turn every 2 hours  Prevalon boots  Pressure reducing mattress, patient refusing air mattress, discussed benefits/risks  Cushion to chair  Dietitian consult  Supplements per dietitian  Grab bars to bed to assist with bed mobility and repositioning  Weekly skin checks         Pressure ulcer of sacral region, unstageable (CMS/HCC) - Primary     I performed subcutaneous tissue debridement with a total area of 59.5 cm2 with curette to remove viable and non-viable tissue/material including subcutaneous tissue, slough, biofilm, and fibrin/exudate after achieving pain control with 2% lidocaine topical gel.  A minimal amount of bleeding was controlled with pressure. The procedure was tolerated well with a pain level of 0 throughout and a pain level of 0 following the procedure.  Post-debridement measurements unchanged. Character of wound/ulcer post-debridement is improved.     TX: Irrigate with normal saline, pat dry, apply santyl, pack with Ca Alg, and cover with silicone border foam dressing daily and as needed     Turn every 2 hours  Prevalon boots  Air mattress  Cushion to chair  Dietitian following  Supplements per dietitian  Grab bars to bed to assist with bed mobility and repositioning  Weekly skin checks  House barrier cream to buttocks          Medications, treatments, and labs reviewed  Continue medications and treatments as listed in EMR    Scribe Attestation  Hien MARRERO Scribe   attest that this documentation has been prepared under the direction and in the presence of JANELL Johnson-CNP    Provider Attestation - Scribe documentation  All medical record entries made by the Scribe were at my direction and personally dictated by me.  I have reviewed the chart and agree that the record accurately reflects my personal performance of the history, physical exam, discussion and plan.   MAR Johnson            Electronically Signed By: MAR Johnson   2/21/24  3:42 PM

## 2024-02-16 ENCOUNTER — NURSING HOME VISIT (OUTPATIENT)
Dept: POST ACUTE CARE | Facility: EXTERNAL LOCATION | Age: 89
End: 2024-02-16
Payer: COMMERCIAL

## 2024-02-16 DIAGNOSIS — N18.6 HYPERTENSIVE HEART AND KIDNEY DISEASE WITH CHRONIC DIASTOLIC CONGESTIVE HEART FAILURE AND STAGE 5 CHRONIC KIDNEY DISEASE ON CHRONIC DIALYSIS (MULTI): Primary | ICD-10-CM

## 2024-02-16 DIAGNOSIS — I50.32 HYPERTENSIVE HEART AND KIDNEY DISEASE WITH CHRONIC DIASTOLIC CONGESTIVE HEART FAILURE AND STAGE 5 CHRONIC KIDNEY DISEASE ON CHRONIC DIALYSIS (MULTI): Primary | ICD-10-CM

## 2024-02-16 DIAGNOSIS — N89.8 VAGINAL DISCHARGE: ICD-10-CM

## 2024-02-16 DIAGNOSIS — I48.91 ATRIAL FIBRILLATION, UNSPECIFIED TYPE (MULTI): ICD-10-CM

## 2024-02-16 DIAGNOSIS — Z99.2 HYPERTENSIVE HEART AND KIDNEY DISEASE WITH CHRONIC DIASTOLIC CONGESTIVE HEART FAILURE AND STAGE 5 CHRONIC KIDNEY DISEASE ON CHRONIC DIALYSIS (MULTI): Primary | ICD-10-CM

## 2024-02-16 DIAGNOSIS — Z86.73 HISTORY OF CVA (CEREBROVASCULAR ACCIDENT): ICD-10-CM

## 2024-02-16 DIAGNOSIS — I13.2 HYPERTENSIVE HEART AND KIDNEY DISEASE WITH CHRONIC DIASTOLIC CONGESTIVE HEART FAILURE AND STAGE 5 CHRONIC KIDNEY DISEASE ON CHRONIC DIALYSIS (MULTI): Primary | ICD-10-CM

## 2024-02-16 PROCEDURE — 99309 SBSQ NF CARE MODERATE MDM 30: CPT | Performed by: INTERNAL MEDICINE

## 2024-02-16 NOTE — PROGRESS NOTES
PROGRESS NOTE    Subjective   Chief complaint: Melody Martin is a 88 y.o. female who is a long term care patient being seen and evaluated for hypertension and vaginal discharge.    HPI:  HPI  Patient is seen in follow-up for thick yellow vaginal discharge.  Culture was obtained yesterday and pending.  Patient was started on Cardizem for hypertension.  Continue to monitor patient's blood pressures.  Patient was seen and examined at bedside, appears to be in no apparent distress.  Patient is afebrile at this time    Objective   Vital signs: 151/53, 98.0, 58, 18, blood sugar 207, 96%    Physical Exam  Constitutional:       General: She is not in acute distress.  Eyes:      Extraocular Movements: Extraocular movements intact.   Cardiovascular:      Rate and Rhythm: Normal rate and regular rhythm.   Pulmonary:      Effort: Pulmonary effort is normal.      Breath sounds: Normal breath sounds.   Abdominal:      General: Bowel sounds are normal.      Palpations: Abdomen is soft.   Musculoskeletal:      Cervical back: Neck supple.      Right lower leg: No edema.      Left lower leg: No edema.   Neurological:      Mental Status: She is alert.   Psychiatric:         Mood and Affect: Mood normal.         Behavior: Behavior is cooperative.         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  Blood pressure at goal  On HD per nephrology  Antihypertensives  Cardizem  Renal diet  Monitor BP  Remove extra fluid in dialysis         Atrial fibrillation (CMS/[facility])     Monitor heart rate, controlled  Amiodarone  Apixaban  Bleeding precautions         Vaginal discharge     Culture pending          Medications, treatments,  and labs reviewed  Continue medications and treatments as listed in EMR    Scribe Attestation  I, Phoebe Martinez   attest that this documentation has been prepared under the direction and in the presence of Wilbert Lock MD    Provider Attestation - Scribe documentation  All medical record entries made by the Scribe were at my direction and personally dictated by me. I have reviewed the chart and agree that the record accurately reflects my personal performance of the history, physical exam, discussion and plan.   Wilbert Lock MD

## 2024-02-16 NOTE — LETTER
Patient: Melody Martin  : 1935    Encounter Date: 2024    PROGRESS NOTE    Subjective  Chief complaint: Melody Martin is a 88 y.o. female who is a long term care patient being seen and evaluated for hypertension and vaginal discharge.    HPI:  HPI  Patient is seen in follow-up for thick yellow vaginal discharge.  Culture was obtained yesterday and pending.  Patient was started on Cardizem for hypertension.  Continue to monitor patient's blood pressures.  Patient was seen and examined at bedside, appears to be in no apparent distress.  Patient is afebrile at this time    Objective  Vital signs: 151/53, 98.0, 58, 18, blood sugar 207, 96%    Physical Exam  Constitutional:       General: She is not in acute distress.  Eyes:      Extraocular Movements: Extraocular movements intact.   Cardiovascular:      Rate and Rhythm: Normal rate and regular rhythm.   Pulmonary:      Effort: Pulmonary effort is normal.      Breath sounds: Normal breath sounds.   Abdominal:      General: Bowel sounds are normal.      Palpations: Abdomen is soft.   Musculoskeletal:      Cervical back: Neck supple.      Right lower leg: No edema.      Left lower leg: No edema.   Neurological:      Mental Status: She is alert.   Psychiatric:         Mood and Affect: Mood normal.         Behavior: Behavior is cooperative.         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  Blood pressure at goal  On HD per nephrology  Antihypertensives  Cardizem  Renal diet  Monitor BP  Remove extra fluid in dialysis         Atrial fibrillation (CMS/[facility])     Monitor heart rate, controlled  Amiodarone  Apixaban  Bleeding precautions          Vaginal discharge     Culture pending          Medications, treatments, and labs reviewed  Continue medications and treatments as listed in EMR    Scribe Attestation  I, Phoebe Martinez   attest that this documentation has been prepared under the direction and in the presence of Wilbert Lock MD    Provider Attestation - Scribe documentation  All medical record entries made by the Scribe were at my direction and personally dictated by me. I have reviewed the chart and agree that the record accurately reflects my personal performance of the history, physical exam, discussion and plan.   Wilbert Lock MD            Electronically Signed By: Wilbert Lock MD   2/16/24  2:22 PM

## 2024-02-16 NOTE — ASSESSMENT & PLAN NOTE
Stable, no shortness of breath.  Blood pressure at goal  On HD per nephrology  Antihypertensives  Cardizem  Renal diet  Monitor BP  Remove extra fluid in dialysis

## 2024-02-19 NOTE — ASSESSMENT & PLAN NOTE
Stable, no shortness of breath.  On HD per nephrology  Antihypertensives  Start Norvasc 5 mg daily  Renal diet  Monitor BP  Remove extra fluid in dialysis

## 2024-02-19 NOTE — PROGRESS NOTES
PROGRESS NOTE    Subjective   Chief complaint: Melody Martin is a 88 y.o. female who is an acute skilled patient being seen and evaluated for weakness    HPI:  HPI  Patient continues to work in therapy due to generalized weakness and difficulty walking.  Therapy working with patient focusing on therapeutic exercise and activities, gait training, education and self-care management.  Patient seen and examined at bedside, appears to be in no apparent distress.  Patient does continue with sacral and left heel wounds, being managed.  Patient is afebrile at this time.    Objective   Vital signs: 132/56, 97.7, 60, 17, blood sugar 140    Physical Exam  Constitutional:       General: She is not in acute distress.  Eyes:      Extraocular Movements: Extraocular movements intact.   Cardiovascular:      Rate and Rhythm: Normal rate and regular rhythm.   Pulmonary:      Effort: Pulmonary effort is normal.      Breath sounds: Normal breath sounds.   Abdominal:      General: Bowel sounds are normal.      Palpations: Abdomen is soft.   Musculoskeletal:      Cervical back: Neck supple.      Right lower leg: No edema.      Left lower leg: No edema.   Neurological:      Mental Status: She is alert.      Motor: Weakness present.   Psychiatric:         Mood and Affect: Mood normal.         Behavior: Behavior is cooperative.         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  Blood pressure at goal  On HD per nephrology  Antihypertensives  Renal diet  Monitor BP  Remove extra fluid in dialysis         Weakness - Primary     Continue to work towards goals in therapy         Atrial fibrillation (CMS/[facility])     Monitor heart rate,  controlled  Amiodarone  Apixaban  Bleeding precautions          Medications, treatments, and labs reviewed  Continue medications and treatments as listed in EMR      Scribe Attestation  I, Phoebe Martinez   attest that this documentation has been prepared under the direction and in the presence of MAR Johnson    Provider Attestation - Scribe documentation  All medical record entries made by the Scribe were at my direction and personally dictated by me. I have reviewed the chart and agree that the record accurately reflects my personal performance of the history, physical exam, discussion and plan.   MAR Johnson

## 2024-02-19 NOTE — PROGRESS NOTES
PROGRESS NOTE    Subjective   Chief complaint: Melody Martin is a 88 y.o. female who is an acute skilled patient being seen and evaluated for weakness    HPI:  HPI  Patient does continue to work in therapy.  Therapy is continue to work with patient and completing therapeutic exercise and activities, self-care management, gait training and education.  Nurse reporting patient with thick yellow vaginal discharge and blood pressures are high.  Blood pressures are usually 150s to 170s systolic.  Patient seen and examined at bedside, appears to be in no apparent distress.  Denies chest pain or shortness of breath.  Afebrile.    Objective   Vital signs: 168/80, 97.7, 80, 18, blood sugar 112    Physical Exam  Constitutional:       General: She is not in acute distress.  Eyes:      Extraocular Movements: Extraocular movements intact.   Cardiovascular:      Rate and Rhythm: Normal rate and regular rhythm.   Pulmonary:      Effort: Pulmonary effort is normal.      Breath sounds: Normal breath sounds.   Abdominal:      General: Bowel sounds are normal.      Palpations: Abdomen is soft.   Musculoskeletal:      Cervical back: Neck supple.      Right lower leg: No edema.      Left lower leg: No edema.   Neurological:      Mental Status: She is alert.      Motor: Weakness present.   Psychiatric:         Mood and Affect: Mood normal.         Behavior: Behavior is cooperative.         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  On HD per nephrology  Antihypertensives  Start Norvasc 5 mg daily  Renal diet  Monitor BP  Remove extra fluid in dialysis         Type 2 diabetes mellitus with chronic kidney disease on  chronic dialysis, with long-term current use of insulin (CMS/[facility])     Carb controlled diet  Monitor Glucoscan  Glargine  Humalog  FBG at goal         Vaginal discharge     Obtain culture         Weakness - Primary     Continue to work towards goals          Medications, treatments, and labs reviewed  Continue medications and treatments as listed in EMR      Scribe Attestation  Hien MARRERO Scribe   attest that this documentation has been prepared under the direction and in the presence of MAR Johnson    Provider Attestation - Scribe documentation  All medical record entries made by the Scribe were at my direction and personally dictated by me. I have reviewed the chart and agree that the record accurately reflects my personal performance of the history, physical exam, discussion and plan.   MAR Johnson

## 2024-02-20 ENCOUNTER — NURSING HOME VISIT (OUTPATIENT)
Dept: POST ACUTE CARE | Facility: EXTERNAL LOCATION | Age: 89
End: 2024-02-20
Payer: COMMERCIAL

## 2024-02-20 DIAGNOSIS — Z99.2 HYPERTENSIVE HEART AND KIDNEY DISEASE WITH CHRONIC DIASTOLIC CONGESTIVE HEART FAILURE AND STAGE 5 CHRONIC KIDNEY DISEASE ON CHRONIC DIALYSIS (MULTI): ICD-10-CM

## 2024-02-20 DIAGNOSIS — M62.838 MUSCLE SPASM: Primary | ICD-10-CM

## 2024-02-20 DIAGNOSIS — I48.91 ATRIAL FIBRILLATION, UNSPECIFIED TYPE (MULTI): ICD-10-CM

## 2024-02-20 DIAGNOSIS — I13.2 HYPERTENSIVE HEART AND KIDNEY DISEASE WITH CHRONIC DIASTOLIC CONGESTIVE HEART FAILURE AND STAGE 5 CHRONIC KIDNEY DISEASE ON CHRONIC DIALYSIS (MULTI): ICD-10-CM

## 2024-02-20 DIAGNOSIS — N18.6 HYPERTENSIVE HEART AND KIDNEY DISEASE WITH CHRONIC DIASTOLIC CONGESTIVE HEART FAILURE AND STAGE 5 CHRONIC KIDNEY DISEASE ON CHRONIC DIALYSIS (MULTI): ICD-10-CM

## 2024-02-20 DIAGNOSIS — I50.32 HYPERTENSIVE HEART AND KIDNEY DISEASE WITH CHRONIC DIASTOLIC CONGESTIVE HEART FAILURE AND STAGE 5 CHRONIC KIDNEY DISEASE ON CHRONIC DIALYSIS (MULTI): ICD-10-CM

## 2024-02-20 DIAGNOSIS — Z16.13 CARBAPENEM-RESISTANT KLEBSIELLA PNEUMONIAE INFECTION: ICD-10-CM

## 2024-02-20 DIAGNOSIS — A49.8 CARBAPENEM-RESISTANT KLEBSIELLA PNEUMONIAE INFECTION: ICD-10-CM

## 2024-02-20 PROCEDURE — 99309 SBSQ NF CARE MODERATE MDM 30: CPT | Performed by: NURSE PRACTITIONER

## 2024-02-20 NOTE — LETTER
Patient: Melody Martin  : 1935    Encounter Date: 2024    PROGRESS NOTE    Subjective  Chief complaint: Melody Martin is a 88 y.o. female who is a long term care patient being seen and evaluated for medication review    HPI:  HPI  Asked to evaluate patient for continued use of methocarbamol for muscle spasms.  Patient is stable, denies pain.  Orders placed to discontinue medication at this time.  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  Patient was seen and examined at bedside, appears to be in no acute distress.    Objective  Vital signs: 158/78, 97.4, 64, 18, blood sugar 191, 99%    Physical Exam  Constitutional:       General: She is not in acute distress.  Eyes:      Extraocular Movements: Extraocular movements intact.   Cardiovascular:      Rate and Rhythm: Normal rate and regular rhythm.   Pulmonary:      Effort: Pulmonary effort is normal.      Breath sounds: Normal breath sounds.   Musculoskeletal:      Cervical back: Neck supple.      Right lower leg: No edema.      Left lower leg: No edema.   Neurological:      Mental Status: She is alert.      Motor: Weakness present.   Psychiatric:         Mood and Affect: Mood normal.         Behavior: Behavior is cooperative.         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 I have reviewed the chart and agree that the record accurately reflects my personal performance of the history, physical exam, discussion and plan.   MAR Johnson            Electronically Signed By: MAR Johnson   2/28/24  3:05 PM

## 2024-02-20 NOTE — PROGRESS NOTES
PROGRESS NOTE    Subjective   Chief complaint: Melody Martin is a 88 y.o. female who is a long term care patient being seen and evaluated for follow-up of wounds.    HPI:  HPI  Patient is seen in follow-up to wounds.  Patient continues with wound, unstageable pressure ulcer to sacrum and left heel unstageable pressure ulcer.  Patient was seen and examined at bedside, appears to be in no apparent distress.  Afebrile at this time.    Objective   Vital signs: 154/53, 98.0, 55, 18, blood sugar 200, 96%    Physical Exam  Constitutional:       General: She is not in acute distress.  Eyes:      Extraocular Movements: Extraocular movements intact.   Cardiovascular:      Rate and Rhythm: Normal rate and regular rhythm.   Pulmonary:      Effort: Pulmonary effort is normal.      Breath sounds: Normal breath sounds.   Abdominal:      General: Bowel sounds are normal.      Palpations: Abdomen is soft.   Musculoskeletal:      Cervical back: Neck supple.      Right lower leg: No edema.      Left lower leg: No edema.      Comments:  to palpation   Skin:     Comments: Wound location -sacrum  Etiology - unstageable pressure ulcer  Granulation - M  Slough - M  Edges - flat  Odor - none  Drainage - M, serosanguineous  Size - 8.5 X 7 X UTD centimeters  Undermining -none    Wound location -left heel  Etiology - unstageable pressure ulcer  Large stable eschar  Odor - none  Drainage - none  Size - 3 x 3 by UTD centimeters   Neurological:      Mental Status: She is alert.      Motor: Weakness present.   Psychiatric:         Mood and Affect: Mood normal.         Behavior: Behavior is cooperative.         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  Blood pressure at goal  On HD per nephrology  Antihypertensives  Renal  diet  Monitor BP  Remove extra fluid in dialysis         Pressure ulcer of left heel, unstageable (CMS/HCC)     TX: Irrigate with normal saline, pat dry, paint with Betadine, apply ABD and Kerlix qd     Turn every 2 hours  Prevalon boots  Pressure reducing mattress, patient refusing air mattress, discussed benefits/risks  Cushion to chair  Dietitian consult  Supplements per dietitian  Grab bars to bed to assist with bed mobility and repositioning  Weekly skin checks         Pressure ulcer of sacral region, unstageable (CMS/HCC) - Primary     I performed subcutaneous tissue debridement with a total area of 59.5 cm2 with curette to remove viable and non-viable tissue/material including subcutaneous tissue, slough, biofilm, and fibrin/exudate after achieving pain control with 2% lidocaine topical gel.  A minimal amount of bleeding was controlled with pressure. The procedure was tolerated well with a pain level of 0 throughout and a pain level of 0 following the procedure.  Post-debridement measurements unchanged. Character of wound/ulcer post-debridement is improved.     TX: Irrigate with normal saline, pat dry, apply santyl, pack with Ca Alg, and cover with silicone border foam dressing daily and as needed     Turn every 2 hours  Prevalon boots  Air mattress  Cushion to chair  Dietitian following  Supplements per dietitian  Grab bars to bed to assist with bed mobility and repositioning  Weekly skin checks  House barrier cream to buttocks          Medications, treatments, and labs reviewed  Continue medications and treatments as listed in EMR    Scribe Attestation  Hien MARRERO Scribe   attest that this documentation has been prepared under the direction and in the presence of JANELL Johnson-CNP    Provider Attestation - Scribe documentation  All medical record entries made by the Scribe were at my direction and personally dictated by me. I have reviewed the chart and agree that the record accurately  reflects my personal performance of the history, physical exam, discussion and plan.   Karina Patel, APRN-CNP

## 2024-02-20 NOTE — ASSESSMENT & PLAN NOTE
TX: Irrigate with normal saline, pat dry, paint with Betadine, apply ABD and Kerlix qd     Turn every 2 hours  Prevalon boots  Pressure reducing mattress, patient refusing air mattress, discussed benefits/risks  Cushion to chair  Dietitian consult  Supplements per dietitian  Grab bars to bed to assist with bed mobility and repositioning  Weekly skin checks

## 2024-02-20 NOTE — ASSESSMENT & PLAN NOTE
I performed subcutaneous tissue debridement with a total area of 59.5 cm2 with curette to remove viable and non-viable tissue/material including subcutaneous tissue, slough, biofilm, and fibrin/exudate after achieving pain control with 2% lidocaine topical gel.  A minimal amount of bleeding was controlled with pressure. The procedure was tolerated well with a pain level of 0 throughout and a pain level of 0 following the procedure.  Post-debridement measurements unchanged. Character of wound/ulcer post-debridement is improved.     TX: Irrigate with normal saline, pat dry, apply santyl, pack with Ca Alg, and cover with silicone border foam dressing daily and as needed     Turn every 2 hours  Prevalon boots  Air mattress  Cushion to chair  Dietitian following  Supplements per dietitian  Grab bars to bed to assist with bed mobility and repositioning  Weekly skin checks  House barrier cream to buttocks

## 2024-02-21 ENCOUNTER — NURSING HOME VISIT (OUTPATIENT)
Dept: POST ACUTE CARE | Facility: EXTERNAL LOCATION | Age: 89
End: 2024-02-21
Payer: COMMERCIAL

## 2024-02-21 DIAGNOSIS — Z99.2 HYPERTENSIVE HEART AND KIDNEY DISEASE WITH CHRONIC DIASTOLIC CONGESTIVE HEART FAILURE AND STAGE 5 CHRONIC KIDNEY DISEASE ON CHRONIC DIALYSIS (MULTI): Primary | ICD-10-CM

## 2024-02-21 DIAGNOSIS — E11.22 TYPE 2 DIABETES MELLITUS WITH CHRONIC KIDNEY DISEASE ON CHRONIC DIALYSIS, WITH LONG-TERM CURRENT USE OF INSULIN (MULTI): ICD-10-CM

## 2024-02-21 DIAGNOSIS — I13.2 HYPERTENSIVE HEART AND KIDNEY DISEASE WITH CHRONIC DIASTOLIC CONGESTIVE HEART FAILURE AND STAGE 5 CHRONIC KIDNEY DISEASE ON CHRONIC DIALYSIS (MULTI): Primary | ICD-10-CM

## 2024-02-21 DIAGNOSIS — I48.91 ATRIAL FIBRILLATION, UNSPECIFIED TYPE (MULTI): ICD-10-CM

## 2024-02-21 DIAGNOSIS — Z16.12 ESBL (EXTENDED SPECTRUM BETA-LACTAMASE) PRODUCING BACTERIA INFECTION: ICD-10-CM

## 2024-02-21 DIAGNOSIS — Z79.4 TYPE 2 DIABETES MELLITUS WITH CHRONIC KIDNEY DISEASE ON CHRONIC DIALYSIS, WITH LONG-TERM CURRENT USE OF INSULIN (MULTI): ICD-10-CM

## 2024-02-21 DIAGNOSIS — N18.6 HYPERTENSIVE HEART AND KIDNEY DISEASE WITH CHRONIC DIASTOLIC CONGESTIVE HEART FAILURE AND STAGE 5 CHRONIC KIDNEY DISEASE ON CHRONIC DIALYSIS (MULTI): Primary | ICD-10-CM

## 2024-02-21 DIAGNOSIS — A49.9 ESBL (EXTENDED SPECTRUM BETA-LACTAMASE) PRODUCING BACTERIA INFECTION: ICD-10-CM

## 2024-02-21 DIAGNOSIS — Z99.2 TYPE 2 DIABETES MELLITUS WITH CHRONIC KIDNEY DISEASE ON CHRONIC DIALYSIS, WITH LONG-TERM CURRENT USE OF INSULIN (MULTI): ICD-10-CM

## 2024-02-21 DIAGNOSIS — I50.32 HYPERTENSIVE HEART AND KIDNEY DISEASE WITH CHRONIC DIASTOLIC CONGESTIVE HEART FAILURE AND STAGE 5 CHRONIC KIDNEY DISEASE ON CHRONIC DIALYSIS (MULTI): Primary | ICD-10-CM

## 2024-02-21 DIAGNOSIS — Z86.73 HISTORY OF CVA (CEREBROVASCULAR ACCIDENT): ICD-10-CM

## 2024-02-21 DIAGNOSIS — N18.6 TYPE 2 DIABETES MELLITUS WITH CHRONIC KIDNEY DISEASE ON CHRONIC DIALYSIS, WITH LONG-TERM CURRENT USE OF INSULIN (MULTI): ICD-10-CM

## 2024-02-21 PROCEDURE — 99309 SBSQ NF CARE MODERATE MDM 30: CPT | Performed by: INTERNAL MEDICINE

## 2024-02-21 NOTE — LETTER
Patient: Melody Martin  : 1935    Encounter Date: 2024    PROGRESS NOTE    Subjective  Chief complaint: Melody Martin is a 88 y.o. female who is a long term care patient being seen and evaluated for monthly general medical care and follow-up    HPI:  HPI  Patient presents for general medical care and f/u.  Patient seen and examined at bedside.  No issues per nursing.  Patient has no acute complaints.  Patient did test positive for ESBL.  Patient with diagnosis of ESRD on HD, tolerating treatment well.  AFIB stable, denies palpitations and chest pain.  HTN BP at goal.  Denies chest pain and headache.  CHF stable, denies sob, orthopnea, weight gain.  Hx CVA, denies changes in weakness and speech.  DM, denies polydipsia, polyuria, polyphagia.  Mentation at baseline, no acute distress.    Objective  Vital signs: 136/49, 97.449, 18, blood sugar 221, 99%    Physical Exam  Constitutional:       General: She is not in acute distress.  Eyes:      Extraocular Movements: Extraocular movements intact.   Cardiovascular:      Rate and Rhythm: Normal rate and regular rhythm.   Pulmonary:      Effort: Pulmonary effort is normal.      Breath sounds: Normal breath sounds.   Abdominal:      General: Bowel sounds are normal.      Palpations: Abdomen is soft.   Musculoskeletal:      Cervical back: Neck supple.      Right lower leg: No edema.      Left lower leg: No edema.   Neurological:      Mental Status: She is alert.   Psychiatric:         Mood and Affect: Mood normal.         Behavior: Behavior is cooperative.         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 I have reviewed the chart and agree that the record accurately reflects my personal performance of the history, physical exam, discussion and plan.   Wilbert Lock MD            Electronically Signed By: Wilbert Lock MD   2/21/24  4:12 PM

## 2024-02-21 NOTE — PROGRESS NOTES
PROGRESS NOTE    Subjective   Chief complaint: Melody Martin is a 88 y.o. female who is a long term care patient being seen and evaluated for monthly general medical care and follow-up    HPI:  HPI  Patient presents for general medical care and f/u.  Patient seen and examined at bedside.  No issues per nursing.  Patient has no acute complaints.  Patient did test positive for ESBL.  Patient with diagnosis of ESRD on HD, tolerating treatment well.  AFIB stable, denies palpitations and chest pain.  HTN BP at goal.  Denies chest pain and headache.  CHF stable, denies sob, orthopnea, weight gain.  Hx CVA, denies changes in weakness and speech.  DM, denies polydipsia, polyuria, polyphagia.  Mentation at baseline, no acute distress.    Objective   Vital signs: 136/49, 97.449, 18, blood sugar 221, 99%    Physical Exam  Constitutional:       General: She is not in acute distress.  Eyes:      Extraocular Movements: Extraocular movements intact.   Cardiovascular:      Rate and Rhythm: Normal rate and regular rhythm.   Pulmonary:      Effort: Pulmonary effort is normal.      Breath sounds: Normal breath sounds.   Abdominal:      General: Bowel sounds are normal.      Palpations: Abdomen is soft.   Musculoskeletal:      Cervical back: Neck supple.      Right lower leg: No edema.      Left lower leg: No edema.   Neurological:      Mental Status: She is alert.   Psychiatric:         Mood and Affect: Mood normal.         Behavior: Behavior is cooperative.         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  Blood pressure at goal  On HD per nephrology  Antihypertensives  Renal diet  Monitor BP  Remove extra fluid in dialysis         Atrial fibrillation (CMS/[facility])     Monitor heart rate, controlled  Amiodarone  Apixaban  Bleeding precautions         ESBL (extended spectrum beta-lactamase) producing bacteria infection     Barrier precautions  Monitor          Medications, treatments, and labs reviewed  Continue medications and treatments as listed in EMR    Scribe Attestation  I, Carl Martineziboseas   attest that this documentation has been prepared under the direction and in the presence of Wilbert Lock MD    Provider Attestation - Scribe documentation  All medical record entries made by the Scribe were at my direction and personally dictated by me. I have reviewed the chart and agree that the record accurately reflects my personal performance of the history, physical exam, discussion and plan.   Wilbert Lock MD

## 2024-02-22 ENCOUNTER — NURSING HOME VISIT (OUTPATIENT)
Dept: POST ACUTE CARE | Facility: EXTERNAL LOCATION | Age: 89
End: 2024-02-22
Payer: COMMERCIAL

## 2024-02-22 DIAGNOSIS — N76.0 BACTERIAL VAGINOSIS: ICD-10-CM

## 2024-02-22 DIAGNOSIS — L89.150 PRESSURE ULCER OF SACRAL REGION, UNSTAGEABLE (MULTI): ICD-10-CM

## 2024-02-22 DIAGNOSIS — I13.2 HYPERTENSIVE HEART AND KIDNEY DISEASE WITH CHRONIC DIASTOLIC CONGESTIVE HEART FAILURE AND STAGE 5 CHRONIC KIDNEY DISEASE ON CHRONIC DIALYSIS (MULTI): ICD-10-CM

## 2024-02-22 DIAGNOSIS — Z99.2 HYPERTENSIVE HEART AND KIDNEY DISEASE WITH CHRONIC DIASTOLIC CONGESTIVE HEART FAILURE AND STAGE 5 CHRONIC KIDNEY DISEASE ON CHRONIC DIALYSIS (MULTI): ICD-10-CM

## 2024-02-22 DIAGNOSIS — M25.569 KNEE PAIN, UNSPECIFIED CHRONICITY, UNSPECIFIED LATERALITY: ICD-10-CM

## 2024-02-22 DIAGNOSIS — I50.32 HYPERTENSIVE HEART AND KIDNEY DISEASE WITH CHRONIC DIASTOLIC CONGESTIVE HEART FAILURE AND STAGE 5 CHRONIC KIDNEY DISEASE ON CHRONIC DIALYSIS (MULTI): ICD-10-CM

## 2024-02-22 DIAGNOSIS — N18.6 HYPERTENSIVE HEART AND KIDNEY DISEASE WITH CHRONIC DIASTOLIC CONGESTIVE HEART FAILURE AND STAGE 5 CHRONIC KIDNEY DISEASE ON CHRONIC DIALYSIS (MULTI): ICD-10-CM

## 2024-02-22 DIAGNOSIS — B37.31 VAGINAL YEAST INFECTION: Primary | ICD-10-CM

## 2024-02-22 DIAGNOSIS — L89.620 PRESSURE ULCER OF LEFT HEEL, UNSTAGEABLE (MULTI): ICD-10-CM

## 2024-02-22 DIAGNOSIS — B96.89 BACTERIAL VAGINOSIS: ICD-10-CM

## 2024-02-22 PROCEDURE — 11042 DBRDMT SUBQ TIS 1ST 20SQCM/<: CPT | Performed by: NURSE PRACTITIONER

## 2024-02-22 PROCEDURE — 11045 DBRDMT SUBQ TISS EACH ADDL: CPT | Performed by: NURSE PRACTITIONER

## 2024-02-22 PROCEDURE — 99309 SBSQ NF CARE MODERATE MDM 30: CPT | Performed by: NURSE PRACTITIONER

## 2024-02-22 NOTE — LETTER
Patient: Melody Martin  : 1935    Encounter Date: 2024    PROGRESS NOTE    Subjective  Chief complaint: Melody Martin is a 88 y.o. female who is a long term care patient being seen and evaluated for wounds and vaginal discharge.    HPI:  HPI  Patient is seen in follow-up to thick yellow vaginal discharge.  Orders were placed to obtain culture.  Nursing called yesterday reported culture grew Candida and Enterococcus.  Orders given to start Augmentin and fluconazole.  Patient also has complaints of knee pain, not new nor worse than usual.  Patient reporting had Voltaren in the past which was effective for her.    Objective  Vital signs: 136/72, 97.4, 64, 18, blood sugar 196, 99%    Physical Exam  Constitutional:       General: She is not in acute distress.  Eyes:      Extraocular Movements: Extraocular movements intact.   Cardiovascular:      Rate and Rhythm: Normal rate and regular rhythm.   Pulmonary:      Effort: Pulmonary effort is normal.      Breath sounds: Normal breath sounds.   Musculoskeletal:      Cervical back: Neck supple.      Right lower leg: No edema.      Left lower leg: No edema.      Comments: B/L knees tender to palpation, no swelling or discoloration   Skin:     Comments: Wound location -left heel  Etiology - unstageable pressure ulcer  Large eschar  Odor - none  Drainage - none  Size - 3 X 2.5 X UTD centimeters  Undermining -none    Wound location -sacrum  Etiology - unstageable pressure ulcer  Granulation-medium  Slough-medium  Edge-flat  Odor - none  Drainage - medium  Size - 8 x 5 x UTD centimeters   Neurological:      Mental Status: She is alert.      Motor: Weakness present.   Psychiatric:         Mood and Affect: Mood normal.         Behavior: Behavior is cooperative.         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  On HD per nephrology  Antihypertensives  Renal diet  Monitor BP  Remove extra fluid in dialysis            Knee pain     Start Voltaren gel         Pressure ulcer of left heel, unstageable (CMS/HCC)     TX: Irrigate with normal saline, pat dry, paint with Betadine, apply ABD and Kerlix qd     Turn every 2 hours  Prevalon boots  Pressure reducing mattress, patient refusing air mattress, discussed benefits/risks  Cushion to chair  Dietitian consult  Supplements per dietitian  Grab bars to bed to assist with bed mobility and repositioning  Weekly skin checks         Pressure ulcer of sacral region, unstageable (CMS/HCC)     I performed subcutaneous tissue debridement with a total area of 40 cm2 with curette to remove viable and non-viable tissue/material including subcutaneous tissue, slough, biofilm, and fibrin/exudate after achieving pain control with 2% lidocaine topical gel.  A minimal amount of bleeding was controlled with pressure. The procedure was tolerated well with a pain level of 0 throughout and a pain level of 0 following the procedure.  Post-debridement measurements unchanged. Character of wound/ulcer post-debridement is improved.     TX: Irrigate with normal saline, pat dry, apply santyl, pack with Ca Alg, and cover with silicone border foam dressing daily and as needed     Turn every 2 hours  Prevalon boots  Air mattress  Cushion to chair  Dietitian following  Supplements per dietitian  Grab bars to bed to assist with bed mobility and repositioning  Weekly skin checks  House barrier cream to buttocks         Vaginal yeast infection - Primary     Fluconazole          Medications, treatments, and labs reviewed  Continue medications and treatments as listed in EMR    Scribe Attestation  I, Phoebe Martinez   attest that this documentation has been prepared under the direction and in the presence of Karina FLORES  MAR Patel    Provider Attestation - Scribe documentation  All medical record entries made by the Scribe were at my direction and personally dictated by me. I have reviewed the chart and agree that the record accurately reflects my personal performance of the history, physical exam, discussion and plan.   MAR Johnson            Electronically Signed By: MAR Johnson   2/28/24 10:35 PM

## 2024-02-26 PROBLEM — B37.31 VAGINAL YEAST INFECTION: Status: ACTIVE | Noted: 2024-02-26

## 2024-02-26 PROBLEM — M25.569 KNEE PAIN: Status: ACTIVE | Noted: 2024-02-26

## 2024-02-26 PROBLEM — B96.89 BACTERIAL VAGINOSIS: Status: ACTIVE | Noted: 2024-02-26

## 2024-02-26 PROBLEM — N76.0 BACTERIAL VAGINOSIS: Status: ACTIVE | Noted: 2024-02-26

## 2024-02-26 NOTE — ASSESSMENT & PLAN NOTE
I performed subcutaneous tissue debridement with a total area of 40 cm2 with curette to remove viable and non-viable tissue/material including subcutaneous tissue, slough, biofilm, and fibrin/exudate after achieving pain control with 2% lidocaine topical gel.  A minimal amount of bleeding was controlled with pressure. The procedure was tolerated well with a pain level of 0 throughout and a pain level of 0 following the procedure.  Post-debridement measurements unchanged. Character of wound/ulcer post-debridement is improved.     TX: Irrigate with normal saline, pat dry, apply santyl, pack with Ca Alg, and cover with silicone border foam dressing daily and as needed     Turn every 2 hours  Prevalon boots  Air mattress  Cushion to chair  Dietitian following  Supplements per dietitian  Grab bars to bed to assist with bed mobility and repositioning  Weekly skin checks  House barrier cream to buttocks

## 2024-02-26 NOTE — PROGRESS NOTES
PROGRESS NOTE    Subjective   Chief complaint: Melody Martin is a 88 y.o. female who is a long term care patient being seen and evaluated for wounds and vaginal discharge.    HPI:  HPI  Patient is seen in follow-up to thick yellow vaginal discharge.  Orders were placed to obtain culture.  Nursing called yesterday reported culture grew Candida and Enterococcus.  Orders given to start Augmentin and fluconazole.  Patient also has complaints of knee pain, not new nor worse than usual.  Patient reporting had Voltaren in the past which was effective for her.    Objective   Vital signs: 136/72, 97.4, 64, 18, blood sugar 196, 99%    Physical Exam  Constitutional:       General: She is not in acute distress.  Eyes:      Extraocular Movements: Extraocular movements intact.   Cardiovascular:      Rate and Rhythm: Normal rate and regular rhythm.   Pulmonary:      Effort: Pulmonary effort is normal.      Breath sounds: Normal breath sounds.   Musculoskeletal:      Cervical back: Neck supple.      Right lower leg: No edema.      Left lower leg: No edema.      Comments: B/L knees tender to palpation, no swelling or discoloration   Skin:     Comments: Wound location -left heel  Etiology - unstageable pressure ulcer  Large eschar  Odor - none  Drainage - none  Size - 3 X 2.5 X UTD centimeters  Undermining -none    Wound location -sacrum  Etiology - unstageable pressure ulcer  Granulation-medium  Slough-medium  Edge-flat  Odor - none  Drainage - medium  Size - 8 x 5 x UTD centimeters   Neurological:      Mental Status: She is alert.      Motor: Weakness present.   Psychiatric:         Mood and Affect: Mood normal.         Behavior: Behavior is cooperative.         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  On HD per nephrology  Antihypertensives  Renal diet  Monitor BP  Remove extra fluid in dialysis            Knee pain     Start Voltaren gel         Pressure ulcer of left heel, unstageable (CMS/HCC)     TX: Irrigate with normal saline, pat dry, paint with Betadine, apply ABD and Kerlix qd     Turn every 2 hours  Prevalon boots  Pressure reducing mattress, patient refusing air mattress, discussed benefits/risks  Cushion to chair  Dietitian consult  Supplements per dietitian  Grab bars to bed to assist with bed mobility and repositioning  Weekly skin checks         Pressure ulcer of sacral region, unstageable (CMS/HCC)     I performed subcutaneous tissue debridement with a total area of 40 cm2 with curette to remove viable and non-viable tissue/material including subcutaneous tissue, slough, biofilm, and fibrin/exudate after achieving pain control with 2% lidocaine topical gel.  A minimal amount of bleeding was controlled with pressure. The procedure was tolerated well with a pain level of 0 throughout and a pain level of 0 following the procedure.  Post-debridement measurements unchanged. Character of wound/ulcer post-debridement is improved.     TX: Irrigate with normal saline, pat dry, apply santyl, pack with Ca Alg, and cover with silicone border foam dressing daily and as needed     Turn every 2 hours  Prevalon boots  Air mattress  Cushion to chair  Dietitian following  Supplements per dietitian  Grab bars to bed to assist with bed mobility and repositioning  Weekly skin checks  House barrier cream to buttocks         Vaginal yeast infection - Primary     Fluconazole          Medications, treatments, and labs reviewed  Continue medications and treatments as listed in EMR    Scribe Attestation  I, Phoebe Martinez   attest that this documentation has been prepared under the direction and in the presence of JANELL Johnson-CNP    Provider Attestation - Scribe documentation  All  medical record entries made by the Scribe were at my direction and personally dictated by me. I have reviewed the chart and agree that the record accurately reflects my personal performance of the history, physical exam, discussion and plan.   Karina Patel, JANELL-CNP

## 2024-02-27 ENCOUNTER — NURSING HOME VISIT (OUTPATIENT)
Dept: POST ACUTE CARE | Facility: EXTERNAL LOCATION | Age: 89
End: 2024-02-27
Payer: COMMERCIAL

## 2024-02-27 DIAGNOSIS — N18.6 TYPE 2 DIABETES MELLITUS WITH CHRONIC KIDNEY DISEASE ON CHRONIC DIALYSIS, WITH LONG-TERM CURRENT USE OF INSULIN (MULTI): ICD-10-CM

## 2024-02-27 DIAGNOSIS — I48.91 ATRIAL FIBRILLATION, UNSPECIFIED TYPE (MULTI): ICD-10-CM

## 2024-02-27 DIAGNOSIS — R07.89 ATYPICAL CHEST PAIN: Primary | ICD-10-CM

## 2024-02-27 DIAGNOSIS — Z79.4 TYPE 2 DIABETES MELLITUS WITH CHRONIC KIDNEY DISEASE ON CHRONIC DIALYSIS, WITH LONG-TERM CURRENT USE OF INSULIN (MULTI): ICD-10-CM

## 2024-02-27 DIAGNOSIS — I50.32 HYPERTENSIVE HEART AND KIDNEY DISEASE WITH CHRONIC DIASTOLIC CONGESTIVE HEART FAILURE AND STAGE 5 CHRONIC KIDNEY DISEASE ON CHRONIC DIALYSIS (MULTI): ICD-10-CM

## 2024-02-27 DIAGNOSIS — Z99.2 TYPE 2 DIABETES MELLITUS WITH CHRONIC KIDNEY DISEASE ON CHRONIC DIALYSIS, WITH LONG-TERM CURRENT USE OF INSULIN (MULTI): ICD-10-CM

## 2024-02-27 DIAGNOSIS — E11.22 TYPE 2 DIABETES MELLITUS WITH CHRONIC KIDNEY DISEASE ON CHRONIC DIALYSIS, WITH LONG-TERM CURRENT USE OF INSULIN (MULTI): ICD-10-CM

## 2024-02-27 DIAGNOSIS — Z99.2 HYPERTENSIVE HEART AND KIDNEY DISEASE WITH CHRONIC DIASTOLIC CONGESTIVE HEART FAILURE AND STAGE 5 CHRONIC KIDNEY DISEASE ON CHRONIC DIALYSIS (MULTI): ICD-10-CM

## 2024-02-27 DIAGNOSIS — N18.6 HYPERTENSIVE HEART AND KIDNEY DISEASE WITH CHRONIC DIASTOLIC CONGESTIVE HEART FAILURE AND STAGE 5 CHRONIC KIDNEY DISEASE ON CHRONIC DIALYSIS (MULTI): ICD-10-CM

## 2024-02-27 DIAGNOSIS — I13.2 HYPERTENSIVE HEART AND KIDNEY DISEASE WITH CHRONIC DIASTOLIC CONGESTIVE HEART FAILURE AND STAGE 5 CHRONIC KIDNEY DISEASE ON CHRONIC DIALYSIS (MULTI): ICD-10-CM

## 2024-02-27 PROBLEM — Z16.13 CARBAPENEM-RESISTANT KLEBSIELLA PNEUMONIAE INFECTION: Status: ACTIVE | Noted: 2024-02-27

## 2024-02-27 PROBLEM — A49.8 CARBAPENEM-RESISTANT KLEBSIELLA PNEUMONIAE INFECTION: Status: ACTIVE | Noted: 2024-02-27

## 2024-02-27 PROBLEM — M62.838 MUSCLE SPASM: Status: ACTIVE | Noted: 2024-02-27

## 2024-02-27 PROCEDURE — 99309 SBSQ NF CARE MODERATE MDM 30: CPT | Performed by: NURSE PRACTITIONER

## 2024-02-27 NOTE — PROGRESS NOTES
PROGRESS NOTE    Subjective   Chief complaint: Melody Martin is a 88 y.o. female who is a long term care patient being seen and evaluated for chest pain    HPI:  Nurse called and reported patient with complaints of chest pain in her left chest.  Patient was seen and evaluated via telemedicine.  Patient states that the pain is located at the base of the left ribs and is reproducible/worsened by taking a deep breath.  Nursing staff reports lungs clear.  Patient also endorses no bowel movement for a few days and chronic constipation.  She says that she took MiraLAX which was ineffective for her.  She denies injury shortness of breath nausea vomiting fever chills.    Objective   Vital signs: 129/68, 97.2, 59, 18, 99%, blood sugar 370    Physical Exam  Constitutional:       General: She is not in acute distress.  Eyes:      Extraocular Movements: Extraocular movements intact.   Pulmonary:      Effort: Pulmonary effort is normal.   Musculoskeletal:      Cervical back: Neck supple.   Neurological:      Mental Status: She is alert.   Psychiatric:         Mood and Affect: Mood normal.         Behavior: Behavior is cooperative.       Assessment/Plan   Problem List Items Addressed This Visit       Atrial fibrillation (CMS/HCC)     HR controlled  Amiodarone  Apixaban   Bleeding precautions         Atypical chest pain - Primary     Pain is reproducible  Musculoskeletal versus secondary to constipation.  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  On HD per nephrology  Antihypertensives  Renal diet  Monitor BP  Remove extra fluid in dialysis            Type 2 diabetes mellitus with chronic kidney disease on chronic dialysis, with long-term current use of insulin (CMS/HCC)     Carb controlled diet  Monitor  Glucoscan  Glargine  Humalog  FBG elevated  Will increase glargine           Medications, treatments, and labs reviewed  Continue medications and treatments as listed in EMR  An interactive audio and/or video telecommunication system which permits real time communications between the patient (at the originating site) and provider (at a distant site) was utilized to provide this telehealth service after obtaining verbal consent.      Karina Patel, APRN-CNP

## 2024-02-27 NOTE — PROGRESS NOTES
PROGRESS NOTE    Subjective   Chief complaint: Melody Martin is a 88 y.o. female who is a long term care patient being seen and evaluated for medication review    HPI:  HPI  Asked to evaluate patient for continued use of methocarbamol for muscle spasms.  Patient is stable, denies pain.  Orders placed to discontinue medication at this time.  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  Patient was seen and examined at bedside, appears to be in no acute distress.    Objective   Vital signs: 158/78, 97.4, 64, 18, blood sugar 191, 99%    Physical Exam  Constitutional:       General: She is not in acute distress.  Eyes:      Extraocular Movements: Extraocular movements intact.   Cardiovascular:      Rate and Rhythm: Normal rate and regular rhythm.   Pulmonary:      Effort: Pulmonary effort is normal.      Breath sounds: Normal breath sounds.   Musculoskeletal:      Cervical back: Neck supple.      Right lower leg: No edema.      Left lower leg: No edema.   Neurological:      Mental Status: She is alert.      Motor: Weakness present.   Psychiatric:         Mood and Affect: Mood normal.         Behavior: Behavior is cooperative.         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 I have reviewed the chart and agree that the record accurately reflects my personal performance of the history, physical exam, discussion and plan.   MAR Johnson

## 2024-02-27 NOTE — LETTER
Patient: Melody Martin  : 1935    Encounter Date: 2024    PROGRESS NOTE    Subjective  Chief complaint: Melody Martin is a 88 y.o. female who is a long term care patient being seen and evaluated for chest pain    HPI:  Nurse called and reported patient with complaints of chest pain in her left chest.  Patient was seen and evaluated via telemedicine.  Patient states that the pain is located at the base of the left ribs and is reproducible/worsened by taking a deep breath.  Nursing staff reports lungs clear.  Patient also endorses no bowel movement for a few days and chronic constipation.  She says that she took MiraLAX which was ineffective for her.  She denies injury shortness of breath nausea vomiting fever chills.    Objective   Vital signs: 129/68, 97.2, 59, 18, 99%, blood sugar 370    Physical Exam  Constitutional:       General: She is not in acute distress.  Eyes:      Extraocular Movements: Extraocular movements intact.   Pulmonary:      Effort: Pulmonary effort is normal.   Musculoskeletal:      Cervical back: Neck supple.   Neurological:      Mental Status: She is alert.   Psychiatric:         Mood and Affect: Mood normal.         Behavior: Behavior is cooperative.       Assessment/Plan  Problem List Items Addressed This Visit       Atrial fibrillation (CMS/HCC)     HR controlled  Amiodarone  Apixaban   Bleeding precautions         Atypical chest pain - Primary     Pain is reproducible  Musculoskeletal versus secondary to constipation.  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  On HD per nephrology  Antihypertensives  Renal diet  Monitor BP  Remove extra fluid in dialysis            Type 2 diabetes mellitus with chronic kidney disease on chronic dialysis, with long-term current use of  insulin (CMS/[facility])     Carb controlled diet  Monitor Glucoscan  Glargine  Humalog  FBG elevated  Will increase glargine           Medications, treatments, and labs reviewed  Continue medications and treatments as listed in EMR  An interactive audio and/or video telecommunication system which permits real time communications between the patient (at the originating site) and provider (at a distant site) was utilized to provide this telehealth service after obtaining verbal consent.      MAR Johnson      Electronically Signed By: MAR Johnson   2/27/24  4:19 PM

## 2024-02-27 NOTE — ASSESSMENT & PLAN NOTE
Stable, no shortness of breath.  On HD per nephrology  Antihypertensives  Renal diet  Monitor BP  Remove extra fluid in dialysis

## 2024-02-27 NOTE — ASSESSMENT & PLAN NOTE
Pain is reproducible  Musculoskeletal versus secondary to constipation.  Will give Maalox and lactulose now  Obtain KUB stat

## 2024-02-29 ENCOUNTER — NURSING HOME VISIT (OUTPATIENT)
Dept: POST ACUTE CARE | Facility: EXTERNAL LOCATION | Age: 89
End: 2024-02-29
Payer: COMMERCIAL

## 2024-02-29 DIAGNOSIS — I50.32 HYPERTENSIVE HEART AND KIDNEY DISEASE WITH CHRONIC DIASTOLIC CONGESTIVE HEART FAILURE AND STAGE 5 CHRONIC KIDNEY DISEASE ON CHRONIC DIALYSIS (MULTI): ICD-10-CM

## 2024-02-29 DIAGNOSIS — I13.2 HYPERTENSIVE HEART AND KIDNEY DISEASE WITH CHRONIC DIASTOLIC CONGESTIVE HEART FAILURE AND STAGE 5 CHRONIC KIDNEY DISEASE ON CHRONIC DIALYSIS (MULTI): ICD-10-CM

## 2024-02-29 DIAGNOSIS — L89.153 STAGE III PRESSURE ULCER OF SACRAL REGION (MULTI): ICD-10-CM

## 2024-02-29 DIAGNOSIS — Z99.2 HYPERTENSIVE HEART AND KIDNEY DISEASE WITH CHRONIC DIASTOLIC CONGESTIVE HEART FAILURE AND STAGE 5 CHRONIC KIDNEY DISEASE ON CHRONIC DIALYSIS (MULTI): ICD-10-CM

## 2024-02-29 DIAGNOSIS — N18.6 HYPERTENSIVE HEART AND KIDNEY DISEASE WITH CHRONIC DIASTOLIC CONGESTIVE HEART FAILURE AND STAGE 5 CHRONIC KIDNEY DISEASE ON CHRONIC DIALYSIS (MULTI): ICD-10-CM

## 2024-02-29 DIAGNOSIS — L89.620 PRESSURE ULCER OF LEFT HEEL, UNSTAGEABLE (MULTI): Primary | ICD-10-CM

## 2024-02-29 DIAGNOSIS — I48.91 ATRIAL FIBRILLATION, UNSPECIFIED TYPE (MULTI): ICD-10-CM

## 2024-02-29 PROCEDURE — 11045 DBRDMT SUBQ TISS EACH ADDL: CPT | Performed by: NURSE PRACTITIONER

## 2024-02-29 PROCEDURE — 99309 SBSQ NF CARE MODERATE MDM 30: CPT | Performed by: NURSE PRACTITIONER

## 2024-02-29 PROCEDURE — 11042 DBRDMT SUBQ TIS 1ST 20SQCM/<: CPT | Performed by: NURSE PRACTITIONER

## 2024-02-29 NOTE — ASSESSMENT & PLAN NOTE
I performed subcutaneous tissue debridement with a total area of 27.5 cm2 with curette to remove viable and non-viable tissue/material including subcutaneous tissue, slough, biofilm, and fibrin/exudate after achieving pain control with 2% lidocaine topical gel.  A minimal amount of bleeding was controlled with pressure. The procedure was tolerated well with a pain level of 0 throughout and a pain level of 0 following the procedure.  Post-debridement measurements unchanged. Character of wound/ulcer post-debridement is improved.     TX: Irrigate with normal saline, pat dry, apply calcium alginate and cover with silicone border foam dressing daily and as needed     Turn every 2 hours  Prevalon boots  Air mattress  Cushion to chair  Dietitian following  Supplements per dietitian  Grab bars to bed to assist with bed mobility and repositioning  Weekly skin checks  House barrier cream to buttocks

## 2024-02-29 NOTE — LETTER
Patient: Mleody Martin  : 1935    Encounter Date: 2024    PROGRESS NOTE    Subjective  Chief complaint: Melody Martin is a 88 y.o. female who is a long term care patient being seen and evaluated for follow-up wounds.    HPI:  HPI      Objective  Vital signs: 154/57, 98.0, 64, 18, blood sugar 269, 95%    Physical Exam  Constitutional:       General: She is not in acute distress.  Eyes:      Extraocular Movements: Extraocular movements intact.   Cardiovascular:      Rate and Rhythm: Normal rate and regular rhythm.   Pulmonary:      Effort: Pulmonary effort is normal.      Breath sounds: Normal breath sounds.   Musculoskeletal:      Cervical back: Neck supple.      Right lower leg: No edema.      Left lower leg: No edema.   Skin:     Comments: Wound location -left heel  Etiology - unstageable pressure ulcer  Granulation-small  Slough-large  Edge- flat  Odor - none  Drainage - small serosanguineous  Size - 3 X 2.5 X UTD centimeters  Undermining -none    Wound location -sacrum  Etiology - unstageable pressure ulcer  Granulation-large  Slough-small  Edge-flat  Odor - none  Drainage - medium  Size - 5 x 5.5 x 0.2 centimeters   Neurological:      Mental Status: She is alert.   Psychiatric:         Mood and Affect: Mood normal.         Behavior: Behavior is cooperative.         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  On HD per nephrology  Antihypertensives  Renal diet  Monitor BP  Remove extra fluid in dialysis         Pressure ulcer of left heel, unstageable (CMS/HCC) - Primary     I performed subcutaneous tissue debridement with a total area of 7.5 cm2 with curette to remove viable and non-viable tissue/material including subcutaneous tissue, slough, biofilm, and fibrin/exudate after achieving pain  control with 2% lidocaine topical gel.  A minimal amount of bleeding was controlled with pressure. The procedure was tolerated well with a pain level of 0 throughout and a pain level of 0 following the procedure.  Post-debridement measurements unchanged. Character of wound/ulcer post-debridement is improved.         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  A minimal amount of bleeding was controlled with pressure. The procedure was tolerated well with a pain level of 0 throughout and a pain level of 0 following the procedure.  Post-debridement measurements unchanged. Character of wound/ulcer post-debridement is improved.     TX: Irrigate with normal saline, pat dry, apply calcium alginate and cover with silicone border foam dressing daily and as needed     Turn every 2 hours  Prevalon boots  Air mattress  Cushion to chair  Dietitian following  Supplements per dietitian  Grab bars to bed to assist with bed mobility and repositioning  Weekly skin checks  House barrier cream to buttocks          Medications, treatments, and labs reviewed  Continue medications and treatments as listed in EMR    Scribe Attestation  I, Phoebe Martinez   attest that  this documentation has been prepared under the direction and in the presence of MAR Johnson    Provider Attestation - Scribe documentation  All medical record entries made by the Scribe were at my direction and personally dictated by me. I have reviewed the chart and agree that the record accurately reflects my personal performance of the history, physical exam, discussion and plan.   MAR Johnsno            Electronically Signed By: MAR Johnson   2/29/24  9:08 PM

## 2024-02-29 NOTE — PROGRESS NOTES
PROGRESS NOTE    Subjective   Chief complaint: Melody Martin is a 88 y.o. female who is a long term care patient being seen and evaluated for follow-up wounds.    HPI:  HPI  Patient presents for fu wounds.  No new concerns today.  Denies constitutional symptoms.    Objective   Vital signs: 154/57, 98.0, 64, 18, blood sugar 269, 95%    Physical Exam  Constitutional:       General: She is not in acute distress.  Eyes:      Extraocular Movements: Extraocular movements intact.   Cardiovascular:      Rate and Rhythm: Normal rate and regular rhythm.   Pulmonary:      Effort: Pulmonary effort is normal.      Breath sounds: Normal breath sounds.   Musculoskeletal:      Cervical back: Neck supple.      Right lower leg: No edema.      Left lower leg: No edema.   Skin:     Comments: Wound location -left heel  Etiology - unstageable pressure ulcer  Granulation-small  Slough-large  Edge- flat  Odor - none  Drainage - small serosanguineous  Size - 3 X 2.5 X UTD centimeters  Undermining -none    Wound location -sacrum  Etiology - unstageable pressure ulcer  Granulation-large  Slough-small  Edge-flat  Odor - none  Drainage - medium  Size - 5 x 5.5 x 0.2 centimeters   Neurological:      Mental Status: She is alert.   Psychiatric:         Mood and Affect: Mood normal.         Behavior: Behavior is cooperative.         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  On HD per nephrology  Antihypertensives  Renal diet  Monitor BP  Remove extra fluid in dialysis         Pressure ulcer of left heel, unstageable (CMS/HCC) - Primary     I performed subcutaneous tissue debridement with a total area of 7.5 cm2 with curette to remove viable and non-viable tissue/material including subcutaneous tissue, slough, biofilm, and fibrin/exudate  after achieving pain control with 2% lidocaine topical gel.  A minimal amount of bleeding was controlled with pressure. The procedure was tolerated well with a pain level of 0 throughout and a pain level of 0 following the procedure.  Post-debridement measurements unchanged. Character of wound/ulcer post-debridement is improved.         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  A minimal amount of bleeding was controlled with pressure. The procedure was tolerated well with a pain level of 0 throughout and a pain level of 0 following the procedure.  Post-debridement measurements unchanged. Character of wound/ulcer post-debridement is improved.     TX: Irrigate with normal saline, pat dry, apply calcium alginate and cover with silicone border foam dressing daily and as needed     Turn every 2 hours  Prevalon boots  Air mattress  Cushion to chair  Dietitian following  Supplements per dietitian  Grab bars to bed to assist with bed mobility and repositioning  Weekly skin checks  House barrier cream to buttocks          Medications, treatments, and labs reviewed  Continue medications and treatments as listed in EMR    Scribe Attestation  I, Hien Conn,  Scribe   attest that this documentation has been prepared under the direction and in the presence of MAR Johnson    Provider Attestation - Scribe documentation  All medical record entries made by the Scribe were at my direction and personally dictated by me. I have reviewed the chart and agree that the record accurately reflects my personal performance of the history, physical exam, discussion and plan.   MAR Johnson           Topical Retinoid counseling:  Patient advised to apply a pea-sized amount only at bedtime and wait 30 minutes after washing their face before applying.  If too drying, patient may add a non-comedogenic moisturizer. The patient verbalized understanding of the proper use and possible adverse effects of retinoids.  All of the patient's questions and concerns were addressed.

## 2024-02-29 NOTE — ASSESSMENT & PLAN NOTE
I performed subcutaneous tissue debridement with a total area of 7.5 cm2 with curette to remove viable and non-viable tissue/material including subcutaneous tissue, slough, biofilm, and fibrin/exudate after achieving pain control with 2% lidocaine topical gel.  A minimal amount of bleeding was controlled with pressure. The procedure was tolerated well with a pain level of 0 throughout and a pain level of 0 following the procedure.  Post-debridement measurements unchanged. Character of wound/ulcer post-debridement is improved.         TX: Irrigate with normal saline, pat dry, apply Santyl, calcium alginate, and cover with foam dressing     Turn every 2 hours  Prevalon boots  Pressure reducing mattress, patient refusing air mattress, discussed benefits/risks  Cushion to chair  Dietitian consult  Supplements per dietitian  Grab bars to bed to assist with bed mobility and repositioning  Weekly skin checks

## 2024-03-06 ENCOUNTER — NURSING HOME VISIT (OUTPATIENT)
Dept: POST ACUTE CARE | Facility: EXTERNAL LOCATION | Age: 89
End: 2024-03-06
Payer: COMMERCIAL

## 2024-03-06 DIAGNOSIS — Z99.2 HYPERTENSIVE HEART AND KIDNEY DISEASE WITH CHRONIC DIASTOLIC CONGESTIVE HEART FAILURE AND STAGE 5 CHRONIC KIDNEY DISEASE ON CHRONIC DIALYSIS (MULTI): Primary | ICD-10-CM

## 2024-03-06 DIAGNOSIS — I13.2 HYPERTENSIVE HEART AND KIDNEY DISEASE WITH CHRONIC DIASTOLIC CONGESTIVE HEART FAILURE AND STAGE 5 CHRONIC KIDNEY DISEASE ON CHRONIC DIALYSIS (MULTI): Primary | ICD-10-CM

## 2024-03-06 DIAGNOSIS — N18.6 HYPERTENSIVE HEART AND KIDNEY DISEASE WITH CHRONIC DIASTOLIC CONGESTIVE HEART FAILURE AND STAGE 5 CHRONIC KIDNEY DISEASE ON CHRONIC DIALYSIS (MULTI): Primary | ICD-10-CM

## 2024-03-06 DIAGNOSIS — I50.32 HYPERTENSIVE HEART AND KIDNEY DISEASE WITH CHRONIC DIASTOLIC CONGESTIVE HEART FAILURE AND STAGE 5 CHRONIC KIDNEY DISEASE ON CHRONIC DIALYSIS (MULTI): Primary | ICD-10-CM

## 2024-03-06 DIAGNOSIS — I48.91 ATRIAL FIBRILLATION, UNSPECIFIED TYPE (MULTI): ICD-10-CM

## 2024-03-06 PROCEDURE — 99308 SBSQ NF CARE LOW MDM 20: CPT | Performed by: INTERNAL MEDICINE

## 2024-03-06 NOTE — PROGRESS NOTES
PROGRESS NOTE    Subjective   Chief complaint: Melody Martin is a 88 y.o. female who is a long term care patient being seen and evaluated for hypertension.    HPI:  HPI  Patient is seen in follow-up to hypertension.  Patient recently had adjustments in medications, with an increase in Cardizem.  Patient is tolerating well.  Patient seen and examined at bedside, appears to be in no acute distress.  Denies chest pain or shortness of breath.  Denies headache or blurred vision.    Objective   Vital signs: 146/64, 98.0, 58, 18, 96%, blood sugar 299    Physical Exam  Constitutional:       General: She is not in acute distress.  Eyes:      Extraocular Movements: Extraocular movements intact.   Cardiovascular:      Rate and Rhythm: Normal rate and regular rhythm.   Pulmonary:      Effort: Pulmonary effort is normal.      Breath sounds: Normal breath sounds.   Abdominal:      General: Bowel sounds are normal.      Palpations: Abdomen is soft.   Musculoskeletal:      Cervical back: Neck supple.      Right lower leg: No edema.      Left lower leg: No edema.   Neurological:      Mental Status: She is alert.   Psychiatric:         Mood and Affect: Mood normal.         Behavior: Behavior is cooperative.         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  On HD per nephrology  Antihypertensives  Tolerated recent increase of Cardizem  Renal diet  Monitor BP  Remove extra fluid in dialysis         Atrial fibrillation (CMS/[facility])     Monitor heart rate, controlled  Amiodarone  Apixaban  Bleeding precautions          Medications, treatments, and labs reviewed  Continue medications and treatments as listed in EMR    Scribe Attestation  I, Phoebe Martinez   attest that this documentation has been prepared under the  direction and in the presence of Wilbert Lock MD    Provider Attestation - Scribe documentation  All medical record entries made by the Scribe were at my direction and personally dictated by me. I have reviewed the chart and agree that the record accurately reflects my personal performance of the history, physical exam, discussion and plan.   Wilbert Lock MD

## 2024-03-06 NOTE — LETTER
Patient: Melody Martin  : 1935    Encounter Date: 2024    PROGRESS NOTE    Subjective  Chief complaint: Melody Martin is a 88 y.o. female who is a long term care patient being seen and evaluated for hypertension.    HPI:  HPI  Patient is seen in follow-up to hypertension.  Patient recently had adjustments in medications, with an increase in Cardizem.  Patient is tolerating well.  Patient seen and examined at bedside, appears to be in no acute distress.  Denies chest pain or shortness of breath.  Denies headache or blurred vision.    Objective  Vital signs: 146/64, 98.0, 58, 18, 96%, blood sugar 299    Physical Exam  Constitutional:       General: She is not in acute distress.  Eyes:      Extraocular Movements: Extraocular movements intact.   Cardiovascular:      Rate and Rhythm: Normal rate and regular rhythm.   Pulmonary:      Effort: Pulmonary effort is normal.      Breath sounds: Normal breath sounds.   Abdominal:      General: Bowel sounds are normal.      Palpations: Abdomen is soft.   Musculoskeletal:      Cervical back: Neck supple.      Right lower leg: No edema.      Left lower leg: No edema.   Neurological:      Mental Status: She is alert.   Psychiatric:         Mood and Affect: Mood normal.         Behavior: Behavior is cooperative.         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  On HD per nephrology  Antihypertensives  Tolerated recent increase of Cardizem  Renal diet  Monitor BP  Remove extra fluid in dialysis         Atrial fibrillation (CMS/HCC)     Monitor heart rate, controlled  Amiodarone  Apixaban  Bleeding precautions          Medications, treatments, and labs reviewed  Continue medications and treatments as listed in EMR    Scribe Attestation  IHien  Phoebe Conn   attest that this documentation has been prepared under the direction and in the presence of Wilbert Lock MD    Provider Attestation - Scribe documentation  All medical record entries made by the Scribe were at my direction and personally dictated by me. I have reviewed the chart and agree that the record accurately reflects my personal performance of the history, physical exam, discussion and plan.   Wilbert Lock MD            Electronically Signed By: Wilbert Lock MD   3/6/24  4:10 PM

## 2024-03-06 NOTE — ASSESSMENT & PLAN NOTE
Stable, no shortness of breath.  On HD per nephrology  Antihypertensives  Tolerated recent increase of Cardizem  Renal diet  Monitor BP  Remove extra fluid in dialysis

## 2024-03-07 ENCOUNTER — NURSING HOME VISIT (OUTPATIENT)
Dept: POST ACUTE CARE | Facility: EXTERNAL LOCATION | Age: 89
End: 2024-03-07
Payer: COMMERCIAL

## 2024-03-07 DIAGNOSIS — L89.153 STAGE III PRESSURE ULCER OF SACRAL REGION (MULTI): ICD-10-CM

## 2024-03-07 DIAGNOSIS — I13.2 HYPERTENSIVE HEART AND KIDNEY DISEASE WITH CHRONIC DIASTOLIC CONGESTIVE HEART FAILURE AND STAGE 5 CHRONIC KIDNEY DISEASE ON CHRONIC DIALYSIS (MULTI): ICD-10-CM

## 2024-03-07 DIAGNOSIS — F32.A DEPRESSION, UNSPECIFIED DEPRESSION TYPE: Primary | ICD-10-CM

## 2024-03-07 DIAGNOSIS — L89.620 PRESSURE ULCER OF LEFT HEEL, UNSTAGEABLE (MULTI): ICD-10-CM

## 2024-03-07 DIAGNOSIS — N18.6 HYPERTENSIVE HEART AND KIDNEY DISEASE WITH CHRONIC DIASTOLIC CONGESTIVE HEART FAILURE AND STAGE 5 CHRONIC KIDNEY DISEASE ON CHRONIC DIALYSIS (MULTI): ICD-10-CM

## 2024-03-07 DIAGNOSIS — I50.32 HYPERTENSIVE HEART AND KIDNEY DISEASE WITH CHRONIC DIASTOLIC CONGESTIVE HEART FAILURE AND STAGE 5 CHRONIC KIDNEY DISEASE ON CHRONIC DIALYSIS (MULTI): ICD-10-CM

## 2024-03-07 DIAGNOSIS — Z99.2 HYPERTENSIVE HEART AND KIDNEY DISEASE WITH CHRONIC DIASTOLIC CONGESTIVE HEART FAILURE AND STAGE 5 CHRONIC KIDNEY DISEASE ON CHRONIC DIALYSIS (MULTI): ICD-10-CM

## 2024-03-07 PROCEDURE — 99309 SBSQ NF CARE MODERATE MDM 30: CPT | Performed by: NURSE PRACTITIONER

## 2024-03-07 NOTE — LETTER
Patient: Melody Martin  : 1935    Encounter Date: 2024    PROGRESS NOTE    Subjective  Chief complaint: Melody Martin is a 88 y.o. female who is a long term care patient being seen and evaluated for wounds and depression.    HPI:  HPI  Patient is seen due to nurse reporting patient with periods of tearfulness at times.  Patient recently lost her daughter.  Patient denies thoughts of harming self.  Also presents for follow-up of wounds.  Patient seen and examined at bedside, appears to be in no acute distress.    Objective  Vital signs: 134/65, 97.7, 62, 18, blood sugar 411, 96%    Physical Exam  Constitutional:       General: She is not in acute distress.  Eyes:      Extraocular Movements: Extraocular movements intact.   Cardiovascular:      Rate and Rhythm: Normal rate and regular rhythm.   Pulmonary:      Effort: Pulmonary effort is normal.      Breath sounds: Normal breath sounds.   Musculoskeletal:      Cervical back: Neck supple.      Right lower leg: No edema.      Left lower leg: No edema.   Skin:     Comments: Wound location -left heel  Etiology - unstageable pressure ulcer  Granulation-small  Slough-large  Edge- flat  Odor - none  Drainage - medium serosanguineous  Size - 3.5 X 3.5 X UTD centimeters  Undermining -none    Wound location -sacrum  Etiology - stage 3 pressure ulcer  Darkened area noted in wound bed  Granulation-large  Slough-small  Edge-flat  Odor - none  Drainage - medium  Size - 6 x 8 x 0.2 centimeters   Neurological:      Mental Status: She is alert.   Psychiatric:         Mood and Affect: Mood normal.         Behavior: Behavior is cooperative.         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 (CMS/HCC)     TX: Irrigate with normal saline, pat dry, apply calcium alginate and cover with silicone border foam dressing daily and as needed     Turn every 2 hours  Prevalon boots  Air mattress  Cushion to chair  Dietitian following  Supplements per dietitian  Grab bars to bed to assist with bed mobility and repositioning  Weekly skin checks  House barrier cream to buttocks         Depression - Primary     Increase BuSpar  Psych consult          Medications, treatments, and labs reviewed  Continue medications and treatments as listed in EMR    Scribe Attestation  IHien Scribe   attest that this documentation has been prepared under the direction and in the presence of MAR Johnson    Provider Attestation - Scribe documentation  All medical record entries made by the Scribe were at my direction and personally dictated by me. I have reviewed the chart and agree that the record accurately reflects my personal performance of the history, physical exam, discussion and plan.   MAR Johnson            Electronically Signed By: MAR Johnson   3/16/24  2:51 PM

## 2024-03-12 PROBLEM — F32.A DEPRESSION: Status: ACTIVE | Noted: 2024-03-12

## 2024-03-12 NOTE — ASSESSMENT & PLAN NOTE
TX: Irrigate with normal saline, pat dry, apply Santyl, calcium alginate, and cover with foam dressing     Turn every 2 hours  Prevalon boots  Pressure reducing mattress, patient refusing air mattress, discussed benefits/risks  Cushion to chair  Dietitian consult  Supplements per dietitian  Grab bars to bed to assist with bed mobility and repositioning  Weekly skin checks

## 2024-03-12 NOTE — ASSESSMENT & PLAN NOTE
TX: Irrigate with normal saline, pat dry, apply calcium alginate and cover with silicone border foam dressing daily and as needed     Turn every 2 hours  Prevalon boots  Air mattress  Cushion to chair  Dietitian following  Supplements per dietitian  Grab bars to bed to assist with bed mobility and repositioning  Weekly skin checks  House barrier cream to buttocks

## 2024-03-12 NOTE — PROGRESS NOTES
PROGRESS NOTE    Subjective   Chief complaint: Melody Martin is a 88 y.o. female who is a long term care patient being seen and evaluated for wounds and depression.    HPI:  HPI  Patient is seen due to nurse reporting patient with periods of tearfulness at times.  Patient recently lost her daughter.  Patient denies thoughts of harming self.  Also presents for follow-up of wounds.  Patient seen and examined at bedside, appears to be in no acute distress.    Objective   Vital signs: 134/65, 97.7, 62, 18, blood sugar 411, 96%    Physical Exam  Constitutional:       General: She is not in acute distress.  Eyes:      Extraocular Movements: Extraocular movements intact.   Cardiovascular:      Rate and Rhythm: Normal rate and regular rhythm.   Pulmonary:      Effort: Pulmonary effort is normal.      Breath sounds: Normal breath sounds.   Musculoskeletal:      Cervical back: Neck supple.      Right lower leg: No edema.      Left lower leg: No edema.   Skin:     Comments: Wound location -left heel  Etiology - unstageable pressure ulcer  Granulation-small  Slough-large  Edge- flat  Odor - none  Drainage - medium serosanguineous  Size - 3.5 X 3.5 X UTD centimeters  Undermining -none    Wound location -sacrum  Etiology - stage 3 pressure ulcer  Darkened area noted in wound bed  Granulation-large  Slough-small  Edge-flat  Odor - none  Drainage - medium  Size - 6 x 8 x 0.2 centimeters   Neurological:      Mental Status: She is alert.   Psychiatric:         Mood and Affect: Mood normal.         Behavior: Behavior is cooperative.         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 (CMS/HCC)     TX: Irrigate with normal saline, pat dry, apply calcium alginate and cover with silicone border foam dressing daily and as needed     Turn every 2 hours  Prevalon boots  Air mattress  Cushion to chair  Dietitian following  Supplements per dietitian  Grab bars to bed to assist with bed mobility and repositioning  Weekly skin checks  House barrier cream to buttocks         Depression - Primary     Increase BuSpar  Psych consult          Medications, treatments, and labs reviewed  Continue medications and treatments as listed in EMR    Scribe Attestation  Hien MARRERO Scribe   attest that this documentation has been prepared under the direction and in the presence of MAR Johnson    Provider Attestation - Scribe documentation  All medical record entries made by the Scribe were at my direction and personally dictated by me. I have reviewed the chart and agree that the record accurately reflects my personal performance of the history, physical exam, discussion and plan.   MAR Johnson

## 2024-03-13 ENCOUNTER — NURSING HOME VISIT (OUTPATIENT)
Dept: POST ACUTE CARE | Facility: EXTERNAL LOCATION | Age: 89
End: 2024-03-13
Payer: COMMERCIAL

## 2024-03-13 DIAGNOSIS — F32.A DEPRESSION, UNSPECIFIED DEPRESSION TYPE: Primary | ICD-10-CM

## 2024-03-13 DIAGNOSIS — Z99.2 HYPERTENSIVE HEART AND KIDNEY DISEASE WITH CHRONIC DIASTOLIC CONGESTIVE HEART FAILURE AND STAGE 5 CHRONIC KIDNEY DISEASE ON CHRONIC DIALYSIS (MULTI): ICD-10-CM

## 2024-03-13 DIAGNOSIS — Z79.4 TYPE 2 DIABETES MELLITUS WITH CHRONIC KIDNEY DISEASE ON CHRONIC DIALYSIS, WITH LONG-TERM CURRENT USE OF INSULIN (MULTI): ICD-10-CM

## 2024-03-13 DIAGNOSIS — Z99.2 TYPE 2 DIABETES MELLITUS WITH CHRONIC KIDNEY DISEASE ON CHRONIC DIALYSIS, WITH LONG-TERM CURRENT USE OF INSULIN (MULTI): ICD-10-CM

## 2024-03-13 DIAGNOSIS — I50.32 HYPERTENSIVE HEART AND KIDNEY DISEASE WITH CHRONIC DIASTOLIC CONGESTIVE HEART FAILURE AND STAGE 5 CHRONIC KIDNEY DISEASE ON CHRONIC DIALYSIS (MULTI): ICD-10-CM

## 2024-03-13 DIAGNOSIS — N18.6 HYPERTENSIVE HEART AND KIDNEY DISEASE WITH CHRONIC DIASTOLIC CONGESTIVE HEART FAILURE AND STAGE 5 CHRONIC KIDNEY DISEASE ON CHRONIC DIALYSIS (MULTI): ICD-10-CM

## 2024-03-13 DIAGNOSIS — N18.6 TYPE 2 DIABETES MELLITUS WITH CHRONIC KIDNEY DISEASE ON CHRONIC DIALYSIS, WITH LONG-TERM CURRENT USE OF INSULIN (MULTI): ICD-10-CM

## 2024-03-13 DIAGNOSIS — E11.22 TYPE 2 DIABETES MELLITUS WITH CHRONIC KIDNEY DISEASE ON CHRONIC DIALYSIS, WITH LONG-TERM CURRENT USE OF INSULIN (MULTI): ICD-10-CM

## 2024-03-13 DIAGNOSIS — I13.2 HYPERTENSIVE HEART AND KIDNEY DISEASE WITH CHRONIC DIASTOLIC CONGESTIVE HEART FAILURE AND STAGE 5 CHRONIC KIDNEY DISEASE ON CHRONIC DIALYSIS (MULTI): ICD-10-CM

## 2024-03-13 PROCEDURE — 99308 SBSQ NF CARE LOW MDM 20: CPT | Performed by: INTERNAL MEDICINE

## 2024-03-13 NOTE — PROGRESS NOTES
PROGRESS NOTE    Subjective   Chief complaint: Melody Martin is a 88 y.o. female who is a long term care patient being seen and evaluated for follow-up of depression.    HPI:  HPI  Patient is seen in follow-up to depression.  Patient is followed by psych and they had recently increase BuSpar.  Patient seen and examined at bedside.  Patient denies chest pain or shortness of breath.  Denies nausea or vomiting.    Objective   Vital signs: 120/49, 97.8, 61, 18, blood sugar 388, 94%    Physical Exam  Constitutional:       General: She is not in acute distress.  Eyes:      Extraocular Movements: Extraocular movements intact.   Cardiovascular:      Rate and Rhythm: Normal rate and regular rhythm.   Pulmonary:      Effort: Pulmonary effort is normal.      Breath sounds: Normal breath sounds.   Abdominal:      General: Bowel sounds are normal.      Palpations: Abdomen is soft.   Musculoskeletal:      Cervical back: Neck supple.      Right lower leg: No edema.      Left lower leg: No edema.   Neurological:      Mental Status: She is alert.   Psychiatric:         Mood and Affect: Mood normal.         Behavior: Behavior is cooperative.         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  On HD per nephrology  Antihypertensives  Tolerated recent increase of Cardizem  Renal diet  Monitor BP  Remove extra fluid in dialysis         Depression - Primary     Continue following with psych  BuSpar          Medications, treatments, and labs reviewed  Continue medications and treatments as listed in EMR    Scribe Attestation  I,  Carl Martinezibe   attest that this documentation has been prepared under the direction and in the presence of Wilbert Lock MD    Provider Attestation - Scribe documentation  All medical record entries made by the Scribe were at my direction and personally dictated by me. I have reviewed the chart and agree that the record accurately reflects my personal performance of the history, physical exam, discussion and plan.   Wilbert Lock MD

## 2024-03-13 NOTE — LETTER
Patient: Melody Martin  : 1935    Encounter Date: 2024    PROGRESS NOTE    Subjective  Chief complaint: Melody Martin is a 88 y.o. female who is a long term care patient being seen and evaluated for follow-up of depression.    HPI:  HPI  Patient is seen in follow-up to depression.  Patient is followed by psych and they had recently increase BuSpar.  Patient seen and examined at bedside.  Patient denies chest pain or shortness of breath.  Denies nausea or vomiting.    Objective  Vital signs: 120/49, 97.8, 61, 18, blood sugar 388, 94%    Physical Exam  Constitutional:       General: She is not in acute distress.  Eyes:      Extraocular Movements: Extraocular movements intact.   Cardiovascular:      Rate and Rhythm: Normal rate and regular rhythm.   Pulmonary:      Effort: Pulmonary effort is normal.      Breath sounds: Normal breath sounds.   Abdominal:      General: Bowel sounds are normal.      Palpations: Abdomen is soft.   Musculoskeletal:      Cervical back: Neck supple.      Right lower leg: No edema.      Left lower leg: No edema.   Neurological:      Mental Status: She is alert.   Psychiatric:         Mood and Affect: Mood normal.         Behavior: Behavior is cooperative.         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  On HD per nephrology  Antihypertensives  Tolerated recent increase of Cardizem  Renal diet  Monitor BP  Remove extra fluid in dialysis         Depression - Primary     Continue following with psych  BuSpar          Medications, treatments, and labs reviewed  Continue  medications and treatments as listed in EMR    Scribe Attestation  I, Phoebe Martinez   attest that this documentation has been prepared under the direction and in the presence of Wilbert Lock MD    Provider Attestation - Scribe documentation  All medical record entries made by the Scribe were at my direction and personally dictated by me. I have reviewed the chart and agree that the record accurately reflects my personal performance of the history, physical exam, discussion and plan.   Wilbert Lock MD            Electronically Signed By: Wilbert Lock MD   3/13/24  3:05 PM

## 2024-03-14 ENCOUNTER — NURSING HOME VISIT (OUTPATIENT)
Dept: POST ACUTE CARE | Facility: EXTERNAL LOCATION | Age: 89
End: 2024-03-14
Payer: COMMERCIAL

## 2024-03-14 DIAGNOSIS — Z86.73 HISTORY OF CVA (CEREBROVASCULAR ACCIDENT): ICD-10-CM

## 2024-03-14 DIAGNOSIS — N18.6 HYPERTENSIVE HEART AND KIDNEY DISEASE WITH CHRONIC DIASTOLIC CONGESTIVE HEART FAILURE AND STAGE 5 CHRONIC KIDNEY DISEASE ON CHRONIC DIALYSIS (MULTI): ICD-10-CM

## 2024-03-14 DIAGNOSIS — L89.153 STAGE III PRESSURE ULCER OF SACRAL REGION (MULTI): Primary | ICD-10-CM

## 2024-03-14 DIAGNOSIS — I50.32 HYPERTENSIVE HEART AND KIDNEY DISEASE WITH CHRONIC DIASTOLIC CONGESTIVE HEART FAILURE AND STAGE 5 CHRONIC KIDNEY DISEASE ON CHRONIC DIALYSIS (MULTI): ICD-10-CM

## 2024-03-14 DIAGNOSIS — Z99.2 HYPERTENSIVE HEART AND KIDNEY DISEASE WITH CHRONIC DIASTOLIC CONGESTIVE HEART FAILURE AND STAGE 5 CHRONIC KIDNEY DISEASE ON CHRONIC DIALYSIS (MULTI): ICD-10-CM

## 2024-03-14 DIAGNOSIS — I13.2 HYPERTENSIVE HEART AND KIDNEY DISEASE WITH CHRONIC DIASTOLIC CONGESTIVE HEART FAILURE AND STAGE 5 CHRONIC KIDNEY DISEASE ON CHRONIC DIALYSIS (MULTI): ICD-10-CM

## 2024-03-14 DIAGNOSIS — L89.620 PRESSURE ULCER OF LEFT HEEL, UNSTAGEABLE (MULTI): ICD-10-CM

## 2024-03-14 PROCEDURE — 11045 DBRDMT SUBQ TISS EACH ADDL: CPT | Performed by: NURSE PRACTITIONER

## 2024-03-14 PROCEDURE — 99309 SBSQ NF CARE MODERATE MDM 30: CPT | Performed by: NURSE PRACTITIONER

## 2024-03-14 PROCEDURE — 11042 DBRDMT SUBQ TIS 1ST 20SQCM/<: CPT | Performed by: NURSE PRACTITIONER

## 2024-03-14 NOTE — LETTER
Patient: Melody Martin  : 1935    Encounter Date: 2024    PROGRESS NOTE    Subjective  Chief complaint: Melody Martin is a 88 y.o. female who is a long term care patient being seen and evaluated for follow-up wounds.    HPI:  HPI  Patient is seen in follow-up to wounds.  Patient does continue with treatments and interventions for sacral stage III pressure ulcer and left heel unstageable pressure ulcer.  Nursing reports no new concerns at this time.    Objective  Vital signs: 147/68, 97.8, 67, 18, blood sugar 304, 94%    Physical Exam  Constitutional:       General: She is not in acute distress.  Eyes:      Extraocular Movements: Extraocular movements intact.   Cardiovascular:      Rate and Rhythm: Normal rate and regular rhythm.   Pulmonary:      Effort: Pulmonary effort is normal.      Breath sounds: Normal breath sounds.   Abdominal:      General: Bowel sounds are normal.      Palpations: Abdomen is soft.   Musculoskeletal:      Cervical back: Neck supple.      Right lower leg: No edema.      Left lower leg: No edema.   Skin:     Comments: MASD to PORTER area    Wound location -left heel  Etiology - pressure   Granulation-small  Slough-large  Edge- flat  Odor - none  Drainage - small serosanguineous  Size - 3.5 X 3.5 X UTD centimeters  Undermining -none    Wound location -sacrum  Etiology - pressure   Granulation-large  Slough-small  Edge-flat  Odor - none  Drainage - medium  Size - 6 x 9.5 centimeters   Neurological:      Mental Status: She is alert.   Psychiatric:         Mood and Affect: Mood normal.         Behavior: Behavior is cooperative.         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  A minimal amount of bleeding was controlled with pressure. The procedure was tolerated well with a pain level of 0 throughout and a pain level of 0 following the procedure.  Post-debridement measurements unchanged. Character of wound/ulcer post-debridement is improved.     TX: Irrigate with normal saline, pat dry, apply calcium alginate and cover with silicone border foam dressing daily and as needed     Turn every 2 hours  Prevalon boots  Air mattress  Cushion to chair  Dietitian following  Supplements per dietitian  Grab bars to bed to assist with bed mobility and repositioning  Weekly skin checks  House barrier cream to buttocks          Medications, treatments, and labs reviewed  Continue medications and treatments as listed in EMR    Scribe Attestation  I, Phoebe Martinez   attest that this documentation has been prepared under the direction and in the presence of JANELL Johnson-CNP    Provider Attestation - Scribe documentation  All medical record entries made by the Scribe were at my direction  and personally dictated by me. I have reviewed the chart and agree that the record accurately reflects my personal performance of the history, physical exam, discussion and plan.   MAR Johnson            Electronically Signed By: MAR Johnson   3/20/24  1:04 PM

## 2024-03-15 ENCOUNTER — NURSING HOME VISIT (OUTPATIENT)
Dept: POST ACUTE CARE | Facility: EXTERNAL LOCATION | Age: 89
End: 2024-03-15
Payer: COMMERCIAL

## 2024-03-15 DIAGNOSIS — Z79.4 TYPE 2 DIABETES MELLITUS WITH CHRONIC KIDNEY DISEASE ON CHRONIC DIALYSIS, WITH LONG-TERM CURRENT USE OF INSULIN (MULTI): Primary | ICD-10-CM

## 2024-03-15 DIAGNOSIS — Z99.2 HYPERTENSIVE HEART AND KIDNEY DISEASE WITH CHRONIC DIASTOLIC CONGESTIVE HEART FAILURE AND STAGE 5 CHRONIC KIDNEY DISEASE ON CHRONIC DIALYSIS (MULTI): ICD-10-CM

## 2024-03-15 DIAGNOSIS — Z86.73 HISTORY OF CVA (CEREBROVASCULAR ACCIDENT): ICD-10-CM

## 2024-03-15 DIAGNOSIS — N18.6 HYPERTENSIVE HEART AND KIDNEY DISEASE WITH CHRONIC DIASTOLIC CONGESTIVE HEART FAILURE AND STAGE 5 CHRONIC KIDNEY DISEASE ON CHRONIC DIALYSIS (MULTI): ICD-10-CM

## 2024-03-15 DIAGNOSIS — N18.6 TYPE 2 DIABETES MELLITUS WITH CHRONIC KIDNEY DISEASE ON CHRONIC DIALYSIS, WITH LONG-TERM CURRENT USE OF INSULIN (MULTI): Primary | ICD-10-CM

## 2024-03-15 DIAGNOSIS — E11.22 TYPE 2 DIABETES MELLITUS WITH CHRONIC KIDNEY DISEASE ON CHRONIC DIALYSIS, WITH LONG-TERM CURRENT USE OF INSULIN (MULTI): Primary | ICD-10-CM

## 2024-03-15 DIAGNOSIS — I50.32 HYPERTENSIVE HEART AND KIDNEY DISEASE WITH CHRONIC DIASTOLIC CONGESTIVE HEART FAILURE AND STAGE 5 CHRONIC KIDNEY DISEASE ON CHRONIC DIALYSIS (MULTI): ICD-10-CM

## 2024-03-15 DIAGNOSIS — I48.91 ATRIAL FIBRILLATION, UNSPECIFIED TYPE (MULTI): ICD-10-CM

## 2024-03-15 DIAGNOSIS — F32.A DEPRESSION, UNSPECIFIED DEPRESSION TYPE: ICD-10-CM

## 2024-03-15 DIAGNOSIS — Z99.2 TYPE 2 DIABETES MELLITUS WITH CHRONIC KIDNEY DISEASE ON CHRONIC DIALYSIS, WITH LONG-TERM CURRENT USE OF INSULIN (MULTI): Primary | ICD-10-CM

## 2024-03-15 DIAGNOSIS — I13.2 HYPERTENSIVE HEART AND KIDNEY DISEASE WITH CHRONIC DIASTOLIC CONGESTIVE HEART FAILURE AND STAGE 5 CHRONIC KIDNEY DISEASE ON CHRONIC DIALYSIS (MULTI): ICD-10-CM

## 2024-03-15 PROCEDURE — 99309 SBSQ NF CARE MODERATE MDM 30: CPT | Performed by: INTERNAL MEDICINE

## 2024-03-15 NOTE — LETTER
Patient: Melody Martin  : 1935    Encounter Date: 03/15/2024    PROGRESS NOTE    Subjective  Chief complaint: Melody Martin is a 88 y.o. female who is a long term care patient being seen and evaluated for monthly general medical care and follow-up    HPI:  HPI  Patient presents for general medical care and f/u.  Patient seen and examined at bedside.  No issues per nursing.  Patient has no acute complaints.  AFIB stable, denies palpitations and chest pain.  Anemia stable, denies fatigue, sob, and palpitations.  Hx CVA, denies changes in weakness and speech.  HTN BP at goal.  Denies chest pain and headache.  CHF stable, denies sob, orthopnea, weight gain.  ESRD, on HD, tolerating treatment well.  DM, denies polydipsia polyuria polyphagia.  Patient with diagnosis of depression.  Mood is stable.  Denies feeling down and thoughts of harming self or others.  No acute distress.    Objective  Vital signs: 149/55, 97.7, 65, 18, , 94%    Physical Exam  Constitutional:       General: She is not in acute distress.  Eyes:      Extraocular Movements: Extraocular movements intact.   Cardiovascular:      Rate and Rhythm: Normal rate and regular rhythm.   Pulmonary:      Effort: Pulmonary effort is normal.      Breath sounds: Normal breath sounds.   Abdominal:      General: Bowel sounds are normal.      Palpations: Abdomen is soft.   Musculoskeletal:      Cervical back: Neck supple.      Right lower leg: No edema.      Left lower leg: No edema.   Neurological:      Mental Status: She is alert.   Psychiatric:         Mood and Affect: Mood normal.         Behavior: Behavior is cooperative.         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  On HD per nephrology  Antihypertensives  Tolerated recent increase of Cardizem  Renal diet  Monitor BP  Remove extra fluid in dialysis         Atrial fibrillation (CMS/[facility])     Monitor heart rate, controlled  Amiodarone  Apixaban  Bleeding precautions         Depression     Continue following with psych  BuSpar          Medications, treatments, and labs reviewed  Continue medications and treatments as listed in EMR    Scribe Attestation  I, Carl Martineziboseas   attest that this documentation has been prepared under the direction and in the presence of Wilbert Lock MD    Provider Attestation - Scribe documentation  All medical record entries made by the Scribe were at my direction and personally dictated by me. I have reviewed the chart and agree that the record accurately reflects my personal performance of the history, physical exam, discussion and plan.   Wilbert Lock MD            Electronically Signed By: Wilbert Lock MD   3/15/24  1:51 PM

## 2024-03-15 NOTE — PROGRESS NOTES
PROGRESS NOTE    Subjective   Chief complaint: Melody Martin is a 88 y.o. female who is a long term care patient being seen and evaluated for monthly general medical care and follow-up    HPI:  HPI  Patient presents for general medical care and f/u.  Patient seen and examined at bedside.  No issues per nursing.  Patient has no acute complaints.  AFIB stable, denies palpitations and chest pain.  Anemia stable, denies fatigue, sob, and palpitations.  Hx CVA, denies changes in weakness and speech.  HTN BP at goal.  Denies chest pain and headache.  CHF stable, denies sob, orthopnea, weight gain.  ESRD, on HD, tolerating treatment well.  DM, denies polydipsia polyuria polyphagia.  Patient with diagnosis of depression.  Mood is stable.  Denies feeling down and thoughts of harming self or others.  No acute distress.    Objective   Vital signs: 149/55, 97.7, 65, 18, , 94%    Physical Exam  Constitutional:       General: She is not in acute distress.  Eyes:      Extraocular Movements: Extraocular movements intact.   Cardiovascular:      Rate and Rhythm: Normal rate and regular rhythm.   Pulmonary:      Effort: Pulmonary effort is normal.      Breath sounds: Normal breath sounds.   Abdominal:      General: Bowel sounds are normal.      Palpations: Abdomen is soft.   Musculoskeletal:      Cervical back: Neck supple.      Right lower leg: No edema.      Left lower leg: No edema.   Neurological:      Mental Status: She is alert.   Psychiatric:         Mood and Affect: Mood normal.         Behavior: Behavior is cooperative.         Assessment/Plan   Problem List Items Addressed This Visit       Type 2 diabetes mellitus with chronic kidney disease on chronic dialysis, with long-term current use of insulin (CMS/[facility]) - Primary     Carb controlled diet  Monitor Glucoscan  Glargine  Humalog  FBG elevated         History of CVA (cerebrovascular accident)     Statin  Plavix  Monitor for changes and weakness         Hypertensive  heart and kidney disease with chronic diastolic congestive heart failure and stage 5 chronic kidney disease on chronic dialysis (CMS/[facility])     Stable, no shortness of breath.  On HD per nephrology  Antihypertensives  Tolerated recent increase of Cardizem  Renal diet  Monitor BP  Remove extra fluid in dialysis         Atrial fibrillation (CMS/[facility])     Monitor heart rate, controlled  Amiodarone  Apixaban  Bleeding precautions         Depression     Continue following with psych  BuSpar          Medications, treatments, and labs reviewed  Continue medications and treatments as listed in EMR    Scribe Attestation  I, Carl Martineziboseas   attest that this documentation has been prepared under the direction and in the presence of Wilbert Lock MD    Provider Attestation - Scribe documentation  All medical record entries made by the Scribe were at my direction and personally dictated by me. I have reviewed the chart and agree that the record accurately reflects my personal performance of the history, physical exam, discussion and plan.   Wilbert Lock MD

## 2024-03-19 ENCOUNTER — NURSING HOME VISIT (OUTPATIENT)
Dept: POST ACUTE CARE | Facility: EXTERNAL LOCATION | Age: 89
End: 2024-03-19
Payer: COMMERCIAL

## 2024-03-19 DIAGNOSIS — I48.91 ATRIAL FIBRILLATION, UNSPECIFIED TYPE (MULTI): ICD-10-CM

## 2024-03-19 DIAGNOSIS — Z99.2 HYPERTENSIVE HEART AND KIDNEY DISEASE WITH CHRONIC DIASTOLIC CONGESTIVE HEART FAILURE AND STAGE 5 CHRONIC KIDNEY DISEASE ON CHRONIC DIALYSIS (MULTI): ICD-10-CM

## 2024-03-19 DIAGNOSIS — K59.00 CONSTIPATION, UNSPECIFIED CONSTIPATION TYPE: Primary | ICD-10-CM

## 2024-03-19 DIAGNOSIS — I50.32 HYPERTENSIVE HEART AND KIDNEY DISEASE WITH CHRONIC DIASTOLIC CONGESTIVE HEART FAILURE AND STAGE 5 CHRONIC KIDNEY DISEASE ON CHRONIC DIALYSIS (MULTI): ICD-10-CM

## 2024-03-19 DIAGNOSIS — N18.6 HYPERTENSIVE HEART AND KIDNEY DISEASE WITH CHRONIC DIASTOLIC CONGESTIVE HEART FAILURE AND STAGE 5 CHRONIC KIDNEY DISEASE ON CHRONIC DIALYSIS (MULTI): ICD-10-CM

## 2024-03-19 DIAGNOSIS — I13.2 HYPERTENSIVE HEART AND KIDNEY DISEASE WITH CHRONIC DIASTOLIC CONGESTIVE HEART FAILURE AND STAGE 5 CHRONIC KIDNEY DISEASE ON CHRONIC DIALYSIS (MULTI): ICD-10-CM

## 2024-03-19 PROCEDURE — 99309 SBSQ NF CARE MODERATE MDM 30: CPT | Performed by: NURSE PRACTITIONER

## 2024-03-19 NOTE — ASSESSMENT & PLAN NOTE
I performed subcutaneous tissue debridement with a total area of 57 cm2 with curette to remove viable and non-viable tissue/material including subcutaneous tissue, slough, biofilm, and fibrin/exudate after achieving pain control with 2% lidocaine topical gel.  A minimal amount of bleeding was controlled with pressure. The procedure was tolerated well with a pain level of 0 throughout and a pain level of 0 following the procedure.  Post-debridement measurements unchanged. Character of wound/ulcer post-debridement is improved.     TX: Irrigate with normal saline, pat dry, apply calcium alginate and cover with silicone border foam dressing daily and as needed     Turn every 2 hours  Prevalon boots  Air mattress  Cushion to chair  Dietitian following  Supplements per dietitian  Grab bars to bed to assist with bed mobility and repositioning  Weekly skin checks  House barrier cream to buttocks

## 2024-03-19 NOTE — PROGRESS NOTES
PROGRESS NOTE    Subjective   Chief complaint: Melody Martin is a 88 y.o. female who is a long term care patient being seen and evaluated for follow-up wounds.    HPI:  HPI  Patient is seen in follow-up to wounds.  Patient does continue with treatments and interventions for sacral stage III pressure ulcer and left heel unstageable pressure ulcer.  Nursing reports no new concerns at this time.    Objective   Vital signs: 147/68, 97.8, 67, 18, blood sugar 304, 94%    Physical Exam  Constitutional:       General: She is not in acute distress.  Eyes:      Extraocular Movements: Extraocular movements intact.   Cardiovascular:      Rate and Rhythm: Normal rate and regular rhythm.   Pulmonary:      Effort: Pulmonary effort is normal.      Breath sounds: Normal breath sounds.   Abdominal:      General: Bowel sounds are normal.      Palpations: Abdomen is soft.   Musculoskeletal:      Cervical back: Neck supple.      Right lower leg: No edema.      Left lower leg: No edema.   Skin:     Comments: MASD to PORTER area    Wound location -left heel  Etiology - pressure   Granulation-small  Slough-large  Edge- flat  Odor - none  Drainage - small serosanguineous  Size - 3.5 X 3.5 X UTD centimeters  Undermining -none    Wound location -sacrum  Etiology - pressure   Granulation-large  Slough-small  Edge-flat  Odor - none  Drainage - medium  Size - 6 x 9.5 centimeters   Neurological:      Mental Status: She is alert.   Psychiatric:         Mood and Affect: Mood normal.         Behavior: Behavior is cooperative.         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  A minimal amount of bleeding was controlled with pressure. The procedure was tolerated well with a pain level of 0 throughout and a pain level of 0 following the procedure.  Post-debridement measurements unchanged. Character of wound/ulcer post-debridement is improved.     TX: Irrigate with normal saline, pat dry, apply calcium alginate and cover with silicone border foam dressing daily and as needed     Turn every 2 hours  Prevalon boots  Air mattress  Cushion to chair  Dietitian following  Supplements per dietitian  Grab bars to bed to assist with bed mobility and repositioning  Weekly skin checks  House barrier cream to buttocks          Medications, treatments, and labs reviewed  Continue medications and treatments as listed in EMR    Scribe Attestation  Hien MARRERO Scribe   attest that this documentation has been prepared under the direction and in the presence of JANELL Johnson-CNP    Provider Attestation - Scribe documentation  All medical record entries made by the Scribe were at my direction and personally dictated by me. I have reviewed the chart and agree that the  record accurately reflects my personal performance of the history, physical exam, discussion and plan.   Karina Patel, APRN-CNP

## 2024-03-19 NOTE — LETTER
Patient: Melody Martin  : 1935    Encounter Date: 2024    PROGRESS NOTE    Subjective  Chief complaint: Melody Martin is a 88 y.o. female who is a long term care patient being seen and evaluated for constipation.    HPI:  HPI  Patient is seen for complaints of constipation.  Patient denies nausea or vomiting.  Denies fever or chills.  Patient is on HD for diagnosis of ESRD.    Objective  Vital signs: 149/41, 98.4, 95, 18, blood sugar 220, 94%    Physical Exam  Constitutional:       General: She is not in acute distress.  Eyes:      Extraocular Movements: Extraocular movements intact.   Cardiovascular:      Rate and Rhythm: Normal rate and regular rhythm.   Pulmonary:      Effort: Pulmonary effort is normal.      Breath sounds: Normal breath sounds.   Abdominal:      General: Bowel sounds are normal. There is no distension.      Palpations: Abdomen is soft.      Tenderness: There is no abdominal tenderness.   Musculoskeletal:      Cervical back: Neck supple.      Right lower leg: No edema.      Left lower leg: No edema.   Neurological:      Mental Status: She is alert.   Psychiatric:         Mood and Affect: Mood normal.         Behavior: Behavior is cooperative.         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  On HD per nephrology  Antihypertensives  Cardizem  Renal diet  Monitor BP  Remove extra fluid in dialysis         Atrial fibrillation (CMS/[facility])     Monitor heart rate, controlled  Amiodarone  Apixaban  Bleeding precautions         Constipation - Primary     Start Metamucil every other day          Medications, treatments, and labs reviewed  Continue medications and treatments as listed in EMR    Scribe Attestation  I, Hien Conn Scribe   attest that this documentation has been  prepared under the direction and in the presence of MAR Johnson    Provider Attestation - Scribe documentation  All medical record entries made by the Scribe were at my direction and personally dictated by me. I have reviewed the chart and agree that the record accurately reflects my personal performance of the history, physical exam, discussion and plan.   MAR Johnson            Electronically Signed By: MRA Johnson   4/2/24  6:07 PM

## 2024-03-20 ENCOUNTER — NURSING HOME VISIT (OUTPATIENT)
Dept: POST ACUTE CARE | Facility: EXTERNAL LOCATION | Age: 89
End: 2024-03-20
Payer: COMMERCIAL

## 2024-03-20 DIAGNOSIS — Z99.2 HYPERTENSIVE HEART AND KIDNEY DISEASE WITH CHRONIC DIASTOLIC CONGESTIVE HEART FAILURE AND STAGE 5 CHRONIC KIDNEY DISEASE ON CHRONIC DIALYSIS (MULTI): ICD-10-CM

## 2024-03-20 DIAGNOSIS — N18.6 HYPERTENSIVE HEART AND KIDNEY DISEASE WITH CHRONIC DIASTOLIC CONGESTIVE HEART FAILURE AND STAGE 5 CHRONIC KIDNEY DISEASE ON CHRONIC DIALYSIS (MULTI): ICD-10-CM

## 2024-03-20 DIAGNOSIS — I13.2 HYPERTENSIVE HEART AND KIDNEY DISEASE WITH CHRONIC DIASTOLIC CONGESTIVE HEART FAILURE AND STAGE 5 CHRONIC KIDNEY DISEASE ON CHRONIC DIALYSIS (MULTI): ICD-10-CM

## 2024-03-20 DIAGNOSIS — I48.91 ATRIAL FIBRILLATION, UNSPECIFIED TYPE (MULTI): ICD-10-CM

## 2024-03-20 DIAGNOSIS — K59.00 CONSTIPATION, UNSPECIFIED CONSTIPATION TYPE: Primary | ICD-10-CM

## 2024-03-20 DIAGNOSIS — I50.32 HYPERTENSIVE HEART AND KIDNEY DISEASE WITH CHRONIC DIASTOLIC CONGESTIVE HEART FAILURE AND STAGE 5 CHRONIC KIDNEY DISEASE ON CHRONIC DIALYSIS (MULTI): ICD-10-CM

## 2024-03-20 PROCEDURE — 99308 SBSQ NF CARE LOW MDM 20: CPT | Performed by: INTERNAL MEDICINE

## 2024-03-20 NOTE — PROGRESS NOTES
PROGRESS NOTE    Subjective   Chief complaint: Melody Martin is a 88 y.o. female who is a long term care patient being seen and evaluated for constipation.    HPI:  HPI  Patient is seen in follow-up for constipation.  Patient was started on Metamucil every other day and patient reporting improvement in constipation symptoms.  Patient was seen and examined at bedside, appears to be in no acute distress.  Patient is afebrile.  Denies nausea or vomiting.    Objective   Vital signs: 149/41, 98.4, 95, 18, 94%    Physical Exam  Constitutional:       General: She is not in acute distress.  Eyes:      Extraocular Movements: Extraocular movements intact.   Cardiovascular:      Rate and Rhythm: Normal rate and regular rhythm.   Pulmonary:      Effort: Pulmonary effort is normal.      Breath sounds: Normal breath sounds.   Abdominal:      General: Bowel sounds are normal.      Palpations: Abdomen is soft.   Musculoskeletal:      Cervical back: Neck supple.      Right lower leg: No edema.      Left lower leg: No edema.   Neurological:      Mental Status: She is alert.   Psychiatric:         Mood and Affect: Mood normal.         Behavior: Behavior is cooperative.         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  On HD per nephrology  Antihypertensives  Tolerated recent increase of Cardizem  Renal diet  Monitor BP  Remove extra fluid in dialysis         Atrial fibrillation (CMS/[facility])     Monitor heart rate, controlled  Amiodarone  Apixaban  Bleeding precautions         Constipation - Primary     Metamucil every other day  Improving          Medications, treatments, and labs reviewed  Continue medications and treatments as listed in EMR    Scribe Attestation  I, Phoebe Martinez   attest that this documentation has  been prepared under the direction and in the presence of Wilbert Lock MD    Provider Attestation - Scribe documentation  All medical record entries made by the Scribe were at my direction and personally dictated by me. I have reviewed the chart and agree that the record accurately reflects my personal performance of the history, physical exam, discussion and plan.   Wilbert Lock MD

## 2024-03-20 NOTE — LETTER
Patient: Melody Martin  : 1935    Encounter Date: 2024    PROGRESS NOTE    Subjective  Chief complaint: Melody Martin is a 88 y.o. female who is a long term care patient being seen and evaluated for constipation.    HPI:  HPI  Patient is seen in follow-up for constipation.  Patient was started on Metamucil every other day and patient reporting improvement in constipation symptoms.  Patient was seen and examined at bedside, appears to be in no acute distress.  Patient is afebrile.  Denies nausea or vomiting.    Objective  Vital signs: 149/41, 98.4, 95, 18, 94%    Physical Exam  Constitutional:       General: She is not in acute distress.  Eyes:      Extraocular Movements: Extraocular movements intact.   Cardiovascular:      Rate and Rhythm: Normal rate and regular rhythm.   Pulmonary:      Effort: Pulmonary effort is normal.      Breath sounds: Normal breath sounds.   Abdominal:      General: Bowel sounds are normal.      Palpations: Abdomen is soft.   Musculoskeletal:      Cervical back: Neck supple.      Right lower leg: No edema.      Left lower leg: No edema.   Neurological:      Mental Status: She is alert.   Psychiatric:         Mood and Affect: Mood normal.         Behavior: Behavior is cooperative.         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  On HD per nephrology  Antihypertensives  Tolerated recent increase of Cardizem  Renal diet  Monitor BP  Remove extra fluid in dialysis         Atrial fibrillation (CMS/HCC)     Monitor heart rate, controlled  Amiodarone  Apixaban  Bleeding precautions         Constipation - Primary     Metamucil every other day  Improving          Medications, treatments, and labs reviewed  Continue medications and treatments as listed in EMR    Scribe  Attestation  I, Phoebe Martinez   attest that this documentation has been prepared under the direction and in the presence of Wilbert Lock MD    Provider Attestation - Scribe documentation  All medical record entries made by the Scribe were at my direction and personally dictated by me. I have reviewed the chart and agree that the record accurately reflects my personal performance of the history, physical exam, discussion and plan.   Wilbert Lock MD            Electronically Signed By: Wilbert Lock MD   3/20/24  3:35 PM

## 2024-03-21 ENCOUNTER — NURSING HOME VISIT (OUTPATIENT)
Dept: POST ACUTE CARE | Facility: EXTERNAL LOCATION | Age: 89
End: 2024-03-21
Payer: COMMERCIAL

## 2024-03-21 DIAGNOSIS — N18.6 HYPERTENSIVE HEART AND KIDNEY DISEASE WITH CHRONIC DIASTOLIC CONGESTIVE HEART FAILURE AND STAGE 5 CHRONIC KIDNEY DISEASE ON CHRONIC DIALYSIS (MULTI): ICD-10-CM

## 2024-03-21 DIAGNOSIS — Z99.2 HYPERTENSIVE HEART AND KIDNEY DISEASE WITH CHRONIC DIASTOLIC CONGESTIVE HEART FAILURE AND STAGE 5 CHRONIC KIDNEY DISEASE ON CHRONIC DIALYSIS (MULTI): ICD-10-CM

## 2024-03-21 DIAGNOSIS — L89.620 PRESSURE ULCER OF LEFT HEEL, UNSTAGEABLE (MULTI): Primary | ICD-10-CM

## 2024-03-21 DIAGNOSIS — I13.2 HYPERTENSIVE HEART AND KIDNEY DISEASE WITH CHRONIC DIASTOLIC CONGESTIVE HEART FAILURE AND STAGE 5 CHRONIC KIDNEY DISEASE ON CHRONIC DIALYSIS (MULTI): ICD-10-CM

## 2024-03-21 DIAGNOSIS — L89.153 STAGE III PRESSURE ULCER OF SACRAL REGION (MULTI): ICD-10-CM

## 2024-03-21 DIAGNOSIS — I50.32 HYPERTENSIVE HEART AND KIDNEY DISEASE WITH CHRONIC DIASTOLIC CONGESTIVE HEART FAILURE AND STAGE 5 CHRONIC KIDNEY DISEASE ON CHRONIC DIALYSIS (MULTI): ICD-10-CM

## 2024-03-21 PROCEDURE — 11042 DBRDMT SUBQ TIS 1ST 20SQCM/<: CPT | Performed by: NURSE PRACTITIONER

## 2024-03-21 PROCEDURE — 11045 DBRDMT SUBQ TISS EACH ADDL: CPT | Performed by: NURSE PRACTITIONER

## 2024-03-21 PROCEDURE — 99309 SBSQ NF CARE MODERATE MDM 30: CPT | Performed by: NURSE PRACTITIONER

## 2024-03-21 NOTE — LETTER
Patient: Melody Martin  : 1935    Encounter Date: 2024    PROGRESS NOTE    Subjective  Chief complaint: Melody Martin is a 88 y.o. female who is a long term care patient being seen and evaluated for wounds.    HPI:  HPI  Patient is seen in follow-up for wounds.  Patient continues with sacral stage III pressure ulcer and left heel unstageable pressure ulcer.  Patient continues with treatments and interventions that are in place as per the EMR.  Patient seen and examined at bedside.  Patient denies fever or chills.    Objective  Vital signs: 142/56, 97.8, 79, 16, blood sugar 300 94%    Physical Exam  Constitutional:       General: She is not in acute distress.  Eyes:      Extraocular Movements: Extraocular movements intact.   Cardiovascular:      Rate and Rhythm: Normal rate and regular rhythm.   Pulmonary:      Effort: Pulmonary effort is normal.      Breath sounds: Normal breath sounds.   Abdominal:      General: Bowel sounds are normal.      Palpations: Abdomen is soft.   Musculoskeletal:      Cervical back: Neck supple.      Right lower leg: No edema.      Left lower leg: No edema.   Skin:     Comments: wound location -left heel  Etiology - unstageable pressure   Granulation-small  Slough-large  Edge- flat  Odor - yes  Drainage - medium serosanguineous  Size - 3 X 3 X UTD centimeters  Undermining -none    Wound location -sacrum  Etiology - pressure   Granulation-large  Slough-small  Edge-flat  Odor - none  Drainage - medium  Size - 4.5 x 7 centimeters   Neurological:      Mental Status: She is alert.   Psychiatric:         Mood and Affect: Mood normal.         Behavior: Behavior is cooperative.         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  On HD per  nephrology  Antihypertensives  Cardizem  Renal diet  Monitor BP  Remove extra fluid in dialysis         Pressure ulcer of left heel, unstageable (CMS/HCC) - Primary     I performed subcutaneous tissue debridement with a total area of 9 cm2 with curette to remove viable and non-viable tissue/material including subcutaneous tissue, slough, biofilm, and fibrin/exudate after achieving pain control with 2% lidocaine topical gel.  A minimal amount of bleeding was controlled with pressure. The procedure was tolerated well with a pain level of 0 throughout and a pain level of 0 following the procedure.  Post-debridement measurements unchanged. Character of wound/ulcer post-debridement is improved.     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  A minimal amount of bleeding was controlled with pressure. The procedure was tolerated well with a pain level of 0 throughout and a pain level of 0 following the procedure.  Post-debridement measurements unchanged. Character of wound/ulcer post-debridement is improved.     TX: Irrigate with normal saline, pat dry, apply calcium alginate and cover with silicone border foam dressing daily and as needed     Turn every 2 hours  Prevalon boots  Air mattress  Cushion to chair  Dietitian  following  Supplements per dietitian  Grab bars to bed to assist with bed mobility and repositioning  Weekly skin checks  House barrier cream to buttocks          Medications, treatments, and labs reviewed  Continue medications and treatments as listed in EMR    Scribe Attestation  IHien Scribe   attest that this documentation has been prepared under the direction and in the presence of MAR Johnson    Provider Attestation - Scribe documentation  All medical record entries made by the Scribe were at my direction and personally dictated by me. I have reviewed the chart and agree that the record accurately reflects my personal performance of the history, physical exam, discussion and plan.   MAR Johnson            Electronically Signed By: MAR Johnson   3/27/24 11:12 AM

## 2024-03-22 ENCOUNTER — NURSING HOME VISIT (OUTPATIENT)
Dept: POST ACUTE CARE | Facility: EXTERNAL LOCATION | Age: 89
End: 2024-03-22
Payer: COMMERCIAL

## 2024-03-22 DIAGNOSIS — Z99.2 HYPERTENSIVE HEART AND KIDNEY DISEASE WITH CHRONIC DIASTOLIC CONGESTIVE HEART FAILURE AND STAGE 5 CHRONIC KIDNEY DISEASE ON CHRONIC DIALYSIS (MULTI): ICD-10-CM

## 2024-03-22 DIAGNOSIS — I48.91 ATRIAL FIBRILLATION, UNSPECIFIED TYPE (MULTI): ICD-10-CM

## 2024-03-22 DIAGNOSIS — N18.6 HYPERTENSIVE HEART AND KIDNEY DISEASE WITH CHRONIC DIASTOLIC CONGESTIVE HEART FAILURE AND STAGE 5 CHRONIC KIDNEY DISEASE ON CHRONIC DIALYSIS (MULTI): ICD-10-CM

## 2024-03-22 DIAGNOSIS — L89.620 PRESSURE ULCER OF LEFT HEEL, UNSTAGEABLE (MULTI): Primary | ICD-10-CM

## 2024-03-22 DIAGNOSIS — I13.2 HYPERTENSIVE HEART AND KIDNEY DISEASE WITH CHRONIC DIASTOLIC CONGESTIVE HEART FAILURE AND STAGE 5 CHRONIC KIDNEY DISEASE ON CHRONIC DIALYSIS (MULTI): ICD-10-CM

## 2024-03-22 DIAGNOSIS — K59.00 CONSTIPATION, UNSPECIFIED CONSTIPATION TYPE: ICD-10-CM

## 2024-03-22 DIAGNOSIS — I50.32 HYPERTENSIVE HEART AND KIDNEY DISEASE WITH CHRONIC DIASTOLIC CONGESTIVE HEART FAILURE AND STAGE 5 CHRONIC KIDNEY DISEASE ON CHRONIC DIALYSIS (MULTI): ICD-10-CM

## 2024-03-22 PROCEDURE — 99308 SBSQ NF CARE LOW MDM 20: CPT | Performed by: INTERNAL MEDICINE

## 2024-03-22 NOTE — PROGRESS NOTES
PROGRESS NOTE    Subjective   Chief complaint: Melody Martin is a 88 y.o. female who is a long term care patient being seen and evaluated for constipation and heel wound.    HPI:  HPI  Patient is seen in follow-up for constipation.  Patient was started on Metamucil every other day with improvement.  Patient also with left heel wound, unstageable pressure ulcer.  Patient had left heel culture obtained and x-ray.  X-ray was negative for OM but revealed a heel spur.  Patient's wound culture is pending at this time.  Patient afebrile.    Objective   Vital signs: 105/51, 97.8, 56, 18, blood sugar 359, 94%    Physical Exam  Constitutional:       General: She is not in acute distress.  Eyes:      Extraocular Movements: Extraocular movements intact.   Cardiovascular:      Rate and Rhythm: Normal rate and regular rhythm.   Pulmonary:      Effort: Pulmonary effort is normal.      Breath sounds: Normal breath sounds.   Abdominal:      General: Bowel sounds are normal.      Palpations: Abdomen is soft.   Musculoskeletal:      Cervical back: Neck supple.      Right lower leg: No edema.      Left lower leg: No edema.   Neurological:      Mental Status: She is alert.   Psychiatric:         Mood and Affect: Mood normal.         Behavior: Behavior is cooperative.         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  On HD per nephrology  Antihypertensives  Tolerated recent increase of Cardizem  Renal diet  Monitor BP  Remove extra fluid in dialysis         Pressure ulcer of left heel, unstageable (CMS/HCC) - Primary     TX: Irrigate with normal saline, pat dry, apply Santyl, calcium alginate, and cover with foam dressing     Turn every 2 hours  Prevalon boots  Pressure reducing mattress, patient refusing air mattress, discussed  benefits/risks  Cushion to chair  Dietitian consult  Supplements per dietitian  Grab bars to bed to assist with bed mobility and repositioning  Weekly skin checks    Wound culture pending  X-ray negative for OM         Atrial fibrillation (CMS/[facility])     Monitor heart rate, controlled  Amiodarone  Apixaban  Bleeding precautions         Constipation     Metamucil every other day  Improving          Medications, treatments, and labs reviewed  Continue medications and treatments as listed in EMR    Scribe Attestation  I, Phoebe Martinez   attest that this documentation has been prepared under the direction and in the presence of Wilbert Lock MD    Provider Attestation - Scribe documentation  All medical record entries made by the Scribe were at my direction and personally dictated by me. I have reviewed the chart and agree that the record accurately reflects my personal performance of the history, physical exam, discussion and plan.   Wilbetr Lock MD

## 2024-03-22 NOTE — LETTER
Patient: Melody Martin  : 1935    Encounter Date: 2024    PROGRESS NOTE    Subjective  Chief complaint: Melody Martin is a 88 y.o. female who is a long term care patient being seen and evaluated for constipation and heel wound.    HPI:  HPI  Patient is seen in follow-up for constipation.  Patient was started on Metamucil every other day with improvement.  Patient also with left heel wound, unstageable pressure ulcer.  Patient had left heel culture obtained and x-ray.  X-ray was negative for OM but revealed a heel spur.  Patient's wound culture is pending at this time.  Patient afebrile.    Objective  Vital signs: 105/51, 97.8, 56, 18, blood sugar 359, 94%    Physical Exam  Constitutional:       General: She is not in acute distress.  Eyes:      Extraocular Movements: Extraocular movements intact.   Cardiovascular:      Rate and Rhythm: Normal rate and regular rhythm.   Pulmonary:      Effort: Pulmonary effort is normal.      Breath sounds: Normal breath sounds.   Abdominal:      General: Bowel sounds are normal.      Palpations: Abdomen is soft.   Musculoskeletal:      Cervical back: Neck supple.      Right lower leg: No edema.      Left lower leg: No edema.   Neurological:      Mental Status: She is alert.   Psychiatric:         Mood and Affect: Mood normal.         Behavior: Behavior is cooperative.         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  On HD per nephrology  Antihypertensives  Tolerated recent increase of Cardizem  Renal diet  Monitor BP  Remove extra fluid in dialysis         Pressure ulcer of left heel, unstageable (CMS/HCC) - Primary     TX: Irrigate with normal saline, pat dry, apply Santyl, calcium alginate, and cover with foam dressing     Turn every 2 hours  Prevalon  boots  Pressure reducing mattress, patient refusing air mattress, discussed benefits/risks  Cushion to chair  Dietitian consult  Supplements per dietitian  Grab bars to bed to assist with bed mobility and repositioning  Weekly skin checks    Wound culture pending  X-ray negative for OM         Atrial fibrillation (CMS/[facility])     Monitor heart rate, controlled  Amiodarone  Apixaban  Bleeding precautions         Constipation     Metamucil every other day  Improving          Medications, treatments, and labs reviewed  Continue medications and treatments as listed in EMR    Scribe Attestation  I, Phoebe Martinez   attest that this documentation has been prepared under the direction and in the presence of Wilbert Lock MD    Provider Attestation - Scribe documentation  All medical record entries made by the Scribe were at my direction and personally dictated by me. I have reviewed the chart and agree that the record accurately reflects my personal performance of the history, physical exam, discussion and plan.   Wilbert Lock MD            Electronically Signed By: Wilbert Lock MD   3/22/24  4:25 PM

## 2024-03-22 NOTE — ASSESSMENT & PLAN NOTE
TX: Irrigate with normal saline, pat dry, apply Santyl, calcium alginate, and cover with foam dressing     Turn every 2 hours  Prevalon boots  Pressure reducing mattress, patient refusing air mattress, discussed benefits/risks  Cushion to chair  Dietitian consult  Supplements per dietitian  Grab bars to bed to assist with bed mobility and repositioning  Weekly skin checks    Wound culture pending  X-ray negative for OM

## 2024-03-25 NOTE — ASSESSMENT & PLAN NOTE
I performed subcutaneous tissue debridement with a total area of 31.5 cm2 with curette to remove viable and non-viable tissue/material including subcutaneous tissue, slough, biofilm, and fibrin/exudate after achieving pain control with 2% lidocaine topical gel.  A minimal amount of bleeding was controlled with pressure. The procedure was tolerated well with a pain level of 0 throughout and a pain level of 0 following the procedure.  Post-debridement measurements unchanged. Character of wound/ulcer post-debridement is improved.     TX: Irrigate with normal saline, pat dry, apply calcium alginate and cover with silicone border foam dressing daily and as needed     Turn every 2 hours  Prevalon boots  Air mattress  Cushion to chair  Dietitian following  Supplements per dietitian  Grab bars to bed to assist with bed mobility and repositioning  Weekly skin checks  House barrier cream to buttocks

## 2024-03-25 NOTE — ASSESSMENT & PLAN NOTE
Stable, no shortness of breath.  On HD per nephrology  Antihypertensives  Cardizem  Renal diet  Monitor BP  Remove extra fluid in dialysis

## 2024-03-25 NOTE — PROGRESS NOTES
PROGRESS NOTE    Subjective   Chief complaint: Melody Martin is a 88 y.o. female who is a long term care patient being seen and evaluated for wounds.    HPI:  HPI  Patient is seen in follow-up for wounds.  Patient continues with sacral stage III pressure ulcer and left heel unstageable pressure ulcer.  Patient continues with treatments and interventions that are in place as per the EMR.  Patient seen and examined at bedside.  Patient denies fever or chills.    Objective   Vital signs: 142/56, 97.8, 79, 16, blood sugar 300 94%    Physical Exam  Constitutional:       General: She is not in acute distress.  Eyes:      Extraocular Movements: Extraocular movements intact.   Cardiovascular:      Rate and Rhythm: Normal rate and regular rhythm.   Pulmonary:      Effort: Pulmonary effort is normal.      Breath sounds: Normal breath sounds.   Abdominal:      General: Bowel sounds are normal.      Palpations: Abdomen is soft.   Musculoskeletal:      Cervical back: Neck supple.      Right lower leg: No edema.      Left lower leg: No edema.   Skin:     Comments: wound location -left heel  Etiology - unstageable pressure   Granulation-small  Slough-large  Edge- flat  Odor - yes  Drainage - medium serosanguineous  Size - 3 X 3 X UTD centimeters  Undermining -none    Wound location -sacrum  Etiology - pressure   Granulation-large  Slough-small  Edge-flat  Odor - none  Drainage - medium  Size - 4.5 x 7 centimeters   Neurological:      Mental Status: She is alert.   Psychiatric:         Mood and Affect: Mood normal.         Behavior: Behavior is cooperative.         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  On HD per nephrology  Antihypertensives  Cardizem  Renal diet  Monitor BP  Remove extra fluid in dialysis          Pressure ulcer of left heel, unstageable (CMS/HCC) - Primary     I performed subcutaneous tissue debridement with a total area of 9 cm2 with curette to remove viable and non-viable tissue/material including subcutaneous tissue, slough, biofilm, and fibrin/exudate after achieving pain control with 2% lidocaine topical gel.  A minimal amount of bleeding was controlled with pressure. The procedure was tolerated well with a pain level of 0 throughout and a pain level of 0 following the procedure.  Post-debridement measurements unchanged. Character of wound/ulcer post-debridement is improved.     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  A minimal amount of bleeding was controlled with pressure. The procedure was tolerated well with a pain level of 0 throughout and a pain level of 0 following the procedure.  Post-debridement measurements unchanged. Character of wound/ulcer post-debridement is improved.     TX: Irrigate with normal saline, pat dry, apply calcium alginate and cover with silicone border foam dressing daily and as needed     Turn every 2 hours  Prevalon boots  Air mattress  Cushion to chair  Dietitian following  Supplements per dietitian  Grab bars to bed to assist with bed mobility and repositioning  Weekly skin  checks  House barrier cream to buttocks          Medications, treatments, and labs reviewed  Continue medications and treatments as listed in EMR    Scribe Attestation  I, Phoebe Martinez   attest that this documentation has been prepared under the direction and in the presence of MAR Johnson    Provider Attestation - Scribe documentation  All medical record entries made by the Scribe were at my direction and personally dictated by me. I have reviewed the chart and agree that the record accurately reflects my personal performance of the history, physical exam, discussion and plan.   MAR Johnson

## 2024-03-27 ENCOUNTER — NURSING HOME VISIT (OUTPATIENT)
Dept: POST ACUTE CARE | Facility: EXTERNAL LOCATION | Age: 89
End: 2024-03-27
Payer: COMMERCIAL

## 2024-03-27 DIAGNOSIS — K59.00 CONSTIPATION, UNSPECIFIED CONSTIPATION TYPE: ICD-10-CM

## 2024-03-27 DIAGNOSIS — Z99.2 HYPERTENSIVE HEART AND KIDNEY DISEASE WITH CHRONIC DIASTOLIC CONGESTIVE HEART FAILURE AND STAGE 5 CHRONIC KIDNEY DISEASE ON CHRONIC DIALYSIS (MULTI): ICD-10-CM

## 2024-03-27 DIAGNOSIS — I13.2 HYPERTENSIVE HEART AND KIDNEY DISEASE WITH CHRONIC DIASTOLIC CONGESTIVE HEART FAILURE AND STAGE 5 CHRONIC KIDNEY DISEASE ON CHRONIC DIALYSIS (MULTI): ICD-10-CM

## 2024-03-27 DIAGNOSIS — N18.6 HYPERTENSIVE HEART AND KIDNEY DISEASE WITH CHRONIC DIASTOLIC CONGESTIVE HEART FAILURE AND STAGE 5 CHRONIC KIDNEY DISEASE ON CHRONIC DIALYSIS (MULTI): ICD-10-CM

## 2024-03-27 DIAGNOSIS — L89.620 PRESSURE ULCER OF LEFT HEEL, UNSTAGEABLE (MULTI): Primary | ICD-10-CM

## 2024-03-27 DIAGNOSIS — I50.32 HYPERTENSIVE HEART AND KIDNEY DISEASE WITH CHRONIC DIASTOLIC CONGESTIVE HEART FAILURE AND STAGE 5 CHRONIC KIDNEY DISEASE ON CHRONIC DIALYSIS (MULTI): ICD-10-CM

## 2024-03-27 PROCEDURE — 99308 SBSQ NF CARE LOW MDM 20: CPT | Performed by: INTERNAL MEDICINE

## 2024-03-27 NOTE — PROGRESS NOTES
PROGRESS NOTE    Subjective   Chief complaint: Melody Martin is a 88 y.o. female who is a long term care patient being seen and evaluated for follow-up.    HPI:  HPI  Patient is seen in follow-up of constipation.  Patient was started on Metamucil every other day.  Patient denies abdominal cramps, nausea or vomiting.  Constipation has improved.  Patient did have recent left heel culture and x-ray x-ray showed heel spur but no evidence of osteo-.    Objective   Vital signs: 133/53, 97.8, 59, 18, blood sugar 293, 94%    Physical Exam  Constitutional:       General: She is not in acute distress.  Eyes:      Extraocular Movements: Extraocular movements intact.   Cardiovascular:      Rate and Rhythm: Normal rate and regular rhythm.   Pulmonary:      Effort: Pulmonary effort is normal.      Breath sounds: Normal breath sounds.   Abdominal:      General: Bowel sounds are normal.      Palpations: Abdomen is soft.   Musculoskeletal:      Cervical back: Neck supple.      Right lower leg: No edema.      Left lower leg: No edema.   Neurological:      Mental Status: She is alert.   Psychiatric:         Mood and Affect: Mood normal.         Behavior: Behavior is cooperative.         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  On HD per nephrology  Antihypertensives  Cardizem  Renal diet  Monitor BP  Remove extra fluid in dialysis         Pressure ulcer of left heel, unstageable (CMS/HCC) - Primary     TX: Irrigate with normal saline, pat dry, apply one fourth strength Dakin's moistened gauze, ABD and Kerlix       Turn every 2 hours  Prevalon boots  Pressure reducing mattress, patient refusing air mattress, discussed benefits/risks  Cushion to chair  Dietitian consult  Supplements per dietitian  Grab bars to bed to assist with  bed mobility and repositioning  Weekly skin checks    Wound culture pending  X-ray negative for OM         Constipation     Metamucil every other day  Improving          Medications, treatments, and labs reviewed  Continue medications and treatments as listed in EMR    Scribe Attestation  Hien MARRERO Scribe   attest that this documentation has been prepared under the direction and in the presence of Wilbert Lock MD    Provider Attestation - Scribe documentation  All medical record entries made by the Scribe were at my direction and personally dictated by me. I have reviewed the chart and agree that the record accurately reflects my personal performance of the history, physical exam, discussion and plan.   Wilbert Lock MD

## 2024-03-27 NOTE — ASSESSMENT & PLAN NOTE
TX: Irrigate with normal saline, pat dry, apply one fourth strength Dakin's moistened gauze, ABD and Kerlix       Turn every 2 hours  Prevalon boots  Pressure reducing mattress, patient refusing air mattress, discussed benefits/risks  Cushion to chair  Dietitian consult  Supplements per dietitian  Grab bars to bed to assist with bed mobility and repositioning  Weekly skin checks    Wound culture pending  X-ray negative for OM

## 2024-03-27 NOTE — LETTER
Patient: Melody Martin  : 1935    Encounter Date: 2024    PROGRESS NOTE    Subjective  Chief complaint: Melody Martin is a 88 y.o. female who is a long term care patient being seen and evaluated for follow-up.    HPI:  HPI  Patient is seen in follow-up of constipation.  Patient was started on Metamucil every other day.  Patient denies abdominal cramps, nausea or vomiting.  Constipation has improved.  Patient did have recent left heel culture and x-ray x-ray showed heel spur but no evidence of osteo-.    Objective  Vital signs: 133/53, 97.8, 59, 18, blood sugar 293, 94%    Physical Exam  Constitutional:       General: She is not in acute distress.  Eyes:      Extraocular Movements: Extraocular movements intact.   Cardiovascular:      Rate and Rhythm: Normal rate and regular rhythm.   Pulmonary:      Effort: Pulmonary effort is normal.      Breath sounds: Normal breath sounds.   Abdominal:      General: Bowel sounds are normal.      Palpations: Abdomen is soft.   Musculoskeletal:      Cervical back: Neck supple.      Right lower leg: No edema.      Left lower leg: No edema.   Neurological:      Mental Status: She is alert.   Psychiatric:         Mood and Affect: Mood normal.         Behavior: Behavior is cooperative.         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  On HD per nephrology  Antihypertensives  Cardizem  Renal diet  Monitor BP  Remove extra fluid in dialysis         Pressure ulcer of left heel, unstageable (CMS/HCC) - Primary     TX: Irrigate with normal saline, pat dry, apply one fourth strength Dakin's moistened gauze, ABD and Kerlix       Turn every 2 hours  Prevalon boots  Pressure reducing mattress, patient refusing air mattress, discussed benefits/risks  Cushion to  chair  Dietitian consult  Supplements per dietitian  Grab bars to bed to assist with bed mobility and repositioning  Weekly skin checks    Wound culture pending  X-ray negative for OM         Constipation     Metamucil every other day  Improving          Medications, treatments, and labs reviewed  Continue medications and treatments as listed in EMR    Scribe Attestation  IHien Scribe   attest that this documentation has been prepared under the direction and in the presence of Wilbert Lock MD    Provider Attestation - Scribe documentation  All medical record entries made by the Scribe were at my direction and personally dictated by me. I have reviewed the chart and agree that the record accurately reflects my personal performance of the history, physical exam, discussion and plan.   Wilbert Lock MD            Electronically Signed By: Wilbert Lock MD   3/27/24  6:48 PM

## 2024-03-28 ENCOUNTER — NURSING HOME VISIT (OUTPATIENT)
Dept: POST ACUTE CARE | Facility: EXTERNAL LOCATION | Age: 89
End: 2024-03-28
Payer: COMMERCIAL

## 2024-03-28 DIAGNOSIS — Z99.2 HYPERTENSIVE HEART AND KIDNEY DISEASE WITH CHRONIC DIASTOLIC CONGESTIVE HEART FAILURE AND STAGE 5 CHRONIC KIDNEY DISEASE ON CHRONIC DIALYSIS (MULTI): ICD-10-CM

## 2024-03-28 DIAGNOSIS — L89.153 STAGE III PRESSURE ULCER OF SACRAL REGION (MULTI): Primary | ICD-10-CM

## 2024-03-28 DIAGNOSIS — N18.6 HYPERTENSIVE HEART AND KIDNEY DISEASE WITH CHRONIC DIASTOLIC CONGESTIVE HEART FAILURE AND STAGE 5 CHRONIC KIDNEY DISEASE ON CHRONIC DIALYSIS (MULTI): ICD-10-CM

## 2024-03-28 DIAGNOSIS — Z79.4 TYPE 2 DIABETES MELLITUS WITH CHRONIC KIDNEY DISEASE ON CHRONIC DIALYSIS, WITH LONG-TERM CURRENT USE OF INSULIN (MULTI): ICD-10-CM

## 2024-03-28 DIAGNOSIS — R73.9 HYPERGLYCEMIA: ICD-10-CM

## 2024-03-28 DIAGNOSIS — E11.22 TYPE 2 DIABETES MELLITUS WITH CHRONIC KIDNEY DISEASE ON CHRONIC DIALYSIS, WITH LONG-TERM CURRENT USE OF INSULIN (MULTI): ICD-10-CM

## 2024-03-28 DIAGNOSIS — Z99.2 TYPE 2 DIABETES MELLITUS WITH CHRONIC KIDNEY DISEASE ON CHRONIC DIALYSIS, WITH LONG-TERM CURRENT USE OF INSULIN (MULTI): ICD-10-CM

## 2024-03-28 DIAGNOSIS — I13.2 HYPERTENSIVE HEART AND KIDNEY DISEASE WITH CHRONIC DIASTOLIC CONGESTIVE HEART FAILURE AND STAGE 5 CHRONIC KIDNEY DISEASE ON CHRONIC DIALYSIS (MULTI): ICD-10-CM

## 2024-03-28 DIAGNOSIS — I50.32 HYPERTENSIVE HEART AND KIDNEY DISEASE WITH CHRONIC DIASTOLIC CONGESTIVE HEART FAILURE AND STAGE 5 CHRONIC KIDNEY DISEASE ON CHRONIC DIALYSIS (MULTI): ICD-10-CM

## 2024-03-28 DIAGNOSIS — N18.6 TYPE 2 DIABETES MELLITUS WITH CHRONIC KIDNEY DISEASE ON CHRONIC DIALYSIS, WITH LONG-TERM CURRENT USE OF INSULIN (MULTI): ICD-10-CM

## 2024-03-28 DIAGNOSIS — L89.620 PRESSURE ULCER OF LEFT HEEL, UNSTAGEABLE (MULTI): ICD-10-CM

## 2024-03-28 PROCEDURE — 99309 SBSQ NF CARE MODERATE MDM 30: CPT | Performed by: NURSE PRACTITIONER

## 2024-03-28 PROCEDURE — 11042 DBRDMT SUBQ TIS 1ST 20SQCM/<: CPT | Performed by: NURSE PRACTITIONER

## 2024-03-28 PROCEDURE — 11045 DBRDMT SUBQ TISS EACH ADDL: CPT | Performed by: NURSE PRACTITIONER

## 2024-03-28 NOTE — LETTER
Patient: Melody Martin  : 1935    Encounter Date: 2024    PROGRESS NOTE    Subjective  Chief complaint: Melody Martin is a 88 y.o. female who is a long term care patient being seen and evaluated for follow-up    HPI:  HPI  Patient presents for follow-up of wounds, sacrum and left heel.  Patient had left heel culture and x-ray that showed heel spur.  No evidence of osteomyelitis.  In addition nurse reporting patient with high blood sugars but taken after breakfast.  Patient was seen and examined at bedside, appears to be in no acute distress.  Denies polydipsia polyuria polyphagia.    Objective  Vital signs: 134/58, 97.6, 60, 18,     Physical Exam  Constitutional:       General: She is not in acute distress.  Eyes:      Extraocular Movements: Extraocular movements intact.   Cardiovascular:      Rate and Rhythm: Normal rate and regular rhythm.   Pulmonary:      Effort: Pulmonary effort is normal.      Breath sounds: Normal breath sounds.   Abdominal:      General: Bowel sounds are normal.      Palpations: Abdomen is soft.   Musculoskeletal:      Cervical back: Neck supple.      Right lower leg: No edema.      Left lower leg: No edema.   Skin:     Comments: wound location -left heel  Etiology - unstageable pressure   Granulation-small  Slough-large  Edge- flat  Odor - yes  Drainage - medium serosanguineous  Size - 2.8 X 3 X UTD centimeters  Undermining -none    Wound location -sacrum  Etiology - pressure   Granulation-large  Slough-small  Edge-flat  Odor - none  Drainage - medium  Size - 5 x 8 x 0.2 centimeters   Neurological:      Mental Status: She is alert.   Psychiatric:         Mood and Affect: Mood normal.         Behavior: Behavior is cooperative.         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)     BP at goal  Stable, no shortness of breath.  On HD per nephrology  Antihypertensives  Cardizem  Renal diet  Monitor BP  Remove extra fluid in dialysis         Pressure ulcer of left heel, unstageable (CMS/[facility])     I performed subcutaneous tissue debridement with a total area of 8.4 cm2 with curette to remove viable and non-viable tissue/material including subcutaneous tissue, slough, biofilm, and fibrin/exudate after achieving pain control with 2% lidocaine topical gel.  A minimal amount of bleeding was controlled with pressure. The procedure was tolerated well with a pain level of 0 throughout and a pain level of 0 following the procedure.  Post-debridement measurements unchanged. Character of wound/ulcer post-debridement is improved.       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) - Primary     I performed subcutaneous tissue debridement with a total area of 40 cm2 with curette to remove viable and non-viable tissue/material including subcutaneous tissue, slough, biofilm, and fibrin/exudate after achieving pain control with 2% lidocaine topical gel.  A minimal amount of bleeding was controlled with pressure. The procedure was tolerated well with a pain level of 0 throughout and a pain level of 0 following the procedure.  Post-debridement measurements unchanged. Character of wound/ulcer post-debridement is improved.     TX: Irrigate with normal saline, pat dry, apply calcium alginate and cover with silicone border foam dressing  daily and as needed     Turn every 2 hours  Prevalon boots  Air mattress  Cushion to chair  Dietitian following  Supplements per dietitian  Grab bars to bed to assist with bed mobility and repositioning  Weekly skin checks  House barrier cream to buttocks         Type 2 diabetes mellitus with chronic kidney disease on chronic dialysis, with long-term current use of insulin (CMS/[facility])     Carb controlled diet  Monitor Glucoscan  Glargine  Humalog  FBG elevated but taken after patient ate          Medications, treatments, and labs reviewed  Continue medications and treatments as listed in EMR    Scribe Attestation  I, Phoebe Martinez   attest that this documentation has been prepared under the direction and in the presence of MAR Johnson    Provider Attestation - Scribe documentation  All medical record entries made by the Scribe were at my direction and personally dictated by me. I have reviewed the chart and agree that the record accurately reflects my personal performance of the history, physical exam, discussion and plan.   MAR Johnson            Electronically Signed By: MAR Johnson   4/8/24  5:23 PM

## 2024-04-01 NOTE — PROGRESS NOTES
PROGRESS NOTE    Subjective   Chief complaint: Melody Martin is a 88 y.o. female who is a long term care patient being seen and evaluated for constipation.    HPI:  HPI  Patient is seen for complaints of constipation.  Patient denies nausea or vomiting.  Denies fever or chills.  Patient is on HD for diagnosis of ESRD.    Objective   Vital signs: 149/41, 98.4, 95, 18, blood sugar 220, 94%    Physical Exam  Constitutional:       General: She is not in acute distress.  Eyes:      Extraocular Movements: Extraocular movements intact.   Cardiovascular:      Rate and Rhythm: Normal rate and regular rhythm.   Pulmonary:      Effort: Pulmonary effort is normal.      Breath sounds: Normal breath sounds.   Abdominal:      General: Bowel sounds are normal. There is no distension.      Palpations: Abdomen is soft.      Tenderness: There is no abdominal tenderness.   Musculoskeletal:      Cervical back: Neck supple.      Right lower leg: No edema.      Left lower leg: No edema.   Neurological:      Mental Status: She is alert.   Psychiatric:         Mood and Affect: Mood normal.         Behavior: Behavior is cooperative.         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  On HD per nephrology  Antihypertensives  Cardizem  Renal diet  Monitor BP  Remove extra fluid in dialysis         Atrial fibrillation (CMS/[facility])     Monitor heart rate, controlled  Amiodarone  Apixaban  Bleeding precautions         Constipation - Primary     Start Metamucil every other day          Medications, treatments, and labs reviewed  Continue medications and treatments as listed in EMR    Scribe Attestation  JANES, Phoebe Martinez   attest that this documentation has been prepared under the direction and in the presence of Karina Patel,  APRN-CNP    Provider Attestation - Scribe documentation  All medical record entries made by the Scribe were at my direction and personally dictated by me. I have reviewed the chart and agree that the record accurately reflects my personal performance of the history, physical exam, discussion and plan.   Karina Patel, JANELL-CNP

## 2024-04-03 ENCOUNTER — NURSING HOME VISIT (OUTPATIENT)
Dept: POST ACUTE CARE | Facility: EXTERNAL LOCATION | Age: 89
End: 2024-04-03
Payer: COMMERCIAL

## 2024-04-03 DIAGNOSIS — I13.2 HYPERTENSIVE HEART AND KIDNEY DISEASE WITH CHRONIC DIASTOLIC CONGESTIVE HEART FAILURE AND STAGE 5 CHRONIC KIDNEY DISEASE ON CHRONIC DIALYSIS (MULTI): ICD-10-CM

## 2024-04-03 DIAGNOSIS — T14.8XXA EXCORIATION: ICD-10-CM

## 2024-04-03 DIAGNOSIS — N18.6 HYPERTENSIVE HEART AND KIDNEY DISEASE WITH CHRONIC DIASTOLIC CONGESTIVE HEART FAILURE AND STAGE 5 CHRONIC KIDNEY DISEASE ON CHRONIC DIALYSIS (MULTI): ICD-10-CM

## 2024-04-03 DIAGNOSIS — I50.32 HYPERTENSIVE HEART AND KIDNEY DISEASE WITH CHRONIC DIASTOLIC CONGESTIVE HEART FAILURE AND STAGE 5 CHRONIC KIDNEY DISEASE ON CHRONIC DIALYSIS (MULTI): ICD-10-CM

## 2024-04-03 DIAGNOSIS — R63.4 WEIGHT LOSS: Primary | ICD-10-CM

## 2024-04-03 DIAGNOSIS — Z99.2 HYPERTENSIVE HEART AND KIDNEY DISEASE WITH CHRONIC DIASTOLIC CONGESTIVE HEART FAILURE AND STAGE 5 CHRONIC KIDNEY DISEASE ON CHRONIC DIALYSIS (MULTI): ICD-10-CM

## 2024-04-03 PROCEDURE — 99308 SBSQ NF CARE LOW MDM 20: CPT | Performed by: INTERNAL MEDICINE

## 2024-04-03 NOTE — LETTER
Patient: Melody Martin  : 1935    Encounter Date: 2024    PROGRESS NOTE    Subjective  Chief complaint: Melody Martin is a 88 y.o. female who is a long term care patient being seen and evaluated for follow-up.    HPI:  HPI  Patient is seen in follow-up for reports of weight loss.  Patient to begin Remeron.  Patient had recent complaints of wall red PORTER area and was placed on Diflucan and nystatin.  Patient seen and examined at bedside, appears to be in no acute distress.  Denies chest pain or shortness of breath.    Objective  Vital signs: 127/51, 98.0, 70, 18, 94%, blood sugar 366    Physical Exam  Constitutional:       General: She is not in acute distress.  Eyes:      Extraocular Movements: Extraocular movements intact.   Cardiovascular:      Rate and Rhythm: Normal rate and regular rhythm.   Pulmonary:      Effort: Pulmonary effort is normal.      Breath sounds: Normal breath sounds.   Abdominal:      General: Bowel sounds are normal.      Palpations: Abdomen is soft.   Musculoskeletal:      Cervical back: Neck supple.      Right lower leg: No edema.      Left lower leg: No edema.   Neurological:      Mental Status: She is alert.   Psychiatric:         Mood and Affect: Mood normal.         Behavior: Behavior is cooperative.         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  On HD per nephrology  Antihypertensives  Cardizem  Renal diet  Monitor BP  Remove extra fluid in dialysis         Weight loss - Primary     Remeron  Monitor weights         Excoriation     Diflucan, nystatin  Continue to monitor          Medications, treatments, and labs reviewed  Continue medications and treatments as listed in EMR    Scribe Attestation  I, Hien Conn, Scribe   attest that this documentation has  been prepared under the direction and in the presence of Wilbert Lock MD    Provider Attestation - Scribe documentation  All medical record entries made by the Scribe were at my direction and personally dictated by me. I have reviewed the chart and agree that the record accurately reflects my personal performance of the history, physical exam, discussion and plan.   Wilbert Lock MD            Electronically Signed By: Wilbert Lock MD   4/4/24  2:45 PM

## 2024-04-04 ENCOUNTER — NURSING HOME VISIT (OUTPATIENT)
Dept: POST ACUTE CARE | Facility: EXTERNAL LOCATION | Age: 89
End: 2024-04-04
Payer: COMMERCIAL

## 2024-04-04 DIAGNOSIS — I50.32 HYPERTENSIVE HEART AND KIDNEY DISEASE WITH CHRONIC DIASTOLIC CONGESTIVE HEART FAILURE AND STAGE 5 CHRONIC KIDNEY DISEASE ON CHRONIC DIALYSIS (MULTI): ICD-10-CM

## 2024-04-04 DIAGNOSIS — E11.22 TYPE 2 DIABETES MELLITUS WITH CHRONIC KIDNEY DISEASE ON CHRONIC DIALYSIS, WITH LONG-TERM CURRENT USE OF INSULIN (MULTI): ICD-10-CM

## 2024-04-04 DIAGNOSIS — Z79.4 TYPE 2 DIABETES MELLITUS WITH CHRONIC KIDNEY DISEASE ON CHRONIC DIALYSIS, WITH LONG-TERM CURRENT USE OF INSULIN (MULTI): ICD-10-CM

## 2024-04-04 DIAGNOSIS — N18.6 TYPE 2 DIABETES MELLITUS WITH CHRONIC KIDNEY DISEASE ON CHRONIC DIALYSIS, WITH LONG-TERM CURRENT USE OF INSULIN (MULTI): ICD-10-CM

## 2024-04-04 DIAGNOSIS — Z99.2 TYPE 2 DIABETES MELLITUS WITH CHRONIC KIDNEY DISEASE ON CHRONIC DIALYSIS, WITH LONG-TERM CURRENT USE OF INSULIN (MULTI): ICD-10-CM

## 2024-04-04 DIAGNOSIS — L89.153 STAGE III PRESSURE ULCER OF SACRAL REGION (MULTI): Primary | ICD-10-CM

## 2024-04-04 DIAGNOSIS — I13.2 HYPERTENSIVE HEART AND KIDNEY DISEASE WITH CHRONIC DIASTOLIC CONGESTIVE HEART FAILURE AND STAGE 5 CHRONIC KIDNEY DISEASE ON CHRONIC DIALYSIS (MULTI): ICD-10-CM

## 2024-04-04 DIAGNOSIS — N18.6 HYPERTENSIVE HEART AND KIDNEY DISEASE WITH CHRONIC DIASTOLIC CONGESTIVE HEART FAILURE AND STAGE 5 CHRONIC KIDNEY DISEASE ON CHRONIC DIALYSIS (MULTI): ICD-10-CM

## 2024-04-04 DIAGNOSIS — L89.620 PRESSURE ULCER OF LEFT HEEL, UNSTAGEABLE (MULTI): ICD-10-CM

## 2024-04-04 DIAGNOSIS — Z99.2 HYPERTENSIVE HEART AND KIDNEY DISEASE WITH CHRONIC DIASTOLIC CONGESTIVE HEART FAILURE AND STAGE 5 CHRONIC KIDNEY DISEASE ON CHRONIC DIALYSIS (MULTI): ICD-10-CM

## 2024-04-04 PROBLEM — R63.4 WEIGHT LOSS: Status: ACTIVE | Noted: 2024-04-04

## 2024-04-04 PROBLEM — T14.8XXA EXCORIATION: Status: ACTIVE | Noted: 2024-04-04

## 2024-04-04 PROCEDURE — 11042 DBRDMT SUBQ TIS 1ST 20SQCM/<: CPT | Performed by: NURSE PRACTITIONER

## 2024-04-04 PROCEDURE — 11045 DBRDMT SUBQ TISS EACH ADDL: CPT | Performed by: NURSE PRACTITIONER

## 2024-04-04 PROCEDURE — 99309 SBSQ NF CARE MODERATE MDM 30: CPT | Performed by: NURSE PRACTITIONER

## 2024-04-04 NOTE — LETTER
Patient: Melody Martin  : 1935    Encounter Date: 2024    PROGRESS NOTE    Subjective  Chief complaint: Melody Martin is a 88 y.o. female who is a long term care patient being seen and evaluated for wounds.    HPI:  HPI  Patient is seen in follow-up of wounds, sacral stage III pressure ulcer and left heel unstageable pressure ulcer.  Patient continues with treatment and interventions in place as per the EMR.  Patient denies nausea or vomiting.  Denies fever or chills.    Objective  Vital signs: 127/51, 98.0, 70, 18, blood sugar 165, 94%    Physical Exam  Constitutional:       General: She is not in acute distress.  Eyes:      Extraocular Movements: Extraocular movements intact.   Cardiovascular:      Rate and Rhythm: Normal rate and regular rhythm.   Pulmonary:      Effort: Pulmonary effort is normal.      Breath sounds: Normal breath sounds.   Musculoskeletal:      Cervical back: Neck supple.      Right lower leg: No edema.      Left lower leg: No edema.   Skin:     Comments: wound location -left heel  Etiology - unstageable pressure   Granulation-medium  Slough-medium  Edge- flat  Odor - no  Drainage - medium serosanguineous  Size - 3 X 3 X UTD centimeters  Undermining -none    Wound location -sacrum  Etiology - pressure   Granulation-large  Slough-small  Edge-flat  Odor - none  Drainage - medium serosanguineous  Size - 8.5 x 8.5 x 0.1 centimeters   Neurological:      Mental Status: She is alert.      Motor: Weakness present.   Psychiatric:         Mood and Affect: Mood normal.         Behavior: Behavior is cooperative.         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  On HD per nephrology  Antihypertensives  Cardizem  Renal diet  Monitor BP  Remove extra fluid in dialysis          Pressure ulcer of left heel, unstageable (Multi)     I performed subcutaneous tissue debridement with a total area of 9 cm2 with curette to remove viable and non-viable tissue/material including subcutaneous tissue, slough, biofilm, and fibrin/exudate after achieving pain control with 2% lidocaine topical gel.  A minimal amount of bleeding was controlled with pressure. The procedure was tolerated well with a pain level of 0 throughout and a pain level of 0 following the procedure.  Post-debridement measurements unchanged. Character of wound/ulcer post-debridement is improved.       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  A minimal amount of bleeding was controlled with pressure. The procedure was tolerated well with a pain level of 0 throughout and a pain level of 0 following the procedure.  Post-debridement measurements unchanged. Character of wound/ulcer post-debridement is improved.     TX: Irrigate with normal saline, pat dry, apply calcium alginate and cover with silicone border foam dressing daily and as needed     Turn every 2 hours  Prevalon boots  Air mattress  Cushion to chair  Dietitian following  Supplements per dietitian  Grab bars to bed to assist with bed mobility and repositioning  Weekly  skin checks  House barrier cream to buttocks         Type 2 diabetes mellitus with chronic kidney disease on chronic dialysis, with long-term current use of insulin (Multi)     Carb controlled diet  Monitor Glucoscan  Glargine  Humalog  FBG at goal          Medications, treatments, and labs reviewed  Continue medications and treatments as listed in EMR    Scribe Attestation  Hien MARRERO Scribe   attest that this documentation has been prepared under the direction and in the presence of MAR Johnson    Provider Attestation - Scribe documentation  All medical record entries made by the Scribe were at my direction and personally dictated by me. I have reviewed the chart and agree that the record accurately reflects my personal performance of the history, physical exam, discussion and plan.   MAR Johnson            Electronically Signed By: MAR Johnson   4/20/24  8:11 PM

## 2024-04-04 NOTE — PROGRESS NOTES
PROGRESS NOTE    Subjective   Chief complaint: Melody Martin is a 88 y.o. female who is a long term care patient being seen and evaluated for follow-up.    HPI:  HPI  Patient is seen in follow-up for reports of weight loss.  Patient to begin Remeron.  Patient had recent complaints of wall red PORTER area and was placed on Diflucan and nystatin.  Patient seen and examined at bedside, appears to be in no acute distress.  Denies chest pain or shortness of breath.    Objective   Vital signs: 127/51, 98.0, 70, 18, 94%, blood sugar 366    Physical Exam  Constitutional:       General: She is not in acute distress.  Eyes:      Extraocular Movements: Extraocular movements intact.   Cardiovascular:      Rate and Rhythm: Normal rate and regular rhythm.   Pulmonary:      Effort: Pulmonary effort is normal.      Breath sounds: Normal breath sounds.   Abdominal:      General: Bowel sounds are normal.      Palpations: Abdomen is soft.   Musculoskeletal:      Cervical back: Neck supple.      Right lower leg: No edema.      Left lower leg: No edema.   Neurological:      Mental Status: She is alert.   Psychiatric:         Mood and Affect: Mood normal.         Behavior: Behavior is cooperative.         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  On HD per nephrology  Antihypertensives  Cardizem  Renal diet  Monitor BP  Remove extra fluid in dialysis         Weight loss - Primary     Remeron  Monitor weights         Excoriation     Diflucan, nystatin  Continue to monitor          Medications, treatments, and labs reviewed  Continue medications and treatments as listed in EMR    Scribe Attestation  I, Hien Conn, [place]   attest that this documentation has been prepared under the direction and in the presence of Wilbert Lock  MD    Provider Attestation - Scribe documentation  All medical record entries made by the Scribe were at my direction and personally dictated by me. I have reviewed the chart and agree that the record accurately reflects my personal performance of the history, physical exam, discussion and plan.   Wilbert Lock MD

## 2024-04-05 ENCOUNTER — NURSING HOME VISIT (OUTPATIENT)
Dept: POST ACUTE CARE | Facility: EXTERNAL LOCATION | Age: 89
End: 2024-04-05
Payer: COMMERCIAL

## 2024-04-05 DIAGNOSIS — Z79.4 TYPE 2 DIABETES MELLITUS WITH CHRONIC KIDNEY DISEASE ON CHRONIC DIALYSIS, WITH LONG-TERM CURRENT USE OF INSULIN (MULTI): ICD-10-CM

## 2024-04-05 DIAGNOSIS — L08.9 WOUND INFECTION: Primary | ICD-10-CM

## 2024-04-05 DIAGNOSIS — E11.22 TYPE 2 DIABETES MELLITUS WITH CHRONIC KIDNEY DISEASE ON CHRONIC DIALYSIS, WITH LONG-TERM CURRENT USE OF INSULIN (MULTI): ICD-10-CM

## 2024-04-05 DIAGNOSIS — Z99.2 TYPE 2 DIABETES MELLITUS WITH CHRONIC KIDNEY DISEASE ON CHRONIC DIALYSIS, WITH LONG-TERM CURRENT USE OF INSULIN (MULTI): ICD-10-CM

## 2024-04-05 DIAGNOSIS — N18.6 TYPE 2 DIABETES MELLITUS WITH CHRONIC KIDNEY DISEASE ON CHRONIC DIALYSIS, WITH LONG-TERM CURRENT USE OF INSULIN (MULTI): ICD-10-CM

## 2024-04-05 DIAGNOSIS — Z99.2 HYPERTENSIVE HEART AND KIDNEY DISEASE WITH CHRONIC DIASTOLIC CONGESTIVE HEART FAILURE AND STAGE 5 CHRONIC KIDNEY DISEASE ON CHRONIC DIALYSIS (MULTI): ICD-10-CM

## 2024-04-05 DIAGNOSIS — I50.32 HYPERTENSIVE HEART AND KIDNEY DISEASE WITH CHRONIC DIASTOLIC CONGESTIVE HEART FAILURE AND STAGE 5 CHRONIC KIDNEY DISEASE ON CHRONIC DIALYSIS (MULTI): ICD-10-CM

## 2024-04-05 DIAGNOSIS — I13.2 HYPERTENSIVE HEART AND KIDNEY DISEASE WITH CHRONIC DIASTOLIC CONGESTIVE HEART FAILURE AND STAGE 5 CHRONIC KIDNEY DISEASE ON CHRONIC DIALYSIS (MULTI): ICD-10-CM

## 2024-04-05 DIAGNOSIS — N18.6 HYPERTENSIVE HEART AND KIDNEY DISEASE WITH CHRONIC DIASTOLIC CONGESTIVE HEART FAILURE AND STAGE 5 CHRONIC KIDNEY DISEASE ON CHRONIC DIALYSIS (MULTI): ICD-10-CM

## 2024-04-05 DIAGNOSIS — T14.8XXA WOUND INFECTION: Primary | ICD-10-CM

## 2024-04-05 PROCEDURE — 99308 SBSQ NF CARE LOW MDM 20: CPT | Performed by: INTERNAL MEDICINE

## 2024-04-05 NOTE — ASSESSMENT & PLAN NOTE
Carb controlled diet  Monitor Glucoscan  Glargine increased to 32 units twice daily  Humalog  FBG elevated

## 2024-04-05 NOTE — PROGRESS NOTES
PROGRESS NOTE    Subjective   Chief complaint: Melody Martin is a 88 y.o. female who is a long term care patient being seen and evaluated for follow-up.    HPI:  HPI  Patient's left heel culture did come back positive for infection.  Patient is to start on Augmentin 875 x 10 days.  Also reported that patient's blood sugars have been elevated recently.  Patient's glargine was increased to 32 units twice daily.  Patient denies polydipsia polyuria polyphagia.  Patient in no acute distress and examined at bedside.  Afebrile at this time    Objective   Vital signs: 113/40, 98.0, 69, 18, blood sugar 247, 94%    Physical Exam  Constitutional:       General: She is not in acute distress.  Eyes:      Extraocular Movements: Extraocular movements intact.   Cardiovascular:      Rate and Rhythm: Normal rate and regular rhythm.   Pulmonary:      Effort: Pulmonary effort is normal.      Breath sounds: Normal breath sounds.   Abdominal:      General: Bowel sounds are normal.      Palpations: Abdomen is soft.   Musculoskeletal:      Cervical back: Neck supple.      Right lower leg: No edema.      Left lower leg: No edema.   Neurological:      Mental Status: She is alert.   Psychiatric:         Mood and Affect: Mood normal.         Behavior: Behavior is cooperative.         Assessment/Plan   Problem List Items Addressed This Visit       Type 2 diabetes mellitus with chronic kidney disease on chronic dialysis, with long-term current use of insulin (CMS/[facility], located within 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  On HD per nephrology  Antihypertensives  Cardizem  Renal diet  Monitor BP  Remove extra fluid in dialysis         Wound infection - Primary     Start Augmentin 875 twice daily x  10 days          Medications, treatments, and labs reviewed  Continue medications and treatments as listed in EMR    Scribe Attestation  I, Phoebe Martinez   attest that this documentation has been prepared under the direction and in the presence of Wilbert Lock MD    Provider Attestation - Scribe documentation  All medical record entries made by the Scribe were at my direction and personally dictated by me. I have reviewed the chart and agree that the record accurately reflects my personal performance of the history, physical exam, discussion and plan.   Wilbert Lock MD

## 2024-04-05 NOTE — LETTER
Patient: Melody Martin  : 1935    Encounter Date: 2024    PROGRESS NOTE    Subjective  Chief complaint: Melody Martin is a 88 y.o. female who is a long term care patient being seen and evaluated for follow-up.    HPI:  HPI  Patient's left heel culture did come back positive for infection.  Patient is to start on Augmentin 875 x 10 days.  Also reported that patient's blood sugars have been elevated recently.  Patient's glargine was increased to 32 units twice daily.  Patient denies polydipsia polyuria polyphagia.  Patient in no acute distress and examined at bedside.  Afebrile at this time    Objective  Vital signs: 113/40, 98.0, 69, 18, blood sugar 247, 94%    Physical Exam  Constitutional:       General: She is not in acute distress.  Eyes:      Extraocular Movements: Extraocular movements intact.   Cardiovascular:      Rate and Rhythm: Normal rate and regular rhythm.   Pulmonary:      Effort: Pulmonary effort is normal.      Breath sounds: Normal breath sounds.   Abdominal:      General: Bowel sounds are normal.      Palpations: Abdomen is soft.   Musculoskeletal:      Cervical back: Neck supple.      Right lower leg: No edema.      Left lower leg: No edema.   Neurological:      Mental Status: She is alert.   Psychiatric:         Mood and Affect: Mood normal.         Behavior: Behavior is cooperative.         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  On HD per nephrology  Antihypertensives  Cardizem  Renal diet  Monitor BP  Remove extra fluid in dialysis          Wound infection - Primary     Start Augmentin 875 twice daily x 10 days          Medications, treatments, and labs reviewed  Continue medications and treatments as listed in EMR    Scribe Attestation  I, Phoebe Martinez   attest that this documentation has been prepared under the direction and in the presence of Wilbert Lock MD    Provider Attestation - Scribe documentation  All medical record entries made by the Scribe were at my direction and personally dictated by me. I have reviewed the chart and agree that the record accurately reflects my personal performance of the history, physical exam, discussion and plan.   Wilbert Lock MD            Electronically Signed By: Wilbert Lock MD   4/5/24  3:48 PM

## 2024-04-08 PROBLEM — R73.9 HYPERGLYCEMIA: Status: ACTIVE | Noted: 2024-04-08

## 2024-04-08 PROCEDURE — 99222 1ST HOSP IP/OBS MODERATE 55: CPT | Performed by: INTERNAL MEDICINE

## 2024-04-08 NOTE — PROGRESS NOTES
PROGRESS NOTE    Subjective   Chief complaint: Melody Martin is a 88 y.o. female who is a long term care patient being seen and evaluated for follow-up    HPI:  HPI  Patient presents for follow-up of wounds, sacrum and left heel.  Patient had left heel culture and x-ray that showed heel spur.  No evidence of osteomyelitis.  In addition nurse reporting patient with high blood sugars but taken after breakfast.  Patient was seen and examined at bedside, appears to be in no acute distress.  Denies polydipsia polyuria polyphagia.    Objective   Vital signs: 134/58, 97.6, 60, 18,     Physical Exam  Constitutional:       General: She is not in acute distress.  Eyes:      Extraocular Movements: Extraocular movements intact.   Cardiovascular:      Rate and Rhythm: Normal rate and regular rhythm.   Pulmonary:      Effort: Pulmonary effort is normal.      Breath sounds: Normal breath sounds.   Abdominal:      General: Bowel sounds are normal.      Palpations: Abdomen is soft.   Musculoskeletal:      Cervical back: Neck supple.      Right lower leg: No edema.      Left lower leg: No edema.   Skin:     Comments: wound location -left heel  Etiology - unstageable pressure   Granulation-small  Slough-large  Edge- flat  Odor - yes  Drainage - medium serosanguineous  Size - 2.8 X 3 X UTD centimeters  Undermining -none    Wound location -sacrum  Etiology - pressure   Granulation-large  Slough-small  Edge-flat  Odor - none  Drainage - medium  Size - 5 x 8 x 0.2 centimeters   Neurological:      Mental Status: She is alert.   Psychiatric:         Mood and Affect: Mood normal.         Behavior: Behavior is cooperative.         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  On HD per nephrology  Antihypertensives  Cardizem  Renal diet  Monitor BP  Remove extra fluid in dialysis         Pressure ulcer of left heel, unstageable (CMS/HCC)     I performed subcutaneous tissue debridement with a total area of 8.4 cm2 with curette to remove viable and non-viable tissue/material including subcutaneous tissue, slough, biofilm, and fibrin/exudate after achieving pain control with 2% lidocaine topical gel.  A minimal amount of bleeding was controlled with pressure. The procedure was tolerated well with a pain level of 0 throughout and a pain level of 0 following the procedure.  Post-debridement measurements unchanged. Character of wound/ulcer post-debridement is improved.       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  A minimal amount of bleeding was controlled with pressure. The procedure was tolerated well with a pain level of 0 throughout and a pain level of 0 following the procedure.  Post-debridement measurements unchanged. Character of wound/ulcer post-debridement is improved.     TX: Irrigate with normal saline, pat dry, apply calcium alginate and cover with silicone border foam dressing daily and as needed     Turn every 2 hours  Prevalon boots  Air  mattress  Cushion to chair  Dietitian following  Supplements per dietitian  Grab bars to bed to assist with bed mobility and repositioning  Weekly skin checks  House barrier cream to buttocks         Type 2 diabetes mellitus with chronic kidney disease on chronic dialysis, with long-term current use of insulin (CMS/[facility])     Carb controlled diet  Monitor Glucoscan  Glargine  Humalog  FBG elevated but taken after patient ate          Medications, treatments, and labs reviewed  Continue medications and treatments as listed in EMR    Scribe Attestation  I, Phoebe Martinez   attest that this documentation has been prepared under the direction and in the presence of MAR Johnson    Provider Attestation - Scribe documentation  All medical record entries made by the Scribe were at my direction and personally dictated by me. I have reviewed the chart and agree that the record accurately reflects my personal performance of the history, physical exam, discussion and plan.   MAR Johnson

## 2024-04-08 NOTE — ASSESSMENT & PLAN NOTE
I performed subcutaneous tissue debridement with a total area of 40 cm2 with curette to remove viable and non-viable tissue/material including subcutaneous tissue, slough, biofilm, and fibrin/exudate after achieving pain control with 2% lidocaine topical gel.  A minimal amount of bleeding was controlled with pressure. The procedure was tolerated well with a pain level of 0 throughout and a pain level of 0 following the procedure.  Post-debridement measurements unchanged. Character of wound/ulcer post-debridement is improved.     TX: Irrigate with normal saline, pat dry, apply calcium alginate and cover with silicone border foam dressing daily and as needed     Turn every 2 hours  Prevalon boots  Air mattress  Cushion to chair  Dietitian following  Supplements per dietitian  Grab bars to bed to assist with bed mobility and repositioning  Weekly skin checks  House barrier cream to buttocks

## 2024-04-08 NOTE — ASSESSMENT & PLAN NOTE
Carb controlled diet  Monitor Glucoscan  Glargine  Humalog  FBG elevated but taken after patient ate

## 2024-04-08 NOTE — ASSESSMENT & PLAN NOTE
BP at goal  Stable, no shortness of breath.  On HD per nephrology  Antihypertensives  Cardizem  Renal diet  Monitor BP  Remove extra fluid in dialysis

## 2024-04-08 NOTE — ASSESSMENT & PLAN NOTE
I performed subcutaneous tissue debridement with a total area of 8.4 cm2 with curette to remove viable and non-viable tissue/material including subcutaneous tissue, slough, biofilm, and fibrin/exudate after achieving pain control with 2% lidocaine topical gel.  A minimal amount of bleeding was controlled with pressure. The procedure was tolerated well with a pain level of 0 throughout and a pain level of 0 following the procedure.  Post-debridement measurements unchanged. Character of wound/ulcer post-debridement is improved.       TX: Irrigate with normal saline, pat dry, apply one fourth strength Dakin's moistened gauze, ABD and Kerlix       Turn every 2 hours  Prevalon boots  Pressure reducing mattress, patient refusing air mattress, discussed benefits/risks  Cushion to chair  Dietitian consult  Supplements per dietitian  Grab bars to bed to assist with bed mobility and repositioning  Weekly skin checks    Wound culture pending  X-ray negative for OM

## 2024-04-09 PROCEDURE — 99232 SBSQ HOSP IP/OBS MODERATE 35: CPT | Performed by: INTERNAL MEDICINE

## 2024-04-10 PROCEDURE — 99232 SBSQ HOSP IP/OBS MODERATE 35: CPT | Performed by: INTERNAL MEDICINE

## 2024-04-11 PROCEDURE — 99232 SBSQ HOSP IP/OBS MODERATE 35: CPT | Performed by: INTERNAL MEDICINE

## 2024-04-12 PROCEDURE — 99232 SBSQ HOSP IP/OBS MODERATE 35: CPT | Performed by: INTERNAL MEDICINE

## 2024-04-13 PROCEDURE — 99232 SBSQ HOSP IP/OBS MODERATE 35: CPT | Performed by: INTERNAL MEDICINE

## 2024-04-14 PROCEDURE — 99232 SBSQ HOSP IP/OBS MODERATE 35: CPT | Performed by: INTERNAL MEDICINE

## 2024-04-15 PROCEDURE — 99232 SBSQ HOSP IP/OBS MODERATE 35: CPT | Performed by: INTERNAL MEDICINE

## 2024-04-15 NOTE — ASSESSMENT & PLAN NOTE
I performed subcutaneous tissue debridement with a total area of 72.25 cm2 with curette to remove viable and non-viable tissue/material including subcutaneous tissue, slough, biofilm, and fibrin/exudate after achieving pain control with 2% lidocaine topical gel.  A minimal amount of bleeding was controlled with pressure. The procedure was tolerated well with a pain level of 0 throughout and a pain level of 0 following the procedure.  Post-debridement measurements unchanged. Character of wound/ulcer post-debridement is improved.     TX: Irrigate with normal saline, pat dry, apply calcium alginate and cover with silicone border foam dressing daily and as needed     Turn every 2 hours  Prevalon boots  Air mattress  Cushion to chair  Dietitian following  Supplements per dietitian  Grab bars to bed to assist with bed mobility and repositioning  Weekly skin checks  House barrier cream to buttocks

## 2024-04-15 NOTE — ASSESSMENT & PLAN NOTE
I performed subcutaneous tissue debridement with a total area of 9 cm2 with curette to remove viable and non-viable tissue/material including subcutaneous tissue, slough, biofilm, and fibrin/exudate after achieving pain control with 2% lidocaine topical gel.  A minimal amount of bleeding was controlled with pressure. The procedure was tolerated well with a pain level of 0 throughout and a pain level of 0 following the procedure.  Post-debridement measurements unchanged. Character of wound/ulcer post-debridement is improved.       TX: Irrigate with normal saline, pat dry, apply one fourth strength Dakin's moistened gauze, ABD and Kerlix       Turn every 2 hours  Prevalon boots  Pressure reducing mattress, patient refusing air mattress, discussed benefits/risks  Cushion to chair  Dietitian consult  Supplements per dietitian  Grab bars to bed to assist with bed mobility and repositioning  Weekly skin checks    Wound culture pending  X-ray negative for OM

## 2024-04-15 NOTE — PROGRESS NOTES
PROGRESS NOTE    Subjective   Chief complaint: Melody Martin is a 88 y.o. female who is a long term care patient being seen and evaluated for wounds.    HPI:  HPI  Patient is seen in follow-up of wounds, sacral stage III pressure ulcer and left heel unstageable pressure ulcer.  Patient continues with treatment and interventions in place as per the EMR.  Patient denies nausea or vomiting.  Denies fever or chills.    Objective   Vital signs: 127/51, 98.0, 70, 18, blood sugar 165, 94%    Physical Exam  Constitutional:       General: She is not in acute distress.  Eyes:      Extraocular Movements: Extraocular movements intact.   Cardiovascular:      Rate and Rhythm: Normal rate and regular rhythm.   Pulmonary:      Effort: Pulmonary effort is normal.      Breath sounds: Normal breath sounds.   Musculoskeletal:      Cervical back: Neck supple.      Right lower leg: No edema.      Left lower leg: No edema.   Skin:     Comments: wound location -left heel  Etiology - unstageable pressure   Granulation-medium  Slough-medium  Edge- flat  Odor - no  Drainage - medium serosanguineous  Size - 3 X 3 X UTD centimeters  Undermining -none    Wound location -sacrum  Etiology - pressure   Granulation-large  Slough-small  Edge-flat  Odor - none  Drainage - medium serosanguineous  Size - 8.5 x 8.5 x 0.1 centimeters   Neurological:      Mental Status: She is alert.      Motor: Weakness present.   Psychiatric:         Mood and Affect: Mood normal.         Behavior: Behavior is cooperative.         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  On HD per nephrology  Antihypertensives  Cardizem  Renal diet  Monitor BP  Remove extra fluid in dialysis         Pressure ulcer of left heel, unstageable (Multi)     I performed  subcutaneous tissue debridement with a total area of 9 cm2 with curette to remove viable and non-viable tissue/material including subcutaneous tissue, slough, biofilm, and fibrin/exudate after achieving pain control with 2% lidocaine topical gel.  A minimal amount of bleeding was controlled with pressure. The procedure was tolerated well with a pain level of 0 throughout and a pain level of 0 following the procedure.  Post-debridement measurements unchanged. Character of wound/ulcer post-debridement is improved.       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  A minimal amount of bleeding was controlled with pressure. The procedure was tolerated well with a pain level of 0 throughout and a pain level of 0 following the procedure.  Post-debridement measurements unchanged. Character of wound/ulcer post-debridement is improved.     TX: Irrigate with normal saline, pat dry, apply calcium alginate and cover with silicone border foam dressing daily and as needed     Turn every 2 hours  Prevalon boots  Air mattress  Cushion to chair  Dietitian following  Supplements per dietitian  Grab bars to bed to assist with bed mobility and repositioning  Weekly skin checks  House barrier cream to buttocks         Type 2  diabetes mellitus with chronic kidney disease on chronic dialysis, with long-term current use of insulin (Multi)     Carb controlled diet  Monitor Glucoscan  Glargine  Humalog  FBG at goal          Medications, treatments, and labs reviewed  Continue medications and treatments as listed in EMR    Scribe Attestation  Hien MARRERO Scribe   attest that this documentation has been prepared under the direction and in the presence of MAR Johnson    Provider Attestation - Scribe documentation  All medical record entries made by the Scribe were at my direction and personally dictated by me. I have reviewed the chart and agree that the record accurately reflects my personal performance of the history, physical exam, discussion and plan.   MAR Johnson

## 2024-04-16 PROCEDURE — 99232 SBSQ HOSP IP/OBS MODERATE 35: CPT | Performed by: INTERNAL MEDICINE

## 2024-04-22 ENCOUNTER — NURSING HOME VISIT (OUTPATIENT)
Dept: POST ACUTE CARE | Facility: EXTERNAL LOCATION | Age: 89
End: 2024-04-22
Payer: COMMERCIAL

## 2024-04-22 DIAGNOSIS — Z99.2 HYPERTENSIVE HEART AND KIDNEY DISEASE WITH CHRONIC DIASTOLIC CONGESTIVE HEART FAILURE AND STAGE 5 CHRONIC KIDNEY DISEASE ON CHRONIC DIALYSIS (MULTI): ICD-10-CM

## 2024-04-22 DIAGNOSIS — I13.2 HYPERTENSIVE HEART AND KIDNEY DISEASE WITH CHRONIC DIASTOLIC CONGESTIVE HEART FAILURE AND STAGE 5 CHRONIC KIDNEY DISEASE ON CHRONIC DIALYSIS (MULTI): ICD-10-CM

## 2024-04-22 DIAGNOSIS — I50.32 HYPERTENSIVE HEART AND KIDNEY DISEASE WITH CHRONIC DIASTOLIC CONGESTIVE HEART FAILURE AND STAGE 5 CHRONIC KIDNEY DISEASE ON CHRONIC DIALYSIS (MULTI): ICD-10-CM

## 2024-04-22 DIAGNOSIS — Z99.2 TYPE 2 DIABETES MELLITUS WITH CHRONIC KIDNEY DISEASE ON CHRONIC DIALYSIS, WITH LONG-TERM CURRENT USE OF INSULIN (MULTI): ICD-10-CM

## 2024-04-22 DIAGNOSIS — N18.6 HYPERTENSIVE HEART AND KIDNEY DISEASE WITH CHRONIC DIASTOLIC CONGESTIVE HEART FAILURE AND STAGE 5 CHRONIC KIDNEY DISEASE ON CHRONIC DIALYSIS (MULTI): ICD-10-CM

## 2024-04-22 DIAGNOSIS — I48.91 ATRIAL FIBRILLATION, UNSPECIFIED TYPE (MULTI): ICD-10-CM

## 2024-04-22 DIAGNOSIS — Z79.4 TYPE 2 DIABETES MELLITUS WITH CHRONIC KIDNEY DISEASE ON CHRONIC DIALYSIS, WITH LONG-TERM CURRENT USE OF INSULIN (MULTI): ICD-10-CM

## 2024-04-22 DIAGNOSIS — A41.9 SEPSIS, DUE TO UNSPECIFIED ORGANISM, UNSPECIFIED WHETHER ACUTE ORGAN DYSFUNCTION PRESENT (MULTI): Primary | ICD-10-CM

## 2024-04-22 DIAGNOSIS — E11.22 TYPE 2 DIABETES MELLITUS WITH CHRONIC KIDNEY DISEASE ON CHRONIC DIALYSIS, WITH LONG-TERM CURRENT USE OF INSULIN (MULTI): ICD-10-CM

## 2024-04-22 DIAGNOSIS — N18.6 TYPE 2 DIABETES MELLITUS WITH CHRONIC KIDNEY DISEASE ON CHRONIC DIALYSIS, WITH LONG-TERM CURRENT USE OF INSULIN (MULTI): ICD-10-CM

## 2024-04-22 PROCEDURE — 99309 SBSQ NF CARE MODERATE MDM 30: CPT | Performed by: NURSE PRACTITIONER

## 2024-04-22 NOTE — LETTER
Patient: Melody Martin  : 1935    Encounter Date: 2024    PROGRESS NOTE    Subjective  Chief complaint: Melody Martin is a 88 y.o. female who is a long term care patient being seen and evaluated for readmission.    HPI:  HPI  Patient readmitted to nursing facility following hospitalization for altered mental status and diagnosed with sepsis.  Patient did complete antibiotic course in hospital.  Patient does have chronic wounds, sacral and left heel.  Treatments are in place.  Patient was seen and examined at the bedside, appears to be in no acute distress.    Objective  Vital signs: 142/55, 97.7, 18, 67, blood sugar 383, 96%    Physical Exam  Constitutional:       General: She is not in acute distress.  Eyes:      Extraocular Movements: Extraocular movements intact.   Cardiovascular:      Rate and Rhythm: Normal rate and regular rhythm.   Pulmonary:      Effort: Pulmonary effort is normal.      Breath sounds: Normal breath sounds.   Musculoskeletal:      Cervical back: Neck supple.      Right lower leg: No edema.      Left lower leg: No edema.   Neurological:      Mental Status: She is alert.      Motor: Weakness present.   Psychiatric:         Mood and Affect: Mood normal.         Behavior: Behavior is cooperative.         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  On HD per nephrology  Isosorbide dinitrate  Metoprolol  Diltiazem  Renal diet  Monitor BP  Remove extra fluid in dialysis         Sepsis (Multi) - Primary     Completed antibiotics         Type 2 diabetes mellitus with chronic kidney disease on chronic dialysis, with long-term current use of insulin (Multi)     Carb  controlled diet  Monitor Glucoscan  Glargine  Humalog          Medications, treatments, and labs reviewed  Continue medications and treatments as listed in EMR    Scribe Attestation  I, Phoebe Martinez   attest that this documentation has been prepared under the direction and in the presence of MAR Johnson    Provider Attestation - Scribe documentation  All medical record entries made by the Scribe were at my direction and personally dictated by me. I have reviewed the chart and agree that the record accurately reflects my personal performance of the history, physical exam, discussion and plan.   MAR Johnson            Electronically Signed By: MAR Johnson   5/3/24  1:35 PM

## 2024-04-23 DIAGNOSIS — M54.9 BACK PAIN, UNSPECIFIED BACK LOCATION, UNSPECIFIED BACK PAIN LATERALITY, UNSPECIFIED CHRONICITY: ICD-10-CM

## 2024-04-23 RX ORDER — TRAMADOL HYDROCHLORIDE 50 MG/1
25 TABLET ORAL EVERY 12 HOURS PRN
Qty: 30 TABLET | Refills: 5 | Status: SHIPPED | OUTPATIENT
Start: 2024-04-23

## 2024-04-24 ENCOUNTER — NURSING HOME VISIT (OUTPATIENT)
Dept: POST ACUTE CARE | Facility: EXTERNAL LOCATION | Age: 89
End: 2024-04-24
Payer: COMMERCIAL

## 2024-04-24 DIAGNOSIS — I50.32 HYPERTENSIVE HEART AND KIDNEY DISEASE WITH CHRONIC DIASTOLIC CONGESTIVE HEART FAILURE AND STAGE 5 CHRONIC KIDNEY DISEASE ON CHRONIC DIALYSIS (MULTI): ICD-10-CM

## 2024-04-24 DIAGNOSIS — L89.153 STAGE III PRESSURE ULCER OF SACRAL REGION (MULTI): ICD-10-CM

## 2024-04-24 DIAGNOSIS — E11.22 TYPE 2 DIABETES MELLITUS WITH CHRONIC KIDNEY DISEASE ON CHRONIC DIALYSIS, WITH LONG-TERM CURRENT USE OF INSULIN (MULTI): ICD-10-CM

## 2024-04-24 DIAGNOSIS — Z79.4 TYPE 2 DIABETES MELLITUS WITH CHRONIC KIDNEY DISEASE ON CHRONIC DIALYSIS, WITH LONG-TERM CURRENT USE OF INSULIN (MULTI): ICD-10-CM

## 2024-04-24 DIAGNOSIS — Z99.2 TYPE 2 DIABETES MELLITUS WITH CHRONIC KIDNEY DISEASE ON CHRONIC DIALYSIS, WITH LONG-TERM CURRENT USE OF INSULIN (MULTI): ICD-10-CM

## 2024-04-24 DIAGNOSIS — I13.2 HYPERTENSIVE HEART AND KIDNEY DISEASE WITH CHRONIC DIASTOLIC CONGESTIVE HEART FAILURE AND STAGE 5 CHRONIC KIDNEY DISEASE ON CHRONIC DIALYSIS (MULTI): ICD-10-CM

## 2024-04-24 DIAGNOSIS — E78.5 HYPERLIPIDEMIA, UNSPECIFIED HYPERLIPIDEMIA TYPE: ICD-10-CM

## 2024-04-24 DIAGNOSIS — L89.620 PRESSURE ULCER OF LEFT HEEL, UNSTAGEABLE (MULTI): ICD-10-CM

## 2024-04-24 DIAGNOSIS — F41.9 ANXIETY: ICD-10-CM

## 2024-04-24 DIAGNOSIS — N18.6 TYPE 2 DIABETES MELLITUS WITH CHRONIC KIDNEY DISEASE ON CHRONIC DIALYSIS, WITH LONG-TERM CURRENT USE OF INSULIN (MULTI): ICD-10-CM

## 2024-04-24 DIAGNOSIS — N18.6 HYPERTENSIVE HEART AND KIDNEY DISEASE WITH CHRONIC DIASTOLIC CONGESTIVE HEART FAILURE AND STAGE 5 CHRONIC KIDNEY DISEASE ON CHRONIC DIALYSIS (MULTI): ICD-10-CM

## 2024-04-24 DIAGNOSIS — A41.9 SEPSIS, DUE TO UNSPECIFIED ORGANISM, UNSPECIFIED WHETHER ACUTE ORGAN DYSFUNCTION PRESENT (MULTI): Primary | ICD-10-CM

## 2024-04-24 DIAGNOSIS — Z99.2 HYPERTENSIVE HEART AND KIDNEY DISEASE WITH CHRONIC DIASTOLIC CONGESTIVE HEART FAILURE AND STAGE 5 CHRONIC KIDNEY DISEASE ON CHRONIC DIALYSIS (MULTI): ICD-10-CM

## 2024-04-24 DIAGNOSIS — I48.91 ATRIAL FIBRILLATION, UNSPECIFIED TYPE (MULTI): ICD-10-CM

## 2024-04-24 PROCEDURE — 99305 1ST NF CARE MODERATE MDM 35: CPT | Performed by: INTERNAL MEDICINE

## 2024-04-24 NOTE — LETTER
Patient: Melody Martin  : 1935    Encounter Date: 2024    HISTORY & PHYSICAL    Subjective  Chief complaint: Melody Martin is a 88 y.o. female who is being seen and evaluated for multiple medical problems.  Patient readmitted to  after recent hospitalization.    HPI:  HPI  Patient returns to ED from nursing facility with altered mental status.  Patient had been vomiting and episodes of hallucination.  Patient was admitted for further evaluation management.  Patient diagnosed with sepsis and started on antibiotics.  Wound team f/u patient throughout hospitalization.  Patient did continue with HD for diagnosis of ESRD.  Patient was deemed hemodynamically stable to discharge back to nursing facility for continued medical management.  Patient was seen and examined at the bedside, appears to be in no acute distress.  Denies chest pain or shortness of breath.  Afebrile.  Past Medical History:   Diagnosis Date   • Anemia    • Atrial fibrillation (Multi)    • Chronic kidney disease    • Diabetes mellitus (Multi)    • Elevated troponin 2023   • GERD (gastroesophageal reflux disease)    • Heart murmur    • Hypertension    • Myocardial infarction (Multi)    • Old myocardial infarction 2023   • Other conditions influencing health status 2022    History of cough   • Other conditions influencing health status 2016    Diabetes mellitus type 1, uncontrolled   • Personal history of other diseases of the circulatory system     History of hypertension   • Personal history of other diseases of the female genital tract     History of endometriosis   • Personal history of other diseases of the nervous system and sense organs 10/15/2021    History of acute otitis media   • Personal history of other endocrine, nutritional and metabolic disease 2018    History of diabetes mellitus   • Personal history of transient ischemic attack (TIA), and cerebral infarction without residual deficits  09/20/2023   • Stroke (Multi)        Past Surgical History:   Procedure Laterality Date   • BACK SURGERY  10/10/2018    Back Surgery   • HYSTERECTOMY  10/10/2018    Hysterectomy   • IR CVC TUNNELED  8/28/2023    IR CVC TUNNELED 8/28/2023 POR ANGIO   • MR HEAD ANGIO WO IV CONTRAST  8/31/2023    MR HEAD ANGIO WO IV CONTRAST 8/31/2023 POR MRI   • MR NECK ANGIO WO IV CONTRAST  8/31/2023    MR NECK ANGIO WO IV CONTRAST 8/31/2023 POR MRI       Family History   Problem Relation Name Age of Onset   • No Known Problems Mother     • No Known Problems Father         Social History     Socioeconomic History   • Marital status:      Spouse name: Not on file   • Number of children: Not on file   • Years of education: Not on file   • Highest education level: Not on file   Occupational History   • Not on file   Tobacco Use   • Smoking status: Never   • Smokeless tobacco: Never   Substance and Sexual Activity   • Alcohol use: Never   • Drug use: Never   • Sexual activity: Not on file   Other Topics Concern   • Not on file   Social History Narrative   • Not on file     Social Determinants of Health     Financial Resource Strain: Low Risk  (11/10/2023)    Received from [facility]    Overall Financial Resource Strain (CARDIA)    • Difficulty of Paying Living Expenses: Not hard at all   Food Insecurity: No Food Insecurity (11/10/2023)    Received from [facility]    Hunger Vital Sign    • Worried About Running Out of Food in the Last Year: Never true    • Ran Out of Food in the Last Year: Never true   Transportation Needs: No Transportation Needs (11/10/2023)    Received from [facility]    PRAPARE - Transportation    • Lack of Transportation (Medical): No    • Lack of Transportation (Non-Medical): No   Physical Activity: Not on file   Stress: Not on file   Social Connections: Not on file   Intimate Partner Violence: Not on file   Housing Stability: Unknown (4/8/2024)    Received from [facility]    Housing  Stability Vital Sign    • Unable to Pay for Housing in the Last Year: Not on file    • Number of Places Lived in the Last Year: 1    • Unstable Housing in the Last Year: No       Vital signs: 85/38, 97.7, 73, 18, 96%, blood sugar 230    Objective  Physical Exam  Constitutional:       General: She is not in acute distress.  Eyes:      Extraocular Movements: Extraocular movements intact.   Cardiovascular:      Rate and Rhythm: Normal rate and regular rhythm.   Pulmonary:      Effort: Pulmonary effort is normal.      Breath sounds: Normal breath sounds.   Abdominal:      General: Bowel sounds are normal.      Palpations: Abdomen is soft.   Musculoskeletal:      Cervical back: Neck supple.      Right lower leg: No edema.      Left lower leg: No edema.   Skin:     Comments: Dressings to sacrum and heel clean dry and intact   Neurological:      Mental Status: She is alert.   Psychiatric:         Mood and Affect: Mood normal.         Behavior: Behavior is cooperative.         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  On HD per nephrology  Isosorbide dinitrate  Metoprolol  Diltiazem  Renal diet  Monitor BP  Remove extra fluid in dialysis         Pressure ulcer of left heel, unstageable (Multi)     TX: Irrigate with normal saline, pat dry, apply silver alginate slightly moistened with normal saline and dry dressing, change every other day    Turn every 2 hours  Prevalon boots  Pressure reducing mattress, patient refusing air mattress, discussed  benefits/risks  Cushion to chair  Dietitian consult  Supplements per dietitian  Grab bars to bed to assist with bed mobility and repositioning  Weekly skin checks         Atrial fibrillation (Multi)     Monitor heart rate, controlled  Amiodarone  Apixaban  Bleeding precautions         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 (Multi)     TX: Irrigate with wound cleanser 1 normal saline, pat dry, apply zinc oxide and Xeroform gauze and cover with ABD pad     Turn every 2 hours  Prevalon boots  Air mattress  Cushion to chair  Dietitian following  Supplements per dietitian  Grab bars to bed to assist with bed mobility and repositioning  Weekly skin checks  House barrier cream to buttocks         Anxiety     Buspirone  Mirtazapine  Monitor mood and behaviors         Sepsis (Multi) - Primary     Completed antibiotics          Hospital records reviewed  Medications, treatments, and labs reviewed  Continue medications and treatments as listed in EMR  Discussed with nursing and therapy      Scribe Attestation  IHien Scriboseas   attest that this documentation has been prepared under the direction and in the presence of Wilbert Lock MD    Provider Attestation - Scribe documentation  All medical record entries made by the Scribe were at my direction and personally dictated by me. I have reviewed the chart and agree that the record accurately reflects my personal performance of the history, physical exam, discussion and plan.   Wilbert Lock MD      Electronically Signed By: Wilbert Lock MD   4/24/24  4:16 PM

## 2024-04-24 NOTE — ASSESSMENT & PLAN NOTE
TX: Irrigate with normal saline, pat dry, apply silver alginate slightly moistened with normal saline and dry dressing, change every other day    Turn every 2 hours  Prevalon boots  Pressure reducing mattress, patient refusing air mattress, discussed benefits/risks  Cushion to chair  Dietitian consult  Supplements per dietitian  Grab bars to bed to assist with bed mobility and repositioning  Weekly skin checks

## 2024-04-24 NOTE — ASSESSMENT & PLAN NOTE
TX: Irrigate with wound cleanser 1 normal saline, pat dry, apply zinc oxide and Xeroform gauze and cover with ABD pad     Turn every 2 hours  Prevalon boots  Air mattress  Cushion to chair  Dietitian following  Supplements per dietitian  Grab bars to bed to assist with bed mobility and repositioning  Weekly skin checks  House barrier cream to buttocks

## 2024-04-24 NOTE — ASSESSMENT & PLAN NOTE
Stable, no shortness of breath.  On HD per nephrology  Isosorbide dinitrate  Metoprolol  Diltiazem  Renal diet  Monitor BP  Remove extra fluid in dialysis

## 2024-04-24 NOTE — PROGRESS NOTES
HISTORY & PHYSICAL    Subjective   Chief complaint: Melody Martin is a 88 y.o. female who is being seen and evaluated for multiple medical problems.  Patient readmitted to  after recent hospitalization.    HPI:  HPI  Patient returns to ED from nursing facility with altered mental status.  Patient had been vomiting and episodes of hallucination.  Patient was admitted for further evaluation management.  Patient diagnosed with sepsis and started on antibiotics.  Wound team f/u patient throughout hospitalization.  Patient did continue with HD for diagnosis of ESRD.  Patient was deemed hemodynamically stable to discharge back to nursing facility for continued medical management.  Patient was seen and examined at the bedside, appears to be in no acute distress.  Denies chest pain or shortness of breath.  Afebrile.  Past Medical History:   Diagnosis Date    Anemia     Atrial fibrillation (Multi)     Chronic kidney disease     Diabetes mellitus (Multi)     Elevated troponin 08/24/2023    GERD (gastroesophageal reflux disease)     Heart murmur     Hypertension     Myocardial infarction (Multi)     Old myocardial infarction 09/09/2023    Other conditions influencing health status 12/06/2022    History of cough    Other conditions influencing health status 04/06/2016    Diabetes mellitus type 1, uncontrolled    Personal history of other diseases of the circulatory system     History of hypertension    Personal history of other diseases of the female genital tract     History of endometriosis    Personal history of other diseases of the nervous system and sense organs 10/15/2021    History of acute otitis media    Personal history of other endocrine, nutritional and metabolic disease 01/26/2018    History of diabetes mellitus    Personal history of transient ischemic attack (TIA), and cerebral infarction without residual deficits 09/20/2023    Stroke (Multi)        Past Surgical History:   Procedure Laterality Date    BACK  SURGERY  10/10/2018    Back Surgery    HYSTERECTOMY  10/10/2018    Hysterectomy    IR CVC TUNNELED  8/28/2023    IR CVC TUNNELED 8/28/2023 POR ANGIO    MR HEAD ANGIO WO IV CONTRAST  8/31/2023    MR HEAD ANGIO WO IV CONTRAST 8/31/2023 POR MRI    MR NECK ANGIO WO IV CONTRAST  8/31/2023    MR NECK ANGIO WO IV CONTRAST 8/31/2023 POR MRI       Family History   Problem Relation Name Age of Onset    No Known Problems Mother      No Known Problems Father         Social History     Socioeconomic History    Marital status:      Spouse name: Not on file    Number of children: Not on file    Years of education: Not on file    Highest education level: Not on file   Occupational History    Not on file   Tobacco Use    Smoking status: Never    Smokeless tobacco: Never   Substance and Sexual Activity    Alcohol use: Never    Drug use: Never    Sexual activity: Not on file   Other Topics Concern    Not on file   Social History Narrative    Not on file     Social Determinants of Health     Financial Resource Strain: Low Risk  (11/10/2023)    Received from [facility]    Overall Financial Resource Strain (CARDIA)     Difficulty of Paying Living Expenses: Not hard at all   Food Insecurity: No Food Insecurity (11/10/2023)    Received from [facility]    Hunger Vital Sign     Worried About Running Out of Food in the Last Year: Never true     Ran Out of Food in the Last Year: Never true   Transportation Needs: No Transportation Needs (11/10/2023)    Received from [facility]    PRAPARE - Transportation     Lack of Transportation (Medical): No     Lack of Transportation (Non-Medical): No   Physical Activity: Not on file   Stress: Not on file   Social Connections: Not on file   Intimate Partner Violence: Not on file   Housing Stability: Unknown (4/8/2024)    Received from [facility]    Housing Stability Vital Sign     Unable to Pay for Housing in the Last Year: Not on file     Number of Places Lived in the Last  Year: 1     Unstable Housing in the Last Year: No       Vital signs: 85/38, 97.7, 73, 18, 96%, blood sugar 230    Objective   Physical Exam  Constitutional:       General: She is not in acute distress.  Eyes:      Extraocular Movements: Extraocular movements intact.   Cardiovascular:      Rate and Rhythm: Normal rate and regular rhythm.   Pulmonary:      Effort: Pulmonary effort is normal.      Breath sounds: Normal breath sounds.   Abdominal:      General: Bowel sounds are normal.      Palpations: Abdomen is soft.   Musculoskeletal:      Cervical back: Neck supple.      Right lower leg: No edema.      Left lower leg: No edema.   Skin:     Comments: Dressings to sacrum and heel clean dry and intact   Neurological:      Mental Status: She is alert.   Psychiatric:         Mood and Affect: Mood normal.         Behavior: Behavior is cooperative.         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  On HD per nephrology  Isosorbide dinitrate  Metoprolol  Diltiazem  Renal diet  Monitor BP  Remove extra fluid in dialysis         Pressure ulcer of left heel, unstageable (Multi)     TX: Irrigate with normal saline, pat dry, apply silver alginate slightly moistened with normal saline and dry dressing, change every other day    Turn every 2 hours  Prevalon boots  Pressure reducing mattress, patient refusing air mattress, discussed benefits/risks  Cushion to chair  Dietitian consult  Supplements per dietitian  Grab bars to bed to assist with bed mobility and  repositioning  Weekly skin checks         Atrial fibrillation (Multi)     Monitor heart rate, controlled  Amiodarone  Apixaban  Bleeding precautions         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 (Multi)     TX: Irrigate with wound cleanser 1 normal saline, pat dry, apply zinc oxide and Xeroform gauze and cover with ABD pad     Turn every 2 hours  Prevalon boots  Air mattress  Cushion to chair  Dietitian following  Supplements per dietitian  Grab bars to bed to assist with bed mobility and repositioning  Weekly skin checks  House barrier cream to buttocks         Anxiety     Buspirone  Mirtazapine  Monitor mood and behaviors         Sepsis (Multi) - Primary     Completed antibiotics          Hospital records reviewed  Medications, treatments, and labs reviewed  Continue medications and treatments as listed in EMR  Discussed with nursing and therapy      Scribe Attestation  IHien Scribe   attest that this documentation has been prepared under the direction and in the presence of Wilbert Lock MD    Provider Attestation - Scribe documentation  All medical record entries made by the Scribe were at my direction and personally dictated by me. I have reviewed the chart and agree that the record accurately reflects my personal performance of the history, physical exam, discussion and plan.   Wilbert Lock MD

## 2024-04-25 ENCOUNTER — NURSING HOME VISIT (OUTPATIENT)
Dept: POST ACUTE CARE | Facility: EXTERNAL LOCATION | Age: 89
End: 2024-04-25
Payer: COMMERCIAL

## 2024-04-25 DIAGNOSIS — Z99.2 HYPERTENSIVE HEART AND KIDNEY DISEASE WITH CHRONIC DIASTOLIC CONGESTIVE HEART FAILURE AND STAGE 5 CHRONIC KIDNEY DISEASE ON CHRONIC DIALYSIS (MULTI): ICD-10-CM

## 2024-04-25 DIAGNOSIS — I50.32 HYPERTENSIVE HEART AND KIDNEY DISEASE WITH CHRONIC DIASTOLIC CONGESTIVE HEART FAILURE AND STAGE 5 CHRONIC KIDNEY DISEASE ON CHRONIC DIALYSIS (MULTI): ICD-10-CM

## 2024-04-25 DIAGNOSIS — N18.6 HYPERTENSIVE HEART AND KIDNEY DISEASE WITH CHRONIC DIASTOLIC CONGESTIVE HEART FAILURE AND STAGE 5 CHRONIC KIDNEY DISEASE ON CHRONIC DIALYSIS (MULTI): ICD-10-CM

## 2024-04-25 DIAGNOSIS — I13.2 HYPERTENSIVE HEART AND KIDNEY DISEASE WITH CHRONIC DIASTOLIC CONGESTIVE HEART FAILURE AND STAGE 5 CHRONIC KIDNEY DISEASE ON CHRONIC DIALYSIS (MULTI): ICD-10-CM

## 2024-04-25 DIAGNOSIS — L89.620 PRESSURE ULCER OF LEFT HEEL, UNSTAGEABLE (MULTI): ICD-10-CM

## 2024-04-25 DIAGNOSIS — L89.153 STAGE III PRESSURE ULCER OF SACRAL REGION (MULTI): Primary | ICD-10-CM

## 2024-04-25 PROCEDURE — 99309 SBSQ NF CARE MODERATE MDM 30: CPT | Performed by: NURSE PRACTITIONER

## 2024-04-25 NOTE — LETTER
Patient: Melody Martin  : 1935    Encounter Date: 2024    PROGRESS NOTE    Subjective  Chief complaint: Melody Martin is a 88 y.o. female who is a long term care patient being seen and evaluated for follow-up wounds.    HPI:  HPI  Patient seen in follow-up of wounds.  Patient continues with sacral and left heel wounds.  Patient does have treatment interventions placed.  Patient seen examined at the bedside, appears to be in no acute distress.  Denies fever or chills.    Objective  Vital signs: 113/46, 98.0, 68, 18, blood sugar 230 97%    Physical Exam  Constitutional:       General: She is not in acute distress.  Eyes:      Extraocular Movements: Extraocular movements intact.   Cardiovascular:      Rate and Rhythm: Normal rate and regular rhythm.   Pulmonary:      Effort: Pulmonary effort is normal.      Breath sounds: Normal breath sounds.   Musculoskeletal:      Cervical back: Neck supple.      Right lower leg: No edema.      Left lower leg: No edema.   Skin:     Comments: wound location -left heel  Etiology - unstageable pressure   Granulation-medium  Slough-medium  Edge- flat  Odor - no  Drainage - medium serosanguineous  Size - 3 X 3 X UTD centimeters  Undermining -none    Wound location -sacrum  Etiology - pressure   Granulation-large  Slough-small  Edge-flat  Odor - none  Drainage - medium serosanguineous  Size - 8.5 x 8.5 x 0.1 centimeters   Neurological:      Mental Status: She is alert.   Psychiatric:         Mood and Affect: Mood normal.         Behavior: Behavior is cooperative.         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  On HD per nephrology  Isosorbide dinitrate  Metoprolol  Diltiazem  Renal diet  Monitor BP.  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 (Multi) - Primary     TX: Irrigate with wound cleanser 1 normal saline, pat dry, apply zinc oxide and Xeroform gauze and cover with ABD pad     Turn every 2 hours  Prevalon boots  Air mattress  Cushion to chair  Dietitian following  Supplements per dietitian  Grab bars to bed to assist with bed mobility and repositioning  Weekly skin checks  House barrier cream to buttocks          Medications, treatments, and labs reviewed  Continue medications and treatments as listed in EMR    Scribe Attestation  Hien MARRERO Scribe   attest that this documentation has been prepared under the direction and in the presence of MAR Johnson    Provider Attestation - Scribe documentation  All medical record entries made by the Scribe were at my direction and personally dictated by me. I have reviewed the chart and agree that the record accurately reflects my personal performance of the history, physical exam, discussion and plan.   MAR Johnson            Electronically Signed By: MAR Johnson   5/7/24  3:16 PM

## 2024-04-29 NOTE — PROGRESS NOTES
Galindo Sen 1946 [id number]  PCP:  Shauna Maddox MD    Admit date: 2/9/2021  Admitting Physician: Rey Trinidad MD    Discharge date: 2/11/2021 Discharge Physician: Rey Trinidad MD      Reason for admission:   Chief Complaint   Patient presents with   Deedee Archana Fall     Present on Admission:   Fall at home, initial encounter   Type 2 diabetes mellitus ([facility] [address]   Pneumonia       Discharge Diagnoses Include:  1. Fall at home related to weakness and debility      Did not loose consciousness    Some abrasions on both knees    Head and neck CT W/O contrast    Impression:  No acute intracranial abnormality. Impression:  No acute abnormality of the cervical spine.   Fall precaution, PT/OT consult: rec SNF  MRI brain WO contrast: negative  cdiff is pending but otherwise stable to go to swing     2.  Pneumonia due to Unknown organism      Weakness,WBC15.4High K/CU MM hypoxia  Lactate 2.7High Panic mMOL/L    SARS-CoV-2, NAATNOT DETECTED    Chest X-ray   Bilateral pulmonary opacities in the mid lung fields and lower lung fields   which may represent infiltrates.  Pulmonary nodules cannot be excluded     Culture, fluid, antibiotics (Rocephin): culture results are pending  Day 2 abx: levaquin+ rocephin   mucinex     3.  Diabetes mellitus Type II       POCT glucose, hypoglycemic protocol       Home  Medications, sliding scale insulin with meal coverage  bg 99 this am     4.  Hyponatremia        [id number] MMOL/L       Replete sodium     5. Other health Concerns     Tremor, Osteoarthritis, Hypertension,spinal stenosis, and CAD     6. Diarrhea  Imodium cdif cholestyramine      Hospital Course[de-identified]   Patient presented with weakness dt pneumonia, is o iv abx and can continue that for a total of 7 days, awaiting cdiff culture results. ptot while in swing.   Remainder: see pn from today    Patient Instructions:   Rosalino Holland Medication Instructions KFQ:[id number]    Printed HS:86/75/01 [id number] PROGRESS NOTE    Subjective   Chief complaint: Melody Martin is a 88 y.o. female who is a long term care patient being seen and evaluated for readmission.    HPI:  HPI  Patient readmitted to nursing facility following hospitalization for altered mental status and diagnosed with sepsis.  Patient did complete antibiotic course in hospital.  Patient does have chronic wounds, sacral and left heel.  Treatments are in place.  Patient was seen and examined at the bedside, appears to be in no acute distress.    Objective   Vital signs: 142/55, 97.7, 18, 67, blood sugar 383, 96%    Physical Exam  Constitutional:       General: She is not in acute distress.  Eyes:      Extraocular Movements: Extraocular movements intact.   Cardiovascular:      Rate and Rhythm: Normal rate and regular rhythm.   Pulmonary:      Effort: Pulmonary effort is normal.      Breath sounds: Normal breath sounds.   Musculoskeletal:      Cervical back: Neck supple.      Right lower leg: No edema.      Left lower leg: No edema.   Neurological:      Mental Status: She is alert.      Motor: Weakness present.   Psychiatric:         Mood and Affect: Mood normal.         Behavior: Behavior is cooperative.         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  On HD per nephrology  Isosorbide dinitrate  Metoprolol  Diltiazem  Renal diet  Monitor BP  Remove extra fluid in dialysis         Sepsis (Multi) - Primary     Completed antibiotics         Type 2 diabetes mellitus with chronic kidney disease on chronic dialysis, with long-term current use of insulin (Multi)     Carb controlled diet  Monitor Glucoscan  Glargine  Humalog          Medications,  Medication Information                      amiodarone (CORDARONE) 200 MG tablet  TAKE 1 TABLET BY MOUTH 2 TIMES DAILY             aspirin 81 MG tablet  Take 81 mg by mouth daily              atorvastatin (LIPITOR) 40 MG tablet  Take 1 tablet by mouth daily             blood glucose monitor kit and supplies  Test 3 times a day & as needed for symptoms of irregular blood glucose. blood glucose test strips (TRUE METRIX BLOOD GLUCOSE TEST) strip  1 each by In Vitro route daily As needed. Blood Pressure Monitoring (BLOOD PRESSURE KIT) NASH  Use as directed             carvedilol (COREG) 3.125 MG tablet  TAKE 1 TABLET BY MOUTH 2 TIMES DAILY             furosemide (LASIX) 20 MG tablet  Take 1 tablet by mouth 2 times daily             gabapentin (NEURONTIN) 300 MG capsule  Take 1 capsule by mouth 3 times daily for 30 days. glipiZIDE (GLUCOTROL XL) 2.5 MG extended release tablet  Take 1 tablet by mouth daily             lisinopril (PRINIVIL;ZESTRIL) 10 MG tablet  Take 1 tablet by mouth daily             polyethylene glycol (GLYCOLAX) 17 GM/SCOOP powder  Take 17 g by mouth daily             primidone (MYSOLINE) 50 MG tablet  Take 1 tablet by mouth 2 times daily                  Code Status: Full Code     Consults: IP CONSULT TO SOCIAL WORK    Diet: cardiac diet    Activity: activity as tolerated   Work:    Discharged Condition: stable    Prognosis: Fair    Disposition: swing         Discharge Physician Signed: Emely Huber M.D. The patient was seen and examined on day of discharge and this discharge summary is in conjunction with any daily progress note from day of discharge.   Time spent on discharge in the examination, evaluation, counseling and review of medications and discharge plan: 32 minutes treatments, and labs reviewed  Continue medications and treatments as listed in EMR    Scribe Attestation  I, Phoebe Martinez   attest that this documentation has been prepared under the direction and in the presence of MAR Johnson    Provider Attestation - Scribe documentation  All medical record entries made by the Scribe were at my direction and personally dictated by me. I have reviewed the chart and agree that the record accurately reflects my personal performance of the history, physical exam, discussion and plan.   MAR Johnson

## 2024-04-30 ENCOUNTER — NURSING HOME VISIT (OUTPATIENT)
Dept: POST ACUTE CARE | Facility: EXTERNAL LOCATION | Age: 89
End: 2024-04-30
Payer: COMMERCIAL

## 2024-04-30 DIAGNOSIS — Z99.2 HYPERTENSIVE HEART AND KIDNEY DISEASE WITH CHRONIC DIASTOLIC CONGESTIVE HEART FAILURE AND STAGE 5 CHRONIC KIDNEY DISEASE ON CHRONIC DIALYSIS (MULTI): ICD-10-CM

## 2024-04-30 DIAGNOSIS — E11.22 TYPE 2 DIABETES MELLITUS WITH CHRONIC KIDNEY DISEASE ON CHRONIC DIALYSIS, WITH LONG-TERM CURRENT USE OF INSULIN (MULTI): ICD-10-CM

## 2024-04-30 DIAGNOSIS — N18.6 HYPERTENSIVE HEART AND KIDNEY DISEASE WITH CHRONIC DIASTOLIC CONGESTIVE HEART FAILURE AND STAGE 5 CHRONIC KIDNEY DISEASE ON CHRONIC DIALYSIS (MULTI): ICD-10-CM

## 2024-04-30 DIAGNOSIS — N18.6 TYPE 2 DIABETES MELLITUS WITH CHRONIC KIDNEY DISEASE ON CHRONIC DIALYSIS, WITH LONG-TERM CURRENT USE OF INSULIN (MULTI): ICD-10-CM

## 2024-04-30 DIAGNOSIS — I50.32 HYPERTENSIVE HEART AND KIDNEY DISEASE WITH CHRONIC DIASTOLIC CONGESTIVE HEART FAILURE AND STAGE 5 CHRONIC KIDNEY DISEASE ON CHRONIC DIALYSIS (MULTI): ICD-10-CM

## 2024-04-30 DIAGNOSIS — Z99.2 TYPE 2 DIABETES MELLITUS WITH CHRONIC KIDNEY DISEASE ON CHRONIC DIALYSIS, WITH LONG-TERM CURRENT USE OF INSULIN (MULTI): ICD-10-CM

## 2024-04-30 DIAGNOSIS — I13.2 HYPERTENSIVE HEART AND KIDNEY DISEASE WITH CHRONIC DIASTOLIC CONGESTIVE HEART FAILURE AND STAGE 5 CHRONIC KIDNEY DISEASE ON CHRONIC DIALYSIS (MULTI): ICD-10-CM

## 2024-04-30 DIAGNOSIS — B37.9 CANDIDIASIS: Primary | ICD-10-CM

## 2024-04-30 DIAGNOSIS — Z79.4 TYPE 2 DIABETES MELLITUS WITH CHRONIC KIDNEY DISEASE ON CHRONIC DIALYSIS, WITH LONG-TERM CURRENT USE OF INSULIN (MULTI): ICD-10-CM

## 2024-04-30 PROCEDURE — 99309 SBSQ NF CARE MODERATE MDM 30: CPT | Performed by: NURSE PRACTITIONER

## 2024-04-30 NOTE — PROGRESS NOTES
PROGRESS NOTE    Subjective   Chief complaint: Melody Martin is a 88 y.o. female who is a long term care patient being seen and evaluated for candidiasis.    HPI:  HPI  Patient was seen due to reports of skin irritation.  Patient seen and examined at the bedside, appears to be in no acute distress.  Denies fever or chills.    Objective   Vital signs: 161/48, 97.3, 82, 18, 97%, blood sugar 300    Physical Exam  Constitutional:       General: She is not in acute distress.  Eyes:      Extraocular Movements: Extraocular movements intact.   Cardiovascular:      Rate and Rhythm: Normal rate and regular rhythm.   Pulmonary:      Effort: Pulmonary effort is normal.      Breath sounds: Normal breath sounds.   Abdominal:      General: Bowel sounds are normal.      Palpations: Abdomen is soft.   Musculoskeletal:      Cervical back: Neck supple.      Right lower leg: No edema.      Left lower leg: No edema.   Skin:     Comments: Dressings to sacrum and heel clean dry and intact  Pascale Area red   Neurological:      Mental Status: She is alert.   Psychiatric:         Mood and Affect: Mood normal.         Behavior: Behavior is cooperative.         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  On HD per nephrology  Isosorbide dinitrate  Metoprolol  Diltiazem  Renal diet  Monitor BP  Remove extra fluid in dialysis         Type 2 diabetes mellitus with chronic kidney disease on chronic dialysis, with long-term current use of insulin (Multi)     Carb controlled diet  Monitor Glucoscan  Glargine  Humalog          Medications, treatments, and labs reviewed  Continue medications and treatments as listed in EMR    Phoebe Salasation  I, Phoebe Martinez    attest that this documentation has been prepared under the direction and in the presence of MAR Johnson    Provider Attestation - Scribe documentation  All medical record entries made by the Scribe were at my direction and personally dictated by me. I have reviewed the chart and agree that the record accurately reflects my personal performance of the history, physical exam, discussion and plan.   MAR Johnson

## 2024-04-30 NOTE — LETTER
Patient: Melody Martin  : 1935    Encounter Date: 2024    PROGRESS NOTE    Subjective  Chief complaint: Melody Martin is a 88 y.o. female who is a long term care patient being seen and evaluated for candidiasis.    HPI:  HPI  Patient was seen due to reports of skin irritation.  Patient seen and examined at the bedside, appears to be in no acute distress.  Denies fever or chills.    Objective  Vital signs: 161/48, 97.3, 82, 18, 97%, blood sugar 300    Physical Exam  Constitutional:       General: She is not in acute distress.  Eyes:      Extraocular Movements: Extraocular movements intact.   Cardiovascular:      Rate and Rhythm: Normal rate and regular rhythm.   Pulmonary:      Effort: Pulmonary effort is normal.      Breath sounds: Normal breath sounds.   Abdominal:      General: Bowel sounds are normal.      Palpations: Abdomen is soft.   Musculoskeletal:      Cervical back: Neck supple.      Right lower leg: No edema.      Left lower leg: No edema.   Skin:     Comments: Dressings to sacrum and heel clean dry and intact  Pascale Area red   Neurological:      Mental Status: She is alert.   Psychiatric:         Mood and Affect: Mood normal.         Behavior: Behavior is cooperative.         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  On HD per nephrology  Isosorbide dinitrate  Metoprolol  Diltiazem  Renal diet  Monitor BP  Remove extra fluid in dialysis         Type 2 diabetes mellitus with chronic kidney disease on chronic dialysis, with long-term current use of insulin (Multi)     Carb controlled diet  Monitor Glucoscan  Glargine  Humalog          Medications, treatments, and labs reviewed  Continue medications and  treatments as listed in EMR    Scribe Attestation  IHien Scribe   attest that this documentation has been prepared under the direction and in the presence of MAR Johnson    Provider Attestation - Scribe documentation  All medical record entries made by the Scribe were at my direction and personally dictated by me. I have reviewed the chart and agree that the record accurately reflects my personal performance of the history, physical exam, discussion and plan.   MAR Johnson            Electronically Signed By: MAR Johnson   5/4/24  5:14 PM

## 2024-05-02 ENCOUNTER — NURSING HOME VISIT (OUTPATIENT)
Dept: POST ACUTE CARE | Facility: EXTERNAL LOCATION | Age: 89
End: 2024-05-02
Payer: COMMERCIAL

## 2024-05-02 DIAGNOSIS — L89.620 PRESSURE ULCER OF LEFT HEEL, UNSTAGEABLE (MULTI): ICD-10-CM

## 2024-05-02 DIAGNOSIS — I50.32 HYPERTENSIVE HEART AND KIDNEY DISEASE WITH CHRONIC DIASTOLIC CONGESTIVE HEART FAILURE AND STAGE 5 CHRONIC KIDNEY DISEASE ON CHRONIC DIALYSIS (MULTI): ICD-10-CM

## 2024-05-02 DIAGNOSIS — N18.6 HYPERTENSIVE HEART AND KIDNEY DISEASE WITH CHRONIC DIASTOLIC CONGESTIVE HEART FAILURE AND STAGE 5 CHRONIC KIDNEY DISEASE ON CHRONIC DIALYSIS (MULTI): ICD-10-CM

## 2024-05-02 DIAGNOSIS — L89.153 STAGE III PRESSURE ULCER OF SACRAL REGION (MULTI): Primary | ICD-10-CM

## 2024-05-02 DIAGNOSIS — M54.9 BACK PAIN, UNSPECIFIED BACK LOCATION, UNSPECIFIED BACK PAIN LATERALITY, UNSPECIFIED CHRONICITY: ICD-10-CM

## 2024-05-02 DIAGNOSIS — I13.2 HYPERTENSIVE HEART AND KIDNEY DISEASE WITH CHRONIC DIASTOLIC CONGESTIVE HEART FAILURE AND STAGE 5 CHRONIC KIDNEY DISEASE ON CHRONIC DIALYSIS (MULTI): ICD-10-CM

## 2024-05-02 DIAGNOSIS — Z99.2 HYPERTENSIVE HEART AND KIDNEY DISEASE WITH CHRONIC DIASTOLIC CONGESTIVE HEART FAILURE AND STAGE 5 CHRONIC KIDNEY DISEASE ON CHRONIC DIALYSIS (MULTI): ICD-10-CM

## 2024-05-02 PROCEDURE — 11045 DBRDMT SUBQ TISS EACH ADDL: CPT | Performed by: NURSE PRACTITIONER

## 2024-05-02 PROCEDURE — 11042 DBRDMT SUBQ TIS 1ST 20SQCM/<: CPT | Performed by: NURSE PRACTITIONER

## 2024-05-02 PROCEDURE — 99309 SBSQ NF CARE MODERATE MDM 30: CPT | Performed by: NURSE PRACTITIONER

## 2024-05-02 NOTE — LETTER
Patient: Melody Martin  : 1935    Encounter Date: 2024    PROGRESS NOTE    Subjective  Chief complaint: Melody Martin is a 88 y.o. female who is a long term care patient being seen and evaluated for wounds and back pain    HPI:  HPI  Patient seen in follow-up of wounds to sacrum and left heel and complaints of chronic back pain.  Patient reportedly is taking Ultram as needed frequently.  Otherwise feels okay.  No new concerns per nursing staff.    Objective  Vital signs: 119/56, 97.2, 78, 18, blood sugar 153, 96%    Physical Exam  Constitutional:       General: She is not in acute distress.  Eyes:      Extraocular Movements: Extraocular movements intact.   Cardiovascular:      Rate and Rhythm: Normal rate and regular rhythm.   Pulmonary:      Effort: Pulmonary effort is normal.      Breath sounds: Normal breath sounds.   Musculoskeletal:      Cervical back: Neck supple.      Right lower leg: No edema.      Left lower leg: No edema.   Skin:     Comments: wound location -left heel  Etiology - unstageable pressure   Granulation-large  Slough-small  Edge- flat  Odor - no  Drainage - medium serosanguineous  Size - 3 X 3 X UTD centimeters  Undermining -none    Wound location -sacrum  Etiology - pressure   Granulation-large  Slough-small  Edge-flat  Odor - none  Drainage - medium serosanguineous  Size - 4 x 6.5 x 0.2 centimeters   Neurological:      Mental Status: She is alert.   Psychiatric:         Mood and Affect: Mood normal.         Behavior: Behavior is cooperative.         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  On HD per nephrology  Isosorbide dinitrate  Metoprolol  Diltiazem  Renal diet  Monitor BP.  At goal  Remove extra fluid in  dialysis         Pressure ulcer of left heel, unstageable (Multi)     I performed subcutaneous tissue debridement with a total area of 9 cm2 with curette to remove viable and non-viable tissue/material including subcutaneous tissue, slough, biofilm, and fibrin/exudate after achieving pain control with 2% lidocaine topical gel.  A minimal amount of bleeding was controlled with pressure. The procedure was tolerated well with a pain level of 0 throughout and a pain level of 0 following the procedure.  Post-debridement measurements unchanged. Character of wound/ulcer post-debridement is improved.       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  A minimal amount of bleeding was controlled with pressure. The procedure was tolerated well with a pain level of 0 throughout and a pain level of 0 following the procedure.  Post-debridement measurements unchanged. Character of wound/ulcer post-debridement is improved.       TX: Irrigate with wound cleanser 1 normal saline, pat dry, apply zinc oxide and Xeroform gauze and cover with ABD pad     Turn every 2 hours  Prevalon boots  Air mattress  Cushion to chair  Dietitian following  Supplements per dietitian  Grab bars to bed to assist with bed mobility and repositioning  Weekly skin checks  House barrier cream to buttocks          Medications,  treatments, and labs reviewed  Continue medications and treatments as listed in EMR    Scribe Attestation  I, Phoebe Martinez   attest that this documentation has been prepared under the direction and in the presence of MAR Johnson    Provider Attestation - Scribe documentation  All medical record entries made by the Scribe were at my direction and personally dictated by me. I have reviewed the chart and agree that the record accurately reflects my personal performance of the history, physical exam, discussion and plan.   MAR Johnson            Electronically Signed By: MAR Johnson   5/8/24  9:03 AM

## 2024-05-03 ENCOUNTER — NURSING HOME VISIT (OUTPATIENT)
Dept: POST ACUTE CARE | Facility: EXTERNAL LOCATION | Age: 89
End: 2024-05-03
Payer: COMMERCIAL

## 2024-05-03 DIAGNOSIS — Z99.2 HYPERTENSIVE HEART AND KIDNEY DISEASE WITH CHRONIC DIASTOLIC CONGESTIVE HEART FAILURE AND STAGE 5 CHRONIC KIDNEY DISEASE ON CHRONIC DIALYSIS (MULTI): ICD-10-CM

## 2024-05-03 DIAGNOSIS — N18.6 HYPERTENSIVE HEART AND KIDNEY DISEASE WITH CHRONIC DIASTOLIC CONGESTIVE HEART FAILURE AND STAGE 5 CHRONIC KIDNEY DISEASE ON CHRONIC DIALYSIS (MULTI): ICD-10-CM

## 2024-05-03 DIAGNOSIS — N18.6 TYPE 2 DIABETES MELLITUS WITH CHRONIC KIDNEY DISEASE ON CHRONIC DIALYSIS, WITH LONG-TERM CURRENT USE OF INSULIN (MULTI): ICD-10-CM

## 2024-05-03 DIAGNOSIS — I13.2 HYPERTENSIVE HEART AND KIDNEY DISEASE WITH CHRONIC DIASTOLIC CONGESTIVE HEART FAILURE AND STAGE 5 CHRONIC KIDNEY DISEASE ON CHRONIC DIALYSIS (MULTI): ICD-10-CM

## 2024-05-03 DIAGNOSIS — M54.9 BACK PAIN, UNSPECIFIED BACK LOCATION, UNSPECIFIED BACK PAIN LATERALITY, UNSPECIFIED CHRONICITY: Primary | ICD-10-CM

## 2024-05-03 DIAGNOSIS — Z99.2 TYPE 2 DIABETES MELLITUS WITH CHRONIC KIDNEY DISEASE ON CHRONIC DIALYSIS, WITH LONG-TERM CURRENT USE OF INSULIN (MULTI): ICD-10-CM

## 2024-05-03 DIAGNOSIS — Z79.4 TYPE 2 DIABETES MELLITUS WITH CHRONIC KIDNEY DISEASE ON CHRONIC DIALYSIS, WITH LONG-TERM CURRENT USE OF INSULIN (MULTI): ICD-10-CM

## 2024-05-03 DIAGNOSIS — I50.32 HYPERTENSIVE HEART AND KIDNEY DISEASE WITH CHRONIC DIASTOLIC CONGESTIVE HEART FAILURE AND STAGE 5 CHRONIC KIDNEY DISEASE ON CHRONIC DIALYSIS (MULTI): ICD-10-CM

## 2024-05-03 DIAGNOSIS — E11.22 TYPE 2 DIABETES MELLITUS WITH CHRONIC KIDNEY DISEASE ON CHRONIC DIALYSIS, WITH LONG-TERM CURRENT USE OF INSULIN (MULTI): ICD-10-CM

## 2024-05-03 PROCEDURE — 99308 SBSQ NF CARE LOW MDM 20: CPT | Performed by: INTERNAL MEDICINE

## 2024-05-03 NOTE — LETTER
Patient: Melody Martin  : 1935    Encounter Date: 2024    PROGRESS NOTE    Subjective  Chief complaint: Melody Martin is a 88 y.o. female who is a long term care patient being seen and evaluated for f/u pain.    HPI:  HPI  Patient is seen in follow-up for chronic back pain.  Orders were placed to schedule Tylenol yesterday.  Continue to monitor patient for increased pain.    Objective  Vital signs: 168/46, 97.8, 60, 18, 97%, blood sugar 337    Physical Exam  Constitutional:       General: She is not in acute distress.  Eyes:      Extraocular Movements: Extraocular movements intact.   Cardiovascular:      Rate and Rhythm: Normal rate and regular rhythm.   Pulmonary:      Effort: Pulmonary effort is normal.      Breath sounds: Normal breath sounds.   Abdominal:      General: Bowel sounds are normal.      Palpations: Abdomen is soft.   Musculoskeletal:      Cervical back: Neck supple.      Right lower leg: No edema.      Left lower leg: No edema.   Skin:     Comments: Dressings to sacrum and heel clean dry and intact   Neurological:      Mental Status: She is alert.   Psychiatric:         Mood and Affect: Mood normal.         Behavior: Behavior is cooperative.         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  On HD per nephrology  Isosorbide dinitrate  Metoprolol  Diltiazem  Renal diet  Monitor BP  Remove extra fluid in dialysis          Medications, treatments, and labs reviewed  Continue medications and treatments as listed in EMR    Scribe  Attestation  I, Phoebe Martinez   attest that this documentation has been prepared under the direction and in the presence of Wilbert Lock MD    Provider Attestation - Scribe documentation  All medical record entries made by the Scribe were at my direction and personally dictated by me. I have reviewed the chart and agree that the record accurately reflects my personal performance of the history, physical exam, discussion and plan.   Wilbert Lock MD            Electronically Signed By: Wilbert Lock MD   5/3/24  3:54 PM

## 2024-05-03 NOTE — PROGRESS NOTES
PROGRESS NOTE    Subjective   Chief complaint: Melody Martin is a 88 y.o. female who is a long term care patient being seen and evaluated for f/u pain.    HPI:  HPI  Patient is seen in follow-up for chronic back pain.  Orders were placed to schedule Tylenol yesterday.  Continue to monitor patient for increased pain.    Objective   Vital signs: 168/46, 97.8, 60, 18, 97%, blood sugar 337    Physical Exam  Constitutional:       General: She is not in acute distress.  Eyes:      Extraocular Movements: Extraocular movements intact.   Cardiovascular:      Rate and Rhythm: Normal rate and regular rhythm.   Pulmonary:      Effort: Pulmonary effort is normal.      Breath sounds: Normal breath sounds.   Abdominal:      General: Bowel sounds are normal.      Palpations: Abdomen is soft.   Musculoskeletal:      Cervical back: Neck supple.      Right lower leg: No edema.      Left lower leg: No edema.   Skin:     Comments: Dressings to sacrum and heel clean dry and intact   Neurological:      Mental Status: She is alert.   Psychiatric:         Mood and Affect: Mood normal.         Behavior: Behavior is cooperative.         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  On HD per nephrology  Isosorbide dinitrate  Metoprolol  Diltiazem  Renal diet  Monitor BP  Remove extra fluid in dialysis          Medications, treatments, and labs reviewed  Continue medications and treatments as listed in EMR    Scribe Attestation  I, Hien Conn Scribe   attest that this documentation has  been prepared under the direction and in the presence of Wilbert Lock MD    Provider Attestation - Scribe documentation  All medical record entries made by the Scribe were at my direction and personally dictated by me. I have reviewed the chart and agree that the record accurately reflects my personal performance of the history, physical exam, discussion and plan.   Wilbert Lock MD

## 2024-05-06 ENCOUNTER — NURSING HOME VISIT (OUTPATIENT)
Dept: POST ACUTE CARE | Facility: EXTERNAL LOCATION | Age: 89
End: 2024-05-06
Payer: COMMERCIAL

## 2024-05-06 DIAGNOSIS — I48.91 ATRIAL FIBRILLATION, UNSPECIFIED TYPE (MULTI): ICD-10-CM

## 2024-05-06 DIAGNOSIS — I13.2 HYPERTENSIVE HEART AND KIDNEY DISEASE WITH CHRONIC DIASTOLIC CONGESTIVE HEART FAILURE AND STAGE 5 CHRONIC KIDNEY DISEASE ON CHRONIC DIALYSIS (MULTI): ICD-10-CM

## 2024-05-06 DIAGNOSIS — Z99.2 HYPERTENSIVE HEART AND KIDNEY DISEASE WITH CHRONIC DIASTOLIC CONGESTIVE HEART FAILURE AND STAGE 5 CHRONIC KIDNEY DISEASE ON CHRONIC DIALYSIS (MULTI): ICD-10-CM

## 2024-05-06 DIAGNOSIS — I50.32 HYPERTENSIVE HEART AND KIDNEY DISEASE WITH CHRONIC DIASTOLIC CONGESTIVE HEART FAILURE AND STAGE 5 CHRONIC KIDNEY DISEASE ON CHRONIC DIALYSIS (MULTI): ICD-10-CM

## 2024-05-06 DIAGNOSIS — N18.6 HYPERTENSIVE HEART AND KIDNEY DISEASE WITH CHRONIC DIASTOLIC CONGESTIVE HEART FAILURE AND STAGE 5 CHRONIC KIDNEY DISEASE ON CHRONIC DIALYSIS (MULTI): ICD-10-CM

## 2024-05-06 DIAGNOSIS — Z99.2 TYPE 2 DIABETES MELLITUS WITH CHRONIC KIDNEY DISEASE ON CHRONIC DIALYSIS, WITH LONG-TERM CURRENT USE OF INSULIN (MULTI): Primary | ICD-10-CM

## 2024-05-06 DIAGNOSIS — E11.22 TYPE 2 DIABETES MELLITUS WITH CHRONIC KIDNEY DISEASE ON CHRONIC DIALYSIS, WITH LONG-TERM CURRENT USE OF INSULIN (MULTI): Primary | ICD-10-CM

## 2024-05-06 DIAGNOSIS — N18.6 TYPE 2 DIABETES MELLITUS WITH CHRONIC KIDNEY DISEASE ON CHRONIC DIALYSIS, WITH LONG-TERM CURRENT USE OF INSULIN (MULTI): Primary | ICD-10-CM

## 2024-05-06 DIAGNOSIS — Z79.4 TYPE 2 DIABETES MELLITUS WITH CHRONIC KIDNEY DISEASE ON CHRONIC DIALYSIS, WITH LONG-TERM CURRENT USE OF INSULIN (MULTI): Primary | ICD-10-CM

## 2024-05-06 PROCEDURE — 99309 SBSQ NF CARE MODERATE MDM 30: CPT | Performed by: NURSE PRACTITIONER

## 2024-05-06 NOTE — ASSESSMENT & PLAN NOTE
TX: Irrigate with normal saline, pat dry, Santyl, calcium alginate, ABD and Kerlix     Turn every 2 hours  Prevalon boots  Pressure reducing mattress, patient refusing air mattress, discussed benefits/risks  Cushion to chair  Dietitian consult  Supplements per dietitian  Grab bars to bed to assist with bed mobility and repositioning  Weekly skin checks

## 2024-05-06 NOTE — LETTER
Patient: Melody Martin  : 1935    Encounter Date: 2024    PROGRESS NOTE    Subjective  Chief complaint: Melody Martin is a 88 y.o. female who is a long term care patient being seen and evaluated for diabetes.    HPI:  HPI  Nurse reporting patient's blood sugar low this morning.  Blood sugars are usually ranging mid 100s to 300s.  Patient denies polydipsia polyuria polyphagia.    Objective  Vital signs: 104/50, 97.8, 7019, 93%, blood sugar 307    Physical Exam  Constitutional:       General: She is not in acute distress.  Eyes:      Extraocular Movements: Extraocular movements intact.   Cardiovascular:      Rate and Rhythm: Normal rate and regular rhythm.   Pulmonary:      Effort: Pulmonary effort is normal.      Breath sounds: Normal breath sounds.   Musculoskeletal:      Cervical back: Neck supple.      Right lower leg: No edema.      Left lower leg: No edema.   Skin:     Comments: Dressings to sacrum and heel clean dry and intact   Neurological:      Mental Status: She is alert.      Motor: Weakness present.   Psychiatric:         Mood and Affect: Mood normal.         Behavior: Behavior is cooperative.         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  On HD per nephrology  Isosorbide dinitrate  Metoprolol  Diltiazem  Renal diet  Monitor BP  Remove extra fluid in dialysis         Type 2 diabetes mellitus with chronic kidney disease on chronic dialysis, with long-term current use of insulin (Multi) - Primary     Carb controlled diet  Monitor Glucoscan  Glargine  Humalog  BS fluctuates  Continue meds as ordered and monitor          Medications, treatments, and labs reviewed  Continue  medications and treatments as listed in EMR    Scribe Attestation  IHien Scribe   attest that this documentation has been prepared under the direction and in the presence of MAR Johnson    Provider Attestation - Scribe documentation  All medical record entries made by the Scribe were at my direction and personally dictated by me. I have reviewed the chart and agree that the record accurately reflects my personal performance of the history, physical exam, discussion and plan.   MAR Jonhson            Electronically Signed By: MAR Johnson   5/15/24  3:20 PM

## 2024-05-06 NOTE — ASSESSMENT & PLAN NOTE
Stable, no shortness of breath.  On HD per nephrology  Isosorbide dinitrate  Metoprolol  Diltiazem  Renal diet  Monitor BP.  At goal  Remove extra fluid in dialysis

## 2024-05-06 NOTE — PROGRESS NOTES
PROGRESS NOTE    Subjective   Chief complaint: Melody Martin is a 88 y.o. female who is a long term care patient being seen and evaluated for follow-up wounds.    HPI:  HPI  Patient seen in follow-up of wounds.  Patient continues with sacral and left heel wounds.  Patient does have treatment interventions placed.  Patient seen examined at the bedside, appears to be in no acute distress.  Denies fever or chills.    Objective   Vital signs: 113/46, 98.0, 68, 18, blood sugar 230 97%    Physical Exam  Constitutional:       General: She is not in acute distress.  Eyes:      Extraocular Movements: Extraocular movements intact.   Cardiovascular:      Rate and Rhythm: Normal rate and regular rhythm.   Pulmonary:      Effort: Pulmonary effort is normal.      Breath sounds: Normal breath sounds.   Musculoskeletal:      Cervical back: Neck supple.      Right lower leg: No edema.      Left lower leg: No edema.   Skin:     Comments: wound location -left heel  Etiology - unstageable pressure   Granulation-medium  Slough-medium  Edge- flat  Odor - no  Drainage - medium serosanguineous  Size - 3 X 3 X UTD centimeters  Undermining -none    Wound location -sacrum  Etiology - pressure   Granulation-large  Slough-small  Edge-flat  Odor - none  Drainage - medium serosanguineous  Size - 8.5 x 8.5 x 0.1 centimeters   Neurological:      Mental Status: She is alert.   Psychiatric:         Mood and Affect: Mood normal.         Behavior: Behavior is cooperative.         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  On HD per nephrology  Isosorbide dinitrate  Metoprolol  Diltiazem  Renal diet  Monitor BP.  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 (Multi) - Primary     TX: Irrigate with wound cleanser 1 normal saline, pat dry, apply zinc oxide and Xeroform gauze and cover with ABD pad     Turn every 2 hours  Prevalon boots  Air mattress  Cushion to chair  Dietitian following  Supplements per dietitian  Grab bars to bed to assist with bed mobility and repositioning  Weekly skin checks  House barrier cream to buttocks          Medications, treatments, and labs reviewed  Continue medications and treatments as listed in EMR    Scribe Attestation  IHien Scribe   attest that this documentation has been prepared under the direction and in the presence of MAR Johnson    Provider Attestation - Scribe documentation  All medical record entries made by the Scribe were at my direction and personally dictated by me. I have reviewed the chart and agree that the record accurately reflects my personal performance of the history, physical exam, discussion and plan.   MAR Johnson

## 2024-05-07 NOTE — ASSESSMENT & PLAN NOTE
I performed subcutaneous tissue debridement with a total area of 26 cm2 with curette to remove viable and non-viable tissue/material including subcutaneous tissue, slough, biofilm, and fibrin/exudate after achieving pain control with 2% lidocaine topical gel.  A minimal amount of bleeding was controlled with pressure. The procedure was tolerated well with a pain level of 0 throughout and a pain level of 0 following the procedure.  Post-debridement measurements unchanged. Character of wound/ulcer post-debridement is improved.       TX: Irrigate with wound cleanser 1 normal saline, pat dry, apply zinc oxide and Xeroform gauze and cover with ABD pad     Turn every 2 hours  Prevalon boots  Air mattress  Cushion to chair  Dietitian following  Supplements per dietitian  Grab bars to bed to assist with bed mobility and repositioning  Weekly skin checks  House barrier cream to buttocks

## 2024-05-07 NOTE — PROGRESS NOTES
PROGRESS NOTE    Subjective   Chief complaint: Melody Martin is a 88 y.o. female who is a long term care patient being seen and evaluated for wounds and back pain    HPI:  HPI  Patient seen in follow-up of wounds to sacrum and left heel and complaints of chronic back pain.  Patient reportedly is taking Ultram as needed frequently.  Otherwise feels okay.  No new concerns per nursing staff.    Objective   Vital signs: 119/56, 97.2, 78, 18, blood sugar 153, 96%    Physical Exam  Constitutional:       General: She is not in acute distress.  Eyes:      Extraocular Movements: Extraocular movements intact.   Cardiovascular:      Rate and Rhythm: Normal rate and regular rhythm.   Pulmonary:      Effort: Pulmonary effort is normal.      Breath sounds: Normal breath sounds.   Musculoskeletal:      Cervical back: Neck supple.      Right lower leg: No edema.      Left lower leg: No edema.   Skin:     Comments: wound location -left heel  Etiology - unstageable pressure   Granulation-large  Slough-small  Edge- flat  Odor - no  Drainage - medium serosanguineous  Size - 3 X 3 X UTD centimeters  Undermining -none    Wound location -sacrum  Etiology - pressure   Granulation-large  Slough-small  Edge-flat  Odor - none  Drainage - medium serosanguineous  Size - 4 x 6.5 x 0.2 centimeters   Neurological:      Mental Status: She is alert.   Psychiatric:         Mood and Affect: Mood normal.         Behavior: Behavior is cooperative.         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  On HD per nephrology  Isosorbide dinitrate  Metoprolol  Diltiazem  Renal diet  Monitor BP.  At goal  Remove extra fluid in dialysis         Pressure ulcer of left heel, unstageable (Multi)     I  performed subcutaneous tissue debridement with a total area of 9 cm2 with curette to remove viable and non-viable tissue/material including subcutaneous tissue, slough, biofilm, and fibrin/exudate after achieving pain control with 2% lidocaine topical gel.  A minimal amount of bleeding was controlled with pressure. The procedure was tolerated well with a pain level of 0 throughout and a pain level of 0 following the procedure.  Post-debridement measurements unchanged. Character of wound/ulcer post-debridement is improved.       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  A minimal amount of bleeding was controlled with pressure. The procedure was tolerated well with a pain level of 0 throughout and a pain level of 0 following the procedure.  Post-debridement measurements unchanged. Character of wound/ulcer post-debridement is improved.       TX: Irrigate with wound cleanser 1 normal saline, pat dry, apply zinc oxide and Xeroform gauze and cover with ABD pad     Turn every 2 hours  Prevalon boots  Air mattress  Cushion to chair  Dietitian following  Supplements per dietitian  Grab bars to bed to assist with bed mobility and repositioning  Weekly skin checks  House barrier cream to buttocks          Medications, treatments, and labs reviewed  Continue medications and treatments as  listed in EMR    Scribe Attestation  I, Phoebe Martinez   attest that this documentation has been prepared under the direction and in the presence of MAR Johnson    Provider Attestation - Scribe documentation  All medical record entries made by the Scribe were at my direction and personally dictated by me. I have reviewed the chart and agree that the record accurately reflects my personal performance of the history, physical exam, discussion and plan.   MAR Johnson

## 2024-05-07 NOTE — ASSESSMENT & PLAN NOTE
I performed subcutaneous tissue debridement with a total area of 9 cm2 with curette to remove viable and non-viable tissue/material including subcutaneous tissue, slough, biofilm, and fibrin/exudate after achieving pain control with 2% lidocaine topical gel.  A minimal amount of bleeding was controlled with pressure. The procedure was tolerated well with a pain level of 0 throughout and a pain level of 0 following the procedure.  Post-debridement measurements unchanged. Character of wound/ulcer post-debridement is improved.       TX: Irrigate with normal saline, pat dry, Santyl, calcium alginate, ABD and Kerlix     Turn every 2 hours  Prevalon boots  Pressure reducing mattress, patient refusing air mattress, discussed benefits/risks  Cushion to chair  Dietitian consult  Supplements per dietitian  Grab bars to bed to assist with bed mobility and repositioning  Weekly skin checks

## 2024-05-08 ENCOUNTER — NURSING HOME VISIT (OUTPATIENT)
Dept: POST ACUTE CARE | Facility: EXTERNAL LOCATION | Age: 89
End: 2024-05-08
Payer: COMMERCIAL

## 2024-05-08 DIAGNOSIS — N18.6 TYPE 2 DIABETES MELLITUS WITH CHRONIC KIDNEY DISEASE ON CHRONIC DIALYSIS, WITH LONG-TERM CURRENT USE OF INSULIN (MULTI): ICD-10-CM

## 2024-05-08 DIAGNOSIS — I50.32 HYPERTENSIVE HEART AND KIDNEY DISEASE WITH CHRONIC DIASTOLIC CONGESTIVE HEART FAILURE AND STAGE 5 CHRONIC KIDNEY DISEASE ON CHRONIC DIALYSIS (MULTI): ICD-10-CM

## 2024-05-08 DIAGNOSIS — Z99.2 TYPE 2 DIABETES MELLITUS WITH CHRONIC KIDNEY DISEASE ON CHRONIC DIALYSIS, WITH LONG-TERM CURRENT USE OF INSULIN (MULTI): ICD-10-CM

## 2024-05-08 DIAGNOSIS — N18.6 HYPERTENSIVE HEART AND KIDNEY DISEASE WITH CHRONIC DIASTOLIC CONGESTIVE HEART FAILURE AND STAGE 5 CHRONIC KIDNEY DISEASE ON CHRONIC DIALYSIS (MULTI): ICD-10-CM

## 2024-05-08 DIAGNOSIS — M54.9 BACK PAIN, UNSPECIFIED BACK LOCATION, UNSPECIFIED BACK PAIN LATERALITY, UNSPECIFIED CHRONICITY: Primary | ICD-10-CM

## 2024-05-08 DIAGNOSIS — I13.2 HYPERTENSIVE HEART AND KIDNEY DISEASE WITH CHRONIC DIASTOLIC CONGESTIVE HEART FAILURE AND STAGE 5 CHRONIC KIDNEY DISEASE ON CHRONIC DIALYSIS (MULTI): ICD-10-CM

## 2024-05-08 DIAGNOSIS — I48.91 ATRIAL FIBRILLATION, UNSPECIFIED TYPE (MULTI): ICD-10-CM

## 2024-05-08 DIAGNOSIS — Z79.4 TYPE 2 DIABETES MELLITUS WITH CHRONIC KIDNEY DISEASE ON CHRONIC DIALYSIS, WITH LONG-TERM CURRENT USE OF INSULIN (MULTI): ICD-10-CM

## 2024-05-08 DIAGNOSIS — E11.22 TYPE 2 DIABETES MELLITUS WITH CHRONIC KIDNEY DISEASE ON CHRONIC DIALYSIS, WITH LONG-TERM CURRENT USE OF INSULIN (MULTI): ICD-10-CM

## 2024-05-08 DIAGNOSIS — Z99.2 HYPERTENSIVE HEART AND KIDNEY DISEASE WITH CHRONIC DIASTOLIC CONGESTIVE HEART FAILURE AND STAGE 5 CHRONIC KIDNEY DISEASE ON CHRONIC DIALYSIS (MULTI): ICD-10-CM

## 2024-05-08 PROCEDURE — 99308 SBSQ NF CARE LOW MDM 20: CPT | Performed by: INTERNAL MEDICINE

## 2024-05-08 NOTE — LETTER
Patient: Melody Martin  : 1935    Encounter Date: 2024    PROGRESS NOTE    Subjective  Chief complaint: Melody Martin is a 88 y.o. female who is a long term care patient being seen and evaluated for back pain.    HPI:  HPI  Patient is seen in follow-up of chronic back pain.  Patient recently had Tylenol scheduled, tolerating well.  Patient denies pain at this time.  Patient was seen and examined at the bedside, appears to be in no acute distress.  Denies chest pain or shortness of breath.  Denies fever or chills.    Objective  Vital signs: 132/76, 97.6, 60, 18, 96%, blood sugar 178    Physical Exam  Constitutional:       General: She is not in acute distress.  Eyes:      Extraocular Movements: Extraocular movements intact.   Cardiovascular:      Rate and Rhythm: Normal rate and regular rhythm.   Pulmonary:      Effort: Pulmonary effort is normal.      Breath sounds: Normal breath sounds.   Abdominal:      General: Bowel sounds are normal.      Palpations: Abdomen is soft.   Musculoskeletal:      Cervical back: Neck supple.      Right lower leg: No edema.      Left lower leg: No edema.   Skin:     Comments: Dressings to sacrum and heel clean dry and intact   Neurological:      Mental Status: She is alert.   Psychiatric:         Mood and Affect: Mood normal.         Behavior: Behavior is cooperative.         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  On HD per nephrology  Isosorbide dinitrate  Metoprolol  Diltiazem  Renal diet  Monitor  BP  Remove extra fluid in dialysis         Atrial fibrillation (Multi)     Monitor heart rate, controlled  Amiodarone  Apixaban  Bleeding precautions          Medications, treatments, and labs reviewed  Continue medications and treatments as listed in EMR    Scribe Attestation  JANES, Phoebe Martinez   attest that this documentation has been prepared under the direction and in the presence of Wilbert Lock MD    Provider Attestation - Scribe documentation  All medical record entries made by the Scribe were at my direction and personally dictated by me. I have reviewed the chart and agree that the record accurately reflects my personal performance of the history, physical exam, discussion and plan.   Wilbert Lock MD            Electronically Signed By: Wilbert Lock MD   5/8/24  4:03 PM

## 2024-05-08 NOTE — PROGRESS NOTES
PROGRESS NOTE    Subjective   Chief complaint: Melody Martin is a 88 y.o. female who is a long term care patient being seen and evaluated for back pain.    HPI:  HPI  Patient is seen in follow-up of chronic back pain.  Patient recently had Tylenol scheduled, tolerating well.  Patient denies pain at this time.  Patient was seen and examined at the bedside, appears to be in no acute distress.  Denies chest pain or shortness of breath.  Denies fever or chills.    Objective   Vital signs: 132/76, 97.6, 60, 18, 96%, blood sugar 178    Physical Exam  Constitutional:       General: She is not in acute distress.  Eyes:      Extraocular Movements: Extraocular movements intact.   Cardiovascular:      Rate and Rhythm: Normal rate and regular rhythm.   Pulmonary:      Effort: Pulmonary effort is normal.      Breath sounds: Normal breath sounds.   Abdominal:      General: Bowel sounds are normal.      Palpations: Abdomen is soft.   Musculoskeletal:      Cervical back: Neck supple.      Right lower leg: No edema.      Left lower leg: No edema.   Skin:     Comments: Dressings to sacrum and heel clean dry and intact   Neurological:      Mental Status: She is alert.   Psychiatric:         Mood and Affect: Mood normal.         Behavior: Behavior is cooperative.         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  On HD per nephrology  Isosorbide dinitrate  Metoprolol  Diltiazem  Renal diet  Monitor BP  Remove extra fluid in dialysis         Atrial fibrillation (Multi)      Monitor heart rate, controlled  Amiodarone  Apixaban  Bleeding precautions          Medications, treatments, and labs reviewed  Continue medications and treatments as listed in EMR    Scribe Attestation  I, Phoebe Martinez   attest that this documentation has been prepared under the direction and in the presence of Wilbert Lock MD    Provider Attestation - Scribe documentation  All medical record entries made by the Scribe were at my direction and personally dictated by me. I have reviewed the chart and agree that the record accurately reflects my personal performance of the history, physical exam, discussion and plan.   Wilbert Lock MD

## 2024-05-09 ENCOUNTER — NURSING HOME VISIT (OUTPATIENT)
Dept: POST ACUTE CARE | Facility: EXTERNAL LOCATION | Age: 89
End: 2024-05-09
Payer: COMMERCIAL

## 2024-05-09 DIAGNOSIS — I50.32 HYPERTENSIVE HEART AND KIDNEY DISEASE WITH CHRONIC DIASTOLIC CONGESTIVE HEART FAILURE AND STAGE 5 CHRONIC KIDNEY DISEASE ON CHRONIC DIALYSIS (MULTI): ICD-10-CM

## 2024-05-09 DIAGNOSIS — L89.153 STAGE III PRESSURE ULCER OF SACRAL REGION (MULTI): Primary | ICD-10-CM

## 2024-05-09 DIAGNOSIS — Z79.899 ENCOUNTER FOR MEDICATION REVIEW: ICD-10-CM

## 2024-05-09 DIAGNOSIS — N18.6 TYPE 2 DIABETES MELLITUS WITH CHRONIC KIDNEY DISEASE ON CHRONIC DIALYSIS, WITH LONG-TERM CURRENT USE OF INSULIN (MULTI): ICD-10-CM

## 2024-05-09 DIAGNOSIS — L89.620 PRESSURE ULCER OF LEFT HEEL, UNSTAGEABLE (MULTI): ICD-10-CM

## 2024-05-09 DIAGNOSIS — I13.2 HYPERTENSIVE HEART AND KIDNEY DISEASE WITH CHRONIC DIASTOLIC CONGESTIVE HEART FAILURE AND STAGE 5 CHRONIC KIDNEY DISEASE ON CHRONIC DIALYSIS (MULTI): ICD-10-CM

## 2024-05-09 DIAGNOSIS — Z99.2 TYPE 2 DIABETES MELLITUS WITH CHRONIC KIDNEY DISEASE ON CHRONIC DIALYSIS, WITH LONG-TERM CURRENT USE OF INSULIN (MULTI): ICD-10-CM

## 2024-05-09 DIAGNOSIS — N18.6 HYPERTENSIVE HEART AND KIDNEY DISEASE WITH CHRONIC DIASTOLIC CONGESTIVE HEART FAILURE AND STAGE 5 CHRONIC KIDNEY DISEASE ON CHRONIC DIALYSIS (MULTI): ICD-10-CM

## 2024-05-09 DIAGNOSIS — E11.22 TYPE 2 DIABETES MELLITUS WITH CHRONIC KIDNEY DISEASE ON CHRONIC DIALYSIS, WITH LONG-TERM CURRENT USE OF INSULIN (MULTI): ICD-10-CM

## 2024-05-09 DIAGNOSIS — Z79.4 TYPE 2 DIABETES MELLITUS WITH CHRONIC KIDNEY DISEASE ON CHRONIC DIALYSIS, WITH LONG-TERM CURRENT USE OF INSULIN (MULTI): ICD-10-CM

## 2024-05-09 DIAGNOSIS — Z99.2 HYPERTENSIVE HEART AND KIDNEY DISEASE WITH CHRONIC DIASTOLIC CONGESTIVE HEART FAILURE AND STAGE 5 CHRONIC KIDNEY DISEASE ON CHRONIC DIALYSIS (MULTI): ICD-10-CM

## 2024-05-09 PROCEDURE — 11042 DBRDMT SUBQ TIS 1ST 20SQCM/<: CPT | Performed by: NURSE PRACTITIONER

## 2024-05-09 PROCEDURE — 11045 DBRDMT SUBQ TISS EACH ADDL: CPT | Performed by: NURSE PRACTITIONER

## 2024-05-09 PROCEDURE — 99309 SBSQ NF CARE MODERATE MDM 30: CPT | Performed by: NURSE PRACTITIONER

## 2024-05-09 NOTE — LETTER
Patient: Melody Martin  : 1935    Encounter Date: 2024    PROGRESS NOTE    Subjective  Chief complaint: Melody Martin is a 88 y.o. female who is a long term care patient being seen and evaluated for follow-up wounds and medication review    HPI:  HPI  Patient is seen in follow-up of wounds, sacrum and left heel.  Patient also seen for medication review.  Patient was seen and examined at the bedside, appears to be in no acute distress.    Objective  Vital signs: 124/55, 97.6, 64, 18, blood sugar 192, 96%    Physical Exam  Constitutional:       General: She is not in acute distress.  Eyes:      Extraocular Movements: Extraocular movements intact.   Cardiovascular:      Rate and Rhythm: Normal rate and regular rhythm.   Pulmonary:      Effort: Pulmonary effort is normal.      Breath sounds: Normal breath sounds.   Musculoskeletal:      Cervical back: Neck supple.      Right lower leg: No edema.      Left lower leg: No edema.   Skin:     Comments: wound location -left heel  Etiology - unstageable pressure   Granulation-large  Slough-small  Edge- flat  Odor - no  Drainage - medium serosanguineous  Size - 2.8 X 3 X UTD centimeters  Undermining -none    Wound location -sacrum  Etiology - pressure   Granulation-large  Slough-small  Edge-flat  Odor - none  Drainage - medium serosanguineous  Size - 3.5 x 4 x 0.2 centimeters   Neurological:      Mental Status: She is alert.   Psychiatric:         Mood and Affect: Mood normal.         Behavior: Behavior is cooperative.         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  On HD per nephrology  Isosorbide dinitrate  Metoprolol  Diltiazem  Renal diet  Monitor BP  Remove extra fluid in dialysis         Pressure ulcer of left heel, unstageable (Multi)     I performed subcutaneous tissue debridement with a total area of 8.4 cm2 with curette to remove viable and non-viable tissue/material including subcutaneous tissue, slough, biofilm, and fibrin/exudate after achieving pain control with 2% lidocaine topical gel.  A minimal amount of bleeding was controlled with pressure. The procedure was tolerated well with a pain level of 0 throughout and a pain level of 0 following the procedure.  Post-debridement measurements unchanged. Character of wound/ulcer post-debridement is improved.       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  A minimal amount of bleeding was controlled with pressure. The procedure was tolerated well with a pain level of 0 throughout and a pain level of 0 following the procedure.  Post-debridement measurements unchanged. Character of wound/ulcer post-debridement is improved.       TX: Irrigate with wound cleanser or normal saline, pat dry, apply Skin-Prep to periwound, alginate to wound bed and cover with silicone bordered foam    Turn every 2 hours  Prevalon boots  Air mattress  Cushion  to chair  Dietitian following  Supplements per dietitian  Grab bars to bed to assist with bed mobility and repositioning  Weekly skin checks  House barrier cream to buttocks         Encounter for medication review     Due to dialysis, will change metoprolol to dinnertime          Medications, treatments, and labs reviewed  Continue medications and treatments as listed in EMR    Scribe Attestation  Hien MARRERO Scribe   attest that this documentation has been prepared under the direction and in the presence of MAR Johnson    Provider Attestation - Scribe documentation  All medical record entries made by the Scribe were at my direction and personally dictated by me. I have reviewed the chart and agree that the record accurately reflects my personal performance of the history, physical exam, discussion and plan.   MAR Johnson            Electronically Signed By: MAR Johnson   5/19/24  8:06 PM

## 2024-05-10 ENCOUNTER — NURSING HOME VISIT (OUTPATIENT)
Dept: POST ACUTE CARE | Facility: EXTERNAL LOCATION | Age: 89
End: 2024-05-10
Payer: COMMERCIAL

## 2024-05-10 DIAGNOSIS — M54.9 BACK PAIN, UNSPECIFIED BACK LOCATION, UNSPECIFIED BACK PAIN LATERALITY, UNSPECIFIED CHRONICITY: ICD-10-CM

## 2024-05-10 DIAGNOSIS — Z99.2 HYPERTENSIVE HEART AND KIDNEY DISEASE WITH CHRONIC DIASTOLIC CONGESTIVE HEART FAILURE AND STAGE 5 CHRONIC KIDNEY DISEASE ON CHRONIC DIALYSIS (MULTI): ICD-10-CM

## 2024-05-10 DIAGNOSIS — N18.6 HYPERTENSIVE HEART AND KIDNEY DISEASE WITH CHRONIC DIASTOLIC CONGESTIVE HEART FAILURE AND STAGE 5 CHRONIC KIDNEY DISEASE ON CHRONIC DIALYSIS (MULTI): ICD-10-CM

## 2024-05-10 DIAGNOSIS — Z99.2 TYPE 2 DIABETES MELLITUS WITH CHRONIC KIDNEY DISEASE ON CHRONIC DIALYSIS, WITH LONG-TERM CURRENT USE OF INSULIN (MULTI): Primary | ICD-10-CM

## 2024-05-10 DIAGNOSIS — E11.22 TYPE 2 DIABETES MELLITUS WITH CHRONIC KIDNEY DISEASE ON CHRONIC DIALYSIS, WITH LONG-TERM CURRENT USE OF INSULIN (MULTI): Primary | ICD-10-CM

## 2024-05-10 DIAGNOSIS — I50.32 HYPERTENSIVE HEART AND KIDNEY DISEASE WITH CHRONIC DIASTOLIC CONGESTIVE HEART FAILURE AND STAGE 5 CHRONIC KIDNEY DISEASE ON CHRONIC DIALYSIS (MULTI): ICD-10-CM

## 2024-05-10 DIAGNOSIS — N18.6 TYPE 2 DIABETES MELLITUS WITH CHRONIC KIDNEY DISEASE ON CHRONIC DIALYSIS, WITH LONG-TERM CURRENT USE OF INSULIN (MULTI): Primary | ICD-10-CM

## 2024-05-10 DIAGNOSIS — Z79.4 TYPE 2 DIABETES MELLITUS WITH CHRONIC KIDNEY DISEASE ON CHRONIC DIALYSIS, WITH LONG-TERM CURRENT USE OF INSULIN (MULTI): Primary | ICD-10-CM

## 2024-05-10 DIAGNOSIS — I13.2 HYPERTENSIVE HEART AND KIDNEY DISEASE WITH CHRONIC DIASTOLIC CONGESTIVE HEART FAILURE AND STAGE 5 CHRONIC KIDNEY DISEASE ON CHRONIC DIALYSIS (MULTI): ICD-10-CM

## 2024-05-10 PROCEDURE — 99308 SBSQ NF CARE LOW MDM 20: CPT | Performed by: INTERNAL MEDICINE

## 2024-05-10 NOTE — LETTER
Patient: Melody Martin  : 1935    Encounter Date: 05/10/2024    PROGRESS NOTE    Subjective  Chief complaint: Melody Martin is a 88 y.o. female who is a long term care patient being seen and evaluated for pain.    HPI:  HPI  Patient is seen in follow-up to pain.  Patient was started on scheduled Tylenol.  According to nursing.  When asking patient about pain.  Patient reports is uncontrollable and Tylenol does not help.  On examination today, when asked about pain.  Patient reports pain is controlled.  No issues at this time.  Encourage patient to ask for Tylenol if pain is present.    Objective  Vital signs: 124/68, 97.9, 68, 18, 95%, blood sugar 257    Physical Exam  Constitutional:       General: She is not in acute distress.  Eyes:      Extraocular Movements: Extraocular movements intact.   Cardiovascular:      Rate and Rhythm: Normal rate and regular rhythm.   Pulmonary:      Effort: Pulmonary effort is normal.      Breath sounds: Normal breath sounds.   Abdominal:      General: Bowel sounds are normal.      Palpations: Abdomen is soft.   Musculoskeletal:      Cervical back: Neck supple.      Right lower leg: No edema.      Left lower leg: No edema.   Skin:     Comments: Dressings to sacrum and heel clean dry and intact   Neurological:      Mental Status: She is alert.   Psychiatric:         Mood and Affect: Mood normal.         Behavior: Behavior is cooperative.         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  On HD per  nephrology  Isosorbide dinitrate  Metoprolol  Diltiazem  Renal diet  Monitor BP  Remove extra fluid in dialysis          Medications, treatments, and labs reviewed  Continue medications and treatments as listed in EMR    Scribe Attestation  I, Phoebe Martinez   attest that this documentation has been prepared under the direction and in the presence of Wilbert Lock MD    Provider Attestation - Scribe documentation  All medical record entries made by the Scribe were at my direction and personally dictated by me. I have reviewed the chart and agree that the record accurately reflects my personal performance of the history, physical exam, discussion and plan.   Wilbert Lock MD            Electronically Signed By: Wilbert Lock MD   5/10/24  4:06 PM

## 2024-05-10 NOTE — PROGRESS NOTES
PROGRESS NOTE    Subjective   Chief complaint: Melody Martin is a 88 y.o. female who is a long term care patient being seen and evaluated for pain.    HPI:  HPI  Patient is seen in follow-up to pain.  Patient was started on scheduled Tylenol.  According to nursing.  When asking patient about pain.  Patient reports is uncontrollable and Tylenol does not help.  On examination today, when asked about pain.  Patient reports pain is controlled.  No issues at this time.  Encourage patient to ask for Tylenol if pain is present.    Objective   Vital signs: 124/68, 97.9, 68, 18, 95%, blood sugar 257    Physical Exam  Constitutional:       General: She is not in acute distress.  Eyes:      Extraocular Movements: Extraocular movements intact.   Cardiovascular:      Rate and Rhythm: Normal rate and regular rhythm.   Pulmonary:      Effort: Pulmonary effort is normal.      Breath sounds: Normal breath sounds.   Abdominal:      General: Bowel sounds are normal.      Palpations: Abdomen is soft.   Musculoskeletal:      Cervical back: Neck supple.      Right lower leg: No edema.      Left lower leg: No edema.   Skin:     Comments: Dressings to sacrum and heel clean dry and intact   Neurological:      Mental Status: She is alert.   Psychiatric:         Mood and Affect: Mood normal.         Behavior: Behavior is cooperative.         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  On HD per nephrology  Isosorbide dinitrate  Metoprolol  Diltiazem  Renal diet  Monitor BP  Remove  extra fluid in dialysis          Medications, treatments, and labs reviewed  Continue medications and treatments as listed in EMR    Scribe Attestation  I, Phoebe Martinez   attest that this documentation has been prepared under the direction and in the presence of Wilbert Lock MD    Provider Attestation - Scribe documentation  All medical record entries made by the Scribe were at my direction and personally dictated by me. I have reviewed the chart and agree that the record accurately reflects my personal performance of the history, physical exam, discussion and plan.   Wilbert Lock MD

## 2024-05-13 NOTE — PROGRESS NOTES
PROGRESS NOTE    Subjective   Chief complaint: Melody Martin is a 88 y.o. female who is a long term care patient being seen and evaluated for diabetes.    HPI:  HPI  Nurse reporting patient's blood sugar low this morning.  Blood sugars are usually ranging mid 100s to 300s.  Patient denies polydipsia polyuria polyphagia.    Objective   Vital signs: 104/50, 97.8, 7019, 93%, blood sugar 307    Physical Exam  Constitutional:       General: She is not in acute distress.  Eyes:      Extraocular Movements: Extraocular movements intact.   Cardiovascular:      Rate and Rhythm: Normal rate and regular rhythm.   Pulmonary:      Effort: Pulmonary effort is normal.      Breath sounds: Normal breath sounds.   Musculoskeletal:      Cervical back: Neck supple.      Right lower leg: No edema.      Left lower leg: No edema.   Skin:     Comments: Dressings to sacrum and heel clean dry and intact   Neurological:      Mental Status: She is alert.      Motor: Weakness present.   Psychiatric:         Mood and Affect: Mood normal.         Behavior: Behavior is cooperative.         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  On HD per nephrology  Isosorbide dinitrate  Metoprolol  Diltiazem  Renal diet  Monitor BP  Remove extra fluid in dialysis         Type 2 diabetes mellitus with chronic kidney disease on chronic dialysis, with long-term current use of insulin (Multi) - Primary     Carb controlled diet  Monitor Glucoscan  Glargine  Humalog  BS fluctuates  Continue meds as ordered and monitor          Medications, treatments, and labs reviewed  Continue medications and treatments as listed in EMR    Scribe Attestation  I,  Carl Martineziboseas   attest that this documentation has been prepared under the direction and in the presence of MAR Johnson    Provider Attestation - Scribe documentation  All medical record entries made by the Scribe were at my direction and personally dictated by me. I have reviewed the chart and agree that the record accurately reflects my personal performance of the history, physical exam, discussion and plan.   MAR Johnson

## 2024-05-13 NOTE — ASSESSMENT & PLAN NOTE
Carb controlled diet  Monitor Glucoscan  Glargine  Humalog  BS fluctuates  Continue meds as ordered and monitor

## 2024-05-14 PROBLEM — Z79.899 ENCOUNTER FOR MEDICATION REVIEW: Status: ACTIVE | Noted: 2024-05-14

## 2024-05-14 NOTE — ASSESSMENT & PLAN NOTE
I performed subcutaneous tissue debridement with a total area of 14 cm2 with curette to remove viable and non-viable tissue/material including subcutaneous tissue, slough, biofilm, and fibrin/exudate after achieving pain control with 2% lidocaine topical gel.  A minimal amount of bleeding was controlled with pressure. The procedure was tolerated well with a pain level of 0 throughout and a pain level of 0 following the procedure.  Post-debridement measurements unchanged. Character of wound/ulcer post-debridement is improved.       TX: Irrigate with wound cleanser or normal saline, pat dry, apply Skin-Prep to periwound, alginate to wound bed and cover with silicone bordered foam    Turn every 2 hours  Prevalon boots  Air mattress  Cushion to chair  Dietitian following  Supplements per dietitian  Grab bars to bed to assist with bed mobility and repositioning  Weekly skin checks  House barrier cream to buttocks

## 2024-05-14 NOTE — PROGRESS NOTES
PROGRESS NOTE    Subjective   Chief complaint: Melody Martin is a 88 y.o. female who is a long term care patient being seen and evaluated for follow-up wounds and medication review    HPI:  HPI  Patient is seen in follow-up of wounds, sacrum and left heel.  Patient also seen for medication review.  Patient was seen and examined at the bedside, appears to be in no acute distress.    Objective   Vital signs: 124/55, 97.6, 64, 18, blood sugar 192, 96%    Physical Exam  Constitutional:       General: She is not in acute distress.  Eyes:      Extraocular Movements: Extraocular movements intact.   Cardiovascular:      Rate and Rhythm: Normal rate and regular rhythm.   Pulmonary:      Effort: Pulmonary effort is normal.      Breath sounds: Normal breath sounds.   Musculoskeletal:      Cervical back: Neck supple.      Right lower leg: No edema.      Left lower leg: No edema.   Skin:     Comments: wound location -left heel  Etiology - unstageable pressure   Granulation-large  Slough-small  Edge- flat  Odor - no  Drainage - medium serosanguineous  Size - 2.8 X 3 X UTD centimeters  Undermining -none    Wound location -sacrum  Etiology - pressure   Granulation-large  Slough-small  Edge-flat  Odor - none  Drainage - medium serosanguineous  Size - 3.5 x 4 x 0.2 centimeters   Neurological:      Mental Status: She is alert.   Psychiatric:         Mood and Affect: Mood normal.         Behavior: Behavior is cooperative.         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  On HD per  nephrology  Isosorbide dinitrate  Metoprolol  Diltiazem  Renal diet  Monitor BP  Remove extra fluid in dialysis         Pressure ulcer of left heel, unstageable (Multi)     I performed subcutaneous tissue debridement with a total area of 8.4 cm2 with curette to remove viable and non-viable tissue/material including subcutaneous tissue, slough, biofilm, and fibrin/exudate after achieving pain control with 2% lidocaine topical gel.  A minimal amount of bleeding was controlled with pressure. The procedure was tolerated well with a pain level of 0 throughout and a pain level of 0 following the procedure.  Post-debridement measurements unchanged. Character of wound/ulcer post-debridement is improved.       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  A minimal amount of bleeding was controlled with pressure. The procedure was tolerated well with a pain level of 0 throughout and a pain level of 0 following the procedure.  Post-debridement measurements unchanged. Character of wound/ulcer post-debridement is improved.       TX: Irrigate with wound cleanser or normal saline, pat dry, apply Skin-Prep to periwound, alginate to wound bed and cover with silicone bordered foam    Turn every 2 hours  Prevalon boots  Air mattress  Cushion to chair  Dietitian following  Supplements per dietitian  Grab  bars to bed to assist with bed mobility and repositioning  Weekly skin checks  House barrier cream to buttocks         Encounter for medication review     Due to dialysis, will change metoprolol to dinnertime          Medications, treatments, and labs reviewed  Continue medications and treatments as listed in EMR    Scribe Attestation  IHien Scribe   attest that this documentation has been prepared under the direction and in the presence of MAR Johnson    Provider Attestation - Scribe documentation  All medical record entries made by the Scribe were at my direction and personally dictated by me. I have reviewed the chart and agree that the record accurately reflects my personal performance of the history, physical exam, discussion and plan.   MAR Johnson

## 2024-05-14 NOTE — ASSESSMENT & PLAN NOTE
Carb controlled diet  Monitor Glucoscan  Glargine  Humalog  FBG at goal    Continue meds as ordered and monitor

## 2024-05-14 NOTE — ASSESSMENT & PLAN NOTE
I performed subcutaneous tissue debridement with a total area of 8.4 cm2 with curette to remove viable and non-viable tissue/material including subcutaneous tissue, slough, biofilm, and fibrin/exudate after achieving pain control with 2% lidocaine topical gel.  A minimal amount of bleeding was controlled with pressure. The procedure was tolerated well with a pain level of 0 throughout and a pain level of 0 following the procedure.  Post-debridement measurements unchanged. Character of wound/ulcer post-debridement is improved.       TX: Irrigate with normal saline, pat dry, Santyl, calcium alginate, ABD and Kerlix     Turn every 2 hours  Prevalon boots  Pressure reducing mattress, patient refusing air mattress, discussed benefits/risks  Cushion to chair  Dietitian consult  Supplements per dietitian  Grab bars to bed to assist with bed mobility and repositioning  Weekly skin checks

## 2024-05-15 ENCOUNTER — NURSING HOME VISIT (OUTPATIENT)
Dept: POST ACUTE CARE | Facility: EXTERNAL LOCATION | Age: 89
End: 2024-05-15
Payer: COMMERCIAL

## 2024-05-15 DIAGNOSIS — Z99.2 TYPE 2 DIABETES MELLITUS WITH CHRONIC KIDNEY DISEASE ON CHRONIC DIALYSIS, WITH LONG-TERM CURRENT USE OF INSULIN (MULTI): Primary | ICD-10-CM

## 2024-05-15 DIAGNOSIS — D64.9 ANEMIA, UNSPECIFIED TYPE: ICD-10-CM

## 2024-05-15 DIAGNOSIS — N18.6 TYPE 2 DIABETES MELLITUS WITH CHRONIC KIDNEY DISEASE ON CHRONIC DIALYSIS, WITH LONG-TERM CURRENT USE OF INSULIN (MULTI): Primary | ICD-10-CM

## 2024-05-15 DIAGNOSIS — Z99.2 HYPERTENSIVE HEART AND KIDNEY DISEASE WITH CHRONIC DIASTOLIC CONGESTIVE HEART FAILURE AND STAGE 5 CHRONIC KIDNEY DISEASE ON CHRONIC DIALYSIS (MULTI): ICD-10-CM

## 2024-05-15 DIAGNOSIS — F32.A DEPRESSION, UNSPECIFIED DEPRESSION TYPE: ICD-10-CM

## 2024-05-15 DIAGNOSIS — I13.2 HYPERTENSIVE HEART AND KIDNEY DISEASE WITH CHRONIC DIASTOLIC CONGESTIVE HEART FAILURE AND STAGE 5 CHRONIC KIDNEY DISEASE ON CHRONIC DIALYSIS (MULTI): ICD-10-CM

## 2024-05-15 DIAGNOSIS — E11.22 TYPE 2 DIABETES MELLITUS WITH CHRONIC KIDNEY DISEASE ON CHRONIC DIALYSIS, WITH LONG-TERM CURRENT USE OF INSULIN (MULTI): Primary | ICD-10-CM

## 2024-05-15 DIAGNOSIS — Z79.4 TYPE 2 DIABETES MELLITUS WITH CHRONIC KIDNEY DISEASE ON CHRONIC DIALYSIS, WITH LONG-TERM CURRENT USE OF INSULIN (MULTI): Primary | ICD-10-CM

## 2024-05-15 DIAGNOSIS — I50.32 HYPERTENSIVE HEART AND KIDNEY DISEASE WITH CHRONIC DIASTOLIC CONGESTIVE HEART FAILURE AND STAGE 5 CHRONIC KIDNEY DISEASE ON CHRONIC DIALYSIS (MULTI): ICD-10-CM

## 2024-05-15 DIAGNOSIS — I48.91 ATRIAL FIBRILLATION, UNSPECIFIED TYPE (MULTI): ICD-10-CM

## 2024-05-15 DIAGNOSIS — N18.6 HYPERTENSIVE HEART AND KIDNEY DISEASE WITH CHRONIC DIASTOLIC CONGESTIVE HEART FAILURE AND STAGE 5 CHRONIC KIDNEY DISEASE ON CHRONIC DIALYSIS (MULTI): ICD-10-CM

## 2024-05-15 PROCEDURE — 99308 SBSQ NF CARE LOW MDM 20: CPT | Performed by: INTERNAL MEDICINE

## 2024-05-15 NOTE — PROGRESS NOTES
PROGRESS NOTE    Subjective   Chief complaint: Melody Martin is a 88 y.o. female who is a long term care patient being seen and evaluated for monthly general medical care and follow-up    HPI:  HPI  Patient presents for general medical care and f/u.  Patient seen and examined at bedside.  No issues per nursing.  Patient has no acute complaints.  DM, denies polydipsia polyuria polyphagia.  Patient with ESRD on HD, tolerating treatment well.  HTN, Denies chest pain and headache.  CHF stable, denies sob, orthopnea, weight gain.  Patient with diagnosis of depression.  Mood is stable.  Denies feeling down and thoughts of harming self or others.  AFIB stable, denies palpitations and chest pain.  Anemia stable, denies fatigue, sob, and palpitations.  No acute distress.    Objective   Vital signs: 169/56, 98.7, 78, 18, 95%, blood sugar 278    Physical Exam  Constitutional:       General: She is not in acute distress.  Eyes:      Extraocular Movements: Extraocular movements intact.   Cardiovascular:      Rate and Rhythm: Normal rate and regular rhythm.   Pulmonary:      Effort: Pulmonary effort is normal.      Breath sounds: Normal breath sounds.   Abdominal:      General: Bowel sounds are normal.      Palpations: Abdomen is soft.   Musculoskeletal:      Cervical back: Neck supple.      Right lower leg: No edema.      Left lower leg: No edema.   Skin:     Comments: Dressings to sacrum and heel clean dry and intact   Neurological:      Mental Status: She is alert.   Psychiatric:         Mood and Affect: Mood normal.         Behavior: Behavior is cooperative.         Assessment/Plan   Problem List Items Addressed This Visit       Type 2 diabetes mellitus with chronic kidney disease on chronic dialysis, with long-term current use of insulin (Multi) - Primary     Carb controlled diet  Monitor Glucoscan  Glargine  Humalog  FBG at goal    Continue meds as ordered and monitor         Anemia     Stable on recent labs.  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  On HD per nephrology  Isosorbide dinitrate  Metoprolol  Diltiazem  Renal diet  Monitor BP  Remove extra fluid in dialysis         Atrial fibrillation (Multi)     Monitor heart rate, controlled  Amiodarone  Apixaban  Bleeding precautions         Depression     Continue following with psych  BuSpar          Medications, treatments, and labs reviewed  Continue medications and treatments as listed in EMR    Scribe Attestation  I, Phoebe Martinez   attest that this documentation has been prepared under the direction and in the presence of Wilbert Lock MD    Provider Attestation - Scribe documentation  All medical record entries made by the Scribe were at my direction and personally dictated by me. I have reviewed the chart and agree that the record accurately reflects my personal performance of the history, physical exam, discussion and plan.   Wilbert Lock MD

## 2024-05-15 NOTE — LETTER
Patient: Melody Martin  : 1935    Encounter Date: 05/15/2024    PROGRESS NOTE    Subjective  Chief complaint: Melody Martin is a 88 y.o. female who is a long term care patient being seen and evaluated for monthly general medical care and follow-up    HPI:  HPI  Patient presents for general medical care and f/u.  Patient seen and examined at bedside.  No issues per nursing.  Patient has no acute complaints.  DM, denies polydipsia polyuria polyphagia.  Patient with ESRD on HD, tolerating treatment well.  HTN, Denies chest pain and headache.  CHF stable, denies sob, orthopnea, weight gain.  Patient with diagnosis of depression.  Mood is stable.  Denies feeling down and thoughts of harming self or others.  AFIB stable, denies palpitations and chest pain.  Anemia stable, denies fatigue, sob, and palpitations.  No acute distress.    Objective  Vital signs: 169/56, 98.7, 78, 18, 95%, blood sugar 278    Physical Exam  Constitutional:       General: She is not in acute distress.  Eyes:      Extraocular Movements: Extraocular movements intact.   Cardiovascular:      Rate and Rhythm: Normal rate and regular rhythm.   Pulmonary:      Effort: Pulmonary effort is normal.      Breath sounds: Normal breath sounds.   Abdominal:      General: Bowel sounds are normal.      Palpations: Abdomen is soft.   Musculoskeletal:      Cervical back: Neck supple.      Right lower leg: No edema.      Left lower leg: No edema.   Skin:     Comments: Dressings to sacrum and heel clean dry and intact   Neurological:      Mental Status: She is alert.   Psychiatric:         Mood and Affect: Mood normal.         Behavior: Behavior is cooperative.         Assessment/Plan  Problem List Items Addressed This Visit       Type 2 diabetes mellitus with chronic kidney disease on chronic dialysis, with long-term current use of insulin (Multi) - Primary     Carb controlled diet  Monitor Glucoscan  Glargine  Humalog  FBG at goal    Continue meds as  ordered and monitor         Anemia     Stable on recent labs.  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  On HD per nephrology  Isosorbide dinitrate  Metoprolol  Diltiazem  Renal diet  Monitor BP  Remove extra fluid in dialysis         Atrial fibrillation (Multi)     Monitor heart rate, controlled  Amiodarone  Apixaban  Bleeding precautions         Depression     Continue following with psych  BuSpar          Medications, treatments, and labs reviewed  Continue medications and treatments as listed in EMR    Scribe Attestation  I, Carl Martinezibe   attest that this documentation has been prepared under the direction and in the presence of Wilbert Lock MD    Provider Attestation - Scribe documentation  All medical record entries made by the Scribe were at my direction and personally dictated by me. I have reviewed the chart and agree that the record accurately reflects my personal performance of the history, physical exam, discussion and plan.   Wilbert Lock MD            Electronically Signed By: Wilbert Lock MD   5/15/24  1:13 PM

## 2024-05-16 ENCOUNTER — NURSING HOME VISIT (OUTPATIENT)
Dept: POST ACUTE CARE | Facility: EXTERNAL LOCATION | Age: 89
End: 2024-05-16
Payer: COMMERCIAL

## 2024-05-16 DIAGNOSIS — I13.2 HYPERTENSIVE HEART AND KIDNEY DISEASE WITH CHRONIC DIASTOLIC CONGESTIVE HEART FAILURE AND STAGE 5 CHRONIC KIDNEY DISEASE ON CHRONIC DIALYSIS (MULTI): ICD-10-CM

## 2024-05-16 DIAGNOSIS — Z99.2 TYPE 2 DIABETES MELLITUS WITH CHRONIC KIDNEY DISEASE ON CHRONIC DIALYSIS, WITH LONG-TERM CURRENT USE OF INSULIN (MULTI): ICD-10-CM

## 2024-05-16 DIAGNOSIS — L89.623 STAGE III PRESSURE ULCER OF LEFT HEEL (MULTI): ICD-10-CM

## 2024-05-16 DIAGNOSIS — L89.153 STAGE III PRESSURE ULCER OF SACRAL REGION (MULTI): Primary | ICD-10-CM

## 2024-05-16 DIAGNOSIS — N18.6 HYPERTENSIVE HEART AND KIDNEY DISEASE WITH CHRONIC DIASTOLIC CONGESTIVE HEART FAILURE AND STAGE 5 CHRONIC KIDNEY DISEASE ON CHRONIC DIALYSIS (MULTI): ICD-10-CM

## 2024-05-16 DIAGNOSIS — Z99.2 HYPERTENSIVE HEART AND KIDNEY DISEASE WITH CHRONIC DIASTOLIC CONGESTIVE HEART FAILURE AND STAGE 5 CHRONIC KIDNEY DISEASE ON CHRONIC DIALYSIS (MULTI): ICD-10-CM

## 2024-05-16 DIAGNOSIS — N18.6 TYPE 2 DIABETES MELLITUS WITH CHRONIC KIDNEY DISEASE ON CHRONIC DIALYSIS, WITH LONG-TERM CURRENT USE OF INSULIN (MULTI): ICD-10-CM

## 2024-05-16 DIAGNOSIS — E11.22 TYPE 2 DIABETES MELLITUS WITH CHRONIC KIDNEY DISEASE ON CHRONIC DIALYSIS, WITH LONG-TERM CURRENT USE OF INSULIN (MULTI): ICD-10-CM

## 2024-05-16 DIAGNOSIS — B37.9 CANDIDIASIS: ICD-10-CM

## 2024-05-16 DIAGNOSIS — Z79.4 TYPE 2 DIABETES MELLITUS WITH CHRONIC KIDNEY DISEASE ON CHRONIC DIALYSIS, WITH LONG-TERM CURRENT USE OF INSULIN (MULTI): ICD-10-CM

## 2024-05-16 DIAGNOSIS — I50.32 HYPERTENSIVE HEART AND KIDNEY DISEASE WITH CHRONIC DIASTOLIC CONGESTIVE HEART FAILURE AND STAGE 5 CHRONIC KIDNEY DISEASE ON CHRONIC DIALYSIS (MULTI): ICD-10-CM

## 2024-05-16 PROCEDURE — 11042 DBRDMT SUBQ TIS 1ST 20SQCM/<: CPT | Performed by: NURSE PRACTITIONER

## 2024-05-16 PROCEDURE — 11045 DBRDMT SUBQ TISS EACH ADDL: CPT | Performed by: NURSE PRACTITIONER

## 2024-05-16 PROCEDURE — 99309 SBSQ NF CARE MODERATE MDM 30: CPT | Performed by: NURSE PRACTITIONER

## 2024-05-16 NOTE — LETTER
Patient: Melody Martin  : 1935    Encounter Date: 2024    PROGRESS NOTE    Subjective  Chief complaint: Melody Martin is a 88 y.o. female who is a long term care patient being seen and evaluated for follow-up wound    HPI:  HPI  Patient is seen in follow-up of wounds.  Patient does continue with left heel and sacral pressure ulcer.  Treatment interventions in place as per the EMR.    Objective  Vital signs: 139/56, 97.9, 78, 18, blood sugar 271, 94%    Physical Exam  Constitutional:       General: She is not in acute distress.  Cardiovascular:      Rate and Rhythm: Normal rate and regular rhythm.   Pulmonary:      Effort: Pulmonary effort is normal.   Musculoskeletal:      Cervical back: Neck supple.      Right lower leg: No edema.      Left lower leg: No edema.   Skin:     Comments: wound location -left heel  Etiology - pressure   Granulation-large  Slough-small  Edge- flat  Odor - no  Drainage - medium serosanguineous  Size - 3 X 3 X 0.2 centimeters  Undermining -none    Wound location -sacrum  Etiology - pressure   Granulation-large  Slough-small  Edge-flat  Odor - none  Drainage - medium serosanguineous  Size - 5 x 6.5 x 0.2 centimeters    Inner thighs and PORTER area reddened   Neurological:      Mental Status: She is alert.   Psychiatric:         Mood and Affect: Mood normal.         Behavior: Behavior is cooperative.         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  On HD per nephrology  Isosorbide dinitrate  Metoprolol  Diltiazem increased to 300 mg at bedtime  Renal diet  Monitor BP  Remove extra fluid in dialysis         Stage III pressure ulcer of left heel (Multi)     Now stage 3    I performed subcutaneous tissue debridement with a total area of 9 cm2  with curette to remove viable and non-viable tissue/material including subcutaneous tissue, slough, biofilm, and fibrin/exudate after achieving pain control with 2% lidocaine topical gel.  A minimal amount of bleeding was controlled with pressure. The procedure was tolerated well with a pain level of 0 throughout and a pain level of 0 following the procedure.  Post-debridement measurements unchanged. Character of wound/ulcer post-debridement is improved.       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  A minimal amount of bleeding was controlled with pressure. The procedure was tolerated well with a pain level of 0 throughout and a pain level of 0 following the procedure.  Post-debridement measurements unchanged. Character of wound/ulcer post-debridement is improved.       TX: Irrigate with wound cleanser or normal saline, pat dry, apply Skin-Prep to periwound, alginate to wound bed and cover with silicone bordered foam    Turn every 2 hours  Prevalon boots  Air mattress  Cushion to chair  Dietitian following  Supplements per dietitian  Grab bars to bed to assist with bed mobility and repositioning  Weekly skin checks  House barrier cream to buttocks         Type 2 diabetes mellitus with chronic kidney disease on chronic dialysis, with long-term current  use of insulin (Multi)     Carb controlled diet  Monitor Glucoscan  Glargine  Humalog          Medications, treatments, and labs reviewed  Continue medications and treatments as listed in EMR    Scribe Attestation  I, Phoebe Martinez   attest that this documentation has been prepared under the direction and in the presence of MAR Johnson    Provider Attestation - Scribe documentation  All medical record entries made by the Scribe were at my direction and personally dictated by me. I have reviewed the chart and agree that the record accurately reflects my personal performance of the history, physical exam, discussion and plan.   MAR Johnson            Electronically Signed By: MAR Johnson   6/12/24  2:30 PM

## 2024-05-21 ENCOUNTER — NURSING HOME VISIT (OUTPATIENT)
Dept: POST ACUTE CARE | Facility: EXTERNAL LOCATION | Age: 89
End: 2024-05-21
Payer: COMMERCIAL

## 2024-05-21 DIAGNOSIS — I13.2 HYPERTENSIVE HEART AND KIDNEY DISEASE WITH CHRONIC DIASTOLIC CONGESTIVE HEART FAILURE AND STAGE 5 CHRONIC KIDNEY DISEASE ON CHRONIC DIALYSIS (MULTI): ICD-10-CM

## 2024-05-21 DIAGNOSIS — E11.22 TYPE 2 DIABETES MELLITUS WITH CHRONIC KIDNEY DISEASE ON CHRONIC DIALYSIS, WITH LONG-TERM CURRENT USE OF INSULIN (MULTI): ICD-10-CM

## 2024-05-21 DIAGNOSIS — N18.6 TYPE 2 DIABETES MELLITUS WITH CHRONIC KIDNEY DISEASE ON CHRONIC DIALYSIS, WITH LONG-TERM CURRENT USE OF INSULIN (MULTI): ICD-10-CM

## 2024-05-21 DIAGNOSIS — Z79.4 TYPE 2 DIABETES MELLITUS WITH CHRONIC KIDNEY DISEASE ON CHRONIC DIALYSIS, WITH LONG-TERM CURRENT USE OF INSULIN (MULTI): ICD-10-CM

## 2024-05-21 DIAGNOSIS — Z99.2 TYPE 2 DIABETES MELLITUS WITH CHRONIC KIDNEY DISEASE ON CHRONIC DIALYSIS, WITH LONG-TERM CURRENT USE OF INSULIN (MULTI): ICD-10-CM

## 2024-05-21 DIAGNOSIS — N18.6 HYPERTENSIVE HEART AND KIDNEY DISEASE WITH CHRONIC DIASTOLIC CONGESTIVE HEART FAILURE AND STAGE 5 CHRONIC KIDNEY DISEASE ON CHRONIC DIALYSIS (MULTI): ICD-10-CM

## 2024-05-21 DIAGNOSIS — I50.32 HYPERTENSIVE HEART AND KIDNEY DISEASE WITH CHRONIC DIASTOLIC CONGESTIVE HEART FAILURE AND STAGE 5 CHRONIC KIDNEY DISEASE ON CHRONIC DIALYSIS (MULTI): ICD-10-CM

## 2024-05-21 DIAGNOSIS — R41.0 CONFUSION: Primary | ICD-10-CM

## 2024-05-21 DIAGNOSIS — Z99.2 HYPERTENSIVE HEART AND KIDNEY DISEASE WITH CHRONIC DIASTOLIC CONGESTIVE HEART FAILURE AND STAGE 5 CHRONIC KIDNEY DISEASE ON CHRONIC DIALYSIS (MULTI): ICD-10-CM

## 2024-05-21 PROCEDURE — 99309 SBSQ NF CARE MODERATE MDM 30: CPT | Performed by: NURSE PRACTITIONER

## 2024-05-21 NOTE — LETTER
Patient: Melody Martin  : 1935    Encounter Date: 2024    PROGRESS NOTE    Subjective  Chief complaint: Melody Martin is a 88 y.o. female who is a long term care patient being seen and evaluated for confusion.    HPI:  HPI  Patient seen due to nurse reporting patient with confusion and yelling out.  Patient is afebrile.    Objective  Vital signs: 149/46, 97.9, 68, 18, 96%, blood sugar 211    Physical Exam  Constitutional:       General: She is not in acute distress.  Eyes:      Extraocular Movements: Extraocular movements intact.   Cardiovascular:      Rate and Rhythm: Normal rate and regular rhythm.   Pulmonary:      Effort: Pulmonary effort is normal.      Breath sounds: Normal breath sounds.   Abdominal:      General: Bowel sounds are normal.      Palpations: Abdomen is soft.   Musculoskeletal:      Cervical back: Neck supple.      Right lower leg: No edema.      Left lower leg: No edema.   Skin:     Comments: Dressings to sacrum and heel clean dry and intact   Neurological:      Mental Status: She is alert.   Psychiatric:         Mood and Affect: Mood normal.         Behavior: Behavior is cooperative.         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  On HD per nephrology  Isosorbide dinitrate  Metoprolol  Diltiazem  Renal diet  Monitor BP  Remove extra fluid in dialysis         Type 2 diabetes mellitus with chronic kidney disease on chronic dialysis, with long-term current use of insulin (Multi)     Carb controlled diet  Monitor Glucoscan  Glargine  Humalog          Medications, treatments, and labs reviewed  Continue medications and treatments as listed in EMR    Scribe Attestation  I, Hien Conn, Scribe   attest that this documentation  has been prepared under the direction and in the presence of MAR Johnson    Provider Attestation - Scribe documentation  All medical record entries made by the Scribe were at my direction and personally dictated by me. I have reviewed the chart and agree that the record accurately reflects my personal performance of the history, physical exam, discussion and plan.   MAR Johnson            Electronically Signed By: MAR Johnson   6/4/24  1:58 PM

## 2024-05-22 ENCOUNTER — NURSING HOME VISIT (OUTPATIENT)
Dept: POST ACUTE CARE | Facility: EXTERNAL LOCATION | Age: 89
End: 2024-05-22
Payer: COMMERCIAL

## 2024-05-22 DIAGNOSIS — Z79.4 TYPE 2 DIABETES MELLITUS WITH CHRONIC KIDNEY DISEASE ON CHRONIC DIALYSIS, WITH LONG-TERM CURRENT USE OF INSULIN (MULTI): ICD-10-CM

## 2024-05-22 DIAGNOSIS — I48.91 ATRIAL FIBRILLATION, UNSPECIFIED TYPE (MULTI): ICD-10-CM

## 2024-05-22 DIAGNOSIS — E11.22 TYPE 2 DIABETES MELLITUS WITH CHRONIC KIDNEY DISEASE ON CHRONIC DIALYSIS, WITH LONG-TERM CURRENT USE OF INSULIN (MULTI): ICD-10-CM

## 2024-05-22 DIAGNOSIS — N18.6 TYPE 2 DIABETES MELLITUS WITH CHRONIC KIDNEY DISEASE ON CHRONIC DIALYSIS, WITH LONG-TERM CURRENT USE OF INSULIN (MULTI): ICD-10-CM

## 2024-05-22 DIAGNOSIS — E87.5 HYPERKALEMIA: ICD-10-CM

## 2024-05-22 DIAGNOSIS — Z99.2 TYPE 2 DIABETES MELLITUS WITH CHRONIC KIDNEY DISEASE ON CHRONIC DIALYSIS, WITH LONG-TERM CURRENT USE OF INSULIN (MULTI): ICD-10-CM

## 2024-05-22 DIAGNOSIS — R41.82 ALTERED MENTAL STATUS, UNSPECIFIED ALTERED MENTAL STATUS TYPE: Primary | ICD-10-CM

## 2024-05-22 PROCEDURE — 99309 SBSQ NF CARE MODERATE MDM 30: CPT | Performed by: INTERNAL MEDICINE

## 2024-05-22 NOTE — PROGRESS NOTES
PROGRESS NOTE    Subjective   Chief complaint: Melody Martin is a 88 y.o. female who is a long term care patient being seen and evaluated for altered mental status.    HPI:  HPI  Patient is seen in follow-up to altered mental status.  According to nursing.  Orders were placed to obtain BMP and CBC which were significant for sodium 133 potassium 5.6 BUN and creatinine of 54 and 2.92.  Hemoglobin 9.1 and 31.3.  Due to patient's hypokalemia patient to have dialysis today to remove extra fluid.  Continue to monitor labs.    Objective   Vital signs: 96/30, 97.4, 60, 18, 92%, blood sugar 287    Physical Exam  Constitutional:       General: She is not in acute distress.  Eyes:      Extraocular Movements: Extraocular movements intact.   Cardiovascular:      Rate and Rhythm: Normal rate and regular rhythm.   Pulmonary:      Effort: Pulmonary effort is normal.      Breath sounds: Normal breath sounds.   Abdominal:      General: Bowel sounds are normal.      Palpations: Abdomen is soft.   Musculoskeletal:      Cervical back: Neck supple.      Right lower leg: No edema.      Left lower leg: No edema.   Skin:     Comments: Dressings to sacrum and heel clean dry and intact   Neurological:      Mental Status: She is alert.   Psychiatric:         Mood and Affect: Mood normal.         Behavior: Behavior is cooperative.         Assessment/Plan   Problem List Items Addressed This Visit       Type 2 diabetes mellitus with chronic kidney disease on chronic dialysis, with long-term current use of insulin (Multi)     Carb controlled diet  Monitor Glucoscan  Glargine  Humalog  FBG at goal         Hyperkalemia     Hemodialysis  Continue to monitor labs         Atrial fibrillation (Multi)     Monitor heart rate, controlled  Amiodarone  Apixaban  Bleeding precautions         Altered mental status - Primary     Continue to monitor          Medications, treatments, and labs reviewed  Continue medications and treatments as listed in  EMR    Scribe Attestation  I, Phoebe Martinez   attest that this documentation has been prepared under the direction and in the presence of Wilbert Lock MD    Provider Attestation - Scribe documentation  All medical record entries made by the Scribe were at my direction and personally dictated by me. I have reviewed the chart and agree that the record accurately reflects my personal performance of the history, physical exam, discussion and plan.   Wilbert Lock MD

## 2024-05-22 NOTE — LETTER
Patient: Melody Martin  : 1935    Encounter Date: 2024    PROGRESS NOTE    Subjective  Chief complaint: Melody Martin is a 88 y.o. female who is a long term care patient being seen and evaluated for altered mental status.    HPI:  HPI  Patient is seen in follow-up to altered mental status.  According to nursing.  Orders were placed to obtain BMP and CBC which were significant for sodium 133 potassium 5.6 BUN and creatinine of 54 and 2.92.  Hemoglobin 9.1 and 31.3.  Due to patient's hypokalemia patient to have dialysis today to remove extra fluid.  Continue to monitor labs.    Objective  Vital signs: 96/30, 97.4, 60, 18, 92%, blood sugar 287    Physical Exam  Constitutional:       General: She is not in acute distress.  Eyes:      Extraocular Movements: Extraocular movements intact.   Cardiovascular:      Rate and Rhythm: Normal rate and regular rhythm.   Pulmonary:      Effort: Pulmonary effort is normal.      Breath sounds: Normal breath sounds.   Abdominal:      General: Bowel sounds are normal.      Palpations: Abdomen is soft.   Musculoskeletal:      Cervical back: Neck supple.      Right lower leg: No edema.      Left lower leg: No edema.   Skin:     Comments: Dressings to sacrum and heel clean dry and intact   Neurological:      Mental Status: She is alert.   Psychiatric:         Mood and Affect: Mood normal.         Behavior: Behavior is cooperative.         Assessment/Plan  Problem List Items Addressed This Visit       Type 2 diabetes mellitus with chronic kidney disease on chronic dialysis, with long-term current use of insulin (Multi)     Carb controlled diet  Monitor Glucoscan  Glargine  Humalog  FBG at goal         Hyperkalemia     Hemodialysis  Continue to monitor labs         Atrial fibrillation (Multi)     Monitor heart rate, controlled  Amiodarone  Apixaban  Bleeding precautions         Altered mental status - Primary     Continue to monitor          Medications, treatments, and labs  reviewed  Continue medications and treatments as listed in EMR    Scribe Attestation  I, Phoebe Martinez   attest that this documentation has been prepared under the direction and in the presence of Wilbert Lock MD    Provider Attestation - Scribe documentation  All medical record entries made by the Scribe were at my direction and personally dictated by me. I have reviewed the chart and agree that the record accurately reflects my personal performance of the history, physical exam, discussion and plan.   Wilbert Lock MD            Electronically Signed By: Wilbert Lock MD   5/22/24  2:17 PM

## 2024-05-23 ENCOUNTER — NURSING HOME VISIT (OUTPATIENT)
Dept: POST ACUTE CARE | Facility: EXTERNAL LOCATION | Age: 89
End: 2024-05-23
Payer: COMMERCIAL

## 2024-05-23 DIAGNOSIS — N18.6 HYPERTENSIVE HEART AND KIDNEY DISEASE WITH CHRONIC DIASTOLIC CONGESTIVE HEART FAILURE AND STAGE 5 CHRONIC KIDNEY DISEASE ON CHRONIC DIALYSIS (MULTI): ICD-10-CM

## 2024-05-23 DIAGNOSIS — L89.620 PRESSURE ULCER OF LEFT HEEL, UNSTAGEABLE (MULTI): ICD-10-CM

## 2024-05-23 DIAGNOSIS — I13.2 HYPERTENSIVE HEART AND KIDNEY DISEASE WITH CHRONIC DIASTOLIC CONGESTIVE HEART FAILURE AND STAGE 5 CHRONIC KIDNEY DISEASE ON CHRONIC DIALYSIS (MULTI): ICD-10-CM

## 2024-05-23 DIAGNOSIS — I50.32 HYPERTENSIVE HEART AND KIDNEY DISEASE WITH CHRONIC DIASTOLIC CONGESTIVE HEART FAILURE AND STAGE 5 CHRONIC KIDNEY DISEASE ON CHRONIC DIALYSIS (MULTI): ICD-10-CM

## 2024-05-23 DIAGNOSIS — Z99.2 HYPERTENSIVE HEART AND KIDNEY DISEASE WITH CHRONIC DIASTOLIC CONGESTIVE HEART FAILURE AND STAGE 5 CHRONIC KIDNEY DISEASE ON CHRONIC DIALYSIS (MULTI): ICD-10-CM

## 2024-05-23 DIAGNOSIS — L89.153 STAGE III PRESSURE ULCER OF SACRAL REGION (MULTI): Primary | ICD-10-CM

## 2024-05-23 PROCEDURE — 99309 SBSQ NF CARE MODERATE MDM 30: CPT | Performed by: NURSE PRACTITIONER

## 2024-05-23 NOTE — LETTER
Patient: Melody Martin  : 1935    Encounter Date: 2024    PROGRESS NOTE    Subjective  Chief complaint: Melody Martin is a 88 y.o. female who is a long term care patient being seen and evaluated for follow-up wounds.    HPI:  HPI  Patient was seen and examined at the bedside.  Patient is seen for follow-up of sacral stage III pressure ulcer and left heel unstageable pressure ulcer.  Patient continues with treatment in place.  Patient denies chest pain or shortness of breath.  Denies fever or chills.    Objective  Vital signs: 165/53, 97.4, 61, 18, blood sugar 347, 95%    Physical Exam  Constitutional:       General: She is not in acute distress.  Eyes:      Extraocular Movements: Extraocular movements intact.   Cardiovascular:      Rate and Rhythm: Normal rate and regular rhythm.   Pulmonary:      Effort: Pulmonary effort is normal.      Breath sounds: Normal breath sounds.   Musculoskeletal:      Cervical back: Neck supple.      Right lower leg: No edema.      Left lower leg: No edema.   Skin:     Comments: wound location -left heel  Etiology - unstageable pressure   Granulation-large  Slough-small  Edge- flat  Odor - no  Drainage - medium serosanguineous  Size - 2.8 X 3 X UTD centimeters  Undermining -none    Wound location -sacrum  Etiology - pressure   Granulation-large  Slough-small  Edge-flat  Odor - none  Drainage - medium serosanguineous  Size - 3.5 x 4 x 0.2 centimeters   Neurological:      Mental Status: She is alert.   Psychiatric:         Mood and Affect: Mood normal.         Behavior: Behavior is cooperative.         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 (Multi) - Primary     TX: Irrigate with wound cleanser or normal saline, pat dry, apply Skin-Prep to periwound, alginate to wound bed and cover with silicone bordered foam    Turn every 2 hours  Prevalon boots  Air mattress  Cushion to chair  Dietitian following  Supplements per dietitian  Grab bars to bed to assist with bed mobility and repositioning  Weekly skin checks  House barrier cream to buttocks          Medications, treatments, and labs reviewed  Continue medications and treatments as listed in EMR    Scribe Attestation  IHien Scribe   attest that this documentation has been prepared under the direction and in the presence of MAR Johnson    Provider Attestation - Scribe documentation  All medical record entries made by the Scribe were at my direction and personally dictated by me. I have reviewed the chart and agree that the record accurately reflects my personal performance of the history, physical exam, discussion and plan.   MAR Johnson            Electronically Signed By: MAR Johnson   6/4/24  3:03 PM

## 2024-05-30 PROBLEM — R41.0 CONFUSION: Status: ACTIVE | Noted: 2024-05-30

## 2024-05-30 NOTE — ASSESSMENT & PLAN NOTE
Carb controlled diet  Monitor Glucoscan  Glargine  Humalog   Physical Therapy Discharge Instructions    [facility], [address] [place] [address]  Phone: [phone]   Fax: [phone]      Patient: Zainab Rai  : 1946    Continue Home Exercise Program 3 times per week      Follow up with MD:     [] Upon completion of therapy     [x] As needed      Recommendations:     [x]   Return to activity with home program    []   Return to activity with the following modifications:       []Post Rehab Program    []Join Independent aquatic program     [x]Return to/join local gym      Additional Comments: Please contact clinic at above phone number if you have any questions regarding Home Exercise Program or self care instructions. Current: In-progress, 4/5 grossly left UE and LE, 4/5+ right UE and LE grossly,   4/3/2023                    Patent will perform 5 sit<>stands pain-free to demonstrate improvement in status and aid and completion of ADLs. Status at IE:Requires UEA                 Current: Met, 30 sec STS 8 reps with UEA on first rep then none with no pain, 1/27/23                     Patent will perform 16 STS in 30 seconds with no UE and pain-free to demonstrate improvement in status and aid and completion of ADLs. Goal added 2/9/2023                  Progress note:  12 STS, 2/9/2023                  Current: Met, 17 reps, 4/3/2023                    Patient will increase FOTO score to 70 points overall to demonstrate improvement in functional status. Status at IE: 64                 Current: Partially met, 69, 4/3/2023     Patent will perform 6 to 8inch steps step over step safely with light UE. Goal added 3/9/23                  Current: Met, 4/3/2023    Assessment/ Summary of Care:   Roni Chowdhury is a 78 yo female that presents with left hip pain and left shoulder pain s/p closed comminuted intertrochanteric fracture with internal fixation and closed displaced fracture of greater tuberosity of the left humerus with routine healing secondary to a trip and fall over a garden hose on October 24, 2022. Patient is being discharged as she has met maximum benefit from skilled physical therapy at this time. She is compliant with HEP. She has Met 1/2 STGs and met or partially met 7/7 LTGs. She did not meet ROM goals secondary to severity of condition prior to current episode of care. She has developed strength and functional mobility demonstrating improved activity tolerance while reducing her risk for falls. She is now ambulating safely in the community with a SPC.  Patient plans on reducing future episodes pf care with continued participation in an independent exercise program. Provided advanced Current: 3/9/23: patient reports no pain Met                    Patient will increase bilateral UE/LE strength to 4+/5 throughout all planes to aid in completion of ADLs. Status at IE: 2+/5                 Current: In-progress, 4/5 grossly left UE and LE, 4/5+ right UE and LE grossly,   4/3/2023                    Patent will perform 5 sit<>stands pain-free to demonstrate improvement in status and aid and completion of ADLs. Status at IE:Requires UEA                 Current: Met, 30 sec STS 8 reps with UEA on first rep then none with no pain, 1/27/23                     Patent will perform 16 STS in 30 seconds with no UE and pain-free to demonstrate improvement in status and aid and completion of ADLs. Goal added 2/9/2023                  Progress note:  12 STS, 2/9/2023                  Current: Met, 17 reps, 4/3/2023                    Patient will increase FOTO score to 70 points overall to demonstrate improvement in functional status. Status at IE: 64                 Current: Partially met, 69, 4/3/2023     Patent will perform 6 to 8inch steps step over step safely with light UE.  Goal added 3/9/23                  Current: Met, 4/3/2023    PLAN  NO Continue plan of care  []  Upgrade activities as tolerated  [x]  Discharge due to : Goals Met or partially Met  [x]  Other:    Aida Friend, PT    4/3/2023    9:21 AM    Future Appointments   Date Time Provider Irineo Wilde   4/3/2023 10:00 AM Hiwot Hylton [facility]

## 2024-05-30 NOTE — PROGRESS NOTES
PROGRESS NOTE    Subjective   Chief complaint: Melody Martin is a 88 y.o. female who is a long term care patient being seen and evaluated for confusion.    HPI:  HPI  Patient seen due to nurse reporting patient with confusion and yelling out.  Patient is afebrile.    Objective   Vital signs: 149/46, 97.9, 68, 18, 96%, blood sugar 211    Physical Exam  Constitutional:       General: She is not in acute distress.  Eyes:      Extraocular Movements: Extraocular movements intact.   Cardiovascular:      Rate and Rhythm: Normal rate and regular rhythm.   Pulmonary:      Effort: Pulmonary effort is normal.      Breath sounds: Normal breath sounds.   Abdominal:      General: Bowel sounds are normal.      Palpations: Abdomen is soft.   Musculoskeletal:      Cervical back: Neck supple.      Right lower leg: No edema.      Left lower leg: No edema.   Skin:     Comments: Dressings to sacrum and heel clean dry and intact   Neurological:      Mental Status: She is alert.   Psychiatric:         Mood and Affect: Mood normal.         Behavior: Behavior is cooperative.         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  On HD per nephrology  Isosorbide dinitrate  Metoprolol  Diltiazem  Renal diet  Monitor BP  Remove extra fluid in dialysis         Type 2 diabetes mellitus with chronic kidney disease on chronic dialysis, with long-term current use of insulin (Multi)     Carb controlled diet  Monitor Glucoscan  Glargine  Humalog          Medications, treatments, and labs reviewed  Continue medications and treatments as listed in EMR    Scribe Attestation  I, Phoebe Martinez   attest that this documentation has been prepared under the direction and in the presence of Karina  MAR Yanez    Provider Attestation - Scribe documentation  All medical record entries made by the Scribe were at my direction and personally dictated by me. I have reviewed the chart and agree that the record accurately reflects my personal performance of the history, physical exam, discussion and plan.   MAR Johnson

## 2024-05-31 ENCOUNTER — NURSING HOME VISIT (OUTPATIENT)
Dept: POST ACUTE CARE | Facility: EXTERNAL LOCATION | Age: 89
End: 2024-05-31
Payer: COMMERCIAL

## 2024-05-31 DIAGNOSIS — Z99.2 HYPERTENSIVE HEART AND KIDNEY DISEASE WITH CHRONIC DIASTOLIC CONGESTIVE HEART FAILURE AND STAGE 5 CHRONIC KIDNEY DISEASE ON CHRONIC DIALYSIS (MULTI): ICD-10-CM

## 2024-05-31 DIAGNOSIS — Z79.4 TYPE 2 DIABETES MELLITUS WITH CHRONIC KIDNEY DISEASE ON CHRONIC DIALYSIS, WITH LONG-TERM CURRENT USE OF INSULIN (MULTI): ICD-10-CM

## 2024-05-31 DIAGNOSIS — N18.6 TYPE 2 DIABETES MELLITUS WITH CHRONIC KIDNEY DISEASE ON CHRONIC DIALYSIS, WITH LONG-TERM CURRENT USE OF INSULIN (MULTI): ICD-10-CM

## 2024-05-31 DIAGNOSIS — I50.32 HYPERTENSIVE HEART AND KIDNEY DISEASE WITH CHRONIC DIASTOLIC CONGESTIVE HEART FAILURE AND STAGE 5 CHRONIC KIDNEY DISEASE ON CHRONIC DIALYSIS (MULTI): ICD-10-CM

## 2024-05-31 DIAGNOSIS — M25.569 KNEE PAIN, UNSPECIFIED CHRONICITY, UNSPECIFIED LATERALITY: Primary | ICD-10-CM

## 2024-05-31 DIAGNOSIS — E11.22 TYPE 2 DIABETES MELLITUS WITH CHRONIC KIDNEY DISEASE ON CHRONIC DIALYSIS, WITH LONG-TERM CURRENT USE OF INSULIN (MULTI): ICD-10-CM

## 2024-05-31 DIAGNOSIS — Z86.73 HISTORY OF CVA (CEREBROVASCULAR ACCIDENT): ICD-10-CM

## 2024-05-31 DIAGNOSIS — I13.2 HYPERTENSIVE HEART AND KIDNEY DISEASE WITH CHRONIC DIASTOLIC CONGESTIVE HEART FAILURE AND STAGE 5 CHRONIC KIDNEY DISEASE ON CHRONIC DIALYSIS (MULTI): ICD-10-CM

## 2024-05-31 DIAGNOSIS — Z99.2 TYPE 2 DIABETES MELLITUS WITH CHRONIC KIDNEY DISEASE ON CHRONIC DIALYSIS, WITH LONG-TERM CURRENT USE OF INSULIN (MULTI): ICD-10-CM

## 2024-05-31 DIAGNOSIS — N18.6 HYPERTENSIVE HEART AND KIDNEY DISEASE WITH CHRONIC DIASTOLIC CONGESTIVE HEART FAILURE AND STAGE 5 CHRONIC KIDNEY DISEASE ON CHRONIC DIALYSIS (MULTI): ICD-10-CM

## 2024-05-31 PROCEDURE — 99308 SBSQ NF CARE LOW MDM 20: CPT | Performed by: INTERNAL MEDICINE

## 2024-05-31 NOTE — LETTER
Patient: Melody Martin  : 1935    Encounter Date: 2024    PROGRESS NOTE    Subjective  Chief complaint: Melody Martin is a 88 y.o. female who is a long term care patient being seen and evaluated for medication clarification.    HPI:  HPI   Asked to see patient for clarification for patient's Voltaren gel. Patient to apply Voltaren gel 1% to both knees, 4 g 3 times daily.    Objective  Vital signs: 143/48, 97.6, 75, 18, 96%     Physical Exam  Constitutional:       General: She is not in acute distress.  Eyes:      Extraocular Movements: Extraocular movements intact.   Cardiovascular:      Rate and Rhythm: Normal rate and regular rhythm.   Pulmonary:      Effort: Pulmonary effort is normal.      Breath sounds: Normal breath sounds.   Abdominal:      General: Bowel sounds are normal.      Palpations: Abdomen is soft.   Musculoskeletal:      Cervical back: Neck supple.      Right lower leg: No edema.      Left lower leg: No edema.   Skin:     Comments: Dressings to sacrum and heel clean dry and intact   Neurological:      Mental Status: She is alert.   Psychiatric:         Mood and Affect: Mood normal.         Behavior: Behavior is cooperative.         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  On HD per nephrology  Isosorbide dinitrate  Metoprolol  Diltiazem  Renal diet  Monitor BP  Remove extra fluid in dialysis         Knee pain - Primary     Voltaren gel 3 times daily          Medications,  treatments, and labs reviewed  Continue medications and treatments as listed in EMR    Scribe Attestation  I, Phoebe Martinez   attest that this documentation has been prepared under the direction and in the presence of Wilbert Lock MD    Provider Attestation - Scribe documentation  All medical record entries made by the Scribe were at my direction and personally dictated by me. I have reviewed the chart and agree that the record accurately reflects my personal performance of the history, physical exam, discussion and plan.   Wilbert Lock MD            Electronically Signed By: Wilbert Lock MD   5/31/24  5:30 PM

## 2024-05-31 NOTE — PROGRESS NOTES
PROGRESS NOTE    Subjective   Chief complaint: Melody Martin is a 88 y.o. female who is a long term care patient being seen and evaluated for medication clarification.    HPI:  HPI   Asked to see patient for clarification for patient's Voltaren gel. Patient to apply Voltaren gel 1% to both knees, 4 g 3 times daily.    Objective   Vital signs: 143/48, 97.6, 75, 18, 96%     Physical Exam  Constitutional:       General: She is not in acute distress.  Eyes:      Extraocular Movements: Extraocular movements intact.   Cardiovascular:      Rate and Rhythm: Normal rate and regular rhythm.   Pulmonary:      Effort: Pulmonary effort is normal.      Breath sounds: Normal breath sounds.   Abdominal:      General: Bowel sounds are normal.      Palpations: Abdomen is soft.   Musculoskeletal:      Cervical back: Neck supple.      Right lower leg: No edema.      Left lower leg: No edema.   Skin:     Comments: Dressings to sacrum and heel clean dry and intact   Neurological:      Mental Status: She is alert.   Psychiatric:         Mood and Affect: Mood normal.         Behavior: Behavior is cooperative.         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  On HD per nephrology  Isosorbide dinitrate  Metoprolol  Diltiazem  Renal diet  Monitor BP  Remove extra fluid in dialysis         Knee pain - Primary     Voltaren gel 3 times daily          Medications, treatments, and labs reviewed  Continue medications and treatments as  listed in EMR    Scribe Attestation  I, Phoebe Martinez   attest that this documentation has been prepared under the direction and in the presence of Wilbert Lock MD    Provider Attestation - Scribe documentation  All medical record entries made by the Scribe were at my direction and personally dictated by me. I have reviewed the chart and agree that the record accurately reflects my personal performance of the history, physical exam, discussion and plan.   Wilbert Lock MD

## 2024-06-03 NOTE — PROGRESS NOTES
PROGRESS NOTE    Subjective   Chief complaint: Melody Martin is a 88 y.o. female who is a long term care patient being seen and evaluated for follow-up wounds.    HPI:  HPI  Patient was seen and examined at the bedside.  Patient is seen for follow-up of sacral stage III pressure ulcer and left heel unstageable pressure ulcer.  Patient continues with treatment in place.  Patient denies chest pain or shortness of breath.  Denies fever or chills.    Objective   Vital signs: 165/53, 97.4, 61, 18, blood sugar 347, 95%    Physical Exam  Constitutional:       General: She is not in acute distress.  Eyes:      Extraocular Movements: Extraocular movements intact.   Cardiovascular:      Rate and Rhythm: Normal rate and regular rhythm.   Pulmonary:      Effort: Pulmonary effort is normal.      Breath sounds: Normal breath sounds.   Musculoskeletal:      Cervical back: Neck supple.      Right lower leg: No edema.      Left lower leg: No edema.   Skin:     Comments: wound location -left heel  Etiology - unstageable pressure   Granulation-large  Slough-small  Edge- flat  Odor - no  Drainage - medium serosanguineous  Size - 2.8 X 3 X UTD centimeters  Undermining -none    Wound location -sacrum  Etiology - pressure   Granulation-large  Slough-small  Edge-flat  Odor - none  Drainage - medium serosanguineous  Size - 3.5 x 4 x 0.2 centimeters   Neurological:      Mental Status: She is alert.   Psychiatric:         Mood and Affect: Mood normal.         Behavior: Behavior is cooperative.         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 (Multi) - Primary     TX: Irrigate with wound cleanser or normal saline, pat dry, apply Skin-Prep to periwound, alginate to wound bed and cover with silicone bordered foam    Turn every 2 hours  Prevalon boots  Air mattress  Cushion to chair  Dietitian following  Supplements per dietitian  Grab bars to bed to assist with bed mobility and repositioning  Weekly skin checks  House barrier cream to buttocks          Medications, treatments, and labs reviewed  Continue medications and treatments as listed in EMR    Scribe Attestation  I, Phoebe Martinez   attest that this documentation has been prepared under the direction and in the presence of MAR Johnson    Provider Attestation - Scribe documentation  All medical record entries made by the Scribe were at my direction and personally dictated by me. I have reviewed the chart and agree that the record accurately reflects my personal performance of the history, physical exam, discussion and plan.   MAR Johnson

## 2024-06-03 NOTE — ASSESSMENT & PLAN NOTE
TX: Irrigate with wound cleanser or normal saline, pat dry, apply Skin-Prep to periwound, alginate to wound bed and cover with silicone bordered foam    Turn every 2 hours  Prevalon boots  Air mattress  Cushion to chair  Dietitian following  Supplements per dietitian  Grab bars to bed to assist with bed mobility and repositioning  Weekly skin checks  House barrier cream to buttocks

## 2024-06-05 ENCOUNTER — NURSING HOME VISIT (OUTPATIENT)
Dept: POST ACUTE CARE | Facility: EXTERNAL LOCATION | Age: 89
End: 2024-06-05
Payer: COMMERCIAL

## 2024-06-05 DIAGNOSIS — R52 PAIN: Primary | ICD-10-CM

## 2024-06-05 DIAGNOSIS — I13.2 HYPERTENSIVE HEART AND KIDNEY DISEASE WITH CHRONIC DIASTOLIC CONGESTIVE HEART FAILURE AND STAGE 5 CHRONIC KIDNEY DISEASE ON CHRONIC DIALYSIS (MULTI): ICD-10-CM

## 2024-06-05 DIAGNOSIS — I50.32 HYPERTENSIVE HEART AND KIDNEY DISEASE WITH CHRONIC DIASTOLIC CONGESTIVE HEART FAILURE AND STAGE 5 CHRONIC KIDNEY DISEASE ON CHRONIC DIALYSIS (MULTI): ICD-10-CM

## 2024-06-05 DIAGNOSIS — Z99.2 TYPE 2 DIABETES MELLITUS WITH CHRONIC KIDNEY DISEASE ON CHRONIC DIALYSIS, WITH LONG-TERM CURRENT USE OF INSULIN (MULTI): ICD-10-CM

## 2024-06-05 DIAGNOSIS — Z79.4 TYPE 2 DIABETES MELLITUS WITH CHRONIC KIDNEY DISEASE ON CHRONIC DIALYSIS, WITH LONG-TERM CURRENT USE OF INSULIN (MULTI): ICD-10-CM

## 2024-06-05 DIAGNOSIS — Z99.2 HYPERTENSIVE HEART AND KIDNEY DISEASE WITH CHRONIC DIASTOLIC CONGESTIVE HEART FAILURE AND STAGE 5 CHRONIC KIDNEY DISEASE ON CHRONIC DIALYSIS (MULTI): ICD-10-CM

## 2024-06-05 DIAGNOSIS — N18.6 HYPERTENSIVE HEART AND KIDNEY DISEASE WITH CHRONIC DIASTOLIC CONGESTIVE HEART FAILURE AND STAGE 5 CHRONIC KIDNEY DISEASE ON CHRONIC DIALYSIS (MULTI): ICD-10-CM

## 2024-06-05 DIAGNOSIS — N18.6 TYPE 2 DIABETES MELLITUS WITH CHRONIC KIDNEY DISEASE ON CHRONIC DIALYSIS, WITH LONG-TERM CURRENT USE OF INSULIN (MULTI): ICD-10-CM

## 2024-06-05 DIAGNOSIS — E11.22 TYPE 2 DIABETES MELLITUS WITH CHRONIC KIDNEY DISEASE ON CHRONIC DIALYSIS, WITH LONG-TERM CURRENT USE OF INSULIN (MULTI): ICD-10-CM

## 2024-06-05 PROCEDURE — 99308 SBSQ NF CARE LOW MDM 20: CPT | Performed by: INTERNAL MEDICINE

## 2024-06-05 NOTE — LETTER
Patient: Melody Martin  : 1935    Encounter Date: 2024    PROGRESS NOTE    Subjective  Chief complaint: Melody Martin is a 88 y.o. female who is a long term care patient being seen and evaluated for elevated blood pressure.    HPI:  HPI  Patient seen for reports of elevated blood pressure readings.  Orders placed to increase patient's diltiazem to 300 mg at at bedtime, continue to monitor.  Patient also has complaints of increased pain, changed patient's tramadol to every 8 hours as needed.  Patient seen and examined at the bedside, appears to be in no acute distress.    Objective  Vital signs: 95/22, 98.1, 64, 18, 97%, blood sugar 3, 12    Physical Exam  Constitutional:       General: She is not in acute distress.  Eyes:      Extraocular Movements: Extraocular movements intact.   Cardiovascular:      Rate and Rhythm: Normal rate and regular rhythm.   Pulmonary:      Effort: Pulmonary effort is normal.      Breath sounds: Normal breath sounds.   Abdominal:      General: Bowel sounds are normal.      Palpations: Abdomen is soft.   Musculoskeletal:      Cervical back: Neck supple.      Right lower leg: No edema.      Left lower leg: No edema.   Skin:     Comments: Dressings to sacrum and heel clean dry and intact   Neurological:      Mental Status: She is alert.   Psychiatric:         Mood and Affect: Mood normal.         Behavior: Behavior is cooperative.         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  On HD per nephrology  Isosorbide dinitrate  Metoprolol  Diltiazem increased to 300 mg at  bedtime  Renal diet  Monitor BP  Remove extra fluid in dialysis         Pain - Primary     Change tramadol to every 8 hours as needed          Medications, treatments, and labs reviewed  Continue medications and treatments as listed in EMR    Scribe Attestation  I, Phoebe Martinez   attest that this documentation has been prepared under the direction and in the presence of Wilbert Lock MD    Provider Attestation - Scribe documentation  All medical record entries made by the Scribe were at my direction and personally dictated by me. I have reviewed the chart and agree that the record accurately reflects my personal performance of the history, physical exam, discussion and plan.   Wilbert Lock MD            Electronically Signed By: Wilbert Lock MD   6/5/24  3:17 PM

## 2024-06-05 NOTE — ASSESSMENT & PLAN NOTE
Stable, no shortness of breath.  On HD per nephrology  Isosorbide dinitrate  Metoprolol  Diltiazem increased to 300 mg at bedtime  Renal diet  Monitor BP  Remove extra fluid in dialysis

## 2024-06-05 NOTE — PROGRESS NOTES
PROGRESS NOTE    Subjective   Chief complaint: Melody Martin is a 88 y.o. female who is a long term care patient being seen and evaluated for elevated blood pressure.    HPI:  HPI  Patient seen for reports of elevated blood pressure readings.  Orders placed to increase patient's diltiazem to 300 mg at at bedtime, continue to monitor.  Patient also has complaints of increased pain, changed patient's tramadol to every 8 hours as needed.  Patient seen and examined at the bedside, appears to be in no acute distress.    Objective   Vital signs: 95/22, 98.1, 64, 18, 97%, blood sugar 3, 12    Physical Exam  Constitutional:       General: She is not in acute distress.  Eyes:      Extraocular Movements: Extraocular movements intact.   Cardiovascular:      Rate and Rhythm: Normal rate and regular rhythm.   Pulmonary:      Effort: Pulmonary effort is normal.      Breath sounds: Normal breath sounds.   Abdominal:      General: Bowel sounds are normal.      Palpations: Abdomen is soft.   Musculoskeletal:      Cervical back: Neck supple.      Right lower leg: No edema.      Left lower leg: No edema.   Skin:     Comments: Dressings to sacrum and heel clean dry and intact   Neurological:      Mental Status: She is alert.   Psychiatric:         Mood and Affect: Mood normal.         Behavior: Behavior is cooperative.         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  On HD per nephrology  Isosorbide dinitrate  Metoprolol  Diltiazem increased to 300 mg at bedtime  Renal diet  Monitor BP  Remove extra fluid in dialysis         Pain  - Primary     Change tramadol to every 8 hours as needed          Medications, treatments, and labs reviewed  Continue medications and treatments as listed in EMR    Scribe Attestation  I, Phoebe Martinez   attest that this documentation has been prepared under the direction and in the presence of Wilbert Lock MD    Provider Attestation - Scribe documentation  All medical record entries made by the Scribe were at my direction and personally dictated by me. I have reviewed the chart and agree that the record accurately reflects my personal performance of the history, physical exam, discussion and plan.   Wilbert Lock MD

## 2024-06-06 ENCOUNTER — NURSING HOME VISIT (OUTPATIENT)
Dept: POST ACUTE CARE | Facility: EXTERNAL LOCATION | Age: 89
End: 2024-06-06
Payer: COMMERCIAL

## 2024-06-06 DIAGNOSIS — I50.32 HYPERTENSIVE HEART AND KIDNEY DISEASE WITH CHRONIC DIASTOLIC CONGESTIVE HEART FAILURE AND STAGE 5 CHRONIC KIDNEY DISEASE ON CHRONIC DIALYSIS (MULTI): ICD-10-CM

## 2024-06-06 DIAGNOSIS — R10.9 ABDOMINAL PAIN, UNSPECIFIED ABDOMINAL LOCATION: ICD-10-CM

## 2024-06-06 DIAGNOSIS — Z99.2 HYPERTENSIVE HEART AND KIDNEY DISEASE WITH CHRONIC DIASTOLIC CONGESTIVE HEART FAILURE AND STAGE 5 CHRONIC KIDNEY DISEASE ON CHRONIC DIALYSIS (MULTI): ICD-10-CM

## 2024-06-06 DIAGNOSIS — N18.6 HYPERTENSIVE HEART AND KIDNEY DISEASE WITH CHRONIC DIASTOLIC CONGESTIVE HEART FAILURE AND STAGE 5 CHRONIC KIDNEY DISEASE ON CHRONIC DIALYSIS (MULTI): ICD-10-CM

## 2024-06-06 DIAGNOSIS — L89.153 STAGE III PRESSURE ULCER OF SACRAL REGION (MULTI): Primary | ICD-10-CM

## 2024-06-06 DIAGNOSIS — L89.623 STAGE III PRESSURE ULCER OF LEFT HEEL (MULTI): ICD-10-CM

## 2024-06-06 DIAGNOSIS — K59.00 CONSTIPATION, UNSPECIFIED CONSTIPATION TYPE: ICD-10-CM

## 2024-06-06 DIAGNOSIS — I13.2 HYPERTENSIVE HEART AND KIDNEY DISEASE WITH CHRONIC DIASTOLIC CONGESTIVE HEART FAILURE AND STAGE 5 CHRONIC KIDNEY DISEASE ON CHRONIC DIALYSIS (MULTI): ICD-10-CM

## 2024-06-06 NOTE — LETTER
Patient: Melody Martin  : 1935    Encounter Date: 2024    PROGRESS NOTE    Subjective  Chief complaint: Melody Martin is a 88 y.o. female who is a long term care patient being seen and evaluated for follow-up.    HPI:  HPI  Patient is seen due to nurse calling yesterday reported patient had abdominal pain, and KUB revealed modest bowel gas distention.  Orders were placed for lactulose 20 mL x 1.  Patient also presents for follow-up of wounds.    Objective  Vital signs: 125/39, 98.0, 67, 18, 96%    Physical Exam  Constitutional:       General: She is not in acute distress.  Eyes:      Extraocular Movements: Extraocular movements intact.   Pulmonary:      Effort: Pulmonary effort is normal.   Abdominal:      General: Bowel sounds are normal. There is no distension.      Palpations: Abdomen is soft.      Tenderness: There is no abdominal tenderness.   Musculoskeletal:      Cervical back: Neck supple.      Right lower leg: No edema.      Left lower leg: No edema.   Skin:     Comments: wound location -left heel  Etiology - pressure   Granulation-large  Slough-small  Edge- flat  Odor - no  Drainage - large serosanguineous  Size - 3 X 2.5 X 0.2 centimeters  Undermining -none    Wound location -sacrum  Etiology - pressure   Granulation-large  Slough-small  Edge-flat  Odor - none  Drainage - large serosanguineous  Size - 3 x 3 x 0.2 centimeters   Neurological:      Mental Status: She is alert.      Motor: Weakness present.   Psychiatric:         Mood and Affect: Mood normal.         Behavior: Behavior is cooperative.         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  On HD per nephrology  Isosorbide  dinitrate  Metoprolol  Diltiazem   Renal diet  BP at goal  Monitor BP  Remove extra fluid in dialysis         Stage III pressure ulcer of left heel (Multi)     I performed subcutaneous tissue debridement with a total area of 7.5 cm2 with curette to remove viable and non-viable tissue/material including subcutaneous tissue, slough, biofilm, and fibrin/exudate after achieving pain control with 2% lidocaine topical gel.  A minimal amount of bleeding was controlled with pressure. The procedure was tolerated well with a pain level of 0 throughout and a pain level of 0 following the procedure.  Post-debridement measurements unchanged. Character of wound/ulcer post-debridement is improved.       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  A minimal amount of bleeding was controlled with pressure. The procedure was tolerated well with a pain level of 0 throughout and a pain level of 0 following the procedure.  Post-debridement measurements unchanged. Character of wound/ulcer post-debridement is improved.       TX: Irrigate with wound cleanser or normal saline, pat dry, apply Skin-Prep to periwound, alginate to wound bed and cover with silicone bordered foam    Turn every 2 hours  Prevalon boots  Air mattress  Cushion to chair  Dietitian following  Supplements per  dietitian  Grab bars to bed to assist with bed mobility and repositioning  Weekly skin checks  House barrier cream to buttocks          Medications, treatments, and labs reviewed  Continue medications and treatments as listed in EMR    Scribe Attestation  I, Phoebe Martinez   attest that this documentation has been prepared under the direction and in the presence of MAR Johnson    Provider Attestation - Scribe documentation  All medical record entries made by the Scribe were at my direction and personally dictated by me. I have reviewed the chart and agree that the record accurately reflects my personal performance of the history, physical exam, discussion and plan.   MAR Johnson            Electronically Signed By: MAR Johnson   6/14/24 10:43 AM

## 2024-06-10 PROBLEM — L89.623: Status: ACTIVE | Noted: 2023-10-12

## 2024-06-10 NOTE — ASSESSMENT & PLAN NOTE
After obtaining verbal concent, I performed subcutaneous tissue debridement with a total area of 32.5 cm2 with curette to remove viable and non-viable tissue/material including subcutaneous tissue, slough, biofilm, and fibrin/exudate after achieving pain control with 2% lidocaine topical gel.  A minimal amount of bleeding was controlled with pressure. The procedure was tolerated well with a pain level of 0 throughout and a pain level of 0 following the procedure.  Post-debridement measurements unchanged. Character of wound/ulcer post-debridement is improved.       TX: Irrigate with wound cleanser or normal saline, pat dry, apply Skin-Prep to periwound, alginate to wound bed and cover with silicone bordered foam    Turn every 2 hours  Prevalon boots  Air mattress  Cushion to chair  Dietitian following  Supplements per dietitian  Grab bars to bed to assist with bed mobility and repositioning  Weekly skin checks  House barrier cream to buttocks

## 2024-06-10 NOTE — PROGRESS NOTES
PROGRESS NOTE    Subjective   Chief complaint: Melody Martin is a 88 y.o. female who is a long term care patient being seen and evaluated for follow-up wound    HPI:  HPI  Patient is seen in follow-up of wounds.  Patient does continue with left heel and sacral pressure ulcer.  Treatment interventions in place as per the EMR.    Objective   Vital signs: 139/56, 97.9, 78, 18, blood sugar 271, 94%    Physical Exam  Constitutional:       General: She is not in acute distress.  Cardiovascular:      Rate and Rhythm: Normal rate and regular rhythm.   Pulmonary:      Effort: Pulmonary effort is normal.   Musculoskeletal:      Cervical back: Neck supple.      Right lower leg: No edema.      Left lower leg: No edema.   Skin:     Comments: wound location -left heel  Etiology - pressure   Granulation-large  Slough-small  Edge- flat  Odor - no  Drainage - medium serosanguineous  Size - 3 X 3 X 0.2 centimeters  Undermining -none    Wound location -sacrum  Etiology - pressure   Granulation-large  Slough-small  Edge-flat  Odor - none  Drainage - medium serosanguineous  Size - 5 x 6.5 x 0.2 centimeters    Inner thighs and PORTER area reddened   Neurological:      Mental Status: She is alert.   Psychiatric:         Mood and Affect: Mood normal.         Behavior: Behavior is cooperative.         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  On HD per nephrology  Isosorbide dinitrate  Metoprolol  Diltiazem increased to 300 mg at bedtime  Renal diet  Monitor BP  Remove extra fluid in dialysis         Stage III pressure ulcer of left heel (Multi)     Now stage 3    I performed subcutaneous tissue debridement with a total area of 9 cm2 with curette to remove viable and non-viable tissue/material including  subcutaneous tissue, slough, biofilm, and fibrin/exudate after achieving pain control with 2% lidocaine topical gel.  A minimal amount of bleeding was controlled with pressure. The procedure was tolerated well with a pain level of 0 throughout and a pain level of 0 following the procedure.  Post-debridement measurements unchanged. Character of wound/ulcer post-debridement is improved.       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  A minimal amount of bleeding was controlled with pressure. The procedure was tolerated well with a pain level of 0 throughout and a pain level of 0 following the procedure.  Post-debridement measurements unchanged. Character of wound/ulcer post-debridement is improved.       TX: Irrigate with wound cleanser or normal saline, pat dry, apply Skin-Prep to periwound, alginate to wound bed and cover with silicone bordered foam    Turn every 2 hours  Prevalon boots  Air mattress  Cushion to chair  Dietitian following  Supplements per dietitian  Grab bars to bed to assist with bed mobility and repositioning  Weekly skin checks  House barrier cream to buttocks         Type 2 diabetes mellitus with chronic kidney disease on chronic dialysis, with long-term current use of insulin (Multi)     Carb controlled diet  Monitor  Glucoscan  Glargine  Humalog          Medications, treatments, and labs reviewed  Continue medications and treatments as listed in EMR    Scribe Attestation  I, Phoebe Martinez   attest that this documentation has been prepared under the direction and in the presence of MAR Johnson    Provider Attestation - Scribe documentation  All medical record entries made by the Scribe were at my direction and personally dictated by me. I have reviewed the chart and agree that the record accurately reflects my personal performance of the history, physical exam, discussion and plan.   MAR Johnson

## 2024-06-10 NOTE — ASSESSMENT & PLAN NOTE
Now stage 3    I performed subcutaneous tissue debridement with a total area of 9 cm2 with curette to remove viable and non-viable tissue/material including subcutaneous tissue, slough, biofilm, and fibrin/exudate after achieving pain control with 2% lidocaine topical gel.  A minimal amount of bleeding was controlled with pressure. The procedure was tolerated well with a pain level of 0 throughout and a pain level of 0 following the procedure.  Post-debridement measurements unchanged. Character of wound/ulcer post-debridement is improved.       TX: Irrigate with normal saline, pat dry, calcium alginate, ABD and Kerlix     Turn every 2 hours  Prevalon boots  Pressure reducing mattress, patient refusing air mattress, discussed benefits/risks  Cushion to chair  Dietitian following  Supplements per dietitian  Grab bars to bed to assist with bed mobility and repositioning  Weekly skin checks

## 2024-06-11 NOTE — ASSESSMENT & PLAN NOTE
I performed subcutaneous tissue debridement with a total area of 7.5 cm2 with curette to remove viable and non-viable tissue/material including subcutaneous tissue, slough, biofilm, and fibrin/exudate after achieving pain control with 2% lidocaine topical gel.  A minimal amount of bleeding was controlled with pressure. The procedure was tolerated well with a pain level of 0 throughout and a pain level of 0 following the procedure.  Post-debridement measurements unchanged. Character of wound/ulcer post-debridement is improved.       TX: Irrigate with normal saline, pat dry, calcium alginate, ABD and Kerlix     Turn every 2 hours  Prevalon boots  Pressure reducing mattress, patient refusing air mattress, discussed benefits/risks  Cushion to chair  Dietitian consult  Supplements per dietitian  Grab bars to bed to assist with bed mobility and repositioning  Weekly skin checks

## 2024-06-11 NOTE — PROGRESS NOTES
PROGRESS NOTE    Subjective   Chief complaint: Melody Martin is a 88 y.o. female who is a long term care patient being seen and evaluated for follow-up.    HPI:  HPI  Patient is seen due to nurse calling yesterday reported patient had abdominal pain, and KUB revealed modest bowel gas distention.  Orders were placed for lactulose 20 mL x 1.  Patient also presents for follow-up of wounds.    Objective   Vital signs: 125/39, 98.0, 67, 18, 96%    Physical Exam  Constitutional:       General: She is not in acute distress.  Eyes:      Extraocular Movements: Extraocular movements intact.   Pulmonary:      Effort: Pulmonary effort is normal.   Abdominal:      General: Bowel sounds are normal. There is no distension.      Palpations: Abdomen is soft.      Tenderness: There is no abdominal tenderness.   Musculoskeletal:      Cervical back: Neck supple.      Right lower leg: No edema.      Left lower leg: No edema.   Skin:     Comments: wound location -left heel  Etiology - pressure   Granulation-large  Slough-small  Edge- flat  Odor - no  Drainage - large serosanguineous  Size - 3 X 2.5 X 0.2 centimeters  Undermining -none    Wound location -sacrum  Etiology - pressure   Granulation-large  Slough-small  Edge-flat  Odor - none  Drainage - large serosanguineous  Size - 3 x 3 x 0.2 centimeters   Neurological:      Mental Status: She is alert.      Motor: Weakness present.   Psychiatric:         Mood and Affect: Mood normal.         Behavior: Behavior is cooperative.         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  A minimal amount of bleeding was controlled with pressure. The procedure was tolerated well with a pain level of 0 throughout and a pain level of 0 following the procedure.  Post-debridement measurements unchanged. Character of wound/ulcer post-debridement is improved.       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  A minimal amount of bleeding was controlled with pressure. The procedure was tolerated well with a pain level of 0 throughout and a pain level of 0 following the procedure.  Post-debridement measurements unchanged. Character of wound/ulcer post-debridement is improved.       TX: Irrigate with wound cleanser or normal saline, pat dry, apply Skin-Prep to periwound, alginate to wound bed and cover with silicone bordered foam    Turn every 2 hours  Prevalon boots  Air mattress  Cushion to chair  Dietitian following  Supplements per dietitian  Grab bars to bed to assist with bed mobility and repositioning  Weekly  skin checks  House barrier cream to buttocks          Medications, treatments, and labs reviewed  Continue medications and treatments as listed in EMR    Scribe Attestation  I, Phoebe Martinez   attest that this documentation has been prepared under the direction and in the presence of MAR Johnson    Provider Attestation - Scribe documentation  All medical record entries made by the Scribe were at my direction and personally dictated by me. I have reviewed the chart and agree that the record accurately reflects my personal performance of the history, physical exam, discussion and plan.   MAR Johnson

## 2024-06-11 NOTE — ASSESSMENT & PLAN NOTE
Stable, no shortness of breath.  On HD per nephrology  Isosorbide dinitrate  Metoprolol  Diltiazem   Renal diet  BP at goal  Monitor BP  Remove extra fluid in dialysis

## 2024-06-11 NOTE — ASSESSMENT & PLAN NOTE
After obtaining verbal concent, I performed subcutaneous tissue debridement with a total area of 9 cm2 with curette to remove viable and non-viable tissue/material including subcutaneous tissue, slough, biofilm, and fibrin/exudate after achieving pain control with 2% lidocaine topical gel.  A minimal amount of bleeding was controlled with pressure. The procedure was tolerated well with a pain level of 0 throughout and a pain level of 0 following the procedure.  Post-debridement measurements unchanged. Character of wound/ulcer post-debridement is improved.       TX: Irrigate with wound cleanser or normal saline, pat dry, apply Skin-Prep to periwound, alginate to wound bed and cover with silicone bordered foam    Turn every 2 hours  Prevalon boots  Air mattress  Cushion to chair  Dietitian following  Supplements per dietitian  Grab bars to bed to assist with bed mobility and repositioning  Weekly skin checks  House barrier cream to buttocks

## 2024-06-13 ENCOUNTER — NURSING HOME VISIT (OUTPATIENT)
Dept: POST ACUTE CARE | Facility: EXTERNAL LOCATION | Age: 89
End: 2024-06-13
Payer: COMMERCIAL

## 2024-06-13 DIAGNOSIS — L89.623 STAGE III PRESSURE ULCER OF LEFT HEEL (MULTI): Primary | ICD-10-CM

## 2024-06-13 DIAGNOSIS — N18.6 HYPERTENSIVE HEART AND KIDNEY DISEASE WITH CHRONIC DIASTOLIC CONGESTIVE HEART FAILURE AND STAGE 5 CHRONIC KIDNEY DISEASE ON CHRONIC DIALYSIS (MULTI): ICD-10-CM

## 2024-06-13 DIAGNOSIS — I13.2 HYPERTENSIVE HEART AND KIDNEY DISEASE WITH CHRONIC DIASTOLIC CONGESTIVE HEART FAILURE AND STAGE 5 CHRONIC KIDNEY DISEASE ON CHRONIC DIALYSIS (MULTI): ICD-10-CM

## 2024-06-13 DIAGNOSIS — D64.9 ANEMIA, UNSPECIFIED TYPE: ICD-10-CM

## 2024-06-13 DIAGNOSIS — Z99.2 HYPERTENSIVE HEART AND KIDNEY DISEASE WITH CHRONIC DIASTOLIC CONGESTIVE HEART FAILURE AND STAGE 5 CHRONIC KIDNEY DISEASE ON CHRONIC DIALYSIS (MULTI): ICD-10-CM

## 2024-06-13 DIAGNOSIS — I50.32 HYPERTENSIVE HEART AND KIDNEY DISEASE WITH CHRONIC DIASTOLIC CONGESTIVE HEART FAILURE AND STAGE 5 CHRONIC KIDNEY DISEASE ON CHRONIC DIALYSIS (MULTI): ICD-10-CM

## 2024-06-13 DIAGNOSIS — L89.153 STAGE III PRESSURE ULCER OF SACRAL REGION (MULTI): ICD-10-CM

## 2024-06-13 NOTE — LETTER
Patient: Melody Martin  : 1935    Encounter Date: 2024    PROGRESS NOTE    Subjective  Chief complaint: Melody Martin is a 88 y.o. female who is a long term care patient being seen and evaluated for follow-up.    HPI:  HPI  Patient is seen in follow-up of wounds, treatments are in place.  Patient also had recent anemia f/u, hemoglobin 9.8, stable.  Patient is on epo and dialysis.    Objective  Vital signs: 114/31, 97.6, 50, 70, 18, 95%, blood sugar 150    Physical Exam  Constitutional:       General: She is not in acute distress.  Eyes:      Extraocular Movements: Extraocular movements intact.   Pulmonary:      Effort: Pulmonary effort is normal.   Abdominal:      General: Bowel sounds are normal. There is no distension.      Palpations: Abdomen is soft.      Tenderness: There is no abdominal tenderness.   Musculoskeletal:      Cervical back: Neck supple.      Right lower leg: No edema.      Left lower leg: No edema.   Skin:     Comments: wound location -left heel  Etiology - pressure   Granulation-large  Slough-small  Edge- flat  Odor - no  Drainage - small serosanguineous  Size - 2 X 1.5 X 0.1 centimeters  Undermining -none    Wound location -sacrum  Etiology - pressure   Granulation-large  Slough-small  Edge-flat  Odor - none  Drainage - medium serosanguineous  Size - 2 x 3 x 0.2 centimeters   Neurological:      Mental Status: She is alert.   Psychiatric:         Mood and Affect: Mood normal.         Behavior: Behavior is cooperative.         Assessment/Plan  Problem List Items Addressed This Visit       Anemia     Stable on recent labs.  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  A minimal amount of bleeding was controlled with pressure. The procedure was tolerated well with a pain level of 0 throughout and a pain level of 0 following the procedure.  Post-debridement measurements unchanged. Character of wound/ulcer post-debridement is improved.       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  A minimal amount of bleeding was controlled with pressure. The procedure was tolerated well with a pain level of 0 throughout and a pain level of 0 following the procedure.  Post-debridement measurements unchanged. Character of wound/ulcer post-debridement is improved.       TX: Irrigate with wound cleanser or normal saline, pat dry, apply Skin-Prep to periwound, alginate to wound bed and cover with silicone bordered foam    Turn every 2 hours  Prevalon boots  Air mattress  Cushion to chair  Dietitian following  Supplements per dietitian  Grab bars to bed to assist with bed mobility and repositioning  Weekly skin checks  House barrier cream to  buttocks          Medications, treatments, and labs reviewed  Continue medications and treatments as listed in EMR    Scribe Attestation  I, Phoebe Martinez   attest that this documentation has been prepared under the direction and in the presence of MAR Johnson    Provider Attestation - Scribe documentation  All medical record entries made by the Scribe were at my direction and personally dictated by me. I have reviewed the chart and agree that the record accurately reflects my personal performance of the history, physical exam, discussion and plan.   MAR Johnson            Electronically Signed By: MAR Johnson   6/28/24  9:02 PM

## 2024-06-14 ENCOUNTER — NURSING HOME VISIT (OUTPATIENT)
Dept: POST ACUTE CARE | Facility: EXTERNAL LOCATION | Age: 89
End: 2024-06-14
Payer: COMMERCIAL

## 2024-06-14 DIAGNOSIS — N18.6 HYPERTENSIVE HEART AND KIDNEY DISEASE WITH CHRONIC DIASTOLIC CONGESTIVE HEART FAILURE AND STAGE 5 CHRONIC KIDNEY DISEASE ON CHRONIC DIALYSIS (MULTI): Primary | ICD-10-CM

## 2024-06-14 DIAGNOSIS — I50.32 HYPERTENSIVE HEART AND KIDNEY DISEASE WITH CHRONIC DIASTOLIC CONGESTIVE HEART FAILURE AND STAGE 5 CHRONIC KIDNEY DISEASE ON CHRONIC DIALYSIS (MULTI): Primary | ICD-10-CM

## 2024-06-14 DIAGNOSIS — Z99.2 HYPERTENSIVE HEART AND KIDNEY DISEASE WITH CHRONIC DIASTOLIC CONGESTIVE HEART FAILURE AND STAGE 5 CHRONIC KIDNEY DISEASE ON CHRONIC DIALYSIS (MULTI): Primary | ICD-10-CM

## 2024-06-14 DIAGNOSIS — R63.5 WEIGHT GAIN: ICD-10-CM

## 2024-06-14 DIAGNOSIS — N18.6 TYPE 2 DIABETES MELLITUS WITH CHRONIC KIDNEY DISEASE ON CHRONIC DIALYSIS, WITH LONG-TERM CURRENT USE OF INSULIN (MULTI): ICD-10-CM

## 2024-06-14 DIAGNOSIS — Z99.2 TYPE 2 DIABETES MELLITUS WITH CHRONIC KIDNEY DISEASE ON CHRONIC DIALYSIS, WITH LONG-TERM CURRENT USE OF INSULIN (MULTI): ICD-10-CM

## 2024-06-14 DIAGNOSIS — E11.22 TYPE 2 DIABETES MELLITUS WITH CHRONIC KIDNEY DISEASE ON CHRONIC DIALYSIS, WITH LONG-TERM CURRENT USE OF INSULIN (MULTI): ICD-10-CM

## 2024-06-14 DIAGNOSIS — I13.2 HYPERTENSIVE HEART AND KIDNEY DISEASE WITH CHRONIC DIASTOLIC CONGESTIVE HEART FAILURE AND STAGE 5 CHRONIC KIDNEY DISEASE ON CHRONIC DIALYSIS (MULTI): Primary | ICD-10-CM

## 2024-06-14 DIAGNOSIS — Z79.4 TYPE 2 DIABETES MELLITUS WITH CHRONIC KIDNEY DISEASE ON CHRONIC DIALYSIS, WITH LONG-TERM CURRENT USE OF INSULIN (MULTI): ICD-10-CM

## 2024-06-14 PROCEDURE — 99308 SBSQ NF CARE LOW MDM 20: CPT | Performed by: INTERNAL MEDICINE

## 2024-06-14 NOTE — LETTER
Patient: Melody Martin  : 1935    Encounter Date: 2024    PROGRESS NOTE    Subjective  Chief complaint: Melody Martin is a 88 y.o. female who is a long term care patient being seen and evaluated for weight gain and diabetes.    HPI:  HPI  Nursing called yesterday reported patient has had a 10.8 pound weight gain in 3 months.  Dietitian following.  Patient seen and examined at the bedside.  Patient requesting consideration for gurjit device.  Discussion to be had with pharmacy to verify coverage and availability.  Patient denies polydipsia polyuria polyphagia.  Objective  Vital signs: 121/37, 97.2, 59, 18, 94%, blood sugar 222    Physical Exam  Constitutional:       General: She is not in acute distress.  Eyes:      Extraocular Movements: Extraocular movements intact.   Cardiovascular:      Rate and Rhythm: Normal rate and regular rhythm.   Pulmonary:      Effort: Pulmonary effort is normal.      Breath sounds: Normal breath sounds.   Abdominal:      General: Bowel sounds are normal.      Palpations: Abdomen is soft.   Musculoskeletal:      Cervical back: Neck supple.      Right lower leg: No edema.      Left lower leg: No edema.   Skin:     Comments: Dressings to sacrum and heel clean dry and intact   Neurological:      Mental Status: She is alert.   Psychiatric:         Mood and Affect: Mood normal.         Behavior: Behavior is cooperative.         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  On HD per nephrology  Isosorbide dinitrate  Metoprolol  Diltiazem   Renal diet  Monitor BP  Remove extra fluid  in dialysis         Weight gain     Continue to monitor weight          Medications, treatments, and labs reviewed  Continue medications and treatments as listed in EMR    Scribe Attestation  I, Phoebe Martinez   attest that this documentation has been prepared under the direction and in the presence of Wilbert Lock MD    Provider Attestation - Scribe documentation  All medical record entries made by the Scribe were at my direction and personally dictated by me. I have reviewed the chart and agree that the record accurately reflects my personal performance of the history, physical exam, discussion and plan.   Wilbert Lock MD            Electronically Signed By: Wilbert Lock MD   6/14/24  3:09 PM

## 2024-06-14 NOTE — PROGRESS NOTES
PROGRESS NOTE    Subjective   Chief complaint: Melody Martin is a 88 y.o. female who is a long term care patient being seen and evaluated for weight gain and diabetes.    HPI:  HPI  Nursing called yesterday reported patient has had a 10.8 pound weight gain in 3 months.  Dietitian following.  Patient seen and examined at the bedside.  Patient requesting consideration for gurjit device.  Discussion to be had with pharmacy to verify coverage and availability.  Patient denies polydipsia polyuria polyphagia.  Objective   Vital signs: 121/37, 97.2, 59, 18, 94%, blood sugar 222    Physical Exam  Constitutional:       General: She is not in acute distress.  Eyes:      Extraocular Movements: Extraocular movements intact.   Cardiovascular:      Rate and Rhythm: Normal rate and regular rhythm.   Pulmonary:      Effort: Pulmonary effort is normal.      Breath sounds: Normal breath sounds.   Abdominal:      General: Bowel sounds are normal.      Palpations: Abdomen is soft.   Musculoskeletal:      Cervical back: Neck supple.      Right lower leg: No edema.      Left lower leg: No edema.   Skin:     Comments: Dressings to sacrum and heel clean dry and intact   Neurological:      Mental Status: She is alert.   Psychiatric:         Mood and Affect: Mood normal.         Behavior: Behavior is cooperative.         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  On HD per nephrology  Isosorbide dinitrate  Metoprolol  Diltiazem   Renal diet  Monitor BP  Remove extra fluid in dialysis         Weight gain     Continue to monitor weight           Medications, treatments, and labs reviewed  Continue medications and treatments as listed in EMR    Scribe Attestation  I, Phoebe Martinez   attest that this documentation has been prepared under the direction and in the presence of Wilbert Lock MD    Provider Attestation - Scribe documentation  All medical record entries made by the Scribe were at my direction and personally dictated by me. I have reviewed the chart and agree that the record accurately reflects my personal performance of the history, physical exam, discussion and plan.   Wilbert Lock MD

## 2024-06-18 NOTE — PROGRESS NOTES
PROGRESS NOTE    Subjective   Chief complaint: Melody Martin is a 88 y.o. female who is a long term care patient being seen and evaluated for follow-up.    HPI:  HPI  Patient is seen in follow-up of wounds, treatments are in place.  Patient also had recent anemia f/u, hemoglobin 9.8, stable.  Patient is on epo and dialysis.    Objective   Vital signs: 114/31, 97.6, 50, 70, 18, 95%, blood sugar 150    Physical Exam  Constitutional:       General: She is not in acute distress.  Eyes:      Extraocular Movements: Extraocular movements intact.   Pulmonary:      Effort: Pulmonary effort is normal.   Abdominal:      General: Bowel sounds are normal. There is no distension.      Palpations: Abdomen is soft.      Tenderness: There is no abdominal tenderness.   Musculoskeletal:      Cervical back: Neck supple.      Right lower leg: No edema.      Left lower leg: No edema.   Skin:     Comments: wound location -left heel  Etiology - pressure   Granulation-large  Slough-small  Edge- flat  Odor - no  Drainage - small serosanguineous  Size - 2 X 1.5 X 0.1 centimeters  Undermining -none    Wound location -sacrum  Etiology - pressure   Granulation-large  Slough-small  Edge-flat  Odor - none  Drainage - medium serosanguineous  Size - 2 x 3 x 0.2 centimeters   Neurological:      Mental Status: She is alert.   Psychiatric:         Mood and Affect: Mood normal.         Behavior: Behavior is cooperative.         Assessment/Plan   Problem List Items Addressed This Visit       Anemia     Stable on recent labs.  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  A minimal amount of bleeding was controlled with pressure. The procedure was tolerated well with a pain level of 0 throughout and a pain level of 0 following the procedure.  Post-debridement measurements unchanged. Character of wound/ulcer post-debridement is improved.       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  A minimal amount of bleeding was controlled with pressure. The procedure was tolerated well with a pain level of 0 throughout and a pain level of 0 following the procedure.  Post-debridement measurements unchanged. Character of wound/ulcer post-debridement is improved.       TX: Irrigate with wound cleanser or normal saline, pat dry, apply Skin-Prep to periwound, alginate to wound bed and cover with silicone bordered foam    Turn every 2 hours  Prevalon boots  Air mattress  Cushion to chair  Dietitian following  Supplements per dietitian  Grab bars to bed to assist with bed mobility and repositioning  Weekly skin checks  House barrier cream to buttocks          Medications, treatments, and labs  reviewed  Continue medications and treatments as listed in EMR    Scribe Attestation  IHien Scribe   attest that this documentation has been prepared under the direction and in the presence of MAR Johnson    Provider Attestation - Scribe documentation  All medical record entries made by the Scribe were at my direction and personally dictated by me. I have reviewed the chart and agree that the record accurately reflects my personal performance of the history, physical exam, discussion and plan.   MAR Johnson

## 2024-06-18 NOTE — ASSESSMENT & PLAN NOTE
I performed subcutaneous tissue debridement with a total area of 3 cm2 with curette to remove viable and non-viable tissue/material including subcutaneous tissue, slough, biofilm, and fibrin/exudate after achieving pain control with 2% lidocaine topical gel.  A minimal amount of bleeding was controlled with pressure. The procedure was tolerated well with a pain level of 0 throughout and a pain level of 0 following the procedure.  Post-debridement measurements unchanged. Character of wound/ulcer post-debridement is improved.       TX: Irrigate with normal saline, pat dry, collagen powder, ABD and Kerlix     Turn every 2 hours  Prevalon boots  Pressure reducing mattress, patient refusing air mattress, discussed benefits/risks  Cushion to chair  Dietitian consult  Supplements per dietitian  Grab bars to bed to assist with bed mobility and repositioning  Weekly skin checks

## 2024-06-18 NOTE — ASSESSMENT & PLAN NOTE
After obtaining verbal concent, I performed subcutaneous tissue debridement with a total area of 6 cm2 with curette to remove viable and non-viable tissue/material including subcutaneous tissue, slough, biofilm, and fibrin/exudate after achieving pain control with 2% lidocaine topical gel.  A minimal amount of bleeding was controlled with pressure. The procedure was tolerated well with a pain level of 0 throughout and a pain level of 0 following the procedure.  Post-debridement measurements unchanged. Character of wound/ulcer post-debridement is improved.       TX: Irrigate with wound cleanser or normal saline, pat dry, apply Skin-Prep to periwound, alginate to wound bed and cover with silicone bordered foam    Turn every 2 hours  Prevalon boots  Air mattress  Cushion to chair  Dietitian following  Supplements per dietitian  Grab bars to bed to assist with bed mobility and repositioning  Weekly skin checks  House barrier cream to buttocks

## 2024-06-20 ENCOUNTER — NURSING HOME VISIT (OUTPATIENT)
Dept: POST ACUTE CARE | Facility: EXTERNAL LOCATION | Age: 89
End: 2024-06-20
Payer: COMMERCIAL

## 2024-06-20 DIAGNOSIS — I13.2 HYPERTENSIVE HEART AND KIDNEY DISEASE WITH CHRONIC DIASTOLIC CONGESTIVE HEART FAILURE AND STAGE 5 CHRONIC KIDNEY DISEASE ON CHRONIC DIALYSIS (MULTI): ICD-10-CM

## 2024-06-20 DIAGNOSIS — Z99.2 HYPERTENSIVE HEART AND KIDNEY DISEASE WITH CHRONIC DIASTOLIC CONGESTIVE HEART FAILURE AND STAGE 5 CHRONIC KIDNEY DISEASE ON CHRONIC DIALYSIS (MULTI): ICD-10-CM

## 2024-06-20 DIAGNOSIS — L89.153 STAGE III PRESSURE ULCER OF SACRAL REGION (MULTI): Primary | ICD-10-CM

## 2024-06-20 DIAGNOSIS — I50.32 HYPERTENSIVE HEART AND KIDNEY DISEASE WITH CHRONIC DIASTOLIC CONGESTIVE HEART FAILURE AND STAGE 5 CHRONIC KIDNEY DISEASE ON CHRONIC DIALYSIS (MULTI): ICD-10-CM

## 2024-06-20 DIAGNOSIS — L89.623 STAGE III PRESSURE ULCER OF LEFT HEEL (MULTI): ICD-10-CM

## 2024-06-20 DIAGNOSIS — N18.6 HYPERTENSIVE HEART AND KIDNEY DISEASE WITH CHRONIC DIASTOLIC CONGESTIVE HEART FAILURE AND STAGE 5 CHRONIC KIDNEY DISEASE ON CHRONIC DIALYSIS (MULTI): ICD-10-CM

## 2024-06-20 NOTE — LETTER
Patient: Melody Martin  : 1935    Encounter Date: 2024    PROGRESS NOTE    Subjective  Chief complaint: Melody Martin is a 88 y.o. female who is a long term care patient being seen and evaluated for wounds.    HPI:  HPI  Patient is seen in follow-up of wounds, sacrum and left heel pressure ulcers.  Patient does have treatments in place.  Patient denies pain at this time.    Objective  Vital signs: 137/49, 97.6, 68, 20 95%    Physical Exam  Constitutional:       General: She is not in acute distress.  Eyes:      Extraocular Movements: Extraocular movements intact.   Pulmonary:      Effort: Pulmonary effort is normal.   Abdominal:      General: Bowel sounds are normal. There is no distension.      Palpations: Abdomen is soft.      Tenderness: There is no abdominal tenderness.   Musculoskeletal:      Cervical back: Neck supple.      Right lower leg: No edema.      Left lower leg: No edema.   Skin:     Comments: wound location -left heel  Etiology - pressure   Granulation-large  Slough-small  Edge- flat  Odor - no  Drainage - medium serosanguineous  Size - 2.5 X 1.5 X 0.2 centimeters  Undermining -none    Wound location -sacrum  Etiology - pressure   Granulation-large  Slough-small  Edge-flat  Odor - none  Drainage - large serosanguineous  Size - 1.5 x 2 x 0.2 centimeters   Neurological:      Mental Status: She is alert.   Psychiatric:         Mood and Affect: Mood normal.         Behavior: Behavior is cooperative.         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  A minimal amount of bleeding was controlled with pressure. The procedure was tolerated well with a pain level of 0 throughout and a pain level of 0 following the procedure.  Post-debridement measurements unchanged. Character of wound/ulcer post-debridement is improved.       TX: Irrigate with normal saline, pat dry, collagen powder, ABD and Kerlix     Turn every 2 hours  Prevalon boots  Pressure reducing mattress, patient refusing air mattress, discussed benefits/risks  Cushion to chair  Dietitian consult  Supplements per dietitian  Grab bars to bed to assist with bed mobility and repositioning  Weekly skin checks         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 (Multi) - Primary     After obtaining verbal concent, I performed subcutaneous tissue debridement with a total area of 3 cm2 with curette to remove viable and non-viable tissue/material including subcutaneous tissue, slough, biofilm, and fibrin/exudate after achieving pain control with 2% lidocaine topical gel.  A minimal amount of bleeding was controlled with pressure. The procedure was tolerated well with a pain level of 0 throughout and a pain level of 0 following the procedure.  Post-debridement measurements unchanged. Character of wound/ulcer post-debridement is improved.       TX: Irrigate with wound cleanser or normal saline, pat dry, apply Skin-Prep to periwound, alginate to wound bed and cover with silicone bordered foam    Turn every 2 hours  Prevalon boots  Air mattress  Cushion to chair  Dietitian following  Supplements per dietitian  Grab bars to bed to assist with bed mobility and repositioning  Weekly skin checks  House barrier cream to buttocks          Medications, treatments, and labs reviewed  Continue medications and treatments as  listed in EMR    Scribe Attestation  I, Phoebe Martinez   attest that this documentation has been prepared under the direction and in the presence of MAR Johnson    Provider Attestation - Scribe documentation  All medical record entries made by the Scribe were at my direction and personally dictated by me. I have reviewed the chart and agree that the record accurately reflects my personal performance of the history, physical exam, discussion and plan.   MAR Johnson            Electronically Signed By: MAR Johnson   6/29/24 11:18 AM

## 2024-06-21 ENCOUNTER — NURSING HOME VISIT (OUTPATIENT)
Dept: POST ACUTE CARE | Facility: EXTERNAL LOCATION | Age: 89
End: 2024-06-21
Payer: COMMERCIAL

## 2024-06-21 DIAGNOSIS — N18.6 HYPERTENSIVE HEART AND KIDNEY DISEASE WITH CHRONIC DIASTOLIC CONGESTIVE HEART FAILURE AND STAGE 5 CHRONIC KIDNEY DISEASE ON CHRONIC DIALYSIS (MULTI): Primary | ICD-10-CM

## 2024-06-21 DIAGNOSIS — Z86.73 HISTORY OF CVA (CEREBROVASCULAR ACCIDENT): ICD-10-CM

## 2024-06-21 DIAGNOSIS — Z79.4 TYPE 2 DIABETES MELLITUS WITH CHRONIC KIDNEY DISEASE ON CHRONIC DIALYSIS, WITH LONG-TERM CURRENT USE OF INSULIN (MULTI): ICD-10-CM

## 2024-06-21 DIAGNOSIS — Z99.2 TYPE 2 DIABETES MELLITUS WITH CHRONIC KIDNEY DISEASE ON CHRONIC DIALYSIS, WITH LONG-TERM CURRENT USE OF INSULIN (MULTI): ICD-10-CM

## 2024-06-21 DIAGNOSIS — Z99.2 HYPERTENSIVE HEART AND KIDNEY DISEASE WITH CHRONIC DIASTOLIC CONGESTIVE HEART FAILURE AND STAGE 5 CHRONIC KIDNEY DISEASE ON CHRONIC DIALYSIS (MULTI): Primary | ICD-10-CM

## 2024-06-21 DIAGNOSIS — I50.32 HYPERTENSIVE HEART AND KIDNEY DISEASE WITH CHRONIC DIASTOLIC CONGESTIVE HEART FAILURE AND STAGE 5 CHRONIC KIDNEY DISEASE ON CHRONIC DIALYSIS (MULTI): Primary | ICD-10-CM

## 2024-06-21 DIAGNOSIS — E11.22 TYPE 2 DIABETES MELLITUS WITH CHRONIC KIDNEY DISEASE ON CHRONIC DIALYSIS, WITH LONG-TERM CURRENT USE OF INSULIN (MULTI): ICD-10-CM

## 2024-06-21 DIAGNOSIS — I13.2 HYPERTENSIVE HEART AND KIDNEY DISEASE WITH CHRONIC DIASTOLIC CONGESTIVE HEART FAILURE AND STAGE 5 CHRONIC KIDNEY DISEASE ON CHRONIC DIALYSIS (MULTI): Primary | ICD-10-CM

## 2024-06-21 DIAGNOSIS — I48.91 ATRIAL FIBRILLATION, UNSPECIFIED TYPE (MULTI): ICD-10-CM

## 2024-06-21 DIAGNOSIS — N18.6 TYPE 2 DIABETES MELLITUS WITH CHRONIC KIDNEY DISEASE ON CHRONIC DIALYSIS, WITH LONG-TERM CURRENT USE OF INSULIN (MULTI): ICD-10-CM

## 2024-06-21 PROCEDURE — 99309 SBSQ NF CARE MODERATE MDM 30: CPT | Performed by: INTERNAL MEDICINE

## 2024-06-21 NOTE — LETTER
Patient: Melody Martin  : 1935    Encounter Date: 2024    PROGRESS NOTE    Subjective  Chief complaint: Melody Martin is a 88 y.o. female who is a long term care patient being seen and evaluated for monthly general medical care and follow-up    HPI:  HPI  Patient presents for general medical care and f/u.  Patient seen and examined at bedside.  No issues per nursing.  Patient has no acute complaints.  DM, denies polydipsia polyuria polyphagia.  HTN Denies chest pain and headache.  ESRD on HD, tolerating treatment well.  AFIB stable, denies palpitations and chest pain.  CHF stable, denies sob, orthopnea, weight gain.  Hx CVA, denies changes in weakness and speech.  No acute distress.    Objective  Vital signs: 151/54, 98.1, 66, 18, 95%, blood sugar 142    Physical Exam  Constitutional:       General: She is not in acute distress.  Eyes:      Extraocular Movements: Extraocular movements intact.   Cardiovascular:      Rate and Rhythm: Normal rate and regular rhythm.   Pulmonary:      Effort: Pulmonary effort is normal.      Breath sounds: Normal breath sounds.   Abdominal:      General: Bowel sounds are normal.      Palpations: Abdomen is soft.   Musculoskeletal:      Cervical back: Neck supple.      Right lower leg: No edema.      Left lower leg: No edema.   Skin:     Comments: Dressings to sacrum and heel clean dry and intact   Neurological:      Mental Status: She is alert.   Psychiatric:         Mood and Affect: Mood normal.         Behavior: Behavior is cooperative.         Assessment/Plan  Problem List Items Addressed This Visit       Type 2 diabetes mellitus with chronic kidney disease on chronic dialysis, with long-term current use of insulin (Multi)     Carb controlled diet  Monitor Glucoscan  Glargine  Humalog         History of CVA (cerebrovascular accident)     Statin  Plavix  Monitor for changes and weakness         Hypertensive heart and kidney disease with chronic diastolic congestive  heart failure and stage 5 chronic kidney disease on chronic dialysis (Multi) - Primary     Stable, no shortness of breath.  On HD per nephrology  Isosorbide dinitrate  Metoprolol  Diltiazem   Renal diet  Monitor BP  Remove extra fluid in dialysis         Atrial fibrillation (Multi)     Monitor heart rate, controlled  Amiodarone  Apixaban  Bleeding precautions          Medications, treatments, and labs reviewed  Continue medications and treatments as listed in EMR    Scribe Attestation  I, Phoebe Martinez   attest that this documentation has been prepared under the direction and in the presence of Wilbert Lock MD    Provider Attestation - Scribe documentation  All medical record entries made by the Scribe were at my direction and personally dictated by me. I have reviewed the chart and agree that the record accurately reflects my personal performance of the history, physical exam, discussion and plan.   Wilbert Lock MD            Electronically Signed By: Wilbert Lock MD   6/21/24  5:18 PM

## 2024-06-21 NOTE — PROGRESS NOTES
PROGRESS NOTE    Subjective   Chief complaint: Melody Martin is a 88 y.o. female who is a long term care patient being seen and evaluated for monthly general medical care and follow-up    HPI:  HPI  Patient presents for general medical care and f/u.  Patient seen and examined at bedside.  No issues per nursing.  Patient has no acute complaints.  DM, denies polydipsia polyuria polyphagia.  HTN Denies chest pain and headache.  ESRD on HD, tolerating treatment well.  AFIB stable, denies palpitations and chest pain.  CHF stable, denies sob, orthopnea, weight gain.  Hx CVA, denies changes in weakness and speech.  No acute distress.    Objective   Vital signs: 151/54, 98.1, 66, 18, 95%, blood sugar 142    Physical Exam  Constitutional:       General: She is not in acute distress.  Eyes:      Extraocular Movements: Extraocular movements intact.   Cardiovascular:      Rate and Rhythm: Normal rate and regular rhythm.   Pulmonary:      Effort: Pulmonary effort is normal.      Breath sounds: Normal breath sounds.   Abdominal:      General: Bowel sounds are normal.      Palpations: Abdomen is soft.   Musculoskeletal:      Cervical back: Neck supple.      Right lower leg: No edema.      Left lower leg: No edema.   Skin:     Comments: Dressings to sacrum and heel clean dry and intact   Neurological:      Mental Status: She is alert.   Psychiatric:         Mood and Affect: Mood normal.         Behavior: Behavior is cooperative.         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  On HD per nephrology  Isosorbide dinitrate  Metoprolol  Diltiazem   Renal diet  Monitor BP  Remove extra fluid in dialysis         Atrial fibrillation (Multi)     Monitor heart rate, controlled  Amiodarone  Apixaban  Bleeding precautions          Medications, treatments, and labs reviewed  Continue medications and treatments as listed in EMR    Scribe Attestation  I, Hien Conn Scribe   attest that this documentation has been prepared under the direction and in the presence of Wilbert Lock MD    Provider Attestation - Scribe documentation  All medical record entries made by the Scribe were at my direction and personally dictated by me. I have reviewed the chart and agree that the record accurately reflects my personal performance of the history, physical exam, discussion and plan.   Wilbert Lock MD

## 2024-06-25 NOTE — PROGRESS NOTES
PROGRESS NOTE    Subjective   Chief complaint: Melody Martin is a 88 y.o. female who is a long term care patient being seen and evaluated for wounds.    HPI:  HPI  Patient is seen in follow-up of wounds, sacrum and left heel pressure ulcers.  Patient does have treatments in place.  Patient denies pain at this time.    Objective   Vital signs: 137/49, 97.6, 68, 20 95%    Physical Exam  Constitutional:       General: She is not in acute distress.  Eyes:      Extraocular Movements: Extraocular movements intact.   Pulmonary:      Effort: Pulmonary effort is normal.   Abdominal:      General: Bowel sounds are normal. There is no distension.      Palpations: Abdomen is soft.      Tenderness: There is no abdominal tenderness.   Musculoskeletal:      Cervical back: Neck supple.      Right lower leg: No edema.      Left lower leg: No edema.   Skin:     Comments: wound location -left heel  Etiology - pressure   Granulation-large  Slough-small  Edge- flat  Odor - no  Drainage - medium serosanguineous  Size - 2.5 X 1.5 X 0.2 centimeters  Undermining -none    Wound location -sacrum  Etiology - pressure   Granulation-large  Slough-small  Edge-flat  Odor - none  Drainage - large serosanguineous  Size - 1.5 x 2 x 0.2 centimeters   Neurological:      Mental Status: She is alert.   Psychiatric:         Mood and Affect: Mood normal.         Behavior: Behavior is cooperative.         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  A minimal amount of bleeding was controlled with pressure. The procedure was tolerated well with a pain level of 0 throughout and a pain level of 0 following the procedure.  Post-debridement measurements unchanged. Character of wound/ulcer post-debridement is improved.       TX: Irrigate with normal saline, pat dry, collagen powder, ABD and Kerlix     Turn every 2 hours  Prevalon boots  Pressure reducing mattress, patient refusing air mattress, discussed benefits/risks  Cushion to chair  Dietitian consult  Supplements per dietitian  Grab bars to bed to assist with bed mobility and repositioning  Weekly skin checks         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 (Multi) - Primary     After obtaining verbal concent, I performed subcutaneous tissue debridement with a total area of 3 cm2 with curette to remove viable and non-viable tissue/material including subcutaneous tissue, slough, biofilm, and fibrin/exudate after achieving pain control with 2% lidocaine topical gel.  A minimal amount of bleeding was controlled with pressure. The procedure was tolerated well with a pain level of 0 throughout and a pain level of 0 following the procedure.  Post-debridement measurements unchanged. Character of wound/ulcer post-debridement is improved.       TX: Irrigate with wound cleanser or normal saline, pat dry, apply Skin-Prep to periwound, alginate to wound bed and cover with silicone bordered foam    Turn every 2 hours  Prevalon boots  Air mattress  Cushion to chair  Dietitian following  Supplements per dietitian  Grab bars to bed to assist with bed mobility and repositioning  Weekly skin checks  House barrier cream to buttocks          Medications, treatments, and labs reviewed  Continue medications and treatments as listed in EMR    Scribe Attestation  I, Phoebe Martinez    attest that this documentation has been prepared under the direction and in the presence of MAR Johnson    Provider Attestation - Scribe documentation  All medical record entries made by the Scribe were at my direction and personally dictated by me. I have reviewed the chart and agree that the record accurately reflects my personal performance of the history, physical exam, discussion and plan.   MAR Johnson

## 2024-06-25 NOTE — ASSESSMENT & PLAN NOTE
After obtaining verbal concent, I performed subcutaneous tissue debridement with a total area of 3 cm2 with curette to remove viable and non-viable tissue/material including subcutaneous tissue, slough, biofilm, and fibrin/exudate after achieving pain control with 2% lidocaine topical gel.  A minimal amount of bleeding was controlled with pressure. The procedure was tolerated well with a pain level of 0 throughout and a pain level of 0 following the procedure.  Post-debridement measurements unchanged. Character of wound/ulcer post-debridement is improved.       TX: Irrigate with wound cleanser or normal saline, pat dry, apply Skin-Prep to periwound, alginate to wound bed and cover with silicone bordered foam    Turn every 2 hours  Prevalon boots  Air mattress  Cushion to chair  Dietitian following  Supplements per dietitian  Grab bars to bed to assist with bed mobility and repositioning  Weekly skin checks  House barrier cream to buttocks

## 2024-06-25 NOTE — ASSESSMENT & PLAN NOTE
I performed subcutaneous tissue debridement with a total area of 3.75 cm2 with curette to remove viable and non-viable tissue/material including subcutaneous tissue, slough, biofilm, and fibrin/exudate after achieving pain control with 2% lidocaine topical gel.  A minimal amount of bleeding was controlled with pressure. The procedure was tolerated well with a pain level of 0 throughout and a pain level of 0 following the procedure.  Post-debridement measurements unchanged. Character of wound/ulcer post-debridement is improved.       TX: Irrigate with normal saline, pat dry, collagen powder, ABD and Kerlix     Turn every 2 hours  Prevalon boots  Pressure reducing mattress, patient refusing air mattress, discussed benefits/risks  Cushion to chair  Dietitian consult  Supplements per dietitian  Grab bars to bed to assist with bed mobility and repositioning  Weekly skin checks

## 2024-06-27 ENCOUNTER — NURSING HOME VISIT (OUTPATIENT)
Dept: POST ACUTE CARE | Facility: EXTERNAL LOCATION | Age: 89
End: 2024-06-27
Payer: COMMERCIAL

## 2024-06-27 DIAGNOSIS — L89.153 STAGE III PRESSURE ULCER OF SACRAL REGION (MULTI): ICD-10-CM

## 2024-06-27 DIAGNOSIS — L89.623 STAGE III PRESSURE ULCER OF LEFT HEEL (MULTI): ICD-10-CM

## 2024-06-27 DIAGNOSIS — N18.6 TYPE 2 DIABETES MELLITUS WITH CHRONIC KIDNEY DISEASE ON CHRONIC DIALYSIS, WITH LONG-TERM CURRENT USE OF INSULIN (MULTI): ICD-10-CM

## 2024-06-27 DIAGNOSIS — E11.22 TYPE 2 DIABETES MELLITUS WITH CHRONIC KIDNEY DISEASE ON CHRONIC DIALYSIS, WITH LONG-TERM CURRENT USE OF INSULIN (MULTI): ICD-10-CM

## 2024-06-27 DIAGNOSIS — I50.32 HYPERTENSIVE HEART AND KIDNEY DISEASE WITH CHRONIC DIASTOLIC CONGESTIVE HEART FAILURE AND STAGE 5 CHRONIC KIDNEY DISEASE ON CHRONIC DIALYSIS (MULTI): Primary | ICD-10-CM

## 2024-06-27 DIAGNOSIS — N18.6 HYPERTENSIVE HEART AND KIDNEY DISEASE WITH CHRONIC DIASTOLIC CONGESTIVE HEART FAILURE AND STAGE 5 CHRONIC KIDNEY DISEASE ON CHRONIC DIALYSIS (MULTI): Primary | ICD-10-CM

## 2024-06-27 DIAGNOSIS — Z99.2 TYPE 2 DIABETES MELLITUS WITH CHRONIC KIDNEY DISEASE ON CHRONIC DIALYSIS, WITH LONG-TERM CURRENT USE OF INSULIN (MULTI): ICD-10-CM

## 2024-06-27 DIAGNOSIS — I13.2 HYPERTENSIVE HEART AND KIDNEY DISEASE WITH CHRONIC DIASTOLIC CONGESTIVE HEART FAILURE AND STAGE 5 CHRONIC KIDNEY DISEASE ON CHRONIC DIALYSIS (MULTI): Primary | ICD-10-CM

## 2024-06-27 DIAGNOSIS — Z79.4 TYPE 2 DIABETES MELLITUS WITH CHRONIC KIDNEY DISEASE ON CHRONIC DIALYSIS, WITH LONG-TERM CURRENT USE OF INSULIN (MULTI): ICD-10-CM

## 2024-06-27 DIAGNOSIS — Z99.2 HYPERTENSIVE HEART AND KIDNEY DISEASE WITH CHRONIC DIASTOLIC CONGESTIVE HEART FAILURE AND STAGE 5 CHRONIC KIDNEY DISEASE ON CHRONIC DIALYSIS (MULTI): Primary | ICD-10-CM

## 2024-06-27 PROCEDURE — 11042 DBRDMT SUBQ TIS 1ST 20SQCM/<: CPT | Performed by: NURSE PRACTITIONER

## 2024-06-27 PROCEDURE — 99309 SBSQ NF CARE MODERATE MDM 30: CPT | Performed by: NURSE PRACTITIONER

## 2024-06-27 NOTE — LETTER
Patient: Melody Martin  : 1935    Encounter Date: 2024    PROGRESS NOTE    Subjective  Chief complaint: Melody Martin is a 88 y.o. female who is a long term care patient being seen and evaluated for follow-up.    HPI:  HPI  She is seen in the f/u of wounds.  Nurse also reporting patient's diastolic blood pressures are low at times.  Patient denies dizziness or lightheadedness.  Patient denies wound pain at this time.    Objective  Vital signs: 148/50, 97.4, 55, 18, 97%, blood sugar 180    Physical Exam  Constitutional:       General: She is not in acute distress.  Eyes:      Extraocular Movements: Extraocular movements intact.   Pulmonary:      Effort: Pulmonary effort is normal.   Abdominal:      General: Bowel sounds are normal. There is no distension.      Palpations: Abdomen is soft.      Tenderness: There is no abdominal tenderness.   Musculoskeletal:      Cervical back: Neck supple.      Right lower leg: No edema.      Left lower leg: No edema.   Skin:     Comments: wound location -left heel  Periwound dark purple and boggy  Etiology - pressure   Granulation-large  Slough-small  Edge- flat  Odor - yes  Drainage - medium serosanguineous  Size - 3.5 X 2.5 X 0.2 centimeters  Undermining -none    Wound location -sacrum  Etiology - pressure   Granulation-large  Slough-small  Edge-flat  Odor - none  Drainage - large serosanguineous  Size - 3 x 2.5 x 0.2 centimeters   Neurological:      Mental Status: She is alert.   Psychiatric:         Mood and Affect: Mood normal.         Behavior: Behavior is cooperative.         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  A minimal amount of bleeding was controlled with pressure. The procedure was tolerated well with a pain level of 0 throughout and a pain level of 0 following the procedure.  Post-debridement measurements unchanged. Character of wound/ulcer post-debridement is improved.       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 (Multi)     TX: Irrigate with wound cleanser or normal saline, pat dry, apply Skin-Prep to periwound, alginate to wound bed and cover with silicone bordered foam    Turn every 2 hours  Prevalon boots  Air mattress  Cushion to chair  Dietitian following  Supplements per dietitian  Grab bars to bed to assist with bed mobility and repositioning  Weekly skin checks  House barrier cream to buttocks         Type 2 diabetes mellitus with chronic kidney disease on chronic dialysis, with long-term current use of insulin (Multi)     Carb controlled diet  Monitor Glucoscan  Glargine  Humalog  FBG at goal          Medications, treatments, and labs reviewed  Continue medications and treatments as listed in EMR    Scribe Attestation  I, Phoebe Martinez   attest that this documentation has been prepared under the direction  and in the presence of MAR Johnson    Provider Attestation - Scribe documentation  All medical record entries made by the Scribe were at my direction and personally dictated by me. I have reviewed the chart and agree that the record accurately reflects my personal performance of the history, physical exam, discussion and plan.   MAR Johnson            Electronically Signed By: MAR Johnson   7/5/24  8:00 AM

## 2024-06-28 ENCOUNTER — NURSING HOME VISIT (OUTPATIENT)
Dept: POST ACUTE CARE | Facility: EXTERNAL LOCATION | Age: 89
End: 2024-06-28
Payer: COMMERCIAL

## 2024-06-28 DIAGNOSIS — I50.32 HYPERTENSIVE HEART AND KIDNEY DISEASE WITH CHRONIC DIASTOLIC CONGESTIVE HEART FAILURE AND STAGE 5 CHRONIC KIDNEY DISEASE ON CHRONIC DIALYSIS (MULTI): Primary | ICD-10-CM

## 2024-06-28 DIAGNOSIS — Z86.73 HISTORY OF CVA (CEREBROVASCULAR ACCIDENT): ICD-10-CM

## 2024-06-28 DIAGNOSIS — I13.2 HYPERTENSIVE HEART AND KIDNEY DISEASE WITH CHRONIC DIASTOLIC CONGESTIVE HEART FAILURE AND STAGE 5 CHRONIC KIDNEY DISEASE ON CHRONIC DIALYSIS (MULTI): Primary | ICD-10-CM

## 2024-06-28 DIAGNOSIS — N18.6 TYPE 2 DIABETES MELLITUS WITH CHRONIC KIDNEY DISEASE ON CHRONIC DIALYSIS, WITH LONG-TERM CURRENT USE OF INSULIN (MULTI): ICD-10-CM

## 2024-06-28 DIAGNOSIS — Z99.2 HYPERTENSIVE HEART AND KIDNEY DISEASE WITH CHRONIC DIASTOLIC CONGESTIVE HEART FAILURE AND STAGE 5 CHRONIC KIDNEY DISEASE ON CHRONIC DIALYSIS (MULTI): Primary | ICD-10-CM

## 2024-06-28 DIAGNOSIS — Z79.4 TYPE 2 DIABETES MELLITUS WITH CHRONIC KIDNEY DISEASE ON CHRONIC DIALYSIS, WITH LONG-TERM CURRENT USE OF INSULIN (MULTI): ICD-10-CM

## 2024-06-28 DIAGNOSIS — E11.22 TYPE 2 DIABETES MELLITUS WITH CHRONIC KIDNEY DISEASE ON CHRONIC DIALYSIS, WITH LONG-TERM CURRENT USE OF INSULIN (MULTI): ICD-10-CM

## 2024-06-28 DIAGNOSIS — Z99.2 TYPE 2 DIABETES MELLITUS WITH CHRONIC KIDNEY DISEASE ON CHRONIC DIALYSIS, WITH LONG-TERM CURRENT USE OF INSULIN (MULTI): ICD-10-CM

## 2024-06-28 DIAGNOSIS — N18.6 HYPERTENSIVE HEART AND KIDNEY DISEASE WITH CHRONIC DIASTOLIC CONGESTIVE HEART FAILURE AND STAGE 5 CHRONIC KIDNEY DISEASE ON CHRONIC DIALYSIS (MULTI): Primary | ICD-10-CM

## 2024-06-28 PROCEDURE — 99308 SBSQ NF CARE LOW MDM 20: CPT | Performed by: INTERNAL MEDICINE

## 2024-06-28 NOTE — LETTER
Patient: Melody Martin  : 1935    Encounter Date: 2024    PROGRESS NOTE    Subjective  Chief complaint: Melody Martin is a 88 y.o. female who is a long term care patient being seen and evaluated for DM.    HPI:  HPI  Patient is seen in follow-up of diabetes.  Patient requesting to have gurjit device.  Orders placed.  Awaiting at this time.  Patient does continue with dressings to sacrum and left heel, clean dry and intact.  Patient also reported to have elevated blood pressures, reported low diastolic, evaluation of blood pressures to evaluate meds as needed.  Patient denies chest pain or shortness of breath.      Objective  Vital signs: 123/58, 97.8, 62, 18, 97%     Physical Exam  Constitutional:       General: She is not in acute distress.  Eyes:      Extraocular Movements: Extraocular movements intact.   Cardiovascular:      Rate and Rhythm: Normal rate and regular rhythm.   Pulmonary:      Effort: Pulmonary effort is normal.      Breath sounds: Normal breath sounds.   Abdominal:      General: Bowel sounds are normal.      Palpations: Abdomen is soft.   Musculoskeletal:      Cervical back: Neck supple.      Right lower leg: No edema.      Left lower leg: No edema.   Skin:     Comments: Dressings to sacrum and heel clean dry and intact   Neurological:      Mental Status: She is alert.   Psychiatric:         Mood and Affect: Mood normal.         Behavior: Behavior is cooperative.         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  On HD per nephrology  Isosorbide dinitrate  Metoprolol  Diltiazem   Renal diet  Monitor BP  Remove extra fluid in dialysis          Medications, treatments, and labs reviewed  Continue medications and treatments as listed in EMR    Scribe Attestation  Hien MARRERO Scribe   attest that this documentation has been prepared under the direction and in the presence of Wilbert Lock MD    Provider Attestation - Scribe documentation  All medical record entries made by the Scribe were at my direction and personally dictated by me. I have reviewed the chart and agree that the record accurately reflects my personal performance of the history, physical exam, discussion and plan.   Wilbert Lock MD            Electronically Signed By: Wilbert Lock MD   6/29/24  2:45 PM

## 2024-06-28 NOTE — PROGRESS NOTES
PROGRESS NOTE    Subjective   Chief complaint: Melody Martin is a 88 y.o. female who is a long term care patient being seen and evaluated for DM.    HPI:  HPI  Patient is seen in follow-up of diabetes.  Patient requesting to have gurjit device.  Orders placed.  Awaiting at this time.  Patient does continue with dressings to sacrum and left heel, clean dry and intact.  Patient also reported to have elevated blood pressures, reported low diastolic, evaluation of blood pressures to evaluate meds as needed.  Patient denies chest pain or shortness of breath.      Objective   Vital signs: 123/58, 97.8, 62, 18, 97%     Physical Exam  Constitutional:       General: She is not in acute distress.  Eyes:      Extraocular Movements: Extraocular movements intact.   Cardiovascular:      Rate and Rhythm: Normal rate and regular rhythm.   Pulmonary:      Effort: Pulmonary effort is normal.      Breath sounds: Normal breath sounds.   Abdominal:      General: Bowel sounds are normal.      Palpations: Abdomen is soft.   Musculoskeletal:      Cervical back: Neck supple.      Right lower leg: No edema.      Left lower leg: No edema.   Skin:     Comments: Dressings to sacrum and heel clean dry and intact   Neurological:      Mental Status: She is alert.   Psychiatric:         Mood and Affect: Mood normal.         Behavior: Behavior is cooperative.         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  On HD per  nephrology  Isosorbide dinitrate  Metoprolol  Diltiazem   Renal diet  Monitor BP  Remove extra fluid in dialysis          Medications, treatments, and labs reviewed  Continue medications and treatments as listed in EMR    Scribe Attestation  I, Phoebe Martinez   attest that this documentation has been prepared under the direction and in the presence of Wilbert Lock MD    Provider Attestation - Scribe documentation  All medical record entries made by the Scribe were at my direction and personally dictated by me. I have reviewed the chart and agree that the record accurately reflects my personal performance of the history, physical exam, discussion and plan.   Wilbert Lock MD

## 2024-07-02 DIAGNOSIS — I77.1 ARTERIAL STENOSIS (CMS-HCC): Primary | ICD-10-CM

## 2024-07-02 NOTE — ASSESSMENT & PLAN NOTE
I performed subcutaneous tissue debridement with a total area of 8.75 cm2 with curette to remove viable and non-viable tissue/material including subcutaneous tissue, slough, biofilm, and fibrin/exudate after achieving pain control with 2% lidocaine topical gel.  A minimal amount of bleeding was controlled with pressure. The procedure was tolerated well with a pain level of 0 throughout and a pain level of 0 following the procedure.  Post-debridement measurements unchanged. Character of wound/ulcer post-debridement is improved.       TX: Irrigate with normal saline, pat dry, apply Betadine moistened gauze, ABD and Kerlix daily and as needed    Obtain vascular studies x-ray and culture    Turn every 2 hours  Prevalon boots  Pressure reducing mattress, patient refusing air mattress, discussed benefits/risks  Cushion to chair  Dietitian consult  Supplements per dietitian  Grab bars to bed to assist with bed mobility and repositioning  Weekly skin checks

## 2024-07-02 NOTE — ASSESSMENT & PLAN NOTE
Stable, no shortness of breath.  On HD per nephrology  Isosorbide dinitrate  Metoprolol  Diltiazem decreased to 240 mg daily due to low diastolics at times  Renal diet  Monitor BP  Remove extra fluid in dialysis

## 2024-07-02 NOTE — PROGRESS NOTES
PROGRESS NOTE    Subjective   Chief complaint: Melody Martin is a 88 y.o. female who is a long term care patient being seen and evaluated for follow-up.    HPI:  HPI  She is seen in the f/u of wounds.  Nurse also reporting patient's diastolic blood pressures are low at times.  Patient denies dizziness or lightheadedness.  Patient denies wound pain at this time.    Objective   Vital signs: 148/50, 97.4, 55, 18, 97%, blood sugar 180    Physical Exam  Constitutional:       General: She is not in acute distress.  Eyes:      Extraocular Movements: Extraocular movements intact.   Pulmonary:      Effort: Pulmonary effort is normal.   Abdominal:      General: Bowel sounds are normal. There is no distension.      Palpations: Abdomen is soft.      Tenderness: There is no abdominal tenderness.   Musculoskeletal:      Cervical back: Neck supple.      Right lower leg: No edema.      Left lower leg: No edema.   Skin:     Comments: wound location -left heel  Periwound dark purple and boggy  Etiology - pressure   Granulation-large  Slough-small  Edge- flat  Odor - yes  Drainage - medium serosanguineous  Size - 3.5 X 2.5 X 0.2 centimeters  Undermining -none    Wound location -sacrum  Etiology - pressure   Granulation-large  Slough-small  Edge-flat  Odor - none  Drainage - large serosanguineous  Size - 3 x 2.5 x 0.2 centimeters   Neurological:      Mental Status: She is alert.   Psychiatric:         Mood and Affect: Mood normal.         Behavior: Behavior is cooperative.         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  A minimal amount of bleeding was controlled with pressure. The procedure was tolerated well with a pain level of 0 throughout and a pain level of 0 following the procedure.  Post-debridement measurements unchanged. Character of wound/ulcer post-debridement is improved.       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 (Multi)     TX: Irrigate with wound cleanser or normal saline, pat dry, apply Skin-Prep to periwound, alginate to wound bed and cover with silicone bordered foam    Turn every 2 hours  Prevalon boots  Air mattress  Cushion to chair  Dietitian following  Supplements per dietitian  Grab bars to bed to assist with bed mobility and repositioning  Weekly skin checks  House barrier cream to buttocks         Type 2 diabetes mellitus with chronic kidney disease on chronic dialysis, with long-term current use of insulin (Multi)     Carb controlled diet  Monitor Glucoscan  Glargine  Humalog  FBG at goal          Medications, treatments, and labs reviewed  Continue medications and treatments as listed in EMR    Scribe Attestation  I, Phoebe Martinez   attest that this documentation has been prepared under the direction and in the presence of MAR Johnson    Provider  Attestation - Scribe documentation  All medical record entries made by the Scribe were at my direction and personally dictated by me. I have reviewed the chart and agree that the record accurately reflects my personal performance of the history, physical exam, discussion and plan.   Karina Patel, APRN-CNP

## 2024-07-04 ENCOUNTER — NURSING HOME VISIT (OUTPATIENT)
Dept: POST ACUTE CARE | Facility: EXTERNAL LOCATION | Age: 89
End: 2024-07-04
Payer: COMMERCIAL

## 2024-07-04 DIAGNOSIS — I73.9 PERIPHERAL ARTERY DISEASE (CMS-HCC): Primary | ICD-10-CM

## 2024-07-04 DIAGNOSIS — L89.623 STAGE III PRESSURE ULCER OF LEFT HEEL (MULTI): ICD-10-CM

## 2024-07-04 DIAGNOSIS — I13.2 HYPERTENSIVE HEART AND KIDNEY DISEASE WITH CHRONIC DIASTOLIC CONGESTIVE HEART FAILURE AND STAGE 5 CHRONIC KIDNEY DISEASE ON CHRONIC DIALYSIS (MULTI): ICD-10-CM

## 2024-07-04 DIAGNOSIS — E11.22 TYPE 2 DIABETES MELLITUS WITH CHRONIC KIDNEY DISEASE ON CHRONIC DIALYSIS, WITH LONG-TERM CURRENT USE OF INSULIN (MULTI): ICD-10-CM

## 2024-07-04 DIAGNOSIS — N18.6 HYPERTENSIVE HEART AND KIDNEY DISEASE WITH CHRONIC DIASTOLIC CONGESTIVE HEART FAILURE AND STAGE 5 CHRONIC KIDNEY DISEASE ON CHRONIC DIALYSIS (MULTI): ICD-10-CM

## 2024-07-04 DIAGNOSIS — N18.6 TYPE 2 DIABETES MELLITUS WITH CHRONIC KIDNEY DISEASE ON CHRONIC DIALYSIS, WITH LONG-TERM CURRENT USE OF INSULIN (MULTI): ICD-10-CM

## 2024-07-04 DIAGNOSIS — I50.32 HYPERTENSIVE HEART AND KIDNEY DISEASE WITH CHRONIC DIASTOLIC CONGESTIVE HEART FAILURE AND STAGE 5 CHRONIC KIDNEY DISEASE ON CHRONIC DIALYSIS (MULTI): ICD-10-CM

## 2024-07-04 DIAGNOSIS — Z99.2 HYPERTENSIVE HEART AND KIDNEY DISEASE WITH CHRONIC DIASTOLIC CONGESTIVE HEART FAILURE AND STAGE 5 CHRONIC KIDNEY DISEASE ON CHRONIC DIALYSIS (MULTI): ICD-10-CM

## 2024-07-04 DIAGNOSIS — L89.153 STAGE III PRESSURE ULCER OF SACRAL REGION (MULTI): ICD-10-CM

## 2024-07-04 DIAGNOSIS — Z79.4 TYPE 2 DIABETES MELLITUS WITH CHRONIC KIDNEY DISEASE ON CHRONIC DIALYSIS, WITH LONG-TERM CURRENT USE OF INSULIN (MULTI): ICD-10-CM

## 2024-07-04 DIAGNOSIS — Z99.2 TYPE 2 DIABETES MELLITUS WITH CHRONIC KIDNEY DISEASE ON CHRONIC DIALYSIS, WITH LONG-TERM CURRENT USE OF INSULIN (MULTI): ICD-10-CM

## 2024-07-04 PROCEDURE — 11045 DBRDMT SUBQ TISS EACH ADDL: CPT | Performed by: NURSE PRACTITIONER

## 2024-07-04 PROCEDURE — 11042 DBRDMT SUBQ TIS 1ST 20SQCM/<: CPT | Performed by: NURSE PRACTITIONER

## 2024-07-04 PROCEDURE — 99309 SBSQ NF CARE MODERATE MDM 30: CPT | Performed by: NURSE PRACTITIONER

## 2024-07-04 NOTE — LETTER
Patient: Melody Martin  : 1935    Encounter Date: 2024    PROGRESS NOTE    Subjective  Chief complaint: Melody Martin is a 89 y.o. female who is a long term care patient being seen and evaluated for wounds    HPI:  HPI  Patient seen in follow-up of wounds.  Patient continues with sacrum and left heel wound.  Treatments are in place.  Nursing called on 71, reported the patient's x-ray showed no acute osseous abnormality.  Arterial ultrasound showed left SFA and infrapopliteal artery stenotic disease in the mild to moderate ischemic range.  Orders were placed for vascular consult.    Objective  Vital signs: 116/58, 97.8, 62, 20, 94%, blood sugar 202    Physical Exam  Constitutional:       General: She is not in acute distress.  Eyes:      Extraocular Movements: Extraocular movements intact.   Pulmonary:      Effort: Pulmonary effort is normal.   Abdominal:      General: Bowel sounds are normal. There is no distension.      Palpations: Abdomen is soft.      Tenderness: There is no abdominal tenderness.   Musculoskeletal:      Cervical back: Neck supple.      Right lower leg: No edema.      Left lower leg: No edema.   Skin:     Comments:   wound location -left heel  Etiology - pressure   Granulation-medium  Slough-medium  Edge- flat  Odor - no  Drainage - medium serosanguineous  Size - 4 X 4.5 X UTD centimeters  Undermining -none    Wound location -sacrum  Etiology - pressure   Granulation-large  Slough-small  Edge-flat  Odor - none  Drainage - medium serosanguineous  Size - 2.5 x 3 x 0.2 centimeters  Undermining-none   Neurological:      Mental Status: She is alert.   Psychiatric:         Mood and Affect: Mood normal.         Behavior: Behavior is cooperative.         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  A minimal amount of bleeding was controlled with pressure. The procedure was tolerated well with a pain level of 0 throughout and a pain level of 0 following the procedure.  Post-debridement measurements unchanged. Character of wound/ulcer post-debridement is improved.       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  A minimal amount of bleeding was controlled with pressure. The procedure was tolerated well with a pain level of 0 throughout and a pain level of 0 following the procedure.  Post-debridement measurements unchanged. Character of wound/ulcer post-debridement is improved.       TX: Irrigate with wound cleanser or normal saline, pat dry, apply Skin-Prep to periwound, alginate to wound bed and cover with  silicone bordered foam    Turn every 2 hours  Prevalon boots  Air mattress  Cushion to chair  Dietitian following  Supplements per dietitian  Grab bars to bed to assist with bed mobility and repositioning  Weekly skin checks  House barrier cream to buttocks         Type 2 diabetes mellitus with chronic kidney disease on chronic dialysis, with long-term current use of insulin (Multi)     Carb controlled diet  Monitor Glucoscan  Glargine  Humalog            Medications, treatments, and labs reviewed  Continue medications and treatments as listed in EMR    Scribe Attestation  I, Phoebe Martinez   attest that this documentation has been prepared under the direction and in the presence of MAR Johnson    Provider Attestation - Scribe documentation  All medical record entries made by the Scribe were at my direction and personally dictated by me. I have reviewed the chart and agree that the record accurately reflects my personal performance of the history, physical exam, discussion and plan.   MAR Johnson            Electronically Signed By: MAR Johnson   7/30/24 12:44 PM

## 2024-07-10 ENCOUNTER — NURSING HOME VISIT (OUTPATIENT)
Dept: POST ACUTE CARE | Facility: EXTERNAL LOCATION | Age: 89
End: 2024-07-10
Payer: COMMERCIAL

## 2024-07-10 DIAGNOSIS — I13.2 HYPERTENSIVE HEART AND KIDNEY DISEASE WITH CHRONIC DIASTOLIC CONGESTIVE HEART FAILURE AND STAGE 5 CHRONIC KIDNEY DISEASE ON CHRONIC DIALYSIS (MULTI): ICD-10-CM

## 2024-07-10 DIAGNOSIS — N18.6 HYPERTENSIVE HEART AND KIDNEY DISEASE WITH CHRONIC DIASTOLIC CONGESTIVE HEART FAILURE AND STAGE 5 CHRONIC KIDNEY DISEASE ON CHRONIC DIALYSIS (MULTI): ICD-10-CM

## 2024-07-10 DIAGNOSIS — Z79.4 TYPE 2 DIABETES MELLITUS WITH CHRONIC KIDNEY DISEASE ON CHRONIC DIALYSIS, WITH LONG-TERM CURRENT USE OF INSULIN (MULTI): ICD-10-CM

## 2024-07-10 DIAGNOSIS — Z99.2 HYPERTENSIVE HEART AND KIDNEY DISEASE WITH CHRONIC DIASTOLIC CONGESTIVE HEART FAILURE AND STAGE 5 CHRONIC KIDNEY DISEASE ON CHRONIC DIALYSIS (MULTI): ICD-10-CM

## 2024-07-10 DIAGNOSIS — R52 INCREASED PAIN: Primary | ICD-10-CM

## 2024-07-10 DIAGNOSIS — Z99.2 TYPE 2 DIABETES MELLITUS WITH CHRONIC KIDNEY DISEASE ON CHRONIC DIALYSIS, WITH LONG-TERM CURRENT USE OF INSULIN (MULTI): ICD-10-CM

## 2024-07-10 DIAGNOSIS — I50.32 HYPERTENSIVE HEART AND KIDNEY DISEASE WITH CHRONIC DIASTOLIC CONGESTIVE HEART FAILURE AND STAGE 5 CHRONIC KIDNEY DISEASE ON CHRONIC DIALYSIS (MULTI): ICD-10-CM

## 2024-07-10 DIAGNOSIS — E11.22 TYPE 2 DIABETES MELLITUS WITH CHRONIC KIDNEY DISEASE ON CHRONIC DIALYSIS, WITH LONG-TERM CURRENT USE OF INSULIN (MULTI): ICD-10-CM

## 2024-07-10 DIAGNOSIS — N18.6 TYPE 2 DIABETES MELLITUS WITH CHRONIC KIDNEY DISEASE ON CHRONIC DIALYSIS, WITH LONG-TERM CURRENT USE OF INSULIN (MULTI): ICD-10-CM

## 2024-07-10 PROCEDURE — 99308 SBSQ NF CARE LOW MDM 20: CPT | Performed by: INTERNAL MEDICINE

## 2024-07-10 NOTE — PROGRESS NOTES
PROGRESS NOTE    Subjective   Chief complaint: Melody Martin is a 88 y.o. female who is a long term care patient being seen and evaluated for increased pain    HPI:  HPI  Patient is seen for complaints of increased pain.  Patient have scheduled tramadol 25 mg every 8 hours for increased pain.  Patient seen and examined at bedside, appears to be in no acute distress.    Objective   Vital signs: 134 4697.6, 61, 19, 97%, blood sugar 180    Physical Exam  Constitutional:       General: She is not in acute distress.  Eyes:      Extraocular Movements: Extraocular movements intact.   Cardiovascular:      Rate and Rhythm: Normal rate and regular rhythm.   Pulmonary:      Effort: Pulmonary effort is normal.      Breath sounds: Normal breath sounds.   Abdominal:      General: Bowel sounds are normal.      Palpations: Abdomen is soft.   Musculoskeletal:      Cervical back: Neck supple.      Right lower leg: No edema.      Left lower leg: No edema.   Skin:     Comments: Dressings to sacrum and heel clean dry and intact   Neurological:      Mental Status: She is alert.   Psychiatric:         Mood and Affect: Mood normal.         Behavior: Behavior is cooperative.         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  On HD per nephrology  Isosorbide dinitrate  Metoprolol  Diltiazem decreased to 240 mg daily due to low diastolics at times  Renal diet  Monitor BP  Remove extra fluid in dialysis         Increased pain - Primary     Schedule tramadol          Medications, treatments, and labs reviewed  Continue medications  and treatments as listed in EMR    Scribe Attestation  I, Phoebe Martinez   attest that this documentation has been prepared under the direction and in the presence of Wilbert Lock MD    Provider Attestation - Scribe documentation  All medical record entries made by the Scribe were at my direction and personally dictated by me. I have reviewed the chart and agree that the record accurately reflects my personal performance of the history, physical exam, discussion and plan.   Wilbert Lock MD

## 2024-07-10 NOTE — LETTER
Patient: Melody Martin  : 1935    Encounter Date: 07/10/2024    PROGRESS NOTE    Subjective  Chief complaint: Melody Martin is a 88 y.o. female who is a long term care patient being seen and evaluated for increased pain    HPI:  HPI  Patient is seen for complaints of increased pain.  Patient have scheduled tramadol 25 mg every 8 hours for increased pain.  Patient seen and examined at bedside, appears to be in no acute distress.    Objective  Vital signs: 134 4697.6, 61, 19, 97%, blood sugar 180    Physical Exam  Constitutional:       General: She is not in acute distress.  Eyes:      Extraocular Movements: Extraocular movements intact.   Cardiovascular:      Rate and Rhythm: Normal rate and regular rhythm.   Pulmonary:      Effort: Pulmonary effort is normal.      Breath sounds: Normal breath sounds.   Abdominal:      General: Bowel sounds are normal.      Palpations: Abdomen is soft.   Musculoskeletal:      Cervical back: Neck supple.      Right lower leg: No edema.      Left lower leg: No edema.   Skin:     Comments: Dressings to sacrum and heel clean dry and intact   Neurological:      Mental Status: She is alert.   Psychiatric:         Mood and Affect: Mood normal.         Behavior: Behavior is cooperative.         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  On HD per nephrology  Isosorbide dinitrate  Metoprolol  Diltiazem decreased to 240 mg daily due to low diastolics at times  Renal diet  Monitor BP  Remove extra fluid in dialysis         Increased pain - Primary     Schedule tramadol           Medications, treatments, and labs reviewed  Continue medications and treatments as listed in EMR    Scribe Attestation  I, Phoebe Martinez   attest that this documentation has been prepared under the direction and in the presence of Wilbert Lock MD    Provider Attestation - Scribe documentation  All medical record entries made by the Scribe were at my direction and personally dictated by me. I have reviewed the chart and agree that the record accurately reflects my personal performance of the history, physical exam, discussion and plan.   Wilbert Lock MD            Electronically Signed By: Wilbert Lock MD   7/11/24 11:54 AM

## 2024-07-11 ENCOUNTER — NURSING HOME VISIT (OUTPATIENT)
Dept: POST ACUTE CARE | Facility: EXTERNAL LOCATION | Age: 89
End: 2024-07-11
Payer: COMMERCIAL

## 2024-07-11 DIAGNOSIS — Z99.2 TYPE 2 DIABETES MELLITUS WITH CHRONIC KIDNEY DISEASE ON CHRONIC DIALYSIS, WITH LONG-TERM CURRENT USE OF INSULIN (MULTI): ICD-10-CM

## 2024-07-11 DIAGNOSIS — L89.623 STAGE III PRESSURE ULCER OF LEFT HEEL (MULTI): Primary | ICD-10-CM

## 2024-07-11 DIAGNOSIS — Z79.4 TYPE 2 DIABETES MELLITUS WITH CHRONIC KIDNEY DISEASE ON CHRONIC DIALYSIS, WITH LONG-TERM CURRENT USE OF INSULIN (MULTI): ICD-10-CM

## 2024-07-11 DIAGNOSIS — L89.153 STAGE III PRESSURE ULCER OF SACRAL REGION (MULTI): ICD-10-CM

## 2024-07-11 DIAGNOSIS — N18.6 TYPE 2 DIABETES MELLITUS WITH CHRONIC KIDNEY DISEASE ON CHRONIC DIALYSIS, WITH LONG-TERM CURRENT USE OF INSULIN (MULTI): ICD-10-CM

## 2024-07-11 DIAGNOSIS — I13.2 HYPERTENSIVE HEART AND KIDNEY DISEASE WITH CHRONIC DIASTOLIC CONGESTIVE HEART FAILURE AND STAGE 5 CHRONIC KIDNEY DISEASE ON CHRONIC DIALYSIS (MULTI): ICD-10-CM

## 2024-07-11 DIAGNOSIS — E11.22 TYPE 2 DIABETES MELLITUS WITH CHRONIC KIDNEY DISEASE ON CHRONIC DIALYSIS, WITH LONG-TERM CURRENT USE OF INSULIN (MULTI): ICD-10-CM

## 2024-07-11 DIAGNOSIS — Z99.2 HYPERTENSIVE HEART AND KIDNEY DISEASE WITH CHRONIC DIASTOLIC CONGESTIVE HEART FAILURE AND STAGE 5 CHRONIC KIDNEY DISEASE ON CHRONIC DIALYSIS (MULTI): ICD-10-CM

## 2024-07-11 DIAGNOSIS — N18.6 HYPERTENSIVE HEART AND KIDNEY DISEASE WITH CHRONIC DIASTOLIC CONGESTIVE HEART FAILURE AND STAGE 5 CHRONIC KIDNEY DISEASE ON CHRONIC DIALYSIS (MULTI): ICD-10-CM

## 2024-07-11 DIAGNOSIS — I50.32 HYPERTENSIVE HEART AND KIDNEY DISEASE WITH CHRONIC DIASTOLIC CONGESTIVE HEART FAILURE AND STAGE 5 CHRONIC KIDNEY DISEASE ON CHRONIC DIALYSIS (MULTI): ICD-10-CM

## 2024-07-11 PROCEDURE — 99309 SBSQ NF CARE MODERATE MDM 30: CPT | Performed by: NURSE PRACTITIONER

## 2024-07-11 NOTE — LETTER
Patient: Melody Martin  : 1935    Encounter Date: 2024    PROGRESS NOTE    Subjective  Chief complaint: Melody Martin is a 89 y.o. female who is a long term care patient being seen and evaluated for follow-up wounds.    HPI:  HPI  Patient is seen in follow-up of wounds to sacrum and left heel pressure ulcers.  Treatments are in place.  Patient denies pain at this time.  Afebrile.    Objective  Vital signs: 116/58, 97.8, 62, 20, 94%, blood sugar 220    Physical Exam  Constitutional:       General: She is not in acute distress.  Eyes:      Extraocular Movements: Extraocular movements intact.   Pulmonary:      Effort: Pulmonary effort is normal.   Abdominal:      General: Bowel sounds are normal. There is no distension.      Palpations: Abdomen is soft.      Tenderness: There is no abdominal tenderness.   Musculoskeletal:      Cervical back: Neck supple.      Right lower leg: No edema.      Left lower leg: No edema.   Skin:     Comments:   wound location -left heel  Etiology - pressure   Granulation-medium  Slough-medium  Edge- flat  Odor - no  Drainage - medium serosanguineous  Size - 4.4 X 4.1 X UTD centimeters  Undermining -none    Wound location -sacrum  Etiology - pressure   Granulation-large  Slough-small  Edge-flat  Odor - none  Drainage - medium serosanguineous  Size - 1.7 x 1.1 x 0.2 centimeters  Undermining-none   Neurological:      Mental Status: She is alert.   Psychiatric:         Mood and Affect: Mood normal.         Behavior: Behavior is cooperative.         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 (Multi)     Improving   TX: Irrigate with wound cleanser or normal saline, pat dry, apply Skin-Prep to periwound, alginate to wound bed and cover with silicone bordered foam    Turn every 2 hours  Prevalon boots  Air mattress  Cushion to chair  Dietitian following  Supplements per dietitian  Grab bars to bed to assist with bed mobility and repositioning  Weekly skin checks  House barrier cream to buttocks         Type 2 diabetes mellitus with chronic kidney disease on chronic dialysis, with long-term current use of insulin (Multi)     Carb controlled diet  Monitor Glucoscan  Continue insulin            Medications, treatments, and labs reviewed  Continue medications and treatments as listed in EMR    Scribe Attestation  I, Phoebe Martinez   attest that this documentation has been prepared under the direction and in the presence of MAR Johnson    Provider Attestation - Scribe documentation  All medical record entries made by the Scribe were at my direction and personally dictated by me. I have reviewed the chart and agree that the record accurately reflects my personal performance of the history, physical exam, discussion and plan.   MAR Johnson            Electronically Signed By: MAR Johnson   7/30/24  5:05 PM

## 2024-07-12 ENCOUNTER — NURSING HOME VISIT (OUTPATIENT)
Dept: POST ACUTE CARE | Facility: EXTERNAL LOCATION | Age: 89
End: 2024-07-12
Payer: COMMERCIAL

## 2024-07-12 DIAGNOSIS — I13.2 HYPERTENSIVE HEART AND KIDNEY DISEASE WITH CHRONIC DIASTOLIC CONGESTIVE HEART FAILURE AND STAGE 5 CHRONIC KIDNEY DISEASE ON CHRONIC DIALYSIS (MULTI): ICD-10-CM

## 2024-07-12 DIAGNOSIS — Z99.2 HYPERTENSIVE HEART AND KIDNEY DISEASE WITH CHRONIC DIASTOLIC CONGESTIVE HEART FAILURE AND STAGE 5 CHRONIC KIDNEY DISEASE ON CHRONIC DIALYSIS (MULTI): ICD-10-CM

## 2024-07-12 DIAGNOSIS — Z86.73 HISTORY OF CVA (CEREBROVASCULAR ACCIDENT): ICD-10-CM

## 2024-07-12 DIAGNOSIS — R52 INCREASED PAIN: Primary | ICD-10-CM

## 2024-07-12 DIAGNOSIS — I50.32 HYPERTENSIVE HEART AND KIDNEY DISEASE WITH CHRONIC DIASTOLIC CONGESTIVE HEART FAILURE AND STAGE 5 CHRONIC KIDNEY DISEASE ON CHRONIC DIALYSIS (MULTI): ICD-10-CM

## 2024-07-12 DIAGNOSIS — N18.6 HYPERTENSIVE HEART AND KIDNEY DISEASE WITH CHRONIC DIASTOLIC CONGESTIVE HEART FAILURE AND STAGE 5 CHRONIC KIDNEY DISEASE ON CHRONIC DIALYSIS (MULTI): ICD-10-CM

## 2024-07-12 PROCEDURE — 99308 SBSQ NF CARE LOW MDM 20: CPT | Performed by: INTERNAL MEDICINE

## 2024-07-12 NOTE — ASSESSMENT & PLAN NOTE
Encounter Date: 2/1/2024       History     Chief Complaint   Patient presents with    Chest Pain     ONSET LASTNITE. PACEMAKER PLACED DEC 28, 2023, STATES HE FELL ASLEEP WITHOUT SLING AND FEELS LIKE SOMETHING MAY HAVE PULLED     Mr. Cunningham is a 57 year old man with a hx of CAD s/p CABG (2020), atrial fibrillation on eliquis, COPD, and diabetes coming in for evaluation of chest pain. Says that it does not feel like his prior STEMI - says it feels muscular in nature. States that he slept poorly last night and feels like he pulled a muscle in his chest. States that the CP is not exertional in nature. No associated SOB, n/v, diaphoresis, or syncope. No increased lower extremity swelling. No orthopnea or PND. Says that he has been doing well, taking all of his meds as prescribed. Says that in the last few years, and especially since his last hospitalization, he has turned his life around: he is eating better, exercising, and has fully stopped smoking.     Chart review shows: Patient was recently hospitalized end of December 2023 for subacute stent thrombosis requiring PCI, then complicated by cardiogenic shock requiring an ICU stay at [facility]. CRT-D was placed during that hospitalization and he was discharged 1/4/2024.        Review of patient's allergies indicates:   Allergen Reactions    Pesticide Dermatitis and Rash     Past Medical History:   Diagnosis Date    Anticoagulant long-term use     Atrial fibrillation 2018    BPH (benign prostatic hyperplasia)     Cataract     COPD (chronic obstructive pulmonary disease)     Coronary artery disease     stents    CVD (cardiovascular disease)     Diabetes mellitus     Erythema multiforme     AKA Denton Edmondson Syndrome    Hypertension     Infected incision     MI (myocardial infarction)     per pt he has had 4     ROSAMARIA on CPAP     RLS (restless legs syndrome)     Perez-Denton syndrome      Past Surgical History:   Procedure Laterality Date    CORONARY  Improved with scheduled tramadol  Continue to monitor   ANGIOGRAPHY INCLUDING BYPASS GRAFTS WITH CATHETERIZATION OF LEFT HEART N/A 9/1/2022    Procedure: ANGIOGRAM, CORONARY, INCLUDING BYPASS GRAFT, WITH LEFT HEART CATHETERIZATION;  Surgeon: Paul Botello MD;  Location: [facility] CATH/EP LAB;  Service: Cardiology;  Laterality: N/A;    CORONARY ANGIOGRAPHY INCLUDING BYPASS GRAFTS WITH CATHETERIZATION OF LEFT HEART Left 12/22/2023    Procedure: ANGIOGRAM, CORONARY, INCLUDING BYPASS GRAFT, WITH LEFT HEART CATHETERIZATION;  Surgeon: Paul Botello MD;  Location: [facility] CATH/EP LAB;  Service: Cardiology;  Laterality: Left;    CORONARY ARTERY BYPASS GRAFT (CABG) N/A 4/7/2020    Procedure: CORONARY ARTERY BYPASS GRAFT (CABG)- Excision of left atrial appendage;  Surgeon: Doug Solitario MD;  Location: [facility] OR;  Service: Cardiothoracic;  Laterality: N/A;    CORONARY BYPASS GRAFT ANGIOGRAPHY  12/25/2023    Procedure: Bypass graft study;  Surgeon: Ashley Valverde MD;  Location: [facility] CATH/EP LAB;  Service: Cardiology;;    CORONARY STENT PLACEMENT      CORONARY STENT PLACEMENT N/A 12/22/2023    Procedure: INSERTION, STENT, CORONARY ARTERY;  Surgeon: Paul Botello MD;  Location: [facility] CATH/EP LAB;  Service: Cardiology;  Laterality: N/A;    WILSON MAZE PROCEDURE N/A 4/7/2020    Procedure: WILSON MAZE PROCEDURE- Attempted;  Surgeon: Doug Solitario MD;  Location: [facility] OR;  Service: Cardiothoracic;  Laterality: N/A;    CYSTOSCOPY N/A 12/10/2018    Procedure: CYSTOSCOPY;  Surgeon: Khushboo Abreu MD;  Location: [facility] OR;  Service: Urology;  Laterality: N/A;    ENDOSCOPIC HARVEST OF VEIN Left 4/7/2020    Procedure: HARVEST-VEIN-ENDOVASCULAR;  Surgeon: Doug Solitario MD;  Location: [facility] OR;  Service: Cardiothoracic;  Laterality: Left;    INCISION OF PERIRECTAL ABSCESS Bilateral 9/1/2023    Procedure: INCISION, ABSCESS, PERIRECTAL;  Surgeon: Brayan Spears MD;  Location: [facility] OR;  Service: General;  Laterality: Bilateral;  stirrups    INSERTION OF INTRA-AORTIC  BALLOON ASSIST DEVICE  12/25/2023    Procedure: INSERTION, INTRA-AORTIC BALLOON PUMP;  Surgeon: Ashley Valverde MD;  Location: [facility] CATH/EP LAB;  Service: Cardiology;;    INSERTION OF TEMPORARY PACEMAKER N/A 12/25/2023    Procedure: INSERTION, PACEMAKER, TEMPORARY;  Surgeon: Ashley Valverde MD;  Location: [facility] CATH/EP LAB;  Service: Cardiology;  Laterality: N/A;    INSERTION, ICD, DUAL CHAMBER N/A 1/2/2024    Procedure: Insertion, ICD, Dual Chamber;  Surgeon: Antonio Callejas MD;  Location: [facility] EP LAB;  Service: Cardiology;  Laterality: N/A;  CM, DUAL ICD, ANES, SJM, MB, 3079    IVUS, CORONARY  12/22/2023    Procedure: IVUS, Coronary;  Surgeon: Paul Botello MD;  Location: [facility] CATH/EP LAB;  Service: Cardiology;;    LEFT HEART CATHETERIZATION Left 3/17/2020    Procedure: CATHETERIZATION, HEART, LEFT;  Surgeon: Tyree Walters MD;  Location: [facility] CATH/EP LAB;  Service: Cardiology;  Laterality: Left;    PERCUTANEOUS CORONARY INTERVENTION, ARTERY N/A 12/22/2023    Procedure: Percutaneous coronary intervention;  Surgeon: Paul Botello MD;  Location: [facility] CATH/EP LAB;  Service: Cardiology;  Laterality: N/A;    PERCUTANEOUS CORONARY INTERVENTION, ARTERY N/A 12/25/2023    Procedure: Percutaneous coronary intervention;  Surgeon: Ashley Valverde MD;  Location: [facility] CATH/EP LAB;  Service: Cardiology;  Laterality: N/A;    PLACEMENT, IABP  12/25/2023    Procedure: Placement, IABP;  Surgeon: Ashley Valverde MD;  Location: [facility] CATH/EP LAB;  Service: Cardiology;;    STERNAL WIRES REMOVAL N/A 6/8/2020    Procedure: REMOVAL, STERNAL WIRE;  Surgeon: Doug Solitario MD;  Location: [facility] OR;  Service: Peripheral Vascular;  Laterality: N/A;    ULTRASOUND OF PROSTATE FOR VOLUME DETERMINATION  12/10/2018    Procedure: ULTRASOUND, PROSTATE, FOR VOLUME DETERMINATION;  Surgeon: Khushboo Abreu MD;  Location: [facility];  Service: Urology;;     Family History   Problem Relation Age of Onset     Heart disease Mother     Diabetes Mother     Hyperlipidemia Father     Cancer Father      Social History     Tobacco Use    Smoking status: Former     Current packs/day: 0.00     Average packs/day: 3.0 packs/day for 30.0 years (90.0 ttl pk-yrs)     Types: Cigarettes     Start date: 4/4/1990     Quit date: 4/4/2020     Years since quitting: 3.8    Smokeless tobacco: Never   Substance Use Topics    Alcohol use: Not Currently    Drug use: Yes     Frequency: 1.0 times per week     Types: Marijuana     Review of Systems   Constitutional:  Negative for activity change, appetite change, diaphoresis and fever.   HENT:  Negative for sore throat, trouble swallowing and voice change.    Eyes:  Negative for visual disturbance.   Respiratory:  Negative for shortness of breath.    Cardiovascular:  Positive for chest pain. Negative for palpitations and leg swelling.   Gastrointestinal:  Negative for abdominal pain, nausea and vomiting.   Genitourinary:  Negative for decreased urine volume.   Musculoskeletal:  Negative for back pain, gait problem, myalgias and neck pain.   Skin:  Negative for rash.   Neurological:  Negative for dizziness, syncope, weakness, light-headedness and headaches.   Hematological:  Does not bruise/bleed easily.       Physical Exam     Initial Vitals [02/01/24 1201]   BP Pulse Resp Temp SpO2   139/72 91 18 98 °F (36.7 °C) 100 %      MAP       --         Physical Exam    Nursing note and vitals reviewed.  Constitutional: He appears well-developed and well-nourished. He is not diaphoretic. No distress.   Walks into CC-03, not distressed, appears calm    HENT:   Head: Normocephalic and atraumatic.   Eyes: Conjunctivae and EOM are normal.   Neck: Neck supple. No JVD present.   Normal range of motion.  Cardiovascular:  Normal rate, regular rhythm and normal heart sounds.           No murmur heard.  Pulmonary/Chest: Breath sounds normal. No respiratory distress. He has no wheezes. He has no rhonchi. He has no  rales. He exhibits tenderness (point TTP left midclavicular around 4-5 ICS).   Abdominal: Abdomen is soft. He exhibits no distension. There is no abdominal tenderness. There is no rebound and no guarding.   Musculoskeletal:         General: No tenderness or edema. Normal range of motion.      Cervical back: Normal range of motion and neck supple.      Comments: Calves = in circumference bilaterally, no lower extremity edema, no TTP popliteal fossa bilaterally     Neurological: He is alert and oriented to person, place, and time. GCS score is 15. GCS eye subscore is 4. GCS verbal subscore is 5. GCS motor subscore is 6.   Skin: Skin is warm and dry.         ED Course   Procedures  Labs Reviewed   COMPREHENSIVE METABOLIC PANEL - Abnormal; Notable for the following components:       Result Value    Sodium 134 (*)     CO2 20 (*)     Glucose 283 (*)     Anion Gap 17 (*)     All other components within normal limits   MAGNESIUM   CBC W/ AUTO DIFFERENTIAL   TROPONIN I HIGH SENSITIVITY     EKG Readings: (Independently Interpreted)   Initial Reading: No STEMI. Rhythm: Normal Sinus Rhythm. Conduction: Normal. ST Segments: Normal ST Segments. T Waves: Normal. Axis: Left Axis Deviation. Clinical Impression: with PACs   NSR, rate 90. One PAC. No LOIS or STEMI equivalent. Per my interpretation.      ECG Results              EKG 12-lead (Final result)  Result time 02/01/24 22:09:30      Final result by Interface, Lab In [facility] (02/01/24 22:09:30)                   Narrative:    Test Reason : R07.9,    Vent. Rate : 090 BPM     Atrial Rate : 090 BPM     P-R Int : 156 ms          QRS Dur : 100 ms      QT Int : 398 ms       P-R-T Axes : 052 -39 041 degrees     QTc Int : 486 ms    Sinus rhythm with Premature atrial complexes with Aberrant conduction  Left axis deviation  Nonspecific ST abnormality  Prolonged QT  Abnormal ECG  When compared with ECG of 02-JAN-2024 16:03,  Sinus rhythm has replaced Electronic ventricular  pacemaker  Confirmed by Jennifer REESE, Norm FIERRO ([id number]) on 2/1/2024 10:09:19 PM    Referred By: [email or web address]   SELF           Confirmed By:Norm Rodriguez MD                                  Imaging Results              X-Ray Chest AP Portable (Final result)  Result time 02/01/24 13:35:30      Final result by Ailyn Singh MD (02/01/24 13:35:30)                   Narrative:    Portable chest x-ray at 1:23 PM is compared to prior study 1-24    Clinical history is chest pain    FINDINGS: The cardiomediastinal silhouette is normal in size. There are median sternotomy wires. There is a left subclavian vein dual lead pacemaker with lead tips overlying the right atrium and right ventricle.  The lungs are clear. There are no acute osseous abnormalities. The visualized portion of the upper abdomen is unremarkable.    IMPRESSION: Prior median sternotomy with no acute pulmonary process    Left subclavian vein pacemaker in stable position    Electronically signed by:  Ailyn Singh MD  02/01/2024 01:35 PM CST Workstation: [id number]                                     Medications - No data to display  Medical Decision Making  Comes in for CP. Complicated CP history of CABG, PCI 2/2 stent thrombosis, and recent pacemaker placement. On eliquis. Normal vitals. Walks without issue today, no appearance of SOB or diaphoresis. Chest wall TTP on exam. Absolutely no physical exam findings indicative of volume overload or DVT.     Quality of chest pain more like MSK strain rather than related to ACS, however due to patient's very complicated cardiac history, obtained labs, EKG, CXR. All of these are wnl - see ED course below and notes above about EKG.     I evaluated the patient initially from a rapid assessment room, then he was moved to waiting room once assessment complete and labs/EKG/CXR obtained. Patient then left the ER prior to being roomed and prior to discussion with me about results, and also prior to completion of his  work-up. I would have ordered a second troponin for this patient at the very least and also would have discussed with him potential admission/continued observation given his CP and high HEART score.     UPDATE:   I called the patient on 2/2/2024 at 4:54 pm to see how he's doing. No answer. Could not leave a message as he has a voicemail that is not set up yet.     Problems Addressed:  Chest pain: complicated acute illness or injury    Amount and/or Complexity of Data Reviewed  External Data Reviewed: ECG and notes.     Details: Prior notes from recent hospitalization 12/26/23-1/4/24  Prior EKG   Labs: ordered. Decision-making details documented in ED Course.  Radiology: ordered. Decision-making details documented in ED Course.  ECG/medicine tests: ordered and independent interpretation performed.     Details: See above    Risk  Diagnosis or treatment significantly limited by social determinants of health.               ED Course as of 02/02/24 1655   Thu Feb 01, 2024   1341 X-Ray Chest AP Portable  Prior median sternotomy with no acute pulmonary process. Left subclavian vein pacemaker in stable position   [EL]   1449 WBC: 8.76 [EL]   1449 Hemoglobin: 15.5 [EL]   1449 Platelet Count: 276 [EL]   1449 Creatinine: 1.2 [EL]   1449 Troponin I High Sensitivity: 10.5 [EL]      ED Course User Index  [EL] Deanne Kumar MD                           Clinical Impression:  Final diagnoses:  [R07.9] Chest pain          ED Disposition Condition    Eloped                 Deanne Kumar MD  Resident  02/02/24 8108

## 2024-07-12 NOTE — LETTER
Patient: Melody Martin  : 1935    Encounter Date: 2024    PROGRESS NOTE    Subjective  Chief complaint: Melody Martin is a 88 y.o. female who is a long term care patient being seen and evaluated for f/u pain.    HPI:  HPI  Patient is seen in follow-up of recent complaints of pain.  Patient did have scheduled tramadol and patient reports pain is better controlled at this time.  Patient seen and examined at the bedside, appears to be in no acute distress.  Nursing staff voicing no other new concerns.    Objective  Vital signs: 100/54, 97.6, 60, 18, 97%    Physical Exam  Constitutional:       General: She is not in acute distress.  Eyes:      Extraocular Movements: Extraocular movements intact.   Cardiovascular:      Rate and Rhythm: Normal rate and regular rhythm.   Pulmonary:      Effort: Pulmonary effort is normal.      Breath sounds: Normal breath sounds.   Abdominal:      General: Bowel sounds are normal.      Palpations: Abdomen is soft.   Musculoskeletal:      Cervical back: Neck supple.      Right lower leg: No edema.      Left lower leg: No edema.   Skin:     Comments: Dressings to sacrum and heel clean dry and intact   Neurological:      Mental Status: She is alert.   Psychiatric:         Mood and Affect: Mood normal.         Behavior: Behavior is cooperative.         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  On HD per nephrology  Isosorbide dinitrate  Metoprolol  Diltiazem decreased to 240 mg daily due to low diastolics at times  Renal diet  Monitor BP  Remove extra fluid in dialysis         Increased pain - Primary     Improved with scheduled tramadol  Continue to monitor           Medications, treatments, and labs reviewed  Continue medications and treatments as listed in EMR    Scribe Attestation  I, Phoebe Martinez   attest that this documentation has been prepared under the direction and in the presence of Wilbert Lock MD    Provider Attestation - Scribe documentation  All medical record entries made by the Scribe were at my direction and personally dictated by me. I have reviewed the chart and agree that the record accurately reflects my personal performance of the history, physical exam, discussion and plan.   Wilbert Lock MD            Electronically Signed By: Wilbert Lock MD   7/12/24  5:55 PM

## 2024-07-12 NOTE — PROGRESS NOTES
PROGRESS NOTE    Subjective   Chief complaint: Melody Martin is a 88 y.o. female who is a long term care patient being seen and evaluated for f/u pain.    HPI:  HPI  Patient is seen in follow-up of recent complaints of pain.  Patient did have scheduled tramadol and patient reports pain is better controlled at this time.  Patient seen and examined at the bedside, appears to be in no acute distress.  Nursing staff voicing no other new concerns.    Objective   Vital signs: 100/54, 97.6, 60, 18, 97%    Physical Exam  Constitutional:       General: She is not in acute distress.  Eyes:      Extraocular Movements: Extraocular movements intact.   Cardiovascular:      Rate and Rhythm: Normal rate and regular rhythm.   Pulmonary:      Effort: Pulmonary effort is normal.      Breath sounds: Normal breath sounds.   Abdominal:      General: Bowel sounds are normal.      Palpations: Abdomen is soft.   Musculoskeletal:      Cervical back: Neck supple.      Right lower leg: No edema.      Left lower leg: No edema.   Skin:     Comments: Dressings to sacrum and heel clean dry and intact   Neurological:      Mental Status: She is alert.   Psychiatric:         Mood and Affect: Mood normal.         Behavior: Behavior is cooperative.         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  On HD per nephrology  Isosorbide dinitrate  Metoprolol  Diltiazem decreased to 240 mg daily due to low diastolics at times  Renal diet  Monitor BP  Remove extra fluid in dialysis         Increased pain - Primary     Improved with scheduled tramadol  Continue to monitor          Medications, treatments, and labs reviewed  Continue medications and  treatments as listed in EMR    Scribe Attestation  I, Phoebe Martinez   attest that this documentation has been prepared under the direction and in the presence of Wilbert Lock MD    Provider Attestation - Scribe documentation  All medical record entries made by the Scribe were at my direction and personally dictated by me. I have reviewed the chart and agree that the record accurately reflects my personal performance of the history, physical exam, discussion and plan.   Wilbert Lock MD

## 2024-07-14 ENCOUNTER — APPOINTMENT (OUTPATIENT)
Dept: RADIOLOGY | Facility: HOSPITAL | Age: 89
End: 2024-07-14
Payer: COMMERCIAL

## 2024-07-14 ENCOUNTER — APPOINTMENT (OUTPATIENT)
Dept: CARDIOLOGY | Facility: HOSPITAL | Age: 89
End: 2024-07-14
Payer: COMMERCIAL

## 2024-07-14 ENCOUNTER — HOSPITAL ENCOUNTER (EMERGENCY)
Facility: HOSPITAL | Age: 89
End: 2024-07-14
Attending: STUDENT IN AN ORGANIZED HEALTH CARE EDUCATION/TRAINING PROGRAM
Payer: COMMERCIAL

## 2024-07-14 VITALS
HEIGHT: 64 IN | HEART RATE: 68 BPM | SYSTOLIC BLOOD PRESSURE: 139 MMHG | WEIGHT: 171.96 LBS | BODY MASS INDEX: 29.36 KG/M2 | OXYGEN SATURATION: 96 % | RESPIRATION RATE: 16 BRPM | TEMPERATURE: 97.1 F | DIASTOLIC BLOOD PRESSURE: 49 MMHG

## 2024-07-14 DIAGNOSIS — N39.0 URINARY TRACT INFECTION WITH HEMATURIA, SITE UNSPECIFIED: Primary | ICD-10-CM

## 2024-07-14 DIAGNOSIS — R10.12 LEFT UPPER QUADRANT ABDOMINAL PAIN: ICD-10-CM

## 2024-07-14 DIAGNOSIS — K55.9: ICD-10-CM

## 2024-07-14 DIAGNOSIS — R31.9 URINARY TRACT INFECTION WITH HEMATURIA, SITE UNSPECIFIED: Primary | ICD-10-CM

## 2024-07-14 DIAGNOSIS — M54.9 BACK PAIN, UNSPECIFIED BACK LOCATION, UNSPECIFIED BACK PAIN LATERALITY, UNSPECIFIED CHRONICITY: ICD-10-CM

## 2024-07-14 PROBLEM — R41.841 COGNITIVE COMMUNICATION DEFICIT: Status: ACTIVE | Noted: 2023-09-24

## 2024-07-14 PROBLEM — R29.898 SEVERE MUSCLE DECONDITIONING: Status: ACTIVE | Noted: 2023-12-27

## 2024-07-14 PROBLEM — M48.061: Status: ACTIVE | Noted: 2023-11-11

## 2024-07-14 PROBLEM — M42.16: Status: ACTIVE | Noted: 2023-10-17

## 2024-07-14 PROBLEM — Z99.2 ESRD ON HEMODIALYSIS (MULTI): Status: ACTIVE | Noted: 2023-12-23

## 2024-07-14 PROBLEM — I69.354 HEMIPLEGIA AND HEMIPARESIS FOLLOWING CEREBRAL INFARCTION AFFECTING LEFT NON-DOMINANT SIDE (MULTI): Status: ACTIVE | Noted: 2023-09-24

## 2024-07-14 PROBLEM — M62.81 MUSCLE WEAKNESS (GENERALIZED): Status: ACTIVE | Noted: 2023-09-24

## 2024-07-14 PROBLEM — R23.9 ALTERATION IN SKIN INTEGRITY DUE TO MOISTURE: Status: ACTIVE | Noted: 2023-12-27

## 2024-07-14 PROBLEM — N18.6 ESRD ON HEMODIALYSIS (MULTI): Status: ACTIVE | Noted: 2023-12-23

## 2024-07-14 PROBLEM — R53.81 PHYSICAL DECONDITIONING: Status: ACTIVE | Noted: 2023-11-10

## 2024-07-14 LAB
ALBUMIN SERPL BCP-MCNC: 2.9 G/DL (ref 3.4–5)
ALP SERPL-CCNC: 121 U/L (ref 33–136)
ALT SERPL W P-5'-P-CCNC: 24 U/L (ref 7–45)
ANION GAP BLDV CALCULATED.4IONS-SCNC: 10 MMOL/L (ref 10–25)
ANION GAP SERPL CALC-SCNC: 19 MMOL/L (ref 10–20)
APPEARANCE UR: ABNORMAL
AST SERPL W P-5'-P-CCNC: 51 U/L (ref 9–39)
BACTERIA #/AREA URNS AUTO: ABNORMAL /HPF
BACTERIA BLD CULT: NORMAL
BASE EXCESS BLDV CALC-SCNC: 1 MMOL/L (ref -2–3)
BASOPHILS # BLD AUTO: 0.12 X10*3/UL (ref 0–0.1)
BASOPHILS NFR BLD AUTO: 0.4 %
BILIRUB SERPL-MCNC: 0.4 MG/DL (ref 0–1.2)
BILIRUB UR STRIP.AUTO-MCNC: NEGATIVE MG/DL
BODY TEMPERATURE: 37 DEGREES CELSIUS
BUN SERPL-MCNC: 55 MG/DL (ref 6–23)
CA-I BLDV-SCNC: 1.08 MMOL/L (ref 1.1–1.33)
CALCIUM SERPL-MCNC: 8.4 MG/DL (ref 8.6–10.3)
CARDIAC TROPONIN I PNL SERPL HS: 15 NG/L (ref 0–13)
CHLORIDE BLDV-SCNC: 100 MMOL/L (ref 98–107)
CHLORIDE SERPL-SCNC: 98 MMOL/L (ref 98–107)
CO2 SERPL-SCNC: 26 MMOL/L (ref 21–32)
COLOR UR: ABNORMAL
CREAT SERPL-MCNC: 3.34 MG/DL (ref 0.5–1.05)
EGFRCR SERPLBLD CKD-EPI 2021: 13 ML/MIN/1.73M*2
EOSINOPHIL # BLD AUTO: 0 X10*3/UL (ref 0–0.4)
EOSINOPHIL NFR BLD AUTO: 0 %
ERYTHROCYTE [DISTWIDTH] IN BLOOD BY AUTOMATED COUNT: 18.9 % (ref 11.5–14.5)
GLUCOSE BLDV-MCNC: 237 MG/DL (ref 74–99)
GLUCOSE SERPL-MCNC: 227 MG/DL (ref 74–99)
GLUCOSE UR STRIP.AUTO-MCNC: ABNORMAL MG/DL
HCO3 BLDV-SCNC: 27.1 MMOL/L (ref 22–26)
HCT VFR BLD AUTO: 42.3 % (ref 36–46)
HCT VFR BLD EST: 41 % (ref 36–46)
HGB BLD-MCNC: 13.1 G/DL (ref 12–16)
HGB BLDV-MCNC: 13.6 G/DL (ref 12–16)
IMM GRANULOCYTES # BLD AUTO: 0.56 X10*3/UL (ref 0–0.5)
IMM GRANULOCYTES NFR BLD AUTO: 2 % (ref 0–0.9)
INHALED O2 CONCENTRATION: 0 %
KETONES UR STRIP.AUTO-MCNC: ABNORMAL MG/DL
LACTATE BLDV-SCNC: 3.9 MMOL/L (ref 0.4–2)
LACTATE BLDV-SCNC: 3.9 MMOL/L (ref 0.4–2)
LEUKOCYTE ESTERASE UR QL STRIP.AUTO: ABNORMAL
LYMPHOCYTES # BLD AUTO: 1.69 X10*3/UL (ref 0.8–3)
LYMPHOCYTES NFR BLD AUTO: 6.2 %
MAGNESIUM SERPL-MCNC: 2.15 MG/DL (ref 1.6–2.4)
MCH RBC QN AUTO: 29 PG (ref 26–34)
MCHC RBC AUTO-ENTMCNC: 31 G/DL (ref 32–36)
MCV RBC AUTO: 94 FL (ref 80–100)
MONOCYTES # BLD AUTO: 1.57 X10*3/UL (ref 0.05–0.8)
MONOCYTES NFR BLD AUTO: 5.7 %
NEUTROPHILS # BLD AUTO: 23.42 X10*3/UL (ref 1.6–5.5)
NEUTROPHILS NFR BLD AUTO: 85.7 %
NITRITE UR QL STRIP.AUTO: NEGATIVE
NRBC BLD-RTO: 0 /100 WBCS (ref 0–0)
OXYHGB MFR BLDV: 60.9 % (ref 45–75)
PCO2 BLDV: 48 MM HG (ref 41–51)
PH BLDV: 7.36 PH (ref 7.33–7.43)
PH UR STRIP.AUTO: 7 [PH]
PLATELET # BLD AUTO: 339 X10*3/UL (ref 150–450)
PO2 BLDV: 44 MM HG (ref 35–45)
POTASSIUM BLDV-SCNC: 5.5 MMOL/L (ref 3.5–5.3)
POTASSIUM SERPL-SCNC: 5.4 MMOL/L (ref 3.5–5.3)
PROT SERPL-MCNC: 6.1 G/DL (ref 6.4–8.2)
PROT UR STRIP.AUTO-MCNC: ABNORMAL MG/DL
RBC # BLD AUTO: 4.51 X10*6/UL (ref 4–5.2)
RBC # UR STRIP.AUTO: ABNORMAL /UL
RBC #/AREA URNS AUTO: >20 /HPF
SAO2 % BLDV: 62 % (ref 45–75)
SODIUM BLDV-SCNC: 132 MMOL/L (ref 136–145)
SODIUM SERPL-SCNC: 138 MMOL/L (ref 136–145)
SP GR UR STRIP.AUTO: 1.02
UROBILINOGEN UR STRIP.AUTO-MCNC: ABNORMAL MG/DL
WBC # BLD AUTO: 27.4 X10*3/UL (ref 4.4–11.3)
WBC #/AREA URNS AUTO: >50 /HPF

## 2024-07-14 PROCEDURE — 96360 HYDRATION IV INFUSION INIT: CPT

## 2024-07-14 PROCEDURE — 2550000001 HC RX 255 CONTRASTS: Performed by: STUDENT IN AN ORGANIZED HEALTH CARE EDUCATION/TRAINING PROGRAM

## 2024-07-14 PROCEDURE — 74174 CTA ABD&PLVS W/CONTRAST: CPT | Performed by: STUDENT IN AN ORGANIZED HEALTH CARE EDUCATION/TRAINING PROGRAM

## 2024-07-14 PROCEDURE — 87040 BLOOD CULTURE FOR BACTERIA: CPT | Mod: PORLAB | Performed by: STUDENT IN AN ORGANIZED HEALTH CARE EDUCATION/TRAINING PROGRAM

## 2024-07-14 PROCEDURE — 83735 ASSAY OF MAGNESIUM: CPT | Performed by: STUDENT IN AN ORGANIZED HEALTH CARE EDUCATION/TRAINING PROGRAM

## 2024-07-14 PROCEDURE — 84132 ASSAY OF SERUM POTASSIUM: CPT | Performed by: STUDENT IN AN ORGANIZED HEALTH CARE EDUCATION/TRAINING PROGRAM

## 2024-07-14 PROCEDURE — 2500000004 HC RX 250 GENERAL PHARMACY W/ HCPCS (ALT 636 FOR OP/ED): Performed by: STUDENT IN AN ORGANIZED HEALTH CARE EDUCATION/TRAINING PROGRAM

## 2024-07-14 PROCEDURE — 87086 URINE CULTURE/COLONY COUNT: CPT | Mod: PORLAB | Performed by: STUDENT IN AN ORGANIZED HEALTH CARE EDUCATION/TRAINING PROGRAM

## 2024-07-14 PROCEDURE — 74174 CTA ABD&PLVS W/CONTRAST: CPT

## 2024-07-14 PROCEDURE — 96375 TX/PRO/DX INJ NEW DRUG ADDON: CPT

## 2024-07-14 PROCEDURE — 71045 X-RAY EXAM CHEST 1 VIEW: CPT | Performed by: STUDENT IN AN ORGANIZED HEALTH CARE EDUCATION/TRAINING PROGRAM

## 2024-07-14 PROCEDURE — 36415 COLL VENOUS BLD VENIPUNCTURE: CPT | Performed by: STUDENT IN AN ORGANIZED HEALTH CARE EDUCATION/TRAINING PROGRAM

## 2024-07-14 PROCEDURE — 99223 1ST HOSP IP/OBS HIGH 75: CPT | Performed by: SURGERY

## 2024-07-14 PROCEDURE — 99285 EMERGENCY DEPT VISIT HI MDM: CPT | Mod: 25

## 2024-07-14 PROCEDURE — 96365 THER/PROPH/DIAG IV INF INIT: CPT

## 2024-07-14 PROCEDURE — 93005 ELECTROCARDIOGRAM TRACING: CPT

## 2024-07-14 PROCEDURE — 85025 COMPLETE CBC W/AUTO DIFF WBC: CPT | Performed by: STUDENT IN AN ORGANIZED HEALTH CARE EDUCATION/TRAINING PROGRAM

## 2024-07-14 PROCEDURE — 83605 ASSAY OF LACTIC ACID: CPT | Performed by: STUDENT IN AN ORGANIZED HEALTH CARE EDUCATION/TRAINING PROGRAM

## 2024-07-14 PROCEDURE — 84484 ASSAY OF TROPONIN QUANT: CPT | Performed by: STUDENT IN AN ORGANIZED HEALTH CARE EDUCATION/TRAINING PROGRAM

## 2024-07-14 PROCEDURE — 81003 URINALYSIS AUTO W/O SCOPE: CPT | Performed by: STUDENT IN AN ORGANIZED HEALTH CARE EDUCATION/TRAINING PROGRAM

## 2024-07-14 PROCEDURE — 71045 X-RAY EXAM CHEST 1 VIEW: CPT

## 2024-07-14 RX ORDER — ONDANSETRON HYDROCHLORIDE 2 MG/ML
4 INJECTION, SOLUTION INTRAVENOUS EVERY 6 HOURS PRN
Status: DISCONTINUED | OUTPATIENT
Start: 2024-07-14 | End: 2024-07-18 | Stop reason: HOSPADM

## 2024-07-14 RX ORDER — BISACODYL 5 MG
10 TABLET, DELAYED RELEASE (ENTERIC COATED) ORAL DAILY PRN
Status: DISCONTINUED | OUTPATIENT
Start: 2024-07-14 | End: 2024-07-18 | Stop reason: HOSPADM

## 2024-07-14 RX ORDER — ACETAMINOPHEN 325 MG/1
650 TABLET ORAL EVERY 4 HOURS PRN
Status: DISCONTINUED | OUTPATIENT
Start: 2024-07-14 | End: 2024-07-18 | Stop reason: HOSPADM

## 2024-07-14 RX ORDER — ATORVASTATIN CALCIUM 40 MG/1
80 TABLET, FILM COATED ORAL NIGHTLY
Status: DISCONTINUED | OUTPATIENT
Start: 2024-07-14 | End: 2024-07-18 | Stop reason: HOSPADM

## 2024-07-14 RX ORDER — PANTOPRAZOLE SODIUM 40 MG/10ML
40 INJECTION, POWDER, LYOPHILIZED, FOR SOLUTION INTRAVENOUS DAILY
Status: DISCONTINUED | OUTPATIENT
Start: 2024-07-15 | End: 2024-07-18 | Stop reason: HOSPADM

## 2024-07-14 RX ORDER — ALBUTEROL SULFATE 90 UG/1
2 AEROSOL, METERED RESPIRATORY (INHALATION) EVERY 12 HOURS PRN
Status: DISCONTINUED | OUTPATIENT
Start: 2024-07-14 | End: 2024-07-18 | Stop reason: HOSPADM

## 2024-07-14 RX ORDER — POLYETHYLENE GLYCOL 3350 17 G/17G
17 POWDER, FOR SOLUTION ORAL DAILY
Status: DISCONTINUED | OUTPATIENT
Start: 2024-07-15 | End: 2024-07-18 | Stop reason: HOSPADM

## 2024-07-14 RX ORDER — BUSPIRONE HYDROCHLORIDE 10 MG/1
10 TABLET ORAL 2 TIMES DAILY
COMMUNITY

## 2024-07-14 RX ORDER — INSULIN LISPRO 100 [IU]/ML
0-10 INJECTION, SOLUTION INTRAVENOUS; SUBCUTANEOUS
Status: DISCONTINUED | OUTPATIENT
Start: 2024-07-15 | End: 2024-07-18 | Stop reason: HOSPADM

## 2024-07-14 RX ORDER — MIRTAZAPINE 7.5 MG/1
7.5 TABLET, FILM COATED ORAL NIGHTLY
COMMUNITY

## 2024-07-14 RX ORDER — AMLODIPINE BESYLATE 5 MG/1
TABLET ORAL
Status: ON HOLD | COMMUNITY
Start: 2024-02-12 | End: 2024-07-15 | Stop reason: WASHOUT

## 2024-07-14 RX ORDER — CLONIDINE HYDROCHLORIDE 0.1 MG/1
TABLET ORAL
Status: ON HOLD | COMMUNITY
Start: 2024-04-20 | End: 2024-07-15 | Stop reason: WASHOUT

## 2024-07-14 RX ORDER — PANTOPRAZOLE SODIUM 40 MG/1
40 TABLET, DELAYED RELEASE ORAL DAILY
Status: DISCONTINUED | OUTPATIENT
Start: 2024-07-15 | End: 2024-07-18 | Stop reason: HOSPADM

## 2024-07-14 RX ORDER — AMIODARONE HYDROCHLORIDE 200 MG/1
200 TABLET ORAL DAILY
Status: DISCONTINUED | OUTPATIENT
Start: 2024-07-15 | End: 2024-07-18 | Stop reason: HOSPADM

## 2024-07-14 RX ORDER — GUAIFENESIN 600 MG/1
600 TABLET, EXTENDED RELEASE ORAL EVERY 12 HOURS PRN
Status: DISCONTINUED | OUTPATIENT
Start: 2024-07-14 | End: 2024-07-18 | Stop reason: HOSPADM

## 2024-07-14 RX ORDER — INSULIN GLARGINE-YFGN 100 [IU]/ML
INJECTION, SOLUTION SUBCUTANEOUS
Status: ON HOLD | COMMUNITY
Start: 2024-04-21 | End: 2024-07-15 | Stop reason: WASHOUT

## 2024-07-14 RX ORDER — ALBUTEROL SULFATE 90 UG/1
2 AEROSOL, METERED RESPIRATORY (INHALATION) EVERY 12 HOURS PRN
COMMUNITY
Start: 2024-01-08

## 2024-07-14 RX ORDER — CLOPIDOGREL BISULFATE 75 MG/1
75 TABLET ORAL DAILY
Status: DISCONTINUED | OUTPATIENT
Start: 2024-07-15 | End: 2024-07-18 | Stop reason: HOSPADM

## 2024-07-14 RX ORDER — DEXTROSE 50 % IN WATER (D50W) INTRAVENOUS SYRINGE
25
Status: DISCONTINUED | OUTPATIENT
Start: 2024-07-14 | End: 2024-07-18 | Stop reason: HOSPADM

## 2024-07-14 RX ORDER — ONDANSETRON HYDROCHLORIDE 2 MG/ML
4 INJECTION, SOLUTION INTRAVENOUS ONCE
Status: COMPLETED | OUTPATIENT
Start: 2024-07-14 | End: 2024-07-14

## 2024-07-14 RX ORDER — FENTANYL CITRATE 50 UG/ML
50 INJECTION, SOLUTION INTRAMUSCULAR; INTRAVENOUS ONCE
Status: COMPLETED | OUTPATIENT
Start: 2024-07-14 | End: 2024-07-14

## 2024-07-14 RX ORDER — BUMETANIDE 1 MG/1
1 TABLET ORAL
Status: DISCONTINUED | OUTPATIENT
Start: 2024-07-15 | End: 2024-07-18 | Stop reason: HOSPADM

## 2024-07-14 RX ORDER — GABAPENTIN 100 MG/1
CAPSULE ORAL
Status: ON HOLD | COMMUNITY
Start: 2023-10-17 | End: 2024-07-15 | Stop reason: WASHOUT

## 2024-07-14 RX ORDER — SODIUM CHLORIDE, SODIUM LACTATE, POTASSIUM CHLORIDE, CALCIUM CHLORIDE 600; 310; 30; 20 MG/100ML; MG/100ML; MG/100ML; MG/100ML
75 INJECTION, SOLUTION INTRAVENOUS CONTINUOUS
Status: ACTIVE | OUTPATIENT
Start: 2024-07-14 | End: 2024-07-15

## 2024-07-14 RX ORDER — DEXTROSE 50 % IN WATER (D50W) INTRAVENOUS SYRINGE
12.5
Status: DISCONTINUED | OUTPATIENT
Start: 2024-07-14 | End: 2024-07-18 | Stop reason: HOSPADM

## 2024-07-14 RX ORDER — LISINOPRIL 20 MG/1
20 TABLET ORAL NIGHTLY
Status: DISCONTINUED | OUTPATIENT
Start: 2024-07-14 | End: 2024-07-18 | Stop reason: HOSPADM

## 2024-07-14 RX ORDER — TIZANIDINE HYDROCHLORIDE 4 MG/1
CAPSULE, GELATIN COATED ORAL
Status: ON HOLD | COMMUNITY
Start: 2024-04-24 | End: 2024-07-15 | Stop reason: WASHOUT

## 2024-07-14 RX ORDER — HYDROCHLOROTHIAZIDE 12.5 MG/1
12.5 CAPSULE ORAL DAILY
Status: DISCONTINUED | OUTPATIENT
Start: 2024-07-15 | End: 2024-07-18 | Stop reason: HOSPADM

## 2024-07-14 RX ORDER — DILTIAZEM HYDROCHLORIDE 240 MG/1
240 CAPSULE, EXTENDED RELEASE ORAL NIGHTLY
COMMUNITY
Start: 2024-05-27

## 2024-07-14 RX ORDER — METHOCARBAMOL 500 MG/1
TABLET, FILM COATED ORAL
Status: ON HOLD | COMMUNITY
Start: 2024-04-21 | End: 2024-07-15 | Stop reason: WASHOUT

## 2024-07-14 RX ORDER — TALC
3 POWDER (GRAM) TOPICAL NIGHTLY PRN
Status: DISCONTINUED | OUTPATIENT
Start: 2024-07-14 | End: 2024-07-18 | Stop reason: HOSPADM

## 2024-07-14 RX ADMIN — IOHEXOL 100 ML: 350 INJECTION, SOLUTION INTRAVENOUS at 19:50

## 2024-07-14 RX ADMIN — SODIUM CHLORIDE, POTASSIUM CHLORIDE, SODIUM LACTATE AND CALCIUM CHLORIDE 1000 ML: 600; 310; 30; 20 INJECTION, SOLUTION INTRAVENOUS at 20:50

## 2024-07-14 RX ADMIN — PIPERACILLIN SODIUM AND TAZOBACTAM SODIUM 2.25 G: 2; .25 INJECTION, SOLUTION INTRAVENOUS at 20:32

## 2024-07-14 RX ADMIN — ONDANSETRON 4 MG: 2 INJECTION INTRAMUSCULAR; INTRAVENOUS at 19:37

## 2024-07-14 RX ADMIN — FENTANYL CITRATE 50 MCG: 50 INJECTION INTRAMUSCULAR; INTRAVENOUS at 19:37

## 2024-07-14 ASSESSMENT — COLUMBIA-SUICIDE SEVERITY RATING SCALE - C-SSRS
1. IN THE PAST MONTH, HAVE YOU WISHED YOU WERE DEAD OR WISHED YOU COULD GO TO SLEEP AND NOT WAKE UP?: NO
2. HAVE YOU ACTUALLY HAD ANY THOUGHTS OF KILLING YOURSELF?: NO
6. HAVE YOU EVER DONE ANYTHING, STARTED TO DO ANYTHING, OR PREPARED TO DO ANYTHING TO END YOUR LIFE?: NO

## 2024-07-14 ASSESSMENT — PAIN DESCRIPTION - LOCATION: LOCATION: ABDOMEN

## 2024-07-14 ASSESSMENT — LIFESTYLE VARIABLES
HAVE YOU EVER FELT YOU SHOULD CUT DOWN ON YOUR DRINKING: NO
HAVE PEOPLE ANNOYED YOU BY CRITICIZING YOUR DRINKING: NO
TOTAL SCORE: 0
EVER FELT BAD OR GUILTY ABOUT YOUR DRINKING: NO
EVER HAD A DRINK FIRST THING IN THE MORNING TO STEADY YOUR NERVES TO GET RID OF A HANGOVER: NO

## 2024-07-14 ASSESSMENT — ENCOUNTER SYMPTOMS
PALPITATIONS: 0
COUGH: 0
SHORTNESS OF BREATH: 0
DIZZINESS: 0
CONSTIPATION: 0
VOMITING: 0
HEADACHES: 0
BLOOD IN STOOL: 0
ABDOMINAL PAIN: 1
NAUSEA: 0
FEVER: 0
DIARRHEA: 1
CHILLS: 0

## 2024-07-14 ASSESSMENT — PAIN - FUNCTIONAL ASSESSMENT: PAIN_FUNCTIONAL_ASSESSMENT: 0-10

## 2024-07-14 ASSESSMENT — PAIN SCALES - GENERAL
PAINLEVEL_OUTOF10: 5 - MODERATE PAIN
PAINLEVEL_OUTOF10: 8

## 2024-07-14 ASSESSMENT — PAIN DESCRIPTION - PAIN TYPE: TYPE: ACUTE PAIN

## 2024-07-14 NOTE — ED PROVIDER NOTES
"Chief Complaint   Patient presents with    abdominal pain    nursing home sent in for concern for sepsis        HPI:  88 y.o. female with a history of ESRD on HD last dialyzed yesterday, HTN, CVA, peripheral arterial disease who is presenting to the ED with abdominal pain.  Patient currently resides in a nursing home and per EMS patient was sent to the ED for \"concern for sepsis\".  Unclear why nursing home was concern for sepsis.  Patient says she has had left sided abdominal pain for the past day.  She reports having nausea and vomiting as well as recurrent episodes of diarrhea that started this afternoon.  She denies any fevers chills, dysuria, or hematuri.  She has had similar pain with constipation but states today's severity is much worse.  No chest pain or shortness of breath.    History provided by: patient  Limitations to history: none    ------------------------------------------------------------------------------------------------------------------------------------------    Physical Exam:  T 36.2 °C (97.1 °F)  HR 67  BP (!) 120/48  RR 16  O2 98 % None (Room air)    Triage vitals reviewed.  Constitutional: Elderly female in no acute distress, nontoxic  Head: Normocephalic, atraumatic  Skin: Grade 3 sacral ulcer with mild surrounding erythema.  Scattered circular abrasions over entire back.  Eyes: Pupils are equal. No conjunctival injection.  Neck: Supple. Trachea is midline.  Pulmonary: Normal work of breathing with no accessory muscle use noted.  Clear to auscultation bilaterally. No wheezing, rhonchi, or rales.   Cardiovascular: RRR. 2+ radial pulses bilaterally.   Abdomen: Abdomen is mildly distended but still compressible.  She has pain out of proportion to exam notably on palpation of the left upper and lower quadrants.  Extremities: LLE in boot. No gross deformities.    Neuro: Awake and alert. Face is symmetric.  Speech is clear.   Psych: Appropriate mood and " affect.    ------------------------------------------------------------------------------------------------------------------------------------------  EKG interpreted by me showing sinus rhythm, rate of 68 bpm, prolonged MA interval, no acute ST elevations/depressions.    ED Course:  ED Course as of 07/15/24 1158   Sun Jul 14, 2024 1954 LEUKOCYTES (10*3/UL) IN BLOOD BY AUTOMATED COUNT, Romans[place](!): 27.4  Leukocytosis of 27 with elevated lactate at 3.  Blood cultures ordered.  Will start on Zosyn for possible intra-abdominal infection.  IV fluids additionally ordered with 1 L of LR but will reevaluate need for further fluids given she does have history of ESRD on dialysis. [DR]   2135 CTA showing bowel wall thickening in the sigmoid colon that could be representative of bowel ischemia.  Given her elevated lactate and abdominal pain will consult surgery for recommendations.  She does have a UTI and will be covered by Zosyn that has already been started. []   2300 Discussed with Dr. Everett from general surgery who evaluated patient at bedside.  She has had several bowel movements and rectal exam did not show any stool ball so lower suspicion for stercoral colitis.  Did have goals of care conversation with patient and she would not want to undergo any aggressive surgery at this time.  Will continue with medical management with antibiotics, pain control, bowel regimen.  Case was discussed with internal medicine team and will plan to admit patient to stepdown unit for further care. []      ED Course User Index  [] Cadence Kirkpatrick,          Diagnoses as of 07/15/24 1158   Urinary tract infection with hematuria, site unspecified   Left upper quadrant abdominal pain   Intestinal ischemia (Multi)        Medical Decision Making:  This patient is a 88 y.o. female with a history of ESRD on HD last dialyzed yesterday, HTN, CVA, peripheral arterial disease who is presenting to the ED with abdominal pain, nausea, vomiting,  and diarrhea.  On arrival her vitals are notable for a low diastolic blood pressure but maps are above 65.  No fevers or significant tachycardia.  She does have pain out of proportion to exam of the left abdomen.  Differential includes mesenteric ischemia, diverticulitis, pyelonephritis, obstruction.  Basic labs, CTA ordered.  Started on Zofran and fentanyl for symptom control.  See ED course for remainder of details.    Clinical Impression:  UTI  Concern for bowel ischemia versus developing stercoral colitis    Disposition:  Admit to stepdown     Cadence Kirkpatrick DO  Emergency Medicine         Cadence Kirkpatrick DO  07/15/24 1200

## 2024-07-15 ENCOUNTER — APPOINTMENT (OUTPATIENT)
Dept: RADIOLOGY | Facility: HOSPITAL | Age: 89
End: 2024-07-15
Payer: COMMERCIAL

## 2024-07-15 ENCOUNTER — APPOINTMENT (OUTPATIENT)
Dept: DIALYSIS | Facility: HOSPITAL | Age: 89
End: 2024-07-15
Payer: COMMERCIAL

## 2024-07-15 PROBLEM — K52.9 COLITIS: Status: ACTIVE | Noted: 2024-07-15

## 2024-07-15 LAB
ALBUMIN SERPL BCP-MCNC: 2.7 G/DL (ref 3.4–5)
ALP SERPL-CCNC: 101 U/L (ref 33–136)
ALT SERPL W P-5'-P-CCNC: 26 U/L (ref 7–45)
ANION GAP SERPL CALC-SCNC: 18 MMOL/L (ref 10–20)
AST SERPL W P-5'-P-CCNC: 53 U/L (ref 9–39)
ATRIAL RATE: 68 BPM
BASOPHILS # BLD MANUAL: 0 X10*3/UL (ref 0–0.1)
BASOPHILS NFR BLD MANUAL: 0 %
BILIRUB SERPL-MCNC: 0.3 MG/DL (ref 0–1.2)
BUN SERPL-MCNC: 57 MG/DL (ref 6–23)
CALCIUM SERPL-MCNC: 8.4 MG/DL (ref 8.6–10.3)
CHLORIDE SERPL-SCNC: 100 MMOL/L (ref 98–107)
CHOLEST SERPL-MCNC: 90 MG/DL (ref 0–199)
CHOLESTEROL/HDL RATIO: 2.9
CO2 SERPL-SCNC: 25 MMOL/L (ref 21–32)
CREAT SERPL-MCNC: 3.29 MG/DL (ref 0.5–1.05)
EGFRCR SERPLBLD CKD-EPI 2021: 13 ML/MIN/1.73M*2
EOSINOPHIL # BLD MANUAL: 0 X10*3/UL (ref 0–0.4)
EOSINOPHIL NFR BLD MANUAL: 0 %
ERYTHROCYTE [DISTWIDTH] IN BLOOD BY AUTOMATED COUNT: 19 % (ref 11.5–14.5)
EST. AVERAGE GLUCOSE BLD GHB EST-MCNC: 128 MG/DL
GLUCOSE BLD MANUAL STRIP-MCNC: 138 MG/DL (ref 74–99)
GLUCOSE BLD MANUAL STRIP-MCNC: 138 MG/DL (ref 74–99)
GLUCOSE BLD MANUAL STRIP-MCNC: 149 MG/DL (ref 74–99)
GLUCOSE BLD MANUAL STRIP-MCNC: 179 MG/DL (ref 74–99)
GLUCOSE BLD MANUAL STRIP-MCNC: 209 MG/DL (ref 74–99)
GLUCOSE SERPL-MCNC: 171 MG/DL (ref 74–99)
HBA1C MFR BLD: 6.1 %
HCT VFR BLD AUTO: 39 % (ref 36–46)
HDLC SERPL-MCNC: 30.6 MG/DL
HGB BLD-MCNC: 12.4 G/DL (ref 12–16)
IMM GRANULOCYTES # BLD AUTO: 0.46 X10*3/UL (ref 0–0.5)
IMM GRANULOCYTES NFR BLD AUTO: 1.9 % (ref 0–0.9)
LACTATE SERPL-SCNC: 2.2 MMOL/L (ref 0.4–2)
LACTATE SERPL-SCNC: 2.5 MMOL/L (ref 0.4–2)
LDLC SERPL CALC-MCNC: 34 MG/DL
LYMPHOCYTES # BLD MANUAL: 2.44 X10*3/UL (ref 0.8–3)
LYMPHOCYTES NFR BLD MANUAL: 10 %
MAGNESIUM SERPL-MCNC: 2.07 MG/DL (ref 1.6–2.4)
MCH RBC QN AUTO: 29.3 PG (ref 26–34)
MCHC RBC AUTO-ENTMCNC: 31.8 G/DL (ref 32–36)
MCV RBC AUTO: 92 FL (ref 80–100)
MONOCYTES # BLD MANUAL: 0.98 X10*3/UL (ref 0.05–0.8)
MONOCYTES NFR BLD MANUAL: 4 %
NEUTROPHILS # BLD MANUAL: 20.98 X10*3/UL (ref 1.6–5.5)
NEUTS BAND # BLD MANUAL: 2.68 X10*3/UL (ref 0–0.5)
NEUTS BAND NFR BLD MANUAL: 11 %
NEUTS SEG # BLD MANUAL: 18.3 X10*3/UL (ref 1.6–5)
NEUTS SEG NFR BLD MANUAL: 75 %
NON HDL CHOLESTEROL: 59 MG/DL (ref 0–149)
NRBC BLD-RTO: 0 /100 WBCS (ref 0–0)
P AXIS: -53 DEGREES
PHOSPHATE SERPL-MCNC: 6.4 MG/DL (ref 2.5–4.9)
PLATELET # BLD AUTO: 300 X10*3/UL (ref 150–450)
POTASSIUM SERPL-SCNC: 5.8 MMOL/L (ref 3.5–5.3)
PR INTERVAL: 257 MS
PROT SERPL-MCNC: 5.7 G/DL (ref 6.4–8.2)
Q ONSET: 251 MS
QRS COUNT: 11 BEATS
QRS DURATION: 101 MS
QT INTERVAL: 443 MS
QTC CALCULATION(BAZETT): 472 MS
QTC FREDERICIA: 462 MS
R AXIS: 19 DEGREES
RBC # BLD AUTO: 4.23 X10*6/UL (ref 4–5.2)
RBC MORPH BLD: ABNORMAL
SODIUM SERPL-SCNC: 137 MMOL/L (ref 136–145)
T AXIS: 61 DEGREES
T OFFSET: 472 MS
TOTAL CELLS COUNTED BLD: 100
TRIGL SERPL-MCNC: 126 MG/DL (ref 0–149)
VENTRICULAR RATE: 68 BPM
VLDL: 25 MG/DL (ref 0–40)
WBC # BLD AUTO: 24.4 X10*3/UL (ref 4.4–11.3)

## 2024-07-15 PROCEDURE — 83735 ASSAY OF MAGNESIUM: CPT | Performed by: INTERNAL MEDICINE

## 2024-07-15 PROCEDURE — 84100 ASSAY OF PHOSPHORUS: CPT | Performed by: INTERNAL MEDICINE

## 2024-07-15 PROCEDURE — 99231 SBSQ HOSP IP/OBS SF/LOW 25: CPT | Performed by: SURGERY

## 2024-07-15 PROCEDURE — 99223 1ST HOSP IP/OBS HIGH 75: CPT

## 2024-07-15 PROCEDURE — 2060000001 HC INTERMEDIATE ICU ROOM DAILY

## 2024-07-15 PROCEDURE — 83605 ASSAY OF LACTIC ACID: CPT

## 2024-07-15 PROCEDURE — 5A1D70Z PERFORMANCE OF URINARY FILTRATION, INTERMITTENT, LESS THAN 6 HOURS PER DAY: ICD-10-PCS | Performed by: INTERNAL MEDICINE

## 2024-07-15 PROCEDURE — 85027 COMPLETE CBC AUTOMATED: CPT

## 2024-07-15 PROCEDURE — 82947 ASSAY GLUCOSE BLOOD QUANT: CPT

## 2024-07-15 PROCEDURE — 85007 BL SMEAR W/DIFF WBC COUNT: CPT

## 2024-07-15 PROCEDURE — 99233 SBSQ HOSP IP/OBS HIGH 50: CPT | Performed by: INTERNAL MEDICINE

## 2024-07-15 PROCEDURE — 80053 COMPREHEN METABOLIC PANEL: CPT

## 2024-07-15 PROCEDURE — 8010000001 HC DIALYSIS - HEMODIALYSIS PER DAY

## 2024-07-15 PROCEDURE — 74018 RADEX ABDOMEN 1 VIEW: CPT | Performed by: RADIOLOGY

## 2024-07-15 PROCEDURE — 36415 COLL VENOUS BLD VENIPUNCTURE: CPT

## 2024-07-15 PROCEDURE — 2500000001 HC RX 250 WO HCPCS SELF ADMINISTERED DRUGS (ALT 637 FOR MEDICARE OP)

## 2024-07-15 PROCEDURE — 2500000004 HC RX 250 GENERAL PHARMACY W/ HCPCS (ALT 636 FOR OP/ED)

## 2024-07-15 PROCEDURE — 80061 LIPID PANEL: CPT

## 2024-07-15 PROCEDURE — 2500000002 HC RX 250 W HCPCS SELF ADMINISTERED DRUGS (ALT 637 FOR MEDICARE OP, ALT 636 FOR OP/ED)

## 2024-07-15 PROCEDURE — 74018 RADEX ABDOMEN 1 VIEW: CPT

## 2024-07-15 PROCEDURE — 83036 HEMOGLOBIN GLYCOSYLATED A1C: CPT

## 2024-07-15 PROCEDURE — 2500000004 HC RX 250 GENERAL PHARMACY W/ HCPCS (ALT 636 FOR OP/ED): Performed by: INTERNAL MEDICINE

## 2024-07-15 RX ORDER — LIDOCAINE 40 MG/G
1 CREAM TOPICAL DAILY
COMMUNITY

## 2024-07-15 RX ORDER — HEPARIN SODIUM 1000 [USP'U]/ML
1000 INJECTION, SOLUTION INTRAVENOUS; SUBCUTANEOUS
Status: DISCONTINUED | OUTPATIENT
Start: 2024-07-16 | End: 2024-07-18 | Stop reason: HOSPADM

## 2024-07-15 RX ORDER — GUAIFENESIN 600 MG/1
600 TABLET, EXTENDED RELEASE ORAL 2 TIMES DAILY
COMMUNITY
Start: 2024-01-08

## 2024-07-15 RX ORDER — CLOTRIMAZOLE AND BETAMETHASONE DIPROPIONATE 10; .64 MG/G; MG/G
1 CREAM TOPICAL 2 TIMES DAILY
COMMUNITY

## 2024-07-15 RX ORDER — DICLOFENAC SODIUM 10 MG/G
2 GEL TOPICAL 2 TIMES DAILY
COMMUNITY

## 2024-07-15 RX ORDER — DEXTROSE 40 %
15 GEL (GRAM) ORAL ONCE AS NEEDED
COMMUNITY

## 2024-07-15 RX ORDER — HEPARIN SODIUM 5000 [USP'U]/ML
5000 INJECTION, SOLUTION INTRAVENOUS; SUBCUTANEOUS EVERY 8 HOURS SCHEDULED
Status: DISCONTINUED | OUTPATIENT
Start: 2024-07-15 | End: 2024-07-16

## 2024-07-15 RX ORDER — SENNOSIDES 8.6 MG/1
8.6 TABLET ORAL EVERY 12 HOURS PRN
COMMUNITY
Start: 2024-01-08

## 2024-07-15 RX ORDER — ZINC OXIDE 20 G/100G
1 OINTMENT TOPICAL 2 TIMES DAILY
COMMUNITY
Start: 2024-04-19

## 2024-07-15 RX ORDER — SEVELAMER CARBONATE 800 MG/1
800 TABLET, FILM COATED ORAL
COMMUNITY
Start: 2024-07-12

## 2024-07-15 RX ORDER — DOCUSATE SODIUM 100 MG/1
100 CAPSULE, LIQUID FILLED ORAL 2 TIMES DAILY
COMMUNITY

## 2024-07-15 RX ADMIN — HYDROCHLOROTHIAZIDE 12.5 MG: 12.5 CAPSULE ORAL at 10:29

## 2024-07-15 RX ADMIN — HEPARIN SODIUM 5000 UNITS: 5000 INJECTION INTRAVENOUS; SUBCUTANEOUS at 14:13

## 2024-07-15 RX ADMIN — POLYETHYLENE GLYCOL 3350 17 G: 17 POWDER, FOR SOLUTION ORAL at 10:30

## 2024-07-15 RX ADMIN — PIPERACILLIN SODIUM AND TAZOBACTAM SODIUM 2.25 G: 2; .25 INJECTION, SOLUTION INTRAVENOUS at 10:31

## 2024-07-15 RX ADMIN — HEPARIN SODIUM 1600 UNITS: 1000 INJECTION INTRAVENOUS; SUBCUTANEOUS at 17:56

## 2024-07-15 RX ADMIN — HEPARIN SODIUM 1600 UNITS: 1000 INJECTION INTRAVENOUS; SUBCUTANEOUS at 17:53

## 2024-07-15 RX ADMIN — ATORVASTATIN CALCIUM 80 MG: 40 TABLET, FILM COATED ORAL at 20:31

## 2024-07-15 RX ADMIN — PANTOPRAZOLE SODIUM 40 MG: 40 TABLET, DELAYED RELEASE ORAL at 10:30

## 2024-07-15 RX ADMIN — AMIODARONE HYDROCHLORIDE 200 MG: 200 TABLET ORAL at 10:29

## 2024-07-15 RX ADMIN — HEPARIN SODIUM 5000 UNITS: 5000 INJECTION INTRAVENOUS; SUBCUTANEOUS at 22:05

## 2024-07-15 RX ADMIN — SODIUM CHLORIDE, POTASSIUM CHLORIDE, SODIUM LACTATE AND CALCIUM CHLORIDE 75 ML/HR: 600; 310; 30; 20 INJECTION, SOLUTION INTRAVENOUS at 01:19

## 2024-07-15 RX ADMIN — ACETAMINOPHEN 650 MG: 325 TABLET ORAL at 20:31

## 2024-07-15 RX ADMIN — PIPERACILLIN SODIUM AND TAZOBACTAM SODIUM 2.25 G: 2; .25 INJECTION, SOLUTION INTRAVENOUS at 20:31

## 2024-07-15 RX ADMIN — INSULIN LISPRO 4 UNITS: 100 INJECTION, SOLUTION INTRAVENOUS; SUBCUTANEOUS at 20:31

## 2024-07-15 SDOH — SOCIAL STABILITY: SOCIAL INSECURITY: WERE YOU ABLE TO COMPLETE ALL THE BEHAVIORAL HEALTH SCREENINGS?: YES

## 2024-07-15 SDOH — SOCIAL STABILITY: SOCIAL INSECURITY: HAVE YOU HAD THOUGHTS OF HARMING ANYONE ELSE?: YES

## 2024-07-15 ASSESSMENT — COGNITIVE AND FUNCTIONAL STATUS - GENERAL
PERSONAL GROOMING: A LOT
MOVING TO AND FROM BED TO CHAIR: TOTAL
MOVING FROM LYING ON BACK TO SITTING ON SIDE OF FLAT BED WITH BEDRAILS: TOTAL
EATING MEALS: A LITTLE
DRESSING REGULAR LOWER BODY CLOTHING: TOTAL
DAILY ACTIVITIY SCORE: 10
PATIENT BASELINE BEDBOUND: YES
MOVING FROM LYING ON BACK TO SITTING ON SIDE OF FLAT BED WITH BEDRAILS: TOTAL
HELP NEEDED FOR BATHING: TOTAL
TURNING FROM BACK TO SIDE WHILE IN FLAT BAD: TOTAL
MOBILITY SCORE: 6
DRESSING REGULAR UPPER BODY CLOTHING: TOTAL
STANDING UP FROM CHAIR USING ARMS: TOTAL
DRESSING REGULAR UPPER BODY CLOTHING: TOTAL
PERSONAL GROOMING: A LITTLE
MOBILITY SCORE: 6
TOILETING: TOTAL
EATING MEALS: A LOT
CLIMB 3 TO 5 STEPS WITH RAILING: TOTAL
TURNING FROM BACK TO SIDE WHILE IN FLAT BAD: TOTAL
DAILY ACTIVITIY SCORE: 8
TOILETING: TOTAL
CLIMB 3 TO 5 STEPS WITH RAILING: TOTAL
HELP NEEDED FOR BATHING: TOTAL
WALKING IN HOSPITAL ROOM: TOTAL
STANDING UP FROM CHAIR USING ARMS: TOTAL
MOVING TO AND FROM BED TO CHAIR: TOTAL
WALKING IN HOSPITAL ROOM: TOTAL
DRESSING REGULAR LOWER BODY CLOTHING: TOTAL

## 2024-07-15 ASSESSMENT — ENCOUNTER SYMPTOMS
FEVER: 0
FLANK PAIN: 0
HEADACHES: 0
WOUND: 0
SHORTNESS OF BREATH: 0
WEAKNESS: 1
CHEST TIGHTNESS: 0
JOINT SWELLING: 0
CONSTIPATION: 1
VOMITING: 1
FATIGUE: 0
PALPITATIONS: 0
TREMORS: 0
ABDOMINAL PAIN: 1
DIARRHEA: 0
WHEEZING: 0
HEMATURIA: 0
DYSURIA: 1
NAUSEA: 1
BACK PAIN: 0
CHILLS: 0
COUGH: 0

## 2024-07-15 ASSESSMENT — LIFESTYLE VARIABLES
AUDIT-C TOTAL SCORE: 0
HOW OFTEN DO YOU HAVE A DRINK CONTAINING ALCOHOL: NEVER
HOW OFTEN DO YOU HAVE 6 OR MORE DRINKS ON ONE OCCASION: NEVER
HOW MANY STANDARD DRINKS CONTAINING ALCOHOL DO YOU HAVE ON A TYPICAL DAY: PATIENT DOES NOT DRINK
AUDIT-C TOTAL SCORE: 0
SKIP TO QUESTIONS 9-10: 1

## 2024-07-15 ASSESSMENT — ACTIVITIES OF DAILY LIVING (ADL)
FEEDING YOURSELF: INDEPENDENT
ADEQUATE_TO_COMPLETE_ADL: YES
DRESSING YOURSELF: DEPENDENT
ASSISTIVE_DEVICE: WHEELCHAIR
HEARING - RIGHT EAR: FUNCTIONAL
WALKS IN HOME: DEPENDENT
GROOMING: DEPENDENT
HEARING - LEFT EAR: FUNCTIONAL
JUDGMENT_ADEQUATE_SAFELY_COMPLETE_DAILY_ACTIVITIES: YES
BATHING: DEPENDENT
TOILETING: DEPENDENT
LACK_OF_TRANSPORTATION: NO
PATIENT'S MEMORY ADEQUATE TO SAFELY COMPLETE DAILY ACTIVITIES?: YES

## 2024-07-15 ASSESSMENT — PAIN - FUNCTIONAL ASSESSMENT
PAIN_FUNCTIONAL_ASSESSMENT: 0-10

## 2024-07-15 ASSESSMENT — PAIN DESCRIPTION - DESCRIPTORS: DESCRIPTORS: ACHING

## 2024-07-15 ASSESSMENT — PATIENT HEALTH QUESTIONNAIRE - PHQ9
SUM OF ALL RESPONSES TO PHQ9 QUESTIONS 1 & 2: 0
1. LITTLE INTEREST OR PLEASURE IN DOING THINGS: NOT AT ALL
2. FEELING DOWN, DEPRESSED OR HOPELESS: NOT AT ALL

## 2024-07-15 ASSESSMENT — PAIN SCALES - GENERAL
PAINLEVEL_OUTOF10: 9
PAINLEVEL_OUTOF10: 0 - NO PAIN
PAINLEVEL_OUTOF10: 0 - NO PAIN

## 2024-07-15 NOTE — PROGRESS NOTES
"Melody Martin is a 88 y.o. female on day 0 of admission presenting with Urinary tract infection with hematuria, site unspecified.    Subjective   No acute events overnight, patient states that she feels mildly improved this morning also expresses resistance to surgery given age and overall health.       Objective     Physical Exam  HENT:      Head: Normocephalic.      Nose: Nose normal.      Mouth/Throat:      Mouth: Mucous membranes are moist.   Eyes:      Pupils: Pupils are equal, round, and reactive to light.   Abdominal:      Comments: , Tender to palpation of left lower quadrant no rebound or guarding   Skin:     General: Skin is warm.      Capillary Refill: Capillary refill takes less than 2 seconds.   Neurological:      Mental Status: She is alert.         Last Recorded Vitals  Blood pressure (!) 163/111, pulse 69, temperature 35.9 °C (96.7 °F), temperature source Temporal, resp. rate 17, height 1.626 m (5' 4\"), weight 78 kg (171 lb 15.3 oz), SpO2 95%.  Intake/Output last 3 Shifts:  No intake/output data recorded.           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  Patient found to have large stool burden in rectum as well as some thickening of the colon.  Patient high risk for stercoral colitis.  Given age and health patient pursuing nonoperative management.  Patient having bowel function at this time we will continue with bowel regimen.    Plan:  -No acute surgical intervention  -Leukocytosis 24 and 27  -Bowel regimen  -IV fluids    Patient will be discussed with Attending Physician Dr. Karlos Galindo PGY4  General Surgery Service

## 2024-07-15 NOTE — CONSULTS
Nutrition Initial Assessment:   Nutrition Assessment    Reason for Assessment: Admission nursing screening    Medical history per chart:   CAD, cardiomyopathy, dyslipidemia, chronic diastolic congestive heart failure, stage V chronic kidney disease on chronic dialysis, A-fib on Eliquis, IDDM-II, iron deficiency anemia, 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, history of CVA     HPI:  Patient presents from nursing facility with left lower abdominal pain, weakness, constipation, N/V, decreased oral intake     7/15:  Patient awake, alert at time of visit, clear liquid tray present at bedside, patient needed assistance with opening food items (notified diet office).  Patient endorses decreased appetite for the past year since stroke, as of recent has been wanting to consume liquids versus solid food.  Patient denies further N/V at this time, positive bowel movement noted this morning.  Stage III wounds noted, patient requesting Ensure type supplements (does not care much for Nepro), will offer Glucerna once daily as diet allows and  trial Gelatein protein supplement daily to help promote healing.          Current Diet: Adult diet Clear Liquid  Supplement(s): No  Average meal Intake during admission:  new admission, no intake noted.          Nutrition Related Findings:   Oral Symptoms: Teeth: Missing teeth   GI symptoms: anorexia, early satiety, nausea, emesis, abdominal pain, and constipation.   BM: Last BM Date: 07/14/24  Food allergies: NKFA. is allergic to aspirin, amlodipine, levofloxacin, and sulfamethoxazole-trimethoprim.  Meds/Labs reviewed.  amiodarone, 200 mg, oral, Daily  [Held by provider] apixaban, 2.5 mg, oral, BID  atorvastatin, 80 mg, oral, Nightly  [Held by provider] bumetanide, 1 mg, oral, BID  [Held by provider] clopidogrel, 75 mg, oral, Daily  heparin (porcine), 5,000 Units, subcutaneous, q8h MARIA E  [START ON 7/16/2024] heparin, 1,000 Units, intra-catheter, After Dialysis  [START ON  "7/16/2024] heparin, 1,000 Units, intra-catheter, After Dialysis  hydroCHLOROthiazide, 12.5 mg, oral, Daily  insulin lispro, 0-10 Units, subcutaneous, Before meals & nightly  [Held by provider] lisinopril, 20 mg, oral, Nightly  pantoprazole, 40 mg, oral, Daily   Or  pantoprazole, 40 mg, intravenous, Daily  piperacillin-tazobactam, 2.25 g, intravenous, q12h  polyethylene glycol, 17 g, oral, Daily       lactated Ringer's, 75 mL/hr, Last Rate: Stopped (07/15/24 1415)         Nutrition Significant Labs:    Results from last 7 days   Lab Units 07/15/24  0502 07/14/24  1903   GLUCOSE mg/dL 171* 227*   SODIUM mmol/L 137 138   POTASSIUM mmol/L 5.8* 5.4*   CHLORIDE mmol/L 100 98   CO2 mmol/L 25 26   BUN mg/dL 57* 55*   CREATININE mg/dL 3.29* 3.34*   EGFR mL/min/1.73m*2 13* 13*   CALCIUM mg/dL 8.4* 8.4*   PHOSPHORUS mg/dL 6.4*  --    MAGNESIUM mg/dL 2.07 2.15     Lab Results   Component Value Date    HGBA1C 6.1 (H) 07/15/2024    HGBA1C 7.6 (H) 11/11/2023    HGBA1C 7.6 (H) 11/11/2023     Results from last 7 days   Lab Units 07/15/24  1255 07/15/24  1022 07/15/24  0107   POCT GLUCOSE mg/dL 149* 138* 179*       Anthropometrics:  Height: 162.6 cm (5' 4\")   Weight: 78 kg (171 lb 15.3 oz)   BMI (Calculated): 29.5  IBW/kg (Dietitian Calculated): 54.5 kg            Weight History:   Wt Readings from Last 10 Encounters:   07/14/24 78 kg (171 lb 15.3 oz)   11/09/23 78 kg (172 lb)   10/17/23 78 kg (172 lb)   10/09/23 84 kg (185 lb 3 oz)   05/17/23 73 kg (161 lb)   04/10/23 76.2 kg (168 lb)   03/29/23 75.3 kg (166 lb)   03/09/23 73 kg (161 lb)   02/23/23 73.7 kg (162 lb 6 oz)   12/28/22 74.4 kg (164 lb)        Weight Change %:  Weight History / % Weight Change: Patient reports a 27 lb weight loss within the past year post stroke.  Variable but stable weights noted per available weight history, will monitor trends.  Significant Weight Loss: No          Nutrition Focused Physical Exam Findings:    Subcutaneous Fat Loss:   Orbital Fat Pads: " Well nourished (slightly bulging fat pads)  Buccal Fat Pads: Well nourished (full, rounded cheeks)  Triceps: Well nourished (ample fat tissue)  Muscle Wasting:  Temporalis: Mild-Moderate (slight depression)  Pectoralis (Clavicular Region): Defer  Deltoid/Trapezius: Defer  Interosseous: Well nourished (muscle bulges)  Trapezius/Infraspinatus/Supraspinatus (Scapular Region): Defer  Quadriceps: Defer  Gastrocnemius: Defer  Edema:     Physical Findings:  Skin: Positive (wounds)    Estimated Needs:   Total Energy Estimated Needs (kCal):  (8334-0647)  Method for Estimating Needs: 30, IBW  Total Protein Estimated Needs (g):  (70)        Method for Estimating Needs: 1 mL, kcal or per physician        Nutrition Diagnosis   Nutrition Diagnosis:  Malnutrition Diagnosis  Patient has Malnutrition Diagnosis: No    Nutrition Diagnosis  Patient has Nutrition Diagnosis: Yes  Diagnosis Status (1): New  Nutrition Diagnosis 1: Increased nutrient needs  Related to (1): wound healing, dialysis  As Evidenced by (1): stage III wounds to sacrum and left heel, known losses from dialysis       Nutrition Interventions/Recommendations   Nutrition Interventions and Recommendations:        Nutrition Prescription:  Individualized Nutrition Prescription Provided for : Clear liquid diet with advancement per physician.   Trial gelatein supplement daily, add Glucerna once daily once diet allows (monitor labs/tolerance).        Nutrition Interventions:   Food and/or Nutrient Delivery Interventions  Interventions: Meals and snacks, Medical food supplement  Meals and Snacks: Other (Comment)  Goal: >75% intake of liquids  Medical Food Supplement: Commercial beverage, Commercial food  Goal: >75% intake of ONS BID         Nutrition Education:   Education Documentation  No documentation found.      Nutrition Counseling  Counseling Theoretical Approach: Other (Comment)  Goal: Reviewed supplement options, reviewed clear liquid diet       Nutrition Monitoring  and Evaluation   Monitoring/Evaluation:   Food/Nutrient Related History Monitoring  Monitoring and Evaluation Plan: Energy intake  Energy Intake: Estimated energy intake  Criteria: meet >75% of estimated needs from diet and ONS    Body Composition/Growth/Weight History  Monitoring and Evaluation Plan: Weight  Weight: Weight change  Criteria: Maintain stable weight    Biochemical Data, Medical Tests and Procedures  Monitoring and Evaluation Plan: Electrolyte/renal panel, Glucose/endocrine profile  Electrolyte and Renal Panel: BUN, Creatinine, Phosphorus, Potassium, Sodium  Criteria: WNL  Glucose/Endocrine Profile: Glucose, casual  Criteria: WNL    Nutrition Focused Physical Findings  Monitoring and Evaluation Plan: Skin  Skin: Impaired wound healing  Criteria: Promote healing            Time Spent/Follow-up Reminder:   Follow Up  Time Spent (min): 45 minutes  Last Date of Nutrition Visit: 07/15/24  Nutrition Follow-Up Needed?: Dietitian to reassess per policy  Follow up Comment: 7/19

## 2024-07-15 NOTE — H&P
[facility] - GENERAL MEDICINE HISTORY AND PHYSICAL    History Obtained From: Patient    History Of Present Illness:  Melody Martin is a 88 y.o. 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 Patient came in from the nursing facility, [facility] on [place] and she notes that she has been feeling weaker than usual. She notes that she hasn't had bowel movements in a week and has less appetite. She was nauseous and had 2 episodes of emesis; was unsure of the color of her emesis. She has ESRD on dialysis, notes that she doesn't have much urine output but she reports of burning with urination and increasing lower abdominal pain. She denies f/c, chest pain, shortness of breath, leg swelling, syncope, hematuria, focal weakness.     ED Course (Summary):   Vitals on presentation: 97.1 F, 67 bpm, 16 RR, 120/40, 98% on RA  Labs: Chemistries-glucose 227, sodium 138, potassium 5.4, BUN/creatinine 55/3.34, EGFR 13, calcium 8.4, albumin 2.9, AST 51  Troponin 15  CBC-leukocyte 27.4, hemoglobin 13.1, neutrophils 23.42  VBG-pH 7.36, pCO2 40, pO2 44, HCO3 27.1, lactate 3.9  UA-ketones trace, blood 3+, glucose trace, protein 3+, leukocyte esterase 500, turbid appearance, WBC >50  Imaging: CXR - No acute radiographic abnormalities.   CT angio AP-Bowel wall thickening and loss of haustration in the proximal sigmoid colon is new compared to prior exam. This finding is nonspecific and can be seen in the setting of ischemia or infectious/inflammatory colitis. No large vessel arterial or venous occlusions detected in this territory but this is a watershed zone and at increased risk for ischemia. Circumferential bladder wall thickening and  mucosal hyperenhancement suspicious for infectious or inflammatory cystitis. Large volume stool impacted in the rectum with mesorectal stranding putting patient at increased risk for stercoral colitis. 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 Bibasal nodules and nodular consolidations may represent sequelae of bronchopneumonia or aspiration. Correlate with patient's symptoms and recommend follow-up chest CT in 3 months to ensure resolution after treatment  Interventions: N.p.o., LR bolus 1000 mL, Zosyn, admission for further management    ED Course (From Provider):  ED Course as of 07/15/24 0103   Sun Jul 14, 2024 1954 LEUKOCYTES (10*3/UL) IN BLOOD BY AUTOMATED COUNT, MILAGRO(!): 27.4  Leukocytosis of 27 with elevated lactate at 3.  Blood cultures ordered.  Will start on Zosyn for possible intra-abdominal infection.  IV fluids additionally ordered with 1 L of LR but will reevaluate need for further fluids given she does have history of ESRD on dialysis. [DR]   2135 CTA showing bowel wall thickening in the sigmoid colon that could be representative of bowel ischemia.  Given her elevated lactate and abdominal pain will consult surgery for recommendations.  She does have a UTI and will be covered by Zosyn that has already been started. [DR]      ED Course User Index  [DR] Cadence Kirkpatrick, DO         Diagnoses as of 07/15/24 0103   Urinary tract infection with hematuria, site unspecified   Left upper quadrant abdominal pain   Intestinal ischemia (Multi)     Relevant Results  Results for orders placed or performed during the hospital encounter of 07/14/24 (from the past 24 hour(s))   CBC and Auto Differential   Result Value Ref Range    WBC 27.4 (H) 4.4 - 11.3 x10*3/uL    nRBC 0.0 0.0 - 0.0 /100 WBCs    RBC 4.51 4.00 - 5.20 x10*6/uL    Hemoglobin 13.1 12.0 - 16.0 g/dL    Hematocrit 42.3 36.0 - 46.0 %    MCV 94 80 - 100 fL    MCH 29.0 26.0 - 34.0 pg    MCHC 31.0 (L) 32.0 - 36.0 g/dL    RDW  18.9 (H) 11.5 - 14.5 %    Platelets 339 150 - 450 x10*3/uL    Neutrophils % 85.7 40.0 - 80.0 %    Immature Granulocytes %, Automated 2.0 (H) 0.0 - 0.9 %    Lymphocytes % 6.2 13.0 - 44.0 %    Monocytes % 5.7 2.0 - 10.0 %    Eosinophils % 0.0 0.0 - 6.0 %    Basophils % 0.4 0.0 - 2.0 %    Neutrophils Absolute 23.42 (H) 1.60 - 5.50 x10*3/uL    Immature Granulocytes Absolute, Automated 0.56 (H) 0.00 - 0.50 x10*3/uL    Lymphocytes Absolute 1.69 0.80 - 3.00 x10*3/uL    Monocytes Absolute 1.57 (H) 0.05 - 0.80 x10*3/uL    Eosinophils Absolute 0.00 0.00 - 0.40 x10*3/uL    Basophils Absolute 0.12 (H) 0.00 - 0.10 x10*3/uL   Comprehensive metabolic panel   Result Value Ref Range    Glucose 227 (H) 74 - 99 mg/dL    Sodium 138 136 - 145 mmol/L    Potassium 5.4 (H) 3.5 - 5.3 mmol/L    Chloride 98 98 - 107 mmol/L    Bicarbonate 26 21 - 32 mmol/L    Anion Gap 19 10 - 20 mmol/L    Urea Nitrogen 55 (H) 6 - 23 mg/dL    Creatinine 3.34 (H) 0.50 - 1.05 mg/dL    eGFR 13 (L) >60 mL/min/1.73m*2    Calcium 8.4 (L) 8.6 - 10.3 mg/dL    Albumin 2.9 (L) 3.4 - 5.0 g/dL    Alkaline Phosphatase 121 33 - 136 U/L    Total Protein 6.1 (L) 6.4 - 8.2 g/dL    AST 51 (H) 9 - 39 U/L    Bilirubin, Total 0.4 0.0 - 1.2 mg/dL    ALT 24 7 - 45 U/L   Magnesium   Result Value Ref Range    Magnesium 2.15 1.60 - 2.40 mg/dL   Blood Gas Venous Full Panel   Result Value Ref Range    POCT pH, Venous 7.36 7.33 - 7.43 pH    POCT pCO2, Venous 48 41 - 51 mm Hg    POCT pO2, Venous 44 35 - 45 mm Hg    POCT SO2, Venous 62 45 - 75 %    POCT Oxy Hemoglobin, Venous 60.9 45.0 - 75.0 %    POCT Hematocrit Calculated, Venous 41.0 36.0 - 46.0 %    POCT Sodium, Venous 132 (L) 136 - 145 mmol/L    POCT Potassium, Venous 5.5 (H) 3.5 - 5.3 mmol/L    POCT Chloride, Venous 100 98 - 107 mmol/L    POCT Ionized Calicum, Venous 1.08 (L) 1.10 - 1.33 mmol/L    POCT Glucose, Venous 237 (H) 74 - 99 mg/dL    POCT Lactate, Venous 3.9 (H) 0.4 - 2.0 mmol/L    POCT Base Excess, Venous 1.0 -2.0 - 3.0  mmol/L    POCT HCO3 Calculated, Venous 27.1 (H) 22.0 - 26.0 mmol/L    POCT Hemoglobin, Venous 13.6 12.0 - 16.0 g/dL    POCT Anion Gap, Venous 10.0 10.0 - 25.0 mmol/L    Patient Temperature 37.0 degrees Celsius    FiO2 0 %   Troponin I, High Sensitivity   Result Value Ref Range    Troponin I, High Sensitivity 15 (H) 0 - 13 ng/L   Blood Culture    Specimen: Peripheral Venipuncture; Blood culture   Result Value Ref Range    Blood Culture Loaded on Instrument - Culture in progress    Blood Gas Lactic Acid, Venous   Result Value Ref Range    POCT Lactate, Venous 3.9 (H) 0.4 - 2.0 mmol/L   Blood Culture    Specimen: Peripheral Venipuncture; Blood culture   Result Value Ref Range    Blood Culture Loaded on Instrument - Culture in progress    Urinalysis with Reflex Culture and Microscopic   Result Value Ref Range    Color, Urine Red-brown (N) Straw, Yellow    Appearance, Urine Turbid (N) Clear    Specific Gravity, Urine 1.025 1.005 - 1.035    pH, Urine 7.0 5.0, 5.5, 6.0, 6.5, 7.0, 7.5, 8.0    Protein, Urine 300 (3+) (A) NEGATIVE, 10 (TRACE) mg/dL    Glucose, Urine 30 (TRACE) (A) NEGATIVE mg/dL    Blood, Urine 1.0 (3+) (A) NEGATIVE    Ketones, Urine 5 (TRACE) (A) NEGATIVE mg/dL    Bilirubin, Urine NEGATIVE NEGATIVE    Urobilinogen, Urine NORM NORM mg/dL    Nitrite, Urine NEGATIVE NEGATIVE    Leukocyte Esterase, Urine 500 Ange/µL (A) NEGATIVE   Microscopic Only, Urine   Result Value Ref Range    WBC, Urine >50 (A) 1-5, NONE /HPF    RBC, Urine >20 (A) NONE, 1-2, 3-5 /HPF    Bacteria, Urine 4+ (A) NONE SEEN /HPF      CT angio abdomen pelvis w and or wo IV IV contrast    Result Date: 7/14/2024  Interpreted By:  Baljinder Morse, STUDY: CT ANGIO ABDOMEN PELVIS W AND/OR WO IV IV CONTRAST;  7/14/2024 7:49 pm   INDICATION: Signs/Symptoms:r/o mesenteric ischemia, L sided abdominal pain.   COMPARISON: CT abdomen and pelvis 09/23/2023   ACCESSION NUMBER(S): [id number]   ORDERING CLINICIAN: AMADA ELIZALDE   TECHNIQUE: Axial CT images of  the abdomen and pelvis  before and after intravenous administration of contrast using CT angiographic technique. Coronal and sagittal images are reconstructed.  3D reconstructions were obtained at a separate workstation.   FINDINGS: VASCULAR: Severe atherosclerotic calcification of the abdominal aorta and its branches. No critical stenosis. No occlusions. No vascular malformations. There is a peripherally calcified splenic artery aneurysm measuring 1 cm (8/157), stable compared to prior.   ABDOMINAL AORTA: No aortic aneurysm or dissection. The celiac, superior mesenteric, renal and inferior mesenteric arteries are patent with no significant stenosis.   The bilateral common iliac, internal iliac, external iliac and common femoral arteries are patent with no significant stenosis.   LOWER CHEST: There are new nodules and nodular consolidations in the bilateral lower lobes. No significant effusion. Coronary stents and severe coronary atherosclerosis. Aortic valve calcifications also noted. Central venous catheter tip is in the right atrium. BONES: No acute osseous abnormality. ABDOMINAL WALL: Rectus diastasis. Soft tissue thickening/calcification noted in the right anterior abdominal wall soft tissues.   LIVER: Within normal limits. BILE DUCTS: No biliary dilatation. GALLBLADDER: No calcified gallstones. No pericholecystic inflammatory changes. PANCREAS: Tiny side-branch IPMN in the pancreatic head (8/181) measuring 0.5 cm, unchanged compared to prior exam. No main duct dilation. SPLEEN: Within normal limits. ADRENALS:  Stable right adrenal gland adenoma measuring 2.8 cm, previously measured -4 Hounsfield units on prior exam from 09/23/2023. 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 Multifocal areas of renal cortical scarring bilaterally. Right mid kidney simple cyst. Additional too small to characterize hypodensities, statistically likely cysts. No nephrolithiasis or hydronephrosis. URETERS: No  hydroureter.   PELVIS:   REPRODUCTIVE ORGANS: No pelvic masses. BLADDER: Circumferential bladder wall thickening and mucosal hyperenhancement. Bladder diverticulum noted anteriorly. Perivesical stranding is present.   RETROPERITONEUM: Unremarkable. BOWEL: Large volume stool impacted in the rectum measuring up to 8 cm. Colonic diverticulosis. Short-segment thickening of the proximal sigmoid colon (8/338) with loss of haustration, new compared to prior. PERITONEUM: Mild mesorectal stranding. Mesenteric engorgement surrounding the inflamed loop of sigmoid colon.       1. Bowel wall thickening and loss of haustration in the proximal sigmoid colon is new compared to prior exam. This finding is nonspecific and can be seen in the setting of ischemia or infectious/inflammatory colitis. No large vessel arterial or venous occlusions detected in this territory but this is a watershed zone and at increased risk for ischemia. 2. Circumferential bladder wall thickening and mucosal hyperenhancement suspicious for infectious or inflammatory cystitis. Correlate with urinalysis. 3. Large volume stool impacted in the rectum with mesorectal stranding putting patient at increased risk for stercoral colitis. 4. 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 5. Bibasal nodules and nodular consolidations may represent sequelae of bronchopneumonia or aspiration. Correlate with patient's symptoms and recommend follow-up chest CT in 3 months to ensure resolution after treatment.   MACRO: Critical Finding:  See findings. Notification was initiated on 7/14/2024 at 8:58 pm by  Baljinder Morse.  (**-YCF-**) Instructions:   Signed by: Baljinder Morse 7/14/2024 8:58 PM Dictation workstation:   [id number]    XR chest 1 view    Result Date: 7/14/2024  Interpreted By:  Baljinder Morse, STUDY: XR CHEST 1 VIEW;  7/14/2024 8:18 pm   INDICATION: Signs/Symptoms:r/o pneumonia.   COMPARISON: CXR 10/02/2023   ACCESSION NUMBER(S):  [id number]   ORDERING CLINICIAN: AMADA ELIZALDE   FINDINGS: Central venous catheter tip is in the right atrium.   CARDIOMEDIASTINAL SILHOUETTE: Cardiomediastinal silhouette is stable in size and configuration.   LUNGS: Low lung volumes. No pulmonary consolidation is detected. Nodular opacity seen on CT are not well seen radiographically.   ABDOMEN: Unchanged compared to prior exam.   BONES: No acute osseous abnormality.       No acute radiographic abnormalities. Please refer to same-day CT abdomen/pelvis for characterization of the nodular consolidations in the lower lobes.   MACRO: None   Signed by: Baljinder Morse 7/14/2024 8:34 PM Dictation workstation:   [id number]    Scheduled medications:  amiodarone, 200 mg, oral, Daily  [Held by provider] apixaban, 2.5 mg, oral, BID  atorvastatin, 80 mg, oral, Nightly  [Held by provider] bumetanide, 1 mg, oral, BID  [Held by provider] clopidogrel, 75 mg, oral, Daily  hydroCHLOROthiazide, 12.5 mg, oral, Daily  insulin lispro, 0-10 Units, subcutaneous, Before meals & nightly  [Held by provider] lisinopril, 20 mg, oral, Nightly  pantoprazole, 40 mg, oral, Daily   Or  pantoprazole, 40 mg, intravenous, Daily  piperacillin-tazobactam, 2.25 g, intravenous, q12h  polyethylene glycol, 17 g, oral, Daily      Continuous medications:  lactated Ringer's, 75 mL/hr      PRN medications:  PRN medications: acetaminophen, albuterol, bisacodyl, dextrose, dextrose, glucagon, glucagon, guaiFENesin, melatonin, ondansetron     Past Medical History  She has a past medical history of Anemia, Atrial fibrillation (Multi), Chronic kidney disease, Diabetes mellitus (Multi), Elevated troponin (08/24/2023), GERD (gastroesophageal reflux disease), Heart murmur, Hypertension, Myocardial infarction (Multi), Old myocardial infarction (09/09/2023), Other conditions influencing health status (12/06/2022), Other conditions influencing health status (04/06/2016), Personal history of other diseases of the  circulatory system, Personal history of other diseases of the female genital tract, Personal history of other diseases of the nervous system and sense organs (10/15/2021), Personal history of other endocrine, nutritional and metabolic disease (01/26/2018), Personal history of transient ischemic attack (TIA), and cerebral infarction without residual deficits (09/20/2023), and Stroke (Multi).    Surgical History  She has a past surgical history that includes Hysterectomy (10/10/2018); Back surgery (10/10/2018); IR CVC tunneled (8/28/2023); MR angio neck wo IV contrast (8/31/2023); and MR angio head wo IV contrast (8/31/2023).     Social History  She reports that she has never smoked. She has never used smokeless tobacco. She reports that she does not drink alcohol and does not use drugs.    Family History  Family History   Problem Relation Name Age of Onset    No Known Problems Mother      No Known Problems Father         Allergies  Aspirin, Amlodipine, Levofloxacin, and Sulfamethoxazole-trimethoprim    Code Status  DNR and No Intubation and No ICU     Review of Systems   Constitutional:  Negative for chills, fatigue and fever.   HENT:  Negative for congestion.    Respiratory:  Negative for cough, chest tightness, shortness of breath and wheezing.    Cardiovascular:  Negative for chest pain, palpitations and leg swelling.   Gastrointestinal:  Positive for abdominal pain, constipation, nausea and vomiting. Negative for diarrhea.   Genitourinary:  Positive for dysuria. Negative for flank pain and hematuria.   Musculoskeletal:  Negative for back pain and joint swelling.   Skin:  Negative for rash and wound.   Neurological:  Positive for weakness. Negative for tremors, syncope and headaches.       Last Recorded Vitals  BP (!) 139/49   Pulse 68   Temp 36.2 °C (97.1 °F)   Resp 16   Wt 78 kg (171 lb 15.3 oz)   SpO2 96%      Physical Exam:  Vital signs and nursing notes reviewed.   Constitutional: Pleasant and  cooperative. Laying in bed in no acute distress. Conversant.   Skin: Sacral wound noted. Warm and dry; no obvious lesions, rashes, pallor, or jaundice. Good turgor.   Eyes: EOMI. Anicteric sclera.   ENT: Mucous membranes moist; no obvious injury or deformity appreciated.   Head and Neck: Normocephalic, atraumatic. ROM preserved. Trachea midline. No appreciable JVD.   Respiratory: Nonlabored on RA. Lungs clear to auscultation bilaterally without obvious adventitious sounds. Chest rise is equal.  Cardiovascular: RRR. No gross murmur, gallop, or rub. Extremities are warm and well-perfused with good capillary refill (< 3 seconds). No chest wall tenderness.   GI: LLQ abdomen pain to palpation disproportionate to exam, no guarding. No obvious organomegaly appreciated. Bowel sounds are present and normoactive.  : No CVA tenderness.   MSK: No gross abnormalities appreciated. No limitations to AROM/PROM appreciated.   Extremities: No cyanosis, edema, or clubbing evident. Neurovascularly intact.   Neuro: A&Ox4. CN 2-12 grossly intact. Able to respond to questions appropriately and clearly. No acute focal neurologic deficits appreciated.  Psych: Appropriate mood and behavior.    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 (Multi)    Atrial fibrillation (Multi)    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 (Multi)    Constipation    Sepsis (Multi)    Alteration in skin  integrity due to moisture    ESRD on hemodialysis (Multi)    Hemiplegia and hemiparesis following cerebral infarction affecting left non-dominant side (Multi)    Physical deconditioning    Colitis    UTI with hematuria  -zosyn  -urine culture pending  -blood culture pending for sepsis  -adjust abx pending cx result    Sepsis without acute end organ damage, responsive to IVF  -SIRS criteria (1/4): WBC  -Source: UTI, colitis  -End-organ dysfunction: none  -Lactate 3.9 on VBG  -BP is normotensive  -Blood cultures x2  -Continue antibiotic coverage with zosyn  -Follow fever curve, WBCs    Significant fecal impaction with inflammatory colitis and possible bowel ischemia  Leukocytosis  -IV abx coverage as above  -NPO  -hold eliquis, plavix for now  -general surgery consult appreciated    ESRD on dialysis MWF  -BUN/Cr 55/3.34 on admission  -LR 75 ml/hr for 14 hrs  -nephrology consult  -hold lisinopril and bumex    CAD, cardiomyopathy, dyslipidemia, chronic diastolic congestive heart failure, A-fib on Eliquis  -hold eliquis, plavix for now  -Continue home medications   -Monitor and adjust as needed while admitted     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  -wound care consult    Chronic deconditioning/ambulatory dysfunction  -PT/OT eval    IDDM-II  -pt is unsure of her home basal insulin   -Continue with SSI   -Continue with accucheks, hypoglycemic protocol   -Monitor and adjust as needed     Elevated troponin  -troponin 15, likely secondary to pain  -pt denies chest pain and/or anginal equivalents  -EKG shows sinus rhythm at 68 bpm with prolonged AR interval, no acute ischemic changes    Pt meds need to be reconciled, adjust medications and doses as needed     Diet: NPO  DVT Prophylaxis: hold home eliquis  Code Status: DNR, DNI     Brooks Webb PA-C    Dragon dictation software was used to dictate this note and thus there may be minor errors in translation/transcription including garbled speech or  misspellings. Please contact for clarification if needed.

## 2024-07-15 NOTE — PROGRESS NOTES
Pharmacy Medication History Review    Melody Martin is a 88 y.o. female admitted for Urinary tract infection with hematuria, site unspecified. Pharmacy reviewed the patient's [id number] medications and allergies for accuracy.    The list below reflects the updated PTA list. Comments regarding how patient may be taking medications differently can be found in the Admit Orders Activity  Prior to Admission Medications   Prescriptions Last Dose Informant Patient Reported? Taking?   B complex-vitamin C-folic acid (Nephrocaps) 1 mg capsule Unknown Other Yes Yes   Sig: Take 1 capsule by mouth once daily.   PSYLLIUM HUSK, ASPARTAME, ORAL Unknown Other Yes Yes   Sig: Take 1 packet by mouth once daily.   acetaminophen (Tylenol) 325 mg tablet Unknown Other Yes Yes   Sig: Take 2 tablets (650 mg) by mouth every 6 hours if needed for mild pain (1 - 3) or fever (temp greater than 38.0 C).   acetaminophen (Tylenol) 325 mg tablet Not Taking Other No No   Sig: Take 2 tablets (650 mg) by mouth every 4 hours if needed for fever (temp greater than 38.0 C) (greater than or equal to 38 C).   Patient not taking: Reported on 7/15/2024   albuterol 90 mcg/actuation inhaler Unknown Other Yes Yes   Sig: Inhale 2 puffs every 12 hours if needed.   alum-mag hydroxide-simeth (Mylanta) 200-200-20 mg/5 mL oral suspension Unknown Other Yes Yes   Sig: Take 10 mL by mouth every 6 hours if needed for indigestion or heartburn.   amino acids-protein hydr-fiber 15 gram- 100 kcal/30 mL liquid in packet Unknown Other Yes Yes   Sig: Take 30 mL by mouth 2 times a day.   amiodarone (Pacerone) 200 mg tablet Unknown Other Yes Yes   Sig: Take 1 tablet (200 mg) by mouth once every 24 hours.   apixaban (Eliquis) 2.5 mg tablet Unknown Other Yes Yes   Sig: Take 1 tablet (2.5 mg) by mouth 2 times a day.   atorvastatin (Lipitor) 80 mg tablet Unknown Other Yes Yes   Sig: Take 1 tablet (80 mg) by mouth once daily at bedtime.   benzonatate (Tessalon) 100 mg capsule  Unknown Other Yes Yes   Sig: Take 1 capsule (100 mg) by mouth 3 times a day as needed (decongestion).   bisacodyl (Dulcolax, bisacodyl,) 10 mg suppository Unknown Other Yes Yes   Sig: Insert 1 suppository (10 mg) into the rectum once daily as needed for constipation.   blood sugar diagnostic (OneTouch Ultra Test) strip  Other No Yes   Si strip by Does not apply route 3 times a day.   bumetanide (Bumex) 1 mg tablet Not Taking Other No No   Sig: Take 1 tablet (1 mg) by mouth 2 times a day.   Patient not taking: Reported on 7/15/2024   busPIRone (Buspar) 10 mg tablet Unknown Other Yes Yes   Sig: Take 1 tablet (10 mg) by mouth twice a day.   clopidogrel (Plavix) 75 mg tablet Unknown Other Yes Yes   Sig: Take 1 tablet (75 mg) by mouth once daily.   clotrimazole-betamethasone (Lotrisone) cream Unknown Other Yes Yes   Sig: Apply 1 Application topically 2 times a day. To inner thighs and stephanie area   diclofenac sodium (Voltaren) 1 % gel Unknown Other Yes Yes   Sig: Apply 2.25 inches (2 g) topically twice a day.   dilTIAZem ER (Tiazac) 240 mg 24 hr capsule Unknown Other Yes Yes   Sig: Take 1 capsule (240 mg) by mouth once daily at bedtime.   diphenhydramine-zinc acetate cream Unknown Other Yes Yes   Sig: Apply 1 Application topically 3 times a day. For itchy back   docusate sodium (Colace) 100 mg capsule Unknown Other Yes Yes   Sig: Take 1 capsule (100 mg) by mouth twice a day.   glucagon 1 mg injection Unknown Other Yes Yes   Sig: Inject 1 mg into the muscle.   glucose (Glutose) 40 % gel oral gel Unknown Other Yes Yes   Sig: Take 15 g by mouth 1 time if needed for low blood sugar - see comments.   guaiFENesin (Mucinex) 600 mg 12 hr tablet Unknown Other Yes Yes   Sig: Take 1 tablet (600 mg) by mouth twice a day.   insulin glargine (Lantus) 100 unit/mL injection Unknown Other No Yes   Sig: Inject 20 Units under the skin once daily at bedtime. Take as directed per insulin instructions.   Patient taking differently: Inject  20 Units under the skin 2 times a day. Take as directed per insulin instructions.   insulin lispro (HumaLOG) 100 unit/mL injection Unknown Other No Yes   Sig: Inject 0-0.15 mL (0-15 Units) under the skin 4 times a day before meals. Take as directed per insulin instructions.   Patient taking differently: Inject 0-0.1 mL (0-10 Units) under the skin 4 times a day before meals. Take as directed per insulin instructions.   isosorbide dinitrate (Isordil) 10 mg tablet Unknown Other Yes Yes   Sig: Take 1 tablet (10 mg) by mouth 3 times a day.   lidocaine (LMX) 4 % cream Unknown Other Yes Yes   Sig: Apply 0.1 g topically once daily. To back   melatonin 3 mg tablet Unknown Other Yes Yes   Sig: Take 1 tablet (3 mg) by mouth once daily at bedtime.   metoprolol succinate XL (Kapspargo Sprinkle) 100 mg 24 hr capsule Unknown Other Yes Yes   Sig: Take 1 capsule (100 mg) by mouth once daily. Do not crush or chew.   mirtazapine (Remeron) 7.5 mg tablet Unknown Other Yes Yes   Sig: Take 1 tablet (7.5 mg) by mouth once daily at bedtime.   pantoprazole (ProtoNix) 40 mg EC tablet Unknown Other Yes Yes   Sig: Take 1 tablet (40 mg) by mouth once daily in the morning. Take before meals. Do not crush, chew, or split.   sennosides (Senokot) 8.6 mg tablet Unknown Other Yes Yes   Sig: Take 1 tablet (8.6 mg) by mouth every 12 hours if needed.   sevelamer carbonate (Renvela) 800 mg tablet Unknown Other Yes Yes   Sig: Take 1 tablet (800 mg) by mouth 3 times a day before meals.   simethicone (Mylicon) 80 mg chewable tablet Unknown Other Yes Yes   Sig: Chew 1 tablet (80 mg) 4 times a day.   traMADol (Ultram) 50 mg tablet Unknown Other No Yes   Sig: Take 0.5 tablets (25 mg) by mouth every 12 hours if needed for severe pain (7 - 10).   Patient taking differently: Take 0.5 tablets (25 mg) by mouth 3 times a day.   zinc oxide 20 % ointment Unknown Other Yes Yes   Sig: Apply 1 Application topically twice a day. To groin and buttocks       Facility-Administered Medications: None        The list below reflects the updated allergy list. Please review each documented allergy for additional clarification and justification.  Allergies  Reviewed by Manav Roa on 7/14/2024        Severity Reactions Comments    Aspirin High Shortness of breath, Unknown     Amlodipine Not Specified Dizziness, Unknown     Levofloxacin Not Specified Unknown     Sulfamethoxazole-trimethoprim Not Specified Diarrhea, Other, Unknown             Patient was unable to be assessed for M2B at discharge. Pharmacy has been updated to N/A - patient is from facility.    Sources used to complete the med history include   [facility] Order Summary Report printed at [facility] 7/14/24 18:00:53 ET     Medications ADDED:  Lidocaine 4% cream  Diphenhydramine 2% cream  Docusate 100mg  Glutose 40% gel  Guaifenesin 600mg ER  Lotrisone 1-0.05% Cream  Nephro-Billy  Pro Stat  Renvela 800mg  Senna 8.6mg  Voltaren 1%  Zinc Oxide 20%  Medications CHANGED:  Aluminum and magnesium hydroxide sig changed from 30ml Q4h PRN to 10ml Q6h PRN  Benzonatate 100mg - added sig  Diltiazem 240mg - sig added  Humalog sig changed from 10-15 units QID to 0-10 units QID  Lantus frequency changed from every day to BID  Metamucil frequency changed from every day PRN to Qday  Mirtazapine - added at bedtime frequency  Simethicone changed form Q6h PRN to QID  Tramadol sig changed from q12h PRN to TID  Metoprolol succinate strength changed from 50mg to 100mg  Medications REMOVED/MARKED NOT TAKING:   Acetaminophen 325 mg (duplicate entry)  Amlodipine  Cepacol  Bumetanide  Cilostazol 50mg  Clonidine 0.1mg  Aranesp 0.4ml  Dextrose 10% in water  Gabapentin 100mg  Heparin 1000mg inj  Hydralazine 10mg tab  Hydralazine 20mg/ml inj  Hydrochlorothiazide 12.5mg  Humalog mix 75-25  Isosorbide mononitrate 10mg  Lidocaine 5% patch  Lisinopril 30mg  Methocarbamol 500mg  Mircera inj  Metoprolol tartrate 100mg  Polyethylene glycol    Semglee  Tizanidine   Tuberculin    Below are additional concerns with the patient's PTA list.  None    Alana Frey, Olga   Transitions of Care Pharmacist  [facility]  Please reach out via Secure Chat for questions, or if no response call [phone] or [facility]

## 2024-07-15 NOTE — PROGRESS NOTES
Social work consult placed for discharge planning. SW reviewed pt's chart and communicated with TCC. No SW needs foreseen at this time. SW signing off; available upon request.    MUSTAPHA Watson ([id number])   Care Transitions

## 2024-07-15 NOTE — PROGRESS NOTES
Melody Martin is a 88 y.o. female on day 0 of admission presenting with Urinary tract infection with hematuria, site unspecified.      Subjective   Melody Martin is a 88 y.o. 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 Patient came in from the nursing facility, [facility] on [address] and she notes that she has been feeling weaker than usual. She notes that she hasn't had bowel movements in a week and has less appetite. She was nauseous and had 2 episodes of emesis; was unsure of the color of her emesis. She has ESRD on dialysis, notes that she doesn't have much urine output but she reports of burning with urination and increasing lower abdominal pain. She denies f/c, chest pain, shortness of breath, leg swelling, syncope, hematuria, focal weakness.      ED Course (Summary):   Vitals on presentation: 97.1 F, 67 bpm, 16 RR, 120/40, 98% on RA  Labs: Chemistries-glucose 227, sodium 138, potassium 5.4, BUN/creatinine 55/3.34, EGFR 13, calcium 8.4, albumin 2.9, AST 51  Troponin 15  CBC-leukocyte 27.4, hemoglobin 13.1, neutrophils 23.42  VBG-pH 7.36, pCO2 40, pO2 44, HCO3 27.1, lactate 3.9  UA-ketones trace, blood 3+, glucose trace, protein 3+, leukocyte esterase 500, turbid appearance, WBC >50  Imaging: CXR - No acute radiographic abnormalities.   CT angio AP-Bowel wall thickening and loss of haustration in the proximal sigmoid colon is new compared to prior exam. This finding is nonspecific and can be seen in the setting of ischemia or infectious/inflammatory colitis. No large vessel arterial or venous occlusions detected in this territory but this is a watershed zone and at increased risk for ischemia. Circumferential bladder wall  thickening and mucosal hyperenhancement suspicious for infectious or inflammatory cystitis. Large volume stool impacted in the rectum with mesorectal stranding putting patient at increased risk for stercoral colitis. 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 Bibasal nodules and nodular consolidations may represent sequelae of bronchopneumonia or aspiration. Correlate with patient's symptoms and recommend follow-up chest CT in 3 months to ensure resolution after treatment  Interventions: N.p.o., LR bolus 1000 mL, Zosyn, admission for further management.  7/15: Patient was seen and examined.  Abdominal pain has now subsided.  Patient had bowel movement this morning.  Diet advanced to clear liquid diet.  Blood and urine cultures are still pending.  Continue current IV antibiotics.  Trend CBC and BMP daily.  General surgery recommendations appreciated.     Objective     Last Recorded Vitals  /67 (BP Location: Left arm, Patient Position: Lying)   Pulse 73   Temp 37.2 °C (98.9 °F) (Temporal)   Resp 17   Wt 78 kg (171 lb 15.3 oz)   SpO2 96%   Intake/Output last 3 Shifts:    Intake/Output Summary (Last 24 hours) at 7/15/2024 1255  Last data filed at 7/15/2024 1250  Gross per 24 hour   Intake 1962.75 ml   Output --   Net 1962.75 ml       Admission Weight  Weight: 78 kg (171 lb 15.3 oz) (07/14/24 1845)    Daily Weight  07/14/24 : 78 kg (171 lb 15.3 oz)    Image Results  ECG 12 lead  Sinus or ectopic atrial rhythm  Prolonged SD interval  Anterior infarct, old  Baseline wander in lead(s) V3    See ED provider note for full interpretation and clinical correlation  Confirmed by Zandra Turcios ([id number]) on 7/15/2024 11:46:32 AM  XR abdomen 1 view  Narrative: Interpreted By:  Vivek Ponce,   STUDY:  No definite large amount of stool.      Bowel-gas pattern nonspecific.      No acute findings.      INDICATION:  Signs/Symptoms:evaluate rectum for retained stool - please obtain  image of lower  abdomen/pelvis.      COMPARISON:  CT abdomen July 14      ACCESSION NUMBER(S):  [id number]      ORDERING CLINICIAN:  FRANCK HANKS      FINDINGS:  No definite large amount of stool.      Bowel-gas pattern nonspecific.      No acute findings.      Impression:             No definite large amount of stool.      Bowel-gas pattern nonspecific.      No acute findings.      Signed by: Vivek Ponce 7/15/2024 7:55 AM  Dictation workstation:   [id number]      Physical Exam  Constitutional: Pleasant and cooperative. Laying in bed in no acute distress. Conversant.   Skin: Sacral wound noted. Warm and dry; no obvious lesions, rashes, pallor, or jaundice. Good turgor.   Eyes: EOMI. Anicteric sclera.   ENT: Mucous membranes moist; no obvious injury or deformity appreciated.   Head and Neck: Normocephalic, atraumatic. ROM preserved. Trachea midline. No appreciable JVD.   Respiratory: Nonlabored on RA. Lungs clear to auscultation bilaterally without obvious adventitious sounds. Chest rise is equal.  Cardiovascular: RRR. No gross murmur, gallop, or rub. Extremities are warm and well-perfused with good capillary refill (< 3 seconds). No chest wall tenderness.   GI: LLQ abdomen pain to palpation disproportionate to exam, no guarding. No obvious organomegaly appreciated. Bowel sounds are present and normoactive.  : No CVA tenderness.   MSK: No gross abnormalities appreciated. No limitations to AROM/PROM appreciated.   Extremities: No cyanosis, edema, or clubbing evident. Neurovascularly intact.   Neuro: A&Ox4. CN 2-12 grossly intact. Able to respond to questions appropriately and clearly. No acute focal neurologic deficits appreciated.  Psych: Appropriate mood and behavior.  Relevant Results               Assessment/Plan   This patient currently has cardiac telemetry ordered; if you would like to modify or discontinue the telemetry order, click here to go to the orders activity to modify/discontinue the order.    This patient has a  central line   Reason for the central line remaining today? 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 (Multi)    Atrial fibrillation (Multi)    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 (Multi)    Constipation    Sepsis (Multi)    Alteration in skin integrity due to moisture    ESRD on hemodialysis (Multi)    Hemiplegia and hemiparesis following cerebral infarction affecting left non-dominant side (Multi)    Physical deconditioning    Colitis    UTI with hematuria  -zosyn  -urine culture pending  -blood culture pending for sepsis  -adjust abx pending cx result  -Trend CBC daily     Sepsis without acute end organ damage, responsive to IVF  -SIRS criteria (1/4): WBC  -Source: UTI, colitis  -End-organ dysfunction: none  -Lactate 3.9 on VBG  -BP is normotensive  -Blood cultures x2 pending  -Continue antibiotic coverage with zosyn  -Follow fever curve, WBCs     Significant fecal impaction with inflammatory colitis and possible bowel ischemia  Leukocytosis  -IV abx coverage as above  -Diet advanced to clear liquid diet  -hold eliquis, plavix for now  -general surgery consult appreciated     ESRD on dialysis MWF  -BUN/Cr 55/3.34 on admission  -LR 75 ml/hr for 14 hrs  -nephrology consult  -hold lisinopril and bumex     CAD, cardiomyopathy, dyslipidemia, chronic diastolic congestive heart failure, A-fib on Eliquis  -hold eliquis, plavix for now  -Continue  home medications   -Monitor and adjust as needed while admitted      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  -wound care consult     Chronic deconditioning/ambulatory dysfunction  -PT/OT eval     IDDM-II  -pt is unsure of her home basal insulin   -Continue with SSI   -Continue with accucheks, hypoglycemic protocol   -Monitor and adjust as needed      Elevated troponin  -troponin 15, likely secondary to pain  -pt denies chest pain and/or anginal equivalents  -EKG shows sinus rhythm at 68 bpm with prolonged IL interval, no acute ischemic changes     Pt meds need to be reconciled, adjust medications and doses as needed      Diet: Clear liquid  DVT Prophylaxis: hold home eliquis  Code Status: DNR, DNI          Spent 35 minutes in the follow-up management of this patient today       Karen Bernstein MD

## 2024-07-15 NOTE — CONSULTS
Nephrology Consult Note                                                                                                                                         Consults                                                                                                         HPI  Patient is a 88 y.o. female who is admitted to hospital with complaints of  abdominal pain. Nephrology consulted in view of esrd.     -getting dialysis MWF at [facility]  -still makes some urine  -has been on HD since August 2023  -last HD session on Friday  -had BM  -being treated for colitis     Past Medical History:   Diagnosis Date    Anemia     Atrial fibrillation (Multi)     Chronic kidney disease     Diabetes mellitus (Multi)     Elevated troponin 08/24/2023    GERD (gastroesophageal reflux disease)     Heart murmur     Hypertension     Myocardial infarction (Multi)     Old myocardial infarction 09/09/2023    Other conditions influencing health status 12/06/2022    History of cough    Other conditions influencing health status 04/06/2016    Diabetes mellitus type 1, uncontrolled    Personal history of other diseases of the circulatory system     History of hypertension    Personal history of other diseases of the female genital tract     History of endometriosis    Personal history of other diseases of the nervous system and sense organs 10/15/2021    History of acute otitis media    Personal history of other endocrine, nutritional and metabolic disease 01/26/2018    History of diabetes mellitus    Personal history of transient ischemic attack (TIA), and cerebral infarction without residual deficits 09/20/2023    Stroke (Multi)       Social History     Socioeconomic History    Marital status:      Spouse name: Not on file    Number of children: Not on file    Years of education: Not on file     Highest education level: Not on file   Occupational History    Not on file   Tobacco Use    Smoking status: Never    Smokeless tobacco: Never   Substance and Sexual Activity    Alcohol use: Never    Drug use: Never    Sexual activity: Not on file   Other Topics Concern    Not on file   Social History Narrative    Not on file     Social Determinants of Health     Financial Resource Strain: Low Risk  (7/15/2024)    Overall Financial Resource Strain (CARDIA)     Difficulty of Paying Living Expenses: Not very hard   Food Insecurity: No Food Insecurity (11/10/2023)    [facility]    Hunger Vital Sign     Worried About Running Out of Food in the Last Year: Never true     Ran Out of Food in the Last Year: Never true   Transportation Needs: No Transportation Needs (7/15/2024)    PRAPARE - Transportation     Lack of Transportation (Medical): No     Lack of Transportation (Non-Medical): No   Physical Activity: Not on file   Stress: Not on file   Social Connections: Not on file   Intimate Partner Violence: Not on file   Housing Stability: Low Risk  (7/15/2024)    Housing Stability Vital Sign     Unable to Pay for Housing in the Last Year: No     Number of Times Moved in the Last Year: 1     Homeless in the Last Year: No      Family History   Problem Relation Name Age of Onset    No Known Problems Mother      No Known Problems Father        No current facility-administered medications on file prior to encounter.     Current Outpatient Medications on File Prior to Encounter   Medication Sig Dispense Refill    albuterol 90 mcg/actuation inhaler Inhale 2 puffs every 12 hours if needed.      B complex-vitamin C-folic acid (Nephrocaps) 1 mg capsule Take 1 capsule by mouth once daily.      dilTIAZem ER (Tiazac) 240 mg 24 hr capsule Take 1 capsule (240 mg) by mouth once daily at bedtime.      guaiFENesin (Mucinex) 600 mg 12 hr tablet Take 1 tablet (600 mg) by mouth twice a day.      sennosides  (Senokot) 8.6 mg tablet Take 1 tablet (8.6 mg) by mouth every 12 hours if needed.      sevelamer carbonate (Renvela) 800 mg tablet Take 1 tablet (800 mg) by mouth 3 times a day before meals.      zinc oxide 20 % ointment Apply 1 Application topically twice a day. To groin and buttocks      [DISCONTINUED] amLODIPine (Norvasc) 5 mg tablet       [DISCONTINUED] cloNIDine (Catapres) 0.1 mg tablet       [DISCONTINUED] darbepoetin jose (Aranesp) 40 mcg/0.4 mL injection Inject 0.4 mL (40 mcg total) under the skin.      [DISCONTINUED] gabapentin (Neurontin) 100 mg capsule Take by mouth.      [DISCONTINUED] methocarbamol (Robaxin) 500 mg tablet       [DISCONTINUED] Semglee,insulin glarg-yfgn,Pen 100 unit/mL (3 mL) Pen       [DISCONTINUED] tiZANidine (Zanaflex) 4 mg capsule       acetaminophen (Tylenol) 325 mg tablet Take 2 tablets (650 mg) by mouth every 6 hours if needed for mild pain (1 - 3) or fever (temp greater than 38.0 C).      acetaminophen (Tylenol) 325 mg tablet Take 2 tablets (650 mg) by mouth every 4 hours if needed for fever (temp greater than 38.0 C) (greater than or equal to 38 C). (Patient not taking: Reported on 7/15/2024) 30 tablet 0    alum-mag hydroxide-simeth (Mylanta) 200-200-20 mg/5 mL oral suspension Take 10 mL by mouth every 6 hours if needed for indigestion or heartburn.      amino acids-protein hydr-fiber 15 gram- 100 kcal/30 mL liquid in packet Take 30 mL by mouth 2 times a day.      amiodarone (Pacerone) 200 mg tablet Take 1 tablet (200 mg) by mouth once every 24 hours.      apixaban (Eliquis) 2.5 mg tablet Take 1 tablet (2.5 mg) by mouth 2 times a day.      atorvastatin (Lipitor) 80 mg tablet Take 1 tablet (80 mg) by mouth once daily at bedtime.      benzonatate (Tessalon) 100 mg capsule Take 1 capsule (100 mg) by mouth 3 times a day as needed (decongestion).      bisacodyl (Dulcolax, bisacodyl,) 10 mg suppository Insert 1 suppository (10 mg) into the rectum once daily as needed for constipation.       blood sugar diagnostic (OneTouch Ultra Test) strip 1 strip by Does not apply route 3 times a day. 300 strip 3    bumetanide (Bumex) 1 mg tablet Take 1 tablet (1 mg) by mouth 2 times a day. (Patient not taking: Reported on 7/15/2024)      busPIRone (Buspar) 10 mg tablet Take 1 tablet (10 mg) by mouth twice a day.      clopidogrel (Plavix) 75 mg tablet Take 1 tablet (75 mg) by mouth once daily.      clotrimazole-betamethasone (Lotrisone) cream Apply 1 Application topically 2 times a day. To inner thighs and stephanie area      diclofenac sodium (Voltaren) 1 % gel Apply 2.25 inches (2 g) topically twice a day.      diphenhydramine-zinc acetate cream Apply 1 Application topically 3 times a day. For itchy back      docusate sodium (Colace) 100 mg capsule Take 1 capsule (100 mg) by mouth twice a day.      glucagon 1 mg injection Inject 1 mg into the muscle.      glucose (Glutose) 40 % gel oral gel Take 15 g by mouth 1 time if needed for low blood sugar - see comments.      insulin glargine (Lantus) 100 unit/mL injection Inject 20 Units under the skin once daily at bedtime. Take as directed per insulin instructions. (Patient taking differently: Inject 20 Units under the skin 2 times a day. Take as directed per insulin instructions.)      insulin lispro (HumaLOG) 100 unit/mL injection Inject 0-0.15 mL (0-15 Units) under the skin 4 times a day before meals. Take as directed per insulin instructions. (Patient taking differently: Inject 0-0.1 mL (0-10 Units) under the skin 4 times a day before meals. Take as directed per insulin instructions.)      isosorbide dinitrate (Isordil) 10 mg tablet Take 1 tablet (10 mg) by mouth 3 times a day.      lidocaine (LMX) 4 % cream Apply 0.1 g topically once daily. To back      melatonin 3 mg tablet Take 1 tablet (3 mg) by mouth once daily at bedtime.      metoprolol succinate XL (Kapspargo Sprinkle) 100 mg 24 hr capsule Take 1 capsule (100 mg) by mouth once daily. Do not crush or chew.       mirtazapine (Remeron) 7.5 mg tablet Take 1 tablet (7.5 mg) by mouth once daily at bedtime.      pantoprazole (ProtoNix) 40 mg EC tablet Take 1 tablet (40 mg) by mouth once daily in the morning. Take before meals. Do not crush, chew, or split.      PSYLLIUM HUSK, ASPARTAME, ORAL Take 1 packet by mouth once daily.      simethicone (Mylicon) 80 mg chewable tablet Chew 1 tablet (80 mg) 4 times a day.      traMADol (Ultram) 50 mg tablet Take 0.5 tablets (25 mg) by mouth every 12 hours if needed for severe pain (7 - 10). (Patient taking differently: Take 0.5 tablets (25 mg) by mouth 3 times a day.) 30 tablet 5    [DISCONTINUED] benzocaine-menthol (Cepastat Sore Throat) 15-3.6 mg lozenge Dissolve 1 lozenge in the mouth.      [DISCONTINUED] cilostazol (Pletal) 50 mg tablet Take by mouth every 12 hours.      [DISCONTINUED] dextrose 10 % in water, D10W, 10 % infusion Infuse 0.3 g/kg/hr into a venous catheter.      [DISCONTINUED] heparin 1,000 unit/mL injection 2 mL (2,000 Units) by intra-catheter route.      [DISCONTINUED] hydrALAZINE (Apresoline) 10 mg tablet Take by mouth every 8 hours.      [DISCONTINUED] hydrALAZINE (Apresoline) 20 mg/mL injection Infuse 1.25 mL (25 mg) into a venous catheter every 8 hours if needed.      [DISCONTINUED] hydroCHLOROthiazide (HYDRODiuril) 12.5 mg tablet Take 1 tablet (12.5 mg) by mouth once daily in the morning. Take before meals.      [DISCONTINUED] insulin lispro protamin-lispro (HumaLOG Mix 75-25) 100 unit/mL (75-25) injection Inject 40 Units under the skin once daily in the morning. And inject 20 units under the skin once daily at night      [DISCONTINUED] isosorbide mononitrate 10 mg tablet every 8 hours.      [DISCONTINUED] lidocaine (Lidoderm) 5 % patch Place 1 patch on the skin once daily. Remove & discard patch within 12 hours or as directed by MD. Apply to left hip.      [DISCONTINUED] lisinopril 20 mg tablet Take 1 tablet (20 mg) by mouth once daily at bedtime.       "[DISCONTINUED] magnesium hydroxide (Milk of Magnesia) 400 mg/5 mL suspension Take 30 mL by mouth once daily as needed for constipation.      [DISCONTINUED] methoxy peg-epoetin beta (MIRCERA INJ) 50 mcg every 14 (fourteen) days.      [DISCONTINUED] metoprolol tartrate (Lopressor) 100 mg tablet Take 1 tablet (100 mg) by mouth 2 times a day.      [DISCONTINUED] polyethylene glycol (Glycolax, Miralax) 17 gram/dose powder Take 17 g by mouth once daily as needed (constipation).      [DISCONTINUED] tuberculin,purif.prot.deriv. (TUBERSOL IDRM) Inject 0.1 mL into the skin.        Scheduled medications  amiodarone, 200 mg, oral, Daily  [Held by provider] apixaban, 2.5 mg, oral, BID  atorvastatin, 80 mg, oral, Nightly  [Held by provider] bumetanide, 1 mg, oral, BID  [Held by provider] clopidogrel, 75 mg, oral, Daily  [START ON 7/16/2024] heparin, 1,000 Units, intra-catheter, After Dialysis  [START ON 7/16/2024] heparin, 1,000 Units, intra-catheter, After Dialysis  hydroCHLOROthiazide, 12.5 mg, oral, Daily  insulin lispro, 0-10 Units, subcutaneous, Before meals & nightly  [Held by provider] lisinopril, 20 mg, oral, Nightly  pantoprazole, 40 mg, oral, Daily   Or  pantoprazole, 40 mg, intravenous, Daily  piperacillin-tazobactam, 2.25 g, intravenous, q12h  polyethylene glycol, 17 g, oral, Daily      Continuous medications  lactated Ringer's, 75 mL/hr, Last Rate: 75 mL/hr (07/15/24 0119)      PRN medications  PRN medications: acetaminophen, albuterol, bisacodyl, dextrose, dextrose, glucagon, glucagon, guaiFENesin, melatonin, ondansetron     Review of systems  as per HPI otherwise 10 point review systems negative    /67 (BP Location: Left arm, Patient Position: Lying)   Pulse 73   Temp 37.2 °C (98.9 °F) (Temporal)   Resp 17   Ht 1.626 m (5' 4\")   Wt 78 kg (171 lb 15.3 oz)   SpO2 96%   BMI 29.52 kg/m²     Input / Output:  24 HR:   Intake/Output Summary (Last 24 hours) at 7/15/2024 1145  Last data filed at 7/15/2024 " 0500  Gross per 24 hour   Intake 1325.25 ml   Output --   Net 1325.25 ml       Physical Exam   Alert and oriented x 3, NAD, tearful  EOMI  Neck: supple, No JVD  CV: RRR without m/r/g  Lungs: CTA bilaterally  Abd: soft NT/ND +BS  Ext: no lower extremity edema   : no carmichael  Neuro: grossly intact  Skin: no rashes  +right TDC    Results from last 7 days   Lab Units 07/15/24  0502 07/14/24  1903   SODIUM mmol/L 137 138   POTASSIUM mmol/L 5.8* 5.4*   CHLORIDE mmol/L 100 98   CO2 mmol/L 25 26   BUN mg/dL 57* 55*   CREATININE mg/dL 3.29* 3.34*   GLUCOSE mg/dL 171* 227*   CALCIUM mg/dL 8.4* 8.4*        Results from last 7 days   Lab Units 07/15/24  0502   SODIUM mmol/L 137   POTASSIUM mmol/L 5.8*   CHLORIDE mmol/L 100   CO2 mmol/L 25   BUN mg/dL 57*   CREATININE mg/dL 3.29*   CALCIUM mg/dL 8.4*   PROTEIN TOTAL g/dL 5.7*   BILIRUBIN TOTAL mg/dL 0.3   ALK PHOS U/L 101   ALT U/L 26   AST U/L 53*   GLUCOSE mg/dL 171*      Results from last 7 days   Lab Units 07/14/24  1903   MAGNESIUM mg/dL 2.15      Results from last 7 days   Lab Units 07/15/24  0502 07/14/24  1903   WBC AUTO x10*3/uL 24.4* 27.4*   HEMOGLOBIN g/dL 12.4 13.1   HEMATOCRIT % 39.0 42.3   PLATELETS AUTO x10*3/uL 300 339        XR chest 1 view   Final Result   No acute radiographic abnormalities. Please refer to same-day CT   abdomen/pelvis for characterization of the nodular consolidations in   the lower lobes.        MACRO:   None        Signed by: Baljinder Morse 7/14/2024 8:34 PM   Dictation workstation:   [id number]      CT angio abdomen pelvis w and or wo IV IV contrast   Final Result   1. Bowel wall thickening and loss of haustration in the proximal   sigmoid colon is new compared to prior exam. This finding is   nonspecific and can be seen in the setting of ischemia or   infectious/inflammatory colitis. No large vessel arterial or venous   occlusions detected in this territory but this is a watershed zone   and at increased risk for ischemia.   2.  Circumferential bladder wall thickening and mucosal   hyperenhancement suspicious for infectious or inflammatory cystitis.   Correlate with urinalysis.   3. Large volume stool impacted in the rectum with mesorectal   stranding putting patient at increased risk for stercoral colitis.   4. 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   5. Bibasal nodules and nodular consolidations may represent sequelae   of bronchopneumonia or aspiration. Correlate with patient's symptoms   and recommend follow-up chest CT in 3 months to ensure resolution   after treatment.        MACRO:   Critical Finding:  See findings. Notification was initiated on   7/14/2024 at 8:58 pm by  Baljinder Morse.  (**-YCF-**) Instructions:        Signed by: Baljinder Morse 7/14/2024 8:58 PM   Dictation workstation:   [id number]           Assessment:   Patient is 88 y.o. female who is admitted to hospital for abdominal pain/colisit. Nephrology consulted in view of esrd.     ESRD on HD MWF   Electrolytes  Hyperkalemia  Correct with HD  MBD  Currently not on binders  Colitis/abdominal pain  Improved  Abx per primary/surgery    Recommendations:   -HD today  -2k bath  -check PO4, Mag  -continue with MWF HD schedule     Please message me through [facility] chat with any questions or concerns.     Wero Lynch MD  7/15/2024  11:45 AM         [facility]    [address], [address]   [place] [address]  Office: [phone]

## 2024-07-15 NOTE — CONSULTS
Attempted wound care consult. Patient off unit. Photos reviewed, simple dressing care order placed until eval tomorrow.

## 2024-07-15 NOTE — PROGRESS NOTES
Multiple attempts to meet with pt this afternoon but she has been off the floor.  Will address DC needs when able to discuss with pt.   78

## 2024-07-15 NOTE — CARE PLAN
The patient's goals for the shift include      The clinical goals for the shift include to remain comfortable      Problem: Pain - Adult  Goal: Verbalizes/displays adequate comfort level or baseline comfort level  Outcome: Progressing     Problem: Safety - Adult  Goal: Free from fall injury  Outcome: Progressing     Problem: Discharge Planning  Goal: Discharge to home or other facility with appropriate resources  Outcome: Progressing     Problem: Chronic Conditions and Co-morbidities  Goal: Patient's chronic conditions and co-morbidity symptoms are monitored and maintained or improved  Outcome: Progressing     Problem: Skin  Goal: Decreased wound size/increased tissue granulation at next dressing change  Outcome: Progressing  Goal: Participates in plan/prevention/treatment measures  Outcome: Progressing  Goal: Prevent/manage excess moisture  Outcome: Progressing  Goal: Prevent/minimize sheer/friction injuries  Outcome: Progressing  Flowsheets (Taken 7/15/2024 1148)  Prevent/minimize sheer/friction injuries:   HOB 30 degrees or less   Turn/reposition every 2 hours/use positioning/transfer devices  Goal: Promote/optimize nutrition  Outcome: Progressing  Goal: Promote skin healing  Outcome: Progressing     Problem: Diabetes  Goal: Achieve decreasing blood glucose levels by end of shift  Outcome: Progressing  Goal: Increase stability of blood glucose readings by end of shift  Outcome: Progressing  Goal: Decrease in ketones present in urine by end of shift  Outcome: Progressing  Goal: Maintain electrolyte levels within acceptable range throughout shift  Outcome: Progressing  Goal: Maintain glucose levels >70mg/dl to <250mg/dl throughout shift  Outcome: Progressing  Goal: No changes in neurological exam by end of shift  Outcome: Progressing  Goal: Learn about and adhere to nutrition recommendations by end of shift  Outcome: Progressing  Goal: Vital signs within normal range for age by end of shift  Outcome:  Progressing  Goal: Increase self care and/or family involovement by end of shift  Outcome: Progressing  Goal: Receive DSME education by end of shift  Outcome: Progressing     Problem: Heart Failure  Goal: Improved gas exchange this shift  Outcome: Progressing  Goal: Improved urinary output this shift  Outcome: Progressing  Goal: Reduction in peripheral edema within 24 hours  Outcome: Progressing  Goal: Report improvement of dyspnea/breathlessness this shift  Outcome: Progressing  Goal: Weight from fluid excess reduced over 2-3 days, then stabilize  Outcome: Progressing  Goal: Increase self care and/or family involvement in 24 hours  Outcome: Progressing     Problem: Pain  Goal: Takes deep breaths with improved pain control throughout the shift  Outcome: Progressing  Goal: Turns in bed with improved pain control throughout the shift  Outcome: Progressing  Goal: Walks with improved pain control throughout the shift  Outcome: Progressing  Goal: Performs ADL's with improved pain control throughout shift  Outcome: Progressing  Goal: Participates in PT with improved pain control throughout the shift  Outcome: Progressing  Goal: Free from opioid side effects throughout the shift  Outcome: Progressing  Goal: Free from acute confusion related to pain meds throughout the shift  Outcome: Progressing     Problem: Nutrition  Goal: Less than 5 days NPO/clear liquids  Outcome: Progressing  Goal: Oral intake greater than 50%  Outcome: Progressing  Goal: Oral intake greater 75%  Outcome: Progressing  Goal: Consume prescribed supplement  Outcome: Progressing  Goal: Adequate PO fluid intake  Outcome: Progressing  Goal: Nutrition support goals are met within 48 hrs  Outcome: Progressing  Goal: Nutrition support is meeting 75% of nutrient needs  Outcome: Progressing  Goal: Tube feed tolerance  Outcome: Progressing  Goal: BG  mg/dL  Outcome: Progressing  Goal: Lab values WNL  Outcome: Progressing  Goal: Electrolytes WNL  Outcome:  Progressing  Goal: Promote healing  Outcome: Progressing  Goal: Maintain stable weight  Outcome: Progressing  Goal: Reduce weight from edema/fluid  Outcome: Progressing  Goal: Gradual weight gain  Outcome: Progressing  Goal: Improve ostomy output  Outcome: Progressing

## 2024-07-15 NOTE — CARE PLAN
The patient's goals for the shift include      Problem: Pain - Adult  Goal: Verbalizes/displays adequate comfort level or baseline comfort level  Outcome: Progressing     Problem: Safety - Adult  Goal: Free from fall injury  Outcome: Progressing     Problem: Discharge Planning  Goal: Discharge to home or other facility with appropriate resources  Outcome: Progressing     Problem: Chronic Conditions and Co-morbidities  Goal: Patient's chronic conditions and co-morbidity symptoms are monitored and maintained or improved  Outcome: Progressing     Problem: Skin  Goal: Decreased wound size/increased tissue granulation at next dressing change  Outcome: Progressing  Goal: Participates in plan/prevention/treatment measures  Outcome: Progressing  Flowsheets (Taken 7/15/2024 1250)  Participates in plan/prevention/treatment measures: Elevate heels  Goal: Prevent/manage excess moisture  Outcome: Progressing  Flowsheets (Taken 7/15/2024 1250)  Prevent/manage excess moisture:   Cleanse incontinence/protect with barrier cream   Monitor for/manage infection if present  Goal: Prevent/minimize sheer/friction injuries  Outcome: Progressing  Flowsheets (Taken 7/15/2024 1250)  Prevent/minimize sheer/friction injuries:   Turn/reposition every 2 hours/use positioning/transfer devices   Use pull sheet  Goal: Promote/optimize nutrition  Outcome: Progressing  Flowsheets (Taken 7/15/2024 1250)  Promote/optimize nutrition:   Consume > 50% meals/supplements   Offer water/supplements/favorite foods  Goal: Promote skin healing  Outcome: Progressing     Problem: Diabetes  Goal: Achieve decreasing blood glucose levels by end of shift  Outcome: Progressing  Goal: Increase stability of blood glucose readings by end of shift  Outcome: Progressing  Goal: Decrease in ketones present in urine by end of shift  Outcome: Progressing  Goal: Maintain electrolyte levels within acceptable range throughout shift  Outcome: Progressing  Goal: Maintain glucose  levels >70mg/dl to <250mg/dl throughout shift  Outcome: Progressing  Goal: No changes in neurological exam by end of shift  Outcome: Progressing  Goal: Learn about and adhere to nutrition recommendations by end of shift  Outcome: Progressing  Goal: Vital signs within normal range for age by end of shift  Outcome: Progressing  Goal: Increase self care and/or family involovement by end of shift  Outcome: Progressing  Goal: Receive DSME education by end of shift  Outcome: Progressing     Problem: Heart Failure  Goal: Improved gas exchange this shift  Outcome: Progressing  Goal: Improved urinary output this shift  Outcome: Progressing  Goal: Reduction in peripheral edema within 24 hours  Outcome: Progressing  Goal: Report improvement of dyspnea/breathlessness this shift  Outcome: Progressing  Goal: Weight from fluid excess reduced over 2-3 days, then stabilize  Outcome: Progressing  Goal: Increase self care and/or family involvement in 24 hours  Outcome: Progressing     Problem: Pain  Goal: Takes deep breaths with improved pain control throughout the shift  Outcome: Progressing  Goal: Turns in bed with improved pain control throughout the shift  Outcome: Progressing  Goal: Walks with improved pain control throughout the shift  Outcome: Progressing  Goal: Performs ADL's with improved pain control throughout shift  Outcome: Progressing  Goal: Participates in PT with improved pain control throughout the shift  Outcome: Progressing  Goal: Free from opioid side effects throughout the shift  Outcome: Progressing  Goal: Free from acute confusion related to pain meds throughout the shift  Outcome: Progressing     Problem: Nutrition  Goal: Less than 5 days NPO/clear liquids  Outcome: Progressing  Goal: Oral intake greater than 50%  Outcome: Progressing  Goal: Oral intake greater 75%  Outcome: Progressing  Goal: Consume prescribed supplement  Outcome: Progressing  Goal: Adequate PO fluid intake  Outcome: Progressing  Goal:  Nutrition support goals are met within 48 hrs  Outcome: Progressing  Goal: Nutrition support is meeting 75% of nutrient needs  Outcome: Progressing  Goal: Tube feed tolerance  Outcome: Progressing  Goal: BG  mg/dL  Outcome: Progressing  Goal: Lab values WNL  Outcome: Progressing  Goal: Electrolytes WNL  Outcome: Progressing  Goal: Promote healing  Outcome: Progressing  Goal: Maintain stable weight  Outcome: Progressing  Goal: Reduce weight from edema/fluid  Outcome: Progressing  Goal: Gradual weight gain  Outcome: Progressing  Goal: Improve ostomy output  Outcome: Progressing       The clinical goals for the shift include tolerate clear liquids throughout shift.    See assessment and mar. Remains on room air. See blood sugars. Clear liquids started.

## 2024-07-15 NOTE — PROCEDURES
Report from Sending RN:    Report From: Tiny Gautam RN  Recent Surgery of Procedure: No  Baseline Level of Consciousness (LOC): x4  Oxygen Use: No  Type: RA  Diabetic: No  Last BP Med Given Day of Dialysis:   See EMAR  Last Pain Med Given: See EMAR  Lab Tests to be Obtained with Dialysis: No  Blood Transfusion to be Given During Dialysis: No  Available IV Access: Yes  Medications to be Administered During Dialysis: No  Continuous IV Infusion Running: No  Restraints on Currently or in the Last 24 Hours: No  Hand-Off Communication: ***  Dialysis Catheter Dressing: ***  Last Dressing Change: ***

## 2024-07-15 NOTE — PROGRESS NOTES
Medication Adjustment    The following medication(s) was/were adjusted for Melody Martin per protocol/policy due to altered renal function and indication.    Medication(s) adjusted:   Zosyn 2.25g q 8 hrs changed to 2.25g q 12 hrs for the indication of Abdominal Infection and ESRD requiring HD.    Maribell Deshpande, PharmD

## 2024-07-15 NOTE — CONSULTS
GENERAL SURGERY CONSULTATION NOTE    Melody Martin   1935   [id number]     Consults    Reason For Consult  Abdominal pain, inflammation of the colon    History Of Present Illness  Melody Martin is a 88 y.o. female presenting with severe LLQ abdominal pain for 2 days. She lives in a nursing facility and did not have a bowel movement for a few days. She had pain out of proportion to examination and underwent a CTA. Today, she has had innumerable bowel movements, including a large BM with hard stool in the ED on my initial evaluation. She states that her abdominal pain has significantly improved but not resolved since passing the bowel movements.    The patient's POA is her son-in-law. Her daughter (son-in-law's wife) passed away a few months ago.      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    Surgical History  She has a past surgical history that includes Hysterectomy (10/10/2018); Back surgery (10/10/2018); IR CVC tunneled (8/28/2023); MR angio neck wo IV contrast (8/31/2023); and MR angio head wo IV contrast (8/31/2023).    Medications  No current facility-administered medications on file prior to  encounter.     Current Outpatient Medications on File Prior to Encounter   Medication Sig Dispense Refill    acetaminophen (Tylenol) 325 mg tablet Take 2 tablets (650 mg) by mouth every 6 hours if needed for mild pain (1 - 3).      acetaminophen (Tylenol) 325 mg tablet Take 2 tablets (650 mg) by mouth every 4 hours if needed for fever (temp greater than 38.0 C) (greater than or equal to 38 C). 30 tablet 0    alum-mag hydroxide-simeth (Mylanta) 200-200-20 mg/5 mL oral suspension Take 30 mL by mouth every 4 hours if needed for indigestion or heartburn.      amiodarone (Pacerone) 200 mg tablet Take 1 tablet (200 mg) by mouth once every 24 hours.      apixaban (Eliquis) 2.5 mg tablet Take 1 tablet (2.5 mg) by mouth 2 times a day.      atorvastatin (Lipitor) 80 mg tablet Take 1 tablet (80 mg) by mouth once daily at bedtime.      benzocaine-menthol (Cepastat Sore Throat) 15-3.6 mg lozenge Dissolve 1 lozenge in the mouth.      benzonatate (Tessalon) 100 mg capsule Take by mouth.      bisacodyl (Dulcolax, bisacodyl,) 10 mg suppository Insert 1 suppository (10 mg) into the rectum once daily as needed for constipation.      blood sugar diagnostic (OneTouch Ultra Test) strip 1 strip by Does not apply route 3 times a day. 300 strip 3    bumetanide (Bumex) 1 mg tablet Take 1 tablet (1 mg) by mouth 2 times a day.      cilostazol (Pletal) 50 mg tablet Take by mouth every 12 hours.      clopidogrel (Plavix) 75 mg tablet Take 1 tablet (75 mg) by mouth once daily.      dextrose 10 % in water, D10W, 10 % infusion Infuse 0.3 g/kg/hr into a venous catheter.      glucagon 1 mg injection Inject 1 mg into the muscle.      heparin 1,000 unit/mL injection 2 mL (2,000 Units) by intra-catheter route.      hydrALAZINE (Apresoline) 10 mg tablet Take by mouth every 8 hours.      hydrALAZINE (Apresoline) 20 mg/mL injection Infuse 1.25 mL (25 mg) into a venous catheter every 8 hours if needed.      hydroCHLOROthiazide (HYDRODiuril) 12.5 mg tablet  Take 1 tablet (12.5 mg) by mouth once daily in the morning. Take before meals.      insulin glargine (Lantus) 100 unit/mL injection Inject 20 Units under the skin once daily at bedtime. Take as directed per insulin instructions.      insulin lispro (HumaLOG) 100 unit/mL injection Inject 0-0.15 mL (0-15 Units) under the skin 4 times a day before meals. Take as directed per insulin instructions.      insulin lispro protamin-lispro (HumaLOG Mix 75-25) 100 unit/mL (75-25) injection Inject 40 Units under the skin once daily in the morning. And inject 20 units under the skin once daily at night      isosorbide dinitrate (Isordil) 10 mg tablet Take 1 tablet (10 mg) by mouth 3 times a day.      isosorbide mononitrate 10 mg tablet every 8 hours.      lidocaine (Lidoderm) 5 % patch Place 1 patch on the skin once daily. Remove & discard patch within 12 hours or as directed by MD. Apply to left hip.      lisinopril 20 mg tablet Take 1 tablet (20 mg) by mouth once daily at bedtime.      magnesium hydroxide (Milk of Magnesia) 400 mg/5 mL suspension Take 30 mL by mouth once daily as needed for constipation.      melatonin tablet Take 2 tablets (2 mg) by mouth as needed at bedtime for sleep.      methoxy peg-epoetin beta (MIRCERA INJ) 50 mcg every 14 (fourteen) days.      metoprolol succinate XL (Toprol-XL) 50 mg 24 hr tablet Take 1 tablet (50 mg) by mouth once daily. Do not crush or chew.      metoprolol tartrate (Lopressor) 100 mg tablet Take 1 tablet (100 mg) by mouth 2 times a day.      pantoprazole (ProtoNix) 40 mg EC tablet Take 1 tablet (40 mg) by mouth once daily in the morning. Take before meals. Do not crush, chew, or split.      polyethylene glycol (Glycolax, Miralax) 17 gram/dose powder Take 17 g by mouth once daily as needed (constipation).      PSYLLIUM HUSK, ASPARTAME, ORAL Take 1 packet by mouth once daily as needed (constipation).      simethicone (Mylicon) 80 mg chewable tablet Chew 1 tablet (80 mg) every 6 hours  "if needed for flatulence.      traMADol (Ultram) 50 mg tablet Take 0.5 tablets (25 mg) by mouth every 12 hours if needed for severe pain (7 - 10). 30 tablet 5    tuberculin,purif.prot.deriv. (TUBERSOL IDRM) Inject 0.1 mL into the skin.         Allergies  Aspirin, Amlodipine, Levofloxacin, and Sulfamethoxazole-trimethoprim     Social History  She reports that she has never smoked. She has never used smokeless tobacco. She reports that she does not drink alcohol and does not use drugs.    Family History  Family History   Problem Relation Name Age of Onset    No Known Problems Mother      No Known Problems Father          Review of Systems   Constitutional:  Negative for chills and fever.   Respiratory:  Negative for cough and shortness of breath.    Cardiovascular:  Negative for chest pain and palpitations.   Gastrointestinal:  Positive for abdominal pain and diarrhea. Negative for blood in stool, constipation, nausea and vomiting.   Neurological:  Negative for dizziness and headaches.   All other systems reviewed and are negative.      Last Recorded Vitals  Blood pressure (!) 139/49, pulse 68, temperature 36.2 °C (97.1 °F), resp. rate 16, height 1.626 m (5' 4\"), weight 78 kg (171 lb 15.3 oz), SpO2 96%.     Physical Exam  Constitutional:       General: She is not in acute distress.     Appearance: Normal appearance. She is not ill-appearing.   HENT:      Head: Normocephalic and atraumatic.   Cardiovascular:      Rate and Rhythm: Normal rate and regular rhythm.   Pulmonary:      Effort: Pulmonary effort is normal. No respiratory distress.      Breath sounds: Normal breath sounds.   Abdominal:      General: There is no distension.      Palpations: Abdomen is soft.      Tenderness: There is abdominal tenderness. There is no guarding.      Comments: Mildly tender in LLQ without guarding   Genitourinary:     Comments: Large amount of stool noted during examination, with 1 very firm and large piece of " stool  Musculoskeletal:         General: No swelling.   Skin:     General: Skin is warm and dry.      Comments: Sacral wound noted   Neurological:      Mental Status: She is alert and oriented to person, place, and time. Mental status is at baseline.   Psychiatric:         Mood and Affect: Mood normal.         Behavior: Behavior normal.          Labs  Results for orders placed or performed during the hospital encounter of 07/14/24 (from the past 24 hour(s))   CBC and Auto Differential   Result Value Ref Range    WBC 27.4 (H) 4.4 - 11.3 x10*3/uL    nRBC 0.0 0.0 - 0.0 /100 WBCs    RBC 4.51 4.00 - 5.20 x10*6/uL    Hemoglobin 13.1 12.0 - 16.0 g/dL    Hematocrit 42.3 36.0 - 46.0 %    MCV 94 80 - 100 fL    MCH 29.0 26.0 - 34.0 pg    MCHC 31.0 (L) 32.0 - 36.0 g/dL    RDW 18.9 (H) 11.5 - 14.5 %    Platelets 339 150 - 450 x10*3/uL    Neutrophils % 85.7 40.0 - 80.0 %    Immature Granulocytes %, Automated 2.0 (H) 0.0 - 0.9 %    Lymphocytes % 6.2 13.0 - 44.0 %    Monocytes % 5.7 2.0 - 10.0 %    Eosinophils % 0.0 0.0 - 6.0 %    Basophils % 0.4 0.0 - 2.0 %    Neutrophils Absolute 23.42 (H) 1.60 - 5.50 x10*3/uL    Immature Granulocytes Absolute, Automated 0.56 (H) 0.00 - 0.50 x10*3/uL    Lymphocytes Absolute 1.69 0.80 - 3.00 x10*3/uL    Monocytes Absolute 1.57 (H) 0.05 - 0.80 x10*3/uL    Eosinophils Absolute 0.00 0.00 - 0.40 x10*3/uL    Basophils Absolute 0.12 (H) 0.00 - 0.10 x10*3/uL   Comprehensive metabolic panel   Result Value Ref Range    Glucose 227 (H) 74 - 99 mg/dL    Sodium 138 136 - 145 mmol/L    Potassium 5.4 (H) 3.5 - 5.3 mmol/L    Chloride 98 98 - 107 mmol/L    Bicarbonate 26 21 - 32 mmol/L    Anion Gap 19 10 - 20 mmol/L    Urea Nitrogen 55 (H) 6 - 23 mg/dL    Creatinine 3.34 (H) 0.50 - 1.05 mg/dL    eGFR 13 (L) >60 mL/min/1.73m*2    Calcium 8.4 (L) 8.6 - 10.3 mg/dL    Albumin 2.9 (L) 3.4 - 5.0 g/dL    Alkaline Phosphatase 121 33 - 136 U/L    Total Protein 6.1 (L) 6.4 - 8.2 g/dL    AST 51 (H) 9 - 39 U/L    Bilirubin,  Total 0.4 0.0 - 1.2 mg/dL    ALT 24 7 - 45 U/L   Magnesium   Result Value Ref Range    Magnesium 2.15 1.60 - 2.40 mg/dL   Blood Gas Venous Full Panel   Result Value Ref Range    POCT pH, Venous 7.36 7.33 - 7.43 pH    POCT pCO2, Venous 48 41 - 51 mm Hg    POCT pO2, Venous 44 35 - 45 mm Hg    POCT SO2, Venous 62 45 - 75 %    POCT Oxy Hemoglobin, Venous 60.9 45.0 - 75.0 %    POCT Hematocrit Calculated, Venous 41.0 36.0 - 46.0 %    POCT Sodium, Venous 132 (L) 136 - 145 mmol/L    POCT Potassium, Venous 5.5 (H) 3.5 - 5.3 mmol/L    POCT Chloride, Venous 100 98 - 107 mmol/L    POCT Ionized Calicum, Venous 1.08 (L) 1.10 - 1.33 mmol/L    POCT Glucose, Venous 237 (H) 74 - 99 mg/dL    POCT Lactate, Venous 3.9 (H) 0.4 - 2.0 mmol/L    POCT Base Excess, Venous 1.0 -2.0 - 3.0 mmol/L    POCT HCO3 Calculated, Venous 27.1 (H) 22.0 - 26.0 mmol/L    POCT Hemoglobin, Venous 13.6 12.0 - 16.0 g/dL    POCT Anion Gap, Venous 10.0 10.0 - 25.0 mmol/L    Patient Temperature 37.0 degrees Celsius    FiO2 0 %   Troponin I, High Sensitivity   Result Value Ref Range    Troponin I, High Sensitivity 15 (H) 0 - 13 ng/L   Blood Gas Lactic Acid, Venous   Result Value Ref Range    POCT Lactate, Venous 3.9 (H) 0.4 - 2.0 mmol/L   Urinalysis with Reflex Culture and Microscopic   Result Value Ref Range    Color, Urine Red-brown (N) Straw, Yellow    Appearance, Urine Turbid (N) Clear    Specific Gravity, Urine 1.025 1.005 - 1.035    pH, Urine 7.0 5.0, 5.5, 6.0, 6.5, 7.0, 7.5, 8.0    Protein, Urine 300 (3+) (A) NEGATIVE, 10 (TRACE) mg/dL    Glucose, Urine 30 (TRACE) (A) NEGATIVE mg/dL    Blood, Urine 1.0 (3+) (A) NEGATIVE    Ketones, Urine 5 (TRACE) (A) NEGATIVE mg/dL    Bilirubin, Urine NEGATIVE NEGATIVE    Urobilinogen, Urine NORM NORM mg/dL    Nitrite, Urine NEGATIVE NEGATIVE    Leukocyte Esterase, Urine 500 Ange/µL (A) NEGATIVE   Microscopic Only, Urine   Result Value Ref Range    WBC, Urine >50 (A) 1-5, NONE /HPF    RBC, Urine >20 (A) NONE, 1-2, 3-5 /HPF     Bacteria, Urine 4+ (A) NONE SEEN /HPF       Radiology  CT angio abdomen pelvis w and or wo IV IV contrast  1. Bowel wall thickening and loss of haustration in the proximal sigmoid colon is new compared to prior exam. This finding is nonspecific and can be seen in the setting of ischemia or infectious/inflammatory colitis. No large vessel arterial or venous occlusions detected in this territory but this is a watershed zone and at increased risk for ischemia. 2. Circumferential bladder wall thickening and mucosal hyperenhancement suspicious for infectious or inflammatory cystitis. Correlate with urinalysis. 3. Large volume stool impacted in the rectum with mesorectal stranding putting patient at increased risk for stercoral colitis. 4. 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 5. Bibasal nodules and nodular consolidations may represent sequelae of bronchopneumonia or aspiration. Correlate with patient's symptoms and recommend follow-up chest CT in 3 months to ensure resolution after treatment.   MACRO: Critical Finding:  See findings. Notification was initiated on 7/14/2024 at 8:58 pm by  Baljinder Morse.  (**-YCF-**) Instructions:   Signed by: Baljinder Morse 7/14/2024 8:58 PM Dictation workstation:   [id number]      Assessment and Plan  Active Problems:  There are no active Hospital Problems.    88 y.o. female with severe abdominal pain and imaging showing significant fecal impaction and upstream inflammatory changes as a result. On initial discussion with the patient and son-in-law, she would be quite high risk for emergency surgical intervention and would probably not want to proceed if that was strongly recommended. However, she has since passed several bowel movements and her pain has improved. Recommend IV antibiotics (she also has a UTI), NPO, hold antiplatelet/anticoagulants, and reassess tomorrow. I suspect she has passed the majority of the stool at this point and does not need  manual disimpaction. We will obtain a KUB tomorrow to confirm. Avoid using fecal management system for now. She will likely need a stronger bowel regimen than her typical home dose to prevent future episodes.     Mely Everett MD, [facility]  General Surgery

## 2024-07-15 NOTE — PROGRESS NOTES
Occupational Therapy                 Therapy Communication Note    Patient Name: Melody Martin  MRN: [id number]  Today's Date: 7/15/2024     Discipline: Occupational Therapy - chart reviewed + referral received    Missed Visit Reason: Missed Visit Reason: Patient in a medical procedure (attempted to see pt at 1519 pt off unit - dialysis)    Missed Time: Attempt    Comment:    JINA Daniels, OTR/L

## 2024-07-15 NOTE — PROGRESS NOTES
Physical Therapy                 Therapy Communication Note    Patient Name: Melody Martin  MRN: [id number]  Today's Date: 7/15/2024     Discipline: Physical Therapy    Missed Visit Reason: Missed Visit Reason: Patient in a medical procedure (pt off floor for dialysis)    Missed Time: Attempt    Comment: Will attempt again tomorrow as long as medically appropriate.    MIRTA DYSON-PT

## 2024-07-15 NOTE — PROCEDURES
Report to Receiving RN:    Report To: Tiny Gautam RN  Time Report Called: 0154  Hand-Off Communication:   Pt tolerated tx well.   Removed 1.3L  Post vitals: 132/50 (76) - 75.8 kg - 97.4*F   Complications During Treatment: No  Ultrafiltration Treatment: No  Medications Administered During Dialysis: No  Blood Products Administered During Dialysis: No  Labs Sent During Dialysis: No  Heparin Drip Rate Changes: N/A  Dialysis Catheter Dressing:   Changed dressing. Dated 07/15/2024 and initialed TE. Dressing clean, dry and intact.  Last Dressing Change:   Changed dressing. Dated 07/15/2024 and initialed TE. Dressing clean, dry and intact.    Electronic Signatures:  Monica Connelly OCDT    Last Updated: 6:14 PM by MONICA CONNELLY

## 2024-07-16 LAB
ALBUMIN SERPL BCP-MCNC: 2.3 G/DL (ref 3.4–5)
ALP SERPL-CCNC: 77 U/L (ref 33–136)
ALT SERPL W P-5'-P-CCNC: 20 U/L (ref 7–45)
ANION GAP SERPL CALC-SCNC: 11 MMOL/L (ref 10–20)
AST SERPL W P-5'-P-CCNC: 24 U/L (ref 9–39)
BACTERIA UR CULT: ABNORMAL
BASOPHILS # BLD AUTO: 0.06 X10*3/UL (ref 0–0.1)
BASOPHILS NFR BLD AUTO: 0.4 %
BILIRUB SERPL-MCNC: 0.3 MG/DL (ref 0–1.2)
BUN SERPL-MCNC: 21 MG/DL (ref 6–23)
C DIF TOX TCDA+TCDB STL QL NAA+PROBE: NOT DETECTED
CALCIUM SERPL-MCNC: 7.4 MG/DL (ref 8.6–10.3)
CHLORIDE SERPL-SCNC: 100 MMOL/L (ref 98–107)
CO2 SERPL-SCNC: 29 MMOL/L (ref 21–32)
CREAT SERPL-MCNC: 2 MG/DL (ref 0.5–1.05)
EGFRCR SERPLBLD CKD-EPI 2021: 24 ML/MIN/1.73M*2
EOSINOPHIL # BLD AUTO: 0.15 X10*3/UL (ref 0–0.4)
EOSINOPHIL NFR BLD AUTO: 1.1 %
ERYTHROCYTE [DISTWIDTH] IN BLOOD BY AUTOMATED COUNT: 19.1 % (ref 11.5–14.5)
GLUCOSE BLD MANUAL STRIP-MCNC: 153 MG/DL (ref 74–99)
GLUCOSE BLD MANUAL STRIP-MCNC: 155 MG/DL (ref 74–99)
GLUCOSE BLD MANUAL STRIP-MCNC: 215 MG/DL (ref 74–99)
GLUCOSE BLD MANUAL STRIP-MCNC: 225 MG/DL (ref 74–99)
GLUCOSE SERPL-MCNC: 117 MG/DL (ref 74–99)
HCT VFR BLD AUTO: 33.6 % (ref 36–46)
HGB BLD-MCNC: 10.4 G/DL (ref 12–16)
IMM GRANULOCYTES # BLD AUTO: 0.17 X10*3/UL (ref 0–0.5)
IMM GRANULOCYTES NFR BLD AUTO: 1.2 % (ref 0–0.9)
LYMPHOCYTES # BLD AUTO: 1.64 X10*3/UL (ref 0.8–3)
LYMPHOCYTES NFR BLD AUTO: 12 %
MCH RBC QN AUTO: 28.8 PG (ref 26–34)
MCHC RBC AUTO-ENTMCNC: 31 G/DL (ref 32–36)
MCV RBC AUTO: 93 FL (ref 80–100)
MONOCYTES # BLD AUTO: 1.04 X10*3/UL (ref 0.05–0.8)
MONOCYTES NFR BLD AUTO: 7.6 %
NEUTROPHILS # BLD AUTO: 10.61 X10*3/UL (ref 1.6–5.5)
NEUTROPHILS NFR BLD AUTO: 77.7 %
NRBC BLD-RTO: 0 /100 WBCS (ref 0–0)
PLATELET # BLD AUTO: 240 X10*3/UL (ref 150–450)
POTASSIUM SERPL-SCNC: 3.3 MMOL/L (ref 3.5–5.3)
PROT SERPL-MCNC: 4.9 G/DL (ref 6.4–8.2)
RBC # BLD AUTO: 3.61 X10*6/UL (ref 4–5.2)
SODIUM SERPL-SCNC: 137 MMOL/L (ref 136–145)
WBC # BLD AUTO: 13.7 X10*3/UL (ref 4.4–11.3)

## 2024-07-16 PROCEDURE — 84075 ASSAY ALKALINE PHOSPHATASE: CPT | Performed by: INTERNAL MEDICINE

## 2024-07-16 PROCEDURE — 87506 IADNA-DNA/RNA PROBE TQ 6-11: CPT | Mod: PORLAB | Performed by: INTERNAL MEDICINE

## 2024-07-16 PROCEDURE — 99233 SBSQ HOSP IP/OBS HIGH 50: CPT | Performed by: INTERNAL MEDICINE

## 2024-07-16 PROCEDURE — 85025 COMPLETE CBC W/AUTO DIFF WBC: CPT | Performed by: INTERNAL MEDICINE

## 2024-07-16 PROCEDURE — 2500000002 HC RX 250 W HCPCS SELF ADMINISTERED DRUGS (ALT 637 FOR MEDICARE OP, ALT 636 FOR OP/ED)

## 2024-07-16 PROCEDURE — 1200000002 HC GENERAL ROOM WITH TELEMETRY DAILY

## 2024-07-16 PROCEDURE — 97166 OT EVAL MOD COMPLEX 45 MIN: CPT | Mod: GO

## 2024-07-16 PROCEDURE — 2500000001 HC RX 250 WO HCPCS SELF ADMINISTERED DRUGS (ALT 637 FOR MEDICARE OP): Performed by: INTERNAL MEDICINE

## 2024-07-16 PROCEDURE — 97162 PT EVAL MOD COMPLEX 30 MIN: CPT | Mod: GP

## 2024-07-16 PROCEDURE — 2500000004 HC RX 250 GENERAL PHARMACY W/ HCPCS (ALT 636 FOR OP/ED): Performed by: INTERNAL MEDICINE

## 2024-07-16 PROCEDURE — 87493 C DIFF AMPLIFIED PROBE: CPT | Performed by: INTERNAL MEDICINE

## 2024-07-16 PROCEDURE — 82947 ASSAY GLUCOSE BLOOD QUANT: CPT

## 2024-07-16 PROCEDURE — 99231 SBSQ HOSP IP/OBS SF/LOW 25: CPT | Performed by: SURGERY

## 2024-07-16 PROCEDURE — 36415 COLL VENOUS BLD VENIPUNCTURE: CPT | Performed by: INTERNAL MEDICINE

## 2024-07-16 PROCEDURE — 2500000004 HC RX 250 GENERAL PHARMACY W/ HCPCS (ALT 636 FOR OP/ED)

## 2024-07-16 PROCEDURE — 2500000001 HC RX 250 WO HCPCS SELF ADMINISTERED DRUGS (ALT 637 FOR MEDICARE OP)

## 2024-07-16 RX ORDER — CALCIUM ACETATE 667 MG/1
667 CAPSULE ORAL
Status: DISCONTINUED | OUTPATIENT
Start: 2024-07-16 | End: 2024-07-18 | Stop reason: HOSPADM

## 2024-07-16 RX ADMIN — ACETAMINOPHEN 650 MG: 325 TABLET ORAL at 14:30

## 2024-07-16 RX ADMIN — PANTOPRAZOLE SODIUM 40 MG: 40 TABLET, DELAYED RELEASE ORAL at 09:43

## 2024-07-16 RX ADMIN — AMIODARONE HYDROCHLORIDE 200 MG: 200 TABLET ORAL at 09:43

## 2024-07-16 RX ADMIN — CALCIUM ACETATE 667 MG: 667 CAPSULE ORAL at 16:50

## 2024-07-16 RX ADMIN — HYDROCHLOROTHIAZIDE 12.5 MG: 12.5 CAPSULE ORAL at 09:43

## 2024-07-16 RX ADMIN — INSULIN LISPRO 4 UNITS: 100 INJECTION, SOLUTION INTRAVENOUS; SUBCUTANEOUS at 21:38

## 2024-07-16 RX ADMIN — ATORVASTATIN CALCIUM 80 MG: 40 TABLET, FILM COATED ORAL at 21:37

## 2024-07-16 RX ADMIN — PIPERACILLIN SODIUM AND TAZOBACTAM SODIUM 2.25 G: 2; .25 INJECTION, SOLUTION INTRAVENOUS at 21:37

## 2024-07-16 RX ADMIN — HEPARIN SODIUM 5000 UNITS: 5000 INJECTION INTRAVENOUS; SUBCUTANEOUS at 06:01

## 2024-07-16 RX ADMIN — PIPERACILLIN SODIUM AND TAZOBACTAM SODIUM 2.25 G: 2; .25 INJECTION, SOLUTION INTRAVENOUS at 09:43

## 2024-07-16 RX ADMIN — ACETAMINOPHEN 650 MG: 325 TABLET ORAL at 09:43

## 2024-07-16 RX ADMIN — APIXABAN 2.5 MG: 2.5 TABLET, FILM COATED ORAL at 21:37

## 2024-07-16 RX ADMIN — HEPARIN SODIUM 5000 UNITS: 5000 INJECTION INTRAVENOUS; SUBCUTANEOUS at 14:30

## 2024-07-16 ASSESSMENT — ACTIVITIES OF DAILY LIVING (ADL)
ADL_ASSISTANCE: NEEDS ASSISTANCE
BATHING_ASSISTANCE: MAXIMAL

## 2024-07-16 ASSESSMENT — PAIN - FUNCTIONAL ASSESSMENT
PAIN_FUNCTIONAL_ASSESSMENT: 0-10

## 2024-07-16 ASSESSMENT — COGNITIVE AND FUNCTIONAL STATUS - GENERAL
TOILETING: TOTAL
CLIMB 3 TO 5 STEPS WITH RAILING: TOTAL
PERSONAL GROOMING: A LITTLE
DAILY ACTIVITIY SCORE: 12
STANDING UP FROM CHAIR USING ARMS: TOTAL
DRESSING REGULAR UPPER BODY CLOTHING: A LOT
DRESSING REGULAR LOWER BODY CLOTHING: TOTAL
MOVING TO AND FROM BED TO CHAIR: TOTAL
TURNING FROM BACK TO SIDE WHILE IN FLAT BAD: TOTAL
MOBILITY SCORE: 6
MOVING FROM LYING ON BACK TO SITTING ON SIDE OF FLAT BED WITH BEDRAILS: TOTAL
HELP NEEDED FOR BATHING: A LOT
WALKING IN HOSPITAL ROOM: TOTAL
EATING MEALS: A LITTLE

## 2024-07-16 ASSESSMENT — ENCOUNTER SYMPTOMS
COUGH: 0
BLOOD IN STOOL: 0
SHORTNESS OF BREATH: 0
NAUSEA: 0
ABDOMINAL PAIN: 1
CHILLS: 0
VOMITING: 0
HEADACHES: 0
DIARRHEA: 1
PALPITATIONS: 0
DIZZINESS: 0
FEVER: 0
CONSTIPATION: 0

## 2024-07-16 ASSESSMENT — PAIN SCALES - GENERAL
PAINLEVEL_OUTOF10: 0 - NO PAIN
PAINLEVEL_OUTOF10: 0 - NO PAIN
PAINLEVEL_OUTOF10: 5 - MODERATE PAIN
PAINLEVEL_OUTOF10: 0 - NO PAIN
PAINLEVEL_OUTOF10: 5 - MODERATE PAIN

## 2024-07-16 ASSESSMENT — PAIN DESCRIPTION - LOCATION: LOCATION: GENERALIZED

## 2024-07-16 NOTE — PROGRESS NOTES
"      Nephrology Progress Note      Nephrology following for esrd.   Events over night:   -none  -feels better  -tolerated HD well  -having BM    /68 (BP Location: Left arm, Patient Position: Lying)   Pulse 70   Temp 37.7 °C (99.8 °F) (Temporal)   Resp 18   Ht 1.626 m (5' 4\")   Wt 75.3 kg (166 lb 0.1 oz)   SpO2 99%   BMI 28.49 kg/m²     Input / Output:  24 HR:   Intake/Output Summary (Last 24 hours) at 7/16/2024 1054  Last data filed at 7/15/2024 2101  Gross per 24 hour   Intake 1943.75 ml   Output 1749 ml   Net 194.75 ml       Physical Exam   Alert and oriented x 3 NAD  Neck: no JVD  CV: RRR  Lungs: CTA bilaterally  Abd: soft, NT, ND   Ext: no lower extremity edema    Scheduled medications  amiodarone, 200 mg, oral, Daily  [Held by provider] apixaban, 2.5 mg, oral, BID  atorvastatin, 80 mg, oral, Nightly  [Held by provider] bumetanide, 1 mg, oral, BID  [Held by provider] clopidogrel, 75 mg, oral, Daily  heparin (porcine), 5,000 Units, subcutaneous, q8h MARIA E  heparin, 1,000 Units, intra-catheter, After Dialysis  heparin, 1,000 Units, intra-catheter, After Dialysis  hydroCHLOROthiazide, 12.5 mg, oral, Daily  insulin lispro, 0-10 Units, subcutaneous, Before meals & nightly  [Held by provider] lisinopril, 20 mg, oral, Nightly  pantoprazole, 40 mg, oral, Daily   Or  pantoprazole, 40 mg, intravenous, Daily  piperacillin-tazobactam, 2.25 g, intravenous, q12h  polyethylene glycol, 17 g, oral, Daily      Continuous medications     PRN medications  PRN medications: acetaminophen, albuterol, bisacodyl, dextrose, dextrose, glucagon, glucagon, guaiFENesin, melatonin, ondansetron   Results from last 7 days   Lab Units 07/16/24  0433   SODIUM mmol/L 137   POTASSIUM mmol/L 3.3*   CHLORIDE mmol/L 100   CO2 mmol/L 29   BUN mg/dL 21   CREATININE mg/dL 2.00*   CALCIUM mg/dL 7.4*   PROTEIN TOTAL g/dL 4.9*   BILIRUBIN TOTAL mg/dL 0.3   ALK PHOS U/L 77   ALT U/L 20   AST U/L 24   GLUCOSE mg/dL 117*      Results from last 7 " days   Lab Units 07/15/24  0502 07/14/24  1903   MAGNESIUM mg/dL 2.07 2.15      Results from last 7 days   Lab Units 07/16/24  0433 07/15/24  0502 07/14/24  1903   WBC AUTO x10*3/uL 13.7* 24.4* 27.4*   HEMOGLOBIN g/dL 10.4* 12.4 13.1   HEMATOCRIT % 33.6* 39.0 42.3   PLATELETS AUTO x10*3/uL 240 300 339        Assessment & Plan:   Patient is 88 y.o. female who is admitted to hospital for abdominal pain/colisit. Nephrology consulted in view of esrd.      ESRD on HD MWF   Electrolytes  Hyperkalemia  Resolved after HD  MBD  PO4 at 6.4  Colitis/abdominal pain  Improved  Abx per primary/surgery     Recommendations:   -no need for HD today  -continue with MWF HD schedule   -add Phoslo with meals  -from renal's standpoint ok for discharge       Please message me through EPIC chat with any questions or concerns.     Wero Lynch MD  7/16/2024  10:54 AM     [facility]    [address], [address]   [place] [address]  Office: [phone]

## 2024-07-16 NOTE — PROGRESS NOTES
07/16/24 1102   Discharge Planning   Living Arrangements Other (Comment)   Support Systems Children   Assistance Needed total   Type of Residence Skilled nursing facility   Home or Post Acute Services Post acute facilities (Rehab/SNF/etc)   Type of Post Acute Facility Services Skilled nursing   Expected Discharge Disposition SNF   Does the patient need discharge transport arranged? Yes   RoundTrip coordination needed? Yes   Housing Stability   In the last 12 months, was there a time when you were not able to pay the mortgage or rent on time? N   Transportation Needs   In the past 12 months, has lack of transportation kept you from medical appointments or from getting medications? no   Patient Choice   Provider Choice list and CMS website (https://medicare.gov/care-compare#search) for post-acute Quality and Resource Measure Data were provided and reviewed with: Patient   Patient / Family choosing to utilize agency / facility established prior to hospitalization Yes     I spoke with patient to introduce discharge planning. Pt states she has been at [facility] for the last few months and she is planning on returning to them.  She admits that she has not been physically mobile.  They have to use a stevo to get into WC but states this is not very often.  She is on HD through R chest port.  She became tearful when talking about her son who, she says, has not visited her in 2 months.  She does have a grandson that visits.   DSC asked to send referral to [facility].    1354  Per IDR, pt may be ready for DC in next 48 hours.  Asked facility to start auth for skilled care and dialysis.

## 2024-07-16 NOTE — PROGRESS NOTES
Physical Therapy    Physical Therapy Evaluation    Patient Name: Melody Martin  MRN: [id number]  Today's Date: 7/16/2024   Time Calculation  Start Time: 1138  Stop Time: 1201  Time Calculation (min): 23 min    Assessment/Plan   PT Assessment  PT Assessment Results: Decreased strength, Decreased endurance, Impaired balance, Decreased mobility, Impaired judgement, Decreased cognition, Pain  Rehab Prognosis: Fair  Evaluation/Treatment Tolerance: Patient limited by fatigue, Patient limited by pain  End of Session Communication: Bedside nurse (BACK IN BED ALARM ON, SUNITHA OBM AX X2)  Assessment Comment: MAX X2 BED MOBILITY, FATIGUED QUICKLY, PT. CLOSE TO BASLINE ACTIVITY RECOMMEND LOW REHAB  End of Session Patient Position: Bed, 3 rail up, Alarm on (CALL LIGHT IN REACH)  IP OR SWING BED PT PLAN  Inpatient or Swing Bed: Inpatient  PT Plan  Treatment/Interventions: Bed mobility, Balance training, Strengthening, Endurance training  PT Plan: Ongoing PT  PT Frequency: 3 times per week  PT Discharge Recommendations: Low intensity level of continued care  Equipment Recommended upon Discharge:  (TBD)  PT Recommended Transfer Status: Assist x2 (BEDLEVEL)  PT - OK to Discharge: Yes (WHENMEDICALLY CLEARED)      Subjective   General Visit Information:  General  Reason for Referral: GAIT TRAINING  Referred By: MERLY WEST  Past Medical History Relevant to Rehab: ABDOMINAL PAIN; DX: UTI, INTERSTITIAL ISCHEMIA, SEPSIS; HX: ESRD ON HD, HTN, CAD, PAD, MI, CVA, ASHD, CHRONIC WOUNDS  Co-Treatment: OT  Co-Treatment Reason: FACILITATE MOBILITY SAFETY  Prior to Session Communication: Bedside nurse (OK FOR THERAPY)  Patient Position Received: Bed, 3 rail up, Alarm on (ROOM 2025 ALERT IV GETTING WOUND CARE WHEN THERAPY ARRIVED IN ROOM ASSISTED RN W/ ROLLING; PT. AGREEABLE TO THERAPY)  General Comment: HEEL PROTECTOR ON R, WAFFLE BOOT ADN DRESING ON L HEEL , DUODERM ON BUTTOCKS  Home Living:  Home Living  Home Living Comments: ECF LOC, MAX A FOR ADL  BED MOBIILTY OCCASIONALLY GETS TO WC VIA  HANK, IN WC NEEDS PUSHED  Prior Level of Function:     Precautions:  Precautions  Hearing/Visual Limitations: [place]  Medical Precautions: Fall precautions (WOUNDS)  Vital Signs:       Objective   Pain:  Pain Assessment  0-10 (Numeric) Pain Score:  (C/OPAIN IN L GROIN, NO RATING, ALSO C/O PSIN IN BUTTOCKS WHEN SEATED EOB-NO RATING)  Cognition:  Cognition  Overall Cognitive Status: Within Functional Limits  Arousal/Alertness: Appropriate responses to stimuli  Orientation Level: Disoriented to situation  Following Commands: Follows one step commands without difficulty  Insight: Moderate  Planning: Reduced planning skills  Organization: Moderately disorganized    General Assessments:                  Activity Tolerance  Endurance: Decreased tolerance for upright activites    Sensation  Sensation Comment: NO C/O    Strength  Strength Comments: ROM HIPS/KNEES WFL ANKLES TO NEUTRAL,  STRENGTH IN LEGS- RLE 2/5, 0/5 L DF, L PF, KNEE AND HIP 2-/5  Strength  Strength Comments: ROM HIPS/KNEES WFL ANKLES TO NEUTRAL,  STRENGTH IN LEGS- RLE 2/5, 0/5 L DF, L PF, KNEE AND HIP 2-/5                     Static Sitting Balance  Static Sitting-Comment/Number of Minutes: POOR  Dynamic Sitting Balance  Dynamic Sitting-Comments: POOR  Functional Assessments:  Bed Mobility  Bed Mobility:  (ROLLS MAX X1 USES RAILS/PAD, 2 REPS R/L, UP IN BED MAX X2 PAD, SUPINE<>SIT MAX X2, SAT EOB 5 MIN, INITIALLY NEEDS MAX A FOR BALANCE,  ASSIST DECREASED GRADUALLY AS SHE BECAME MORE COMFORTABLE SITTING, WAS ABLE TO SIT W/ SBA FOR 1 MIN, FATIGUED)  Extremity/Trunk Assessments:     Outcome Measures:  [facility] Basic Mobility  Turning from your back to your side while in a flat bed without using bedrails: Total  Moving from lying on your back to sitting on the side of a flat bed without using bedrails: Total  Moving to and from bed to chair (including a wheelchair): Total  Standing up from a chair using your arms (e.g.  wheelchair or bedside chair): Total  To walk in hospital room: Total  Climbing 3-5 steps with railing: Total  Basic Mobility - Total Score: 6    Encounter Problems       Encounter Problems (Active)       Balance       Goal 1 (Not Progressing)       Start:  07/16/24    Expected End:  08/06/24       SIT EOB 10 MIN  MIN A FOR BALANCE WHILE COMPLETING EX AND FUNCTIONAL ACTIVITIES            Mobility       Goal 1 (Not Progressing)       Start:  07/16/24    Expected End:  08/06/24       20 REPS AAROM/AROM EX INCREASING STRENGTH TO PROGRESS ACTIVITY, DECREASE A            PT Transfers       STG - Patient to transfer to and from sit to supine (Not Progressing)       Start:  07/16/24    Expected End:  07/31/24       MOD X2 USNIG RAILS         STG - Patient will roll (Not Progressing)       Start:  07/16/24    Expected End:  07/26/24       MOD X1 USING RAILS            Pain - Adult              Education Documentation  Mobility Training, taught by Jaz Castañeda, PT at 7/16/2024  1:52 PM.  Learner: Patient  Readiness: Acceptance  Method: Explanation  Response: Needs Reinforcement  Comment: IMPORTANCE OF ACTIVITY IN RECOVERY    Education Comments  No comments found.

## 2024-07-16 NOTE — PROGRESS NOTES
Occupational Therapy  Evaluation    Patient Name: Melody Martin  MRN: [id number]  Today's Date: 7/16/2024  Time Calculation  Start Time: 1137  Stop Time: 1200  Time Calculation (min): 23 min    Current Problem:   1. Urinary tract infection with hematuria, site unspecified    2. Left upper quadrant abdominal pain    3. Intestinal ischemia (Multi)        OT order: OT eval and treat   Referred by: Jon  Reason for referral: ADLs, safety assessment  Past medical history related to rehab:  has a past medical history of Anemia, Atrial fibrillation (Multi), Chronic kidney disease, Diabetes mellitus (Multi), Elevated troponin (08/24/2023), GERD (gastroesophageal reflux disease), Heart murmur, Hypertension, Myocardial infarction (Multi), Old myocardial infarction (09/09/2023), Other conditions influencing health status (12/06/2022), Other conditions influencing health status (04/06/2016), Personal history of other diseases of the circulatory system, Personal history of other diseases of the female genital tract, Personal history of other diseases of the nervous system and sense organs (10/15/2021), Personal history of other endocrine, nutritional and metabolic disease (01/26/2018), Personal history of transient ischemic attack (TIA), and cerebral infarction without residual deficits (09/20/2023), and Stroke (Multi).     Precautions:   Hearing/Visual Limitations: [place]  Medical Precautions: Fall precautions    ASSESSMENT  OT Assessment: OT eval completed. The patient is functioning below baseline for ADLs and mobility. can benefit from continued OT. Pt with Decreased ADL status, Decreased upper extremity strength, Decreased cognition, Decreased safe judgment during ADL, Decreased endurance, Decreased functional mobility, Decreased gross motor control, Decreased IADLs    PLAN  Frequency: 2 times per week  Treatment Interventions: ADL retraining, Functional transfer training, UE strengthening/ROM, Endurance training, Cognitive  reorientation, Patient/family training, Neuromuscular reeducation, Equipment evaluation/education, Fine motor coordination activities  Discharge Recommendations: Low intensity level of continued care  OT OK to discharge: Yes    GENERAL VISIT INFORMATION   Start of session communication: Bedside nurse  End of session communication: Bedside nurse  Family/caregiver present: No  Caregiver feedback:    Co-Treatment: PT  Reason for co-treatment: to optimize safety and mobility, while focusing on discipline specific goals   Position Pt Received:  Bed, 3 rail up, Alarm on  End of session position: Bed, 3 rail up, Alarm on    SUBJECTIVE  Home Living:  Type of Home: Skilled Nursing facility (unsure if pt is skilled VS LTC resident)  Home Adaptive Equipment: Wheelchair-manual  Home Layout: One level  Home Access: No concerns     Prior Level of Function:  Receives Help From:  (caregivers)  ADL Assistance: Needs assistance  Bath:  (staff assists)  Toileting:  (staff assists. unsure if pt even mobilizes to [facility] for toileting or if toileting/hygiene performed at bed level.)  Dressing:  (staff assists)  Grooming:  (pt able to participate a little in grooming)  Feeding:  (pt completed fairly independently)  Homemaking Assistance: Needs assistance  Ambulatory Assistance:  (non ambulatory at baseline. pt had stroke with residual L hemiparesis that contributed to immobility. pt reports she spends majority of time bedbound. pt reports she gets out of bed to  via Fabio lift. reports this only happens when her grandson visits)  Hand Dominance: Right      Pain:  Assessment: 0-10  Score:  (no numerical rating given)  Type: Acute pain, Chronic pain  Location:  (left groin, wounds)  Interventions: Repositioned, Ambulation/increased activity, Distraction, Emotional support  Response to pain interventions:      OBJECTIVE       Cognition:  Overall Cognitive Status: Impaired (pt tearful throughout session. emotional when talking about her  rut and how new she sees her family since being at SNF. Therapy provided active listening and verbal support.)  Arousal/Alertness: Generalized responses  Orientation Level:  (oriented to person, place, time)  Processing Speed: Delayed             Current ADL function:   EATING:  Minimal (anticipate)     GROOMING: Moderate (anticipate)     BATHING: Maximal (anticipate)     UB DRESSING: Moderate Increased time to complete, Thread JOSE, Thread ARIANAE ([facility])   LB DRESSING: Total     TOILETING: Total Perineal hygiene, Incontinent, Clothing management down, Clothing management up  ADL comments:       Activity Tolerance:  Endurance: Decreased tolerance for upright activites    Bed Mobility/Transfers:   Bed Mobility  Bed Mobility: Yes  Bed Mobility 1  Bed Mobility 1: Supine to sitting, Sitting to supine  Level of Assistance 1: Maximum assistance, +2  Bed Mobility Comments 1: tolerated 5 minutes sitting eob.  Bed Mobility 2  Bed Mobility  2: Rolling right, Rolling left  Level of Assistance 2: Maximum assistance (x1)  Transfers  Transfer: No    Ambulation/Gait Training:  Functional Mobility  Functional Mobility Performed: No    Sitting Balance:  Static Sitting Balance  Static Sitting-Level of Assistance: Contact guard, Minimum assistance  Dynamic Sitting Balance  Dynamic Sitting-Comments: min to mod A        Vision: Vision - Basic Assessment  Current Vision: No visual deficits   and Vision - Complex Assessment  Acuity: Able to read clock/calendar on wall without difficulty    Sensation:  Light Touch: No apparent deficits    Strength:  Strength Comments: BUE grossly 2/5 to 3+/5    Perception:  Inattention/Neglect: Appears intact    Coordination:  Movements are Fluid and Coordinated: Yes     Hand Function:  Hand Function  Gross Grasp: Functional    Extremities: RUE   RUGAYATRI :  (R shoulder 50%, R distal WFL.) and LANG LANEE: Within Functional Limits    Outcome Measures: [facility] Daily Activity  Putting on and taking  off regular lower body clothing: Total  Bathing (including washing, rinsing, drying): A lot  Putting on and taking off regular upper body clothing: A lot  Toileting, which includes using toilet, bedpan or urinal: Total  Taking care of personal grooming such as brushing teeth: A little  Eating Meals: A little  Daily Activity - Total Score: 12                    EDUCATION:     Education Documentation  ADL Training, taught by Ailyn Horner OT at 7/16/2024  1:51 PM.  Learner: Patient  Readiness: Acceptance  Method: Explanation  Response: Needs Reinforcement    Education Comments  No comments found.        Goals:   Encounter Problems       Encounter Problems (Active)       ADLs       Patient will complete daily grooming tasks brushing teeth and washing face/hair with set-up and supervision level of assistance and PRN adaptive equipment while supported sitting. (Progressing)       Start:  07/16/24    Expected End:  07/30/24               EXERCISE/STRENGTHENING       Patient with increase BUE by 1/2 MMT grade strength. (Progressing)       Start:  07/16/24    Expected End:  07/30/24               TRANSFERS       Patient will perform bed mobility minimal assist  level of assistance and bed rails in order to improve safety and independence with mobility (Progressing)       Start:  07/16/24    Expected End:  07/30/24            Patient will tolerate 15 minutes sitting EOB with supervision for balance. (Progressing)       Start:  07/16/24    Expected End:  07/30/24

## 2024-07-16 NOTE — PROGRESS NOTES
Melody Mratin is a 88 y.o. female on day 1 of admission presenting with Urinary tract infection with hematuria, site unspecified.      Subjective   Melody Martin is a 88 y.o. 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 Patient came in from the nursing facility, [facility] on Spring and she notes that she has been feeling weaker than usual. She notes that she hasn't had bowel movements in a week and has less appetite. She was nauseous and had 2 episodes of emesis; was unsure of the color of her emesis. She has ESRD on dialysis, notes that she doesn't have much urine output but she reports of burning with urination and increasing lower abdominal pain. She denies f/c, chest pain, shortness of breath, leg swelling, syncope, hematuria, focal weakness.      ED Course (Summary):   Vitals on presentation: 97.1 F, 67 bpm, 16 RR, 120/40, 98% on RA  Labs: Chemistries-glucose 227, sodium 138, potassium 5.4, BUN/creatinine 55/3.34, EGFR 13, calcium 8.4, albumin 2.9, AST 51  Troponin 15  CBC-leukocyte 27.4, hemoglobin 13.1, neutrophils 23.42  VBG-pH 7.36, pCO2 40, pO2 44, HCO3 27.1, lactate 3.9  UA-ketones trace, blood 3+, glucose trace, protein 3+, leukocyte esterase 500, turbid appearance, WBC >50  Imaging: CXR - No acute radiographic abnormalities.   CT angio AP-Bowel wall thickening and loss of haustration in the proximal sigmoid colon is new compared to prior exam. This finding is nonspecific and can be seen in the setting of ischemia or infectious/inflammatory colitis. No large vessel arterial or venous occlusions detected in this territory but this is a watershed zone and at increased risk for ischemia. Circumferential bladder wall  thickening and mucosal hyperenhancement suspicious for infectious or inflammatory cystitis. Large volume stool impacted in the rectum with mesorectal stranding putting patient at increased risk for stercoral colitis. 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 Bibasal nodules and nodular consolidations may represent sequelae of bronchopneumonia or aspiration. Correlate with patient's symptoms and recommend follow-up chest CT in 3 months to ensure resolution after treatment  Interventions: N.p.o., LR bolus 1000 mL, Zosyn, admission for further management.  7/15: Patient was seen and examined.  Abdominal pain has now subsided.  Patient had bowel movement this morning.  Diet advanced to clear liquid diet.  Blood and urine cultures are still pending.  Continue current IV antibiotics.  Trend CBC and BMP daily.  General surgery recommendations appreciated.   7/16: Patient was seen and examined.  Still having intermittent abdominal pain.  Was having severe multiple episodes of diarrhea this morning.  Ordered stool for C. difficile and pathogen PCR.  Seen by general surgery and diet advanced to regular diet.  Urine culture showing mixed organisms.  Repeat urine culture ordered.  Continue current IV antibiotics.  Repeat CBC and BMP in a.m.        Objective     Last Recorded Vitals  /73 (BP Location: Left arm, Patient Position: Lying)   Pulse 71   Temp 36.7 °C (98.1 °F) (Temporal)   Resp 18   Wt 75.3 kg (166 lb 0.1 oz)   SpO2 100%   Intake/Output last 3 Shifts:    Intake/Output Summary (Last 24 hours) at 7/16/2024 1511  Last data filed at 7/15/2024 2101  Gross per 24 hour   Intake 650 ml   Output 1749 ml   Net -1099 ml       Admission Weight  Weight: 78 kg (171 lb 15.3 oz) (07/14/24 1845)    Daily Weight  07/16/24 : 75.3 kg (166 lb 0.1 oz)    Image Results  ECG 12 lead  Sinus or ectopic atrial rhythm  Prolonged DC interval  Anterior infarct, old  Baseline wander in lead(s) V3    See ED  provider note for full interpretation and clinical correlation  Confirmed by Zandra Turcios ([id number]) on 7/15/2024 11:46:32 AM  XR abdomen 1 view  Narrative: Interpreted By:  Vivek Ponce,   STUDY:  No definite large amount of stool.      Bowel-gas pattern nonspecific.      No acute findings.      INDICATION:  Signs/Symptoms:evaluate rectum for retained stool - please obtain  image of lower abdomen/pelvis.      COMPARISON:  CT abdomen July 14      ACCESSION NUMBER(S):  [id number]      ORDERING CLINICIAN:  FRANCK HANKS      FINDINGS:  No definite large amount of stool.      Bowel-gas pattern nonspecific.      No acute findings.      Impression:             No definite large amount of stool.      Bowel-gas pattern nonspecific.      No acute findings.      Signed by: Vivek Ponce 7/15/2024 7:55 AM  Dictation workstation:   [id number]      Physical Exam  Constitutional: Pleasant and cooperative. Laying in bed in no acute distress. Conversant.   Skin: Sacral wound noted. Warm and dry; no obvious lesions, rashes, pallor, or jaundice. Good turgor.   Eyes: EOMI. Anicteric sclera.   ENT: Mucous membranes moist; no obvious injury or deformity appreciated.   Head and Neck: Normocephalic, atraumatic. ROM preserved. Trachea midline. No appreciable JVD.   Respiratory: Nonlabored on RA. Lungs clear to auscultation bilaterally without obvious adventitious sounds. Chest rise is equal.  Cardiovascular: RRR. No gross murmur, gallop, or rub. Extremities are warm and well-perfused with good capillary refill (< 3 seconds). No chest wall tenderness.   GI: LLQ abdomen pain to palpation disproportionate to exam, no guarding. No obvious organomegaly appreciated. Bowel sounds are present and normoactive.  : No CVA tenderness.   MSK: No gross abnormalities appreciated. No limitations to AROM/PROM appreciated.   Extremities: No cyanosis, edema, or clubbing evident. Neurovascularly intact.   Neuro: A&Ox4. CN 2-12 grossly intact. Able to  respond to questions appropriately and clearly. No acute focal neurologic deficits appreciated.  Psych: Appropriate mood and behavior.  Relevant Results               Assessment/Plan   This patient currently has cardiac telemetry ordered; if you would like to modify or discontinue the telemetry order, click here to go to the orders activity to modify/discontinue the order.    This patient has a central line   Reason for the central line remaining today? 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 (Multi)    Atrial fibrillation (Multi)    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 (Multi)    Constipation    Sepsis (Multi)    Alteration in skin integrity due to moisture    ESRD on hemodialysis (Multi)    Hemiplegia and hemiparesis following cerebral infarction affecting left non-dominant side (Multi)    Physical deconditioning    Colitis    UTI with hematuria  -zosyn  -urine culture pending  -blood culture pending for sepsis  -adjust abx pending cx result  -Trend CBC daily     Sepsis without acute end organ damage, responsive to IVF  -SIRS criteria (1/4): WBC  -Source: UTI, colitis  -End-organ dysfunction: none  -Lactate 3.9 on VBG  -BP is normotensive  -Blood cultures x2 pending  -Continue antibiotic coverage with zosyn  -Follow fever curve, WBCs     Significant fecal impaction with inflammatory colitis and possible bowel ischemia  Leukocytosis  -IV  abx coverage as above  -Diet advanced to regular diet  -Resume Eliquis.  -Stool for C. 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  -wound care consult     Chronic deconditioning/ambulatory dysfunction  -PT/OT eval     IDDM-II  -pt is unsure of her home basal insulin   -Continue with SSI   -Continue with accucheks, hypoglycemic protocol   -Monitor and adjust as needed      Elevated troponin  -troponin 15, likely secondary to pain  -pt denies chest pain and/or anginal equivalents  -EKG shows sinus rhythm at 68 bpm with prolonged NC interval, no acute ischemic changes     Pt meds need to be reconciled, adjust medications and doses as needed      Diet: Clear liquid  DVT Prophylaxis: hold home eliquis  Code Status: DNR, DNI          Spent 35 minutes in the follow-up management of this patient today       Karen Bernstein MD

## 2024-07-16 NOTE — CARE PLAN
Problem: Balance  Goal: Goal 1  Description: SIT EOB 10 MIN  MIN A FOR BALANCE WHILE COMPLETING EX AND FUNCTIONAL ACTIVITIES  Outcome: Not Progressing     Problem: Mobility  Goal: Goal 1  Description: 20 REPS AAROM/AROM EX INCREASING STRENGTH TO PROGRESS ACTIVITY, DECREASE A  Outcome: Not Progressing     Problem: PT Transfers  Goal: STG - Patient to transfer to and from sit to supine  Description: MOD X2 USNIG RAILS  Outcome: Not Progressing  Goal: STG - Patient will roll  Description: MOD X1 USING RAILS  Outcome: Not Progressing

## 2024-07-16 NOTE — CONSULTS
Wound Care Consult     Visit Date: 7/16/2024      Patient Name: Melody Martin         MRN: [id number]           YOB: 1935      Pertinent Labs:   Albumin   Date Value Ref Range Status   07/16/2024 2.3 (L) 3.4 - 5.0 g/dL Final     ALBUMIN (MG/L) IN URINE   Date Value Ref Range Status   03/27/2023 1,255.6 Not Established mg/L Final   Nutrition on consult.     Wound Assessment:  Wound 07/15/24 Pressure Injury Sacrum (Active)   Wound Image   07/16/24 1127   Site Assessment Pink;Red;White 07/16/24 1127   Pascale-Wound Assessment Blanchable erythema 07/16/24 1127   Pressure Injury Stage 3 07/16/24 1127   Shape irregular 07/16/24 1127   Wound Length (cm) 1.6 cm 07/16/24 1127   Wound Width (cm) 2 cm 07/16/24 1127   Wound Surface Area (cm^2) 3.2 cm^2 07/16/24 1127   Wound Depth (cm) 0.3 cm 07/16/24 1127   Wound Volume (cm^3) 0.96 cm^3 07/16/24 1127   Margins Epibole (Rolled edges) 07/16/24 1127   Drainage Description Serosanguineous 07/16/24 1127   Drainage Amount Scant 07/16/24 1127   Dressing Foam 07/16/24 1134   Dressing Changed Changed 07/16/24 1127   Dressing Status Clean;Dry 07/16/24 0400       Wound 07/15/24 Other (comment) Back Left;Lower;Medial;Mid;Right;Upper (Active)   Wound Image   07/15/24 0143   Shape dry rash 07/16/24 1127       Wound 07/16/24 Pressure Injury Heel Left (Active)   Wound Image   07/16/24 1116   Site Assessment Brown;Eschar;Yellow;Sloughing;Red;Pink;Granulation 07/16/24 1116   Pascale-Wound Assessment Boggy 07/16/24 1116   Pressure Injury Stage 3 07/16/24 1116   Shape circular 07/16/24 1116   Wound Length (cm) 5.5 cm 07/16/24 1116   Wound Width (cm) 5 cm 07/16/24 1116   Wound Surface Area (cm^2) 27.5 cm^2 07/16/24 1116   Wound Depth (cm) 0.2 cm 07/16/24 1116   Wound Volume (cm^3) 5.5 cm^3 07/16/24 1116   Margins Well-defined edges 07/16/24 1116   Drainage Description Serosanguineous 07/16/24 1116   Drainage Amount Moderate 07/16/24 1116   Dressing Silver  dressing;Alginate;ABD;Kerlix/rolled gauze 07/16/24 1116   Dressing Changed Changed 07/16/24 1116       Wound 07/16/24 Pressure Injury Foot Dorsal foot;Left;Medial (Active)   Wound Image   07/16/24 1115   Site Assessment Burgundy;Purple;Red;Denuded 07/16/24 1115   Pascale-Wound Assessment Dry 07/16/24 1115   Pressure Injury Stage DTPI 07/16/24 1115   Shape circular 07/16/24 1115   Wound Length (cm) 1.5 cm 07/16/24 1115   Wound Width (cm) 0.8 cm 07/16/24 1115   Wound Surface Area (cm^2) 1.2 cm^2 07/16/24 1115   Drainage Amount None 07/16/24 1115   Dressing Non adherent;ABD;Kerlix/rolled gauze 07/16/24 1115   Dressing Changed Changed 07/16/24 1115       Wound 07/16/24 Pressure Injury Toe (Comment  which one) Dorsal foot;Left (Active)   Wound Image   07/16/24 1120   Site Assessment Burgundy;Purple 07/16/24 1120   Pascale-Wound Assessment Blanchable erythema 07/16/24 1120   Pressure Injury Stage DTPI 07/16/24 1120   Shape irregular 07/16/24 1120   Wound Length (cm) 1 cm 07/16/24 1120   Wound Width (cm) 0.5 cm 07/16/24 1120   Wound Surface Area (cm^2) 0.5 cm^2 07/16/24 1120   Drainage Description None 07/16/24 1120   Drainage Amount None 07/16/24 1120   Dressing Open to air 07/16/24 1120     Patient seen for multiple wounds (present on admission) complicated by PMH: CAD, cardiomyopathy, dyslipidemia, chronic diastolic congestive heart failure, stage 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 Exam conducted with MELISA Christy to assist with exam and skin hygiene. Patient is alert and oriented, from [facility]. Patient receives wound care OP at facility per chart review. Patient had 2 large loose stools during exam, pca states that she has had 3 in total today and reports 6 yesterday, IMS made aware, patient would benefit from fecal management system to prevent soiling of wound. Pascale area excoriated. Dry  diffuse rash to patients back. Skin hygiene and dressing care provided. See detailed assessment above from flowsheet. Recommendations below, reviewed with Dr. Bernstein.     Treatment protocols recommended:    Groin-Apply Triad, dime thickness to affected areas BID and prn for incontinence, leave open to air. Wipe soiled Triad away after incontinence leaving some behind and reapply.  Sacrum- Cleanse with vashe, apply aquacel ag cut to size, cover with mepilex foam every other day/prn.  Left heel - Cleanse with vashe, apply aquacel ag cut to size, cover with ABD and wrap with kerlix every other day/prn.  Left medial foot- Cover with adaptic, ABD and wrap with kerlix every other day/prn.    Therapeutic surface: Patient on [employer] Dream Air pressure relieving mattress with turn and reposition system in place. Staff to continue to turn/reposition patient atleast every 2 hours. Offload heels. Heel boots.     Nursing updated, continue pressure injury preventions, wound care to be completed by nursing per orders. and re-consult wound RN if needed.    See above recommendations for treatment. Patient will likely continue to require skilled assistance with skin hygiene and wound care upon discharge. LTC facility she resides provides wound care for patient.     Please contact me with questions or changes in patient condition.  Natalie Kauffman RN  [facility]   [phone]

## 2024-07-16 NOTE — CARE PLAN
Problem: Pain - Adult  Goal: Verbalizes/displays adequate comfort level or baseline comfort level  Outcome: Progressing     Problem: Safety - Adult  Goal: Free from fall injury  Outcome: Progressing     Problem: Chronic Conditions and Co-morbidities  Goal: Patient's chronic conditions and co-morbidity symptoms are monitored and maintained or improved  Outcome: Progressing     Problem: Skin  Goal: Prevent/manage excess moisture  Outcome: Progressing  Flowsheets (Taken 7/16/2024 0335)  Prevent/manage excess moisture:   Cleanse incontinence/protect with barrier cream   Moisturize dry skin  Goal: Prevent/minimize sheer/friction injuries  Outcome: Progressing  Flowsheets (Taken 7/16/2024 0335)  Prevent/minimize sheer/friction injuries:   Complete micro-shifts as needed if patient unable. Adjust patient position to relieve pressure points, not a full turn   HOB 30 degrees or less   Turn/reposition every 2 hours/use positioning/transfer devices     Problem: Skin  Goal: Participates in plan/prevention/treatment measures  Flowsheets (Taken 7/15/2024 1250 by Tiny Gautam RN)  Participates in plan/prevention/treatment measures: Elevate heels      GYNECOLOGIC ONCOLOGY  Dr. Kendy Jolley   Chief Complaint   Patient presents with   • Office Visit     New Patient       INITIAL OFFICE VISIT   3/22/2023    Josephine Kay is being seen at the kind request of Nikia Orlando MD for further evaluation and treatment recommendations for complex atypical hyperplasia.  She is unaccompanied today.    PCP: Verify Pcp    History of Present Illness:  Josephine Kay is a 59 year old female with a PMHx significant for CHF, HTN, rheumatoid arthritis, T2DM, HLD and atrial fibrillation on chronic Eilquis who presented with postmenopausal bleeding.    TVUS was performed on 1023 which demonstrated:  IMPRESSION:   1. Thickened heterogeneous endometrium is estimated to measure upwards of  28 mm.  2. Focal area of calcification within the endometrial cavity versus  submucosal fibroid posterior superior body 1.9 cm.  3. Ovaries not visualized either normal or abnormal.  4. Further evaluation or follow-up recommended.    Exam and endometrial biopsy was performed on 3/8/23.  Pathology demonstrated:  Endometrial tissue, curettings:   -Complex atypical endometrial hyperplasia.    She presents today to discuss next steps.  She reports that she has been bleeding for approximately 2 years.  She denies any pelvic pain but does note that previously during her menses she would note a bulge at the left aspect of her  scar.  Denies any changes in her bowel/bladder habits.    Reproductive History  Last Pap Smear: 20, neg, HPV neg  History of Abnormal Pap Smears:Denies  Colonoscopy: Never. Cologuard negative on 23.  Mammogram: 22, normal.  OB History    Para Term  AB Living   4 2 0 0 2 0   SAB IAB Ectopic Molar Multiple Live Births   2 0 0 0 0 2   Obstetric Comments   C section x2       REVIEW OF SYSTEMS     A 12 point review of systems is performed and reviewed with pertinent findings as noted above.      PAST MEDICAL, SURGICAL, FAMILY AND SOCIAL HISTORY  Problem: Patient Care Overview  Goal: Plan of Care Review  Outcome: Ongoing (interventions implemented as appropriate)  Guardian (Aunt Terra) and uncle updated on pt status and plan of care this evening. Both seem overwhelmed by the events of the day, but are glad he is doing well and appreciate our care. I reinforced that even though pt with go through two surgeries in one day, the heart transplant was our end goal and will be best for him. Support provided. Pt maintained on [place] BiVAD without major issues. PRBC and FFP finished as this shift began. Chest tube and wound vac drainage still sanguineous, but volume decreasing hourly. Volume given for lower CVP and mild-mod dimpling on [place] membrane.            Past Medical History:   Diagnosis Date   • Acute on chronic systolic congestive heart failure (CMD)     Dr. Callaway   • Anxiety     Resolved. Presumed to be related to AFib   • HTN (hypertension) 2016   • Persistent atrial fibrillation (CMD)     Dr. Taylor   • RA (rheumatoid arthritis) (CMD) 2016    [facility] [place]       Past Surgical History:   Procedure Laterality Date   • Cardioversion  2021   •  delivery+postpartum care      ,    • Oral surgery procedure      wisdom teeth   • Removal of tonsils,<12 y/o         Family History   Problem Relation Age of Onset   • Heart Mother         AVR    • Stroke Mother    • Hypothyroid Mother    • Diabetes Mother         type 2   • Asthma Mother    • Hypothyroid Father    • Hypertension Father    • Diabetes Father         type 2   • Cancer Father         lung/brain   • Hypertension Brother    • Other Brother         smokes   • Stroke Maternal Grandmother    • Cancer, Lung Maternal Grandfather         smoker   • Pneumonia Paternal Grandmother    • Thyroid Paternal Grandmother    • Diabetes Paternal Grandmother         Type 2   • Pneumonia Paternal Grandfather    • Hypertension Paternal Grandfather    • Diabetes Paternal Grandfather         type 2       Social History     Socioeconomic History   • Marital status: /Civil Union     Spouse name: Not on file   • Number of children: Not on file   • Years of education: Not on file   • Highest education level: Not on file   Occupational History   • Not on file   Tobacco Use   • Smoking status: Never   • Smokeless tobacco: Never   Vaping Use   • Vaping Use: never used   Substance and Sexual Activity   • Alcohol use: Yes     Alcohol/week: 1.0 standard drink     Types: 1 Standard drinks or equivalent per week     Comment: rarely   • Drug use: Never   • Sexual activity: Yes     Partners: Male     Birth control/protection: Post-menopausal     Comment:   x 37 yrs to Yovani   Other Topics Concern   • Not on file   Social History Narrative    Moved to [facility] in 2012     worked for [employer] now  for [employer]    Patient is an author, writes romance novels    Has 2 adult sons, one is a pharmacist in [place]    Family is big into motor bikes and enjoys downhill skiing as well      Social Determinants of Health     Financial Resource Strain: Not on file   Food Insecurity: Not on file   Transportation Needs: Not on file   Physical Activity: Not on file   Stress: Not on file   Social Connections: Not on file   Intimate Partner Violence: Not At Risk   • Social Determinants: Intimate Partner Violence Past Fear: No   • Social Determinants: Intimate Partner Violence Current Fear: No       MEDICATIONS AND ALLERGIES     Current Medications    ADALIMUMAB (HUMIRA) 40 MG/0.8ML PREFILLED SYRINGE KIT FOR INJECTION    Inject 40 mg into the skin every 14 days.     ASPIRIN (ECOTRIN) 81 MG EC TABLET    Take 81 mg by mouth daily.    BUMETANIDE (BUMEX) 1 MG TABLET    Take 1 tablet by mouth 2 times daily.    CANNABIDIOL (CBD) OIL    Take ¼ dropper by mouth two times daily as needed for pain    CHOLECALCIFEROL (VITAMIN D) 25 MCG (1,000 UNITS) TABLET    Take by mouth daily.    ELIQUIS 5 MG TAB    TAKE 1 TABLET EVERY 12 HOURS    EMPAGLIFLOZIN (JARDIANCE) 10 MG TABLET    Take 1 tablet by mouth daily (before breakfast).    FOLIC ACID PO    Take 3 mg by mouth daily.     MAGNESIUM 400 MG TAB    Take 400 mg by mouth daily.    METHOTREXATE 2.5 MG TAB    Take 12.5 mg by mouth 1 day a week. 5 tablets = 12.5 mg  (Every Friday)    METOPROLOL SUCCINATE (TOPROL-XL) 100 MG 24 HR TABLET    Take 1 tablet by mouth daily.    MULTIPLE VITAMINS-MINERALS (HAIR SKIN & NAILS ADVANCED) TAB    Take 1 tablet by mouth daily.    POTASSIUM CHLORIDE (KLOR-CON M) 20 MEQ FRANCIA ER TABLET    Take 1 tablet by mouth daily (with breakfast).    ROSUVASTATIN (CRESTOR) 10 MG TABLET     Take 1 tablet by mouth daily.    VALSARTAN (DIOVAN) 40 MG TABLET    Take 1 tablet by mouth daily.       ALLERGIES:  No Known Allergies    PHYSICAL EXAM     Vitals:    23 0930   BP: 138/84   BP Location: LUE - Left upper extremity   Patient Position: Sitting   Cuff Size: Large Adult   Pulse: (!) 54   Resp: 18   Temp: 98 °F (36.7 °C)   TempSrc: Oral   SpO2: 98%   Weight: 125.5 kg (276 lb 11.2 oz)   Height: 5' 7.5\" (1.715 m)     Body mass index is 42.7 kg/m².    Constitutional: she appears well-developed and well-nourished, no apparent distress.   HEENT: normocephalic and atraumatic, no masses, lesions or abnormalities, conjunctivae, eye lids and EOMs are normal.   Neck: normal range of motion with no tracheal deviation or asymmetry, thyroid is not enlarged.  Respiratory: non-labored, no accessory muscle use  Cardiovascular: well-perfused, no peripheral edema  Abdomen: soft, nontender, non-distended, no hepatosplenomegaly,  scar, no palpable masses.  Musculoskeletal: coordination and gait normal, appropriate muscle strength and tone.  Skin: skin is warm and dry, no rash or skin lesions noted, she is not diaphoretic.  Psychiatric: she is alert and oriented to person, place, and time, she has a normal mood and affect, her behavior is normal, judgment and thought content normal.  Gyn: Deferred.    Nursing note and vitals reviewed.    DATA REVIEW AND ORDERS PLACED   I have personally reviewed the images of her TVUS.    Orders Placed: Pre-operative testing (labs, ekg, chest xray, primary clearance and/or any other orders as noted)    ASSESSMENT AND PLAN   Josephine Kay is a 59 year old female with a PMHx significant for CHF, HTN, rheumatoid arthritis, T2DM, HLD and atrial fibrillation on chronic Eilquis who presents with newly diagnosed complex atypical endometrial hyperplasia.    We discussed the natural history of complex atypical hyperplasia and the risk of either underlying endometrial cancer or the  progression to endometrial cancer.  We discussed management options including 1. Do nothing, which is not my recommendation but her right as a patient. 2.  Conservative management with hormonal therapy and repeat biopsies. 3.  Surgical management with hysterectomy and staging.  After discussing these options she opted for surgical management.    We discussed that surgery would include a robotic-assisted total hysterectomy, bilateral salpingo-oophorectomy, bilateral sentinel lymph node injection/dissection, possible additional pelvic and/or para-aortic lymph node dissection, possible exploratory laparotomy.  We discussed the risks including, but not limited to, bleeding, infection, damage to surrounding organs (yuriy. Bowel, bladder, ureter, blood vessels, nerves), reoperation, lymphedema, poor wound healing/future hernia formation, blood clots and death.   Consent was signed.    Given her prolonged history of postmenopausal bleeding and for surgical planning would recommend a CT A/P prior to surgery.  She will also need a bowel prep for surgery and I would recommend a colonoscopy the day prior.  She will need ureteral stents as well as clearance from her PCP and cardiologist.    At the end of the visit the patient stated an understanding and agreement with the above stated plan of care. All of her questions were answered to her satisfaction. I've encouraged her to call the office with any further issues, questions, or concerns.    Nikia Orlando MD, thank you so much for allowing me the opportunity to assist you in the care of this cristin lady. Please contact me if you have any questions or concerns. I look forward to being of further service to you and will continue to keep you informed of any and all subsequent developments in her care.      Kendy Jolley MD.  [facility]   [address] [facility]  [address] [place] [id number]  Phone: [phone]

## 2024-07-16 NOTE — CARE PLAN
The patient's goals for the shift include  watery stool to stop.    The clinical goals for the shift include Pt will not experience nausea and vomiting throughout shift.    Problem: Pain - Adult  Goal: Verbalizes/displays adequate comfort level or baseline comfort level  Outcome: Progressing     Problem: Safety - Adult  Goal: Free from fall injury  Outcome: Progressing     Problem: Discharge Planning  Goal: Discharge to home or other facility with appropriate resources  Outcome: Progressing     Problem: Chronic Conditions and Co-morbidities  Goal: Patient's chronic conditions and co-morbidity symptoms are monitored and maintained or improved  Outcome: Progressing     Problem: Skin  Goal: Decreased wound size/increased tissue granulation at next dressing change  Outcome: Progressing  Goal: Participates in plan/prevention/treatment measures  Outcome: Progressing  Goal: Prevent/manage excess moisture  Outcome: Progressing  Goal: Prevent/minimize sheer/friction injuries  Outcome: Progressing  Goal: Promote/optimize nutrition  Outcome: Progressing  Goal: Promote skin healing  Outcome: Progressing     Problem: Diabetes  Goal: Achieve decreasing blood glucose levels by end of shift  Outcome: Progressing  Goal: Increase stability of blood glucose readings by end of shift  Outcome: Progressing  Goal: Decrease in ketones present in urine by end of shift  Outcome: Progressing  Goal: Maintain electrolyte levels within acceptable range throughout shift  Outcome: Progressing  Goal: Maintain glucose levels >70mg/dl to <250mg/dl throughout shift  Outcome: Progressing  Goal: No changes in neurological exam by end of shift  Outcome: Progressing  Goal: Learn about and adhere to nutrition recommendations by end of shift  Outcome: Progressing  Goal: Vital signs within normal range for age by end of shift  Outcome: Progressing  Goal: Increase self care and/or family involovement by end of shift  Outcome: Progressing  Goal: Receive DSME  education by end of shift  Outcome: Progressing     Problem: Heart Failure  Goal: Improved gas exchange this shift  Outcome: Progressing  Goal: Improved urinary output this shift  Outcome: Progressing  Goal: Reduction in peripheral edema within 24 hours  Outcome: Progressing  Goal: Report improvement of dyspnea/breathlessness this shift  Outcome: Progressing  Goal: Weight from fluid excess reduced over 2-3 days, then stabilize  Outcome: Progressing  Goal: Increase self care and/or family involvement in 24 hours  Outcome: Progressing     Problem: Pain  Goal: Takes deep breaths with improved pain control throughout the shift  Outcome: Progressing  Goal: Turns in bed with improved pain control throughout the shift  Outcome: Progressing  Goal: Walks with improved pain control throughout the shift  Outcome: Progressing  Goal: Performs ADL's with improved pain control throughout shift  Outcome: Progressing  Goal: Participates in PT with improved pain control throughout the shift  Outcome: Progressing  Goal: Free from opioid side effects throughout the shift  Outcome: Progressing  Goal: Free from acute confusion related to pain meds throughout the shift  Outcome: Progressing     Problem: Nutrition  Goal: Less than 5 days NPO/clear liquids  Outcome: Progressing  Goal: Oral intake greater 75%  Outcome: Progressing  Goal: Consume prescribed supplement  Outcome: Progressing  Goal: Adequate PO fluid intake  Outcome: Progressing  Goal: BG  mg/dL  Outcome: Progressing  Goal: Lab values WNL  Outcome: Progressing  Goal: Electrolytes WNL  Outcome: Progressing  Goal: Promote healing  Outcome: Progressing  Goal: Maintain stable weight  Outcome: Progressing

## 2024-07-16 NOTE — PROGRESS NOTES
"GENERAL SURGERY PROGRESS NOTE    Melody Martin   1935   [id number]     Melody Martin is a 88 y.o. female on day 1 of admission presenting with Urinary tract infection with hematuria, site unspecified.      Subjective  No acute events. The patient had several bowel movements over the last day. Her abdominal pain has continued to improve but has not fully resolved.    Review of Systems   Constitutional:  Negative for chills and fever.   Respiratory:  Negative for cough and shortness of breath.    Cardiovascular:  Negative for chest pain and palpitations.   Gastrointestinal:  Positive for abdominal pain and diarrhea. Negative for blood in stool, constipation, nausea and vomiting.   Neurological:  Negative for dizziness and headaches.   All other systems reviewed and are negative.      Objective    Last Recorded Vitals  Blood pressure 116/61, pulse 67, temperature 36.7 °C (98 °F), temperature source Temporal, resp. rate 18, height 1.626 m (5' 4\"), weight 75.3 kg (166 lb 0.1 oz), SpO2 93%.    Intake/Output last 3 Shifts:  I/O last 3 completed shifts:  In: 3319 (44.1 mL/kg) [I.V.:1770 (23.5 mL/kg); Other:400; IV Piggyback:1149]  Out: 1749 (23.2 mL/kg) [Other:1749]  Weight: 75.3 kg     Physical Exam  Constitutional:       General: She is not in acute distress.     Appearance: Normal appearance. She is not ill-appearing.   HENT:      Head: Normocephalic and atraumatic.   Cardiovascular:      Rate and Rhythm: Normal rate and regular rhythm.   Pulmonary:      Effort: Pulmonary effort is normal. No respiratory distress.      Breath sounds: Normal breath sounds.   Abdominal:      General: There is no distension.      Palpations: Abdomen is soft.      Tenderness: There is abdominal tenderness. There is no guarding.      Comments: Mild tenderness in the LLQ without guarding   Musculoskeletal:         General: No swelling.   Skin:     General: Skin is warm and dry.   Neurological:      Mental Status: She is alert and oriented " to person, place, and time. Mental status is at baseline.   Psychiatric:         Mood and Affect: Mood normal.         Behavior: Behavior normal.       Labs  Results for orders placed or performed during the hospital encounter of 07/14/24 (from the past 24 hour(s))   POCT GLUCOSE   Result Value Ref Range    POCT Glucose 138 (H) 74 - 99 mg/dL   POCT GLUCOSE   Result Value Ref Range    POCT Glucose 149 (H) 74 - 99 mg/dL   POCT GLUCOSE   Result Value Ref Range    POCT Glucose 138 (H) 74 - 99 mg/dL   POCT GLUCOSE   Result Value Ref Range    POCT Glucose 209 (H) 74 - 99 mg/dL   CBC and Auto Differential   Result Value Ref Range    WBC 13.7 (H) 4.4 - 11.3 x10*3/uL    nRBC 0.0 0.0 - 0.0 /100 WBCs    RBC 3.61 (L) 4.00 - 5.20 x10*6/uL    Hemoglobin 10.4 (L) 12.0 - 16.0 g/dL    Hematocrit 33.6 (L) 36.0 - 46.0 %    MCV 93 80 - 100 fL    MCH 28.8 26.0 - 34.0 pg    MCHC 31.0 (L) 32.0 - 36.0 g/dL    RDW 19.1 (H) 11.5 - 14.5 %    Platelets 240 150 - 450 x10*3/uL    Neutrophils % 77.7 40.0 - 80.0 %    Immature Granulocytes %, Automated 1.2 (H) 0.0 - 0.9 %    Lymphocytes % 12.0 13.0 - 44.0 %    Monocytes % 7.6 2.0 - 10.0 %    Eosinophils % 1.1 0.0 - 6.0 %    Basophils % 0.4 0.0 - 2.0 %    Neutrophils Absolute 10.61 (H) 1.60 - 5.50 x10*3/uL    Immature Granulocytes Absolute, Automated 0.17 0.00 - 0.50 x10*3/uL    Lymphocytes Absolute 1.64 0.80 - 3.00 x10*3/uL    Monocytes Absolute 1.04 (H) 0.05 - 0.80 x10*3/uL    Eosinophils Absolute 0.15 0.00 - 0.40 x10*3/uL    Basophils Absolute 0.06 0.00 - 0.10 x10*3/uL   Comprehensive Metabolic Panel   Result Value Ref Range    Glucose 117 (H) 74 - 99 mg/dL    Sodium 137 136 - 145 mmol/L    Potassium 3.3 (L) 3.5 - 5.3 mmol/L    Chloride 100 98 - 107 mmol/L    Bicarbonate 29 21 - 32 mmol/L    Anion Gap 11 10 - 20 mmol/L    Urea Nitrogen 21 6 - 23 mg/dL    Creatinine 2.00 (H) 0.50 - 1.05 mg/dL    eGFR 24 (L) >60 mL/min/1.73m*2    Calcium 7.4 (L) 8.6 - 10.3 mg/dL    Albumin 2.3 (L) 3.4 - 5.0 g/dL     Alkaline Phosphatase 77 33 - 136 U/L    Total Protein 4.9 (L) 6.4 - 8.2 g/dL    AST 24 9 - 39 U/L    Bilirubin, Total 0.3 0.0 - 1.2 mg/dL    ALT 20 7 - 45 U/L       Radiology    XR abdomen 1 view          No definite large amount of stool.   Bowel-gas pattern nonspecific.   No acute findings.   Signed by: Vivek Ponce 7/15/2024 7:55 AM Dictation workstation:   [id number]    CT angio abdomen pelvis w and or wo IV IV contrast    1. Bowel wall thickening and loss of haustration in the proximal sigmoid colon is new compared to prior exam. This finding is nonspecific and can be seen in the setting of ischemia or infectious/inflammatory colitis. No large vessel arterial or venous occlusions detected in this territory but this is a watershed zone and at increased risk for ischemia. 2. Circumferential bladder wall thickening and mucosal hyperenhancement suspicious for infectious or inflammatory cystitis. Correlate with urinalysis. 3. Large volume stool impacted in the rectum with mesorectal stranding putting patient at increased risk for stercoral colitis. 4. 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 5. Bibasal nodules and nodular consolidations may represent sequelae of bronchopneumonia or aspiration. Correlate with patient's symptoms and recommend follow-up chest CT in 3 months to ensure resolution after treatment.   MACRO: Critical Finding:  See findings. Notification was initiated on 7/14/2024 at 8:58 pm by  Baljinder Morse.  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 female with severe abdominal pain and imaging showing significant fecal impaction and upstream inflammatory changes as a result. She has since had numerous bowel movements with improvement in her pain, examination and leukocytosis. Advance diet as tolerated. May resume antiplatelet/anticoagulants. She may need a stronger bowel regimen than her typical home dose to prevent future episodes. Will sign off - please page with questions. The patient does not need to follow up with me following discharge.    Mely Everett MD, FACS  General Surgery

## 2024-07-17 ENCOUNTER — APPOINTMENT (OUTPATIENT)
Dept: DIALYSIS | Facility: HOSPITAL | Age: 89
End: 2024-07-17
Payer: COMMERCIAL

## 2024-07-17 LAB
ALBUMIN SERPL BCP-MCNC: 2.4 G/DL (ref 3.4–5)
ALP SERPL-CCNC: 77 U/L (ref 33–136)
ALT SERPL W P-5'-P-CCNC: 19 U/L (ref 7–45)
ANION GAP SERPL CALC-SCNC: 12 MMOL/L (ref 10–20)
AST SERPL W P-5'-P-CCNC: 20 U/L (ref 9–39)
BASOPHILS # BLD AUTO: 0.06 X10*3/UL (ref 0–0.1)
BASOPHILS NFR BLD AUTO: 0.6 %
BILIRUB SERPL-MCNC: 0.3 MG/DL (ref 0–1.2)
BUN SERPL-MCNC: 31 MG/DL (ref 6–23)
C COLI+JEJ+UPSA DNA STL QL NAA+PROBE: NOT DETECTED
CALCIUM SERPL-MCNC: 7.2 MG/DL (ref 8.6–10.3)
CHLORIDE SERPL-SCNC: 101 MMOL/L (ref 98–107)
CO2 SERPL-SCNC: 27 MMOL/L (ref 21–32)
CREAT SERPL-MCNC: 3.19 MG/DL (ref 0.5–1.05)
EC STX1 GENE STL QL NAA+PROBE: NOT DETECTED
EC STX2 GENE STL QL NAA+PROBE: NOT DETECTED
EGFRCR SERPLBLD CKD-EPI 2021: 14 ML/MIN/1.73M*2
EOSINOPHIL # BLD AUTO: 0.25 X10*3/UL (ref 0–0.4)
EOSINOPHIL NFR BLD AUTO: 2.6 %
ERYTHROCYTE [DISTWIDTH] IN BLOOD BY AUTOMATED COUNT: 18.2 % (ref 11.5–14.5)
GLUCOSE BLD MANUAL STRIP-MCNC: 142 MG/DL (ref 74–99)
GLUCOSE BLD MANUAL STRIP-MCNC: 185 MG/DL (ref 74–99)
GLUCOSE BLD MANUAL STRIP-MCNC: 363 MG/DL (ref 74–99)
GLUCOSE SERPL-MCNC: 171 MG/DL (ref 74–99)
HCT VFR BLD AUTO: 34.2 % (ref 36–46)
HGB BLD-MCNC: 10.3 G/DL (ref 12–16)
IMM GRANULOCYTES # BLD AUTO: 0.09 X10*3/UL (ref 0–0.5)
IMM GRANULOCYTES NFR BLD AUTO: 0.9 % (ref 0–0.9)
LYMPHOCYTES # BLD AUTO: 1.54 X10*3/UL (ref 0.8–3)
LYMPHOCYTES NFR BLD AUTO: 16.1 %
MCH RBC QN AUTO: 28.4 PG (ref 26–34)
MCHC RBC AUTO-ENTMCNC: 30.1 G/DL (ref 32–36)
MCV RBC AUTO: 94 FL (ref 80–100)
MONOCYTES # BLD AUTO: 0.76 X10*3/UL (ref 0.05–0.8)
MONOCYTES NFR BLD AUTO: 7.9 %
NEUTROPHILS # BLD AUTO: 6.88 X10*3/UL (ref 1.6–5.5)
NEUTROPHILS NFR BLD AUTO: 71.9 %
NOROVIRUS GI + GII RNA STL NAA+PROBE: NOT DETECTED
NRBC BLD-RTO: 0 /100 WBCS (ref 0–0)
PLATELET # BLD AUTO: 244 X10*3/UL (ref 150–450)
POTASSIUM SERPL-SCNC: 3.2 MMOL/L (ref 3.5–5.3)
PROT SERPL-MCNC: 5.1 G/DL (ref 6.4–8.2)
RBC # BLD AUTO: 3.63 X10*6/UL (ref 4–5.2)
RV RNA STL NAA+PROBE: NOT DETECTED
SALMONELLA DNA STL QL NAA+PROBE: NOT DETECTED
SHIGELLA DNA SPEC QL NAA+PROBE: NOT DETECTED
SODIUM SERPL-SCNC: 137 MMOL/L (ref 136–145)
V CHOLERAE DNA STL QL NAA+PROBE: NOT DETECTED
WBC # BLD AUTO: 9.6 X10*3/UL (ref 4.4–11.3)
Y ENTEROCOL DNA STL QL NAA+PROBE: NOT DETECTED

## 2024-07-17 PROCEDURE — 80053 COMPREHEN METABOLIC PANEL: CPT | Performed by: INTERNAL MEDICINE

## 2024-07-17 PROCEDURE — 85025 COMPLETE CBC W/AUTO DIFF WBC: CPT | Performed by: INTERNAL MEDICINE

## 2024-07-17 PROCEDURE — 8010000001 HC DIALYSIS - HEMODIALYSIS PER DAY

## 2024-07-17 PROCEDURE — 2500000002 HC RX 250 W HCPCS SELF ADMINISTERED DRUGS (ALT 637 FOR MEDICARE OP, ALT 636 FOR OP/ED)

## 2024-07-17 PROCEDURE — 2500000004 HC RX 250 GENERAL PHARMACY W/ HCPCS (ALT 636 FOR OP/ED)

## 2024-07-17 PROCEDURE — 99233 SBSQ HOSP IP/OBS HIGH 50: CPT | Performed by: INTERNAL MEDICINE

## 2024-07-17 PROCEDURE — 36415 COLL VENOUS BLD VENIPUNCTURE: CPT | Performed by: INTERNAL MEDICINE

## 2024-07-17 PROCEDURE — 1200000002 HC GENERAL ROOM WITH TELEMETRY DAILY

## 2024-07-17 PROCEDURE — 2500000001 HC RX 250 WO HCPCS SELF ADMINISTERED DRUGS (ALT 637 FOR MEDICARE OP)

## 2024-07-17 PROCEDURE — 2500000001 HC RX 250 WO HCPCS SELF ADMINISTERED DRUGS (ALT 637 FOR MEDICARE OP): Performed by: INTERNAL MEDICINE

## 2024-07-17 PROCEDURE — 82947 ASSAY GLUCOSE BLOOD QUANT: CPT

## 2024-07-17 PROCEDURE — 2500000004 HC RX 250 GENERAL PHARMACY W/ HCPCS (ALT 636 FOR OP/ED): Performed by: INTERNAL MEDICINE

## 2024-07-17 RX ORDER — SODIUM CHLORIDE, SODIUM LACTATE, POTASSIUM CHLORIDE, CALCIUM CHLORIDE 600; 310; 30; 20 MG/100ML; MG/100ML; MG/100ML; MG/100ML
75 INJECTION, SOLUTION INTRAVENOUS CONTINUOUS
Status: DISCONTINUED | OUTPATIENT
Start: 2024-07-17 | End: 2024-07-17

## 2024-07-17 RX ADMIN — CALCIUM ACETATE 667 MG: 667 CAPSULE ORAL at 18:40

## 2024-07-17 RX ADMIN — PANTOPRAZOLE SODIUM 40 MG: 40 TABLET, DELAYED RELEASE ORAL at 08:46

## 2024-07-17 RX ADMIN — HYDROCHLOROTHIAZIDE 12.5 MG: 12.5 CAPSULE ORAL at 08:47

## 2024-07-17 RX ADMIN — PIPERACILLIN SODIUM AND TAZOBACTAM SODIUM 2.25 G: 2; .25 INJECTION, SOLUTION INTRAVENOUS at 08:46

## 2024-07-17 RX ADMIN — AMIODARONE HYDROCHLORIDE 200 MG: 200 TABLET ORAL at 08:46

## 2024-07-17 RX ADMIN — HEPARIN SODIUM 1600 UNITS: 1000 INJECTION INTRAVENOUS; SUBCUTANEOUS at 13:59

## 2024-07-17 RX ADMIN — ACETAMINOPHEN 650 MG: 325 TABLET ORAL at 08:56

## 2024-07-17 RX ADMIN — INSULIN LISPRO 10 UNITS: 100 INJECTION, SOLUTION INTRAVENOUS; SUBCUTANEOUS at 21:39

## 2024-07-17 RX ADMIN — CALCIUM ACETATE 667 MG: 667 CAPSULE ORAL at 08:47

## 2024-07-17 RX ADMIN — PIPERACILLIN SODIUM AND TAZOBACTAM SODIUM 2.25 G: 2; .25 INJECTION, SOLUTION INTRAVENOUS at 20:24

## 2024-07-17 RX ADMIN — HEPARIN SODIUM 1600 UNITS: 1000 INJECTION INTRAVENOUS; SUBCUTANEOUS at 13:57

## 2024-07-17 RX ADMIN — INSULIN LISPRO 2 UNITS: 100 INJECTION, SOLUTION INTRAVENOUS; SUBCUTANEOUS at 17:29

## 2024-07-17 RX ADMIN — APIXABAN 2.5 MG: 2.5 TABLET, FILM COATED ORAL at 20:24

## 2024-07-17 RX ADMIN — APIXABAN 2.5 MG: 2.5 TABLET, FILM COATED ORAL at 08:47

## 2024-07-17 RX ADMIN — CALCIUM ACETATE 667 MG: 667 CAPSULE ORAL at 15:07

## 2024-07-17 RX ADMIN — ATORVASTATIN CALCIUM 80 MG: 40 TABLET, FILM COATED ORAL at 20:24

## 2024-07-17 ASSESSMENT — COGNITIVE AND FUNCTIONAL STATUS - GENERAL
MOVING FROM LYING ON BACK TO SITTING ON SIDE OF FLAT BED WITH BEDRAILS: TOTAL
WALKING IN HOSPITAL ROOM: TOTAL
DRESSING REGULAR UPPER BODY CLOTHING: TOTAL
DAILY ACTIVITIY SCORE: 10
MOBILITY SCORE: 6
STANDING UP FROM CHAIR USING ARMS: TOTAL
STANDING UP FROM CHAIR USING ARMS: TOTAL
MOVING TO AND FROM BED TO CHAIR: TOTAL
MOVING TO AND FROM BED TO CHAIR: TOTAL
MOVING FROM LYING ON BACK TO SITTING ON SIDE OF FLAT BED WITH BEDRAILS: TOTAL
CLIMB 3 TO 5 STEPS WITH RAILING: TOTAL
DRESSING REGULAR LOWER BODY CLOTHING: TOTAL
WALKING IN HOSPITAL ROOM: TOTAL
PERSONAL GROOMING: A LITTLE
PERSONAL GROOMING: A LITTLE
TOILETING: TOTAL
EATING MEALS: A LITTLE
TURNING FROM BACK TO SIDE WHILE IN FLAT BAD: TOTAL
DRESSING REGULAR UPPER BODY CLOTHING: A LOT
EATING MEALS: A LITTLE
MOBILITY SCORE: 6
TOILETING: TOTAL
CLIMB 3 TO 5 STEPS WITH RAILING: TOTAL
HELP NEEDED FOR BATHING: TOTAL
HELP NEEDED FOR BATHING: A LOT
DAILY ACTIVITIY SCORE: 12
DRESSING REGULAR LOWER BODY CLOTHING: TOTAL
TURNING FROM BACK TO SIDE WHILE IN FLAT BAD: TOTAL

## 2024-07-17 ASSESSMENT — PAIN - FUNCTIONAL ASSESSMENT
PAIN_FUNCTIONAL_ASSESSMENT: 0-10
PAIN_FUNCTIONAL_ASSESSMENT: 0-10

## 2024-07-17 ASSESSMENT — PAIN SCALES - GENERAL
PAINLEVEL_OUTOF10: 3
PAINLEVEL_OUTOF10: 0 - NO PAIN
PAINLEVEL_OUTOF10: 2

## 2024-07-17 NOTE — PROCEDURES
Report to Receiving RN:    Report To: Jeanine Roman RN   Time Report Called: 1408  Hand-Off Communication:   Pt tolerated tx well. Removed 1L Post vitals: 144/47 (69) - 76.1kg - 98*F.   Complications During Treatment: No  Ultrafiltration Treatment: No  Medications Administered During Dialysis: No  Blood Products Administered During Dialysis: No  Labs Sent During Dialysis: No  Heparin Drip Rate Changes: N/A  Dialysis Catheter Dressing: Changed 07/15/2024 and initialed TE. Clean, dry and intact  Last Dressing Change:   Dialysis Catheter Dressing: Changed 07/15/2024 and initialed TE. Clean, dry and intact    Electronic Signatures:  Monica Connelly OCDT    Last Updated: 1:58 PM by MONICA CONNELLY

## 2024-07-17 NOTE — CARE PLAN
The patient's goals for the shift include      The clinical goals for the shift include Patient will remain free from falls throughout the shift      Problem: Skin  Goal: Promote skin healing  Outcome: Progressing     Problem: Diabetes  Goal: Maintain electrolyte levels within acceptable range throughout shift  Outcome: Progressing     Problem: Pain - Adult  Goal: Verbalizes/displays adequate comfort level or baseline comfort level  Outcome: Met     Problem: Safety - Adult  Goal: Free from fall injury  Outcome: Met     Problem: Diabetes  Goal: No changes in neurological exam by end of shift  Outcome: Met

## 2024-07-17 NOTE — PROGRESS NOTES
ADOD Friday.  Will transition to PO antibiotics at that time.  HD auth has been received by the facility.  Skilled care auth still pending.      1534  Skilled auth received-good through 7/23.

## 2024-07-17 NOTE — PROGRESS NOTES
Physical Therapy                 Therapy Communication Note    Patient Name: Melody Martin  MRN: [id number]  Today's Date: 7/17/2024     Discipline: Physical Therapy    Missed Visit Reason: Missed Visit Reason: Patient in a medical procedure (patient at [facility])    Missed Time: Attempt    Comment:

## 2024-07-17 NOTE — PROGRESS NOTES
"      Nephrology Progress Note      Nephrology following for esrd.   Events over night:   Seen on dialysis today  Feeling better    /73 (BP Location: Left arm, Patient Position: Lying)   Pulse 69   Temp 36.9 °C (98.5 °F) (Temporal)   Resp 12   Ht 1.626 m (5' 4\")   Wt 75.3 kg (166 lb 0.1 oz)   SpO2 100%   BMI 28.49 kg/m²     Input / Output:  24 HR:   Intake/Output Summary (Last 24 hours) at 7/17/2024 1231  Last data filed at 7/17/2024 1055  Gross per 24 hour   Intake 960 ml   Output --   Net 960 ml       Physical Exam   Alert and oriented x 3 NAD  Neck: no JVD  CV: RRR  Lungs: CTA bilaterally  Abd: soft, NT, ND   Ext: no lower extremity edema    Scheduled medications  amiodarone, 200 mg, oral, Daily  apixaban, 2.5 mg, oral, BID  atorvastatin, 80 mg, oral, Nightly  [Held by provider] bumetanide, 1 mg, oral, BID  calcium acetate, 667 mg, oral, TID after meals  [Held by provider] clopidogrel, 75 mg, oral, Daily  heparin, 1,000 Units, intra-catheter, After Dialysis  heparin, 1,000 Units, intra-catheter, After Dialysis  hydroCHLOROthiazide, 12.5 mg, oral, Daily  insulin lispro, 0-10 Units, subcutaneous, Before meals & nightly  [Held by provider] lisinopril, 20 mg, oral, Nightly  pantoprazole, 40 mg, oral, Daily   Or  pantoprazole, 40 mg, intravenous, Daily  piperacillin-tazobactam, 2.25 g, intravenous, q12h  polyethylene glycol, 17 g, oral, Daily      Continuous medications     PRN medications  PRN medications: acetaminophen, albuterol, bisacodyl, dextrose, dextrose, glucagon, glucagon, guaiFENesin, melatonin, ondansetron   Results from last 7 days   Lab Units 07/17/24  0446   SODIUM mmol/L 137   POTASSIUM mmol/L 3.2*   CHLORIDE mmol/L 101   CO2 mmol/L 27   BUN mg/dL 31*   CREATININE mg/dL 3.19*   CALCIUM mg/dL 7.2*   PROTEIN TOTAL g/dL 5.1*   BILIRUBIN TOTAL mg/dL 0.3   ALK PHOS U/L 77   ALT U/L 19   AST U/L 20   GLUCOSE mg/dL 171*      Results from last 7 days   Lab Units 07/15/24  0502 07/14/24  1908 "   MAGNESIUM mg/dL 2.07 2.15      Results from last 7 days   Lab Units 07/17/24  0446 07/16/24  0433 07/15/24  0502   WBC AUTO x10*3/uL 9.6 13.7* 24.4*   HEMOGLOBIN g/dL 10.3* 10.4* 12.4   HEMATOCRIT % 34.2* 33.6* 39.0   PLATELETS AUTO x10*3/uL 244 240 300        Assessment & Plan:   Patient is 88 y.o. female who is admitted to hospital for abdominal pain/colisit. Nephrology consulted in view of esrd.      ESRD on HD MWF   Electrolytes  Hyperkalemia  Resolved after HD  MBD  PO4 at 6.4  Colitis/abdominal pain  Improved  Abx per primary/surgery     Recommendations:   -HD today  -continue with MWF HD schedule   -add Phoslo with meals-but will need stool softeners daily  -from renal's standpoint ok for discharge       Please message me through EPIC chat with any questions or concerns.     Mally Lucas DO  7/17/2024  12:31 PM     [facility]    [address], [address]   [place] [address]  Office: [phone]

## 2024-07-17 NOTE — PROGRESS NOTES
Melody Martin is a 88 y.o. female on day 2 of admission presenting with Urinary tract infection with hematuria, site unspecified.      Subjective   Melody Martin is a 88 y.o. 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 Patient came in from the nursing facility, [facility] on [address] and she notes that she has been feeling weaker than usual. She notes that she hasn't had bowel movements in a week and has less appetite. She was nauseous and had 2 episodes of emesis; was unsure of the color of her emesis. She has ESRD on dialysis, notes that she doesn't have much urine output but she reports of burning with urination and increasing lower abdominal pain. She denies f/c, chest pain, shortness of breath, leg swelling, syncope, hematuria, focal weakness.      ED Course (Summary):   Vitals on presentation: 97.1 F, 67 bpm, 16 RR, 120/40, 98% on RA  Labs: Chemistries-glucose 227, sodium 138, potassium 5.4, BUN/creatinine 55/3.34, EGFR 13, calcium 8.4, albumin 2.9, AST 51  Troponin 15  CBC-leukocyte 27.4, hemoglobin 13.1, neutrophils 23.42  VBG-pH 7.36, pCO2 40, pO2 44, HCO3 27.1, lactate 3.9  UA-ketones trace, blood 3+, glucose trace, protein 3+, leukocyte esterase 500, turbid appearance, WBC >50  Imaging: CXR - No acute radiographic abnormalities.   CT angio AP-Bowel wall thickening and loss of haustration in the proximal sigmoid colon is new compared to prior exam. This finding is nonspecific and can be seen in the setting of ischemia or infectious/inflammatory colitis. No large vessel arterial or venous occlusions detected in this territory but this is a watershed zone and at increased risk for ischemia. Circumferential bladder wall  thickening and mucosal hyperenhancement suspicious for infectious or inflammatory cystitis. Large volume stool impacted in the rectum with mesorectal stranding putting patient at increased risk for stercoral colitis. 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 Bibasal nodules and nodular consolidations may represent sequelae of bronchopneumonia or aspiration. Correlate with patient's symptoms and recommend follow-up chest CT in 3 months to ensure resolution after treatment  Interventions: N.p.o., LR bolus 1000 mL, Zosyn, admission for further management.  7/15: Patient was seen and examined.  Abdominal pain has now subsided.  Patient had bowel movement this morning.  Diet advanced to clear liquid diet.  Blood and urine cultures are still pending.  Continue current IV antibiotics.  Trend CBC and BMP daily.  General surgery recommendations appreciated.   7/16: Patient was seen and examined.  Still having intermittent abdominal pain.  Was having severe multiple episodes of diarrhea this morning.  Ordered stool for C. difficile and pathogen PCR.  Seen by general surgery and diet advanced to regular diet.  Urine culture showing mixed organisms.  Repeat urine culture ordered.  Continue current IV antibiotics.  Repeat CBC and BMP in a.m.  7/17: Patient was seen and examined.  Stool for C. difficile and pathogen PCR negative.  Hemodialysis scheduled for later today.  Blood cultures are negative x 2 days.  Repeat urine culture was not done.  Will reorder stat.  Trend CBC and BMP daily.  Awaiting authorization for placement in extended-care facility.      Objective     Last Recorded Vitals  /73 (BP Location: Left arm, Patient Position: Lying)   Pulse 69   Temp 36.9 °C (98.5 °F) (Temporal)   Resp 12   Wt 75.3 kg (166 lb 0.1 oz)   SpO2 100%   Intake/Output last 3 Shifts:    Intake/Output Summary (Last 24 hours) at 7/17/2024 1219  Last data filed at 7/17/2024 1055  Gross per 24 hour   Intake  960 ml   Output --   Net 960 ml       Admission Weight  Weight: 78 kg (171 lb 15.3 oz) (07/14/24 1845)    Daily Weight  07/16/24 : 75.3 kg (166 lb 0.1 oz)    Image Results  ECG 12 lead  Sinus or ectopic atrial rhythm  Prolonged NH interval  Anterior infarct, old  Baseline wander in lead(s) V3    See ED provider note for full interpretation and clinical correlation  Confirmed by Zandra Turcios ([id number]) on 7/15/2024 11:46:32 AM  XR abdomen 1 view  Narrative: Interpreted By:  Vivek Ponce,   STUDY:  No definite large amount of stool.      Bowel-gas pattern nonspecific.      No acute findings.      INDICATION:  Signs/Symptoms:evaluate rectum for retained stool - please obtain  image of lower abdomen/pelvis.      COMPARISON:  CT abdomen July 14      ACCESSION NUMBER(S):  [id number]      ORDERING CLINICIAN:  FRANCK HANKS      FINDINGS:  No definite large amount of stool.      Bowel-gas pattern nonspecific.      No acute findings.      Impression:             No definite large amount of stool.      Bowel-gas pattern nonspecific.      No acute findings.      Signed by: Vivek Ponce 7/15/2024 7:55 AM  Dictation workstation:   [id number]      Physical Exam  Constitutional: Pleasant and cooperative. Laying in bed in no acute distress. Conversant.   Skin: Sacral wound noted. Warm and dry; no obvious lesions, rashes, pallor, or jaundice. Good turgor.   Eyes: EOMI. Anicteric sclera.   ENT: Mucous membranes moist; no obvious injury or deformity appreciated.   Head and Neck: Normocephalic, atraumatic. ROM preserved. Trachea midline. No appreciable JVD.   Respiratory: Nonlabored on RA. Lungs clear to auscultation bilaterally without obvious adventitious sounds. Chest rise is equal.  Cardiovascular: RRR. No gross murmur, gallop, or rub. Extremities are warm and well-perfused with good capillary refill (< 3 seconds). No chest wall tenderness.   GI: LLQ abdomen pain to palpation disproportionate to exam, no guarding. No obvious  organomegaly appreciated. Bowel sounds are present and normoactive.  : No CVA tenderness.   MSK: No gross abnormalities appreciated. No limitations to AROM/PROM appreciated.   Extremities: No cyanosis, edema, or clubbing evident. Neurovascularly intact.   Neuro: A&Ox4. CN 2-12 grossly intact. Able to respond to questions appropriately and clearly. No acute focal neurologic deficits appreciated.  Psych: Appropriate mood and behavior.  Relevant Results               Assessment/Plan        This patient has a central line   Reason for the central line remaining today? 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 (Multi)    Atrial fibrillation (Multi)    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 (Multi)    Constipation    Sepsis (Multi)    Alteration in skin integrity due to moisture    ESRD on hemodialysis (Multi)    Hemiplegia and hemiparesis following cerebral infarction affecting left non-dominant side (Multi)    Physical deconditioning    Colitis    UTI with hematuria  -zosyn  -urine culture pending  -blood culture pending for sepsis  -adjust abx pending cx result  -Trend CBC daily     Sepsis without acute end organ damage, responsive to IVF  -SIRS criteria (1/4): WBC  -Source: UTI, colitis  -End-organ dysfunction: none  -Lactate 3.9 on VBG  -BP is normotensive  -Blood cultures x2 pending  -Continue antibiotic coverage with zosyn  -Follow fever  curve, WBCs     Significant fecal impaction with inflammatory colitis and possible bowel ischemia  Leukocytosis  -IV abx coverage as above  -Diet advanced to regular diet  -Resume Eliquis.  -Stool for C. 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  -wound care consult     Chronic deconditioning/ambulatory dysfunction  -PT/OT eval     IDDM-II  -pt is unsure of her home basal insulin   -Continue with SSI   -Continue with accucheks, hypoglycemic protocol   -Monitor and adjust as needed      Elevated troponin  -troponin 15, likely secondary to pain  -pt denies chest pain and/or anginal equivalents  -EKG shows sinus rhythm at 68 bpm with prolonged MA interval, no acute ischemic changes     Pt meds need to be reconciled, adjust medications and doses as needed      Diet: Clear liquid  DVT Prophylaxis: hold home eliquis  Code Status: DNR, DNI          Spent 35 minutes in the follow-up management of this patient today       Karen Bernstein MD

## 2024-07-17 NOTE — PROCEDURES
Report from Sending RN:    Report From: Jeanine Roman RN  Recent Surgery of Procedure: No  Baseline Level of Consciousness (LOC): x4  Oxygen Use: No  Type: RA  Diabetic: Yes  Last BP Med Given Day of Dialysis:   See EMAR  Last Pain Med Given: See EMAR  Lab Tests to be Obtained with Dialysis: No  Blood Transfusion to be Given During Dialysis: No  Available IV Access: Yes  Medications to be Administered During Dialysis: No  Continuous IV Infusion Running: No  Restraints on Currently or in the Last 24 Hours: No  Hand-Off Communication: BP has been in 150s and received BP meds, but received them on 7/15 pre tx and BP remained stable  Dialysis Catheter Dressing:   Will check prior to HD  Last Dressing Change:   Will check prior to HD

## 2024-07-18 VITALS
HEIGHT: 64 IN | WEIGHT: 166.01 LBS | BODY MASS INDEX: 28.34 KG/M2 | OXYGEN SATURATION: 96 % | SYSTOLIC BLOOD PRESSURE: 161 MMHG | RESPIRATION RATE: 18 BRPM | HEART RATE: 82 BPM | TEMPERATURE: 98.6 F | DIASTOLIC BLOOD PRESSURE: 72 MMHG

## 2024-07-18 PROBLEM — N18.6 HYPERTENSIVE HEART AND KIDNEY DISEASE WITH CHRONIC DIASTOLIC CONGESTIVE HEART FAILURE AND STAGE 5 CHRONIC KIDNEY DISEASE ON CHRONIC DIALYSIS (MULTI): Status: RESOLVED | Noted: 2023-09-20 | Resolved: 2024-07-18

## 2024-07-18 PROBLEM — L89.623: Status: RESOLVED | Noted: 2023-10-12 | Resolved: 2024-07-18

## 2024-07-18 PROBLEM — K52.9 COLITIS: Status: RESOLVED | Noted: 2024-07-15 | Resolved: 2024-07-18

## 2024-07-18 PROBLEM — A41.9 SEPSIS (MULTI): Status: RESOLVED | Noted: 2024-04-24 | Resolved: 2024-07-18

## 2024-07-18 PROBLEM — I50.32 HYPERTENSIVE HEART AND KIDNEY DISEASE WITH CHRONIC DIASTOLIC CONGESTIVE HEART FAILURE AND STAGE 5 CHRONIC KIDNEY DISEASE ON CHRONIC DIALYSIS (MULTI): Status: RESOLVED | Noted: 2023-09-20 | Resolved: 2024-07-18

## 2024-07-18 PROBLEM — Z99.2 HYPERTENSIVE HEART AND KIDNEY DISEASE WITH CHRONIC DIASTOLIC CONGESTIVE HEART FAILURE AND STAGE 5 CHRONIC KIDNEY DISEASE ON CHRONIC DIALYSIS (MULTI): Status: RESOLVED | Noted: 2023-09-20 | Resolved: 2024-07-18

## 2024-07-18 PROBLEM — R79.89 ELEVATED TROPONIN: Status: RESOLVED | Noted: 2023-08-24 | Resolved: 2024-07-18

## 2024-07-18 PROBLEM — I13.2 HYPERTENSIVE HEART AND KIDNEY DISEASE WITH CHRONIC DIASTOLIC CONGESTIVE HEART FAILURE AND STAGE 5 CHRONIC KIDNEY DISEASE ON CHRONIC DIALYSIS (MULTI): Status: RESOLVED | Noted: 2023-09-20 | Resolved: 2024-07-18

## 2024-07-18 LAB
BACTERIA BLD CULT: NORMAL
GLUCOSE BLD MANUAL STRIP-MCNC: 154 MG/DL (ref 74–99)
GLUCOSE BLD MANUAL STRIP-MCNC: 244 MG/DL (ref 74–99)

## 2024-07-18 PROCEDURE — 97110 THERAPEUTIC EXERCISES: CPT | Mod: GP,CQ

## 2024-07-18 PROCEDURE — 2500000004 HC RX 250 GENERAL PHARMACY W/ HCPCS (ALT 636 FOR OP/ED)

## 2024-07-18 PROCEDURE — 2500000001 HC RX 250 WO HCPCS SELF ADMINISTERED DRUGS (ALT 637 FOR MEDICARE OP): Performed by: INTERNAL MEDICINE

## 2024-07-18 PROCEDURE — 2500000002 HC RX 250 W HCPCS SELF ADMINISTERED DRUGS (ALT 637 FOR MEDICARE OP, ALT 636 FOR OP/ED)

## 2024-07-18 PROCEDURE — 82947 ASSAY GLUCOSE BLOOD QUANT: CPT

## 2024-07-18 PROCEDURE — 99239 HOSP IP/OBS DSCHRG MGMT >30: CPT | Performed by: INTERNAL MEDICINE

## 2024-07-18 PROCEDURE — 2500000001 HC RX 250 WO HCPCS SELF ADMINISTERED DRUGS (ALT 637 FOR MEDICARE OP)

## 2024-07-18 PROCEDURE — 97530 THERAPEUTIC ACTIVITIES: CPT | Mod: GP,CQ

## 2024-07-18 PROCEDURE — 97530 THERAPEUTIC ACTIVITIES: CPT | Mod: GO,CO

## 2024-07-18 RX ORDER — TRAMADOL HYDROCHLORIDE 50 MG/1
25 TABLET ORAL EVERY 12 HOURS PRN
Qty: 30 TABLET | Refills: 5 | Status: SHIPPED | OUTPATIENT
Start: 2024-07-18

## 2024-07-18 RX ORDER — AMOXICILLIN AND CLAVULANATE POTASSIUM 875; 125 MG/1; MG/1
1 TABLET, FILM COATED ORAL 2 TIMES DAILY
Qty: 10 TABLET | Refills: 0 | Status: SHIPPED | OUTPATIENT
Start: 2024-07-18 | End: 2024-07-23

## 2024-07-18 RX ADMIN — HYDROCHLOROTHIAZIDE 12.5 MG: 12.5 CAPSULE ORAL at 07:51

## 2024-07-18 RX ADMIN — CALCIUM ACETATE 667 MG: 667 CAPSULE ORAL at 16:10

## 2024-07-18 RX ADMIN — APIXABAN 2.5 MG: 2.5 TABLET, FILM COATED ORAL at 07:55

## 2024-07-18 RX ADMIN — INSULIN LISPRO 6 UNITS: 100 INJECTION, SOLUTION INTRAVENOUS; SUBCUTANEOUS at 12:06

## 2024-07-18 RX ADMIN — INSULIN LISPRO 4 UNITS: 100 INJECTION, SOLUTION INTRAVENOUS; SUBCUTANEOUS at 16:17

## 2024-07-18 RX ADMIN — INSULIN LISPRO 2 UNITS: 100 INJECTION, SOLUTION INTRAVENOUS; SUBCUTANEOUS at 07:59

## 2024-07-18 RX ADMIN — PANTOPRAZOLE SODIUM 40 MG: 40 TABLET, DELAYED RELEASE ORAL at 07:55

## 2024-07-18 RX ADMIN — CALCIUM ACETATE 667 MG: 667 CAPSULE ORAL at 12:06

## 2024-07-18 RX ADMIN — PIPERACILLIN SODIUM AND TAZOBACTAM SODIUM 2.25 G: 2; .25 INJECTION, SOLUTION INTRAVENOUS at 07:58

## 2024-07-18 RX ADMIN — APIXABAN 2.5 MG: 2.5 TABLET, FILM COATED ORAL at 20:18

## 2024-07-18 RX ADMIN — ATORVASTATIN CALCIUM 80 MG: 40 TABLET, FILM COATED ORAL at 20:18

## 2024-07-18 RX ADMIN — AMIODARONE HYDROCHLORIDE 200 MG: 200 TABLET ORAL at 07:56

## 2024-07-18 RX ADMIN — CALCIUM ACETATE 667 MG: 667 CAPSULE ORAL at 07:58

## 2024-07-18 ASSESSMENT — PAIN SCALES - GENERAL
PAINLEVEL_OUTOF10: 0 - NO PAIN

## 2024-07-18 ASSESSMENT — COGNITIVE AND FUNCTIONAL STATUS - GENERAL
MOBILITY SCORE: 10
MOVING TO AND FROM BED TO CHAIR: A LOT
DRESSING REGULAR UPPER BODY CLOTHING: A LOT
STANDING UP FROM CHAIR USING ARMS: A LOT
TURNING FROM BACK TO SIDE WHILE IN FLAT BAD: A LOT
WALKING IN HOSPITAL ROOM: TOTAL
DRESSING REGULAR LOWER BODY CLOTHING: A LOT
DAILY ACTIVITIY SCORE: 12
MOBILITY SCORE: 24
DAILY ACTIVITIY SCORE: 24
PERSONAL GROOMING: A LOT
EATING MEALS: A LOT
MOVING FROM LYING ON BACK TO SITTING ON SIDE OF FLAT BED WITH BEDRAILS: A LOT
CLIMB 3 TO 5 STEPS WITH RAILING: TOTAL
HELP NEEDED FOR BATHING: A LOT
TOILETING: A LOT

## 2024-07-18 ASSESSMENT — PAIN - FUNCTIONAL ASSESSMENT
PAIN_FUNCTIONAL_ASSESSMENT: 0-10

## 2024-07-18 NOTE — CARE PLAN
Problem: Discharge Planning  Goal: Discharge to home or other facility with appropriate resources  Outcome: Progressing     Problem: Chronic Conditions and Co-morbidities  Goal: Patient's chronic conditions and co-morbidity symptoms are monitored and maintained or improved  Outcome: Progressing     Problem: Skin  Goal: Decreased wound size/increased tissue granulation at next dressing change  Outcome: Progressing  Goal: Participates in plan/prevention/treatment measures  Outcome: Progressing  Goal: Prevent/manage excess moisture  Outcome: Progressing  Goal: Prevent/minimize sheer/friction injuries  Outcome: Progressing  Goal: Promote/optimize nutrition  Outcome: Progressing  Goal: Promote skin healing  Outcome: Progressing     Problem: Diabetes  Goal: Achieve decreasing blood glucose levels by end of shift  Outcome: Progressing  Goal: Increase stability of blood glucose readings by end of shift  Outcome: Progressing  Goal: Decrease in ketones present in urine by end of shift  Outcome: Progressing  Goal: Maintain electrolyte levels within acceptable range throughout shift  Outcome: Progressing  Goal: Maintain glucose levels >70mg/dl to <250mg/dl throughout shift  Outcome: Progressing  Goal: Learn about and adhere to nutrition recommendations by end of shift  Outcome: Progressing  Goal: Vital signs within normal range for age by end of shift  Outcome: Progressing  Goal: Increase self care and/or family involovement by end of shift  Outcome: Progressing  Goal: Receive DSME education by end of shift  Outcome: Progressing     Problem: Heart Failure  Goal: Improved gas exchange this shift  Outcome: Progressing  Goal: Improved urinary output this shift  Outcome: Progressing  Goal: Reduction in peripheral edema within 24 hours  Outcome: Progressing  Goal: Report improvement of dyspnea/breathlessness this shift  Outcome: Progressing  Goal: Weight from fluid excess reduced over 2-3 days, then stabilize  Outcome:  Progressing  Goal: Increase self care and/or family involvement in 24 hours  Outcome: Progressing     Problem: Pain  Goal: Takes deep breaths with improved pain control throughout the shift  Outcome: Progressing  Goal: Turns in bed with improved pain control throughout the shift  Outcome: Progressing  Goal: Walks with improved pain control throughout the shift  Outcome: Progressing  Goal: Performs ADL's with improved pain control throughout shift  Outcome: Progressing  Goal: Participates in PT with improved pain control throughout the shift  Outcome: Progressing  Goal: Free from opioid side effects throughout the shift  Outcome: Progressing  Goal: Free from acute confusion related to pain meds throughout the shift  Outcome: Progressing     Problem: Nutrition  Goal: Less than 5 days NPO/clear liquids  Outcome: Progressing  Goal: Oral intake greater 75%  Outcome: Progressing  Goal: Consume prescribed supplement  Outcome: Progressing  Goal: Adequate PO fluid intake  Outcome: Progressing  Goal: BG  mg/dL  Outcome: Progressing  Goal: Lab values WNL  Outcome: Progressing  Goal: Electrolytes WNL  Outcome: Progressing  Goal: Promote healing  Outcome: Progressing  Goal: Maintain stable weight  Outcome: Progressing

## 2024-07-18 NOTE — PROGRESS NOTES
"   07/18/24 1154   Discharge Planning   Expected Discharge Disposition SNF     Spoke with pt, role of TCC explained. Pt confirms her plan remains to return to [facility]. States, \"I would love to go and be back before tomorrow because that's my birthday. I am feeling pretty good. I still have a little but of pain but  I am feeling so much better. I if do leave could you please call my son in law?\" Reached out to provider to notify that HD and skilled auth both obtained. CT will follow.     1525- discharge order in, notified [facility] to send discharge documents. Called left  with son in law as requested to notify of discharge. Plan- return to [facility]. CT will follow.     1540- Per [facility]- report# [phone]  The [employer] Vehicle you requested for Melody CAIT in unit/room 2323.01 on 07/18/2024 is scheduled to arrive at 6:30pm EDT! [facility] is handling this ride and you can contact them at [phone]     Notified floor nurse of same. CT will follow.   "

## 2024-07-18 NOTE — DISCHARGE SUMMARY
Discharge Diagnosis  Urinary tract infection with hematuria, site unspecified.  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 (Multi)    Atrial fibrillation (Multi)    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 (Multi)    Constipation    Sepsis (Multi)    Alteration in skin integrity due to moisture    ESRD on hemodialysis (Multi)    Hemiplegia and hemiparesis following cerebral infarction affecting left non-dominant side (Multi)    Physical deconditioning    Colitis    Issues Requiring Follow-Up      Discharge Meds     Your medication list        START taking these medications        Instructions Last Dose Given Next Dose Due   amoxicillin-pot clavulanate 875-125 mg tablet  Commonly known as: Augmentin      Take 1 tablet by mouth 2 times a day for 5 days.              CHANGE how you take these medications        Instructions Last Dose Given Next Dose Due   acetaminophen 325 mg tablet  Commonly known as: Tylenol  What changed: Another medication with the same name was removed. Continue taking this medication, and follow the directions you see here.           traMADol 50 mg tablet  Commonly known as: Ultram  What changed:   when to take this  reasons to take this      Take 0.5 tablets (25 mg) by mouth every 12 hours if needed for severe pain (7 - 10).              CONTINUE taking these medications        Instructions Last Dose Given Next Dose Due   albuterol 90 mcg/actuation inhaler           alum-mag hydroxide-simeth 200-200-20 mg/5 mL oral suspension  Commonly known as: Mylanta            amino acids-protein hydr-fiber 15 gram- 100 kcal/30 mL liquid in packet           amiodarone 200 mg tablet  Commonly known as: Pacerone           apixaban 2.5 mg tablet  Commonly known as: Eliquis           atorvastatin 80 mg tablet  Commonly known as: Lipitor           B complex-vitamin C-folic acid 1 mg capsule  Commonly known as: Nephrocaps           benzonatate 100 mg capsule  Commonly known as: Tessalon           busPIRone 10 mg tablet  Commonly known as: Buspar           clopidogrel 75 mg tablet  Commonly known as: Plavix           clotrimazole-betamethasone cream  Commonly known as: Lotrisone           diclofenac sodium 1 % gel  Commonly known as: Voltaren           dilTIAZem  mg 24 hr capsule  Commonly known as: Tiazac           diphenhydramine-zinc acetate cream           docusate sodium 100 mg capsule  Commonly known as: Colace           Dulcolax (bisacodyl) 10 mg suppository  Generic drug: bisacodyl           glucagon 1 mg injection  Commonly known as: Glucagen           glucose 40 % gel oral gel  Commonly known as: Glutose           guaiFENesin 600 mg 12 hr tablet  Commonly known as: Mucinex           insulin glargine 100 unit/mL injection  Commonly known as: Lantus      Inject 20 Units under the skin once daily at bedtime. Take as directed per insulin instructions.       insulin lispro 100 unit/mL injection  Commonly known as: HumaLOG      Inject 0-0.15 mL (0-15 Units) under the skin 4 times a day before meals. Take as directed per insulin instructions.       isosorbide dinitrate 10 mg tablet  Commonly known as: Isordil           lidocaine 4 % cream  Commonly known as: LMX           melatonin 3 mg tablet           metoprolol succinate  mg 24 hr capsule  Commonly known as: Kapspargo Sprinkle           mirtazapine 7.5 mg tablet  Commonly known as: Remeron           OneTouch Ultra Test strip  Generic drug: blood sugar diagnostic      1 strip by Does not apply route 3 times a day.        pantoprazole 40 mg EC tablet  Commonly known as: ProtoNix           PSYLLIUM HUSK (ASPARTAME) ORAL           sennosides 8.6 mg tablet  Commonly known as: Senokot           sevelamer carbonate 800 mg tablet  Commonly known as: Renvela           simethicone 80 mg chewable tablet  Commonly known as: Mylicon           zinc oxide 20 % ointment                  STOP taking these medications      bumetanide 1 mg tablet  Commonly known as: Bumex                  Where to Get Your Medications        These [facility] [place] [facility]  [place] - [address] [facility]  [address] [facility] [place] [address]      Phone: [phone]   amoxicillin-pot clavulanate 875-125 mg tablet       You can get these medications from any pharmacy    Bring a paper prescription for each of these medications  traMADol 50 mg tablet         Test Results Pending At Discharge  Pending Labs       Order Current Status    Extra Urine Gray Tube Collected (07/14/24 2056)    Urinalysis with Reflex Culture and Microscopic In process    Blood Culture Preliminary result    Blood Culture Preliminary result            Hospital Course  Melody Martin is a 88 y.o. 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 Patient came in from the nursing facility, [facility] and she notes that she has been feeling weaker than usual. She notes that she hasn't had bowel movements in a week and has less appetite. She was nauseous and had 2 episodes of emesis; was unsure of the color of her emesis. She has ESRD on dialysis, notes that she doesn't have much urine output but she reports of burning with urination and increasing lower abdominal pain. She denies  f/c, chest pain, shortness of breath, leg swelling, syncope, hematuria, focal weakness.      ED Course (Summary):   Vitals on presentation: 97.1 F, 67 bpm, 16 RR, 120/40, 98% on RA  Labs: Chemistries-glucose 227, sodium 138, potassium 5.4, BUN/creatinine 55/3.34, EGFR 13, calcium 8.4, albumin 2.9, AST 51  Troponin 15  CBC-leukocyte 27.4, hemoglobin 13.1, neutrophils 23.42  VBG-pH 7.36, pCO2 40, pO2 44, HCO3 27.1, lactate 3.9  UA-ketones trace, blood 3+, glucose trace, protein 3+, leukocyte esterase 500, turbid appearance, WBC >50  Imaging: CXR - No acute radiographic abnormalities.   CT angio AP-Bowel wall thickening and loss of haustration in the proximal sigmoid colon is new compared to prior exam. This finding is nonspecific and can be seen in the setting of ischemia or infectious/inflammatory colitis. No large vessel arterial or venous occlusions detected in this territory but this is a watershed zone and at increased risk for ischemia. Circumferential bladder wall thickening and mucosal hyperenhancement suspicious for infectious or inflammatory cystitis. Large volume stool impacted in the rectum with mesorectal stranding putting patient at increased risk for stercoral colitis. 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 Bibasal nodules and nodular consolidations may represent sequelae of bronchopneumonia or aspiration. Correlate with patient's symptoms and recommend follow-up chest CT in 3 months to ensure resolution after treatment  Interventions: N.p.o., LR bolus 1000 mL, Zosyn, admission for further management.  7/15: Patient was seen and examined.  Abdominal pain has now subsided.  Patient had bowel movement this morning.  Diet advanced to clear liquid diet.  Blood and urine cultures are still pending.  Continue current IV antibiotics.  Trend CBC and BMP daily.  General surgery recommendations appreciated.     7/16: Patient was seen and examined.  Still having intermittent  abdominal pain.  Was having severe multiple episodes of diarrhea this morning.  Ordered stool for C. difficile and pathogen PCR.  Seen by general surgery and diet advanced to regular diet.  Urine culture showing mixed organisms.  Repeat urine culture ordered.  Continue current IV antibiotics.  Repeat CBC and BMP in a.m.  7/17: Patient was seen and examined.  Stool for C. difficile and pathogen PCR negative.  Hemodialysis scheduled for later today.  Blood cultures are negative x 2 days.  Repeat urine culture was not done.  Will reorder stat.  Trend CBC and BMP daily.  Awaiting authorization for placement in extended-care facility.     7/18: Patient was seen and examined.  Completed hemodialysis yesterday as regularly scheduled.  Blood cultures are remain negative.  She was discharged in stable condition to an extended care facility for further management.      The discharge process took about 35 minutes.    Pertinent Physical Exam At Time of Discharge  Physical Exam  Constitutional: Pleasant and cooperative. Laying in bed in no acute distress. Conversant.   Skin: Sacral wound noted. Warm and dry; no obvious lesions, rashes, pallor, or jaundice. Good turgor.   Eyes: EOMI. Anicteric sclera.   ENT: Mucous membranes moist; no obvious injury or deformity appreciated.   Head and Neck: Normocephalic, atraumatic. ROM preserved. Trachea midline. No appreciable JVD.   Respiratory: Nonlabored on RA. Lungs clear to auscultation bilaterally without obvious adventitious sounds. Chest rise is equal.  Cardiovascular: RRR. No gross murmur, gallop, or rub. Extremities are warm and well-perfused with good capillary refill (< 3 seconds). No chest wall tenderness.   GI: LLQ abdomen pain to palpation disproportionate to exam, no guarding. No obvious organomegaly appreciated. Bowel sounds are present and normoactive.  : No CVA tenderness.   MSK: No gross abnormalities appreciated. No limitations to AROM/PROM appreciated.   Extremities:  No cyanosis, edema, or clubbing evident. Neurovascularly intact.   Neuro: A&Ox4. CN 2-12 grossly intact. Able to respond to questions appropriately and clearly. No acute focal neurologic deficits appreciated.  Psych: Appropriate mood and behavior.  Outpatient Follow-Up  No future appointments.      Karen Bernstein MD

## 2024-07-18 NOTE — PROGRESS NOTES
"      Nephrology Progress Note      Nephrology following for esrd.   Events over night:   none  Feeling better    /60 (BP Location: Left arm, Patient Position: Lying)   Pulse 77   Temp 36.9 °C (98.4 °F) (Temporal)   Resp 18   Ht 1.626 m (5' 4\")   Wt 75.3 kg (166 lb 0.1 oz)   SpO2 95%   BMI 28.49 kg/m²     Input / Output:  24 HR:   Intake/Output Summary (Last 24 hours) at 7/18/2024 1401  Last data filed at 7/18/2024 1042  Gross per 24 hour   Intake 410 ml   Output --   Net 410 ml       Physical Exam   Alert and oriented x 3 NAD  Neck: no JVD  CV: RRR  Lungs: CTA bilaterally  Abd: soft, NT, ND   Ext: no lower extremity edema    Scheduled medications  amiodarone, 200 mg, oral, Daily  apixaban, 2.5 mg, oral, BID  atorvastatin, 80 mg, oral, Nightly  [Held by provider] bumetanide, 1 mg, oral, BID  calcium acetate, 667 mg, oral, TID after meals  [Held by provider] clopidogrel, 75 mg, oral, Daily  heparin, 1,000 Units, intra-catheter, After Dialysis  heparin, 1,000 Units, intra-catheter, After Dialysis  hydroCHLOROthiazide, 12.5 mg, oral, Daily  insulin lispro, 0-10 Units, subcutaneous, Before meals & nightly  [Held by provider] lisinopril, 20 mg, oral, Nightly  pantoprazole, 40 mg, oral, Daily   Or  pantoprazole, 40 mg, intravenous, Daily  piperacillin-tazobactam, 2.25 g, intravenous, q12h  polyethylene glycol, 17 g, oral, Daily      Continuous medications     PRN medications  PRN medications: acetaminophen, albuterol, bisacodyl, dextrose, dextrose, glucagon, glucagon, guaiFENesin, melatonin, ondansetron   Results from last 7 days   Lab Units 07/17/24  0446   SODIUM mmol/L 137   POTASSIUM mmol/L 3.2*   CHLORIDE mmol/L 101   CO2 mmol/L 27   BUN mg/dL 31*   CREATININE mg/dL 3.19*   CALCIUM mg/dL 7.2*   PROTEIN TOTAL g/dL 5.1*   BILIRUBIN TOTAL mg/dL 0.3   ALK PHOS U/L 77   ALT U/L 19   AST U/L 20   GLUCOSE mg/dL 171*      Results from last 7 days   Lab Units 07/15/24  0502 07/14/24  1903   MAGNESIUM mg/dL 2.07 " 2.15      Results from last 7 days   Lab Units 07/17/24  0446 07/16/24  0433 07/15/24  0502   WBC AUTO x10*3/uL 9.6 13.7* 24.4*   HEMOGLOBIN g/dL 10.3* 10.4* 12.4   HEMATOCRIT % 34.2* 33.6* 39.0   PLATELETS AUTO x10*3/uL 244 240 300        Assessment & Plan:   Patient is 88 y.o. female who is admitted to hospital for abdominal pain/colisit. Nephrology consulted in view of esrd.      ESRD on HD MWF   Electrolytes  Hyperkalemia  Resolved after HD  MBD  PO4 at 6.4  Colitis/abdominal pain  Improved  Abx per primary/surgery     Recommendations:   -HD planned tomorrow per chronic schedule  -continue with MWF HD schedule   -noted  Phoslo with meals-but will need stool softeners daily  -from renal's standpoint ok for discharge       Please message me through EPIC chat with any questions or concerns.     Mally Lucas DO  7/18/2024  2:01 PM     [facility]    [address], [address]   [place] [address]  Office: [phone]

## 2024-07-18 NOTE — PROGRESS NOTES
Nutrition Follow Up Assessment:   Nutrition Assessment         Medical history per chart:   CAD, cardiomyopathy, dyslipidemia, chronic diastolic congestive heart failure, stage V chronic kidney disease on chronic dialysis, A-fib on Eliquis, IDDM-II, iron deficiency anemia, 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, history of CVA      HPI:  Patient presents from nursing facility with left lower abdominal pain, weakness, constipation, N/V, decreased oral intake     7/18:  Patient awake, alert at time of visit.  Diet advancement since initial assessment, patient reports decreased appetite but is tolerating meals with no N/V.  Reviewed provided supplements, patient does not care much for Glucerna but enjoyed the Gelatein protein supplement, will discontinue Glucerna and increase Gelatein to BID.  Meal and supplement intake encouraged to help promote well being and healing.       7/15:  Patient awake, alert at time of visit, clear liquid tray present at bedside, patient needed assistance with opening food items (notified diet office).  Patient endorses decreased appetite for the past year since stroke, as of recent has been wanting to consume liquids versus solid food.  Patient denies further N/V at this time, positive bowel movement noted this morning.  Stage III wounds noted, patient requesting Ensure type supplements (does not care much for Nepro), will offer Glucerna once daily as diet allows and  trial Gelatein protein supplement daily to help promote healing.             Current Diet: Adult diet Regular  Supplement(s): Yes: Glucerna and Gelatein once daily each   Average meal Intake during admission: %   Average supplement intake during admission: %     Nutrition Related Findings:   Oral Symptoms: Teeth: Missing teeth   GI symptoms: anorexia.   BM: Last BM Date: 07/17/24  Food allergies: NKFA. is allergic to aspirin, amlodipine, levofloxacin, and sulfamethoxazole-trimethoprim.  Meds/Labs  "reviewed.  amiodarone, 200 mg, oral, Daily  apixaban, 2.5 mg, oral, BID  atorvastatin, 80 mg, oral, Nightly  [Held by provider] bumetanide, 1 mg, oral, BID  calcium acetate, 667 mg, oral, TID after meals  [Held by provider] clopidogrel, 75 mg, oral, Daily  heparin, 1,000 Units, intra-catheter, After Dialysis  heparin, 1,000 Units, intra-catheter, After Dialysis  hydroCHLOROthiazide, 12.5 mg, oral, Daily  insulin lispro, 0-10 Units, subcutaneous, Before meals & nightly  [Held by provider] lisinopril, 20 mg, oral, Nightly  pantoprazole, 40 mg, oral, Daily   Or  pantoprazole, 40 mg, intravenous, Daily  piperacillin-tazobactam, 2.25 g, intravenous, q12h  polyethylene glycol, 17 g, oral, Daily             Nutrition Significant Labs:    Results from last 7 days   Lab Units 07/17/24  0446 07/16/24  0433 07/15/24  0502 07/14/24  1903   GLUCOSE mg/dL 171* 117* 171* 227*   SODIUM mmol/L 137 137 137 138   POTASSIUM mmol/L 3.2* 3.3* 5.8* 5.4*   CHLORIDE mmol/L 101 100 100 98   CO2 mmol/L 27 29 25 26   BUN mg/dL 31* 21 57* 55*   CREATININE mg/dL 3.19* 2.00* 3.29* 3.34*   EGFR mL/min/1.73m*2 14* 24* 13* 13*   CALCIUM mg/dL 7.2* 7.4* 8.4* 8.4*   PHOSPHORUS mg/dL  --   --  6.4*  --    MAGNESIUM mg/dL  --   --  2.07 2.15     Lab Results   Component Value Date    HGBA1C 6.1 (H) 07/15/2024    HGBA1C 7.6 (H) 11/11/2023    HGBA1C 7.6 (H) 11/11/2023     Results from last 7 days   Lab Units 07/18/24  1205 07/18/24  0647 07/17/24  2047 07/17/24  1714 07/17/24  0649 07/16/24  2053 07/16/24  1533 07/16/24  1143   POCT GLUCOSE mg/dL 244* 154* 363* 185* 142* 225* 153* 215*       Anthropometrics:  Height: 162.6 cm (5' 4\")   Weight: 75.3 kg (166 lb 0.1 oz)   BMI (Calculated): 28.48  IBW/kg (Dietitian Calculated): 54.5 kg              Weight History:   Wt Readings from Last 10 Encounters:   07/16/24 75.3 kg (166 lb 0.1 oz)   11/09/23 78 kg (172 lb)   10/17/23 78 kg (172 lb)   10/09/23 84 kg (185 lb 3 oz)   05/17/23 73 kg (161 lb)   04/10/23 76.2 " kg (168 lb)   03/29/23 75.3 kg (166 lb)   03/09/23 73 kg (161 lb)   02/23/23 73.7 kg (162 lb 6 oz)   12/28/22 74.4 kg (164 lb)        Weight Change %:  Weight History / % Weight Change: Patient reports a 27 lb weight loss within the past year post stroke.  Variable but stable weights noted per available weight history, will monitor trends.  Significant Weight Loss: No          Nutrition Focused Physical Exam Findings:    Subcutaneous Fat Loss:   Orbital Fat Pads: Well nourished (slightly bulging fat pads)  Buccal Fat Pads: Well nourished (full, rounded cheeks)  Triceps: Well nourished (ample fat tissue)  Muscle Wasting:  Temporalis: Mild-Moderate (slight depression)  Pectoralis (Clavicular Region): Defer  Deltoid/Trapezius: Defer  Interosseous: Well nourished (muscle bulges)  Trapezius/Infraspinatus/Supraspinatus (Scapular Region): Defer  Quadriceps: Defer  Gastrocnemius: Defer  Edema:     Physical Findings:  Skin: Positive (wounds)    Estimated Needs:   Total Energy Estimated Needs (kCal):  (7156-1330)  Method for Estimating Needs: 30, IBW  Total Protein Estimated Needs (g):  (70)        Method for Estimating Needs: 1 mL, kcal or per physician        Nutrition Diagnosis   Nutrition Diagnosis:  Malnutrition Diagnosis  Patient has Malnutrition Diagnosis: No    Nutrition Diagnosis  Patient has Nutrition Diagnosis: Yes  Diagnosis Status (1): Ongoing  Nutrition Diagnosis 1: Increased nutrient needs  Related to (1): wound healing, dialysis  As Evidenced by (1): stage III wounds to sacrum and left heel, known losses from dialysis       Nutrition Interventions/Recommendations   Nutrition Interventions and Recommendations:        Nutrition Prescription:  Individualized Nutrition Prescription Provided for : Regular diet.   Monitor need for carbohydrate controlled diet.  Discontinue Glucerna and increase Gelatein supplement to BID per patient preference.        Nutrition Interventions:   Food and/or Nutrient Delivery  Interventions  Interventions: Meals and snacks, Medical food supplement  Meals and Snacks: General healthful diet  Goal: >75% intake of meals  Medical Food Supplement: Commercial beverage, Commercial food  Goal: >75% intake of ONS BID         Nutrition Education:   Education Documentation  No documentation found.      Nutrition Counseling  Counseling Theoretical Approach: Other (Comment)  Goal: Reviewed supplement options       Nutrition Monitoring and Evaluation   Monitoring/Evaluation:   Food/Nutrient Related History Monitoring  Monitoring and Evaluation Plan: Energy intake  Energy Intake: Estimated energy intake  Criteria: meet >75% of estimated needs from diet and ONS    Body Composition/Growth/Weight History  Monitoring and Evaluation Plan: Weight  Weight: Weight change  Criteria: Maintain stable weight    Biochemical Data, Medical Tests and Procedures  Monitoring and Evaluation Plan: Electrolyte/renal panel, Glucose/endocrine profile  Electrolyte and Renal Panel: BUN, Creatinine, Phosphorus, Potassium, Sodium  Criteria: WNL  Glucose/Endocrine Profile: Glucose, casual  Criteria: WNL    Nutrition Focused Physical Findings  Monitoring and Evaluation Plan: Skin  Skin: Impaired wound healing  Criteria: Promote healing            Time Spent/Follow-up Reminder:   Follow Up  Time Spent (min): 35 minutes  Last Date of Nutrition Visit: 07/18/24  Nutrition Follow-Up Needed?: Dietitian to reassess per policy  Follow up Comment: 7/22-7/23

## 2024-07-18 NOTE — PROGRESS NOTES
Occupational Therapy    OT Treatment    Patient Name: Melody Martin  MRN: [id number]  Today's Date: 7/18/2024  Time Calculation  Start Time: 1354  Stop Time: 1417  Time Calculation (min): 23 min        Assessment:  End of Session Communication: PCT/NA/CTA  End of Session Patient Position: Bed, 3 rail up, Alarm on     Plan:  Treatment Interventions: ADL retraining, Functional transfer training, UE strengthening/ROM, Endurance training, Cognitive reorientation, Patient/family training, Neuromuscular reeducation, Equipment evaluation/education, Fine motor coordination activities      Subjective   Previous Visit Info:  OT Last Visit  OT Received On: 07/18/24  General:  General  General Comment: pt. cooperative emotional today grieving the loss of her daughter. Pt.  sitting tolerance increased to 9 mins with MOD A initially and progressed to MIN A x1. Pt. stood x2 trials < 10 secs.       Pain:  Pain Assessment  Pain Assessment: 0-10  0-10 (Numeric) Pain Score: 0 - No pain    Objective    Cognition:  Cognition  Orientation Level: Oriented X4       Activities of Daily Living:   Functional Standing Tolerance: Pt. Standing is MAX A x2 tolerating up to 10 secs      Bed Mobility/Transfers: Bed Mobility  Bed Mobility: Yes  Bed Mobility 1  Bed Mobility 1: Supine to sitting, Sitting to supine  Level of Assistance 1: Maximum assistance, +2, Moderate verbal cues  Bed Mobility Comments 1: cueing for hand placement  Bed Mobility 2  Bed Mobility  2:  (boosted)  Level of Assistance 2: Maximum assistance, +2  Bed Mobility 3  Bed Mobility 3: Rolling left  Level of Assistance 3: Maximum assistance, +2    Transfers  Transfer: Yes  Transfer 1  Transfer From 1: Bed to  Transfer to 1: Stand  Technique 1: Sit to stand, Stand to sit  Transfer Device 1:  (arm in arm)  Transfer Level of Assistance 1: Maximum assistance, +2  Trials/Comments 1: attempted x2 trials pt. stood <10 secs           Therapy/Activity:      Therapeutic Activity  Therapeutic  Activity Performed: Yes (pt. completed sitting EOB sitting with MOD A. progressing to MIN A with support of JOSE. Pt. was able to tolerate up to 9 mins of sitting occasionally removing  LUE to wipe her nose.)          Outcome Measures:[facility] Daily Activity  Putting on and taking off regular lower body clothing: A lot  Bathing (including washing, rinsing, drying): A lot  Putting on and taking off regular upper body clothing: A lot  Toileting, which includes using toilet, bedpan or urinal: A lot  Taking care of personal grooming such as brushing teeth: A lot  Eating Meals: A lot  Daily Activity - Total Score: 12        Education Documentation  Body Mechanics, taught by EMETERIO Collado at 7/18/2024  2:24 PM.  Learner: Patient  Readiness: Acceptance  Method: Explanation  Response: Verbalizes Understanding, Needs Reinforcement    Precautions, taught by EMETERIO Collado at 7/18/2024  2:24 PM.  Learner: Patient  Readiness: Acceptance  Method: Explanation  Response: Verbalizes Understanding, Needs Reinforcement    Education Comments  No comments found.        OP EDUCATION:       Goals:  Encounter Problems       Encounter Problems (Active)       ADLs       Patient will complete daily grooming tasks brushing teeth and washing face/hair with set-up and supervision level of assistance and PRN adaptive equipment while supported sitting. (Progressing)       Start:  07/16/24    Expected End:  07/30/24               EXERCISE/STRENGTHENING       Patient with increase BUE by 1/2 MMT grade strength. (Not Progressing)       Start:  07/16/24    Expected End:  07/30/24               TRANSFERS       Patient will perform bed mobility minimal assist  level of assistance and bed rails in order to improve safety and independence with mobility (Progressing)       Start:  07/16/24    Expected End:  07/30/24            Patient will tolerate 15 minutes sitting EOB with supervision for balance. (Progressing)       Start:  07/16/24     Expected End:  07/30/24

## 2024-07-18 NOTE — PROGRESS NOTES
Physical Therapy    Physical Therapy Treatment    Patient Name: Melody Martin  MRN: [id number]  Today's Date: 7/18/2024  Time Calculation  Start Time: 1345  Stop Time: 1417  Time Calculation (min): 32 min    Assessment/Plan   PT Assessment  PT Assessment Results: Decreased strength, Decreased range of motion, Decreased endurance, Impaired balance, Decreased mobility, Decreased safety awareness  End of Session Communication: Bedside nurse, PCT/HELIO/FRANCISCO JAVIER  Assessment Comment: patient able  to progress to eoB and  static standing, patient eager to continue standing and increasing core and LE strength for  gait. continue intense strengthening and weight bearing activities to promote mobility  End of Session Patient Position: Bed, 3 rail up, Alarm on  PT Plan  Inpatient/Swing Bed or Outpatient: Inpatient  PT Plan  Treatment/Interventions: Bed mobility, Transfer training, Gait training, Strengthening, Therapeutic exercise, Therapeutic activity  PT Plan: Ongoing PT  PT Frequency: 3 times per week  PT Discharge Recommendations: Low intensity level of continued care  Equipment Recommended upon Discharge:  (TBD)  PT Recommended Transfer Status: Assist x2 (BEDLEVEL)  PT - OK to Discharge: Yes (WHENMEDICALLY CLEARED)      General Visit Information:   PT  Visit  PT Received On: 07/18/24  Response to Previous Treatment: Patient reporting fatigue but able to participate.  General  Co-Treatment: OT  Co-Treatment Reason: FACILITATE MOBILITY SAFETY  Prior to Session Communication: Bedside nurse, PCT/HELIO/FRANCISCO JAVIER  Patient Position Received: Bed, 3 rail up, Alarm on  General Comment: patient  eagerfor therapy,want to stand and walk again    Subjective   Precautions:     Vital Signs:       Objective   Pain:  Pain Assessment  Pain Assessment: 0-10  0-10 (Numeric) Pain Score: 0 - No pain (no comlaints)  Cognition:  Cognition  Orientation Level: Oriented X4  Coordination:     Postural Control:     Extremity/Trunk Assessments:    Activity  Tolerance:  Activity Tolerance  Endurance: Tolerates 10 - 20 min exercise with multiple rests  Treatments:  Therapeutic Exercise  Therapeutic Exercise Performed: Yes  Therapeutic Exercise Activity 1: ankle pumps limited motion HERNANDO ankles, bridging  10 repsx2 setswith modasssit to hold EL's for weightbearing through LE's. hip abduction, heel slides, quad sets. 20 reps each with decreased friction    Therapeutic Activity  Therapeutic Activity Performed: Yes  Therapeutic Activity 1: static sitting EOB 9 mins with mod  asssit initally due to heavy lean to left then  progressing to CGA., static standing x 10 seconds x2 reps arm in arm  with max assit x2    Bed Mobility 1  Bed Mobility 1: Supine to sitting, Sitting to supine  Level of Assistance 1: Maximum assistance, +2       Transfers  Transfer: Yes  Transfer 1  Transfer From 1: Sit to  Transfer to 1: Stand  Transfer Device 1: Walker  Transfer Level of Assistance 1: Maximum assistance  Trials/Comments 1: x2    Outcome Measures:  [facility] Basic Mobility  Turning from your back to your side while in a flat bed without using bedrails: A lot  Moving from lying on your back to sitting on the side of a flat bed without using bedrails: A lot  Moving to and from bed to chair (including a wheelchair): A lot  Standing up from a chair using your arms (e.g. wheelchair or bedside chair): A lot  To walk in hospital room: Total  Climbing 3-5 steps with railing: Total  Basic Mobility - Total Score: 10    Education Documentation  Mobility Training, taught by Susana Bolaños PTA at 7/18/2024  2:36 PM.  Learner: Patient  Readiness: Acceptance  Method: Explanation  Response: Verbalizes Understanding    Education Comments  No comments found.        OP EDUCATION:       Encounter Problems       Encounter Problems (Active)       Balance       Goal 1 (Progressing)       Start:  07/16/24    Expected End:  08/06/24       SIT EOB 10 MIN  MIN A FOR BALANCE WHILE COMPLETING EX AND FUNCTIONAL ACTIVITIES             Mobility       Goal 1 (Progressing)       Start:  07/16/24    Expected End:  08/06/24       20 REPS AAROM/AROM EX INCREASING STRENGTH TO PROGRESS ACTIVITY, DECREASE A            PT Transfers       STG - Patient to transfer to and from sit to supine (Progressing)       Start:  07/16/24    Expected End:  07/31/24       MOD X2 USNIG RAILS         STG - Patient will roll (Progressing)       Start:  07/16/24    Expected End:  07/26/24       MOD X1 USING RAILS            Pain - Adult

## 2024-07-18 NOTE — PROGRESS NOTES
Spiritual Care Visit    Clinical Encounter Type  Visited With: Patient  Routine Visit: Introduction    Orthodox Encounters  Orthodox Needs: Prayer

## 2024-07-19 ENCOUNTER — NURSING HOME VISIT (OUTPATIENT)
Dept: POST ACUTE CARE | Facility: EXTERNAL LOCATION | Age: 89
End: 2024-07-19
Payer: COMMERCIAL

## 2024-07-19 DIAGNOSIS — F32.A DEPRESSION, UNSPECIFIED DEPRESSION TYPE: ICD-10-CM

## 2024-07-19 DIAGNOSIS — Z79.4 TYPE 2 DIABETES MELLITUS WITH CHRONIC KIDNEY DISEASE ON CHRONIC DIALYSIS, WITH LONG-TERM CURRENT USE OF INSULIN (MULTI): ICD-10-CM

## 2024-07-19 DIAGNOSIS — I48.91 ATRIAL FIBRILLATION, UNSPECIFIED TYPE (MULTI): ICD-10-CM

## 2024-07-19 DIAGNOSIS — N18.6 TYPE 2 DIABETES MELLITUS WITH CHRONIC KIDNEY DISEASE ON CHRONIC DIALYSIS, WITH LONG-TERM CURRENT USE OF INSULIN (MULTI): ICD-10-CM

## 2024-07-19 DIAGNOSIS — N18.6 HYPERTENSIVE HEART AND KIDNEY DISEASE WITH CHRONIC DIASTOLIC CONGESTIVE HEART FAILURE AND STAGE 5 CHRONIC KIDNEY DISEASE ON CHRONIC DIALYSIS (MULTI): ICD-10-CM

## 2024-07-19 DIAGNOSIS — I50.32 HYPERTENSIVE HEART AND KIDNEY DISEASE WITH CHRONIC DIASTOLIC CONGESTIVE HEART FAILURE AND STAGE 5 CHRONIC KIDNEY DISEASE ON CHRONIC DIALYSIS (MULTI): ICD-10-CM

## 2024-07-19 DIAGNOSIS — Z99.2 HYPERTENSIVE HEART AND KIDNEY DISEASE WITH CHRONIC DIASTOLIC CONGESTIVE HEART FAILURE AND STAGE 5 CHRONIC KIDNEY DISEASE ON CHRONIC DIALYSIS (MULTI): ICD-10-CM

## 2024-07-19 DIAGNOSIS — Z86.73 HISTORY OF CVA (CEREBROVASCULAR ACCIDENT): ICD-10-CM

## 2024-07-19 DIAGNOSIS — E11.22 TYPE 2 DIABETES MELLITUS WITH CHRONIC KIDNEY DISEASE ON CHRONIC DIALYSIS, WITH LONG-TERM CURRENT USE OF INSULIN (MULTI): ICD-10-CM

## 2024-07-19 DIAGNOSIS — N39.0 URINARY TRACT INFECTION WITH HEMATURIA, SITE UNSPECIFIED: Primary | ICD-10-CM

## 2024-07-19 DIAGNOSIS — I13.2 HYPERTENSIVE HEART AND KIDNEY DISEASE WITH CHRONIC DIASTOLIC CONGESTIVE HEART FAILURE AND STAGE 5 CHRONIC KIDNEY DISEASE ON CHRONIC DIALYSIS (MULTI): ICD-10-CM

## 2024-07-19 DIAGNOSIS — Z99.2 TYPE 2 DIABETES MELLITUS WITH CHRONIC KIDNEY DISEASE ON CHRONIC DIALYSIS, WITH LONG-TERM CURRENT USE OF INSULIN (MULTI): ICD-10-CM

## 2024-07-19 DIAGNOSIS — R31.9 URINARY TRACT INFECTION WITH HEMATURIA, SITE UNSPECIFIED: Primary | ICD-10-CM

## 2024-07-19 LAB
BACTERIA BLD CULT: NORMAL
GLUCOSE BLD MANUAL STRIP-MCNC: 212 MG/DL (ref 74–99)

## 2024-07-19 PROCEDURE — 99305 1ST NF CARE MODERATE MDM 35: CPT | Performed by: INTERNAL MEDICINE

## 2024-07-19 NOTE — LETTER
Patient: Melody Martin  : 1935    Encounter Date: 2024    HISTORY & PHYSICAL    Subjective  Chief complaint: Melody Martin is a 89 y.o. female who is being seen and evaluated for multiple medical problems.  Patient admitted to SNF for therapy due to weakness after recent hospitalization.    HPI:  HPI  Patient presented to ED with complaints of left lower abdominal pain from  with increased weakness. Patient also reported no BM in 1 week, nauseated with emesis x2. Reported that patient is on ESRD and was not producing much urine output, but did have c/o burning with urination. Patient had UA obtained which was positive and patient started on ATBs. Patient then had multiple episodes of diarrhea and had stool for C diff obtained that was negative. Patient did have blood cultures obtained which remained negative throughout hospitalization. Patient was hemodynamically stable to discharge back to . Patient was seen and examined at the bedside, appears to be in no acute distress. Patient denies chest pain, shortness of breath, nausea, vomiting. Patient is afebrile. Patient is to complete ATB outpatient for UTI, tolerating without adverse effects.   Past Medical History:   Diagnosis Date   • Anemia    • Atrial fibrillation (Multi)    • Chronic kidney disease    • Diabetes mellitus (Multi)    • Elevated troponin 2023   • GERD (gastroesophageal reflux disease)    • Heart murmur    • Hypertension    • Myocardial infarction (Multi)    • Old myocardial infarction 2023   • Other conditions influencing health status 2022    History of cough   • Other conditions influencing health status 2016    Diabetes mellitus type 1, uncontrolled   • Personal history of other diseases of the circulatory system     History of hypertension   • Personal history of other diseases of the female genital tract     History of endometriosis   • Personal history of other diseases of the nervous system and sense  organs 10/15/2021    History of acute otitis media   • Personal history of other endocrine, nutritional and metabolic disease 01/26/2018    History of diabetes mellitus   • Personal history of transient ischemic attack (TIA), and cerebral infarction without residual deficits 09/20/2023   • Stroke (Multi)    • Urinary tract infection        Past Surgical History:   Procedure Laterality Date   • BACK SURGERY  10/10/2018    Back Surgery   • HYSTERECTOMY  10/10/2018    Hysterectomy   • IR CVC TUNNELED  8/28/2023    IR CVC TUNNELED 8/28/2023 POR ANGIO   • MR HEAD ANGIO WO IV CONTRAST  8/31/2023    MR HEAD ANGIO WO IV CONTRAST 8/31/2023 POR MRI   • MR NECK ANGIO WO IV CONTRAST  8/31/2023    MR NECK ANGIO WO IV CONTRAST 8/31/2023 POR MRI       Family History   Problem Relation Name Age of Onset   • No Known Problems Mother     • No Known Problems Father         Social History     Socioeconomic History   • Marital status:    Tobacco Use   • Smoking status: Never   • Smokeless tobacco: Never   Substance and Sexual Activity   • Alcohol use: Never   • Drug use: Never     Social Determinants of Health     Financial Resource Strain: Low Risk  (7/15/2024)    Overall Financial Resource Strain (CARDIA)    • Difficulty of Paying Living Expenses: Not very hard   Food Insecurity: No Food Insecurity (11/10/2023)    [facility]    Hunger Vital Sign    • Worried About Running Out of Food in the Last Year: Never true    • Ran Out of Food in the Last Year: Never true   Transportation Needs: No Transportation Needs (7/16/2024)    PRAPARE - Transportation    • Lack of Transportation (Medical): No    • Lack of Transportation (Non-Medical): No   Housing Stability: Low Risk  (7/16/2024)    Housing Stability Vital Sign    • Unable to Pay for Housing in the Last Year: No    • Number of Times Moved in the Last Year: 1    • Homeless in the Last Year: No       Vital signs: 131/47, 97.3, 71, 18, 96%, BS  299    Objective  Physical Exam  Constitutional:       General: She is not in acute distress.  Eyes:      Extraocular Movements: Extraocular movements intact.   Cardiovascular:      Rate and Rhythm: Normal rate and regular rhythm.   Pulmonary:      Effort: Pulmonary effort is normal.      Breath sounds: Normal breath sounds.   Abdominal:      General: Bowel sounds are normal.      Palpations: Abdomen is soft.   Musculoskeletal:      Cervical back: Neck supple.      Right lower leg: No edema.      Left lower leg: No edema.   Skin:     Comments: Dressings to sacrum and heel clean dry and intact   Neurological:      Mental Status: She is alert.   Psychiatric:         Mood and Affect: Mood normal.         Behavior: Behavior is cooperative.         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  On HD per nephrology  Isosorbide dinitrate  Metoprolol  Diltiazem decreased to 240 mg daily due to low diastolics at times  Renal diet  Monitor BP  Remove extra fluid in dialysis         Atrial fibrillation (Multi)     Monitor heart rate, controlled  Amiodarone  Diltiazem  Apixaban  Bleeding precautions         Depression     Continue following with psych  BuSpar         Urinary tract infection with hematuria, site unspecified - Primary     Continue Amoxicillin   Monitor symptoms          Hospital records reviewed  Medications, treatments, and labs reviewed  Continue medications and treatments as listed in EMR  Discussed  with nursing and therapy      Scribe Attestation  I, Phoebe Martinez   attest that this documentation has been prepared under the direction and in the presence of Wilbert Lock MD    Provider Attestation - Scribe documentation  All medical record entries made by the Scribe were at my direction and personally dictated by me. I have reviewed the chart and agree that the record accurately reflects my personal performance of the history, physical exam, discussion and plan.   Wilbert Lock MD      Electronically Signed By: Wilbert Lock MD   7/19/24  3:43 PM

## 2024-07-19 NOTE — PROGRESS NOTES
HISTORY & PHYSICAL    Subjective   Chief complaint: Melody Martin is a 89 y.o. female who is being seen and evaluated for multiple medical problems.  Patient admitted to SNF for therapy due to weakness after recent hospitalization.    HPI:  HPI  Patient presented to ED with complaints of left lower abdominal pain from  with increased weakness. Patient also reported no BM in 1 week, nauseated with emesis x2. Reported that patient is on ESRD and was not producing much urine output, but did have c/o burning with urination. Patient had UA obtained which was positive and patient started on ATBs. Patient then had multiple episodes of diarrhea and had stool for C diff obtained that was negative. Patient did have blood cultures obtained which remained negative throughout hospitalization. Patient was hemodynamically stable to discharge back to . Patient was seen and examined at the bedside, appears to be in no acute distress. Patient denies chest pain, shortness of breath, nausea, vomiting. Patient is afebrile. Patient is to complete ATB outpatient for UTI, tolerating without adverse effects.   Past Medical History:   Diagnosis Date    Anemia     Atrial fibrillation (Multi)     Chronic kidney disease     Diabetes mellitus (Multi)     Elevated troponin 08/24/2023    GERD (gastroesophageal reflux disease)     Heart murmur     Hypertension     Myocardial infarction (Multi)     Old myocardial infarction 09/09/2023    Other conditions influencing health status 12/06/2022    History of cough    Other conditions influencing health status 04/06/2016    Diabetes mellitus type 1, uncontrolled    Personal history of other diseases of the circulatory system     History of hypertension    Personal history of other diseases of the female genital tract     History of endometriosis    Personal history of other diseases of the nervous system and sense organs 10/15/2021    History of acute otitis media    Personal history of other  endocrine, nutritional and metabolic disease 01/26/2018    History of diabetes mellitus    Personal history of transient ischemic attack (TIA), and cerebral infarction without residual deficits 09/20/2023    Stroke (Multi)     Urinary tract infection        Past Surgical History:   Procedure Laterality Date    BACK SURGERY  10/10/2018    Back Surgery    HYSTERECTOMY  10/10/2018    Hysterectomy    IR CVC TUNNELED  8/28/2023    IR CVC TUNNELED 8/28/2023 POR ANGIO    MR HEAD ANGIO WO IV CONTRAST  8/31/2023    MR HEAD ANGIO WO IV CONTRAST 8/31/2023 POR MRI    MR NECK ANGIO WO IV CONTRAST  8/31/2023    MR NECK ANGIO WO IV CONTRAST 8/31/2023 POR MRI       Family History   Problem Relation Name Age of Onset    No Known Problems Mother      No Known Problems Father         Social History     Socioeconomic History    Marital status:    Tobacco Use    Smoking status: Never    Smokeless tobacco: Never   Substance and Sexual Activity    Alcohol use: Never    Drug use: Never     Social Determinants of Health     Financial Resource Strain: Low Risk  (7/15/2024)    Overall Financial Resource Strain (CARDIA)     Difficulty of Paying Living Expenses: Not very hard   Food Insecurity: No Food Insecurity (11/10/2023)    [facility]    Hunger Vital Sign     Worried About Running Out of Food in the Last Year: Never true     Ran Out of Food in the Last Year: Never true   Transportation Needs: No Transportation Needs (7/16/2024)    PRAPARE - Transportation     Lack of Transportation (Medical): No     Lack of Transportation (Non-Medical): No   Housing Stability: Low Risk  (7/16/2024)    Housing Stability Vital Sign     Unable to Pay for Housing in the Last Year: No     Number of Times Moved in the Last Year: 1     Homeless in the Last Year: No       Vital signs: 131/47, 97.3, 71, 18, 96%,     Objective   Physical Exam  Constitutional:       General: She is not in acute distress.  Eyes:       Extraocular Movements: Extraocular movements intact.   Cardiovascular:      Rate and Rhythm: Normal rate and regular rhythm.   Pulmonary:      Effort: Pulmonary effort is normal.      Breath sounds: Normal breath sounds.   Abdominal:      General: Bowel sounds are normal.      Palpations: Abdomen is soft.   Musculoskeletal:      Cervical back: Neck supple.      Right lower leg: No edema.      Left lower leg: No edema.   Skin:     Comments: Dressings to sacrum and heel clean dry and intact   Neurological:      Mental Status: She is alert.   Psychiatric:         Mood and Affect: Mood normal.         Behavior: Behavior is cooperative.         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  On HD per nephrology  Isosorbide dinitrate  Metoprolol  Diltiazem decreased to 240 mg daily due to low diastolics at times  Renal diet  Monitor BP  Remove extra fluid in dialysis         Atrial fibrillation (Multi)     Monitor heart rate, controlled  Amiodarone  Diltiazem  Apixaban  Bleeding precautions         Depression     Continue following with psych  BuSpar         Urinary tract infection with hematuria, site unspecified - Primary     Continue Amoxicillin   Monitor symptoms          Hospital records reviewed  Medications, treatments, and labs reviewed  Continue medications and treatments as listed in EMR  Discussed with nursing and therapy      Scribe Attestation  JANES, Phoebe Martinez   attest that this  documentation has been prepared under the direction and in the presence of Wilbert Lock MD    Provider Attestation - Scribe documentation  All medical record entries made by the Scribe were at my direction and personally dictated by me. I have reviewed the chart and agree that the record accurately reflects my personal performance of the history, physical exam, discussion and plan.   Wilbert Lock MD

## 2024-07-22 ENCOUNTER — NURSING HOME VISIT (OUTPATIENT)
Dept: POST ACUTE CARE | Facility: EXTERNAL LOCATION | Age: 89
End: 2024-07-22
Payer: COMMERCIAL

## 2024-07-22 DIAGNOSIS — Z99.2 TYPE 2 DIABETES MELLITUS WITH CHRONIC KIDNEY DISEASE ON CHRONIC DIALYSIS, WITH LONG-TERM CURRENT USE OF INSULIN (MULTI): ICD-10-CM

## 2024-07-22 DIAGNOSIS — I13.2 HYPERTENSIVE HEART AND KIDNEY DISEASE WITH CHRONIC DIASTOLIC CONGESTIVE HEART FAILURE AND STAGE 5 CHRONIC KIDNEY DISEASE ON CHRONIC DIALYSIS (MULTI): ICD-10-CM

## 2024-07-22 DIAGNOSIS — I48.91 ATRIAL FIBRILLATION, UNSPECIFIED TYPE (MULTI): Primary | ICD-10-CM

## 2024-07-22 DIAGNOSIS — N30.01 ACUTE CYSTITIS WITH HEMATURIA: ICD-10-CM

## 2024-07-22 DIAGNOSIS — Z79.4 TYPE 2 DIABETES MELLITUS WITH CHRONIC KIDNEY DISEASE ON CHRONIC DIALYSIS, WITH LONG-TERM CURRENT USE OF INSULIN (MULTI): ICD-10-CM

## 2024-07-22 DIAGNOSIS — I50.32 HYPERTENSIVE HEART AND KIDNEY DISEASE WITH CHRONIC DIASTOLIC CONGESTIVE HEART FAILURE AND STAGE 5 CHRONIC KIDNEY DISEASE ON CHRONIC DIALYSIS (MULTI): ICD-10-CM

## 2024-07-22 DIAGNOSIS — Z99.2 HYPERTENSIVE HEART AND KIDNEY DISEASE WITH CHRONIC DIASTOLIC CONGESTIVE HEART FAILURE AND STAGE 5 CHRONIC KIDNEY DISEASE ON CHRONIC DIALYSIS (MULTI): ICD-10-CM

## 2024-07-22 DIAGNOSIS — E11.22 TYPE 2 DIABETES MELLITUS WITH CHRONIC KIDNEY DISEASE ON CHRONIC DIALYSIS, WITH LONG-TERM CURRENT USE OF INSULIN (MULTI): ICD-10-CM

## 2024-07-22 DIAGNOSIS — N18.6 HYPERTENSIVE HEART AND KIDNEY DISEASE WITH CHRONIC DIASTOLIC CONGESTIVE HEART FAILURE AND STAGE 5 CHRONIC KIDNEY DISEASE ON CHRONIC DIALYSIS (MULTI): ICD-10-CM

## 2024-07-22 DIAGNOSIS — R53.1 WEAKNESS: ICD-10-CM

## 2024-07-22 DIAGNOSIS — N18.6 TYPE 2 DIABETES MELLITUS WITH CHRONIC KIDNEY DISEASE ON CHRONIC DIALYSIS, WITH LONG-TERM CURRENT USE OF INSULIN (MULTI): ICD-10-CM

## 2024-07-22 PROCEDURE — 99309 SBSQ NF CARE MODERATE MDM 30: CPT | Performed by: NURSE PRACTITIONER

## 2024-07-22 NOTE — LETTER
Patient: Melody Martin  : 1935    Encounter Date: 2024    PROGRESS NOTE    Subjective  Chief complaint: Melody Martin is a 89 y.o. female who is an acute skilled patient being seen and evaluated for weakness    HPI:  HPI  Patient readmitted to SNF for therapy d/t weakness after recent hospitalization for UTI.   She state she is feeling better.  Patient requires assist with ADLs and transfers.  No new complaints.  Denies n/v/f/c.    Objective  Vital signs:  167/58, 97.3, 57, 18, 94%, blood sugar 160    Physical Exam  Constitutional:       General: She is not in acute distress.  Eyes:      Extraocular Movements: Extraocular movements intact.   Cardiovascular:      Rate and Rhythm: Normal rate and regular rhythm.   Pulmonary:      Effort: Pulmonary effort is normal.      Breath sounds: Normal breath sounds.   Musculoskeletal:      Cervical back: Neck supple.      Right lower leg: No edema.      Left lower leg: No edema.   Neurological:      Mental Status: She is alert.      Motor: Weakness present.   Psychiatric:         Mood and Affect: Mood normal.         Behavior: Behavior is cooperative.         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  On HD per nephrology  Isosorbide dinitrate  Metoprolol  Diltiazem decreased to 240 mg daily due to low diastolics at times  Renal diet  Monitor BP           Type 2 diabetes mellitus with chronic kidney disease on chronic dialysis, with long-term current use of insulin (Multi)     Carb controlled diet  Monitor Glucoscan with SSI coverage  Glargine          Weakness     Continue to work towards goals in therapy          Medications, treatments, and labs reviewed  Continue medications and treatments as listed in EMR    MAR Johnson    Scribe Attestation  IBibiana Scribe   attest that this documentation has been prepared under the direction and in the presence of MAR Johnson    Provider Attestation - Scribe documentation  All medical record entries made by the Scribe were at my direction and personally dictated by me. I have reviewed the chart and agree that the record accurately reflects my personal performance of the history, physical exam, discussion and plan.      Electronically Signed By: MAR Johnson   7/28/24  4:54 PM

## 2024-07-23 ENCOUNTER — NURSING HOME VISIT (OUTPATIENT)
Dept: POST ACUTE CARE | Facility: EXTERNAL LOCATION | Age: 89
End: 2024-07-23
Payer: COMMERCIAL

## 2024-07-23 DIAGNOSIS — N18.6 HYPERTENSIVE HEART AND KIDNEY DISEASE WITH CHRONIC DIASTOLIC CONGESTIVE HEART FAILURE AND STAGE 5 CHRONIC KIDNEY DISEASE ON CHRONIC DIALYSIS (MULTI): ICD-10-CM

## 2024-07-23 DIAGNOSIS — I50.32 HYPERTENSIVE HEART AND KIDNEY DISEASE WITH CHRONIC DIASTOLIC CONGESTIVE HEART FAILURE AND STAGE 5 CHRONIC KIDNEY DISEASE ON CHRONIC DIALYSIS (MULTI): ICD-10-CM

## 2024-07-23 DIAGNOSIS — R53.1 WEAKNESS: Primary | ICD-10-CM

## 2024-07-23 DIAGNOSIS — N18.6 TYPE 2 DIABETES MELLITUS WITH CHRONIC KIDNEY DISEASE ON CHRONIC DIALYSIS, WITH LONG-TERM CURRENT USE OF INSULIN (MULTI): ICD-10-CM

## 2024-07-23 DIAGNOSIS — Z99.2 HYPERTENSIVE HEART AND KIDNEY DISEASE WITH CHRONIC DIASTOLIC CONGESTIVE HEART FAILURE AND STAGE 5 CHRONIC KIDNEY DISEASE ON CHRONIC DIALYSIS (MULTI): ICD-10-CM

## 2024-07-23 DIAGNOSIS — I13.2 HYPERTENSIVE HEART AND KIDNEY DISEASE WITH CHRONIC DIASTOLIC CONGESTIVE HEART FAILURE AND STAGE 5 CHRONIC KIDNEY DISEASE ON CHRONIC DIALYSIS (MULTI): ICD-10-CM

## 2024-07-23 DIAGNOSIS — Z99.2 TYPE 2 DIABETES MELLITUS WITH CHRONIC KIDNEY DISEASE ON CHRONIC DIALYSIS, WITH LONG-TERM CURRENT USE OF INSULIN (MULTI): ICD-10-CM

## 2024-07-23 DIAGNOSIS — Z79.4 TYPE 2 DIABETES MELLITUS WITH CHRONIC KIDNEY DISEASE ON CHRONIC DIALYSIS, WITH LONG-TERM CURRENT USE OF INSULIN (MULTI): ICD-10-CM

## 2024-07-23 DIAGNOSIS — E11.22 TYPE 2 DIABETES MELLITUS WITH CHRONIC KIDNEY DISEASE ON CHRONIC DIALYSIS, WITH LONG-TERM CURRENT USE OF INSULIN (MULTI): ICD-10-CM

## 2024-07-23 PROCEDURE — 99309 SBSQ NF CARE MODERATE MDM 30: CPT | Performed by: NURSE PRACTITIONER

## 2024-07-23 NOTE — LETTER
Patient: Melody Martin  : 1935    Encounter Date: 2024    PROGRESS NOTE    Subjective  Chief complaint: Melody Martin is a 89 y.o. female who is an acute skilled patient being seen and evaluated for weakness    HPI:  HPI patient has been working in therapy to improve strength, endurance, and ADLs.  Patient continues to work toward goals.  No new concerns today.  Denies n/v/f/c pain.      Objective  Vital signs: 167/58 - 97.3-57-18-94% -     Physical Exam  Constitutional:       General: She is not in acute distress.  Eyes:      Extraocular Movements: Extraocular movements intact.   Cardiovascular:      Rate and Rhythm: Normal rate and regular rhythm.   Pulmonary:      Effort: Pulmonary effort is normal.      Breath sounds: Normal breath sounds.   Musculoskeletal:      Cervical back: Neck supple.      Right lower leg: No edema.      Left lower leg: No edema.   Neurological:      Mental Status: She is alert.      Motor: Weakness present.   Psychiatric:         Mood and Affect: Mood normal.         Behavior: Behavior is cooperative.         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  On HD per nephrology  Isosorbide dinitrate  Metoprolol  Diltiazem decreased to 240 mg daily due to low diastolics at times  Renal diet  Monitor BP         Type 2 diabetes mellitus with chronic kidney disease on chronic dialysis, with long-term current use of insulin (Multi)     Carb controlled diet  Monitor Glucoscan with SSI coverage  Glargine         Weakness - Primary     Continue to work towards goals in therapy          Medications, treatments, and labs reviewed  Continue medications and treatments as listed in EMR      Scribe Attestation  I, Phoebe Bennett   attest that this documentation has been  prepared under the direction and in the presence of MAR Johnson    Provider Attestation - Scribe documentation  All medical record entries made by the Scribe were at my direction and personally dictated by me. I have reviewed the chart and agree that the record accurately reflects my personal performance of the history, physical exam, discussion and plan.   MAR Johnson        Electronically Signed By: MAR Johnson   7/30/24  7:35 PM

## 2024-07-24 ENCOUNTER — NURSING HOME VISIT (OUTPATIENT)
Dept: POST ACUTE CARE | Facility: EXTERNAL LOCATION | Age: 89
End: 2024-07-24
Payer: COMMERCIAL

## 2024-07-24 DIAGNOSIS — R53.1 WEAKNESS: ICD-10-CM

## 2024-07-24 DIAGNOSIS — I50.32 HYPERTENSIVE HEART AND KIDNEY DISEASE WITH CHRONIC DIASTOLIC CONGESTIVE HEART FAILURE AND STAGE 5 CHRONIC KIDNEY DISEASE ON CHRONIC DIALYSIS (MULTI): ICD-10-CM

## 2024-07-24 DIAGNOSIS — I13.2 HYPERTENSIVE HEART AND KIDNEY DISEASE WITH CHRONIC DIASTOLIC CONGESTIVE HEART FAILURE AND STAGE 5 CHRONIC KIDNEY DISEASE ON CHRONIC DIALYSIS (MULTI): ICD-10-CM

## 2024-07-24 DIAGNOSIS — Z99.2 HYPERTENSIVE HEART AND KIDNEY DISEASE WITH CHRONIC DIASTOLIC CONGESTIVE HEART FAILURE AND STAGE 5 CHRONIC KIDNEY DISEASE ON CHRONIC DIALYSIS (MULTI): ICD-10-CM

## 2024-07-24 DIAGNOSIS — N18.6 HYPERTENSIVE HEART AND KIDNEY DISEASE WITH CHRONIC DIASTOLIC CONGESTIVE HEART FAILURE AND STAGE 5 CHRONIC KIDNEY DISEASE ON CHRONIC DIALYSIS (MULTI): ICD-10-CM

## 2024-07-24 PROCEDURE — 99308 SBSQ NF CARE LOW MDM 20: CPT | Performed by: INTERNAL MEDICINE

## 2024-07-24 NOTE — LETTER
Patient: Melody Martin  : 1935    Encounter Date: 2024    PROGRESS NOTE    Subjective  Chief complaint: Melody Martin is a 89 y.o. female who is an acute skilled patient being seen and evaluated for weakness    HPI:  HPI Patient seen and evaluated at bedside for weakness. She continues to work in therapy. Interventions focused on Supine to sit EOB with Max A needed to achieve a supported upright posture for 2 minutes before return to supine with max A. Pt guided through rolling side to side with limited initiation of UB to assist in bed mobility and repositioning. Pt completed BUE strengthening with minimum resistance therpy band all planes and joint levels 15 reps x 2 sets with rest periods given as needed and ample time given to process through tasks. No new issues or concerns today. No acute distress.   Objective  Vital signs: 122/53, 97.6, 60, 18, 94%    Physical Exam  Constitutional:       General: She is not in acute distress.  Eyes:      Extraocular Movements: Extraocular movements intact.   Cardiovascular:      Rate and Rhythm: Normal rate and regular rhythm.   Pulmonary:      Effort: Pulmonary effort is normal.      Breath sounds: Normal breath sounds.   Abdominal:      General: Bowel sounds are normal.      Palpations: Abdomen is soft.   Musculoskeletal:      Cervical back: Neck supple.      Right lower leg: No edema.      Left lower leg: No edema.   Neurological:      Mental Status: She is alert.   Psychiatric:         Mood and Affect: Mood normal.         Behavior: Behavior is cooperative.         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  On HD per nephrology  Isosorbide dinitrate  Metoprolol  Diltiazem decreased to 240 mg daily due to low diastolics at times  Renal diet  Monitor  BP  Remove extra fluid in dialysis         Weakness     Continue to work towards goals in therapy          Medications, treatments, and labs reviewed  Continue medications and treatments as listed in PCC    Wilbert Lock MD    1. 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 Weakness             Scribe Attestation  By signing my name below, IAnita, Scribe   attest that this documentation has been prepared under the direction and in the presence of Wilbert Lock MD.    Provider Attestation - Scribe documentation  All medical record entries made by the Scribe were at my direction and personally dictated by me. I have reviewed the chart and agree that the record accurately reflects my personal performance of the history, physical exam, discussion and plan.      Electronically Signed By: Wilbert Lock MD   7/25/24  6:36 PM

## 2024-07-25 ENCOUNTER — NURSING HOME VISIT (OUTPATIENT)
Dept: POST ACUTE CARE | Facility: EXTERNAL LOCATION | Age: 89
End: 2024-07-25
Payer: COMMERCIAL

## 2024-07-25 DIAGNOSIS — Z99.2 TYPE 2 DIABETES MELLITUS WITH CHRONIC KIDNEY DISEASE ON CHRONIC DIALYSIS, WITH LONG-TERM CURRENT USE OF INSULIN (MULTI): ICD-10-CM

## 2024-07-25 DIAGNOSIS — N18.6 HYPERTENSIVE HEART AND KIDNEY DISEASE WITH CHRONIC DIASTOLIC CONGESTIVE HEART FAILURE AND STAGE 5 CHRONIC KIDNEY DISEASE ON CHRONIC DIALYSIS (MULTI): ICD-10-CM

## 2024-07-25 DIAGNOSIS — Z79.4 TYPE 2 DIABETES MELLITUS WITH CHRONIC KIDNEY DISEASE ON CHRONIC DIALYSIS, WITH LONG-TERM CURRENT USE OF INSULIN (MULTI): ICD-10-CM

## 2024-07-25 DIAGNOSIS — I50.32 HYPERTENSIVE HEART AND KIDNEY DISEASE WITH CHRONIC DIASTOLIC CONGESTIVE HEART FAILURE AND STAGE 5 CHRONIC KIDNEY DISEASE ON CHRONIC DIALYSIS (MULTI): ICD-10-CM

## 2024-07-25 DIAGNOSIS — E11.22 TYPE 2 DIABETES MELLITUS WITH CHRONIC KIDNEY DISEASE ON CHRONIC DIALYSIS, WITH LONG-TERM CURRENT USE OF INSULIN (MULTI): ICD-10-CM

## 2024-07-25 DIAGNOSIS — I13.2 HYPERTENSIVE HEART AND KIDNEY DISEASE WITH CHRONIC DIASTOLIC CONGESTIVE HEART FAILURE AND STAGE 5 CHRONIC KIDNEY DISEASE ON CHRONIC DIALYSIS (MULTI): ICD-10-CM

## 2024-07-25 DIAGNOSIS — L89.153 STAGE III PRESSURE ULCER OF SACRAL REGION (MULTI): ICD-10-CM

## 2024-07-25 DIAGNOSIS — T14.8XXA DEEP TISSUE INJURY: ICD-10-CM

## 2024-07-25 DIAGNOSIS — R53.1 WEAKNESS: Primary | ICD-10-CM

## 2024-07-25 DIAGNOSIS — Z99.2 HYPERTENSIVE HEART AND KIDNEY DISEASE WITH CHRONIC DIASTOLIC CONGESTIVE HEART FAILURE AND STAGE 5 CHRONIC KIDNEY DISEASE ON CHRONIC DIALYSIS (MULTI): ICD-10-CM

## 2024-07-25 DIAGNOSIS — N18.6 TYPE 2 DIABETES MELLITUS WITH CHRONIC KIDNEY DISEASE ON CHRONIC DIALYSIS, WITH LONG-TERM CURRENT USE OF INSULIN (MULTI): ICD-10-CM

## 2024-07-25 DIAGNOSIS — L89.623 STAGE III PRESSURE ULCER OF LEFT HEEL (MULTI): ICD-10-CM

## 2024-07-25 PROCEDURE — 99309 SBSQ NF CARE MODERATE MDM 30: CPT | Performed by: NURSE PRACTITIONER

## 2024-07-25 NOTE — LETTER
Patient: Melody Martin  : 1935    Encounter Date: 2024    PROGRESS NOTE    Subjective  Chief complaint: Melody Martin is a 89 y.o. female who is an acute skilled patient being seen and evaluated for weakness    HPI:  HPI  Patient does continue to work in therapy, working on bed mobility.  Patient is focusing on supine to sit at edge of bed with max assist needed to achieve a supported upright posture for 2 minutes before returning to supine with max assist.  Patient is also completing bilateral upper extremity strengthening with minimal resistance therapy bands in all planes and joint levels for 15 reps with required rest breaks.  Patient seen and examined at the bedside.  Patient also continues with wounds to sacrum and left heel.  Treatments are in place.  Patient denies    Objective  Vital signs: 116/58, 97.8, 6220, 94%, blood sugar 270    Physical Exam  Constitutional:       General: She is not in acute distress.  Eyes:      Extraocular Movements: Extraocular movements intact.   Pulmonary:      Effort: Pulmonary effort is normal.   Abdominal:      General: Bowel sounds are normal. There is no distension.      Palpations: Abdomen is soft.      Tenderness: There is no abdominal tenderness.   Musculoskeletal:      Cervical back: Neck supple.      Right lower leg: No edema.      Left lower leg: No edema.   Skin:     Comments:   wound location -left heel  Etiology - pressure   Granulation-large  Slough-small  Edge- flat  Odor - no  Drainage - medium serosanguineous  Size - 4 X 4 X UTD centimeters  Undermining -none    Wound location -sacrum  Etiology - pressure   Granulation-large  Slough-small  Edge-flat  Odor - none  Drainage - medium serosanguineous  Size - 3.5 x 7 x 0. 3 centimeters  Undermining-none    Left bunion, dark and nonblanching area  Size 1 x 1.2 cm   Neurological:      Mental Status: She is alert.   Psychiatric:         Mood and Affect: Mood normal.         Behavior: Behavior is  cooperative.         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)     TX: Irrigate with normal saline, pat dry, apply ALG, ABD and Kerlix daily and as needed    Turn every 2 hours  Prevalon boots  Pressure reducing mattress, patient refusing air mattress, discussed benefits/risks  Cushion to chair  Dietitian consult  Supplements per dietitian  Grab bars to bed to assist with bed mobility and repositioning  Weekly skin checks         Weakness - Primary     Continue to work towards goals in therapy         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 (Multi)       TX: Irrigate with wound cleanser or normal saline, pat dry, alginate to wound bed and cover with silicone bordered foam    Turn every 2 hours  Prevalon boots  Air mattress  Cushion to chair  Dietitian following  Supplements per dietitian  Grab bars to bed to assist with bed mobility and repositioning  Weekly skin checks  House barrier cream to buttocks         Deep tissue injury     Left foot  Treatment: Clean with normal saline, pat dry, paint with Betadine, pad and protect nightly and as needed          Medications, treatments, and labs reviewed  Continue medications and treatments as listed in EMR      Scribe Attestation  I, Phoebe Martinez   attest that this documentation has been prepared under the direction and in the presence of JANELL Johnson-CNP    Provider Attestation -  Scribe documentation  All medical record entries made by the Scribe were at my direction and personally dictated by me. I have reviewed the chart and agree that the record accurately reflects my personal performance of the history, physical exam, discussion and plan.   MAR Johnson        Electronically Signed By: MAR Johnson   7/30/24  7:03 PM

## 2024-07-25 NOTE — PROGRESS NOTES
PROGRESS NOTE    Subjective   Chief complaint: Melody Martin is a 89 y.o. female who is an acute skilled patient being seen and evaluated for weakness    HPI:  HPI Patient seen and evaluated at bedside for weakness. She continues to work in therapy. Interventions focused on Supine to sit EOB with Max A needed to achieve a supported upright posture for 2 minutes before return to supine with max A. Pt guided through rolling side to side with limited initiation of UB to assist in bed mobility and repositioning. Pt completed BUE strengthening with minimum resistance therpy band all planes and joint levels 15 reps x 2 sets with rest periods given as needed and ample time given to process through tasks. No new issues or concerns today. No acute distress.   Objective   Vital signs: 122/53, 97.6, 60, 18, 94%    Physical Exam  Constitutional:       General: She is not in acute distress.  Eyes:      Extraocular Movements: Extraocular movements intact.   Cardiovascular:      Rate and Rhythm: Normal rate and regular rhythm.   Pulmonary:      Effort: Pulmonary effort is normal.      Breath sounds: Normal breath sounds.   Abdominal:      General: Bowel sounds are normal.      Palpations: Abdomen is soft.   Musculoskeletal:      Cervical back: Neck supple.      Right lower leg: No edema.      Left lower leg: No edema.   Neurological:      Mental Status: She is alert.   Psychiatric:         Mood and Affect: Mood normal.         Behavior: Behavior is cooperative.         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  On HD per nephrology  Isosorbide dinitrate  Metoprolol  Diltiazem decreased to 240 mg daily due to low diastolics at times  Renal diet  Monitor BP  Remove extra fluid in dialysis         Weakness     Continue to work  towards goals in therapy          Medications, treatments, and labs reviewed  Continue medications and treatments as listed in PCC    Wilbert Lock MD    1. 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 Weakness             Scribe Attestation  By signing my name below, I, Phoebe Ramon   attest that this documentation has been prepared under the direction and in the presence of Wilbert Lock MD.    Provider Attestation - Scribe documentation  All medical record entries made by the Scribe were at my direction and personally dictated by me. I have reviewed the chart and agree that the record accurately reflects my personal performance of the history, physical exam, discussion and plan.

## 2024-07-26 ENCOUNTER — NURSING HOME VISIT (OUTPATIENT)
Dept: POST ACUTE CARE | Facility: EXTERNAL LOCATION | Age: 89
End: 2024-07-26
Payer: COMMERCIAL

## 2024-07-26 DIAGNOSIS — E11.22 TYPE 2 DIABETES MELLITUS WITH CHRONIC KIDNEY DISEASE ON CHRONIC DIALYSIS, WITH LONG-TERM CURRENT USE OF INSULIN (MULTI): ICD-10-CM

## 2024-07-26 DIAGNOSIS — Z86.73 HISTORY OF CVA (CEREBROVASCULAR ACCIDENT): ICD-10-CM

## 2024-07-26 DIAGNOSIS — N18.6 HYPERTENSIVE HEART AND KIDNEY DISEASE WITH CHRONIC DIASTOLIC CONGESTIVE HEART FAILURE AND STAGE 5 CHRONIC KIDNEY DISEASE ON CHRONIC DIALYSIS (MULTI): ICD-10-CM

## 2024-07-26 DIAGNOSIS — I48.91 ATRIAL FIBRILLATION, UNSPECIFIED TYPE (MULTI): ICD-10-CM

## 2024-07-26 DIAGNOSIS — N18.6 TYPE 2 DIABETES MELLITUS WITH CHRONIC KIDNEY DISEASE ON CHRONIC DIALYSIS, WITH LONG-TERM CURRENT USE OF INSULIN (MULTI): ICD-10-CM

## 2024-07-26 DIAGNOSIS — Z99.2 HYPERTENSIVE HEART AND KIDNEY DISEASE WITH CHRONIC DIASTOLIC CONGESTIVE HEART FAILURE AND STAGE 5 CHRONIC KIDNEY DISEASE ON CHRONIC DIALYSIS (MULTI): ICD-10-CM

## 2024-07-26 DIAGNOSIS — Z99.2 TYPE 2 DIABETES MELLITUS WITH CHRONIC KIDNEY DISEASE ON CHRONIC DIALYSIS, WITH LONG-TERM CURRENT USE OF INSULIN (MULTI): ICD-10-CM

## 2024-07-26 DIAGNOSIS — R53.1 WEAKNESS: ICD-10-CM

## 2024-07-26 DIAGNOSIS — I13.2 HYPERTENSIVE HEART AND KIDNEY DISEASE WITH CHRONIC DIASTOLIC CONGESTIVE HEART FAILURE AND STAGE 5 CHRONIC KIDNEY DISEASE ON CHRONIC DIALYSIS (MULTI): ICD-10-CM

## 2024-07-26 DIAGNOSIS — I50.32 HYPERTENSIVE HEART AND KIDNEY DISEASE WITH CHRONIC DIASTOLIC CONGESTIVE HEART FAILURE AND STAGE 5 CHRONIC KIDNEY DISEASE ON CHRONIC DIALYSIS (MULTI): ICD-10-CM

## 2024-07-26 DIAGNOSIS — Z79.4 TYPE 2 DIABETES MELLITUS WITH CHRONIC KIDNEY DISEASE ON CHRONIC DIALYSIS, WITH LONG-TERM CURRENT USE OF INSULIN (MULTI): ICD-10-CM

## 2024-07-26 PROCEDURE — 99308 SBSQ NF CARE LOW MDM 20: CPT | Performed by: INTERNAL MEDICINE

## 2024-07-26 NOTE — PROGRESS NOTES
PROGRESS NOTE    Subjective   Chief complaint: Melody Martin is a 89 y.o. female who is an acute skilled patient being seen and evaluated for weakness    HPI:  Therapy has been working with the patient to improve strength and endurance with ADLs, transfers, and mobility.  Patient continues to work toward goals. Supine to sit EOB with max assist. Patient completed BUE strengthening with minimum resistance therapy band all planes and joint levels. Toot pulled, to be given tramadol with tylenol for pain control. Patient is stable and has no new complaints.  Nursing staff voices no new concerns today.      Objective   Vital signs: 116/58,62,94%,     Physical Exam  Constitutional:       General: She is not in acute distress.  Eyes:      Extraocular Movements: Extraocular movements intact.   Cardiovascular:      Rate and Rhythm: Normal rate and regular rhythm.   Pulmonary:      Effort: Pulmonary effort is normal.      Breath sounds: Normal breath sounds.   Abdominal:      General: Bowel sounds are normal.      Palpations: Abdomen is soft.   Musculoskeletal:      Cervical back: Neck supple.      Right lower leg: No edema.      Left lower leg: No edema.   Neurological:      Mental Status: She is alert.   Psychiatric:         Mood and Affect: Mood normal.         Behavior: Behavior is cooperative.         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  On HD per nephrology  Isosorbide  dinitrate  Metoprolol  Diltiazem decreased to 240 mg daily due to low diastolics at times  Renal diet  Monitor BP  Remove extra fluid in dialysis         Weakness     Continue to work towards goals in therapy         Atrial fibrillation (Multi)     Monitor heart rate, controlled  Amiodarone  Diltiazem  Apixaban  Bleeding precautions          Medications, treatments, and labs reviewed  Continue medications and treatments as listed in EMR    Scribe Attestation  I, Carl Annibe   attest that this documentation has been prepared under the direction and in the presence of Wilbert Lock MD.     Provider Attestation - Scribe documentation  All medical record entries made by the Scribe were at my direction and personally dictated by me. I have reviewed the chart and agree that the record accurately reflects my personal performance of the history, physical exam, discussion and plan.   Wilbert Lock MD

## 2024-07-26 NOTE — LETTER
Patient: Melody Martin  : 1935    Encounter Date: 2024    PROGRESS NOTE    Subjective  Chief complaint: Melody Martin is a 89 y.o. female who is an acute skilled patient being seen and evaluated for weakness    HPI:  Therapy has been working with the patient to improve strength and endurance with ADLs, transfers, and mobility.  Patient continues to work toward goals. Supine to sit EOB with max assist. Patient completed BUE strengthening with minimum resistance therapy band all planes and joint levels. Toot pulled, to be given tramadol with tylenol for pain control. Patient is stable and has no new complaints.  Nursing staff voices no new concerns today.      Objective  Vital signs: 116/58,62,94%,     Physical Exam  Constitutional:       General: She is not in acute distress.  Eyes:      Extraocular Movements: Extraocular movements intact.   Cardiovascular:      Rate and Rhythm: Normal rate and regular rhythm.   Pulmonary:      Effort: Pulmonary effort is normal.      Breath sounds: Normal breath sounds.   Abdominal:      General: Bowel sounds are normal.      Palpations: Abdomen is soft.   Musculoskeletal:      Cervical back: Neck supple.      Right lower leg: No edema.      Left lower leg: No edema.   Neurological:      Mental Status: She is alert.   Psychiatric:         Mood and Affect: Mood normal.         Behavior: Behavior is cooperative.         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  On HD per nephrology  Isosorbide dinitrate  Metoprolol  Diltiazem decreased to 240 mg daily due to low diastolics at times  Renal diet  Monitor BP  Remove extra fluid in dialysis         Weakness     Continue to work towards goals in therapy         Atrial fibrillation (Multi)     Monitor heart rate, controlled  Amiodarone  Diltiazem  Apixaban  Bleeding precautions          Medications, treatments, and labs reviewed  Continue medications and treatments as listed in EMR    Scribe Attestation  ISachi Scribe   attest that this documentation has been prepared under the direction and in the presence of Wilbert Lock MD.     Provider Attestation - Scribe documentation  All medical record entries made by the Scribe were at my direction and personally dictated by me. I have reviewed the chart and agree that the record accurately reflects my personal performance of the history, physical exam, discussion and plan.   Wilbert Lock MD      Electronically Signed By: Wilbert Lock MD   7/26/24  6:22 PM

## 2024-07-27 NOTE — DOCUMENTATION CLARIFICATION NOTE
"    PATIENT:               NIRU SIMS  ACCT #:                  [id number]  MRN:                       [id number]  :                       1935  ADMIT DATE:       2024 6:37 PM  DISCH DATE:        2024 8:36 PM  RESPONDING PROVIDER #:        [id number]          PROVIDER RESPONSE TEXT:    Sepsis was a differential diagnosis and ruled out after study    CDI QUERY TEXT:    Clarification        Instruction:  Based on your assessment of the patient and the clinical information, please provide the requested documentation by clicking on the appropriate radio button and enter any additional information if prompted.    Question: Sepsis was documented in the medical record. Based on the documentation and the clinical information, can the diagnosis be further clarified as    When answering this query, please exercise your independent professional judgment. The fact that a question is being asked, does not imply that any particular answer is desired or expected.    The patient's clinical indicators include:  Clinical Information:  - 88yo female admitted with UTI, fecal impaction and later diarrhea, decubitus ulcers,  leukocytosis, and elevated lactate.  PMH includes ESRD, HTN, CVA with hemiplegia and PAD.    Documented Diagnosis: Dc summary Dr Bernstein: \" Sepsis\"    Clinical Indicators:  -Vital Signs: 97.1, hr 67, rr 16, bp 120/48, O2 sat 98 percent  -WBC:  27.4  -Microbiology Results:  Urine cx- multiple organisms present /probable contaminant, stool studies negative  -Neutrophil Count/percent Neutrophil:  23.42/  85.7  -Lactic acid:   3.9 x 2 and 2.5 next day  -BUN/Creat:   55/ 3.34  -Blood cultures: negative  -Bilirubin:     0.3  -MAP:  78  -Get Coma Scale: not given but alert and oriented x4  -PAO2/FIO2:  over 300  -Procalcitonin: not tested  -Platelets:  339  -Other clinical indicators:  Bs 227, HGB A1c 6.1, troponin 15.    Treatment: Liter LR bolus, Zosyn.    Risk Factors:  88yo female with ESRD, " UTI, fecal impaction then diarrhea, decubitus ulcers.  Options provided:  -- Sepsis was a differential diagnosis and ruled out after study  -- Sepsis with other organ dysfunction, Please specify sepsis associated organ dysfunction below  -- Other - I will add my own diagnosis  -- Refer to Clinical Documentation Reviewer    Query created by: Marycruz Ang on 7/23/2024 8:40 AM      Electronically signed by:  MIGUELINA VALDEZ MD 7/27/2024 10:24 AM

## 2024-07-28 PROBLEM — R10.9 ABDOMINAL PAIN: Status: RESOLVED | Noted: 2023-02-03 | Resolved: 2024-07-28

## 2024-07-28 PROBLEM — R41.82 ALTERED MENTAL STATUS: Status: RESOLVED | Noted: 2024-05-22 | Resolved: 2024-07-28

## 2024-07-28 PROBLEM — N30.01 ACUTE CYSTITIS WITH HEMATURIA: Status: ACTIVE | Noted: 2024-07-28

## 2024-07-28 NOTE — ASSESSMENT & PLAN NOTE
Stable, no shortness of breath.  On HD per nephrology  Isosorbide dinitrate  Metoprolol  Diltiazem decreased to 240 mg daily due to low diastolics at times  Renal diet  Monitor BP

## 2024-07-28 NOTE — PROGRESS NOTES
PROGRESS NOTE    Subjective   Chief complaint: Melody Martin is a 89 y.o. female who is an acute skilled patient being seen and evaluated for weakness    HPI:  HPI  Patient readmitted to SNF for therapy d/t weakness after recent hospitalization for UTI.   She state she is feeling better.  Patient requires assist with ADLs and transfers.  No new complaints.  Denies n/v/f/c.    Objective   Vital signs:  167/58, 97.3, 57, 18, 94%, blood sugar 160    Physical Exam  Constitutional:       General: She is not in acute distress.  Eyes:      Extraocular Movements: Extraocular movements intact.   Cardiovascular:      Rate and Rhythm: Normal rate and regular rhythm.   Pulmonary:      Effort: Pulmonary effort is normal.      Breath sounds: Normal breath sounds.   Musculoskeletal:      Cervical back: Neck supple.      Right lower leg: No edema.      Left lower leg: No edema.   Neurological:      Mental Status: She is alert.      Motor: Weakness present.   Psychiatric:         Mood and Affect: Mood normal.         Behavior: Behavior is cooperative.         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  On HD per nephrology  Isosorbide dinitrate  Metoprolol  Diltiazem decreased to 240 mg daily due to low diastolics at times  Renal diet  Monitor BP           Type 2 diabetes mellitus with chronic kidney disease on chronic dialysis, with long-term current use of insulin (Multi)     Carb controlled diet  Monitor Glucoscan with SSI coverage  Glargine         Weakness     Continue to work towards goals in therapy           Medications, treatments, and labs reviewed  Continue medications and treatments as listed in EMR    MAR Johnson    Scribe Attestation  IBibianaiboseas   attest that this documentation has been prepared under the direction and in the presence of MAR Johnson    Provider Attestation - Scribe documentation  All medical record entries made by the Scribe were at my direction and personally dictated by me. I have reviewed the chart and agree that the record accurately reflects my personal performance of the history, physical exam, discussion and plan.

## 2024-07-29 ENCOUNTER — NURSING HOME VISIT (OUTPATIENT)
Dept: POST ACUTE CARE | Facility: EXTERNAL LOCATION | Age: 89
End: 2024-07-29
Payer: COMMERCIAL

## 2024-07-29 DIAGNOSIS — Z79.4 TYPE 2 DIABETES MELLITUS WITH CHRONIC KIDNEY DISEASE ON CHRONIC DIALYSIS, WITH LONG-TERM CURRENT USE OF INSULIN (MULTI): ICD-10-CM

## 2024-07-29 DIAGNOSIS — K08.409 STATUS POST TOOTH EXTRACTION: ICD-10-CM

## 2024-07-29 DIAGNOSIS — Z99.2 HYPERTENSIVE HEART AND KIDNEY DISEASE WITH CHRONIC DIASTOLIC CONGESTIVE HEART FAILURE AND STAGE 5 CHRONIC KIDNEY DISEASE ON CHRONIC DIALYSIS (MULTI): ICD-10-CM

## 2024-07-29 DIAGNOSIS — I50.32 HYPERTENSIVE HEART AND KIDNEY DISEASE WITH CHRONIC DIASTOLIC CONGESTIVE HEART FAILURE AND STAGE 5 CHRONIC KIDNEY DISEASE ON CHRONIC DIALYSIS (MULTI): ICD-10-CM

## 2024-07-29 DIAGNOSIS — Z99.2 TYPE 2 DIABETES MELLITUS WITH CHRONIC KIDNEY DISEASE ON CHRONIC DIALYSIS, WITH LONG-TERM CURRENT USE OF INSULIN (MULTI): ICD-10-CM

## 2024-07-29 DIAGNOSIS — I48.91 ATRIAL FIBRILLATION, UNSPECIFIED TYPE (MULTI): ICD-10-CM

## 2024-07-29 DIAGNOSIS — I13.2 HYPERTENSIVE HEART AND KIDNEY DISEASE WITH CHRONIC DIASTOLIC CONGESTIVE HEART FAILURE AND STAGE 5 CHRONIC KIDNEY DISEASE ON CHRONIC DIALYSIS (MULTI): ICD-10-CM

## 2024-07-29 DIAGNOSIS — N18.6 TYPE 2 DIABETES MELLITUS WITH CHRONIC KIDNEY DISEASE ON CHRONIC DIALYSIS, WITH LONG-TERM CURRENT USE OF INSULIN (MULTI): ICD-10-CM

## 2024-07-29 DIAGNOSIS — E11.22 TYPE 2 DIABETES MELLITUS WITH CHRONIC KIDNEY DISEASE ON CHRONIC DIALYSIS, WITH LONG-TERM CURRENT USE OF INSULIN (MULTI): ICD-10-CM

## 2024-07-29 DIAGNOSIS — R53.1 WEAKNESS: Primary | ICD-10-CM

## 2024-07-29 DIAGNOSIS — N18.6 HYPERTENSIVE HEART AND KIDNEY DISEASE WITH CHRONIC DIASTOLIC CONGESTIVE HEART FAILURE AND STAGE 5 CHRONIC KIDNEY DISEASE ON CHRONIC DIALYSIS (MULTI): ICD-10-CM

## 2024-07-29 PROCEDURE — 99309 SBSQ NF CARE MODERATE MDM 30: CPT | Performed by: NURSE PRACTITIONER

## 2024-07-29 NOTE — LETTER
Patient: Melody Martin  : 1935    Encounter Date: 2024    PROGRESS NOTE    Subjective  Chief complaint: Melody Martin is a 89 y.o. female who is an acute skilled patient being seen and evaluated for weakness    HPI:  HPI  Patient is currently working in therapy due to generalized weakness.  Patient is working on completing the bilateral upper extremity strengthening exercises with minimal resistance therapy bands in all planes and joint levels for 15 reps x 2 sets requiring rest periods.  Patient seen and examined at the bedside.  Patient had 4 teeth pulled last week.    Objective  Vital signs: 143/62, 97.5, 66, 18, 94%, blood sugar 200    Physical Exam  Constitutional:       General: She is not in acute distress.  HENT:      Mouth/Throat:      Comments: Right cheek with swelling  Eyes:      Extraocular Movements: Extraocular movements intact.   Cardiovascular:      Rate and Rhythm: Normal rate and regular rhythm.   Pulmonary:      Effort: Pulmonary effort is normal.      Breath sounds: Normal breath sounds.   Musculoskeletal:      Cervical back: Neck supple.      Right lower leg: No edema.      Left lower leg: No edema.   Neurological:      Mental Status: She is alert.      Motor: Weakness present.   Psychiatric:         Mood and Affect: Mood normal.         Behavior: Behavior is cooperative.         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  On HD per nephrology  Isosorbide dinitrate  Metoprolol  Diltiazem   Renal diet  Monitor BP  Remove extra fluid in dialysis         Weakness - Primary     Continue  therapy, working towards established goals         Atrial fibrillation (Multi)     Monitor heart rate, controlled  Amiodarone  Metoprolol   Apixaban  Bleeding precautions         Status post tooth extraction     With cheek swelling  Patient has Ultram as needed, scheduled Tylenol x 5 days and apply ice as needed          Medications, treatments, and labs reviewed  Continue medications and treatments as listed in EMR      Scribe Attestation  Hien MARRERO Scribe   attest that this documentation has been prepared under the direction and in the presence of MAR Johnson    Provider Attestation - Scribe documentation  All medical record entries made by the Scribe were at my direction and personally dictated by me. I have reviewed the chart and agree that the record accurately reflects my personal performance of the history, physical exam, discussion and plan.   MAR Johnson        Electronically Signed By: MAR Johnson   8/7/24 12:57 PM

## 2024-07-30 ENCOUNTER — NURSING HOME VISIT (OUTPATIENT)
Dept: POST ACUTE CARE | Facility: EXTERNAL LOCATION | Age: 89
End: 2024-07-30
Payer: COMMERCIAL

## 2024-07-30 DIAGNOSIS — I50.32 HYPERTENSIVE HEART AND KIDNEY DISEASE WITH CHRONIC DIASTOLIC CONGESTIVE HEART FAILURE AND STAGE 5 CHRONIC KIDNEY DISEASE ON CHRONIC DIALYSIS (MULTI): ICD-10-CM

## 2024-07-30 DIAGNOSIS — E11.22 TYPE 2 DIABETES MELLITUS WITH CHRONIC KIDNEY DISEASE ON CHRONIC DIALYSIS, WITH LONG-TERM CURRENT USE OF INSULIN (MULTI): ICD-10-CM

## 2024-07-30 DIAGNOSIS — I13.2 HYPERTENSIVE HEART AND KIDNEY DISEASE WITH CHRONIC DIASTOLIC CONGESTIVE HEART FAILURE AND STAGE 5 CHRONIC KIDNEY DISEASE ON CHRONIC DIALYSIS (MULTI): ICD-10-CM

## 2024-07-30 DIAGNOSIS — N18.6 HYPERTENSIVE HEART AND KIDNEY DISEASE WITH CHRONIC DIASTOLIC CONGESTIVE HEART FAILURE AND STAGE 5 CHRONIC KIDNEY DISEASE ON CHRONIC DIALYSIS (MULTI): ICD-10-CM

## 2024-07-30 DIAGNOSIS — Z99.2 TYPE 2 DIABETES MELLITUS WITH CHRONIC KIDNEY DISEASE ON CHRONIC DIALYSIS, WITH LONG-TERM CURRENT USE OF INSULIN (MULTI): ICD-10-CM

## 2024-07-30 DIAGNOSIS — Z79.4 TYPE 2 DIABETES MELLITUS WITH CHRONIC KIDNEY DISEASE ON CHRONIC DIALYSIS, WITH LONG-TERM CURRENT USE OF INSULIN (MULTI): ICD-10-CM

## 2024-07-30 DIAGNOSIS — R53.1 WEAKNESS: Primary | ICD-10-CM

## 2024-07-30 DIAGNOSIS — N18.6 TYPE 2 DIABETES MELLITUS WITH CHRONIC KIDNEY DISEASE ON CHRONIC DIALYSIS, WITH LONG-TERM CURRENT USE OF INSULIN (MULTI): ICD-10-CM

## 2024-07-30 DIAGNOSIS — Z99.2 HYPERTENSIVE HEART AND KIDNEY DISEASE WITH CHRONIC DIASTOLIC CONGESTIVE HEART FAILURE AND STAGE 5 CHRONIC KIDNEY DISEASE ON CHRONIC DIALYSIS (MULTI): ICD-10-CM

## 2024-07-30 DIAGNOSIS — Z86.73 HISTORY OF CVA (CEREBROVASCULAR ACCIDENT): ICD-10-CM

## 2024-07-30 PROBLEM — I73.9 PERIPHERAL ARTERY DISEASE (CMS-HCC): Status: ACTIVE | Noted: 2024-07-30

## 2024-07-30 PROBLEM — T14.8XXA DEEP TISSUE INJURY: Status: ACTIVE | Noted: 2024-07-30

## 2024-07-30 PROCEDURE — 99309 SBSQ NF CARE MODERATE MDM 30: CPT | Performed by: NURSE PRACTITIONER

## 2024-07-30 NOTE — PROGRESS NOTES
PROGRESS NOTE    Subjective   Chief complaint: Melody Martin is a 89 y.o. female who is a long term care patient being seen and evaluated for wounds    HPI:  HPI  Patient seen in follow-up of wounds.  Patient continues with sacrum and left heel wound.  Treatments are in place.  Nursing called on 7\1, reported the patient's x-ray showed no acute osseous abnormality.  Arterial ultrasound showed left SFA and infrapopliteal artery stenotic disease in the mild to moderate ischemic range.  Orders were placed for vascular consult.    Objective   Vital signs: 116/58, 97.8, 62, 20, 94%, blood sugar 202    Physical Exam  Constitutional:       General: She is not in acute distress.  Eyes:      Extraocular Movements: Extraocular movements intact.   Pulmonary:      Effort: Pulmonary effort is normal.   Abdominal:      General: Bowel sounds are normal. There is no distension.      Palpations: Abdomen is soft.      Tenderness: There is no abdominal tenderness.   Musculoskeletal:      Cervical back: Neck supple.      Right lower leg: No edema.      Left lower leg: No edema.   Skin:     Comments:   wound location -left heel  Etiology - pressure   Granulation-medium  Slough-medium  Edge- flat  Odor - no  Drainage - medium serosanguineous  Size - 4 X 4.5 X UTD centimeters  Undermining -none    Wound location -sacrum  Etiology - pressure   Granulation-large  Slough-small  Edge-flat  Odor - none  Drainage - medium serosanguineous  Size - 2.5 x 3 x 0.2 centimeters  Undermining-none   Neurological:      Mental Status: She is alert.   Psychiatric:         Mood and Affect: Mood normal.         Behavior: Behavior is cooperative.         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  A minimal amount of bleeding was controlled with pressure. The procedure was tolerated well with a pain level of 0 throughout and a pain level of 0 following the procedure.  Post-debridement measurements unchanged. Character of wound/ulcer post-debridement is improved.       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  A minimal amount of bleeding was controlled with pressure. The procedure was tolerated well with a pain level of 0 throughout and a pain level of 0 following the procedure.  Post-debridement measurements unchanged. Character of wound/ulcer post-debridement is improved.       TX: Irrigate with wound cleanser or normal saline, pat dry, apply Skin-Prep to periwound, alginate to wound bed and cover with silicone bordered foam    Turn every 2  hours  Prevalon boots  Air mattress  Cushion to chair  Dietitian following  Supplements per dietitian  Grab bars to bed to assist with bed mobility and repositioning  Weekly skin checks  House barrier cream to buttocks         Type 2 diabetes mellitus with chronic kidney disease on chronic dialysis, with long-term current use of insulin (Multi)     Carb controlled diet  Monitor Glucoscan  Glargine  Humalog            Medications, treatments, and labs reviewed  Continue medications and treatments as listed in EMR    Scribe Attestation  I, Phoebe Martinez   attest that this documentation has been prepared under the direction and in the presence of MAR Johnson    Provider Attestation - Scribe documentation  All medical record entries made by the Scribe were at my direction and personally dictated by me. I have reviewed the chart and agree that the record accurately reflects my personal performance of the history, physical exam, discussion and plan.   MAR Johnson

## 2024-07-30 NOTE — ASSESSMENT & PLAN NOTE
Left foot  Treatment: Clean with normal saline, pat dry, paint with Betadine, pad and protect nightly and as needed

## 2024-07-30 NOTE — ASSESSMENT & PLAN NOTE
TX: Irrigate with normal saline, pat dry, apply Betadine moistened gauze, ABD and Kerlix daily and as needed    Turn every 2 hours  Prevalon boots  Pressure reducing mattress, patient refusing air mattress, discussed benefits/risks  Cushion to chair  Dietitian consult  Supplements per dietitian  Grab bars to bed to assist with bed mobility and repositioning  Weekly skin checks

## 2024-07-30 NOTE — LETTER
Patient: Melody Martin  : 1935    Encounter Date: 2024    PROGRESS NOTE    Subjective  Chief complaint: Melody Martin is a 89 y.o. female who is an acute skilled patient being seen and evaluated for weakness    HPI:  HPI  Therapy does continue to work with patient due to generalized weakness.  Patient is continue to work on bed mobility, requires max assist to complete.  Therapy is also working with patient on therapeutic exercise and activities to improve strength, endurance, and range of motion to improve functional mobility and task.  Patient was seen and examined at bedside, appears to be in no acute distress.  Nursing staff voicing no new concerns.    Objective  Vital signs: 117/52, 97.3, 72, 18, 94%, blood sugar 175    Physical Exam  Constitutional:       General: She is not in acute distress.  Eyes:      Extraocular Movements: Extraocular movements intact.   Cardiovascular:      Rate and Rhythm: Normal rate and regular rhythm.   Pulmonary:      Effort: Pulmonary effort is normal.      Breath sounds: Normal breath sounds.   Abdominal:      General: Bowel sounds are normal.      Palpations: Abdomen is soft.   Musculoskeletal:      Cervical back: Neck supple.      Right lower leg: No edema.      Left lower leg: No edema.   Neurological:      Mental Status: She is alert.      Motor: Weakness present.   Psychiatric:         Mood and Affect: Mood normal.         Behavior: Behavior is cooperative.         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  On HD per nephrology  Isosorbide dinitrate  Metoprolol  Diltiazem   Renal diet  Monitor BP  Remove extra fluid in dialysis         Weakness - Primary     Continue therapy, work to reach goals          Medications, treatments, and labs reviewed  Continue medications and treatments as listed in EMR      Scribe Attestation  Hien MARRERO Scribe   attest that this documentation has been prepared under the direction and in the presence of MAR Johnson    Provider Attestation - Scribe documentation  All medical record entries made by the Scribe were at my direction and personally dictated by me. I have reviewed the chart and agree that the record accurately reflects my personal performance of the history, physical exam, discussion and plan.   MAR Johnson        Electronically Signed By: MAR Johnson   8/7/24  1:40 PM

## 2024-07-30 NOTE — ASSESSMENT & PLAN NOTE
Improving   TX: Irrigate with wound cleanser or normal saline, pat dry, apply Skin-Prep to periwound, alginate to wound bed and cover with silicone bordered foam    Turn every 2 hours  Prevalon boots  Air mattress  Cushion to chair  Dietitian following  Supplements per dietitian  Grab bars to bed to assist with bed mobility and repositioning  Weekly skin checks  House barrier cream to buttocks

## 2024-07-30 NOTE — PROGRESS NOTES
PROGRESS NOTE    Subjective   Chief complaint: Melody Martin is a 89 y.o. female who is a long term care patient being seen and evaluated for follow-up wounds.    HPI:  HPI  Patient is seen in follow-up of wounds to sacrum and left heel pressure ulcers.  Treatments are in place.  Patient denies pain at this time.  Afebrile.    Objective   Vital signs: 116/58, 97.8, 62, 20, 94%, blood sugar 220    Physical Exam  Constitutional:       General: She is not in acute distress.  Eyes:      Extraocular Movements: Extraocular movements intact.   Pulmonary:      Effort: Pulmonary effort is normal.   Abdominal:      General: Bowel sounds are normal. There is no distension.      Palpations: Abdomen is soft.      Tenderness: There is no abdominal tenderness.   Musculoskeletal:      Cervical back: Neck supple.      Right lower leg: No edema.      Left lower leg: No edema.   Skin:     Comments:   wound location -left heel  Etiology - pressure   Granulation-medium  Slough-medium  Edge- flat  Odor - no  Drainage - medium serosanguineous  Size - 4.4 X 4.1 X UTD centimeters  Undermining -none    Wound location -sacrum  Etiology - pressure   Granulation-large  Slough-small  Edge-flat  Odor - none  Drainage - medium serosanguineous  Size - 1.7 x 1.1 x 0.2 centimeters  Undermining-none   Neurological:      Mental Status: She is alert.   Psychiatric:         Mood and Affect: Mood normal.         Behavior: Behavior is cooperative.         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) - Primary     TX: Irrigate with normal saline, pat dry, apply Betadine moistened  gauze, ABD and Kerlix daily and as needed    Turn every 2 hours  Prevalon boots  Pressure reducing mattress, patient refusing air mattress, discussed benefits/risks  Cushion to chair  Dietitian consult  Supplements per dietitian  Grab bars to bed to assist with bed mobility and repositioning  Weekly skin checks         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 (Multi)     Improving   TX: Irrigate with wound cleanser or normal saline, pat dry, apply Skin-Prep to periwound, alginate to wound bed and cover with silicone bordered foam    Turn every 2 hours  Prevalon boots  Air mattress  Cushion to chair  Dietitian following  Supplements per dietitian  Grab bars to bed to assist with bed mobility and repositioning  Weekly skin checks  House barrier cream to buttocks         Type 2 diabetes mellitus with chronic kidney disease on chronic dialysis, with long-term current use of insulin (Multi)     Carb controlled diet  Monitor Glucoscan  Continue insulin            Medications, treatments, and labs reviewed  Continue medications and treatments as listed in EMR    Scribe Attestation  IHien Scribe   attest that this documentation has been prepared under the direction and in the presence of MAR Johnson    Provider Attestation - Scribe documentation  All medical record entries made by the Scribe were at my direction and personally dictated by me. I have reviewed the chart and agree that the record accurately reflects my personal performance of the history, physical exam, discussion and plan.   MAR Johnson

## 2024-07-30 NOTE — ASSESSMENT & PLAN NOTE
I performed subcutaneous tissue debridement with a total area of 18 cm2 with curette to remove viable and non-viable tissue/material including subcutaneous tissue, slough, biofilm, and fibrin/exudate after achieving pain control with 2% lidocaine topical gel.  A minimal amount of bleeding was controlled with pressure. The procedure was tolerated well with a pain level of 0 throughout and a pain level of 0 following the procedure.  Post-debridement measurements unchanged. Character of wound/ulcer post-debridement is improved.       TX: Irrigate with normal saline, pat dry, apply Betadine moistened gauze, ABD and Kerlix daily and as needed    Turn every 2 hours  Prevalon boots  Pressure reducing mattress, patient refusing air mattress, discussed benefits/risks  Cushion to chair  Dietitian consult  Supplements per dietitian  Grab bars to bed to assist with bed mobility and repositioning  Weekly skin checks

## 2024-07-30 NOTE — PROGRESS NOTES
PROGRESS NOTE    Subjective   Chief complaint: Melody Martin is a 89 y.o. female who is an acute skilled patient being seen and evaluated for weakness    HPI:  HPI patient has been working in therapy to improve strength, endurance, and ADLs.  Patient continues to work toward goals.  No new concerns today.  Denies n/v/f/c pain.      Objective   Vital signs: 167/58 - 97.3-57-18-94% -     Physical Exam  Constitutional:       General: She is not in acute distress.  Eyes:      Extraocular Movements: Extraocular movements intact.   Cardiovascular:      Rate and Rhythm: Normal rate and regular rhythm.   Pulmonary:      Effort: Pulmonary effort is normal.      Breath sounds: Normal breath sounds.   Musculoskeletal:      Cervical back: Neck supple.      Right lower leg: No edema.      Left lower leg: No edema.   Neurological:      Mental Status: She is alert.      Motor: Weakness present.   Psychiatric:         Mood and Affect: Mood normal.         Behavior: Behavior is cooperative.         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  On HD per nephrology  Isosorbide dinitrate  Metoprolol  Diltiazem decreased to 240 mg daily due to low diastolics at times  Renal diet  Monitor BP         Type 2 diabetes mellitus with chronic kidney disease on chronic dialysis, with long-term current use of insulin (Multi)     Carb controlled diet  Monitor Glucoscan with SSI coverage  Glargine         Weakness - Primary     Continue to work towards goals in therapy          Medications, treatments, and labs reviewed  Continue medications and treatments as listed in EMR      Scribe Attestation  I, Phoebe Bennett   attest that this documentation has been prepared under the direction and in the presence of Karina Patel,  APRN-CNP    Provider Attestation - Scribe documentation  All medical record entries made by the Scribe were at my direction and personally dictated by me. I have reviewed the chart and agree that the record accurately reflects my personal performance of the history, physical exam, discussion and plan.   Karina Patel, JANELL-CNP

## 2024-07-30 NOTE — ASSESSMENT & PLAN NOTE
TX: Irrigate with normal saline, pat dry, apply ALG, ABD and Kerlix daily and as needed    Turn every 2 hours  Prevalon boots  Pressure reducing mattress, patient refusing air mattress, discussed benefits/risks  Cushion to chair  Dietitian consult  Supplements per dietitian  Grab bars to bed to assist with bed mobility and repositioning  Weekly skin checks

## 2024-07-30 NOTE — ASSESSMENT & PLAN NOTE
After obtaining verbal concent, I performed subcutaneous tissue debridement with a total area of 7.5 cm2 with curette to remove viable and non-viable tissue/material including subcutaneous tissue, slough, biofilm, and fibrin/exudate after achieving pain control with 2% lidocaine topical gel.  A minimal amount of bleeding was controlled with pressure. The procedure was tolerated well with a pain level of 0 throughout and a pain level of 0 following the procedure.  Post-debridement measurements unchanged. Character of wound/ulcer post-debridement is improved.       TX: Irrigate with wound cleanser or normal saline, pat dry, apply Skin-Prep to periwound, alginate to wound bed and cover with silicone bordered foam    Turn every 2 hours  Prevalon boots  Air mattress  Cushion to chair  Dietitian following  Supplements per dietitian  Grab bars to bed to assist with bed mobility and repositioning  Weekly skin checks  House barrier cream to buttocks

## 2024-07-30 NOTE — ASSESSMENT & PLAN NOTE
TX: Irrigate with wound cleanser or normal saline, pat dry, alginate to wound bed and cover with silicone bordered foam    Turn every 2 hours  Prevalon boots  Air mattress  Cushion to chair  Dietitian following  Supplements per dietitian  Grab bars to bed to assist with bed mobility and repositioning  Weekly skin checks  House barrier cream to buttocks

## 2024-07-30 NOTE — PROGRESS NOTES
PROGRESS NOTE    Subjective   Chief complaint: Melody Martin is a 89 y.o. female who is an acute skilled patient being seen and evaluated for weakness    HPI:  HPI  Patient does continue to work in therapy, working on bed mobility.  Patient is focusing on supine to sit at edge of bed with max assist needed to achieve a supported upright posture for 2 minutes before returning to supine with max assist.  Patient is also completing bilateral upper extremity strengthening with minimal resistance therapy bands in all planes and joint levels for 15 reps with required rest breaks.  Patient seen and examined at the bedside.  Patient also continues with wounds to sacrum and left heel.  Treatments are in place.  Patient denies    Objective   Vital signs: 116/58, 97.8, 6220, 94%, blood sugar 270    Physical Exam  Constitutional:       General: She is not in acute distress.  Eyes:      Extraocular Movements: Extraocular movements intact.   Pulmonary:      Effort: Pulmonary effort is normal.   Abdominal:      General: Bowel sounds are normal. There is no distension.      Palpations: Abdomen is soft.      Tenderness: There is no abdominal tenderness.   Musculoskeletal:      Cervical back: Neck supple.      Right lower leg: No edema.      Left lower leg: No edema.   Skin:     Comments:   wound location -left heel  Etiology - pressure   Granulation-large  Slough-small  Edge- flat  Odor - no  Drainage - medium serosanguineous  Size - 4 X 4 X UTD centimeters  Undermining -none    Wound location -sacrum  Etiology - pressure   Granulation-large  Slough-small  Edge-flat  Odor - none  Drainage - medium serosanguineous  Size - 3.5 x 7 x 0. 3 centimeters  Undermining-none    Left bunion, dark and nonblanching area  Size 1 x 1.2 cm   Neurological:      Mental Status: She is alert.   Psychiatric:         Mood and Affect: Mood normal.         Behavior: Behavior is cooperative.         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)     TX: Irrigate with normal saline, pat dry, apply ALG, ABD and Kerlix daily and as needed    Turn every 2 hours  Prevalon boots  Pressure reducing mattress, patient refusing air mattress, discussed benefits/risks  Cushion to chair  Dietitian consult  Supplements per dietitian  Grab bars to bed to assist with bed mobility and repositioning  Weekly skin checks         Weakness - Primary     Continue to work towards goals in therapy         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 (Multi)       TX: Irrigate with wound cleanser or normal saline, pat dry, alginate to wound bed and cover with silicone bordered foam    Turn every 2 hours  Prevalon boots  Air mattress  Cushion to chair  Dietitian following  Supplements per dietitian  Grab bars to bed to assist with bed mobility and repositioning  Weekly skin checks  House barrier cream to buttocks         Deep tissue injury     Left foot  Treatment: Clean with normal saline, pat dry, paint with Betadine, pad and protect nightly and as needed          Medications, treatments, and labs reviewed  Continue medications and treatments as listed in EMR      Scribe Attestation  I, Phoebe Martinez   attest that this documentation has been prepared under the direction and in the presence of JANELL Johnson-CNP    Provider Attestation - Scribe documentation  All medical record entries made by the Scribe were at my  direction and personally dictated by me. I have reviewed the chart and agree that the record accurately reflects my personal performance of the history, physical exam, discussion and plan.   Karina Patel, APRN-CNP

## 2024-07-31 ENCOUNTER — NURSING HOME VISIT (OUTPATIENT)
Dept: POST ACUTE CARE | Facility: EXTERNAL LOCATION | Age: 89
End: 2024-07-31
Payer: COMMERCIAL

## 2024-07-31 DIAGNOSIS — E11.22 TYPE 2 DIABETES MELLITUS WITH CHRONIC KIDNEY DISEASE ON CHRONIC DIALYSIS, WITH LONG-TERM CURRENT USE OF INSULIN (MULTI): Primary | ICD-10-CM

## 2024-07-31 DIAGNOSIS — R53.1 WEAKNESS: ICD-10-CM

## 2024-07-31 DIAGNOSIS — N18.6 TYPE 2 DIABETES MELLITUS WITH CHRONIC KIDNEY DISEASE ON CHRONIC DIALYSIS, WITH LONG-TERM CURRENT USE OF INSULIN (MULTI): Primary | ICD-10-CM

## 2024-07-31 DIAGNOSIS — Z99.2 TYPE 2 DIABETES MELLITUS WITH CHRONIC KIDNEY DISEASE ON CHRONIC DIALYSIS, WITH LONG-TERM CURRENT USE OF INSULIN (MULTI): Primary | ICD-10-CM

## 2024-07-31 DIAGNOSIS — Z79.4 TYPE 2 DIABETES MELLITUS WITH CHRONIC KIDNEY DISEASE ON CHRONIC DIALYSIS, WITH LONG-TERM CURRENT USE OF INSULIN (MULTI): Primary | ICD-10-CM

## 2024-07-31 DIAGNOSIS — Z99.2 HYPERTENSIVE HEART AND KIDNEY DISEASE WITH CHRONIC DIASTOLIC CONGESTIVE HEART FAILURE AND STAGE 5 CHRONIC KIDNEY DISEASE ON CHRONIC DIALYSIS (MULTI): ICD-10-CM

## 2024-07-31 DIAGNOSIS — I50.32 HYPERTENSIVE HEART AND KIDNEY DISEASE WITH CHRONIC DIASTOLIC CONGESTIVE HEART FAILURE AND STAGE 5 CHRONIC KIDNEY DISEASE ON CHRONIC DIALYSIS (MULTI): ICD-10-CM

## 2024-07-31 DIAGNOSIS — N18.6 HYPERTENSIVE HEART AND KIDNEY DISEASE WITH CHRONIC DIASTOLIC CONGESTIVE HEART FAILURE AND STAGE 5 CHRONIC KIDNEY DISEASE ON CHRONIC DIALYSIS (MULTI): ICD-10-CM

## 2024-07-31 DIAGNOSIS — I13.2 HYPERTENSIVE HEART AND KIDNEY DISEASE WITH CHRONIC DIASTOLIC CONGESTIVE HEART FAILURE AND STAGE 5 CHRONIC KIDNEY DISEASE ON CHRONIC DIALYSIS (MULTI): ICD-10-CM

## 2024-07-31 DIAGNOSIS — Z86.73 HISTORY OF CVA (CEREBROVASCULAR ACCIDENT): ICD-10-CM

## 2024-07-31 PROCEDURE — 99308 SBSQ NF CARE LOW MDM 20: CPT | Performed by: INTERNAL MEDICINE

## 2024-07-31 NOTE — PROGRESS NOTES
PROGRESS NOTE    Subjective   Chief complaint: Melody Martin is a 89 y.o. female who is an acute skilled patient being seen and evaluated for weakness    HPI:  HPI  Patient is currently working with therapy.  Therapy is working with patient towards goals.  Patient is performing therapeutic exercises x 10 reps to improve strength and endurance and range of motion.  Therapy is also focusing on bed mobility, patient is completing supine to sit at edge of bed with max assist.  Patient seen and examined at the bedside, appears to be in no acute distress.  Nursing staff voicing no new concerns at this time.  Patient denies constitutional symptoms.    Objective   Vital signs: 116/58, 97.8, 62, 20, 94%, blood sugar 360    Physical Exam  Constitutional:       General: She is not in acute distress.  Eyes:      Extraocular Movements: Extraocular movements intact.   Cardiovascular:      Rate and Rhythm: Normal rate and regular rhythm.   Pulmonary:      Effort: Pulmonary effort is normal.      Breath sounds: Normal breath sounds.   Abdominal:      General: Bowel sounds are normal.      Palpations: Abdomen is soft.   Musculoskeletal:      Cervical back: Neck supple.      Right lower leg: No edema.      Left lower leg: No edema.   Neurological:      Mental Status: She is alert.   Psychiatric:         Mood and Affect: Mood normal.         Behavior: Behavior is cooperative.         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  On HD  per nephrology  Isosorbide dinitrate  Metoprolol  Diltiazem   Renal diet  Monitor BP  Remove extra fluid in dialysis         Weakness     Continue to actively participate in therapy          Medications, treatments, and labs reviewed  Continue medications and treatments as listed in EMR      Scribe Attestation  IHien Scribe   attest that this documentation has been prepared under the direction and in the presence of Wilbert Lock MD    Provider Attestation - Scribe documentation  All medical record entries made by the Scribe were at my direction and personally dictated by me. I have reviewed the chart and agree that the record accurately reflects my personal performance of the history, physical exam, discussion and plan.   Wilbert Lock MD

## 2024-07-31 NOTE — LETTER
Patient: Melody Martin  : 1935    Encounter Date: 2024    PROGRESS NOTE    Subjective  Chief complaint: Melody Martin is a 89 y.o. female who is an acute skilled patient being seen and evaluated for weakness    HPI:  HPI  Patient is currently working with therapy.  Therapy is working with patient towards goals.  Patient is performing therapeutic exercises x 10 reps to improve strength and endurance and range of motion.  Therapy is also focusing on bed mobility, patient is completing supine to sit at edge of bed with max assist.  Patient seen and examined at the bedside, appears to be in no acute distress.  Nursing staff voicing no new concerns at this time.  Patient denies constitutional symptoms.    Objective  Vital signs: 116/58, 97.8, 62, 20, 94%, blood sugar 360    Physical Exam  Constitutional:       General: She is not in acute distress.  Eyes:      Extraocular Movements: Extraocular movements intact.   Cardiovascular:      Rate and Rhythm: Normal rate and regular rhythm.   Pulmonary:      Effort: Pulmonary effort is normal.      Breath sounds: Normal breath sounds.   Abdominal:      General: Bowel sounds are normal.      Palpations: Abdomen is soft.   Musculoskeletal:      Cervical back: Neck supple.      Right lower leg: No edema.      Left lower leg: No edema.   Neurological:      Mental Status: She is alert.   Psychiatric:         Mood and Affect: Mood normal.         Behavior: Behavior is cooperative.         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  On HD per nephrology  Isosorbide dinitrate  Metoprolol  Diltiazem   Renal diet  Monitor BP  Remove extra fluid in dialysis         Weakness     Continue to actively participate in therapy          Medications, treatments, and labs reviewed  Continue medications and treatments as listed in EMR      Scribe Attestation  Hien MARRERO Scribe   attest that this documentation has been prepared under the direction and in the presence of Wilbert Lock MD    Provider Attestation - Scribe documentation  All medical record entries made by the Scribe were at my direction and personally dictated by me. I have reviewed the chart and agree that the record accurately reflects my personal performance of the history, physical exam, discussion and plan.   Wilbert Lock MD        Electronically Signed By: Wilbert Lock MD   7/31/24  2:44 PM

## 2024-08-01 ENCOUNTER — NURSING HOME VISIT (OUTPATIENT)
Dept: POST ACUTE CARE | Facility: EXTERNAL LOCATION | Age: 89
End: 2024-08-01
Payer: COMMERCIAL

## 2024-08-01 DIAGNOSIS — Z79.4 TYPE 2 DIABETES MELLITUS WITH CHRONIC KIDNEY DISEASE ON CHRONIC DIALYSIS, WITH LONG-TERM CURRENT USE OF INSULIN (MULTI): ICD-10-CM

## 2024-08-01 DIAGNOSIS — I13.2 HYPERTENSIVE HEART AND KIDNEY DISEASE WITH CHRONIC DIASTOLIC CONGESTIVE HEART FAILURE AND STAGE 5 CHRONIC KIDNEY DISEASE ON CHRONIC DIALYSIS (MULTI): ICD-10-CM

## 2024-08-01 DIAGNOSIS — Z99.2 HYPERTENSIVE HEART AND KIDNEY DISEASE WITH CHRONIC DIASTOLIC CONGESTIVE HEART FAILURE AND STAGE 5 CHRONIC KIDNEY DISEASE ON CHRONIC DIALYSIS (MULTI): ICD-10-CM

## 2024-08-01 DIAGNOSIS — N18.6 HYPERTENSIVE HEART AND KIDNEY DISEASE WITH CHRONIC DIASTOLIC CONGESTIVE HEART FAILURE AND STAGE 5 CHRONIC KIDNEY DISEASE ON CHRONIC DIALYSIS (MULTI): ICD-10-CM

## 2024-08-01 DIAGNOSIS — Z99.2 TYPE 2 DIABETES MELLITUS WITH CHRONIC KIDNEY DISEASE ON CHRONIC DIALYSIS, WITH LONG-TERM CURRENT USE OF INSULIN (MULTI): ICD-10-CM

## 2024-08-01 DIAGNOSIS — L89.623 STAGE III PRESSURE ULCER OF LEFT HEEL (MULTI): ICD-10-CM

## 2024-08-01 DIAGNOSIS — N18.6 TYPE 2 DIABETES MELLITUS WITH CHRONIC KIDNEY DISEASE ON CHRONIC DIALYSIS, WITH LONG-TERM CURRENT USE OF INSULIN (MULTI): ICD-10-CM

## 2024-08-01 DIAGNOSIS — E11.22 TYPE 2 DIABETES MELLITUS WITH CHRONIC KIDNEY DISEASE ON CHRONIC DIALYSIS, WITH LONG-TERM CURRENT USE OF INSULIN (MULTI): ICD-10-CM

## 2024-08-01 DIAGNOSIS — I50.32 HYPERTENSIVE HEART AND KIDNEY DISEASE WITH CHRONIC DIASTOLIC CONGESTIVE HEART FAILURE AND STAGE 5 CHRONIC KIDNEY DISEASE ON CHRONIC DIALYSIS (MULTI): ICD-10-CM

## 2024-08-01 DIAGNOSIS — R53.1 WEAKNESS: Primary | ICD-10-CM

## 2024-08-01 DIAGNOSIS — T14.8XXA DEEP TISSUE INJURY: ICD-10-CM

## 2024-08-01 DIAGNOSIS — L89.153 STAGE III PRESSURE ULCER OF SACRAL REGION (MULTI): ICD-10-CM

## 2024-08-01 PROCEDURE — 11042 DBRDMT SUBQ TIS 1ST 20SQCM/<: CPT | Performed by: NURSE PRACTITIONER

## 2024-08-01 PROCEDURE — 99309 SBSQ NF CARE MODERATE MDM 30: CPT | Performed by: NURSE PRACTITIONER

## 2024-08-01 NOTE — LETTER
Patient: Melody Martin  : 1935    Encounter Date: 2024    PROGRESS NOTE    Subjective  Chief complaint: Melody Martin is a 89 y.o. female who is an acute skilled patient being seen and evaluated for weakness    HPI:  HPI  Patient is currently working with therapy due to generalized weakness.  Therapy is continuing to work with patient on gait training, neuromuscular education, safe transfers, ADLs and self-care.  Patient is also seen in follow-up of wounds to sacrum and left heel.  Treatment is in place.  Patient denies wound pain at this time.  Patient seen and examined at the bedside, appears to be in no acute distress.    Objective  Vital signs: 117/52, 97.3, 18, 72, 94%, blood sugar 220    Physical Exam  Constitutional:       General: She is not in acute distress.  Eyes:      Extraocular Movements: Extraocular movements intact.   Cardiovascular:      Rate and Rhythm: Normal rate and regular rhythm.   Pulmonary:      Effort: Pulmonary effort is normal.      Breath sounds: Normal breath sounds.   Abdominal:      General: Bowel sounds are normal. There is no distension.      Palpations: Abdomen is soft.      Tenderness: There is no abdominal tenderness.   Musculoskeletal:      Cervical back: Neck supple.      Right lower leg: No edema.      Left lower leg: No edema.   Skin:     Comments:   wound location -left heel  Etiology - pressure   Granulation-large  Slough-small  Edge- flat  Odor - yes  Drainage - medium serosanguineous  Size - 3.5 X 3.5 X UTD centimeters  Undermining -none    Wound location -sacrum  Etiology - pressure   Granulation-large  Slough-small  Edge-flat macerated  Odor - none  Drainage - large serosanguineous  Size - 3.5 x 5.5 x 0.2 centimeters  Undermining-none    Left bunion  Deep tissue injury/darkened non-blanching area  Size-1.2 x 0.5 cm   Neurological:      Mental Status: She is alert.   Psychiatric:         Mood and Affect: Mood normal.         Behavior: Behavior is  cooperative.         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)     TX: Irrigate with wound cleanser, apply oil emulsion gauze, calcium alginate, cover with ABD and wrap with Kerlix nightly and as needed    Turn every 2 hours  Prevalon boots  Pressure reducing mattress, patient refusing air mattress, discussed benefits/risks  Cushion to chair  Dietitian consult  Supplements per dietitian  Grab bars to bed to assist with bed mobility and repositioning  Weekly skin checks         Weakness - Primary     Continue working in therapy         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  A minimal amount of bleeding was controlled with pressure. The procedure was tolerated well with a pain level of 0 throughout and a pain level of 0 following the procedure.  Post-debridement measurements unchanged. Character of wound/ulcer post-debridement is improved.       TX: Irrigate with wound cleanser and apply Skin-Prep to PORTER area, calcium alginate with bordered foam cover, change nightly and as needed    Turn every 2 hours  Prevalon boots  Air mattress  Cushion to  chair  Dietitian following  Supplements per dietitian  Grab bars to bed to assist with bed mobility and repositioning  Weekly skin checks  House barrier cream to buttocks           Deep tissue injury     Left foot  Treatment: Clean with normal saline, pat dry, paint with Betadine, pad and protect nightly and as needed          Medications, treatments, and labs reviewed  Continue medications and treatments as listed in EMR      Scribe Attestation  Hien MARRERO Scribe   attest that this documentation has been prepared under the direction and in the presence of MAR Johnson    Provider Attestation - Scribe documentation  All medical record entries made by the Scribe were at my direction and personally dictated by me. I have reviewed the chart and agree that the record accurately reflects my personal performance of the history, physical exam, discussion and plan.   MAR Johnson        Electronically Signed By: MAR Johnson   8/7/24  4:12 PM

## 2024-08-02 ENCOUNTER — NURSING HOME VISIT (OUTPATIENT)
Dept: POST ACUTE CARE | Facility: EXTERNAL LOCATION | Age: 89
End: 2024-08-02
Payer: COMMERCIAL

## 2024-08-02 DIAGNOSIS — E11.22 TYPE 2 DIABETES MELLITUS WITH CHRONIC KIDNEY DISEASE ON CHRONIC DIALYSIS, WITH LONG-TERM CURRENT USE OF INSULIN (MULTI): ICD-10-CM

## 2024-08-02 DIAGNOSIS — Z99.2 HYPERTENSIVE HEART AND KIDNEY DISEASE WITH CHRONIC DIASTOLIC CONGESTIVE HEART FAILURE AND STAGE 5 CHRONIC KIDNEY DISEASE ON CHRONIC DIALYSIS (MULTI): Primary | ICD-10-CM

## 2024-08-02 DIAGNOSIS — Z86.73 HISTORY OF CVA (CEREBROVASCULAR ACCIDENT): ICD-10-CM

## 2024-08-02 DIAGNOSIS — R53.1 WEAKNESS: ICD-10-CM

## 2024-08-02 DIAGNOSIS — Z79.4 TYPE 2 DIABETES MELLITUS WITH CHRONIC KIDNEY DISEASE ON CHRONIC DIALYSIS, WITH LONG-TERM CURRENT USE OF INSULIN (MULTI): ICD-10-CM

## 2024-08-02 DIAGNOSIS — N18.6 HYPERTENSIVE HEART AND KIDNEY DISEASE WITH CHRONIC DIASTOLIC CONGESTIVE HEART FAILURE AND STAGE 5 CHRONIC KIDNEY DISEASE ON CHRONIC DIALYSIS (MULTI): Primary | ICD-10-CM

## 2024-08-02 DIAGNOSIS — I50.32 HYPERTENSIVE HEART AND KIDNEY DISEASE WITH CHRONIC DIASTOLIC CONGESTIVE HEART FAILURE AND STAGE 5 CHRONIC KIDNEY DISEASE ON CHRONIC DIALYSIS (MULTI): Primary | ICD-10-CM

## 2024-08-02 DIAGNOSIS — I13.2 HYPERTENSIVE HEART AND KIDNEY DISEASE WITH CHRONIC DIASTOLIC CONGESTIVE HEART FAILURE AND STAGE 5 CHRONIC KIDNEY DISEASE ON CHRONIC DIALYSIS (MULTI): Primary | ICD-10-CM

## 2024-08-02 DIAGNOSIS — Z99.2 TYPE 2 DIABETES MELLITUS WITH CHRONIC KIDNEY DISEASE ON CHRONIC DIALYSIS, WITH LONG-TERM CURRENT USE OF INSULIN (MULTI): ICD-10-CM

## 2024-08-02 DIAGNOSIS — N18.6 TYPE 2 DIABETES MELLITUS WITH CHRONIC KIDNEY DISEASE ON CHRONIC DIALYSIS, WITH LONG-TERM CURRENT USE OF INSULIN (MULTI): ICD-10-CM

## 2024-08-02 NOTE — PROGRESS NOTES
PROGRESS NOTE    Subjective   Chief complaint: Melody Martin is a 89 y.o. female who is an acute skilled patient being seen and evaluated for weakness    HPI:  HPI    Patient is continuing in therapy.  Patient is performing therapeutic exercises to improve strength endurance range of motion.  Patient does require cues to obtain full range of motion.  Therapist noting patient actively participating in skilled interventions with decreased need for instruction.  Patient was seen and examined at the bedside, appears to be in no acute distress.  Denies chest pain, shortness of breath, nausea or vomiting.  Objective   Vital signs: 118/50, 97.5, 52, 18, 94%, blood sugar 164    Physical Exam  Constitutional:       General: She is not in acute distress.  Eyes:      Extraocular Movements: Extraocular movements intact.   Cardiovascular:      Rate and Rhythm: Normal rate and regular rhythm.   Pulmonary:      Effort: Pulmonary effort is normal.      Breath sounds: Normal breath sounds.   Abdominal:      General: Bowel sounds are normal.      Palpations: Abdomen is soft.   Musculoskeletal:      Cervical back: Neck supple.      Right lower leg: No edema.      Left lower leg: No edema.   Neurological:      Mental Status: She is alert.      Motor: Weakness present.   Psychiatric:         Mood and Affect: Mood normal.         Behavior: Behavior is cooperative.         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  On HD per  nephrology  Isosorbide dinitrate  Metoprolol  Diltiazem   Renal diet  Monitor BP  Remove extra fluid in dialysis         Weakness     Continue therapy, work towards goals          Medications, treatments, and labs reviewed  Continue medications and treatments as listed in EMR      Scribe Attestation  Hien MARRERO Scribe   attest that this documentation has been prepared under the direction and in the presence of Wilbert Lock MD    Provider Attestation - Scribe documentation  All medical record entries made by the Scribe were at my direction and personally dictated by me. I have reviewed the chart and agree that the record accurately reflects my personal performance of the history, physical exam, discussion and plan.   Wilbert Lock MD

## 2024-08-02 NOTE — LETTER
Patient: Melody Martin  : 1935    Encounter Date: 2024    PROGRESS NOTE    Subjective  Chief complaint: Melody Martin is a 89 y.o. female who is an acute skilled patient being seen and evaluated for weakness    HPI:  HPI    Patient is continuing in therapy.  Patient is performing therapeutic exercises to improve strength endurance range of motion.  Patient does require cues to obtain full range of motion.  Therapist noting patient actively participating in skilled interventions with decreased need for instruction.  Patient was seen and examined at the bedside, appears to be in no acute distress.  Denies chest pain, shortness of breath, nausea or vomiting.  Objective  Vital signs: 118/50, 97.5, 52, 18, 94%, blood sugar 164    Physical Exam  Constitutional:       General: She is not in acute distress.  Eyes:      Extraocular Movements: Extraocular movements intact.   Cardiovascular:      Rate and Rhythm: Normal rate and regular rhythm.   Pulmonary:      Effort: Pulmonary effort is normal.      Breath sounds: Normal breath sounds.   Abdominal:      General: Bowel sounds are normal.      Palpations: Abdomen is soft.   Musculoskeletal:      Cervical back: Neck supple.      Right lower leg: No edema.      Left lower leg: No edema.   Neurological:      Mental Status: She is alert.      Motor: Weakness present.   Psychiatric:         Mood and Affect: Mood normal.         Behavior: Behavior is cooperative.         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  On HD per nephrology  Isosorbide dinitrate  Metoprolol  Diltiazem   Renal diet  Monitor BP  Remove extra fluid in dialysis         Weakness     Continue therapy, work towards goals          Medications, treatments, and labs reviewed  Continue medications and treatments as listed in EMR      Scribe Attestation  Hien MARRERO Scribe   attest that this documentation has been prepared under the direction and in the presence of Wilbert Lock MD    Provider Attestation - Scribe documentation  All medical record entries made by the Scribe were at my direction and personally dictated by me. I have reviewed the chart and agree that the record accurately reflects my personal performance of the history, physical exam, discussion and plan.   Wilbert Lock MD        Electronically Signed By: Wilbert Lock MD   8/2/24  4:59 PM

## 2024-08-05 ENCOUNTER — NURSING HOME VISIT (OUTPATIENT)
Dept: POST ACUTE CARE | Facility: EXTERNAL LOCATION | Age: 89
End: 2024-08-05
Payer: COMMERCIAL

## 2024-08-05 DIAGNOSIS — N18.6 TYPE 2 DIABETES MELLITUS WITH CHRONIC KIDNEY DISEASE ON CHRONIC DIALYSIS, WITH LONG-TERM CURRENT USE OF INSULIN (MULTI): ICD-10-CM

## 2024-08-05 DIAGNOSIS — Z99.2 TYPE 2 DIABETES MELLITUS WITH CHRONIC KIDNEY DISEASE ON CHRONIC DIALYSIS, WITH LONG-TERM CURRENT USE OF INSULIN (MULTI): ICD-10-CM

## 2024-08-05 DIAGNOSIS — Z79.4 TYPE 2 DIABETES MELLITUS WITH CHRONIC KIDNEY DISEASE ON CHRONIC DIALYSIS, WITH LONG-TERM CURRENT USE OF INSULIN (MULTI): ICD-10-CM

## 2024-08-05 DIAGNOSIS — Z99.2 HYPERTENSIVE HEART AND KIDNEY DISEASE WITH CHRONIC DIASTOLIC CONGESTIVE HEART FAILURE AND STAGE 5 CHRONIC KIDNEY DISEASE ON CHRONIC DIALYSIS (MULTI): ICD-10-CM

## 2024-08-05 DIAGNOSIS — N18.6 HYPERTENSIVE HEART AND KIDNEY DISEASE WITH CHRONIC DIASTOLIC CONGESTIVE HEART FAILURE AND STAGE 5 CHRONIC KIDNEY DISEASE ON CHRONIC DIALYSIS (MULTI): ICD-10-CM

## 2024-08-05 DIAGNOSIS — E11.22 TYPE 2 DIABETES MELLITUS WITH CHRONIC KIDNEY DISEASE ON CHRONIC DIALYSIS, WITH LONG-TERM CURRENT USE OF INSULIN (MULTI): ICD-10-CM

## 2024-08-05 DIAGNOSIS — I13.2 HYPERTENSIVE HEART AND KIDNEY DISEASE WITH CHRONIC DIASTOLIC CONGESTIVE HEART FAILURE AND STAGE 5 CHRONIC KIDNEY DISEASE ON CHRONIC DIALYSIS (MULTI): ICD-10-CM

## 2024-08-05 DIAGNOSIS — R53.1 WEAKNESS: Primary | ICD-10-CM

## 2024-08-05 DIAGNOSIS — I50.32 HYPERTENSIVE HEART AND KIDNEY DISEASE WITH CHRONIC DIASTOLIC CONGESTIVE HEART FAILURE AND STAGE 5 CHRONIC KIDNEY DISEASE ON CHRONIC DIALYSIS (MULTI): ICD-10-CM

## 2024-08-05 PROBLEM — K08.409 STATUS POST TOOTH EXTRACTION: Status: ACTIVE | Noted: 2024-08-05

## 2024-08-05 PROCEDURE — 99309 SBSQ NF CARE MODERATE MDM 30: CPT | Performed by: NURSE PRACTITIONER

## 2024-08-05 NOTE — PROGRESS NOTES
PROGRESS NOTE    Subjective   Chief complaint: Melody Martin is a 89 y.o. female who is an acute skilled patient being seen and evaluated for weakness    HPI:  HPI  Patient is currently working in therapy due to generalized weakness.  Patient is working on completing the bilateral upper extremity strengthening exercises with minimal resistance therapy bands in all planes and joint levels for 15 reps x 2 sets requiring rest periods.  Patient seen and examined at the bedside.  Patient had 4 teeth pulled last week.    Objective   Vital signs: 143/62, 97.5, 66, 18, 94%, blood sugar 200    Physical Exam  Constitutional:       General: She is not in acute distress.  HENT:      Mouth/Throat:      Comments: Right cheek with swelling  Eyes:      Extraocular Movements: Extraocular movements intact.   Cardiovascular:      Rate and Rhythm: Normal rate and regular rhythm.   Pulmonary:      Effort: Pulmonary effort is normal.      Breath sounds: Normal breath sounds.   Musculoskeletal:      Cervical back: Neck supple.      Right lower leg: No edema.      Left lower leg: No edema.   Neurological:      Mental Status: She is alert.      Motor: Weakness present.   Psychiatric:         Mood and Affect: Mood normal.         Behavior: Behavior is cooperative.         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  On HD per nephrology  Isosorbide dinitrate  Metoprolol  Diltiazem   Renal diet  Monitor BP  Remove extra fluid in dialysis         Weakness - Primary     Continue therapy, working towards established goals         Atrial fibrillation  (Multi)     Monitor heart rate, controlled  Amiodarone  Metoprolol   Apixaban  Bleeding precautions         Status post tooth extraction     With cheek swelling  Patient has Ultram as needed, scheduled Tylenol x 5 days and apply ice as needed          Medications, treatments, and labs reviewed  Continue medications and treatments as listed in EMR      Scribe Attestation  Hien MARRERO Scribe   attest that this documentation has been prepared under the direction and in the presence of MAR Johnson    Provider Attestation - Scribe documentation  All medical record entries made by the Scribe were at my direction and personally dictated by me. I have reviewed the chart and agree that the record accurately reflects my personal performance of the history, physical exam, discussion and plan.   MAR Johnson

## 2024-08-05 NOTE — ASSESSMENT & PLAN NOTE
With cheek swelling  Patient has Ultram as needed, scheduled Tylenol x 5 days and apply ice as needed

## 2024-08-05 NOTE — PROGRESS NOTES
PROGRESS NOTE    Subjective   Chief complaint: Melody Martin is a 89 y.o. female who is an acute skilled patient being seen and evaluated for weakness    HPI:  HPI  Therapy does continue to work with patient due to generalized weakness.  Patient is continue to work on bed mobility, requires max assist to complete.  Therapy is also working with patient on therapeutic exercise and activities to improve strength, endurance, and range of motion to improve functional mobility and task.  Patient was seen and examined at bedside, appears to be in no acute distress.  Nursing staff voicing no new concerns.    Objective   Vital signs: 117/52, 97.3, 72, 18, 94%, blood sugar 175    Physical Exam  Constitutional:       General: She is not in acute distress.  Eyes:      Extraocular Movements: Extraocular movements intact.   Cardiovascular:      Rate and Rhythm: Normal rate and regular rhythm.   Pulmonary:      Effort: Pulmonary effort is normal.      Breath sounds: Normal breath sounds.   Abdominal:      General: Bowel sounds are normal.      Palpations: Abdomen is soft.   Musculoskeletal:      Cervical back: Neck supple.      Right lower leg: No edema.      Left lower leg: No edema.   Neurological:      Mental Status: She is alert.      Motor: Weakness present.   Psychiatric:         Mood and Affect: Mood normal.         Behavior: Behavior is cooperative.         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  On HD per  nephrology  Isosorbide dinitrate  Metoprolol  Diltiazem   Renal diet  Monitor BP  Remove extra fluid in dialysis         Weakness - Primary     Continue therapy, work to reach goals          Medications, treatments, and labs reviewed  Continue medications and treatments as listed in EMR      Scribe Attestation  Hien MARRERO Scribe   attest that this documentation has been prepared under the direction and in the presence of MAR Johnson    Provider Attestation - Scribe documentation  All medical record entries made by the Scribe were at my direction and personally dictated by me. I have reviewed the chart and agree that the record accurately reflects my personal performance of the history, physical exam, discussion and plan.   MAR Johnson

## 2024-08-05 NOTE — LETTER
Patient: Melody Martin  : 1935    Encounter Date: 2024    PROGRESS NOTE    Subjective  Chief complaint: Melody Martin is a 89 y.o. female who is an acute skilled patient being seen and evaluated for weakness    HPI:  HPI Patient working with therapy on strengthening and mobility.  Focus on supine to sit EOB.  Patient requires max assist to achieve a supported upright posture for 2 minutes before returning to supine with max A.  Patient also working on rolling side-to-side, bed mobility, and repositioning.  No new cocners today.  Denies n/v/f/c.    Objective  Vital signs: 117/52 - 97.3-72-18-94%.  B    Physical Exam  Constitutional:       General: She is not in acute distress.  Eyes:      Extraocular Movements: Extraocular movements intact.   Cardiovascular:      Rate and Rhythm: Normal rate and regular rhythm.   Pulmonary:      Effort: Pulmonary effort is normal.      Breath sounds: Normal breath sounds.   Abdominal:      General: Bowel sounds are normal.      Palpations: Abdomen is soft.   Musculoskeletal:      Cervical back: Neck supple.      Right lower leg: No edema.      Left lower leg: No edema.   Neurological:      Mental Status: She is alert and oriented to person, place, and time.      Motor: Weakness present.   Psychiatric:         Mood and Affect: Mood normal.         Behavior: Behavior is cooperative.         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  On HD per nephrology  Isosorbide dinitrate  Metoprolol  Diltiazem   Renal diet  Monitor BP  Remove extra fluid in dialysis         Weakness -  Primary     Continue working in therapy          Medications, treatments, and labs reviewed  Continue medications and treatments as listed in EMR      Scribe Attestation  I, Phoebe Bennett   attest that this documentation has been prepared under the direction and in the presence of MAR Johnson    Provider Attestation - Scribe documentation  All medical record entries made by the Scribe were at my direction and personally dictated by me. I have reviewed the chart and agree that the record accurately reflects my personal performance of the history, physical exam, discussion and plan.   MAR Johnson        Electronically Signed By: MAR Johnson   8/9/24 12:05 PM

## 2024-08-06 NOTE — ASSESSMENT & PLAN NOTE
After obtaining verbal concent, I performed subcutaneous tissue debridement with a total area of 19.25 cm2 with curette to remove viable and non-viable tissue/material including subcutaneous tissue, slough, biofilm, and fibrin/exudate after achieving pain control with 2% lidocaine topical gel.  A minimal amount of bleeding was controlled with pressure. The procedure was tolerated well with a pain level of 0 throughout and a pain level of 0 following the procedure.  Post-debridement measurements unchanged. Character of wound/ulcer post-debridement is improved.       TX: Irrigate with wound cleanser and apply Skin-Prep to PORTER area, calcium alginate with bordered foam cover, change nightly and as needed    Turn every 2 hours  Prevalon boots  Air mattress  Cushion to chair  Dietitian following  Supplements per dietitian  Grab bars to bed to assist with bed mobility and repositioning  Weekly skin checks  House barrier cream to buttocks

## 2024-08-06 NOTE — PROGRESS NOTES
PROGRESS NOTE    Subjective   Chief complaint: Melody Martin is a 89 y.o. female who is an acute skilled patient being seen and evaluated for weakness    HPI:  HPI  Patient is currently working with therapy due to generalized weakness.  Therapy is continuing to work with patient on gait training, neuromuscular education, safe transfers, ADLs and self-care.  Patient is also seen in follow-up of wounds to sacrum and left heel.  Treatment is in place.  Patient denies wound pain at this time.  Patient seen and examined at the bedside, appears to be in no acute distress.    Objective   Vital signs: 117/52, 97.3, 18, 72, 94%, blood sugar 220    Physical Exam  Constitutional:       General: She is not in acute distress.  Eyes:      Extraocular Movements: Extraocular movements intact.   Cardiovascular:      Rate and Rhythm: Normal rate and regular rhythm.   Pulmonary:      Effort: Pulmonary effort is normal.      Breath sounds: Normal breath sounds.   Abdominal:      General: Bowel sounds are normal. There is no distension.      Palpations: Abdomen is soft.      Tenderness: There is no abdominal tenderness.   Musculoskeletal:      Cervical back: Neck supple.      Right lower leg: No edema.      Left lower leg: No edema.   Skin:     Comments:   wound location -left heel  Etiology - pressure   Granulation-large  Slough-small  Edge- flat  Odor - yes  Drainage - medium serosanguineous  Size - 3.5 X 3.5 X UTD centimeters  Undermining -none    Wound location -sacrum  Etiology - pressure   Granulation-large  Slough-small  Edge-flat macerated  Odor - none  Drainage - large serosanguineous  Size - 3.5 x 5.5 x 0.2 centimeters  Undermining-none    Left bunion  Deep tissue injury/darkened non-blanching area  Size-1.2 x 0.5 cm   Neurological:      Mental Status: She is alert.   Psychiatric:         Mood and Affect: Mood normal.         Behavior: Behavior is cooperative.         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)     TX: Irrigate with wound cleanser, apply oil emulsion gauze, calcium alginate, cover with ABD and wrap with Kerlix nightly and as needed    Turn every 2 hours  Prevalon boots  Pressure reducing mattress, patient refusing air mattress, discussed benefits/risks  Cushion to chair  Dietitian consult  Supplements per dietitian  Grab bars to bed to assist with bed mobility and repositioning  Weekly skin checks         Weakness - Primary     Continue working in therapy         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  A minimal amount of bleeding was controlled with pressure. The procedure was tolerated well with a pain level of 0 throughout and a pain level of 0 following the procedure.  Post-debridement measurements unchanged. Character of wound/ulcer post-debridement is improved.       TX: Irrigate with wound cleanser and apply Skin-Prep to PORTER area, calcium alginate with bordered foam cover, change nightly and as needed    Turn every 2 hours  Prevalon boots  Air mattress  Cushion to chair  Dietitian following  Supplements per dietitian  Grab bars to bed to assist  with bed mobility and repositioning  Weekly skin checks  House barrier cream to buttocks           Deep tissue injury     Left foot  Treatment: Clean with normal saline, pat dry, paint with Betadine, pad and protect nightly and as needed          Medications, treatments, and labs reviewed  Continue medications and treatments as listed in EMR      Scribe Attestation  Hien MARRERO Scribe   attest that this documentation has been prepared under the direction and in the presence of MAR Johnson    Provider Attestation - Scribe documentation  All medical record entries made by the Scribe were at my direction and personally dictated by me. I have reviewed the chart and agree that the record accurately reflects my personal performance of the history, physical exam, discussion and plan.   MAR Johnson

## 2024-08-06 NOTE — ASSESSMENT & PLAN NOTE
TX: Irrigate with wound cleanser, apply oil emulsion gauze, calcium alginate, cover with ABD and wrap with Kerlix nightly and as needed    Turn every 2 hours  Prevalon boots  Pressure reducing mattress, patient refusing air mattress, discussed benefits/risks  Cushion to chair  Dietitian consult  Supplements per dietitian  Grab bars to bed to assist with bed mobility and repositioning  Weekly skin checks

## 2024-08-08 ENCOUNTER — NURSING HOME VISIT (OUTPATIENT)
Dept: POST ACUTE CARE | Facility: EXTERNAL LOCATION | Age: 89
End: 2024-08-08
Payer: COMMERCIAL

## 2024-08-08 DIAGNOSIS — L89.153 STAGE III PRESSURE ULCER OF SACRAL REGION (MULTI): ICD-10-CM

## 2024-08-08 DIAGNOSIS — T14.8XXA DEEP TISSUE INJURY: Primary | ICD-10-CM

## 2024-08-08 DIAGNOSIS — N18.6 HYPERTENSIVE HEART AND KIDNEY DISEASE WITH CHRONIC DIASTOLIC CONGESTIVE HEART FAILURE AND STAGE 5 CHRONIC KIDNEY DISEASE ON CHRONIC DIALYSIS (MULTI): ICD-10-CM

## 2024-08-08 DIAGNOSIS — I50.32 HYPERTENSIVE HEART AND KIDNEY DISEASE WITH CHRONIC DIASTOLIC CONGESTIVE HEART FAILURE AND STAGE 5 CHRONIC KIDNEY DISEASE ON CHRONIC DIALYSIS (MULTI): ICD-10-CM

## 2024-08-08 DIAGNOSIS — Z99.2 HYPERTENSIVE HEART AND KIDNEY DISEASE WITH CHRONIC DIASTOLIC CONGESTIVE HEART FAILURE AND STAGE 5 CHRONIC KIDNEY DISEASE ON CHRONIC DIALYSIS (MULTI): ICD-10-CM

## 2024-08-08 DIAGNOSIS — I13.2 HYPERTENSIVE HEART AND KIDNEY DISEASE WITH CHRONIC DIASTOLIC CONGESTIVE HEART FAILURE AND STAGE 5 CHRONIC KIDNEY DISEASE ON CHRONIC DIALYSIS (MULTI): ICD-10-CM

## 2024-08-08 DIAGNOSIS — L89.623 STAGE III PRESSURE ULCER OF LEFT HEEL (MULTI): ICD-10-CM

## 2024-08-08 DIAGNOSIS — L60.2 LONG TOENAIL: ICD-10-CM

## 2024-08-08 NOTE — LETTER
Patient: Melody Martin  : 1935    Encounter Date: 2024    PROGRESS NOTE    Subjective  Chief complaint: Melody Martin is a 89 y.o. female who is a long term care patient being seen and evaluated for  wounds, right great toe pain.    HPI:  HPI  Patient presents for follow-up wounds.  She has complaint of right great toe pain which she states started after visit by podiatry last week.  No new concerns otherwise.  Denies constitutional symptoms    Objective  Vital signs: .  117/52, 97.3, 72, 18, 94%, blood sugar 179    Physical Exam  Constitutional:       General: She is not in acute distress.  Eyes:      Extraocular Movements: Extraocular movements intact.   Cardiovascular:      Rate and Rhythm: Normal rate and regular rhythm.   Pulmonary:      Effort: Pulmonary effort is normal.      Breath sounds: Normal breath sounds.   Abdominal:      General: Bowel sounds are normal.      Palpations: Abdomen is soft.   Musculoskeletal:      Cervical back: Neck supple.      Right lower leg: No edema.      Left lower leg: No edema.   Skin:     Comments: Wound location -left bunion  Etiology -  DTI  Size -  0.3 x 1cm    Wound location - sacrum  Etiology -  stage III pressure ulcer  Granulation -  large  Slough -  small  Edges -  flat, macerated  Odor -  no  Drainage -  large serosanguineous  Size -  2 x 2.5 x 0.2 cm     Wound location - left heel  Etiology -  stage III pressure ulcer  Granulation - large  Slough -  small  Edges -  flat  Odor -  no  Drainage -  medium serosanguineous  Size -  4 x 3.5 x 0.5 cm    Right great toe with swelling, no redness,   tender to palpitation, small blood from nail bed edge   Neurological:      Mental Status: She is alert and oriented to person, place, and time.      Motor: Weakness present.   Psychiatric:         Mood and Affect: Mood normal.         Behavior: Behavior is cooperative.         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  A minimal amount of bleeding was controlled with pressure. The procedure was tolerated well with a pain level of 0 throughout and a pain level of 0 following the procedure.  Post-debridement measurements unchanged. Character of wound/ulcer post-debridement is improved.     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  A minimal amount of bleeding was controlled with pressure. The procedure was tolerated well with a pain level of 0 throughout and a pain level of 0 following the procedure.  Post-debridement measurements unchanged. Character of wound/ulcer post-debridement is improved.     TX: Irrigate with wound cleanser and apply Skin-Prep to PORTER area, apply collagen to wound bed, cover with calcium alginate and bordered foam cover, change daily and as needed    Turn every 2 hours  Prevalon boots  Air mattress  Cushion to chair  Dietitian following  Supplements per dietitian  Grab bars to bed to assist with bed mobility and repositioning  Weekly skin checks  House barrier cream to buttocks          Medications, treatments, and labs reviewed  Continue medications and treatments as listed in EMR    MAR Johnson    Scribe Attestation  IBibiana Scribe   attest that this documentation has been prepared under the direction and in the presence of MAR Johnson    Provider Attestation - Scribe documentation  All medical record entries made by the Scribe were at my direction and personally dictated by me. I have reviewed the chart and agree that the record accurately reflects my personal performance of the history, physical exam, discussion and plan.      Electronically Signed By: MAR Johnson   8/16/24 12:37 PM

## 2024-08-08 NOTE — PROGRESS NOTES
PROGRESS NOTE    Subjective   Chief complaint: Melody Martin is a 89 y.o. female who is an acute skilled patient being seen and evaluated for weakness    HPI:  HPI Patient working with therapy on strengthening and mobility.  Focus on supine to sit EOB.  Patient requires max assist to achieve a supported upright posture for 2 minutes before returning to supine with max A.  Patient also working on rolling side-to-side, bed mobility, and repositioning.  No new cocners today.  Denies n/v/f/c.    Objective   Vital signs: 117/52 - 97.3-72-18-94%.  B    Physical Exam  Constitutional:       General: She is not in acute distress.  Eyes:      Extraocular Movements: Extraocular movements intact.   Cardiovascular:      Rate and Rhythm: Normal rate and regular rhythm.   Pulmonary:      Effort: Pulmonary effort is normal.      Breath sounds: Normal breath sounds.   Abdominal:      General: Bowel sounds are normal.      Palpations: Abdomen is soft.   Musculoskeletal:      Cervical back: Neck supple.      Right lower leg: No edema.      Left lower leg: No edema.   Neurological:      Mental Status: She is alert and oriented to person, place, and time.      Motor: Weakness present.   Psychiatric:         Mood and Affect: Mood normal.         Behavior: Behavior is cooperative.         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  On HD per nephrology  Isosorbide dinitrate  Metoprolol  Diltiazem   Renal diet  Monitor BP  Remove extra fluid in dialysis         Weakness - Primary     Continue working in therapy          Medications,  treatments, and labs reviewed  Continue medications and treatments as listed in EMR      Scribe Attestation  ILeonid Scribe   attest that this documentation has been prepared under the direction and in the presence of MAR Johnson    Provider Attestation - Scribe documentation  All medical record entries made by the Scribe were at my direction and personally dictated by me. I have reviewed the chart and agree that the record accurately reflects my personal performance of the history, physical exam, discussion and plan.   MAR Johnson

## 2024-08-14 ENCOUNTER — NURSING HOME VISIT (OUTPATIENT)
Dept: POST ACUTE CARE | Facility: EXTERNAL LOCATION | Age: 89
End: 2024-08-14
Payer: COMMERCIAL

## 2024-08-14 DIAGNOSIS — Z86.73 HISTORY OF CVA (CEREBROVASCULAR ACCIDENT): ICD-10-CM

## 2024-08-14 DIAGNOSIS — R23.8 SKIN IRRITATION: Primary | ICD-10-CM

## 2024-08-14 DIAGNOSIS — N18.6 TYPE 2 DIABETES MELLITUS WITH CHRONIC KIDNEY DISEASE ON CHRONIC DIALYSIS, WITH LONG-TERM CURRENT USE OF INSULIN (MULTI): ICD-10-CM

## 2024-08-14 DIAGNOSIS — Z79.4 TYPE 2 DIABETES MELLITUS WITH CHRONIC KIDNEY DISEASE ON CHRONIC DIALYSIS, WITH LONG-TERM CURRENT USE OF INSULIN (MULTI): ICD-10-CM

## 2024-08-14 DIAGNOSIS — Z99.2 HYPERTENSIVE HEART AND KIDNEY DISEASE WITH CHRONIC DIASTOLIC CONGESTIVE HEART FAILURE AND STAGE 5 CHRONIC KIDNEY DISEASE ON CHRONIC DIALYSIS (MULTI): ICD-10-CM

## 2024-08-14 DIAGNOSIS — N18.6 HYPERTENSIVE HEART AND KIDNEY DISEASE WITH CHRONIC DIASTOLIC CONGESTIVE HEART FAILURE AND STAGE 5 CHRONIC KIDNEY DISEASE ON CHRONIC DIALYSIS (MULTI): ICD-10-CM

## 2024-08-14 DIAGNOSIS — I50.32 HYPERTENSIVE HEART AND KIDNEY DISEASE WITH CHRONIC DIASTOLIC CONGESTIVE HEART FAILURE AND STAGE 5 CHRONIC KIDNEY DISEASE ON CHRONIC DIALYSIS (MULTI): ICD-10-CM

## 2024-08-14 DIAGNOSIS — E11.22 TYPE 2 DIABETES MELLITUS WITH CHRONIC KIDNEY DISEASE ON CHRONIC DIALYSIS, WITH LONG-TERM CURRENT USE OF INSULIN (MULTI): ICD-10-CM

## 2024-08-14 DIAGNOSIS — Z99.2 TYPE 2 DIABETES MELLITUS WITH CHRONIC KIDNEY DISEASE ON CHRONIC DIALYSIS, WITH LONG-TERM CURRENT USE OF INSULIN (MULTI): ICD-10-CM

## 2024-08-14 DIAGNOSIS — I13.2 HYPERTENSIVE HEART AND KIDNEY DISEASE WITH CHRONIC DIASTOLIC CONGESTIVE HEART FAILURE AND STAGE 5 CHRONIC KIDNEY DISEASE ON CHRONIC DIALYSIS (MULTI): ICD-10-CM

## 2024-08-14 PROCEDURE — 99308 SBSQ NF CARE LOW MDM 20: CPT | Performed by: INTERNAL MEDICINE

## 2024-08-14 NOTE — PROGRESS NOTES
"PROGRESS NOTE    Subjective   Chief complaint: Melody Martin is a 89 y.o. female who is a long term care patient being seen and evaluated for heel irritation    HPI:  HPI  Patient is seen due to nursing calling yesterday reporting that patient had right heel skin irritation.  Orders were placed to pad and protect\" heels.  Continue to monitor.    Objective   Vital signs: 144/61, 97.2, 60, 18, 94%, blood sugar 221    Physical Exam  Constitutional:       General: She is not in acute distress.  Eyes:      Extraocular Movements: Extraocular movements intact.   Cardiovascular:      Rate and Rhythm: Normal rate and regular rhythm.   Pulmonary:      Effort: Pulmonary effort is normal.      Breath sounds: Normal breath sounds.   Abdominal:      General: Bowel sounds are normal.      Palpations: Abdomen is soft.   Musculoskeletal:      Cervical back: Neck supple.      Right lower leg: No edema.      Left lower leg: No edema.   Neurological:      Mental Status: She is alert and oriented to person, place, and time.   Psychiatric:         Mood and Affect: Mood normal.         Behavior: Behavior is cooperative.         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  On HD per nephrology  Isosorbide dinitrate  Metoprolol  Diltiazem   Renal diet  Monitor BP  Remove extra fluid in dialysis         Skin irritation - Primary     Pad and protect  Float heels  Monitor          Medications, treatments, and labs reviewed  Continue medications and " treatments as listed in EMR    Scribe Attestation  I, Phoebe Martinez   attest that this documentation has been prepared under the direction and in the presence of Wilbert Lock MD    Provider Attestation - Scribe documentation  All medical record entries made by the Scribe were at my direction and personally dictated by me. I have reviewed the chart and agree that the record accurately reflects my personal performance of the history, physical exam, discussion and plan.   Wilbert Lock MD

## 2024-08-14 NOTE — LETTER
"Patient: Melody Martin  : 1935    Encounter Date: 2024    PROGRESS NOTE    Subjective  Chief complaint: Melody Martin is a 89 y.o. female who is a long term care patient being seen and evaluated for heel irritation    HPI:  HPI  Patient is seen due to nursing calling yesterday reporting that patient had right heel skin irritation.  Orders were placed to pad and protect\" heels.  Continue to monitor.    Objective  Vital signs: 144/61, 97.2, 60, 18, 94%, blood sugar 221    Physical Exam  Constitutional:       General: She is not in acute distress.  Eyes:      Extraocular Movements: Extraocular movements intact.   Cardiovascular:      Rate and Rhythm: Normal rate and regular rhythm.   Pulmonary:      Effort: Pulmonary effort is normal.      Breath sounds: Normal breath sounds.   Abdominal:      General: Bowel sounds are normal.      Palpations: Abdomen is soft.   Musculoskeletal:      Cervical back: Neck supple.      Right lower leg: No edema.      Left lower leg: No edema.   Neurological:      Mental Status: She is alert and oriented to person, place, and time.   Psychiatric:         Mood and Affect: Mood normal.         Behavior: Behavior is cooperative.         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  On HD per nephrology  Isosorbide dinitrate  Metoprolol  Diltiazem   Renal diet  Monitor BP  Remove extra fluid in dialysis         Skin irritation - Primary     Pad and protect  Float heels  Monitor      "     Medications, treatments, and labs reviewed  Continue medications and treatments as listed in EMR    Scribe Attestation  I, Phoebe Martinez   attest that this documentation has been prepared under the direction and in the presence of Wilbert Lock MD    Provider Attestation - Scribe documentation  All medical record entries made by the Scribe were at my direction and personally dictated by me. I have reviewed the chart and agree that the record accurately reflects my personal performance of the history, physical exam, discussion and plan.   Wilbert Lock MD            Electronically Signed By: Wilbert Lock MD   8/14/24  4:11 PM

## 2024-08-15 ENCOUNTER — NURSING HOME VISIT (OUTPATIENT)
Dept: POST ACUTE CARE | Facility: EXTERNAL LOCATION | Age: 89
End: 2024-08-15
Payer: COMMERCIAL

## 2024-08-15 DIAGNOSIS — N18.6 TYPE 2 DIABETES MELLITUS WITH CHRONIC KIDNEY DISEASE ON CHRONIC DIALYSIS, WITH LONG-TERM CURRENT USE OF INSULIN (MULTI): ICD-10-CM

## 2024-08-15 DIAGNOSIS — L89.623 STAGE III PRESSURE ULCER OF LEFT HEEL (MULTI): ICD-10-CM

## 2024-08-15 DIAGNOSIS — E11.22 TYPE 2 DIABETES MELLITUS WITH CHRONIC KIDNEY DISEASE ON CHRONIC DIALYSIS, WITH LONG-TERM CURRENT USE OF INSULIN (MULTI): ICD-10-CM

## 2024-08-15 DIAGNOSIS — L89.153 STAGE III PRESSURE ULCER OF SACRAL REGION (MULTI): ICD-10-CM

## 2024-08-15 DIAGNOSIS — L81.9 DISCOLORATION OF SKIN: Primary | ICD-10-CM

## 2024-08-15 DIAGNOSIS — Z79.4 TYPE 2 DIABETES MELLITUS WITH CHRONIC KIDNEY DISEASE ON CHRONIC DIALYSIS, WITH LONG-TERM CURRENT USE OF INSULIN (MULTI): ICD-10-CM

## 2024-08-15 DIAGNOSIS — I50.32 HYPERTENSIVE HEART AND KIDNEY DISEASE WITH CHRONIC DIASTOLIC CONGESTIVE HEART FAILURE AND STAGE 5 CHRONIC KIDNEY DISEASE ON CHRONIC DIALYSIS (MULTI): ICD-10-CM

## 2024-08-15 DIAGNOSIS — N18.6 HYPERTENSIVE HEART AND KIDNEY DISEASE WITH CHRONIC DIASTOLIC CONGESTIVE HEART FAILURE AND STAGE 5 CHRONIC KIDNEY DISEASE ON CHRONIC DIALYSIS (MULTI): ICD-10-CM

## 2024-08-15 DIAGNOSIS — Z99.2 TYPE 2 DIABETES MELLITUS WITH CHRONIC KIDNEY DISEASE ON CHRONIC DIALYSIS, WITH LONG-TERM CURRENT USE OF INSULIN (MULTI): ICD-10-CM

## 2024-08-15 DIAGNOSIS — I13.2 HYPERTENSIVE HEART AND KIDNEY DISEASE WITH CHRONIC DIASTOLIC CONGESTIVE HEART FAILURE AND STAGE 5 CHRONIC KIDNEY DISEASE ON CHRONIC DIALYSIS (MULTI): ICD-10-CM

## 2024-08-15 DIAGNOSIS — Z99.2 HYPERTENSIVE HEART AND KIDNEY DISEASE WITH CHRONIC DIASTOLIC CONGESTIVE HEART FAILURE AND STAGE 5 CHRONIC KIDNEY DISEASE ON CHRONIC DIALYSIS (MULTI): ICD-10-CM

## 2024-08-15 PROBLEM — L60.2 LONG TOENAIL: Status: ACTIVE | Noted: 2024-08-15

## 2024-08-15 PROCEDURE — 99309 SBSQ NF CARE MODERATE MDM 30: CPT | Performed by: NURSE PRACTITIONER

## 2024-08-15 PROCEDURE — 11042 DBRDMT SUBQ TIS 1ST 20SQCM/<: CPT | Performed by: NURSE PRACTITIONER

## 2024-08-15 NOTE — ASSESSMENT & PLAN NOTE
Right great toe  Seen by podiatry last week,  but nail is long and pressing into skin  Re-consult podiatry  Apply ATB ointment daily

## 2024-08-15 NOTE — ASSESSMENT & PLAN NOTE
After obtaining verbal concent, I performed subcutaneous tissue debridement with a total area of 14 cm2 with curette to remove viable and non-viable tissue/material including subcutaneous tissue, slough, biofilm, and fibrin/exudate after achieving pain control with 2% lidocaine topical gel.  A minimal amount of bleeding was controlled with pressure. The procedure was tolerated well with a pain level of 0 throughout and a pain level of 0 following the procedure.  Post-debridement measurements unchanged. Character of wound/ulcer post-debridement is improved.     TX: Irrigate with wound cleanser, apply oil emulsion gauze, calcium alginate, cover with ABD and wrap with Kerlix nightly and as needed    Turn every 2 hours  Prevalon boots  Pressure reducing mattress, patient refusing air mattress, discussed benefits/risks  Cushion to chair  Dietitian consult  Supplements per dietitian  Grab bars to bed to assist with bed mobility and repositioning  Weekly skin checks

## 2024-08-15 NOTE — LETTER
Patient: Melody Martin  : 1935    Encounter Date: 08/15/2024    PROGRESS NOTE    Subjective  Chief complaint: Melody Martin is a 89 y.o. female who is a long term care patient being seen and evaluated for follow-up on wounds.    HPI:  HPIPatient presents for follow-up wounds. She has no new concerns today. Denies constitutional symptoms. In addition nursing staff called on  and reported patient's right heel with skin irritation. I gave order to pad and protect and to continue to float heels.      Objective  Vital signs: 128/89- 98.0-60-18-94%     Physical Exam  Constitutional:       General: She is not in acute distress.  Eyes:      Extraocular Movements: Extraocular movements intact.   Cardiovascular:      Rate and Rhythm: Normal rate and regular rhythm.   Pulmonary:      Effort: Pulmonary effort is normal.      Breath sounds: Normal breath sounds.   Abdominal:      General: Bowel sounds are normal.      Palpations: Abdomen is soft.   Musculoskeletal:      Cervical back: Neck supple.      Right lower leg: No edema.      Left lower leg: No edema.   Skin:     Comments: Wound location -left bunion  Etiology -  DTI  Size -0 x 0 x 0 cm    Wound location - sacrum  Etiology -  stage III pressure ulcer  Granulation -  large  Slough -  small  Edges -  flat  Odor -  no  Drainage -medium serosanguineous  Size -2.2 x 2 x 0.2 cm     Wound location - left heel  Etiology -  stage III pressure ulcer  Granulation - large  Slough -  small  Edges -  flat  Odor -  no  Drainage -  medium serosanguineous  Size -3.5 x 4 x 0.3 cm    Right heel reddened, blanchable, mushy   Neurological:      Mental Status: She is alert and oriented to person, place, and time.      Motor: Weakness present.   Psychiatric:         Mood and Affect: Mood normal.         Behavior: Behavior is cooperative.         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  A minimal amount of bleeding was controlled with pressure. The procedure was tolerated well with a pain level of 0 throughout and a pain level of 0 following the procedure.  Post-debridement measurements unchanged. Character of wound/ulcer post-debridement is improved.     TX: Irrigate with wound cleanser, calcium alginate AG, cover with ABD and wrap with Kerlix nightly and as needed    Turn every 2 hours  Prevalon boots  Pressure reducing mattress, patient refusing air mattress, discussed benefits/risks  Cushion to chair  Dietitian consult  Supplements per dietitian  Grab bars to bed to assist with bed mobility and repositioning  Weekly skin checks         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 (Multi)     After obtaining verbal concent, I performed subcutaneous tissue debridement with a total area of 4.4 cm2 with curette to remove viable and non-viable tissue/material including subcutaneous tissue, slough, biofilm, and fibrin/exudate after achieving pain control with 2% lidocaine topical gel.  A minimal amount of bleeding was controlled with pressure. The procedure was tolerated well with a pain level of 0 throughout and a pain level of 0 following the procedure.  Post-debridement measurements unchanged.  Character of wound/ulcer post-debridement is improved.     TX: Irrigate with wound cleanser and apply Skin-Prep to PORTER area, apply collagen to wound bed, cover with calcium alginate and bordered foam cover, change daily and as needed    Turn every 2 hours  Prevalon boots  Air mattress  Cushion to chair  Dietitian following  Supplements per dietitian  Grab bars to bed to assist with bed mobility and repositioning  Weekly skin checks  House barrier cream to buttocks         Type 2 diabetes mellitus with chronic kidney disease on chronic dialysis, with long-term current use of insulin (Multi)     Carb controlled diet  Monitor Glucoscan  Continue insulin            Medications, treatments, and labs reviewed  Continue medications and treatments as listed in EMR    Scribe Attestation  ILeonid Scribe   attest that this documentation has been prepared under the direction and in the presence of MAR Johnson    Provider Attestation - Scribe documentation  All medical record entries made by the Scribe were at my direction and personally dictated by me. I have reviewed the chart and agree that the record accurately reflects my personal performance of the history, physical exam, discussion and plan.   MAR Johnson            Electronically Signed By: MAR Johnson   8/20/24 11:59 AM

## 2024-08-15 NOTE — ASSESSMENT & PLAN NOTE
After obtaining verbal concent, I performed subcutaneous tissue debridement with a total area of 5 cm2 with curette to remove viable and non-viable tissue/material including subcutaneous tissue, slough, biofilm, and fibrin/exudate after achieving pain control with 2% lidocaine topical gel.  A minimal amount of bleeding was controlled with pressure. The procedure was tolerated well with a pain level of 0 throughout and a pain level of 0 following the procedure.  Post-debridement measurements unchanged. Character of wound/ulcer post-debridement is improved.     TX: Irrigate with wound cleanser and apply Skin-Prep to PORTER area, apply collagen to wound bed, cover with calcium alginate and bordered foam cover, change daily and as needed    Turn every 2 hours  Prevalon boots  Air mattress  Cushion to chair  Dietitian following  Supplements per dietitian  Grab bars to bed to assist with bed mobility and repositioning  Weekly skin checks  House barrier cream to buttocks

## 2024-08-15 NOTE — PROGRESS NOTES
PROGRESS NOTE    Subjective   Chief complaint: Melody Martin is a 89 y.o. female who is a long term care patient being seen and evaluated for  wounds, right great toe pain.    HPI:  HPI  Patient presents for follow-up wounds.  She has complaint of right great toe pain which she states started after visit by podiatry last week.  No new concerns otherwise.  Denies constitutional symptoms    Objective   Vital signs: .  117/52, 97.3, 72, 18, 94%, blood sugar 179    Physical Exam  Constitutional:       General: She is not in acute distress.  Eyes:      Extraocular Movements: Extraocular movements intact.   Cardiovascular:      Rate and Rhythm: Normal rate and regular rhythm.   Pulmonary:      Effort: Pulmonary effort is normal.      Breath sounds: Normal breath sounds.   Abdominal:      General: Bowel sounds are normal.      Palpations: Abdomen is soft.   Musculoskeletal:      Cervical back: Neck supple.      Right lower leg: No edema.      Left lower leg: No edema.   Skin:     Comments: Wound location -left bunion  Etiology -  DTI  Size -  0.3 x 1cm    Wound location - sacrum  Etiology -  stage III pressure ulcer  Granulation -  large  Slough -  small  Edges -  flat, macerated  Odor -  no  Drainage -  large serosanguineous  Size -  2 x 2.5 x 0.2 cm     Wound location - left heel  Etiology -  stage III pressure ulcer  Granulation - large  Slough -  small  Edges -  flat  Odor -  no  Drainage -  medium serosanguineous  Size -  4 x 3.5 x 0.5 cm    Right great toe with swelling, no redness,   tender to palpitation, small blood from nail bed edge   Neurological:      Mental Status: She is alert and oriented to person, place, and time.      Motor: Weakness present.   Psychiatric:         Mood and Affect: Mood normal.         Behavior: Behavior is cooperative.         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  A minimal amount of bleeding was controlled with pressure. The procedure was tolerated well with a pain level of 0 throughout and a pain level of 0 following the procedure.  Post-debridement measurements unchanged. Character of wound/ulcer post-debridement is improved.     TX: Irrigate with wound cleanser, apply oil emulsion gauze, calcium alginate, cover with ABD and wrap with Kerlix nightly and as needed    Turn every 2 hours  Prevalon boots  Pressure reducing mattress, patient refusing air mattress, discussed benefits/risks  Cushion to chair  Dietitian consult  Supplements per dietitian  Grab bars to bed to assist with bed mobility and repositioning  Weekly skin checks         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 (Multi)     After obtaining verbal concent, I performed subcutaneous tissue debridement with a total area of 5 cm2 with curette to remove viable and non-viable tissue/material including subcutaneous tissue, slough, biofilm, and fibrin/exudate after achieving pain control with 2% lidocaine topical gel.  A minimal amount  of bleeding was controlled with pressure. The procedure was tolerated well with a pain level of 0 throughout and a pain level of 0 following the procedure.  Post-debridement measurements unchanged. Character of wound/ulcer post-debridement is improved.     TX: Irrigate with wound cleanser and apply Skin-Prep to PORTER area, apply collagen to wound bed, cover with calcium alginate and bordered foam cover, change daily and as needed    Turn every 2 hours  Prevalon boots  Air mattress  Cushion to chair  Dietitian following  Supplements per dietitian  Grab bars to bed to assist with bed mobility and repositioning  Weekly skin checks  House barrier cream to buttocks          Medications, treatments, and labs reviewed  Continue medications and treatments as listed in EMR    MAR Johnson    Scribe Attestation  Bibiana MARRERO Scribe   attest that this documentation has been prepared under the direction and in the presence of MAR Johnson    Provider Attestation - Scribe documentation  All medical record entries made by the Scribe were at my direction and personally dictated by me. I have reviewed the chart and agree that the record accurately reflects my personal performance of the history, physical exam, discussion and plan.

## 2024-08-16 ENCOUNTER — NURSING HOME VISIT (OUTPATIENT)
Dept: POST ACUTE CARE | Facility: EXTERNAL LOCATION | Age: 89
End: 2024-08-16
Payer: COMMERCIAL

## 2024-08-16 DIAGNOSIS — Z79.4 TYPE 2 DIABETES MELLITUS WITH CHRONIC KIDNEY DISEASE ON CHRONIC DIALYSIS, WITH LONG-TERM CURRENT USE OF INSULIN (MULTI): ICD-10-CM

## 2024-08-16 DIAGNOSIS — Z86.73 HISTORY OF CVA (CEREBROVASCULAR ACCIDENT): ICD-10-CM

## 2024-08-16 DIAGNOSIS — N18.6 HYPERTENSIVE HEART AND KIDNEY DISEASE WITH CHRONIC DIASTOLIC CONGESTIVE HEART FAILURE AND STAGE 5 CHRONIC KIDNEY DISEASE ON CHRONIC DIALYSIS (MULTI): ICD-10-CM

## 2024-08-16 DIAGNOSIS — I50.32 HYPERTENSIVE HEART AND KIDNEY DISEASE WITH CHRONIC DIASTOLIC CONGESTIVE HEART FAILURE AND STAGE 5 CHRONIC KIDNEY DISEASE ON CHRONIC DIALYSIS (MULTI): ICD-10-CM

## 2024-08-16 DIAGNOSIS — Z99.2 HYPERTENSIVE HEART AND KIDNEY DISEASE WITH CHRONIC DIASTOLIC CONGESTIVE HEART FAILURE AND STAGE 5 CHRONIC KIDNEY DISEASE ON CHRONIC DIALYSIS (MULTI): ICD-10-CM

## 2024-08-16 DIAGNOSIS — N18.6 TYPE 2 DIABETES MELLITUS WITH CHRONIC KIDNEY DISEASE ON CHRONIC DIALYSIS, WITH LONG-TERM CURRENT USE OF INSULIN (MULTI): ICD-10-CM

## 2024-08-16 DIAGNOSIS — I13.2 HYPERTENSIVE HEART AND KIDNEY DISEASE WITH CHRONIC DIASTOLIC CONGESTIVE HEART FAILURE AND STAGE 5 CHRONIC KIDNEY DISEASE ON CHRONIC DIALYSIS (MULTI): ICD-10-CM

## 2024-08-16 DIAGNOSIS — Z99.2 TYPE 2 DIABETES MELLITUS WITH CHRONIC KIDNEY DISEASE ON CHRONIC DIALYSIS, WITH LONG-TERM CURRENT USE OF INSULIN (MULTI): ICD-10-CM

## 2024-08-16 DIAGNOSIS — E11.22 TYPE 2 DIABETES MELLITUS WITH CHRONIC KIDNEY DISEASE ON CHRONIC DIALYSIS, WITH LONG-TERM CURRENT USE OF INSULIN (MULTI): ICD-10-CM

## 2024-08-16 DIAGNOSIS — I48.91 ATRIAL FIBRILLATION, UNSPECIFIED TYPE (MULTI): Primary | ICD-10-CM

## 2024-08-16 PROCEDURE — 99309 SBSQ NF CARE MODERATE MDM 30: CPT | Performed by: INTERNAL MEDICINE

## 2024-08-16 NOTE — PROGRESS NOTES
PROGRESS NOTE    Subjective   Chief complaint: Melody Martin is a 89 y.o. female who is a long term care patient being seen and evaluated for monthly general medical care and follow-up    HPI:  HPI  Patient presents for general medical care and f/u.  Patient seen and examined at bedside.  No issues per nursing.  Patient has no acute complaints.  Patient with diagnosis of ESRD, on HD, tolerating treatment well.  HTN BP at goal.  Denies chest pain and headache.  AFIB stable, denies palpitations and chest pain.  CHF stable, denies sob, orthopnea, weight gain.  DM, denies polydipsia polyuria polyphagia.  Hx CVA, denies changes in weakness and speech.  Mentation at baseline, no acute distress.    Objective   Vital signs: 128/89, 98.0, 60, 18, 94%, blood sugar 131    Physical Exam  Constitutional:       General: She is not in acute distress.  Eyes:      Extraocular Movements: Extraocular movements intact.   Cardiovascular:      Rate and Rhythm: Normal rate and regular rhythm.   Pulmonary:      Effort: Pulmonary effort is normal.      Breath sounds: Normal breath sounds.   Abdominal:      General: Bowel sounds are normal.      Palpations: Abdomen is soft.   Musculoskeletal:      Cervical back: Neck supple.      Right lower leg: No edema.      Left lower leg: No edema.   Neurological:      Mental Status: She is alert and oriented to person, place, and time.   Psychiatric:         Mood and Affect: Mood normal.         Behavior: Behavior is cooperative.         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  On HD per nephrology  Isosorbide dinitrate  Metoprolol  Diltiazem   Renal diet  Monitor BP  Remove extra fluid in dialysis         Atrial fibrillation (Multi) - Primary     Monitor heart rate, controlled  Amiodarone  Metoprolol   Apixaban  Bleeding precautions          Medications, treatments, and labs reviewed  Continue medications and treatments as listed in EMR    Scribe Attestation  I, Carl Martinezibe   attest that this documentation has been prepared under the direction and in the presence of Wilbert Lock MD    Provider Attestation - Scribe documentation  All medical record entries made by the Scribe were at my direction and personally dictated by me. I have reviewed the chart and agree that the record accurately reflects my personal performance of the history, physical exam, discussion and plan.   Wilbert Lock MD

## 2024-08-16 NOTE — LETTER
Patient: Melody Martin  : 1935    Encounter Date: 2024    PROGRESS NOTE    Subjective  Chief complaint: Melody Martin is a 89 y.o. female who is a long term care patient being seen and evaluated for monthly general medical care and follow-up    HPI:  HPI  Patient presents for general medical care and f/u.  Patient seen and examined at bedside.  No issues per nursing.  Patient has no acute complaints.  Patient with diagnosis of ESRD, on HD, tolerating treatment well.  HTN BP at goal.  Denies chest pain and headache.  AFIB stable, denies palpitations and chest pain.  CHF stable, denies sob, orthopnea, weight gain.  DM, denies polydipsia polyuria polyphagia.  Hx CVA, denies changes in weakness and speech.  Mentation at baseline, no acute distress.    Objective  Vital signs: 128/89, 98.0, 60, 18, 94%, blood sugar 131    Physical Exam  Constitutional:       General: She is not in acute distress.  Eyes:      Extraocular Movements: Extraocular movements intact.   Cardiovascular:      Rate and Rhythm: Normal rate and regular rhythm.   Pulmonary:      Effort: Pulmonary effort is normal.      Breath sounds: Normal breath sounds.   Abdominal:      General: Bowel sounds are normal.      Palpations: Abdomen is soft.   Musculoskeletal:      Cervical back: Neck supple.      Right lower leg: No edema.      Left lower leg: No edema.   Neurological:      Mental Status: She is alert and oriented to person, place, and time.   Psychiatric:         Mood and Affect: Mood normal.         Behavior: Behavior is cooperative.         Assessment/Plan  Problem List Items Addressed This Visit       Type 2 diabetes mellitus with chronic kidney disease on chronic dialysis, with long-term current use of insulin (Multi)     Carb controlled diet  Monitor Glucoscan  Continue insulin           History of CVA (cerebrovascular accident)     Statin  Monitor for changes and weakness         Hypertensive heart and kidney disease with chronic  diastolic congestive heart failure and stage 5 chronic kidney disease on chronic dialysis (Multi)     Stable, no shortness of breath.  On HD per nephrology  Isosorbide dinitrate  Metoprolol  Diltiazem   Renal diet  Monitor BP  Remove extra fluid in dialysis         Atrial fibrillation (Multi) - Primary     Monitor heart rate, controlled  Amiodarone  Metoprolol   Apixaban  Bleeding precautions          Medications, treatments, and labs reviewed  Continue medications and treatments as listed in EMR    Scribe Attestation  I, Carl Martinezibe   attest that this documentation has been prepared under the direction and in the presence of Wiblert Lock MD    Provider Attestation - Scribe documentation  All medical record entries made by the Scribe were at my direction and personally dictated by me. I have reviewed the chart and agree that the record accurately reflects my personal performance of the history, physical exam, discussion and plan.   Wilbert Lock MD            Electronically Signed By: Wilbert Lock MD   8/16/24  3:35 PM

## 2024-08-16 NOTE — ASSESSMENT & PLAN NOTE
Left foot  Treatment: Clean with normal saline, pat dry, paint with Betadine, pad and protect nightly and as needed  Offload pressure

## 2024-08-19 PROBLEM — L81.9 DISCOLORATION OF SKIN: Status: ACTIVE | Noted: 2024-08-19

## 2024-08-20 NOTE — PROGRESS NOTES
PROGRESS NOTE    Subjective   Chief complaint: Melody Martin is a 89 y.o. female who is a long term care patient being seen and evaluated for follow-up on wounds.    HPI:  HPIPatient presents for follow-up wounds. She has no new concerns today. Denies constitutional symptoms. In addition nursing staff called on 8/13 and reported patient's right heel with skin irritation. I gave order to pad and protect and to continue to float heels.      Objective   Vital signs: 128/89- 98.0-60-18-94%     Physical Exam  Constitutional:       General: She is not in acute distress.  Eyes:      Extraocular Movements: Extraocular movements intact.   Cardiovascular:      Rate and Rhythm: Normal rate and regular rhythm.   Pulmonary:      Effort: Pulmonary effort is normal.      Breath sounds: Normal breath sounds.   Abdominal:      General: Bowel sounds are normal.      Palpations: Abdomen is soft.   Musculoskeletal:      Cervical back: Neck supple.      Right lower leg: No edema.      Left lower leg: No edema.   Skin:     Comments: Wound location -left bunion  Etiology -  DTI  Size -0 x 0 x 0 cm    Wound location - sacrum  Etiology -  stage III pressure ulcer  Granulation -  large  Slough -  small  Edges -  flat  Odor -  no  Drainage -medium serosanguineous  Size -2.2 x 2 x 0.2 cm     Wound location - left heel  Etiology -  stage III pressure ulcer  Granulation - large  Slough -  small  Edges -  flat  Odor -  no  Drainage -  medium serosanguineous  Size -3.5 x 4 x 0.3 cm    Right heel reddened, blanchable, mushy   Neurological:      Mental Status: She is alert and oriented to person, place, and time.      Motor: Weakness present.   Psychiatric:         Mood and Affect: Mood normal.         Behavior: Behavior is cooperative.         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  A minimal amount of bleeding was controlled with pressure. The procedure was tolerated well with a pain level of 0 throughout and a pain level of 0 following the procedure.  Post-debridement measurements unchanged. Character of wound/ulcer post-debridement is improved.     TX: Irrigate with wound cleanser, calcium alginate AG, cover with ABD and wrap with Kerlix nightly and as needed    Turn every 2 hours  Prevalon boots  Pressure reducing mattress, patient refusing air mattress, discussed benefits/risks  Cushion to chair  Dietitian consult  Supplements per dietitian  Grab bars to bed to assist with bed mobility and repositioning  Weekly skin checks         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 (Multi)     After obtaining verbal concent, I performed subcutaneous tissue debridement with a total area of 4.4 cm2 with curette to remove viable and non-viable tissue/material including subcutaneous tissue, slough, biofilm, and fibrin/exudate after achieving pain control with 2% lidocaine topical gel.  A minimal amount of bleeding was controlled with pressure. The procedure was tolerated well with a pain level of 0 throughout and a pain level of 0 following the procedure.  Post-debridement measurements unchanged. Character of wound/ulcer post-debridement is improved.     TX: Irrigate  with wound cleanser and apply Skin-Prep to PORTER area, apply collagen to wound bed, cover with calcium alginate and bordered foam cover, change daily and as needed    Turn every 2 hours  Prevalon boots  Air mattress  Cushion to chair  Dietitian following  Supplements per dietitian  Grab bars to bed to assist with bed mobility and repositioning  Weekly skin checks  House barrier cream to buttocks         Type 2 diabetes mellitus with chronic kidney disease on chronic dialysis, with long-term current use of insulin (Multi)     Carb controlled diet  Monitor Glucoscan  Continue insulin            Medications, treatments, and labs reviewed  Continue medications and treatments as listed in EMR    Scribe Attestation  I, Phoebe Bennett   attest that this documentation has been prepared under the direction and in the presence of MAR Johnson    Provider Attestation - Scribe documentation  All medical record entries made by the Scribe were at my direction and personally dictated by me. I have reviewed the chart and agree that the record accurately reflects my personal performance of the history, physical exam, discussion and plan.   MAR Johnson

## 2024-08-20 NOTE — ASSESSMENT & PLAN NOTE
After obtaining verbal concent, I performed subcutaneous tissue debridement with a total area of 14 cm2 with curette to remove viable and non-viable tissue/material including subcutaneous tissue, slough, biofilm, and fibrin/exudate after achieving pain control with 2% lidocaine topical gel.  A minimal amount of bleeding was controlled with pressure. The procedure was tolerated well with a pain level of 0 throughout and a pain level of 0 following the procedure.  Post-debridement measurements unchanged. Character of wound/ulcer post-debridement is improved.     TX: Irrigate with wound cleanser, calcium alginate AG, cover with ABD and wrap with Kerlix nightly and as needed    Turn every 2 hours  Prevalon boots  Pressure reducing mattress, patient refusing air mattress, discussed benefits/risks  Cushion to chair  Dietitian consult  Supplements per dietitian  Grab bars to bed to assist with bed mobility and repositioning  Weekly skin checks

## 2024-08-20 NOTE — ASSESSMENT & PLAN NOTE
After obtaining verbal concent, I performed subcutaneous tissue debridement with a total area of 4.4 cm2 with curette to remove viable and non-viable tissue/material including subcutaneous tissue, slough, biofilm, and fibrin/exudate after achieving pain control with 2% lidocaine topical gel.  A minimal amount of bleeding was controlled with pressure. The procedure was tolerated well with a pain level of 0 throughout and a pain level of 0 following the procedure.  Post-debridement measurements unchanged. Character of wound/ulcer post-debridement is improved.     TX: Irrigate with wound cleanser and apply Skin-Prep to PORTER area, apply collagen to wound bed, cover with calcium alginate and bordered foam cover, change daily and as needed    Turn every 2 hours  Prevalon boots  Air mattress  Cushion to chair  Dietitian following  Supplements per dietitian  Grab bars to bed to assist with bed mobility and repositioning  Weekly skin checks  House barrier cream to buttocks

## 2024-08-22 ENCOUNTER — NURSING HOME VISIT (OUTPATIENT)
Dept: POST ACUTE CARE | Facility: EXTERNAL LOCATION | Age: 89
End: 2024-08-22
Payer: COMMERCIAL

## 2024-08-22 DIAGNOSIS — Z99.2 HYPERTENSIVE HEART AND KIDNEY DISEASE WITH CHRONIC DIASTOLIC CONGESTIVE HEART FAILURE AND STAGE 5 CHRONIC KIDNEY DISEASE ON CHRONIC DIALYSIS (MULTI): ICD-10-CM

## 2024-08-22 DIAGNOSIS — L89.153 STAGE III PRESSURE ULCER OF SACRAL REGION (MULTI): ICD-10-CM

## 2024-08-22 DIAGNOSIS — I50.32 HYPERTENSIVE HEART AND KIDNEY DISEASE WITH CHRONIC DIASTOLIC CONGESTIVE HEART FAILURE AND STAGE 5 CHRONIC KIDNEY DISEASE ON CHRONIC DIALYSIS (MULTI): ICD-10-CM

## 2024-08-22 DIAGNOSIS — L89.620 UNSTAGEABLE PRESSURE ULCER OF LEFT HEEL (MULTI): Primary | ICD-10-CM

## 2024-08-22 DIAGNOSIS — I13.2 HYPERTENSIVE HEART AND KIDNEY DISEASE WITH CHRONIC DIASTOLIC CONGESTIVE HEART FAILURE AND STAGE 5 CHRONIC KIDNEY DISEASE ON CHRONIC DIALYSIS (MULTI): ICD-10-CM

## 2024-08-22 DIAGNOSIS — N18.6 HYPERTENSIVE HEART AND KIDNEY DISEASE WITH CHRONIC DIASTOLIC CONGESTIVE HEART FAILURE AND STAGE 5 CHRONIC KIDNEY DISEASE ON CHRONIC DIALYSIS (MULTI): ICD-10-CM

## 2024-08-22 PROCEDURE — 99309 SBSQ NF CARE MODERATE MDM 30: CPT | Performed by: NURSE PRACTITIONER

## 2024-08-22 PROCEDURE — 11042 DBRDMT SUBQ TIS 1ST 20SQCM/<: CPT | Performed by: NURSE PRACTITIONER

## 2024-08-22 NOTE — ASSESSMENT & PLAN NOTE
After obtaining verbal concent, I performed subcutaneous tissue debridement with a total area of 4.2 cm2 with curette to remove viable and non-viable tissue/material including subcutaneous tissue, slough, biofilm, and fibrin/exudate after achieving pain control with 2% lidocaine topical gel.  A minimal amount of bleeding was controlled with pressure. The procedure was tolerated well with a pain level of 0 throughout and a pain level of 0 following the procedure.  Post-debridement measurements unchanged. Character of wound/ulcer post-debridement is improved.     TX: Irrigate with wound cleanser and apply Skin-Prep to PORTER area, apply collagen to wound bed, cover with calcium alginate and bordered foam cover, change daily and as needed    Turn every 2 hours  Prevalon boots  Air mattress  Cushion to chair  Dietitian following  Supplements per dietitian  Grab bars to bed to assist with bed mobility and repositioning  Weekly skin checks  House barrier cream to buttocks    Wound stable

## 2024-08-22 NOTE — ASSESSMENT & PLAN NOTE
After obtaining verbal concent, I performed subcutaneous tissue debridement with a total area of 14 cm2 with curette to remove viable and non-viable tissue/material including subcutaneous tissue, slough, biofilm, and fibrin/exudate after achieving pain control with 2% lidocaine topical gel.  A minimal amount of bleeding was controlled with pressure. The procedure was tolerated well with a pain level of 0 throughout and a pain level of 0 following the procedure.  Post-debridement measurements unchanged. Character of wound/ulcer post-debridement is improved.     Change Tx to: irrigate with ns, pat dry, apply Vasch moistened gauze, ABD, Kerlix daily and as needed    Obtain culture and x-ray  Nurse to schedule appointment with vascular - discussed with nursing    Turn every 2 hours  Prevalon boots  Pressure reducing mattress, patient refusing air mattress, discussed benefits/risks  Cushion to chair  Dietitian consult  Supplements per dietitian  Grab bars to bed to assist with bed mobility and repositioning  Weekly skin checks

## 2024-08-22 NOTE — PROGRESS NOTES
PROGRESS NOTE    Subjective   Chief complaint: Melody Martin is a 89 y.o. female who is a long term care patient being seen and evaluated for f/u wounds.    HPI:  HPI  Patient is seen in follow-up of wounds, sacrum and left heel.  Patient has not see vascular specialist yet.  Patient was seen and examined at the bedside, appears to be in no acute distress.    Objective   Vital signs: 163/58, 97.3, 62, 18, 94%    Physical Exam  Constitutional:       General: She is not in acute distress.  Eyes:      Extraocular Movements: Extraocular movements intact.   Cardiovascular:      Rate and Rhythm: Normal rate and regular rhythm.   Pulmonary:      Effort: Pulmonary effort is normal.      Breath sounds: Normal breath sounds.   Musculoskeletal:      Cervical back: Neck supple.      Right lower leg: No edema.      Left lower leg: No edema.   Skin:     Comments:   Wound location - sacrum  Etiology -  stage III pressure ulcer  Granulation -  large  Slough -  small  Edges -  flat, mecerated  Odor -  no  Drainage -medium serosanguineous  Size -2.2 x 2 x 0.2 cm     Wound location - left heel  Etiology -unstageable pressure ulcer  Granulation - small  Slough -large  Edges -  flat  Odor -yes  Drainage -  medium purulent  Size -4 x 3.5 x utd cm   Neurological:      Mental Status: She is alert and oriented to person, place, and time.      Motor: Weakness present.   Psychiatric:         Mood and Affect: Mood normal.         Behavior: Behavior is cooperative.         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  On HD per nephrology  Isosorbide dinitrate  Metoprolol  Diltiazem   Renal diet  Monitor BP  Remove extra fluid in dialysis         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 (Multi)     After obtaining verbal concent, I performed  subcutaneous tissue debridement with a total area of 4.2 cm2 with curette to remove viable and non-viable tissue/material including subcutaneous tissue, slough, biofilm, and fibrin/exudate after achieving pain control with 2% lidocaine topical gel.  A minimal amount of bleeding was controlled with pressure. The procedure was tolerated well with a pain level of 0 throughout and a pain level of 0 following the procedure.  Post-debridement measurements unchanged. Character of wound/ulcer post-debridement is improved.     TX: Irrigate with wound cleanser and apply Skin-Prep to PORTER area, apply collagen to wound bed, cover with calcium alginate and bordered foam cover, change daily and as needed    Turn every 2 hours  Prevalon boots  Air mattress  Cushion to chair  Dietitian following  Supplements per dietitian  Grab bars to bed to assist with bed mobility and repositioning  Weekly skin checks  House barrier cream to buttocks    Wound stable         Unstageable pressure ulcer of left heel (Multi) - Primary     After obtaining verbal concent, I performed subcutaneous tissue debridement with a total area of 14 cm2 with curette to remove viable and non-viable tissue/material including subcutaneous tissue, slough, biofilm, and fibrin/exudate after achieving pain control with 2% lidocaine topical gel.  A minimal amount of bleeding was controlled with pressure. The procedure was tolerated well with a pain level of 0 throughout and a pain level of 0 following the procedure.  Post-debridement measurements unchanged. Character of wound/ulcer post-debridement is improved.     Change Tx to: irrigate with ns, pat dry, apply Vasch moistened gauze, ABD, Kerlix daily and as needed    Obtain culture and x-ray  Nurse to schedule appointment with vascular - discussed with nursing    Turn every 2 hours  Prevalon boots  Pressure reducing mattress, patient refusing air mattress, discussed benefits/risks  Cushion to chair  Dietitian  consult  Supplements per dietitian  Grab bars to bed to assist with bed mobility and repositioning  Weekly skin checks          Medications, treatments, and labs reviewed  Continue medications and treatments as listed in EMR    Scribe Attestation  I, Phoebe Martinez   attest that this documentation has been prepared under the direction and in the presence of MAR Johnson    Provider Attestation - Scribe documentation  All medical record entries made by the Scribe were at my direction and personally dictated by me. I have reviewed the chart and agree that the record accurately reflects my personal performance of the history, physical exam, discussion and plan.   MAR Johnson

## 2024-08-22 NOTE — LETTER
Patient: Melody Martin  : 1935    Encounter Date: 2024    PROGRESS NOTE    Subjective  Chief complaint: Melody Martin is a 89 y.o. female who is a long term care patient being seen and evaluated for f/u wounds.    HPI:  HPI  Patient is seen in follow-up of wounds, sacrum and left heel.  Patient has not see vascular specialist yet.  Patient was seen and examined at the bedside, appears to be in no acute distress.    Objective  Vital signs: 163/58, 97.3, 62, 18, 94%    Physical Exam  Constitutional:       General: She is not in acute distress.  Eyes:      Extraocular Movements: Extraocular movements intact.   Cardiovascular:      Rate and Rhythm: Normal rate and regular rhythm.   Pulmonary:      Effort: Pulmonary effort is normal.      Breath sounds: Normal breath sounds.   Musculoskeletal:      Cervical back: Neck supple.      Right lower leg: No edema.      Left lower leg: No edema.   Skin:     Comments:   Wound location - sacrum  Etiology -  stage III pressure ulcer  Granulation -  large  Slough -  small  Edges -  flat, mecerated  Odor -  no  Drainage -medium serosanguineous  Size -2.2 x 2 x 0.2 cm     Wound location - left heel  Etiology -unstageable pressure ulcer  Granulation - small  Slough -large  Edges -  flat  Odor -yes  Drainage -  medium purulent  Size -4 x 3.5 x utd cm   Neurological:      Mental Status: She is alert and oriented to person, place, and time.      Motor: Weakness present.   Psychiatric:         Mood and Affect: Mood normal.         Behavior: Behavior is cooperative.         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  On HD per nephrology  Isosorbide dinitrate  Metoprolol  Diltiazem   Renal diet  Monitor BP  Remove extra fluid in dialysis         Stage III pressure ulcer of  sacral region (Multi)     After obtaining verbal concent, I performed subcutaneous tissue debridement with a total area of 4.2 cm2 with curette to remove viable and non-viable tissue/material including subcutaneous tissue, slough, biofilm, and fibrin/exudate after achieving pain control with 2% lidocaine topical gel.  A minimal amount of bleeding was controlled with pressure. The procedure was tolerated well with a pain level of 0 throughout and a pain level of 0 following the procedure.  Post-debridement measurements unchanged. Character of wound/ulcer post-debridement is improved.     TX: Irrigate with wound cleanser and apply Skin-Prep to PORTER area, apply collagen to wound bed, cover with calcium alginate and bordered foam cover, change daily and as needed    Turn every 2 hours  Prevalon boots  Air mattress  Cushion to chair  Dietitian following  Supplements per dietitian  Grab bars to bed to assist with bed mobility and repositioning  Weekly skin checks  House barrier cream to buttocks    Wound stable         Unstageable pressure ulcer of left heel (Multi) - Primary     After obtaining verbal concent, I performed subcutaneous tissue debridement with a total area of 14 cm2 with curette to remove viable and non-viable tissue/material including subcutaneous tissue, slough, biofilm, and fibrin/exudate after achieving pain control with 2% lidocaine topical gel.  A minimal amount of bleeding was controlled with pressure. The procedure was tolerated well with a pain level of 0 throughout and a pain level of 0 following the procedure.  Post-debridement measurements unchanged. Character of wound/ulcer post-debridement is improved.     Change Tx to: irrigate with ns, pat dry, apply Vasch moistened gauze, ABD, Kerlix daily and as needed    Obtain culture and x-ray  Nurse to schedule appointment with vascular - discussed with nursing    Turn every 2 hours  Prevalon boots  Pressure reducing mattress, patient refusing air  mattress, discussed benefits/risks  Cushion to chair  Dietitian consult  Supplements per dietitian  Grab bars to bed to assist with bed mobility and repositioning  Weekly skin checks          Medications, treatments, and labs reviewed  Continue medications and treatments as listed in EMR    Scribe Attestation  IHien Scribe   attest that this documentation has been prepared under the direction and in the presence of MAR Johnson    Provider Attestation - Scribe documentation  All medical record entries made by the Scribe were at my direction and personally dictated by me. I have reviewed the chart and agree that the record accurately reflects my personal performance of the history, physical exam, discussion and plan.   MAR Johnson            Electronically Signed By: MAR Johnson   8/23/24  7:47 AM

## 2024-08-29 ENCOUNTER — NURSING HOME VISIT (OUTPATIENT)
Dept: POST ACUTE CARE | Facility: EXTERNAL LOCATION | Age: 89
End: 2024-08-29
Payer: COMMERCIAL

## 2024-08-29 DIAGNOSIS — L89.153 STAGE III PRESSURE ULCER OF SACRAL REGION (MULTI): Primary | ICD-10-CM

## 2024-08-29 DIAGNOSIS — N18.6 HYPERTENSIVE HEART AND KIDNEY DISEASE WITH CHRONIC DIASTOLIC CONGESTIVE HEART FAILURE AND STAGE 5 CHRONIC KIDNEY DISEASE ON CHRONIC DIALYSIS (MULTI): ICD-10-CM

## 2024-08-29 DIAGNOSIS — Z99.2 HYPERTENSIVE HEART AND KIDNEY DISEASE WITH CHRONIC DIASTOLIC CONGESTIVE HEART FAILURE AND STAGE 5 CHRONIC KIDNEY DISEASE ON CHRONIC DIALYSIS (MULTI): ICD-10-CM

## 2024-08-29 DIAGNOSIS — Z99.2 TYPE 2 DIABETES MELLITUS WITH CHRONIC KIDNEY DISEASE ON CHRONIC DIALYSIS, WITH LONG-TERM CURRENT USE OF INSULIN (MULTI): ICD-10-CM

## 2024-08-29 DIAGNOSIS — N18.6 TYPE 2 DIABETES MELLITUS WITH CHRONIC KIDNEY DISEASE ON CHRONIC DIALYSIS, WITH LONG-TERM CURRENT USE OF INSULIN (MULTI): ICD-10-CM

## 2024-08-29 DIAGNOSIS — L89.623 STAGE III PRESSURE ULCER OF LEFT HEEL (MULTI): ICD-10-CM

## 2024-08-29 DIAGNOSIS — I13.2 HYPERTENSIVE HEART AND KIDNEY DISEASE WITH CHRONIC DIASTOLIC CONGESTIVE HEART FAILURE AND STAGE 5 CHRONIC KIDNEY DISEASE ON CHRONIC DIALYSIS (MULTI): ICD-10-CM

## 2024-08-29 DIAGNOSIS — Z79.4 TYPE 2 DIABETES MELLITUS WITH CHRONIC KIDNEY DISEASE ON CHRONIC DIALYSIS, WITH LONG-TERM CURRENT USE OF INSULIN (MULTI): ICD-10-CM

## 2024-08-29 DIAGNOSIS — E11.22 TYPE 2 DIABETES MELLITUS WITH CHRONIC KIDNEY DISEASE ON CHRONIC DIALYSIS, WITH LONG-TERM CURRENT USE OF INSULIN (MULTI): ICD-10-CM

## 2024-08-29 DIAGNOSIS — I50.32 HYPERTENSIVE HEART AND KIDNEY DISEASE WITH CHRONIC DIASTOLIC CONGESTIVE HEART FAILURE AND STAGE 5 CHRONIC KIDNEY DISEASE ON CHRONIC DIALYSIS (MULTI): ICD-10-CM

## 2024-08-29 PROCEDURE — 99309 SBSQ NF CARE MODERATE MDM 30: CPT | Performed by: NURSE PRACTITIONER

## 2024-08-29 NOTE — LETTER
Patient: Melody Martin  : 1935    Encounter Date: 2024    PROGRESS NOTE    Subjective  Chief complaint: Melody Martin is a 89 y.o. female who is a long term care patient being seen and evaluated for follow-up wounds    HPI:  HPI  Patient is seen in follow-up of wounds, treatment interventions in place for left heel and sacrum.  Patient seen and examined at the bedside, appears to be in no acute distress.    Objective  Vital signs: 168/55, 97.4, 59, 18, 94%, blood sugar 120    Physical Exam  Constitutional:       General: She is not in acute distress.  Eyes:      Extraocular Movements: Extraocular movements intact.   Cardiovascular:      Rate and Rhythm: Normal rate and regular rhythm.   Pulmonary:      Effort: Pulmonary effort is normal.      Breath sounds: Normal breath sounds.   Musculoskeletal:      Cervical back: Neck supple.      Right lower leg: No edema.      Left lower leg: No edema.   Skin:     Comments:   Wound location - sacrum  Etiology -  stage III pressure ulcer  Granulation -  large  Slough -  small  Edges -  flat  Odor -  no  Drainage -medium serosanguineous  Size -1.5 x 2 x 0.1 cm     Wound location - left heel  Etiology -stage III pressure ulcer  Granulation - large  Slough -small  Edges -  flat  Odor -yes  Drainage -  medium serosanguineous  Size -4.5 x 4 x 0.2 cm  Periwound reddened and poorly blanching   Neurological:      Mental Status: She is alert and oriented to person, place, and time.   Psychiatric:         Mood and Affect: Mood normal.         Behavior: Behavior is cooperative.         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  On HD per nephrology  Isosorbide dinitrate  Metoprolol  Diltiazem   Renal diet  Monitor BP  Remove extra fluid in dialysis         Stage III  pressure ulcer of left heel (Multi)     TX: irrigate with ns, pat dry, apply Vasch moistened gauze, ABD, Kerlix daily and as needed    Turn every 2 hours  Prevalon boots  Pressure reducing mattress, patient refusing air mattress, discussed benefits/risks  Cushion to chair  Dietitian consult  Supplements per dietitian  Grab bars to bed to assist with bed mobility and repositioning  Weekly skin checks    No results received yet for culture or xray.  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  A minimal amount of bleeding was controlled with pressure. The procedure was tolerated well with a pain level of 0 throughout and a pain level of 0 following the procedure.  Post-debridement measurements unchanged. Character of wound/ulcer post-debridement is improved.     TX: Irrigate with wound cleanser and apply Skin-Prep to PORTER area, apply collagen to wound bed, cover with calcium alginate and bordered foam cover, change daily and as needed    Turn every 2 hours  Prevalon boots  Air mattress  Cushion to chair  Dietitian following  Supplements per dietitian  Grab bars to bed to assist with bed mobility and repositioning  Weekly skin checks  House barrier cream to buttocks    Wound stable         Type 2 diabetes mellitus with chronic kidney disease on chronic dialysis, with long-term current use of insulin (Multi)     Carb controlled diet  Monitor Glucoscan  Continue insulin  FBG at goal          Medications, treatments, and labs reviewed  Continue medications and treatments as listed in EMR    Scribe Attestation  I,  Phoebe Martinez   attest that this documentation has been prepared under the direction and in the presence of MAR Johnson    Provider Attestation - Scribe documentation  All medical record entries made by the Scribe were at my direction and personally dictated by me. I have reviewed the chart and agree that the record accurately reflects my personal performance of the history, physical exam, discussion and plan.   MAR Johnson            Electronically Signed By: MAR Johnson   8/30/24 10:26 AM

## 2024-08-30 ENCOUNTER — APPOINTMENT (OUTPATIENT)
Dept: RADIOLOGY | Facility: HOSPITAL | Age: 89
DRG: 637 | End: 2024-08-30
Payer: COMMERCIAL

## 2024-08-30 ENCOUNTER — DOCUMENTATION (OUTPATIENT)
Dept: POST ACUTE CARE | Facility: EXTERNAL LOCATION | Age: 89
End: 2024-08-30
Payer: COMMERCIAL

## 2024-08-30 ENCOUNTER — HOSPITAL ENCOUNTER (INPATIENT)
Facility: HOSPITAL | Age: 89
DRG: 637 | End: 2024-08-30
Attending: STUDENT IN AN ORGANIZED HEALTH CARE EDUCATION/TRAINING PROGRAM | Admitting: INTERNAL MEDICINE
Payer: COMMERCIAL

## 2024-08-30 DIAGNOSIS — Z79.4 TYPE 2 DIABETES MELLITUS WITH CHRONIC KIDNEY DISEASE ON CHRONIC DIALYSIS, WITH LONG-TERM CURRENT USE OF INSULIN (MULTI): ICD-10-CM

## 2024-08-30 DIAGNOSIS — L03.115 CELLULITIS OF HEEL, RIGHT: ICD-10-CM

## 2024-08-30 DIAGNOSIS — N18.6 ESRD ON HEMODIALYSIS (MULTI): ICD-10-CM

## 2024-08-30 DIAGNOSIS — M86.172 ACUTE OSTEOMYELITIS OF LEFT CALCANEUS (MULTI): Primary | ICD-10-CM

## 2024-08-30 DIAGNOSIS — M54.9 BACK PAIN, UNSPECIFIED BACK LOCATION, UNSPECIFIED BACK PAIN LATERALITY, UNSPECIFIED CHRONICITY: ICD-10-CM

## 2024-08-30 DIAGNOSIS — N18.6 TYPE 2 DIABETES MELLITUS WITH CHRONIC KIDNEY DISEASE ON CHRONIC DIALYSIS, WITH LONG-TERM CURRENT USE OF INSULIN (MULTI): ICD-10-CM

## 2024-08-30 DIAGNOSIS — Z99.2 ESRD ON HEMODIALYSIS (MULTI): ICD-10-CM

## 2024-08-30 DIAGNOSIS — E11.22 TYPE 2 DIABETES MELLITUS WITH CHRONIC KIDNEY DISEASE ON CHRONIC DIALYSIS, WITH LONG-TERM CURRENT USE OF INSULIN (MULTI): ICD-10-CM

## 2024-08-30 DIAGNOSIS — I10 PRIMARY HYPERTENSION: ICD-10-CM

## 2024-08-30 DIAGNOSIS — Z99.2 TYPE 2 DIABETES MELLITUS WITH CHRONIC KIDNEY DISEASE ON CHRONIC DIALYSIS, WITH LONG-TERM CURRENT USE OF INSULIN (MULTI): ICD-10-CM

## 2024-08-30 DIAGNOSIS — I48.91 ATRIAL FIBRILLATION, UNSPECIFIED TYPE (MULTI): ICD-10-CM

## 2024-08-30 LAB
ALBUMIN SERPL BCP-MCNC: 2.8 G/DL (ref 3.4–5)
ALP SERPL-CCNC: 93 U/L (ref 33–136)
ALT SERPL W P-5'-P-CCNC: 11 U/L (ref 7–45)
ANION GAP SERPL CALC-SCNC: 13 MMOL/L (ref 10–20)
AST SERPL W P-5'-P-CCNC: 20 U/L (ref 9–39)
BASOPHILS # BLD AUTO: 0.09 X10*3/UL (ref 0–0.1)
BASOPHILS NFR BLD AUTO: 0.9 %
BILIRUB SERPL-MCNC: 0.3 MG/DL (ref 0–1.2)
BUN SERPL-MCNC: 11 MG/DL (ref 6–23)
CALCIUM SERPL-MCNC: 7.7 MG/DL (ref 8.6–10.3)
CHLORIDE SERPL-SCNC: 96 MMOL/L (ref 98–107)
CO2 SERPL-SCNC: 31 MMOL/L (ref 21–32)
CREAT SERPL-MCNC: 1.47 MG/DL (ref 0.5–1.05)
CRP SERPL-MCNC: 3.66 MG/DL
EGFRCR SERPLBLD CKD-EPI 2021: 34 ML/MIN/1.73M*2
EOSINOPHIL # BLD AUTO: 0.12 X10*3/UL (ref 0–0.4)
EOSINOPHIL NFR BLD AUTO: 1.2 %
ERYTHROCYTE [DISTWIDTH] IN BLOOD BY AUTOMATED COUNT: 17.2 % (ref 11.5–14.5)
ERYTHROCYTE [SEDIMENTATION RATE] IN BLOOD BY WESTERGREN METHOD: 79 MM/H (ref 0–30)
GLUCOSE SERPL-MCNC: 144 MG/DL (ref 74–99)
HCT VFR BLD AUTO: 37.2 % (ref 36–46)
HGB BLD-MCNC: 11.8 G/DL (ref 12–16)
IMM GRANULOCYTES # BLD AUTO: 0.25 X10*3/UL (ref 0–0.5)
IMM GRANULOCYTES NFR BLD AUTO: 2.4 % (ref 0–0.9)
LACTATE SERPL-SCNC: 2.1 MMOL/L (ref 0.4–2)
LACTATE SERPL-SCNC: 2.6 MMOL/L (ref 0.4–2)
LYMPHOCYTES # BLD AUTO: 1.04 X10*3/UL (ref 0.8–3)
LYMPHOCYTES NFR BLD AUTO: 10.1 %
MCH RBC QN AUTO: 29.1 PG (ref 26–34)
MCHC RBC AUTO-ENTMCNC: 31.7 G/DL (ref 32–36)
MCV RBC AUTO: 92 FL (ref 80–100)
MONOCYTES # BLD AUTO: 1.04 X10*3/UL (ref 0.05–0.8)
MONOCYTES NFR BLD AUTO: 10.1 %
NEUTROPHILS # BLD AUTO: 7.71 X10*3/UL (ref 1.6–5.5)
NEUTROPHILS NFR BLD AUTO: 75.3 %
NRBC BLD-RTO: 0 /100 WBCS (ref 0–0)
PLATELET # BLD AUTO: 368 X10*3/UL (ref 150–450)
POTASSIUM SERPL-SCNC: 3.7 MMOL/L (ref 3.5–5.3)
PROT SERPL-MCNC: 6.5 G/DL (ref 6.4–8.2)
RBC # BLD AUTO: 4.06 X10*6/UL (ref 4–5.2)
SODIUM SERPL-SCNC: 136 MMOL/L (ref 136–145)
WBC # BLD AUTO: 10.3 X10*3/UL (ref 4.4–11.3)

## 2024-08-30 PROCEDURE — 99285 EMERGENCY DEPT VISIT HI MDM: CPT | Mod: 25

## 2024-08-30 PROCEDURE — 73700 CT LOWER EXTREMITY W/O DYE: CPT | Mod: LEFT SIDE | Performed by: STUDENT IN AN ORGANIZED HEALTH CARE EDUCATION/TRAINING PROGRAM

## 2024-08-30 PROCEDURE — 2500000004 HC RX 250 GENERAL PHARMACY W/ HCPCS (ALT 636 FOR OP/ED): Performed by: PHYSICIAN ASSISTANT

## 2024-08-30 PROCEDURE — 87040 BLOOD CULTURE FOR BACTERIA: CPT | Mod: PORLAB | Performed by: PHYSICIAN ASSISTANT

## 2024-08-30 PROCEDURE — 2500000005 HC RX 250 GENERAL PHARMACY W/O HCPCS: Performed by: PHYSICIAN ASSISTANT

## 2024-08-30 PROCEDURE — 2500000004 HC RX 250 GENERAL PHARMACY W/ HCPCS (ALT 636 FOR OP/ED): Performed by: STUDENT IN AN ORGANIZED HEALTH CARE EDUCATION/TRAINING PROGRAM

## 2024-08-30 PROCEDURE — 82565 ASSAY OF CREATININE: CPT | Performed by: PHYSICIAN ASSISTANT

## 2024-08-30 PROCEDURE — 73620 X-RAY EXAM OF FOOT: CPT | Mod: LT

## 2024-08-30 PROCEDURE — 36415 COLL VENOUS BLD VENIPUNCTURE: CPT | Performed by: PHYSICIAN ASSISTANT

## 2024-08-30 PROCEDURE — 73700 CT LOWER EXTREMITY W/O DYE: CPT | Mod: LT

## 2024-08-30 PROCEDURE — 73620 X-RAY EXAM OF FOOT: CPT | Mod: LEFT SIDE | Performed by: RADIOLOGY

## 2024-08-30 PROCEDURE — 96365 THER/PROPH/DIAG IV INF INIT: CPT

## 2024-08-30 PROCEDURE — 99223 1ST HOSP IP/OBS HIGH 75: CPT | Performed by: INTERNAL MEDICINE

## 2024-08-30 PROCEDURE — 96367 TX/PROPH/DG ADDL SEQ IV INF: CPT

## 2024-08-30 PROCEDURE — 86140 C-REACTIVE PROTEIN: CPT | Performed by: STUDENT IN AN ORGANIZED HEALTH CARE EDUCATION/TRAINING PROGRAM

## 2024-08-30 PROCEDURE — 1200000002 HC GENERAL ROOM WITH TELEMETRY DAILY

## 2024-08-30 PROCEDURE — 85025 COMPLETE CBC W/AUTO DIFF WBC: CPT | Performed by: PHYSICIAN ASSISTANT

## 2024-08-30 PROCEDURE — 96375 TX/PRO/DX INJ NEW DRUG ADDON: CPT

## 2024-08-30 PROCEDURE — 83605 ASSAY OF LACTIC ACID: CPT | Performed by: PHYSICIAN ASSISTANT

## 2024-08-30 PROCEDURE — 87185 SC STD ENZYME DETCJ PER NZM: CPT | Mod: PORLAB | Performed by: PHYSICIAN ASSISTANT

## 2024-08-30 PROCEDURE — 36415 COLL VENOUS BLD VENIPUNCTURE: CPT | Performed by: STUDENT IN AN ORGANIZED HEALTH CARE EDUCATION/TRAINING PROGRAM

## 2024-08-30 PROCEDURE — 85652 RBC SED RATE AUTOMATED: CPT | Performed by: STUDENT IN AN ORGANIZED HEALTH CARE EDUCATION/TRAINING PROGRAM

## 2024-08-30 PROCEDURE — 96366 THER/PROPH/DIAG IV INF ADDON: CPT

## 2024-08-30 RX ORDER — INSULIN LISPRO 100 [IU]/ML
0-15 INJECTION, SOLUTION INTRAVENOUS; SUBCUTANEOUS
Status: DISCONTINUED | OUTPATIENT
Start: 2024-08-31 | End: 2024-09-04 | Stop reason: HOSPADM

## 2024-08-30 RX ORDER — ONDANSETRON HYDROCHLORIDE 2 MG/ML
4 INJECTION, SOLUTION INTRAVENOUS EVERY 6 HOURS PRN
Status: DISCONTINUED | OUTPATIENT
Start: 2024-08-30 | End: 2024-09-04 | Stop reason: HOSPADM

## 2024-08-30 RX ORDER — INSULIN LISPRO 100 [IU]/ML
0-15 INJECTION, SOLUTION INTRAVENOUS; SUBCUTANEOUS
Status: DISCONTINUED | OUTPATIENT
Start: 2024-08-31 | End: 2024-08-30

## 2024-08-30 RX ORDER — AMIODARONE HYDROCHLORIDE 200 MG/1
200 TABLET ORAL EVERY 24 HOURS
Status: DISCONTINUED | OUTPATIENT
Start: 2024-08-31 | End: 2024-09-04 | Stop reason: HOSPADM

## 2024-08-30 RX ORDER — VANCOMYCIN HYDROCHLORIDE 750 MG/150ML
750 INJECTION, SOLUTION INTRAVENOUS EVERY 24 HOURS
Status: DISCONTINUED | OUTPATIENT
Start: 2024-08-31 | End: 2024-08-31

## 2024-08-30 RX ORDER — ALBUTEROL SULFATE 90 UG/1
2 INHALANT RESPIRATORY (INHALATION) EVERY 6 HOURS PRN
Status: DISCONTINUED | OUTPATIENT
Start: 2024-08-30 | End: 2024-09-04 | Stop reason: HOSPADM

## 2024-08-30 RX ORDER — ACETAMINOPHEN 325 MG/1
650 TABLET ORAL EVERY 4 HOURS PRN
Status: DISCONTINUED | OUTPATIENT
Start: 2024-08-30 | End: 2024-09-04 | Stop reason: HOSPADM

## 2024-08-30 RX ORDER — POLYETHYLENE GLYCOL 3350 17 G/17G
17 POWDER, FOR SOLUTION ORAL 2 TIMES DAILY PRN
Status: DISCONTINUED | OUTPATIENT
Start: 2024-08-30 | End: 2024-09-04 | Stop reason: HOSPADM

## 2024-08-30 RX ORDER — MORPHINE SULFATE 4 MG/ML
4 INJECTION INTRAVENOUS ONCE
Status: COMPLETED | OUTPATIENT
Start: 2024-08-30 | End: 2024-08-30

## 2024-08-30 RX ORDER — VANCOMYCIN HYDROCHLORIDE 1 G/20ML
INJECTION, POWDER, LYOPHILIZED, FOR SOLUTION INTRAVENOUS DAILY PRN
Status: DISCONTINUED | OUTPATIENT
Start: 2024-08-30 | End: 2024-09-04 | Stop reason: HOSPADM

## 2024-08-30 RX ORDER — BISACODYL 10 MG/1
10 SUPPOSITORY RECTAL DAILY PRN
Status: DISCONTINUED | OUTPATIENT
Start: 2024-08-30 | End: 2024-09-04 | Stop reason: HOSPADM

## 2024-08-30 RX ORDER — DOCUSATE SODIUM 100 MG/1
100 CAPSULE, LIQUID FILLED ORAL 2 TIMES DAILY
Status: DISCONTINUED | OUTPATIENT
Start: 2024-08-30 | End: 2024-09-04 | Stop reason: HOSPADM

## 2024-08-30 SDOH — SOCIAL STABILITY: SOCIAL INSECURITY: HAVE YOU HAD THOUGHTS OF HARMING ANYONE ELSE?: NO

## 2024-08-30 SDOH — SOCIAL STABILITY: SOCIAL INSECURITY: WERE YOU ABLE TO COMPLETE ALL THE BEHAVIORAL HEALTH SCREENINGS?: YES

## 2024-08-30 ASSESSMENT — ENCOUNTER SYMPTOMS
DIARRHEA: 0
CHILLS: 0
COUGH: 0
ABDOMINAL DISTENTION: 0
APPETITE CHANGE: 0
RHINORRHEA: 0
WEAKNESS: 1
APNEA: 0
NECK STIFFNESS: 0
MYALGIAS: 1
SINUS PRESSURE: 0
DYSURIA: 0
FREQUENCY: 0
BACK PAIN: 1
FEVER: 0
FACIAL ASYMMETRY: 0
CONFUSION: 0
SEIZURES: 0
UNEXPECTED WEIGHT CHANGE: 0
RECTAL PAIN: 0
SLEEP DISTURBANCE: 1
LIGHT-HEADEDNESS: 0
WHEEZING: 0
VOICE CHANGE: 0
ABDOMINAL PAIN: 0
COLOR CHANGE: 1
VOMITING: 0
TROUBLE SWALLOWING: 0
SHORTNESS OF BREATH: 0
ACTIVITY CHANGE: 1
NERVOUS/ANXIOUS: 1
NUMBNESS: 0
DIZZINESS: 0
TREMORS: 0
DIFFICULTY URINATING: 0
NAUSEA: 1
HEADACHES: 0
FATIGUE: 1
WOUND: 1
HEMATURIA: 0
ARTHRALGIAS: 0
DECREASED CONCENTRATION: 0
SPEECH DIFFICULTY: 0
PALPITATIONS: 0
SINUS PAIN: 0
FLANK PAIN: 0
NECK PAIN: 0
DIAPHORESIS: 0
PHOTOPHOBIA: 0
CONSTIPATION: 0
SORE THROAT: 0
BLOOD IN STOOL: 0
JOINT SWELLING: 0

## 2024-08-30 ASSESSMENT — PAIN DESCRIPTION - LOCATION: LOCATION: CHEST

## 2024-08-30 ASSESSMENT — LIFESTYLE VARIABLES
PRESCIPTION_ABUSE_PAST_12_MONTHS: NO
HOW MANY STANDARD DRINKS CONTAINING ALCOHOL DO YOU HAVE ON A TYPICAL DAY: PATIENT DOES NOT DRINK
SKIP TO QUESTIONS 9-10: 1
AUDIT-C TOTAL SCORE: 0
AUDIT-C TOTAL SCORE: 0
HOW OFTEN DO YOU HAVE 6 OR MORE DRINKS ON ONE OCCASION: NEVER
SUBSTANCE_ABUSE_PAST_12_MONTHS: NO
HOW OFTEN DO YOU HAVE A DRINK CONTAINING ALCOHOL: NEVER

## 2024-08-30 ASSESSMENT — ACTIVITIES OF DAILY LIVING (ADL)
HEARING - RIGHT EAR: FUNCTIONAL
PATIENT'S MEMORY ADEQUATE TO SAFELY COMPLETE DAILY ACTIVITIES?: YES
ASSISTIVE_DEVICE: WHEELCHAIR;OTHER (COMMENT)
GROOMING: DEPENDENT
BATHING: DEPENDENT
JUDGMENT_ADEQUATE_SAFELY_COMPLETE_DAILY_ACTIVITIES: YES
WALKS IN HOME: DEPENDENT
TOILETING: DEPENDENT
FEEDING YOURSELF: INDEPENDENT
LACK_OF_TRANSPORTATION: NO
ADEQUATE_TO_COMPLETE_ADL: YES
DRESSING YOURSELF: DEPENDENT
HEARING - LEFT EAR: FUNCTIONAL

## 2024-08-30 ASSESSMENT — COGNITIVE AND FUNCTIONAL STATUS - GENERAL
DRESSING REGULAR UPPER BODY CLOTHING: A LOT
TOILETING: A LOT
STANDING UP FROM CHAIR USING ARMS: A LOT
CLIMB 3 TO 5 STEPS WITH RAILING: TOTAL
MOVING FROM LYING ON BACK TO SITTING ON SIDE OF FLAT BED WITH BEDRAILS: A LOT
MOBILITY SCORE: 10
WALKING IN HOSPITAL ROOM: TOTAL
PERSONAL GROOMING: A LITTLE
DRESSING REGULAR LOWER BODY CLOTHING: A LOT
TURNING FROM BACK TO SIDE WHILE IN FLAT BAD: A LOT
MOVING TO AND FROM BED TO CHAIR: A LOT
PATIENT BASELINE BEDBOUND: NO
HELP NEEDED FOR BATHING: A LOT
DAILY ACTIVITIY SCORE: 15

## 2024-08-30 ASSESSMENT — PATIENT HEALTH QUESTIONNAIRE - PHQ9
1. LITTLE INTEREST OR PLEASURE IN DOING THINGS: SEVERAL DAYS
2. FEELING DOWN, DEPRESSED OR HOPELESS: SEVERAL DAYS
SUM OF ALL RESPONSES TO PHQ9 QUESTIONS 1 & 2: 2

## 2024-08-30 ASSESSMENT — PAIN SCALES - GENERAL
PAINLEVEL_OUTOF10: 0 - NO PAIN
PAINLEVEL_OUTOF10: 0 - NO PAIN

## 2024-08-30 ASSESSMENT — PAIN - FUNCTIONAL ASSESSMENT
PAIN_FUNCTIONAL_ASSESSMENT: 0-10
PAIN_FUNCTIONAL_ASSESSMENT: 0-10

## 2024-08-30 NOTE — PROGRESS NOTES
Called nursing facility to fu on heel xray and culture results.  Nurse reporting imaging suspicious for OM.  She states that the lab did not process the culture because they changed their swab collection device.  I gave order to nurse to send patient to the ED for MRI heel r/o OM.

## 2024-08-30 NOTE — ASSESSMENT & PLAN NOTE
TX: irrigate with ns, pat dry, apply Vasch moistened gauze, ABD, Kerlix daily and as needed    Turn every 2 hours  Prevalon boots  Pressure reducing mattress, patient refusing air mattress, discussed benefits/risks  Cushion to chair  Dietitian consult  Supplements per dietitian  Grab bars to bed to assist with bed mobility and repositioning  Weekly skin checks    No results received yet for culture or xray.  Spoke with nurse who contact lab/imaging company  Wound necrotic tissue and drainage improving

## 2024-08-30 NOTE — ED PROVIDER NOTES
EMERGENCY MEDICINE EVALUATION NOTE    History of Present Illness     Chief Complaint:   Chief Complaint   Patient presents with    Wound Check     Pt has a wound to left heel. Pt was sent from [facility] for concern for osteomylitis. Pt has no feeling in her foot. Pt has a hx of esrd, diabetesn afib, stroke with hemiplegia.        HPI: Melody Martin is a 89 y.o. female presents with a chief complaint of left heel wound.  Patient was sent from [facility] for concern of osteomyelitis.  Patient reports that secondary to her diabetes as well as stroke she has no feeling in her left foot.  Patient reports that she has been having the wound care for her approximately 1 month.  Patient has known history of end-stage renal and is currently on dialysis.  Patient also is a diabetic with a history of atrial fibrillation and left-sided hemiplegia.  Patient is currently anticoagulant on Eliquis.  Patient denies any fevers or chills at the facility.  She reports she has no pain in the foot secondary to not feeling which she has noticed that there is been some tingly sensations which are new near the heel.  Patient reports they did an x-ray but she is unsure of the results.    Previous History     Past Medical History:   Diagnosis Date    Anemia     Atrial fibrillation (Multi)     Chronic kidney disease     Diabetes mellitus (Multi)     Elevated troponin 08/24/2023    GERD (gastroesophageal reflux disease)     Heart murmur     Hypertension     Myocardial infarction (Multi)     Old myocardial infarction 09/09/2023    Other conditions influencing health status 12/06/2022    History of cough    Other conditions influencing health status 04/06/2016    Diabetes mellitus type 1, uncontrolled    Personal history of other diseases of the circulatory system     History of hypertension    Personal history of other diseases of the female genital tract     History of endometriosis    Personal history of other diseases  of the nervous system and sense organs 10/15/2021    History of acute otitis media    Personal history of other endocrine, nutritional and metabolic disease 01/26/2018    History of diabetes mellitus    Personal history of transient ischemic attack (TIA), and cerebral infarction without residual deficits 09/20/2023    Stroke (Multi)     Urinary tract infection      Past Surgical History:   Procedure Laterality Date    BACK SURGERY  10/10/2018    Back Surgery    HYSTERECTOMY  10/10/2018    Hysterectomy    IR CVC TUNNELED  8/28/2023    IR CVC TUNNELED 8/28/2023 POR ANGIO    MR HEAD ANGIO WO IV CONTRAST  8/31/2023    MR HEAD ANGIO WO IV CONTRAST 8/31/2023 POR MRI    MR NECK ANGIO WO IV CONTRAST  8/31/2023    MR NECK ANGIO WO IV CONTRAST 8/31/2023 POR MRI     Social History     Tobacco Use    Smoking status: Never    Smokeless tobacco: Never   Substance Use Topics    Alcohol use: Never    Drug use: Never     Family History   Problem Relation Name Age of Onset    No Known Problems Mother      No Known Problems Father       Allergies   Allergen Reactions    Aspirin Shortness of breath and Unknown    Amlodipine Dizziness and Unknown    Levofloxacin Unknown    Sulfamethoxazole-Trimethoprim Diarrhea, Other and Unknown     Current Outpatient Medications   Medication Instructions    acetaminophen (TYLENOL) 650 mg, oral, Every 6 hours PRN    albuterol 90 mcg/actuation inhaler 2 puffs, inhalation, Every 12 hours PRN    alum-mag hydroxide-simeth (Mylanta) 200-200-20 mg/5 mL oral suspension 10 mL, oral, Every 6 hours PRN    amino acids-protein hydr-fiber 15 gram- 100 kcal/30 mL liquid in packet 30 mL, oral, 2 times daily    amiodarone (PACERONE) 200 mg, oral, Every 24 hours    apixaban (ELIQUIS) 2.5 mg, oral, 2 times daily    atorvastatin (LIPITOR) 80 mg, oral, Nightly    B complex-vitamin C-folic acid (Nephrocaps) 1 mg capsule 1 capsule, oral, Daily RT    benzonatate (TESSALON) 100 mg, oral, 3 times daily PRN    bisacodyl  (DULCOLAX (BISACODYL)) 10 mg, rectal, Daily PRN    blood sugar diagnostic (OneTouch Ultra Test) strip 1 strip, Does not apply, 3 times daily    busPIRone (BUSPAR) 10 mg, oral, 2 times daily    clopidogrel (PLAVIX) 75 mg, oral, Daily    clotrimazole-betamethasone (Lotrisone) cream 1 Application, Topical, 2 times daily, To inner thighs and stephanie area    diclofenac sodium (VOLTAREN) 2 g, Topical, 2 times daily    dilTIAZem ER (Tiazac) 240 mg 24 hr capsule Take 1 capsule (240 mg) by mouth once daily at bedtime.    diphenhydramine-zinc acetate cream 1 Application, Topical, 3 times daily, For itchy back    docusate sodium (COLACE) 100 mg, oral, 2 times daily    glucagon (GLUCAGEN) 1 mg, intramuscular    glucose (GLUTOSE) 15 g, oral, Once as needed    guaiFENesin (MUCINEX) 600 mg, oral, 2 times daily    insulin glargine (LANTUS) 20 Units, subcutaneous, Nightly, Take as directed per insulin instructions.    insulin lispro (HUMALOG) 0-15 Units, subcutaneous, 4 times daily before meals and nightly, Take as directed per insulin instructions.    isosorbide dinitrate (Isordil) 10 mg tablet 1 tablet, oral, 3 times daily    lidocaine (LMX) 4 % cream 1 Application, Topical, Daily, To back    melatonin 3 mg, oral, Nightly    metoprolol succinate XL (KAPSPARGO SPRINKLE) 100 mg, oral, Daily, Do not crush or chew.    mirtazapine (REMERON) 7.5 mg, oral, Nightly    pantoprazole (PROTONIX) 40 mg, oral, Daily before breakfast, Do not crush, chew, or split.    PSYLLIUM HUSK, ASPARTAME, ORAL 1 packet, oral, Daily    sennosides (SENOKOT) 8.6 mg, oral, Every 12 hours PRN    sevelamer carbonate (RENVELA) 800 mg, oral, 3 times daily before meals    simethicone (MYLICON) 80 mg, oral, 4 times daily    traMADol (ULTRAM) 25 mg, oral, Every 12 hours PRN    zinc oxide 20 % ointment 1 Application, Topical, 2 times daily, To groin and buttocks       Physical Exam     Appearance: Alert, oriented , cooperative.  Chronic ill-appearing with bedridden.      Skin: Patient has approximately 3 x 3 cm pressure ulcer over located over left heel.  Dry skin, no lesions, rash, petechiae or purpura.      Eyes: PERRLA, EOMs intact,  Conjunctiva pink      ENT: Hearing grossly intact. Pharynx clear     Neck: Supple. Trachea at midline.      Pulmonary: Clear bilaterally. No rales, rhonchi or wheezing. No accessory muscle use or stridor.     Cardiac: Normal rate and rhythm without murmur     Abdomen: Soft, nontender, active bowel sounds.     Musculoskeletal: Hemiplegia noted on left side.     Neurological:Cranial nerves II through XII are grossly intact.  Decreased sensation bilateral lower extremities.  Hemiplegia noted on left side.     Results     Labs Reviewed   CBC WITH AUTO DIFFERENTIAL - Abnormal       Result Value    WBC 10.3      nRBC 0.0      RBC 4.06      Hemoglobin 11.8 (*)     Hematocrit 37.2      MCV 92      MCH 29.1      MCHC 31.7 (*)     RDW 17.2 (*)     Platelets 368      Neutrophils % 75.3      Immature Granulocytes %, Automated 2.4 (*)     Lymphocytes % 10.1      Monocytes % 10.1      Eosinophils % 1.2      Basophils % 0.9      Neutrophils Absolute 7.71 (*)     Immature Granulocytes Absolute, Automated 0.25      Lymphocytes Absolute 1.04      Monocytes Absolute 1.04 (*)     Eosinophils Absolute 0.12      Basophils Absolute 0.09     COMPREHENSIVE METABOLIC PANEL - Abnormal    Glucose 144 (*)     Sodium 136      Potassium 3.7      Chloride 96 (*)     Bicarbonate 31      Anion Gap 13      Urea Nitrogen 11      Creatinine 1.47 (*)     eGFR 34 (*)     Calcium 7.7 (*)     Albumin 2.8 (*)     Alkaline Phosphatase 93      Total Protein 6.5      AST 20      Bilirubin, Total 0.3      ALT 11     LACTATE - Abnormal    Lactate 2.6 (*)     Narrative:     Venipuncture immediately after or during the administration of Metamizole may lead to falsely low results. Testing should be performed immediately  prior to Metamizole dosing.   SEDIMENTATION RATE, AUTOMATED - Abnormal     "Sedimentation Rate 79 (*)    C-REACTIVE PROTEIN - Abnormal    C-Reactive Protein 3.66 (*)    LACTATE - Abnormal    Lactate 2.1 (*)     Narrative:     Venipuncture immediately after or during the administration of Metamizole may lead to falsely low results. Testing should be performed immediately  prior to Metamizole dosing.   BLOOD CULTURE   BLOOD CULTURE   TISSUE/WOUND CULTURE/SMEAR   LACTATE     CT foot left wo IV contrast   Final Result   Acute osteomyelitis at involving the posterior calcaneal tuberosity   with overlying soft tissue ulceration.                       MACRO:   None.        Signed by: Rolf Baeza 8/30/2024 7:41 PM   Dictation workstation:   [id number]      XR foot left 1-2 views   Final Result   1. Soft tissue irregularity overlying the left calcaneus, suggestive   of an wound. Cortical irregularity at the posterior aspect of the   calcaneus, underlying osteomyelitis cannot be excluded. MRI can be   obtained for further evaluation.   2. Calcifications at the insertion of the [place]'s tendon,   suggestive of calcific tendinopathy.   3. Osteopenia.   4. Extensive vascular calcifications.                  MACRO:   None.        Signed by: Amber Denton 8/30/2024 6:54 PM   Dictation workstation:   [id number]            ED Course & Medical Decision Making     Medications   piperacillin-tazobactam (Zosyn) 4.5 g in dextrose (iso)  mL (0 g intravenous Stopped 8/30/24 1838)   vancomycin (Vancocin) in % 5 dextrose 500 mL IV 1,750 mg (1,750 mg intravenous New Bag 8/30/24 1837)   morphine injection 4 mg (4 mg intravenous Given 8/30/24 2006)     Heart Rate:  [67-72]   Temperature:  [36.7 °C (98.1 °F)]   Respirations:  [16-18]   BP: (122-156)/(41-65)   Height:  [170.2 cm (5' 7\")]   Weight:  [68 kg (150 lb)]   Pulse Ox:  [99 %-100 %]    ED Course as of 08/30/24 2035   Fri Aug 30, 2024   1719 Spoke to the pharmacist Elías about the patient.  I would like broad-spectrum antibiotic coverage but " secondary to patient's renal function I called pharmacy for instruction on which would be the best way to cover.  He says that he will do some research and call back [CJ]   1732 Pharmacy recommends 4.5 g dose of Zosyn as well as one 1.75 g dose of Vanc secondary to her renal status. [CJ]   1814 I reviewed patient's chart it does appear that she has an x-ray from 8/22 that shows questionable osteo.  Patient was sent by the NP at facility for further evaluation of the left heel after reviewing chart today. [CJ]   2034 Dr. Moon agreed to admit the patient onto [facility] telemetry. [CJ]      ED Course User Index  [CJ] Manjit Barnes PA-C         Diagnoses as of 08/30/24 2035   Acute osteomyelitis of left calcaneus (Multi)       Procedures   Procedures    Diagnosis     1. Acute osteomyelitis of left calcaneus (Multi)        Disposition   Admit    ED Prescriptions    None         Disclaimer: This note was dictated by speech recognition. Minor errors in transcription may be present. Please call if questions.       Manjit Barnes PA-C  08/30/24 2035

## 2024-08-30 NOTE — PROGRESS NOTES
PROGRESS NOTE    Subjective   Chief complaint: Melody Martin is a 89 y.o. female who is a long term care patient being seen and evaluated for follow-up wounds    HPI:  HPI  Patient is seen in follow-up of wounds, treatment interventions in place for left heel and sacrum.  Patient seen and examined at the bedside, appears to be in no acute distress.    Objective   Vital signs: 168/55, 97.4, 59, 18, 94%, blood sugar 120    Physical Exam  Constitutional:       General: She is not in acute distress.  Eyes:      Extraocular Movements: Extraocular movements intact.   Cardiovascular:      Rate and Rhythm: Normal rate and regular rhythm.   Pulmonary:      Effort: Pulmonary effort is normal.      Breath sounds: Normal breath sounds.   Musculoskeletal:      Cervical back: Neck supple.      Right lower leg: No edema.      Left lower leg: No edema.   Skin:     Comments:   Wound location - sacrum  Etiology -  stage III pressure ulcer  Granulation -  large  Slough -  small  Edges -  flat  Odor -  no  Drainage -medium serosanguineous  Size -1.5 x 2 x 0.1 cm     Wound location - left heel  Etiology -stage III pressure ulcer  Granulation - large  Slough -small  Edges -  flat  Odor -yes  Drainage -  medium serosanguineous  Size -4.5 x 4 x 0.2 cm  Periwound reddened and poorly blanching   Neurological:      Mental Status: She is alert and oriented to person, place, and time.   Psychiatric:         Mood and Affect: Mood normal.         Behavior: Behavior is cooperative.         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)     TX: irrigate with ns, pat dry,  apply Vasch moistened gauze, ABD, Kerlix daily and as needed    Turn every 2 hours  Prevalon boots  Pressure reducing mattress, patient refusing air mattress, discussed benefits/risks  Cushion to chair  Dietitian consult  Supplements per dietitian  Grab bars to bed to assist with bed mobility and repositioning  Weekly skin checks    No results received yet for culture or xray.  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  A minimal amount of bleeding was controlled with pressure. The procedure was tolerated well with a pain level of 0 throughout and a pain level of 0 following the procedure.  Post-debridement measurements unchanged. Character of wound/ulcer post-debridement is improved.     TX: Irrigate with wound cleanser and apply Skin-Prep to PORTER area, apply collagen to wound bed, cover with calcium alginate and bordered foam cover, change daily and as needed    Turn every 2 hours  Prevalon boots  Air mattress  Cushion to chair  Dietitian following  Supplements per dietitian  Grab bars to bed to assist with bed mobility and repositioning  Weekly skin checks  House barrier cream to buttocks    Wound stable         Type 2 diabetes mellitus with chronic kidney disease on chronic dialysis, with long-term current use of insulin (Multi)     Carb controlled diet  Monitor Glucoscan  Continue insulin  FBG at goal          Medications, treatments, and labs reviewed  Continue medications and treatments as listed in EMR    Scribe Attestation  I, Phoebe Martinez   attest that this documentation has been  prepared under the direction and in the presence of MAR Johnson    Provider Attestation - Scribe documentation  All medical record entries made by the Scribe were at my direction and personally dictated by me. I have reviewed the chart and agree that the record accurately reflects my personal performance of the history, physical exam, discussion and plan.   MAR Johnson

## 2024-08-31 LAB
ALBUMIN SERPL BCP-MCNC: 2.2 G/DL (ref 3.4–5)
ANION GAP SERPL CALC-SCNC: 11 MMOL/L (ref 10–20)
BUN SERPL-MCNC: 16 MG/DL (ref 6–23)
CALCIUM SERPL-MCNC: 7.2 MG/DL (ref 8.6–10.3)
CHLORIDE SERPL-SCNC: 96 MMOL/L (ref 98–107)
CO2 SERPL-SCNC: 30 MMOL/L (ref 21–32)
CREAT SERPL-MCNC: 2.09 MG/DL (ref 0.5–1.05)
CRP SERPL-MCNC: 2.44 MG/DL
EGFRCR SERPLBLD CKD-EPI 2021: 22 ML/MIN/1.73M*2
ERYTHROCYTE [DISTWIDTH] IN BLOOD BY AUTOMATED COUNT: 17.4 % (ref 11.5–14.5)
ERYTHROCYTE [SEDIMENTATION RATE] IN BLOOD BY WESTERGREN METHOD: 63 MM/H (ref 0–30)
GLUCOSE BLD MANUAL STRIP-MCNC: 175 MG/DL (ref 74–99)
GLUCOSE BLD MANUAL STRIP-MCNC: 190 MG/DL (ref 74–99)
GLUCOSE BLD MANUAL STRIP-MCNC: 259 MG/DL (ref 74–99)
GLUCOSE BLD MANUAL STRIP-MCNC: 262 MG/DL (ref 74–99)
GLUCOSE SERPL-MCNC: 230 MG/DL (ref 74–99)
HCT VFR BLD AUTO: 28.5 % (ref 36–46)
HGB BLD-MCNC: 9 G/DL (ref 12–16)
LACTATE SERPL-SCNC: 2.5 MMOL/L (ref 0.4–2)
LACTATE SERPL-SCNC: 2.6 MMOL/L (ref 0.4–2)
LACTATE SERPL-SCNC: 3.4 MMOL/L (ref 0.4–2)
MAGNESIUM SERPL-MCNC: 1.61 MG/DL (ref 1.6–2.4)
MCH RBC QN AUTO: 28.7 PG (ref 26–34)
MCHC RBC AUTO-ENTMCNC: 31.6 G/DL (ref 32–36)
MCV RBC AUTO: 91 FL (ref 80–100)
NRBC BLD-RTO: 0 /100 WBCS (ref 0–0)
PHOSPHATE SERPL-MCNC: 3.4 MG/DL (ref 2.5–4.9)
PLATELET # BLD AUTO: 291 X10*3/UL (ref 150–450)
POTASSIUM SERPL-SCNC: 4 MMOL/L (ref 3.5–5.3)
RBC # BLD AUTO: 3.14 X10*6/UL (ref 4–5.2)
SODIUM SERPL-SCNC: 133 MMOL/L (ref 136–145)
WBC # BLD AUTO: 7.5 X10*3/UL (ref 4.4–11.3)

## 2024-08-31 PROCEDURE — 80069 RENAL FUNCTION PANEL: CPT | Performed by: INTERNAL MEDICINE

## 2024-08-31 PROCEDURE — 97162 PT EVAL MOD COMPLEX 30 MIN: CPT | Mod: GP

## 2024-08-31 PROCEDURE — 2500000001 HC RX 250 WO HCPCS SELF ADMINISTERED DRUGS (ALT 637 FOR MEDICARE OP): Performed by: INTERNAL MEDICINE

## 2024-08-31 PROCEDURE — 86140 C-REACTIVE PROTEIN: CPT | Performed by: INTERNAL MEDICINE

## 2024-08-31 PROCEDURE — 99233 SBSQ HOSP IP/OBS HIGH 50: CPT | Performed by: INTERNAL MEDICINE

## 2024-08-31 PROCEDURE — 85027 COMPLETE CBC AUTOMATED: CPT | Performed by: INTERNAL MEDICINE

## 2024-08-31 PROCEDURE — 36415 COLL VENOUS BLD VENIPUNCTURE: CPT | Performed by: INTERNAL MEDICINE

## 2024-08-31 PROCEDURE — 36415 COLL VENOUS BLD VENIPUNCTURE: CPT | Performed by: PHYSICIAN ASSISTANT

## 2024-08-31 PROCEDURE — 83605 ASSAY OF LACTIC ACID: CPT | Performed by: INTERNAL MEDICINE

## 2024-08-31 PROCEDURE — 2500000002 HC RX 250 W HCPCS SELF ADMINISTERED DRUGS (ALT 637 FOR MEDICARE OP, ALT 636 FOR OP/ED): Performed by: INTERNAL MEDICINE

## 2024-08-31 PROCEDURE — 82947 ASSAY GLUCOSE BLOOD QUANT: CPT

## 2024-08-31 PROCEDURE — 2500000004 HC RX 250 GENERAL PHARMACY W/ HCPCS (ALT 636 FOR OP/ED): Performed by: PHARMACIST

## 2024-08-31 PROCEDURE — 83735 ASSAY OF MAGNESIUM: CPT | Performed by: INTERNAL MEDICINE

## 2024-08-31 PROCEDURE — 85652 RBC SED RATE AUTOMATED: CPT | Performed by: INTERNAL MEDICINE

## 2024-08-31 PROCEDURE — 83605 ASSAY OF LACTIC ACID: CPT | Performed by: PHYSICIAN ASSISTANT

## 2024-08-31 PROCEDURE — 97167 OT EVAL HIGH COMPLEX 60 MIN: CPT | Mod: GO

## 2024-08-31 PROCEDURE — 1200000002 HC GENERAL ROOM WITH TELEMETRY DAILY

## 2024-08-31 PROCEDURE — 2500000004 HC RX 250 GENERAL PHARMACY W/ HCPCS (ALT 636 FOR OP/ED): Performed by: INTERNAL MEDICINE

## 2024-08-31 RX ORDER — AMOXICILLIN 250 MG
1 CAPSULE ORAL 2 TIMES DAILY
COMMUNITY

## 2024-08-31 RX ORDER — ATORVASTATIN CALCIUM 40 MG/1
80 TABLET, FILM COATED ORAL NIGHTLY
Status: DISCONTINUED | OUTPATIENT
Start: 2024-08-31 | End: 2024-09-04 | Stop reason: HOSPADM

## 2024-08-31 RX ORDER — BUSPIRONE HYDROCHLORIDE 5 MG/1
10 TABLET ORAL 2 TIMES DAILY
Status: DISCONTINUED | OUTPATIENT
Start: 2024-08-31 | End: 2024-09-04 | Stop reason: HOSPADM

## 2024-08-31 RX ORDER — BISACODYL 5 MG
10 TABLET, DELAYED RELEASE (ENTERIC COATED) ORAL DAILY PRN
COMMUNITY

## 2024-08-31 RX ORDER — BENZONATATE 200 MG/1
200 CAPSULE ORAL EVERY 8 HOURS PRN
COMMUNITY

## 2024-08-31 RX ORDER — ALUMINUM HYDROXIDE 320 MG/5ML
10 LIQUID ORAL EVERY 6 HOURS PRN
COMMUNITY

## 2024-08-31 RX ORDER — MIRTAZAPINE 7.5 MG/1
7.5 TABLET, FILM COATED ORAL NIGHTLY
Status: DISCONTINUED | OUTPATIENT
Start: 2024-08-31 | End: 2024-09-04 | Stop reason: HOSPADM

## 2024-08-31 RX ORDER — DILTIAZEM HYDROCHLORIDE 240 MG/1
240 CAPSULE, COATED, EXTENDED RELEASE ORAL NIGHTLY
Status: DISCONTINUED | OUTPATIENT
Start: 2024-08-31 | End: 2024-09-04 | Stop reason: HOSPADM

## 2024-08-31 RX ORDER — PANTOPRAZOLE SODIUM 40 MG/1
40 TABLET, DELAYED RELEASE ORAL
Status: DISCONTINUED | OUTPATIENT
Start: 2024-09-01 | End: 2024-09-04 | Stop reason: HOSPADM

## 2024-08-31 RX ORDER — ISOSORBIDE DINITRATE 10 MG/1
10 TABLET ORAL 3 TIMES DAILY
Status: DISCONTINUED | OUTPATIENT
Start: 2024-08-31 | End: 2024-09-04 | Stop reason: HOSPADM

## 2024-08-31 RX ORDER — BISACODYL 5 MG
10 TABLET, DELAYED RELEASE (ENTERIC COATED) ORAL DAILY PRN
Status: DISCONTINUED | OUTPATIENT
Start: 2024-08-31 | End: 2024-09-04 | Stop reason: HOSPADM

## 2024-08-31 RX ORDER — ACETAMINOPHEN 325 MG/1
650 TABLET ORAL EVERY 8 HOURS PRN
COMMUNITY

## 2024-08-31 RX ORDER — BENZONATATE 100 MG/1
200 CAPSULE ORAL EVERY 8 HOURS PRN
Status: DISCONTINUED | OUTPATIENT
Start: 2024-08-31 | End: 2024-09-04 | Stop reason: HOSPADM

## 2024-08-31 RX ORDER — CLOPIDOGREL BISULFATE 75 MG/1
75 TABLET ORAL DAILY
Status: DISCONTINUED | OUTPATIENT
Start: 2024-08-31 | End: 2024-09-04 | Stop reason: HOSPADM

## 2024-08-31 ASSESSMENT — COGNITIVE AND FUNCTIONAL STATUS - GENERAL
STANDING UP FROM CHAIR USING ARMS: TOTAL
WALKING IN HOSPITAL ROOM: TOTAL
DAILY ACTIVITIY SCORE: 12
TOILETING: A LOT
PERSONAL GROOMING: A LITTLE
MOVING FROM LYING ON BACK TO SITTING ON SIDE OF FLAT BED WITH BEDRAILS: TOTAL
TURNING FROM BACK TO SIDE WHILE IN FLAT BAD: A LOT
MOVING TO AND FROM BED TO CHAIR: A LOT
WALKING IN HOSPITAL ROOM: TOTAL
DRESSING REGULAR UPPER BODY CLOTHING: A LOT
MOVING FROM LYING ON BACK TO SITTING ON SIDE OF FLAT BED WITH BEDRAILS: A LOT
TURNING FROM BACK TO SIDE WHILE IN FLAT BAD: A LOT
WALKING IN HOSPITAL ROOM: TOTAL
STANDING UP FROM CHAIR USING ARMS: A LOT
STANDING UP FROM CHAIR USING ARMS: TOTAL
PERSONAL GROOMING: A LITTLE
MOBILITY SCORE: 6
HELP NEEDED FOR BATHING: A LOT
DAILY ACTIVITIY SCORE: 15
CLIMB 3 TO 5 STEPS WITH RAILING: TOTAL
DRESSING REGULAR UPPER BODY CLOTHING: A LOT
PERSONAL GROOMING: A LITTLE
EATING MEALS: A LITTLE
HELP NEEDED FOR BATHING: A LOT
DRESSING REGULAR UPPER BODY CLOTHING: A LOT
TOILETING: A LOT
DRESSING REGULAR LOWER BODY CLOTHING: A LOT
DRESSING REGULAR LOWER BODY CLOTHING: A LOT
TURNING FROM BACK TO SIDE WHILE IN FLAT BAD: TOTAL
DRESSING REGULAR LOWER BODY CLOTHING: TOTAL
MOVING TO AND FROM BED TO CHAIR: TOTAL
MOVING TO AND FROM BED TO CHAIR: A LOT
STANDING UP FROM CHAIR USING ARMS: A LOT
MOVING TO AND FROM BED TO CHAIR: A LOT
MOBILITY SCORE: 9
MOBILITY SCORE: 10
TURNING FROM BACK TO SIDE WHILE IN FLAT BAD: A LOT
HELP NEEDED FOR BATHING: A LOT
CLIMB 3 TO 5 STEPS WITH RAILING: TOTAL
WALKING IN HOSPITAL ROOM: TOTAL
DAILY ACTIVITIY SCORE: 15
CLIMB 3 TO 5 STEPS WITH RAILING: TOTAL
HELP NEEDED FOR BATHING: A LOT
DAILY ACTIVITIY SCORE: 15
MOVING FROM LYING ON BACK TO SITTING ON SIDE OF FLAT BED WITH BEDRAILS: A LOT
TOILETING: A LOT
MOVING FROM LYING ON BACK TO SITTING ON SIDE OF FLAT BED WITH BEDRAILS: A LOT
PERSONAL GROOMING: A LITTLE
MOBILITY SCORE: 10
DRESSING REGULAR LOWER BODY CLOTHING: A LOT
DRESSING REGULAR UPPER BODY CLOTHING: A LOT
TOILETING: TOTAL
CLIMB 3 TO 5 STEPS WITH RAILING: TOTAL

## 2024-08-31 ASSESSMENT — PAIN - FUNCTIONAL ASSESSMENT
PAIN_FUNCTIONAL_ASSESSMENT: 0-10

## 2024-08-31 ASSESSMENT — ACTIVITIES OF DAILY LIVING (ADL): BATHING_ASSISTANCE: MAXIMAL

## 2024-08-31 ASSESSMENT — PAIN SCALES - GENERAL
PAINLEVEL_OUTOF10: 0 - NO PAIN

## 2024-08-31 NOTE — PROGRESS NOTES
Melody Martin is a 89 y.o. female on day 1 of admission presenting with Acute osteomyelitis of left calcaneus (Multi).      Subjective   Melody Martin is a 89 y.o.  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  Patient is from [facility].  She is quite knowledgeable about her history just does not know slight specific details such as medication dosages and to that matter.  She states that she was told that she had a worsening wound of her left heel which per documentation by the nursing facility nurse practitioner the wound was healing well and a x-ray was performed and showed concerns for osteomyelitis so she was sent into the ED for imaging.  She states that she does not really have much sensation in her left lower extremity foot and her foot all the way up to her knee may be slightly above the knee but will occasionally get sharp stabbing/lightening like sensation going from her lower back down her leg but she does not really say where it stops.  She states that overall she feels well but did express concerns that nursing staff at the facility may not be changing the dressing on her foot as often as they should.  She denies any recent sick contacts, chemical/environmental exposures, changes in dietary habits or any recent traumatic events/falls other noted above.  She denies any fevers, chills, night sweats, vision changes, auditory changes, change in taste/smell, loss of bowel/bladder control, loss consciousness, dizziness, vertigo, syncope, seizure-like activity, chest pain, palpitations, shortness of breath, coughing, wheezing, congestion, hemoptysis, hematemesis, abdominal pain,  nausea, vomiting, diarrhea, constipation, dysuria, hematuria, dyschezia, hematochezia or any lateralizing motor/sensory deficits other than noted above.     ED Course (Summary):   Vitals on presentation: Temperature of 36.7 °C, heart rate 72, respirations of 16, blood pressure 132/65, pulse ox of 99% on room air  Patient was initiated on IV vancomycin/Zosyn due to concerns of osteomyelitis of the left heel  Per chart review an x-ray was performed on 08/22/2024 and noted concerns for possible osteomyelitis.  White blood cell count of 10.3  Hemoglobin/hematocrit 11.8/37.2  Baseline hemoglobin seems to be quite variable.  The only labs we have available seem to be during times of hospitalizations where she has been as low as 6 requiring transfusions.  She does seem to possibly bleed close to her baseline may be possibly hemoconcentrated?  Glucose 144  Sodium 136  Potassium 3.7  BUN/creatinine of 11/1.47  Lactate of 2.6 with a repeat of 2.1  CRP of 3.66  ESR of 79  Blood cultures x 2 pending  Wound culture pending  XR left foot: Soft tissue leg regularity of the left calcaneus concerning for underlying osteomyelitis and calcific tendinopathy and osteopenia with extensive vascular calcifications.  CT foot without contrast noted acute osteomyelitis involving the posterior calcaneal tuberosity with overlying soft tissue ulceration.  Podiatry was made aware of the patient's per my discussion with the ED practitioner.    8/31/2024: no acute events overnight. Patient remained Zosyn and vanco. Nephrology followed for ESRD on HD MWF. Podiatry consult pending.       Objective     Last Recorded Vitals  /60 (BP Location: Left arm, Patient Position: Lying)   Pulse 68   Temp 36.6 °C (97.9 °F) (Temporal)   Resp 18   Wt 72 kg (158 lb 11.7 oz)   SpO2 99%   Intake/Output last 3 Shifts:    Intake/Output Summary (Last 24 hours) at 8/31/2024 1512  Last data filed at 8/31/2024 1334  Gross per 24 hour   Intake 570 ml   Output --    Net 570 ml       Admission Weight  Weight: 68 kg (150 lb) (08/30/24 1655)    Daily Weight  08/30/24 : 72 kg (158 lb 11.7 oz)    Image Results  CT foot left wo IV contrast  Narrative: Interpreted By:  Rolf Baeza,   STUDY:  CT FOOT LEFT WO IV CONTRAST;  8/30/2024 7:02 pm      INDICATION:  Signs/Symptoms:concern for osteo left heel.      COMPARISON:  None.      ACCESSION NUMBER(S):  [id number]      ORDERING CLINICIAN:  KORINA SMITH      TECHNIQUE:  CT imaging of the left foot was obtained without contrast. Coronal  and sagittal reformatted images were performed. 3D reconstructed  images were not performed at time of dictation therefore not used  during interpretation.      FINDINGS:  There is a soft tissue ulceration at the heel. This extends to the  bone surface. There is ill-defined erosive change of the posterior  calcaneal tuberosity and sclerosis consistent suspicious for  osteomyelitis.      The bones are severely osteopenic. This limits assessment for  nondisplaced fractures though no displaced fracture is identified.      Plantar calcaneal spur. Enthesopathic spurring at the Achilles tendon  insertion.      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      Impression: Acute osteomyelitis at involving the posterior calcaneal tuberosity  with overlying soft tissue ulceration.                  MACRO:  None.      Signed by: Rolf Baeza 8/30/2024 7:41 PM  Dictation workstation:   [id number]  XR foot left 1-2 views  Narrative: Interpreted By:  Amber Denton,   STUDY:  XR FOOT LEFT 1-2 VIEWS;  8/30/2024 6:11 pm      INDICATION:  Signs/Symptoms:Concern for osteo of left heel.      COMPARISON:  None.      ACCESSION NUMBER(S):  [id number]      ORDERING CLINICIAN:  KORINA SMITH      FINDINGS:  AP and lateral views of the left foot were obtained.      There is diffuse osteopenia. No acute fracture or dislocation is  identified. There is calcaneal enthesopathy. There is  cortical  irregularity at the posterior aspect of the calcaneus. There are  calcifications at the insertion of the Katina's tendon. Degenerative  changes are noted at the 1st metatarsophalangeal joint. Extensive  vascular calcifications are present. There is soft tissue  irregularity overlying the calcaneus.      Impression: 1. Soft tissue irregularity overlying the left calcaneus, suggestive  of an wound. Cortical irregularity at the posterior aspect of the  calcaneus, underlying osteomyelitis cannot be excluded. MRI can be  obtained for further evaluation.  2. Calcifications at the insertion of the [place]'s tendon,  suggestive of calcific tendinopathy.  3. Osteopenia.  4. Extensive vascular calcifications.              MACRO:  None.      Signed by: Amber Denton 8/30/2024 6:54 PM  Dictation workstation:   [id number]      Physical Exam  Constitutional:       General: She is not in acute distress.     Appearance: Normal appearance.   HENT:      Head: Normocephalic.      Mouth/Throat:      Mouth: Mucous membranes are moist.      Pharynx: Oropharynx is clear.   Eyes:      Pupils: Pupils are equal, round, and reactive to light.   Cardiovascular:      Rate and Rhythm: Normal rate and regular rhythm.      Heart sounds: Normal heart sounds. No murmur heard.     No gallop.   Pulmonary:      Effort: Pulmonary effort is normal.      Breath sounds: Normal breath sounds.   Abdominal:      General: Bowel sounds are normal. There is no distension.      Palpations: Abdomen is soft.      Tenderness: There is no abdominal tenderness.   Musculoskeletal:         General: No swelling. Normal range of motion.      Cervical back: Neck supple. No rigidity.   Skin:     General: Skin is warm and dry.      Comments: Left foot wound / heel ulcer dressed. Buttock and sacral ulcer no drain   Neurological:      General: No focal deficit present.      Mental Status: She is alert.      Cranial Nerves: No cranial nerve deficit.      Sensory:  No sensory deficit.   Psychiatric:         Mood and Affect: Mood normal.         Behavior: Behavior normal.         Relevant Results             Results for orders placed or performed during the hospital encounter of 08/30/24 (from the past 96 hour(s))   CBC and Auto Differential   Result Value Ref Range    WBC 10.3 4.4 - 11.3 x10*3/uL    nRBC 0.0 0.0 - 0.0 /100 WBCs    RBC 4.06 4.00 - 5.20 x10*6/uL    Hemoglobin 11.8 (L) 12.0 - 16.0 g/dL    Hematocrit 37.2 36.0 - 46.0 %    MCV 92 80 - 100 fL    MCH 29.1 26.0 - 34.0 pg    MCHC 31.7 (L) 32.0 - 36.0 g/dL    RDW 17.2 (H) 11.5 - 14.5 %    Platelets 368 150 - 450 x10*3/uL    Neutrophils % 75.3 40.0 - 80.0 %    Immature Granulocytes %, Automated 2.4 (H) 0.0 - 0.9 %    Lymphocytes % 10.1 13.0 - 44.0 %    Monocytes % 10.1 2.0 - 10.0 %    Eosinophils % 1.2 0.0 - 6.0 %    Basophils % 0.9 0.0 - 2.0 %    Neutrophils Absolute 7.71 (H) 1.60 - 5.50 x10*3/uL    Immature Granulocytes Absolute, Automated 0.25 0.00 - 0.50 x10*3/uL    Lymphocytes Absolute 1.04 0.80 - 3.00 x10*3/uL    Monocytes Absolute 1.04 (H) 0.05 - 0.80 x10*3/uL    Eosinophils Absolute 0.12 0.00 - 0.40 x10*3/uL    Basophils Absolute 0.09 0.00 - 0.10 x10*3/uL   Comprehensive Metabolic Panel   Result Value Ref Range    Glucose 144 (H) 74 - 99 mg/dL    Sodium 136 136 - 145 mmol/L    Potassium 3.7 3.5 - 5.3 mmol/L    Chloride 96 (L) 98 - 107 mmol/L    Bicarbonate 31 21 - 32 mmol/L    Anion Gap 13 10 - 20 mmol/L    Urea Nitrogen 11 6 - 23 mg/dL    Creatinine 1.47 (H) 0.50 - 1.05 mg/dL    eGFR 34 (L) >60 mL/min/1.73m*2    Calcium 7.7 (L) 8.6 - 10.3 mg/dL    Albumin 2.8 (L) 3.4 - 5.0 g/dL    Alkaline Phosphatase 93 33 - 136 U/L    Total Protein 6.5 6.4 - 8.2 g/dL    AST 20 9 - 39 U/L    Bilirubin, Total 0.3 0.0 - 1.2 mg/dL    ALT 11 7 - 45 U/L   Lactate   Result Value Ref Range    Lactate 2.6 (H) 0.4 - 2.0 mmol/L   Sedimentation rate, automated   Result Value Ref Range    Sedimentation Rate 79 (H) 0 - 30 mm/h   C-reactive  protein   Result Value Ref Range    C-Reactive Protein 3.66 (H) <1.00 mg/dL   Tissue/Wound Culture/Smear    Specimen: Wound/Tissue; Tissue/Biopsy   Result Value Ref Range    Gram Stain No polymorphonuclear leukocytes seen (A)     Gram Stain (4+) Abundant Gram negative bacilli (A)     Gram Stain (3+) Moderate Gram positive cocci (A)    Blood Culture    Specimen: Peripheral Venipuncture; Blood culture   Result Value Ref Range    Blood Culture Loaded on Instrument - Culture in progress    Blood Culture    Specimen: Peripheral Venipuncture; Blood culture   Result Value Ref Range    Blood Culture Loaded on Instrument - Culture in progress    Lactate   Result Value Ref Range    Lactate 2.1 (H) 0.4 - 2.0 mmol/L   Lactate   Result Value Ref Range    Lactate 2.5 (H) 0.4 - 2.0 mmol/L   CBC   Result Value Ref Range    WBC 7.5 4.4 - 11.3 x10*3/uL    nRBC 0.0 0.0 - 0.0 /100 WBCs    RBC 3.14 (L) 4.00 - 5.20 x10*6/uL    Hemoglobin 9.0 (L) 12.0 - 16.0 g/dL    Hematocrit 28.5 (L) 36.0 - 46.0 %    MCV 91 80 - 100 fL    MCH 28.7 26.0 - 34.0 pg    MCHC 31.6 (L) 32.0 - 36.0 g/dL    RDW 17.4 (H) 11.5 - 14.5 %    Platelets 291 150 - 450 x10*3/uL   Renal Function Panel   Result Value Ref Range    Glucose 230 (H) 74 - 99 mg/dL    Sodium 133 (L) 136 - 145 mmol/L    Potassium 4.0 3.5 - 5.3 mmol/L    Chloride 96 (L) 98 - 107 mmol/L    Bicarbonate 30 21 - 32 mmol/L    Anion Gap 11 10 - 20 mmol/L    Urea Nitrogen 16 6 - 23 mg/dL    Creatinine 2.09 (H) 0.50 - 1.05 mg/dL    eGFR 22 (L) >60 mL/min/1.73m*2    Calcium 7.2 (L) 8.6 - 10.3 mg/dL    Phosphorus 3.4 2.5 - 4.9 mg/dL    Albumin 2.2 (L) 3.4 - 5.0 g/dL   Magnesium   Result Value Ref Range    Magnesium 1.61 1.60 - 2.40 mg/dL   Lactate   Result Value Ref Range    Lactate 2.6 (H) 0.4 - 2.0 mmol/L   C-reactive protein   Result Value Ref Range    C-Reactive Protein 2.44 (H) <1.00 mg/dL   Sedimentation Rate   Result Value Ref Range    Sedimentation Rate 63 (H) 0 - 30 mm/h   POCT GLUCOSE   Result  Value Ref Range    POCT Glucose 175 (H) 74 - 99 mg/dL   Lactate   Result Value Ref Range    Lactate 3.4 (H) 0.4 - 2.0 mmol/L   POCT GLUCOSE   Result Value Ref Range    POCT Glucose 262 (H) 74 - 99 mg/dL     CT foot left wo IV contrast    Result Date: 8/30/2024  Interpreted By:  Rolf Baeza, STUDY: CT FOOT LEFT WO IV CONTRAST;  8/30/2024 7:02 pm   INDICATION: Signs/Symptoms:concern for osteo left heel.   COMPARISON: None.   ACCESSION NUMBER(S): [id number]   ORDERING CLINICIAN: KORINA SMITH   TECHNIQUE: CT imaging of the left foot was obtained without contrast. Coronal and sagittal reformatted images were performed. 3D reconstructed images were not performed at time of dictation therefore not used during interpretation.   FINDINGS: There is a soft tissue ulceration at the heel. This extends to the bone surface. There is ill-defined erosive change of the posterior calcaneal tuberosity and sclerosis consistent suspicious for osteomyelitis.   The bones are severely osteopenic. This limits assessment for nondisplaced fractures though no displaced fracture is identified.   Plantar calcaneal spur. Enthesopathic spurring at the Achilles tendon insertion.   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       Acute osteomyelitis at involving the posterior calcaneal tuberosity with overlying soft tissue ulceration.         MACRO: None.   Signed by: Rolf Baeza 8/30/2024 7:41 PM Dictation workstation:   [id number]    XR foot left 1-2 views    Result Date: 8/30/2024  Interpreted By:  Amber Denton, STUDY: XR FOOT LEFT 1-2 VIEWS;  8/30/2024 6:11 pm   INDICATION: Signs/Symptoms:Concern for osteo of left heel.   COMPARISON: None.   ACCESSION NUMBER(S): [id number]   ORDERING CLINICIAN: KORINA SMITH   FINDINGS: AP and lateral views of the left foot were obtained.   There is diffuse osteopenia. No acute fracture or dislocation is identified. There is calcaneal enthesopathy. There is cortical  irregularity at the posterior aspect of the calcaneus. There are calcifications at the insertion of the Katina's tendon. Degenerative changes are noted at the 1st metatarsophalangeal joint. Extensive vascular calcifications are present. There is soft tissue irregularity overlying the calcaneus.       1. Soft tissue irregularity overlying the left calcaneus, suggestive of an wound. Cortical irregularity at the posterior aspect of the calcaneus, underlying osteomyelitis cannot be excluded. MRI can be obtained for further evaluation. 2. Calcifications at the insertion of the Katina's tendon, suggestive of calcific tendinopathy. 3. Osteopenia. 4. Extensive vascular calcifications.       MACRO: None.   Signed by: Amber Denton 8/30/2024 6:54 PM Dictation workstation:   [id number]   Scheduled medications  amiodarone, 200 mg, oral, q24h  apixaban, 2.5 mg, oral, BID  atorvastatin, 80 mg, oral, Nightly  busPIRone, 10 mg, oral, BID  clopidogrel, 75 mg, oral, Daily  dilTIAZem CD, 240 mg, oral, Nightly  docusate sodium, 100 mg, oral, BID  insulin lispro, 0-15 Units, subcutaneous, With meals & nightly  isosorbide dinitrate, 10 mg, oral, TID  mirtazapine, 7.5 mg, oral, Nightly  [START ON 9/1/2024] pantoprazole, 40 mg, oral, Daily before breakfast  piperacillin-tazobactam, 2.25 g, intravenous, q8h      Continuous medications     PRN medications  PRN medications: acetaminophen, albuterol, benzonatate, bisacodyl, bisacodyl, ondansetron, polyethylene glycol, vancomycin    Assessment/Plan        This patient has a central line   Reason for the central line remaining today? Parenteral medication            Assessment & Plan  Acute osteomyelitis of left calcaneus (Multi)      1. Acute osteomyelitis of left calcaneus (Multi)      continue Zosyn and Vanco. need podiatry input, wound care      2. ESRD on hemodialysis (Multi)      Dialysis on MWF. nephrology followed      3. Type 2 diabetes mellitus with chronic kidney disease on  chronic dialysis, with long-term current use of insulin (Multi)      SSL and monitor      4. Atrial fibrillation, unspecified type (Multi)      continue amiodarone, cardizem and eliquis      5. Primary hypertension      continue home meds                      Leigha Christina MD

## 2024-08-31 NOTE — PROGRESS NOTES
Melody Martin is a 89 y.o. female admitted for Acute osteomyelitis of left calcaneus (Multi). Pharmacy reviewed the patient's [id number] medications and allergies for accuracy.    The list below reflects the PTA list prior to pharmacy medication history. A summary a changes to the PTA medication list has been listed below. Please review each medication in order reconciliation for additional clarification and justification.    Source of information:  Facility list    Medications added:  Senna-Dok 8.6-50mg bid  Bisacodyl 5mg 2t every day   Benzonatate 200mg q8 prn  Aluminium Hydroxide 320mg/5ml 10ml q6 prn  Apap 325mg 2t q8 prn for pain    Medications modified:  Benzonatate 100mg tid prn --> 100mg every day   Diphenhydramine- Zinc acetate cream AAA tid --> AAA every day prn  Dok 100mg bid --> 100mg every day   Glucagon 1mg --> q1 prn  Metoprolol Succ XL (Kapspargo Sprinkle) every day --> Metoprolol ER 100mg every day   Tramadol 50mg 0.5t q12 prn --> 50mg q12 prn    Medications to be removed:  Mylanta 467-114-12lf/5ml  Clotrimazole- Betamethasone cream  Senokot 8.6mg    Medications of concern:      Prior to Admission Medications   Prescriptions Last Dose Informant Patient Reported? Taking?   B complex-vitamin C-folic acid (Nephrocaps) 1 mg capsule  Other Yes No   Sig: Take 1 capsule by mouth once daily.   PSYLLIUM HUSK, ASPARTAME, ORAL  Other Yes No   Sig: Take 1 packet by mouth once daily.   acetaminophen (Tylenol) 325 mg tablet  Other Yes No   Sig: Take 2 tablets (650 mg) by mouth every 6 hours if needed for mild pain (1 - 3) or fever (temp greater than 38.0 C).   albuterol 90 mcg/actuation inhaler  Other Yes No   Sig: Inhale 2 puffs every 12 hours if needed.   alum-mag hydroxide-simeth (Mylanta) 200-200-20 mg/5 mL oral suspension  Other Yes No   Sig: Take 10 mL by mouth every 6 hours if needed for indigestion or heartburn.   amino acids-protein hydr-fiber 15 gram- 100 kcal/30 mL liquid in packet  Other Yes  No   Sig: Take 30 mL by mouth 2 times a day.   amiodarone (Pacerone) 200 mg tablet  Other Yes No   Sig: Take 1 tablet (200 mg) by mouth once every 24 hours.   apixaban (Eliquis) 2.5 mg tablet  Other Yes No   Sig: Take 1 tablet (2.5 mg) by mouth 2 times a day.   atorvastatin (Lipitor) 80 mg tablet  Other Yes No   Sig: Take 1 tablet (80 mg) by mouth once daily at bedtime.   benzonatate (Tessalon) 100 mg capsule  Other Yes No   Sig: Take 1 capsule (100 mg) by mouth 3 times a day as needed (decongestion).   bisacodyl (Dulcolax, bisacodyl,) 10 mg suppository  Other Yes No   Sig: Insert 1 suppository (10 mg) into the rectum once daily as needed for constipation.   blood sugar diagnostic (OneTouch Ultra Test) strip  Other No No   Si strip by Does not apply route 3 times a day.   busPIRone (Buspar) 10 mg tablet  Other Yes No   Sig: Take 1 tablet (10 mg) by mouth twice a day.   clopidogrel (Plavix) 75 mg tablet  Other Yes No   Sig: Take 1 tablet (75 mg) by mouth once daily.   clotrimazole-betamethasone (Lotrisone) cream  Other Yes No   Sig: Apply 1 Application topically 2 times a day. To inner thighs and stephanie area   diclofenac sodium (Voltaren) 1 % gel  Other Yes No   Sig: Apply 2.25 inches (2 g) topically twice a day.   dilTIAZem ER (Tiazac) 240 mg 24 hr capsule  Other Yes No   Sig: Take 1 capsule (240 mg) by mouth once daily at bedtime.   diphenhydramine-zinc acetate cream  Other Yes No   Sig: Apply 1 Application topically 3 times a day. For itchy back   docusate sodium (Colace) 100 mg capsule  Other Yes No   Sig: Take 1 capsule (100 mg) by mouth twice a day.   glucagon 1 mg injection  Other Yes No   Sig: Inject 1 mg into the muscle.   glucose (Glutose) 40 % gel oral gel  Other Yes No   Sig: Take 15 g by mouth 1 time if needed for low blood sugar - see comments.   guaiFENesin (Mucinex) 600 mg 12 hr tablet  Other Yes No   Sig: Take 1 tablet (600 mg) by mouth twice a day.   insulin glargine (Lantus) 100 unit/mL injection   Other No No   Sig: Inject 20 Units under the skin once daily at bedtime. Take as directed per insulin instructions.   Patient taking differently: Inject 20 Units under the skin 2 times a day. Take as directed per insulin instructions.   insulin lispro (HumaLOG) 100 unit/mL injection  Other No No   Sig: Inject 0-0.15 mL (0-15 Units) under the skin 4 times a day before meals. Take as directed per insulin instructions.   Patient taking differently: Inject 0-0.1 mL (0-10 Units) under the skin 4 times a day before meals. Take as directed per insulin instructions.   isosorbide dinitrate (Isordil) 10 mg tablet  Other Yes No   Sig: Take 1 tablet (10 mg) by mouth 3 times a day.   lidocaine (LMX) 4 % cream  Other Yes No   Sig: Apply 0.1 g topically once daily. To back   melatonin 3 mg tablet  Other Yes No   Sig: Take 1 tablet (3 mg) by mouth once daily at bedtime.   metoprolol succinate XL ([place]spargo Sprinkle) 100 mg 24 hr capsule  Other Yes No   Sig: Take 1 capsule (100 mg) by mouth once daily. Do not crush or chew.   mirtazapine (Remeron) 7.5 mg tablet  Other Yes No   Sig: Take 1 tablet (7.5 mg) by mouth once daily at bedtime.   pantoprazole (ProtoNix) 40 mg EC tablet  Other Yes No   Sig: Take 1 tablet (40 mg) by mouth once daily in the morning. Take before meals. Do not crush, chew, or split.   sennosides (Senokot) 8.6 mg tablet  Other Yes No   Sig: Take 1 tablet (8.6 mg) by mouth every 12 hours if needed.   sevelamer carbonate (Renvela) 800 mg tablet  Other Yes No   Sig: Take 1 tablet (800 mg) by mouth 3 times a day before meals.   simethicone (Mylicon) 80 mg chewable tablet  Other Yes No   Sig: Chew 1 tablet (80 mg) 4 times a day.   traMADol (Ultram) 50 mg tablet   No No   Sig: Take 0.5 tablets (25 mg) by mouth every 12 hours if needed for severe pain (7 - 10).   zinc oxide 20 % ointment  Other Yes No   Sig: Apply 1 Application topically twice a day. To groin and buttocks      Facility-Administered Medications: None        Michelle Guo

## 2024-08-31 NOTE — PROGRESS NOTES
Medication Adjustment    The following medication(s) was/were adjusted for Melody Martin per protocol/policy due to altered renal function.    Medication(s) adjusted:   Zosyn adjusted to 2.25 g IV every 8 hours. Patient with ESRD on HD. BMI 36.89    Elías Gautam [facility]

## 2024-08-31 NOTE — PROGRESS NOTES
Occupational Therapy  Evaluation and Discharge    Patient Name: Melody Martin  MRN: [id number]  Today's Date: 8/31/2024  Time Calculation  Start Time: 1017  Stop Time: 1034  Time Calculation (min): 17 min    Current Problem:   1. Acute osteomyelitis of left calcaneus (Multi)        OT order: OT eval and treat   Referred by: Deep Moon DO  Reason for referral: ADLs and Safety Assess  Past medical history related to rehab:   Past Medical History:   Diagnosis Date    Anemia     Atrial fibrillation (Multi)     Chronic kidney disease     Diabetes mellitus (Multi)     Elevated troponin 08/24/2023    GERD (gastroesophageal reflux disease)     Heart murmur     Hypertension     Myocardial infarction (Multi)     Old myocardial infarction 09/09/2023    Other conditions influencing health status 12/06/2022    History of cough    Other conditions influencing health status 04/06/2016    Diabetes mellitus type 1, uncontrolled    Personal history of other diseases of the circulatory system     History of hypertension    Personal history of other diseases of the female genital tract     History of endometriosis    Personal history of other diseases of the nervous system and sense organs 10/15/2021    History of acute otitis media    Personal history of other endocrine, nutritional and metabolic disease 01/26/2018    History of diabetes mellitus    Personal history of transient ischemic attack (TIA), and cerebral infarction without residual deficits 09/20/2023    Stroke (Multi)     Urinary tract infection       Past Surgical History:   Procedure Laterality Date    BACK SURGERY  10/10/2018    Back Surgery    HYSTERECTOMY  10/10/2018    Hysterectomy    IR CVC TUNNELED  8/28/2023    IR CVC TUNNELED 8/28/2023 POR ANGIO    MR HEAD ANGIO WO IV CONTRAST  8/31/2023    MR HEAD ANGIO WO IV CONTRAST 8/31/2023 POR MRI    MR NECK ANGIO WO IV CONTRAST  8/31/2023    MR NECK ANGIO WO IV CONTRAST 8/31/2023 POR MRI         Pt admitted 8/29 with LLE  wound    Orders received, chart reviewed, eval complete    Precautions:   Medical Precautions: Fall precautions (hx of CVA with L weakness - LLE >LUE) skin integrity and LLE wound     ASSESSMENT  OT Assessment: Pt is at baseline level of function. No acute OT needs at this time.   Prognosis: Poor  Barriers to discharge: None  Tolerance: Patient tolerated treatment well    PLAN  Frequency: OT eval only  OT OK to discharge: Yes    GENERAL VISIT INFORMATION   Start of session communication: Bedside nurse  End of session communication: Bedside nurse  Family/caregiver present: No  Caregiver feedback: n/a  Co-Treatment: PT  Reason for co-treatment: Co-eval and treat completed in order to optimize patient safety and function in order to promote OT goal achievement   Position Pt Received:  Bed, 3 rail up, Alarm on  End of session position: Bed, 3 rail up, Alarm on    SUBJECTIVE  Home Living:  Type of Home:  (Pt admitted from LTC facility. Pt is stevo lift to  at baseline. Denies working with therapy at this time)     Prior Level of Function:  Level of [place]:  (Pt has assist for all ADLs and functional mobility. pt enjoys crocheting)      Pain:  Assessment: 0-10  Score: 0 - No pain      OBJECTIVE      Cognition:  Overall Cognitive Status: Within Functional Limits  Orientation Level: Oriented X4             Current ADL function:   EATING:   (set up)     GROOMING: Minimal     BATHING: Maximal     UB DRESSING: Moderate     LB DRESSING: Total     TOILETING: Total        Bed Mobility/Transfers:   Bed Mobility  Bed Mobility: Yes  Bed Mobility 1  Bed Mobility 1: Supine to sitting  Level of Assistance 1: Maximum assistance, +2  Bed Mobility 2  Bed Mobility  2: Sitting to supine  Level of Assistance 2: Maximum assistance, +2  Bed Mobility 3  Bed Mobility 3: Rolling right, Rolling left (max x1)         Sitting Balance:  Static Sitting Balance  Static Sitting-Balance Support:  (initally max A progressed to SBA with BUE  support)      Sensation:  Light Touch: No apparent deficits (BUE -reports numbness in L leg)    Strength:  Strength Comments: LUE 4/5; RUE 2- shoulder, 4/5 elbow    Perception:  Inattention/Neglect: Appears intact    Coordination:  Movements are Fluid and Coordinated: Yes     Hand Function:  Hand Function  Gross Grasp: Functional    Extremities: RUE   RUE : Within Functional Limits (impaired at shoulder baseline - arthritis) and LUE   LUE: Within Functional Limits    Outcome Measures: [facility] Daily Activity   Putting on and taking off regular lower body clothing: Total  Bathing (including washing, rinsing, drying): A lot  Putting on and taking off regular upper body clothing: A lot  Toileting, which includes using toilet, bedpan or urinal: Total  Taking care of personal grooming such as brushing teeth: A little   Eating Meals: A little   Daily Activity - Total Score: 12    Therapeutic Intervention   Pt edu on role of OT in the acute care setting. Pt verbalized understanding   Pt edu on the importance of completing ADLs as independently as possible while in the hospital in order to maintain function. Pt verbalized understanding.   Pt edu to use call light and not attempt any functional mobility until staff present. Pt verbalized understanding.     EDUCATION:     Education Documentation  ADL Training, taught by Treasure Alejandro OT at 8/31/2024 11:58 AM.  Learner: Patient  Readiness: Acceptance  Method: Explanation  Response: Verbalizes Understanding    Education Comments  No comments found.      08/31/24 at 11:58 AM - TREASURE ALEJANDRO OT

## 2024-08-31 NOTE — CARE PLAN
The patient's goals for the shift include    Problem: Skin  Goal: Decreased wound size/increased tissue granulation at next dressing change  Outcome: Progressing  Flowsheets (Taken 8/31/2024 1050)  Decreased wound size/increased tissue granulation at next dressing change:   Protective dressings over bony prominences   Promote sleep for wound healing  Goal: Participates in plan/prevention/treatment measures  Outcome: Progressing  Flowsheets (Taken 8/31/2024 1050)  Participates in plan/prevention/treatment measures:   Increase activity/out of bed for meals   Elevate heels  Goal: Prevent/manage excess moisture  Outcome: Progressing  Flowsheets (Taken 8/31/2024 1050)  Prevent/manage excess moisture:   Moisturize dry skin   Cleanse incontinence/protect with barrier cream   Monitor for/manage infection if present  Goal: Prevent/minimize sheer/friction injuries  Outcome: Progressing  Flowsheets (Taken 8/31/2024 1050)  Prevent/minimize sheer/friction injuries:   Turn/reposition every 2 hours/use positioning/transfer devices   Use pull sheet   Increase activity/out of bed for meals  Goal: Promote/optimize nutrition  Outcome: Progressing  Flowsheets (Taken 8/31/2024 1050)  Promote/optimize nutrition:   Consume > 50% meals/supplements   Offer water/supplements/favorite foods  Goal: Promote skin healing  Outcome: Progressing     Problem: Fall/Injury  Goal: Not fall by end of shift  Outcome: Progressing  Goal: Be free from injury by end of the shift  Outcome: Progressing     Problem: Nutrition  Goal: Oral intake greater 75%  Outcome: Progressing  Goal: Consume prescribed supplement  Outcome: Progressing  Goal: Promote healing  Outcome: Progressing     Problem: Infection prevention/bleeding  Goal: Infection s/sx managed  Outcome: Progressing  Goal: No further progression of infection  Outcome: Progressing       The clinical goals for the shift include no new skin breakdown this shift.    See assessment and mar. Remains on room  air. See blood sugars. Tele as ordered.

## 2024-08-31 NOTE — PROGRESS NOTES
Pharmacy to Dose Vancomycin - Results Assessment    Melody Martin is a 89 y.o. female admitted for Acute osteomyelitis of left calcaneus (Multi). Pharmacy was consulted for vancomycin dosing and monitoring. Today is day 1 of vancomycin therapy.      Assessment  Vital signs reviewed, including temperature, HR, BP, I/Os.   Available lab results reviewed, including:WBC, serum creatinine, and BUN.    Recent vancomycin received: 1750 mg x 1 dose to present.              Trough level assessment:      9/1 @ 0500             Plan     Within goal trough range. Continue current vancomycin regimen.  Expected vancomycin duration: . days of total therapy, to be completed on ..   Follow for continued vancomycin need, clinical response, and s/s of toxicity.     Valeriano Berrios, PharmD

## 2024-08-31 NOTE — PROGRESS NOTES
Vancomycin Dosing by Pharmacy- FOLLOW UP    Melody Martin is a 89 y.o. year old female who Pharmacy has been consulted for vancomycin dosing for cellulitis, skin and soft tissue. Based on the patient's indication and renal status this patient is being dosed based on a goal pre-HD level of 20-25.     Patient is on HD M//. Reports last HD session was 24. DBL      Visit Vitals  /66 (BP Location: Left arm, Patient Position: Lying)   Pulse 62   Temp 36.2 °C (97.2 °F) (Temporal)   Resp 18        Lab Results   Component Value Date    CREATININE 2.09 (H) 2024    CREATININE 1.47 (H) 2024    CREATININE 3.19 (H) 2024    CREATININE 2.00 (H) 2024        Patient weight is as follows:   Vitals:    24 2228   Weight: 72 kg (158 lb 11.7 oz)       Cultures:  No results found for the encounter in last 14 days.       No intake/output data recorded.  I/O during current shift:  I/O this shift:  In: 50 [IV Piggyback:50]  Out: -     Temp (24hrs), Av.7 °C (98.1 °F), Min:36.2 °C (97.2 °F), Max:37.3 °C (99.2 °F)      Assessment/Plan    Patient received loading dose of 1750 mg on  around 1800.   Patient will be dosed by level. Will follow for changes in HD schedule.   The next level will be obtained Prior to next HD session. May be obtained sooner if clinically indicated. Lab scheduled for Monday () morning per the // schedule.  Will continue to monitor renal function daily while on vancomycin and order serum creatinine at least every 48 hours if not already ordered.  Follow for continued vancomycin needs, clinical response, and signs/symptoms of toxicity.       Rafa Hull, JarrettD

## 2024-08-31 NOTE — H&P
[facility] - GENERAL MEDICINE HISTORY AND PHYSICAL    History Obtained From: Patient, Discussion with the ED Physician and Chart Review    Recent Admission:  A.  07/14/2024 till 07/18/2024 = urinary tract infection with hematuria, sepsis responsive to IV fluids, fecal impaction with inflammatory colitis/possible bowel ischemia, ESRD    History Of Present Illness:  Melody Martin is a 89 y.o.  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  Patient is from [facility] in [place].  She is quite knowledgeable about her history just does not know slight specific details such as medication dosages and to that matter.  She states that she was told that she had a worsening wound of her left heel which per documentation by the nursing facility nurse practitioner the wound was healing well and a x-ray was performed and showed concerns for osteomyelitis so she was sent into the ED for imaging.  She states that she does not really have much sensation in her left lower extremity foot and her foot all the way up to her knee may be slightly above the knee but will occasionally get sharp stabbing/lightening like sensation going from her lower back down her leg but she does not really say where it stops.  She states that overall she feels well but did express concerns that nursing staff at the facility may not be changing the dressing on her foot as often as they should.  She denies any recent sick contacts, chemical/environmental exposures, changes in dietary habits or any recent traumatic events/falls other noted above.  She denies any fevers, chills, night sweats, vision changes, auditory changes,  change in taste/smell, loss of bowel/bladder control, loss consciousness, dizziness, vertigo, syncope, seizure-like activity, chest pain, palpitations, shortness of breath, coughing, wheezing, congestion, hemoptysis, hematemesis, abdominal pain, nausea, vomiting, diarrhea, constipation, dysuria, hematuria, dyschezia, hematochezia or any lateralizing motor/sensory deficits other than noted above.    ED Course (Summary):   Vitals on presentation: Temperature of 36.7 °C, heart rate 72, respirations of 16, blood pressure 132/65, pulse ox of 99% on room air  Patient was initiated on IV vancomycin/Zosyn due to concerns of osteomyelitis of the left heel  Per chart review an x-ray was performed on 08/22/2024 and noted concerns for possible osteomyelitis.  White blood cell count of 10.3  Hemoglobin/hematocrit 11.8/37.2  Baseline hemoglobin seems to be quite variable.  The only labs we have available seem to be during times of hospitalizations where she has been as low as 6 requiring transfusions.  She does seem to possibly bleed close to her baseline may be possibly hemoconcentrated?  Glucose 144  Sodium 136  Potassium 3.7  BUN/creatinine of 11/1.47  Lactate of 2.6 with a repeat of 2.1  CRP of 3.66  ESR of 79  Blood cultures x 2 pending  Wound culture pending  XR left foot: Soft tissue leg regularity of the left calcaneus concerning for underlying osteomyelitis and calcific tendinopathy and osteopenia with extensive vascular calcifications.  CT foot without contrast noted acute osteomyelitis involving the posterior calcaneal tuberosity with overlying soft tissue ulceration.  Podiatry was made aware of the patient's per my discussion with the ED practitioner.    ED Course (From Provider):  ED Course as of 08/30/24 2225   Fri Aug 30, 2024   1719 Spoke to the pharmacist Elías about the patient.  I would like broad-spectrum antibiotic coverage but secondary to patient's renal function I called pharmacy for instruction on which  would be the best way to cover.  He says that he will do some research and call back [CJ]   1732 Pharmacy recommends 4.5 g dose of Zosyn as well as one 1.75 g dose of Vanc secondary to her renal status. [CJ]   1814 I reviewed patient's chart it does appear that she has an x-ray from 8/22 that shows questionable osteo.  Patient was sent by the NP at facility for further evaluation of the left heel after reviewing chart today. [CJ]   2034 Dr. Moon agreed to admit the patient onto [facility] telemetry. [CJ]      ED Course User Index  [CJ] Manjit Barnes PA-C         Diagnoses as of 08/30/24 2225   Acute osteomyelitis of left calcaneus (Multi)     Relevant Results  Results for orders placed or performed during the hospital encounter of 08/30/24 (from the past 24 hour(s))   CBC and Auto Differential   Result Value Ref Range    WBC 10.3 4.4 - 11.3 x10*3/uL    nRBC 0.0 0.0 - 0.0 /100 WBCs    RBC 4.06 4.00 - 5.20 x10*6/uL    Hemoglobin 11.8 (L) 12.0 - 16.0 g/dL    Hematocrit 37.2 36.0 - 46.0 %    MCV 92 80 - 100 fL    MCH 29.1 26.0 - 34.0 pg    MCHC 31.7 (L) 32.0 - 36.0 g/dL    RDW 17.2 (H) 11.5 - 14.5 %    Platelets 368 150 - 450 x10*3/uL    Neutrophils % 75.3 40.0 - 80.0 %    Immature Granulocytes %, Automated 2.4 (H) 0.0 - 0.9 %    Lymphocytes % 10.1 13.0 - 44.0 %    Monocytes % 10.1 2.0 - 10.0 %    Eosinophils % 1.2 0.0 - 6.0 %    Basophils % 0.9 0.0 - 2.0 %    Neutrophils Absolute 7.71 (H) 1.60 - 5.50 x10*3/uL    Immature Granulocytes Absolute, Automated 0.25 0.00 - 0.50 x10*3/uL    Lymphocytes Absolute 1.04 0.80 - 3.00 x10*3/uL    Monocytes Absolute 1.04 (H) 0.05 - 0.80 x10*3/uL    Eosinophils Absolute 0.12 0.00 - 0.40 x10*3/uL    Basophils Absolute 0.09 0.00 - 0.10 x10*3/uL   Comprehensive Metabolic Panel   Result Value Ref Range    Glucose 144 (H) 74 - 99 mg/dL    Sodium 136 136 - 145 mmol/L    Potassium 3.7 3.5 - 5.3 mmol/L    Chloride 96 (L) 98 - 107 mmol/L    Bicarbonate 31 21 - 32 mmol/L    Anion Gap 13 10 - 20  mmol/L    Urea Nitrogen 11 6 - 23 mg/dL    Creatinine 1.47 (H) 0.50 - 1.05 mg/dL    eGFR 34 (L) >60 mL/min/1.73m*2    Calcium 7.7 (L) 8.6 - 10.3 mg/dL    Albumin 2.8 (L) 3.4 - 5.0 g/dL    Alkaline Phosphatase 93 33 - 136 U/L    Total Protein 6.5 6.4 - 8.2 g/dL    AST 20 9 - 39 U/L    Bilirubin, Total 0.3 0.0 - 1.2 mg/dL    ALT 11 7 - 45 U/L   Lactate   Result Value Ref Range    Lactate 2.6 (H) 0.4 - 2.0 mmol/L   Sedimentation rate, automated   Result Value Ref Range    Sedimentation Rate 79 (H) 0 - 30 mm/h   C-reactive protein   Result Value Ref Range    C-Reactive Protein 3.66 (H) <1.00 mg/dL   Lactate   Result Value Ref Range    Lactate 2.1 (H) 0.4 - 2.0 mmol/L      CT foot left wo IV contrast    Result Date: 8/30/2024  Interpreted By:  Rolf Baeza, STUDY: CT FOOT LEFT WO IV CONTRAST;  8/30/2024 7:02 pm   INDICATION: Signs/Symptoms:concern for osteo left heel.   COMPARISON: None.   ACCESSION NUMBER(S): [id number]   ORDERING CLINICIAN: KORINA SMITH   TECHNIQUE: CT imaging of the left foot was obtained without contrast. Coronal and sagittal reformatted images were performed. 3D reconstructed images were not performed at time of dictation therefore not used during interpretation.   FINDINGS: There is a soft tissue ulceration at the heel. This extends to the bone surface. There is ill-defined erosive change of the posterior calcaneal tuberosity and sclerosis consistent suspicious for osteomyelitis.   The bones are severely osteopenic. This limits assessment for nondisplaced fractures though no displaced fracture is identified.   Plantar calcaneal spur. Enthesopathic spurring at the Achilles tendon insertion.   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       Acute osteomyelitis at involving the posterior calcaneal tuberosity with overlying soft tissue ulceration.         MACRO: None.   Signed by: Rolf Baeza 8/30/2024 7:41 PM Dictation workstation:   [id number]    XR foot left  1-2 views    Result Date: 8/30/2024  Interpreted By:  Amber Denton, STUDY: XR FOOT LEFT 1-2 VIEWS;  8/30/2024 6:11 pm   INDICATION: Signs/Symptoms:Concern for osteo of left heel.   COMPARISON: None.   ACCESSION NUMBER(S): [id number]   ORDERING CLINICIAN: KORINA SMITH   FINDINGS: AP and lateral views of the left foot were obtained.   There is diffuse osteopenia. No acute fracture or dislocation is identified. There is calcaneal enthesopathy. There is cortical irregularity at the posterior aspect of the calcaneus. There are calcifications at the insertion of the Katina's tendon. Degenerative changes are noted at the 1st metatarsophalangeal joint. Extensive vascular calcifications are present. There is soft tissue irregularity overlying the calcaneus.       1. Soft tissue irregularity overlying the left calcaneus, suggestive of an wound. Cortical irregularity at the posterior aspect of the calcaneus, underlying osteomyelitis cannot be excluded. MRI can be obtained for further evaluation. 2. Calcifications at the insertion of the Katina's tendon, suggestive of calcific tendinopathy. 3. Osteopenia. 4. Extensive vascular calcifications.       MACRO: None.   Signed by: Amber Denton 8/30/2024 6:54 PM Dictation workstation:   [id number]    Scheduled medications:  docusate sodium, 100 mg, oral, BID  [START ON 8/31/2024] insulin lispro, 0-15 Units, subcutaneous, TID  [START ON 8/31/2024] piperacillin-tazobactam, 3.375 g, intravenous, q6h  [START ON 8/31/2024] vancomycin, 750 mg, intravenous, q24h      Continuous medications:     PRN medications:  PRN medications: acetaminophen, albuterol, bisacodyl, ondansetron, polyethylene glycol, vancomycin     Past Medical History  She has a past medical history of Anemia, Atrial fibrillation (Multi), Chronic kidney disease, Diabetes mellitus (Multi), Elevated troponin (08/24/2023), GERD (gastroesophageal reflux disease), Heart murmur, Hypertension, Myocardial infarction  (Multi), Old myocardial infarction (09/09/2023), Other conditions influencing health status (12/06/2022), Other conditions influencing health status (04/06/2016), Personal history of other diseases of the circulatory system, Personal history of other diseases of the female genital tract, Personal history of other diseases of the nervous system and sense organs (10/15/2021), Personal history of other endocrine, nutritional and metabolic disease (01/26/2018), Personal history of transient ischemic attack (TIA), and cerebral infarction without residual deficits (09/20/2023), Stroke (Multi), and Urinary tract infection.    Surgical History  She has a past surgical history that includes Hysterectomy (10/10/2018); Back surgery (10/10/2018); IR CVC tunneled (8/28/2023); MR angio neck wo IV contrast (8/31/2023); and MR angio head wo IV contrast (8/31/2023).     Social History  She reports that she has never smoked. She has never used smokeless tobacco. She reports that she does not drink alcohol and does not use drugs.    Family History  Family History   Problem Relation Name Age of Onset    No Known Problems Mother      No Known Problems Father         Allergies  Aspirin, Amlodipine, Levofloxacin, and Sulfamethoxazole-trimethoprim    Code Status  DNR and No Intubation     Review of Systems   Constitutional:  Positive for activity change and fatigue. Negative for appetite change, chills, diaphoresis, fever and unexpected weight change.   HENT:  Negative for congestion, drooling, hearing loss, nosebleeds, postnasal drip, rhinorrhea, sinus pressure, sinus pain, sneezing, sore throat, tinnitus, trouble swallowing and voice change.    Eyes:  Negative for photophobia and visual disturbance.   Respiratory:  Negative for apnea, cough, shortness of breath and wheezing.    Cardiovascular:  Negative for chest pain, palpitations and leg swelling.   Gastrointestinal:  Positive for nausea. Negative for abdominal distention, abdominal  pain, blood in stool, constipation, diarrhea, rectal pain and vomiting.   Genitourinary:  Positive for decreased urine volume. Negative for difficulty urinating, dysuria, flank pain, frequency, hematuria and urgency.   Musculoskeletal:  Positive for back pain and myalgias. Negative for arthralgias, gait problem, joint swelling, neck pain and neck stiffness.   Skin:  Positive for color change, rash and wound.   Neurological:  Positive for weakness. Negative for dizziness, tremors, seizures, syncope, facial asymmetry, speech difficulty, light-headedness, numbness and headaches.   Psychiatric/Behavioral:  Positive for sleep disturbance. Negative for confusion and decreased concentration. The patient is nervous/anxious.    All other systems reviewed and are negative.      Last Recorded Vitals  /50   Pulse 67   Temp 36.7 °C (98.1 °F)   Resp 18   Wt 68 kg (150 lb)   SpO2 100%      Physical Exam  Vitals and nursing note reviewed. Exam conducted with a chaperone present.   Constitutional:       General: She is not in acute distress.     Appearance: She is not ill-appearing or diaphoretic.      Comments: Frail/Elderly and tearful appearing  female   HENT:      Head: Normocephalic and atraumatic.      Mouth/Throat:      Mouth: Mucous membranes are moist.      Pharynx: Oropharynx is clear.   Eyes:      General: No scleral icterus.     Extraocular Movements: Extraocular movements intact.      Conjunctiva/sclera: Conjunctivae normal.      Pupils: Pupils are equal, round, and reactive to light.   Neck:      Vascular: No carotid bruit.      Comments: Mild tenderness within the should and into the lower neck bilaterally   Cardiovascular:      Rate and Rhythm: Normal rate and regular rhythm.      Pulses: Normal pulses.      Heart sounds: Normal heart sounds. No murmur heard.  Pulmonary:      Effort: No respiratory distress.      Breath sounds: No wheezing, rhonchi or rales.      Comments: Diminished throughout  with faint crackles in the bases but otherwise no other signs of distress  Chest:      Chest wall: No tenderness.   Abdominal:      General: Abdomen is flat. Bowel sounds are normal. There is no distension.      Palpations: Abdomen is soft. There is no mass.      Tenderness: There is no abdominal tenderness. There is no right CVA tenderness, left CVA tenderness, guarding or rebound.   Musculoskeletal:      Cervical back: Normal range of motion and neck supple. Tenderness present. No rigidity.      Comments: Diminished range of motion/strength of bilateral upper and lower extremities with the upper being slightly below a 5 out of 5 in the lower extremities notably weaker and unable to move the left lower extremity below the knee and does attempt movement at the hip with the left lower extremity with some muscle spasming, she was able to try to hold her right lower extremity against gravity for about a 2 to 3-second timeframe.   Skin:     Findings: Bruising, erythema, lesion and rash present.      Comments: Left heel ulceration with some granulation tissue and purulence with a foul odor and erythema extending into the midfoot as well as above the ankle, she has little to no sensation except for deep pressure, the buttock does have small superficial ulcerations as well as a deep pressure injury with a purpleish/bruised appearance throughout the sacrum and gluteal cleft into the perineum.  There are some mild erythema but actually improved in appearance compared to prior imaging in July 2024.  Please see media imaging by nursing staff available   Neurological:      General: No focal deficit present.      Mental Status: She is alert and oriented to person, place, and time. Mental status is at baseline.      Cranial Nerves: No cranial nerve deficit.      Sensory: Sensory deficit present.      Motor: Weakness present.      Coordination: Coordination abnormal.      Gait: Gait abnormal.      Comments: AO x3, finger-to-nose  intact, attempted heel-to-shin with the RLE but notably weak, feels some deep pressure of the LLE but unable to move it below the knee, she has some muscle spasms when attempting movement at the hip of the LLE, unable to hold against gravity for but a few seconds, fluent speech, no noted lateralizing gaze deviations, no noted facial asymmetry   Psychiatric:         Behavior: Behavior normal.         Thought Content: Thought content normal.         Judgment: Judgment normal.      Comments: Tearful but appropriately interactive, appears similar demeanor to previous admission       Assessment/Plan   Assessment & Plan  Acute osteomyelitis of left calcaneus (Multi)    Cellulitis of Left Foot  Left Calcaneus Ulcer  Osteomyelitis of the Left Calcaneous  -Imaging as noted above  -WBC = 10.3 -->  -Lactate = 2.6 --> 2.1 -->  -CRP = 3.66 -->  -ESR = 79 -->  -Wound Culture = pending  -Blood Culture x2 = pending  -Continued on IV antibiotics with Vancomycin/Zosyn --> renally dosed   -Podiatry Consult appreciated  -depending or cultures and podiatry recommendations we may need to involve Infectious Disease once plan of care and further is is observed    Sacral Decubitus Ulcer / Pressure Injury  -Wound Care Consult appreciated  -antibiotics in setting of above  -does appear erythematous no no fluctuance or hardened areas or pain with palpation  -will continue to monitor and if worsening condition then will need to image the abdomen/pelvis     ESRD on HD (Mon/Wed/Fri)  -HD via right chest wall catheter since ~08/2023  -Nephrology Consult appreciated  -patient did received dialysis on 08/30/20224 she states  -BUN/Creatinine = 11/1.47  -no grossly evident signs of volume overload at this time    Hypertension  Hyperlipidemia   HFpEF  Atrial Fibrillation   Coronary Artery Disease  -will continue on amiodarone and eliquis as she was on these during her most recent hospitalizations  - WILL NEED TO CONFIRM home medications as it says she  is on Imdur, amiodarone, Toprol-XL, Cardizem but these were not fully ordered during her last hospitalization other than Eliquis and amiodarone    IDDM2  -ISS AC/HS  -Diabetic Diet  -Hypoglycemia Protocol  -will need to confirm home medications as during last hospitalization she did not seem to be on Lantus in patient    History of CVA with chronic left lower extremity weakness  -PT/OT appreciated     Acute on Chronic Deconditioning & Ambulatory Dysfunction  -PT/OT appreciated     Chronic Constipation  -suspect in setting of poor mobility and functional status  -continued bowel regimen     Code Status: DNR/DNI confirmed during previous admission, [place] DNR paperwork and discussion with patient     Deep Moon DO    Dragon dictation software was used to dictate this note and thus there may be minor errors in translation/transcription including garbled speech or misspellings. Please contact for clarification if needed.

## 2024-08-31 NOTE — PROGRESS NOTES
Physical Therapy    Physical Therapy Evaluation    Patient Name: Melody Martin  MRN: [id number]  Today's Date: 8/31/2024        Assessment/Plan   PT Assessment  PT Assessment Results: Decreased strength, Decreased endurance, Decreased range of motion, Impaired balance, Decreased mobility, Decreased skin integrity  Rehab Prognosis: Poor  Evaluation/Treatment Tolerance: Patient limited by fatigue, Patient tolerated treatment well  Barriers to Participation: Comorbidities, Premorbid level of function  End of Session Communication: Bedside nurse  End of Session Patient Position: Bed, 3 rail up, Alarm on  IP OR SWING BED PT PLAN  Inpatient or Swing Bed: Inpatient  PT Plan  Treatment/Interventions: Bed mobility  PT Plan: PT Eval only  PT Eval Only Reason: At baseline function      Subjective   General Visit Information:  General  Reason for Referral: Gait Training, Impaired Mobility, Impaired Cognition/Safety Awareness  Referred By: Red  Past Medical History Relevant to Rehab: CVA c LLE paralysis/parasthesia, HTN, HLD, cardiomyopathy, L heel pressure ulcer x1 month, sacrum/buttock pressure ulcer, anemia, AFIB, DM2  Co-Treatment: OT  Co-Treatment Reason: Co-eval and treat completed in order to optimize patient safety and function in order to promote OT goal achievement  Prior to Session Communication: Bedside nurse  Patient Position Received: Bed, 3 rail up, Alarm on  General Comment: room 3328: external catheter; L heel wound dressed; (B) heel boots donned  Home Living:  Home Living  Type of Home: Skilled Nursing facility  Home Living Comments: pt states she had lots of trouble getting someone to help her into a w/c at her nursing facility, fter asking every day for a week she broke down and demanded to be transferred when she was able to sit for 3-4 hours about 2 weeks ago.  Prior Level of Function:  Prior Function Per Pt/Caregiver Report  Level of [place]: Needs assistance with functional transfers, Needs  assistance with ADLs (non-ambulatory; stevo lift for transfers)  Precautions:  Precautions  Medical Precautions: Fall precautions (non ambulatory at baseline; h/o CVA c LLE weakness and numbess below the knee)            Objective   Pain:  Pain Assessment  Pain Assessment: 0-10  0-10 (Numeric) Pain Score: 0 - No pain  Cognition:  Cognition  Overall Cognitive Status: Within Functional Limits  Orientation Level: Oriented X4    General Assessments:  General Observation  General Observation: Pt is pleasant and cooperative. She is appreciative to PT/OT assistance to EOB               Activity Tolerance  Endurance: Other (Comment) (pt fatigues fairly quickly sitting EOB)    Sensation  Light Touch: Severe deficits in the LLE (severely diminished/absent below the knee)    Strength  Strength Comments: RLE grossly 3+/5; L hip grossly 2+/5, L knee 3-/5, absent muscle activation L ankle/foot  Strength  Strength Comments: RLE grossly 3+/5; L hip grossly 2+/5, L knee 3-/5, absent muscle activation L ankle/foot    Perception  Inattention/Neglect: Appears intact      Coordination  Movements are Fluid and Coordinated: Yes         Static Sitting Balance  Static Sitting-Balance Support: Bilateral upper extremity supported  Static Sitting-Level of Assistance: Maximum assistance (pt progresses to SBA while sitting EOB and BUE support; but only able to maintain breifly due to fatigue)  Static Sitting-Comment/Number of Minutes: 5 min  Functional Assessments:  Bed Mobility  Bed Mobility: Yes  Bed Mobility 1  Bed Mobility 1: Supine to sitting  Level of Assistance 1: Maximum assistance, +2  Bed Mobility 2  Bed Mobility  2: Sitting to supine  Level of Assistance 2: Maximum assistance, +2  Bed Mobility 3  Bed Mobility 3: Rolling right, Rolling left (Max A x1)  Bed Mobility 4  Bed Mobility 4: Scooting (to HOB; Max A x2)    Transfers  Transfer: No    Ambulation/Gait Training  Ambulation/Gait Training Performed: No  Extremity/Trunk  Assessments:  RLE   RLE : Exceptions to WFL (Knee/Hip WFL; R ankle DF -20)  LLE   LLE : Exceptions to WFL (significant decrease L ankle mobility)  Outcome Measures:  [facility] Basic Mobility  Turning from your back to your side while in a flat bed without using bedrails: Total  Moving from lying on your back to sitting on the side of a flat bed without using bedrails: Total  Moving to and from bed to chair (including a wheelchair): Total  Standing up from a chair using your arms (e.g. wheelchair or bedside chair): Total  To walk in hospital room: Total  Climbing 3-5 steps with railing: Total  Basic Mobility - Total Score: 6    Encounter Problems       Encounter Problems (Active)       Balance       LTG - Patient will maintain balance to allow for safe mobility       Start:  08/31/24               PT Transfers       STG - Transfer from bed to chair       Start:  08/31/24                   Education Documentation  Mobility Training, taught by Keyana Martin, PT at 8/31/2024 12:22 PM.  Learner: Patient  Readiness: Acceptance  Method: Explanation  Response: Needs Reinforcement    Education Comments  No comments found.

## 2024-08-31 NOTE — CONSULTS
Nephrology Consult Note                                                                                                                                         Inpatient consult to Nephrology  Consult performed by: Mally Lucas DO  Consult ordered by: Deep Moon DO                                                                                                               HPI  Patient is a 89 y.o. female history of ESRD, HTN, HLD, CAD, HFrEF who is admitted to hospital with complaints of   left heel wound. Nephrology consulted in view of ESRD.   Patient is well-known to me, dialyzes at [facility] Monday Wednesday Friday.  She did get her dialysis treatment yesterday.  Her son-in-law is at bedside today.  She has multiple complaints about the nursing home and is hoping to transfer to a different one.  I actually saw her a few days ago at [facility].  She has been having a difficult time sleeping there.  Her albumin has been running in the 2 range.  Denies shortness of breath.  Patient makes little urine.  Past Medical History:   Diagnosis Date    Anemia     Atrial fibrillation (Multi)     Chronic kidney disease     Diabetes mellitus (Multi)     Elevated troponin 08/24/2023    GERD (gastroesophageal reflux disease)     Heart murmur     Hypertension     Myocardial infarction (Multi)     Old myocardial infarction 09/09/2023    Other conditions influencing health status 12/06/2022    History of cough    Other conditions influencing health status 04/06/2016    Diabetes mellitus type 1, uncontrolled    Personal history of other diseases of the circulatory system     History of hypertension    Personal history of other diseases of the female genital tract     History of endometriosis    Personal history of other diseases of the nervous system and sense organs  10/15/2021    History of acute otitis media    Personal history of other endocrine, nutritional and metabolic disease 01/26/2018    History of diabetes mellitus    Personal history of transient ischemic attack (TIA), and cerebral infarction without residual deficits 09/20/2023    Stroke (Multi)     Urinary tract infection       Social History     Socioeconomic History    Marital status:      Spouse name: Not on file    Number of children: Not on file    Years of education: Not on file    Highest education level: Not on file   Occupational History    Not on file   Tobacco Use    Smoking status: Never    Smokeless tobacco: Never   Substance and Sexual Activity    Alcohol use: Never    Drug use: Never    Sexual activity: Not on file   Other Topics Concern    Not on file   Social History Narrative    Not on file     Social Determinants of Health     Financial Resource Strain: Low Risk  (8/30/2024)    Overall Financial Resource Strain (CARDIA)     Difficulty of Paying Living Expenses: Not very hard   Food Insecurity: No Food Insecurity (11/10/2023)    Received from ,     Hunger Vital Sign     Worried About Running Out of Food in the Last Year: Never true     Ran Out of Food in the Last Year: Never true   Transportation Needs: No Transportation Needs (8/30/2024)    PRAPARE - Transportation     Lack of Transportation (Medical): No     Lack of Transportation (Non-Medical): No   Physical Activity: Not on file   Stress: Not on file   Social Connections: Not on file   Intimate Partner Violence: Not on file   Housing Stability: Low Risk  (8/30/2024)    Housing Stability Vital Sign     Unable to Pay for Housing in the Last Year: No     Number of Times Moved in the Last Year: 1     Homeless in the Last Year: No      Family History   Problem Relation Name Age of Onset    No Known Problems Mother      No Known Problems Father        No current facility-administered medications on file prior to  encounter.     Current Outpatient Medications on File Prior to Encounter   Medication Sig Dispense Refill    acetaminophen (Tylenol) 325 mg tablet Take 2 tablets (650 mg) by mouth every 6 hours if needed for mild pain (1 - 3) or fever (temp greater than 38.0 C).      albuterol 90 mcg/actuation inhaler Inhale 2 puffs every 12 hours if needed.      amino acids-protein hydr-fiber 15 gram- 100 kcal/30 mL liquid in packet Take 30 mL by mouth 2 times a day.      amiodarone (Pacerone) 200 mg tablet Take 1 tablet (200 mg) by mouth once every 24 hours.      apixaban (Eliquis) 2.5 mg tablet Take 1 tablet (2.5 mg) by mouth 2 times a day.      atorvastatin (Lipitor) 80 mg tablet Take 1 tablet (80 mg) by mouth once daily at bedtime.      B complex-vitamin C-folic acid (Nephrocaps) 1 mg capsule Take 1 capsule by mouth once daily.      benzonatate (Tessalon) 100 mg capsule Take 1 capsule (100 mg) by mouth 3 times a day as needed (decongestion).      bisacodyl (Dulcolax, bisacodyl,) 10 mg suppository Insert 1 suppository (10 mg) into the rectum once daily as needed for constipation.      blood sugar diagnostic (OneTouch Ultra Test) strip 1 strip by Does not apply route 3 times a day. 300 strip 3    busPIRone (Buspar) 10 mg tablet Take 1 tablet (10 mg) by mouth twice a day.      clopidogrel (Plavix) 75 mg tablet Take 1 tablet (75 mg) by mouth once daily.      diclofenac sodium (Voltaren) 1 % gel Apply 2.25 inches (2 g) topically twice a day.      dilTIAZem ER (Tiazac) 240 mg 24 hr capsule Take 1 capsule (240 mg) by mouth once daily at bedtime.      diphenhydramine-zinc acetate cream Apply 1 Application topically 3 times a day. For itchy back      docusate sodium (Colace) 100 mg capsule Take 1 capsule (100 mg) by mouth twice a day.      glucagon 1 mg injection Inject 1 mg into the muscle.      glucose (Glutose) 40 % gel oral gel Take 15 g by mouth 1 time if needed for low blood sugar - see comments.      guaiFENesin (Mucinex) 600 mg  12 hr tablet Take 1 tablet (600 mg) by mouth twice a day.      insulin glargine (Lantus) 100 unit/mL injection Inject 20 Units under the skin once daily at bedtime. Take as directed per insulin instructions. (Patient taking differently: Inject 20 Units under the skin 2 times a day. Take as directed per insulin instructions.)      insulin lispro (HumaLOG) 100 unit/mL injection Inject 0-0.15 mL (0-15 Units) under the skin 4 times a day before meals. Take as directed per insulin instructions. (Patient taking differently: Inject 0-0.1 mL (0-10 Units) under the skin 4 times a day before meals. Take as directed per insulin instructions.)      isosorbide dinitrate (Isordil) 10 mg tablet Take 1 tablet (10 mg) by mouth 3 times a day.      lidocaine (LMX) 4 % cream Apply 0.1 g topically once daily. To back      melatonin 3 mg tablet Take 1 tablet (3 mg) by mouth once daily at bedtime.      metoprolol succinate XL (Kapspargo Sprinkle) 100 mg 24 hr capsule Take 1 capsule (100 mg) by mouth once daily. Do not crush or chew.      mirtazapine (Remeron) 7.5 mg tablet Take 1 tablet (7.5 mg) by mouth once daily at bedtime.      pantoprazole (ProtoNix) 40 mg EC tablet Take 1 tablet (40 mg) by mouth once daily in the morning. Take before meals. Do not crush, chew, or split.      PSYLLIUM HUSK, ASPARTAME, ORAL Take 1 packet by mouth once daily.      sennosides (Senokot) 8.6 mg tablet Take 1 tablet (8.6 mg) by mouth every 12 hours if needed.      sevelamer carbonate (Renvela) 800 mg tablet Take 1 tablet (800 mg) by mouth 3 times a day before meals.      simethicone (Mylicon) 80 mg chewable tablet Chew 1 tablet (80 mg) 4 times a day.      traMADol (Ultram) 50 mg tablet Take 0.5 tablets (25 mg) by mouth every 12 hours if needed for severe pain (7 - 10). 30 tablet 5    zinc oxide 20 % ointment Apply 1 Application topically twice a day. To groin and buttocks      [DISCONTINUED] alum-mag hydroxide-simeth (Mylanta) 200-200-20 mg/5 mL oral  "suspension Take 10 mL by mouth every 6 hours if needed for indigestion or heartburn.      [DISCONTINUED] clotrimazole-betamethasone (Lotrisone) cream Apply 1 Application topically 2 times a day. To inner thighs and stephanie area        Scheduled medications  amiodarone, 200 mg, oral, q24h  apixaban, 2.5 mg, oral, BID  docusate sodium, 100 mg, oral, BID  insulin lispro, 0-15 Units, subcutaneous, With meals & nightly  piperacillin-tazobactam, 3.375 g, intravenous, q6h      Continuous medications     PRN medications  PRN medications: acetaminophen, albuterol, bisacodyl, ondansetron, polyethylene glycol, vancomycin     Review of systems  as per HPI otherwise 10 point review systems negative    /66 (BP Location: Left arm, Patient Position: Lying)   Pulse 62   Temp 36.2 °C (97.2 °F) (Temporal)   Resp 18   Ht 1.397 m (4' 7\")   Wt 72 kg (158 lb 11.7 oz)   SpO2 96%   BMI 36.89 kg/m²     Input / Output:  24 HR:   Intake/Output Summary (Last 24 hours) at 8/31/2024 1006  Last data filed at 8/31/2024 0730  Gross per 24 hour   Intake 100 ml   Output --   Net 100 ml       Physical Exam   Alert and oriented x 3, NAD  EOMI  OP clear  Neck: supple, No JVD  Right IJ TDC  CV: RRR without m/r/g  Lungs: CTA bilaterally  Abd: soft NT/ND +BS  Ext: No lower extremity edema   Neuro: grossly intact  Skin: no rashes    Results from last 7 days   Lab Units 08/31/24  0413 08/30/24  1748   SODIUM mmol/L 133* 136   POTASSIUM mmol/L 4.0 3.7   CHLORIDE mmol/L 96* 96*   CO2 mmol/L 30 31   BUN mg/dL 16 11   CREATININE mg/dL 2.09* 1.47*   GLUCOSE mg/dL 230* 144*   CALCIUM mg/dL 7.2* 7.7*        Results from last 7 days   Lab Units 08/31/24  0413 08/30/24  1748   SODIUM mmol/L 133* 136   POTASSIUM mmol/L 4.0 3.7   CHLORIDE mmol/L 96* 96*   CO2 mmol/L 30 31   BUN mg/dL 16 11   CREATININE mg/dL 2.09* 1.47*   CALCIUM mg/dL 7.2* 7.7*   PROTEIN TOTAL g/dL  --  6.5   BILIRUBIN TOTAL mg/dL  --  0.3   ALK PHOS U/L  --  93   ALT U/L  --  11   AST U/L  " --  20   GLUCOSE mg/dL 230* 144*      Results from last 7 days   Lab Units 08/31/24  0413   MAGNESIUM mg/dL 1.61      Results from last 7 days   Lab Units 08/31/24  0413 08/30/24  1748   WBC AUTO x10*3/uL 7.5 10.3   HEMOGLOBIN g/dL 9.0* 11.8*   HEMATOCRIT % 28.5* 37.2   PLATELETS AUTO x10*3/uL 291 368        CT foot left wo IV contrast   Final Result   Acute osteomyelitis at involving the posterior calcaneal tuberosity   with overlying soft tissue ulceration.                       MACRO:   None.        Signed by: Rolf Baeza 8/30/2024 7:41 PM   Dictation workstation:   [id number]      XR foot left 1-2 views   Final Result   1. Soft tissue irregularity overlying the left calcaneus, suggestive   of an wound. Cortical irregularity at the posterior aspect of the   calcaneus, underlying osteomyelitis cannot be excluded. MRI can be   obtained for further evaluation.   2. Calcifications at the insertion of the Katina's tendon,   suggestive of calcific tendinopathy.   3. Osteopenia.   4. Extensive vascular calcifications.                  MACRO:   None.        Signed by: Amber Denton 8/30/2024 6:54 PM   Dictation workstation:   [id number]           Assessment:   Patient is 89 y.o. female HFrEF, A-fib, HTN who is admitted to hospital for left heel wound. Nephrology consulted in view of ESRD.    ESRD  -HD Monday Wednesday Friday, [facility]  -Access is right IJ TDC  -Patient has been refusing referral for AV fistula    Anemia of ESRD  -Hemoglobin has been in the 9 range recently. ,  On IV iron as outpatient as well.  -Hemoglobin was 11.8 yesterday but this was directly after dialysis, hemoglobin 9   -She is on NOAH as outpatient with dialysis and this is titrated to a goal of hemoglobin 10-11.  -NOAH is not as effective in setting of infection    CKD-MBD  -Patient does not require binders  -Phos and calcium are at goal.    Left calcaneal ulcer  -Antibiotics per primary service  -Renal dose vancomycin and  Zosyn    Hypoalbuminemia  -Continue protein supplements    Recommendations:     -There is no indication for dialysis today.  Volume and lytes are stable.  Next dialysis will be to 9/2/24  -Will add protein shake with meals    Please message me through [employer] chat with any questions or concerns.     Mally Lucas DO  8/31/2024  10:06 AM         [facility]    [address] [place] [address], [address]   [place] [address]  Office: [phone]

## 2024-08-31 NOTE — CARE PLAN
Problem: Skin  Goal: Decreased wound size/increased tissue granulation at next dressing change  Outcome: Progressing  Goal: Participates in plan/prevention/treatment measures  Outcome: Progressing  Goal: Prevent/manage excess moisture  Outcome: Progressing  Goal: Prevent/minimize sheer/friction injuries  Outcome: Progressing  Goal: Promote/optimize nutrition  Outcome: Progressing  Goal: Promote skin healing  Outcome: Progressing     Problem: Fall/Injury  Goal: Not fall by end of shift  Outcome: Progressing  Goal: Be free from injury by end of the shift  Outcome: Progressing     Problem: Nutrition  Goal: Oral intake greater 75%  Outcome: Progressing  Goal: Consume prescribed supplement  Outcome: Progressing  Goal: Promote healing  Outcome: Progressing     Problem: Infection prevention/bleeding  Goal: Infection s/sx managed  Outcome: Progressing  Goal: No further progression of infection  Outcome: Progressing

## 2024-08-31 NOTE — CARE PLAN
Problem: Skin  Goal: Decreased wound size/increased tissue granulation at next dressing change  8/31/2024 0532 by Hien Musa RN  Flowsheets (Taken 8/30/2024 2300)  Decreased wound size/increased tissue granulation at next dressing change:   Promote sleep for wound healing   Protective dressings over bony prominences  8/31/2024 0526 by Hien Musa RN  Outcome: Progressing  Flowsheets (Taken 8/30/2024 2300)  Decreased wound size/increased tissue granulation at next dressing change:   Promote sleep for wound healing   Protective dressings over bony prominences  Goal: Participates in plan/prevention/treatment measures  8/31/2024 0532 by Hien Musa RN  Flowsheets (Taken 8/30/2024 2300)  Participates in plan/prevention/treatment measures:   Elevate heels   Increase activity/out of bed for meals  8/31/2024 0526 by Hien Musa RN  Outcome: Progressing  Flowsheets (Taken 8/30/2024 2300)  Participates in plan/prevention/treatment measures:   Elevate heels   Increase activity/out of bed for meals  Goal: Prevent/manage excess moisture  8/31/2024 0532 by Hien Musa RN  Flowsheets (Taken 8/30/2024 2300)  Prevent/manage excess moisture: Monitor for/manage infection if present  8/31/2024 0526 by Hien Musa RN  Outcome: Progressing  Flowsheets (Taken 8/30/2024 2300)  Prevent/manage excess moisture: Monitor for/manage infection if present  Goal: Prevent/minimize sheer/friction injuries  8/31/2024 0532 by Hien Musa RN  Flowsheets (Taken 8/30/2024 2300)  Prevent/minimize sheer/friction injuries:   Complete micro-shifts as needed if patient unable. Adjust patient position to relieve pressure points, not a full turn   Increase activity/out of bed for meals   Turn/reposition every 2 hours/use positioning/transfer devices  8/31/2024 0526 by Hien Musa RN  Outcome: Progressing  Flowsheets (Taken 8/30/2024 2300)  Prevent/minimize sheer/friction injuries:   Complete micro-shifts as needed if  patient unable. Adjust patient position to relieve pressure points, not a full turn   Increase activity/out of bed for meals   Turn/reposition every 2 hours/use positioning/transfer devices  Goal: Promote/optimize nutrition  8/31/2024 0532 by Hine Musa RN  Flowsheets (Taken 8/30/2024 2300)  Promote/optimize nutrition:   Consume > 50% meals/supplements   Offer water/supplements/favorite foods  8/31/2024 0526 by Hien Musa RN  Outcome: Progressing  Flowsheets (Taken 8/30/2024 2300)  Promote/optimize nutrition:   Consume > 50% meals/supplements   Offer water/supplements/favorite foods  Goal: Promote skin healing  8/31/2024 0532 by Hien Musa RN  Flowsheets (Taken 8/30/2024 2300)  Promote skin healing:   Protective dressings over bony prominences   Turn/reposition every 2 hours/use positioning/transfer devices  8/31/2024 0526 by Hien Musa RN  Outcome: Progressing  Flowsheets (Taken 8/30/2024 2300)  Promote skin healing:   Protective dressings over bony prominences   Turn/reposition every 2 hours/use positioning/transfer devices     Problem: Fall/Injury  Goal: Not fall by end of shift  Outcome: Progressing  Goal: Be free from injury by end of the shift  Outcome: Progressing     Problem: Nutrition  Goal: Oral intake greater 75%  Outcome: Progressing  Goal: Consume prescribed supplement  Outcome: Progressing  Goal: Promote healing  Outcome: Progressing     Problem: Infection prevention/bleeding  Goal: Infection s/sx managed  Outcome: Progressing  Goal: No further progression of infection  Outcome: Progressing

## 2024-09-01 VITALS
DIASTOLIC BLOOD PRESSURE: 56 MMHG | TEMPERATURE: 98 F | OXYGEN SATURATION: 99 % | HEART RATE: 73 BPM | RESPIRATION RATE: 18 BRPM | BODY MASS INDEX: 36.73 KG/M2 | SYSTOLIC BLOOD PRESSURE: 123 MMHG | HEIGHT: 55 IN | WEIGHT: 158.73 LBS

## 2024-09-01 LAB
BACTERIA BLD CULT: NORMAL
BACTERIA BLD CULT: NORMAL
BACTERIA SPEC CULT: ABNORMAL
GLUCOSE BLD MANUAL STRIP-MCNC: 149 MG/DL (ref 74–99)
GLUCOSE BLD MANUAL STRIP-MCNC: 168 MG/DL (ref 74–99)
GLUCOSE BLD MANUAL STRIP-MCNC: 205 MG/DL (ref 74–99)
GLUCOSE BLD MANUAL STRIP-MCNC: 283 MG/DL (ref 74–99)
GRAM STN SPEC: ABNORMAL

## 2024-09-01 PROCEDURE — 2500000004 HC RX 250 GENERAL PHARMACY W/ HCPCS (ALT 636 FOR OP/ED): Performed by: PHARMACIST

## 2024-09-01 PROCEDURE — 99233 SBSQ HOSP IP/OBS HIGH 50: CPT | Performed by: INTERNAL MEDICINE

## 2024-09-01 PROCEDURE — 2500000001 HC RX 250 WO HCPCS SELF ADMINISTERED DRUGS (ALT 637 FOR MEDICARE OP): Performed by: INTERNAL MEDICINE

## 2024-09-01 PROCEDURE — 1200000002 HC GENERAL ROOM WITH TELEMETRY DAILY

## 2024-09-01 PROCEDURE — 82947 ASSAY GLUCOSE BLOOD QUANT: CPT

## 2024-09-01 PROCEDURE — 2500000002 HC RX 250 W HCPCS SELF ADMINISTERED DRUGS (ALT 637 FOR MEDICARE OP, ALT 636 FOR OP/ED): Performed by: INTERNAL MEDICINE

## 2024-09-01 PROCEDURE — 2500000004 HC RX 250 GENERAL PHARMACY W/ HCPCS (ALT 636 FOR OP/ED): Performed by: INTERNAL MEDICINE

## 2024-09-01 RX ORDER — ALUMINUM HYDROXIDE, MAGNESIUM HYDROXIDE, AND SIMETHICONE 1200; 120; 1200 MG/30ML; MG/30ML; MG/30ML
10 SUSPENSION ORAL 4 TIMES DAILY PRN
Status: DISCONTINUED | OUTPATIENT
Start: 2024-09-01 | End: 2024-09-04 | Stop reason: HOSPADM

## 2024-09-01 ASSESSMENT — PAIN SCALES - GENERAL
PAINLEVEL_OUTOF10: 0 - NO PAIN
PAINLEVEL_OUTOF10: 0 - NO PAIN

## 2024-09-01 ASSESSMENT — COGNITIVE AND FUNCTIONAL STATUS - GENERAL
TOILETING: A LOT
STANDING UP FROM CHAIR USING ARMS: TOTAL
DRESSING REGULAR UPPER BODY CLOTHING: A LOT
MOBILITY SCORE: 9
DRESSING REGULAR LOWER BODY CLOTHING: A LOT
MOBILITY SCORE: 9
DRESSING REGULAR UPPER BODY CLOTHING: A LOT
HELP NEEDED FOR BATHING: A LOT
CLIMB 3 TO 5 STEPS WITH RAILING: TOTAL
DAILY ACTIVITIY SCORE: 15
DRESSING REGULAR LOWER BODY CLOTHING: A LOT
MOVING TO AND FROM BED TO CHAIR: A LOT
STANDING UP FROM CHAIR USING ARMS: TOTAL
TURNING FROM BACK TO SIDE WHILE IN FLAT BAD: A LOT
DRESSING REGULAR UPPER BODY CLOTHING: A LOT
WALKING IN HOSPITAL ROOM: TOTAL
MOVING FROM LYING ON BACK TO SITTING ON SIDE OF FLAT BED WITH BEDRAILS: A LOT
DRESSING REGULAR LOWER BODY CLOTHING: A LOT
STANDING UP FROM CHAIR USING ARMS: TOTAL
MOBILITY SCORE: 9
CLIMB 3 TO 5 STEPS WITH RAILING: TOTAL
HELP NEEDED FOR BATHING: A LOT
STANDING UP FROM CHAIR USING ARMS: TOTAL
DRESSING REGULAR LOWER BODY CLOTHING: A LOT
PERSONAL GROOMING: A LITTLE
MOVING FROM LYING ON BACK TO SITTING ON SIDE OF FLAT BED WITH BEDRAILS: A LOT
PERSONAL GROOMING: A LITTLE
CLIMB 3 TO 5 STEPS WITH RAILING: TOTAL
MOVING TO AND FROM BED TO CHAIR: A LOT
DAILY ACTIVITIY SCORE: 15
PERSONAL GROOMING: A LITTLE
TOILETING: A LOT
DRESSING REGULAR UPPER BODY CLOTHING: A LOT
DAILY ACTIVITIY SCORE: 15
WALKING IN HOSPITAL ROOM: TOTAL
MOBILITY SCORE: 9
HELP NEEDED FOR BATHING: A LOT
DAILY ACTIVITIY SCORE: 15
HELP NEEDED FOR BATHING: A LOT
MOVING TO AND FROM BED TO CHAIR: A LOT
PERSONAL GROOMING: A LITTLE
CLIMB 3 TO 5 STEPS WITH RAILING: TOTAL
MOVING FROM LYING ON BACK TO SITTING ON SIDE OF FLAT BED WITH BEDRAILS: A LOT
TOILETING: A LOT
WALKING IN HOSPITAL ROOM: TOTAL
WALKING IN HOSPITAL ROOM: TOTAL
TURNING FROM BACK TO SIDE WHILE IN FLAT BAD: A LOT
MOVING TO AND FROM BED TO CHAIR: A LOT
TURNING FROM BACK TO SIDE WHILE IN FLAT BAD: A LOT
TOILETING: A LOT
TURNING FROM BACK TO SIDE WHILE IN FLAT BAD: A LOT
MOVING FROM LYING ON BACK TO SITTING ON SIDE OF FLAT BED WITH BEDRAILS: A LOT

## 2024-09-01 ASSESSMENT — PAIN - FUNCTIONAL ASSESSMENT
PAIN_FUNCTIONAL_ASSESSMENT: 0-10
PAIN_FUNCTIONAL_ASSESSMENT: 0-10

## 2024-09-01 NOTE — PROGRESS NOTES
"      Nephrology Progress Note      Nephrology following for ESRD.   Events over night: none    Patient was sleeping comfortably and I did not wake her      /64 (BP Location: Left arm, Patient Position: Lying)   Pulse 70   Temp 37.7 °C (99.8 °F) (Temporal)   Resp 18   Ht 1.397 m (4' 7\")   Wt 72 kg (158 lb 11.7 oz)   SpO2 99%   BMI 36.89 kg/m²     Input / Output:  24 HR:   Intake/Output Summary (Last 24 hours) at 9/1/2024 1515  Last data filed at 9/1/2024 1432  Gross per 24 hour   Intake 940 ml   Output 0 ml   Net 940 ml       Physical Exam   NAD  RIJ TDC  Neck: no JVD  CV: RRR  Lungs: CTA bilaterally  Abd: soft, NT, ND   Ext: no lower extremity edema    Scheduled medications  amiodarone, 200 mg, oral, q24h  apixaban, 2.5 mg, oral, BID  atorvastatin, 80 mg, oral, Nightly  busPIRone, 10 mg, oral, BID  clopidogrel, 75 mg, oral, Daily  dilTIAZem CD, 240 mg, oral, Nightly  docusate sodium, 100 mg, oral, BID  insulin lispro, 0-15 Units, subcutaneous, With meals & nightly  isosorbide dinitrate, 10 mg, oral, TID  mirtazapine, 7.5 mg, oral, Nightly  pantoprazole, 40 mg, oral, Daily before breakfast  piperacillin-tazobactam, 2.25 g, intravenous, q8h      Continuous medications     PRN medications  PRN medications: acetaminophen, albuterol, benzonatate, bisacodyl, bisacodyl, ondansetron, polyethylene glycol, vancomycin   Results from last 7 days   Lab Units 08/31/24  0413 08/30/24  1748   SODIUM mmol/L 133* 136   POTASSIUM mmol/L 4.0 3.7   CHLORIDE mmol/L 96* 96*   CO2 mmol/L 30 31   BUN mg/dL 16 11   CREATININE mg/dL 2.09* 1.47*   CALCIUM mg/dL 7.2* 7.7*   PROTEIN TOTAL g/dL  --  6.5   BILIRUBIN TOTAL mg/dL  --  0.3   ALK PHOS U/L  --  93   ALT U/L  --  11   AST U/L  --  20   GLUCOSE mg/dL 230* 144*      Results from last 7 days   Lab Units 08/31/24  0413   MAGNESIUM mg/dL 1.61      Results from last 7 days   Lab Units 08/31/24 0413 08/30/24  1748   WBC AUTO x10*3/uL 7.5 10.3   HEMOGLOBIN g/dL 9.0* 11.8* "   HEMATOCRIT % 28.5* 37.2   PLATELETS AUTO x10*3/uL 291 368        Assessment & Plan:     Patient is 89 y.o. female HFrEF, A-fib, HTN who is admitted to hospital for left heel wound. Nephrology consulted in view of ESRD.     ESRD  -HD Monday Wednesday Friday, [facility]  -Access is right IJ TDC  -Patient has been refusing referral for AV fistula     Anemia of ESRD  -Hemoglobin has been in the 9 range recently. ,  On IV iron as outpatient as well.  -Hemoglobin was 11.8 yesterday but this was directly after dialysis, hemoglobin 9   -She is on NOAH as outpatient with dialysis and this is titrated to a goal of hemoglobin 10-11.  -NOAH is not as effective in setting of infection     CKD-MBD  -Patient does not require binders  -Phos and calcium are at goal.     Left calcaneal ulcer  -Antibiotics per primary service  -Renal dose vancomycin and Zosyn     Hypoalbuminemia  -Infection related  -Continue protein supplements     Recommendations:      -There is no indication for dialysis today.  Volume and lytes are stable.  Next dialysis will be to 9/2/24  -cont. protein shake with meals       Please message me through [employer] chat with any questions or concerns.     Mally Lucas DO  9/1/2024  3:15 PM     [facility]    [address], [address]   [place] [address]  Office: [phone]

## 2024-09-01 NOTE — NURSING NOTE
Pt has multiple wounds including a wound to her left heel, L/R buttocks, and coccyx. Excoriation noted to bilateral breast folds and sacrum. Wound care discussed with Dr. Christina and orders placed based on wound care pt was receiving at [facility] nursing facility. Pt tolerated dressing changes with no issues. Updated images and measurements placed in EMR. Dr. Veloz was up to assess and dress left heel wound. Wound care RN consulted. Preventative foam was placed to right heel. Pt was placed on waffle mattress overlay by myself and MM RN. Prevalon boots were ordered and are on bilaterally. Pt was positioned to comfort. Discussed with PCA about turning and repositioning every 2 hours.

## 2024-09-01 NOTE — CARE PLAN
The patient's goals for the shift include        Problem: Skin  Goal: Decreased wound size/increased tissue granulation at next dressing change  Outcome: Progressing  Flowsheets (Taken 9/1/2024 0717)  Decreased wound size/increased tissue granulation at next dressing change:   Promote sleep for wound healing   Protective dressings over bony prominences  Goal: Participates in plan/prevention/treatment measures  Outcome: Progressing  Flowsheets (Taken 9/1/2024 0717)  Participates in plan/prevention/treatment measures:   Increase activity/out of bed for meals   Elevate heels  Goal: Prevent/manage excess moisture  Outcome: Progressing  Flowsheets (Taken 9/1/2024 0717)  Prevent/manage excess moisture:   Follow provider orders for dressing changes   Moisturize dry skin   Monitor for/manage infection if present  Goal: Prevent/minimize sheer/friction injuries  Outcome: Progressing  Flowsheets (Taken 9/1/2024 0717)  Prevent/minimize sheer/friction injuries:   Increase activity/out of bed for meals   Turn/reposition every 2 hours/use positioning/transfer devices   Use pull sheet  Goal: Promote/optimize nutrition  Outcome: Progressing  Flowsheets (Taken 9/1/2024 0717)  Promote/optimize nutrition:   Consume > 50% meals/supplements   Monitor/record intake including meals   Offer water/supplements/favorite foods  Goal: Promote skin healing  Outcome: Progressing  Flowsheets (Taken 9/1/2024 0717)  Promote skin healing:   Assess skin/pad under line(s)/device(s)   Ensure correct size (line/device) and apply per  instructions   Protective dressings over bony prominences   Rotate device position/do not position patient on device   Turn/reposition every 2 hours/use positioning/transfer devices     Problem: Fall/Injury  Goal: Not fall by end of shift  Outcome: Progressing  Goal: Be free from injury by end of the shift  Outcome: Progressing     Problem: Nutrition  Goal: Oral intake greater 75%  Outcome: Progressing  Goal:  Consume prescribed supplement  Outcome: Progressing  Goal: Promote healing  Outcome: Progressing     Problem: Infection prevention/bleeding  Goal: Infection s/sx managed  Outcome: Progressing  Goal: No further progression of infection  Outcome: Progressing     Problem: Pain - Adult  Goal: Verbalizes/displays adequate comfort level or baseline comfort level  Outcome: Progressing     Problem: Safety - Adult  Goal: Free from fall injury  Outcome: Progressing     Problem: Discharge Planning  Goal: Discharge to home or other facility with appropriate resources  Outcome: Progressing     Problem: Chronic Conditions and Co-morbidities  Goal: Patient's chronic conditions and co-morbidity symptoms are monitored and maintained or improved  Outcome: Progressing     Problem: Pain  Goal: Takes deep breaths with improved pain control throughout the shift  Outcome: Progressing  Goal: Turns in bed with improved pain control throughout the shift  Outcome: Progressing  Goal: Walks with improved pain control throughout the shift  Outcome: Progressing  Goal: Performs ADL's with improved pain control throughout shift  Outcome: Progressing  Goal: Participates in PT with improved pain control throughout the shift  Outcome: Progressing  Goal: Free from opioid side effects throughout the shift  Outcome: Progressing  Goal: Free from acute confusion related to pain meds throughout the shift  Outcome: Progressing     Problem: Diabetes  Goal: Achieve decreasing blood glucose levels by end of shift  Outcome: Progressing  Goal: Increase stability of blood glucose readings by end of shift  Outcome: Progressing  Goal: Decrease in ketones present in urine by end of shift  Outcome: Progressing  Goal: Maintain electrolyte levels within acceptable range throughout shift  Outcome: Progressing  Goal: Maintain glucose levels >70mg/dl to <250mg/dl throughout shift  Outcome: Progressing  Goal: No changes in neurological exam by end of shift  Outcome:  Progressing  Goal: Learn about and adhere to nutrition recommendations by end of shift  Outcome: Progressing  Goal: Vital signs within normal range for age by end of shift  Outcome: Progressing  Goal: Increase self care and/or family involovement by end of shift  Outcome: Progressing  Goal: Receive DSME education by end of shift  Outcome: Progressing

## 2024-09-01 NOTE — CARE PLAN
Problem: Skin  Goal: Decreased wound size/increased tissue granulation at next dressing change  Outcome: Progressing  Flowsheets (Taken 8/31/2024 2003)  Decreased wound size/increased tissue granulation at next dressing change:   Promote sleep for wound healing   Protective dressings over bony prominences  Goal: Participates in plan/prevention/treatment measures  Outcome: Progressing  Flowsheets (Taken 8/31/2024 2003)  Participates in plan/prevention/treatment measures:   Increase activity/out of bed for meals   Elevate heels   Discuss with provider PT/OT consult  Goal: Prevent/manage excess moisture  Outcome: Progressing  Flowsheets (Taken 8/31/2024 2003)  Prevent/manage excess moisture:   Monitor for/manage infection if present   Follow provider orders for dressing changes  Goal: Prevent/minimize sheer/friction injuries  Outcome: Progressing  Flowsheets (Taken 8/31/2024 2003)  Prevent/minimize sheer/friction injuries:   Increase activity/out of bed for meals   Turn/reposition every 2 hours/use positioning/transfer devices   HOB 30 degrees or less  Goal: Promote/optimize nutrition  Outcome: Progressing  Flowsheets (Taken 8/31/2024 2003)  Promote/optimize nutrition:   Monitor/record intake including meals   Consume > 50% meals/supplements  Goal: Promote skin healing  Outcome: Progressing  Flowsheets (Taken 8/31/2024 2003)  Promote skin healing:   Assess skin/pad under line(s)/device(s)   Protective dressings over bony prominences   Turn/reposition every 2 hours/use positioning/transfer devices     Problem: Fall/Injury  Goal: Not fall by end of shift  Outcome: Progressing  Goal: Be free from injury by end of the shift  Outcome: Progressing     Problem: Nutrition  Goal: Oral intake greater 75%  Outcome: Progressing  Goal: Consume prescribed supplement  Outcome: Progressing  Goal: Promote healing  Outcome: Progressing     Problem: Infection prevention/bleeding  Goal: Infection s/sx managed  Outcome:  Progressing  Goal: No further progression of infection  Outcome: Progressing   The patient's goals for the shift include      The clinical goals for the shift include no new skin breakdown this shift.

## 2024-09-01 NOTE — CONSULTS
Reason For Consult  Infected pressure wound on left heel. Concern for osteomyelitis    History Of Present Illness  Melody Martin is a 89 y.o. female presenting with an infected chronic pressure ulcer on the left heel. She resides at [facility]. Wound care staff noted malodorous drainage to the wound and sent patient ot ER for assessment. CT scan showed possible early osteomyelitis. Patient has been admitted in the past for similar infection. She also has pressure wound on the sacrum. 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 She normally wears the offloading boots since the pressure wound was first noticed. She notes wearing the boots at night sometimes restricts her ability to reposition herself.      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    Surgical History  She has a past surgical history that includes Hysterectomy (10/10/2018); Back surgery (10/10/2018); IR CVC tunneled (8/28/2023); MR angio neck wo IV contrast (8/31/2023); and MR angio head wo IV contrast  "(8/31/2023).     Social History  She reports that she has never smoked. She has never used smokeless tobacco. She reports that she does not drink alcohol and does not use drugs.    Family History  Family History   Problem Relation Name Age of Onset    No Known Problems Mother      No Known Problems Father          Allergies  Aspirin, Amlodipine, Levofloxacin, and Sulfamethoxazole-trimethoprim    Review of Systems  Awake. Alert and oriented x 3  No acute distress.      Physical Exam  Stage 3 pressure ulcer on the left posterior heel. After cleaning the wound, the ulcer bed was very granular. Epithelializing from periphery. Much improved compared to past admissions. Slight malodor to the dressing, but that was resolved after cleansed. No deep tissue exposed. Very small area of remaining eschar. Does not probe to bone. No evident abscess or deep infection.   Adequate perfusion to the wound and periwound area.   In offloading profor boots in bed.      Last Recorded Vitals  Blood pressure 129/68, pulse 75, temperature 37.2 °C (99 °F), temperature source Temporal, resp. rate 18, height 1.397 m (4' 7\"), weight 72 kg (158 lb 11.7 oz), SpO2 99%.    Relevant Results    CT foot left wo IV contrast    Result Date: 8/30/2024  Interpreted By:  Rolf Baeza, STUDY: CT FOOT LEFT WO IV CONTRAST;  8/30/2024 7:02 pm   INDICATION: Signs/Symptoms:concern for osteo left heel.   COMPARISON: None.   ACCESSION NUMBER(S): [id number]   ORDERING CLINICIAN: KORINA SMITH   TECHNIQUE: CT imaging of the left foot was obtained without contrast. Coronal and sagittal reformatted images were performed. 3D reconstructed images were not performed at time of dictation therefore not used during interpretation.   FINDINGS: There is a soft tissue ulceration at the heel. This extends to the bone surface. There is ill-defined erosive change of the posterior calcaneal tuberosity and sclerosis consistent suspicious for osteomyelitis.   The bones are " severely osteopenic. This limits assessment for nondisplaced fractures though no displaced fracture is identified.   Plantar calcaneal spur. Enthesopathic spurring at the Achilles tendon insertion.   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       Acute osteomyelitis at involving the posterior calcaneal tuberosity with overlying soft tissue ulceration.         MACRO: None.   Signed by: Rolf Baeza 8/30/2024 7:41 PM Dictation workstation:   [id number]    XR foot left 1-2 views    Result Date: 8/30/2024  Interpreted By:  Amber Denton, STUDY: XR FOOT LEFT 1-2 VIEWS;  8/30/2024 6:11 pm   INDICATION: Signs/Symptoms:Concern for osteo of left heel.   COMPARISON: None.   ACCESSION NUMBER(S): [id number]   ORDERING CLINICIAN: KORINA SMITH   FINDINGS: AP and lateral views of the left foot were obtained.   There is diffuse osteopenia. No acute fracture or dislocation is identified. There is calcaneal enthesopathy. There is cortical irregularity at the posterior aspect of the calcaneus. There are calcifications at the insertion of the Katina's tendon. Degenerative changes are noted at the 1st metatarsophalangeal joint. Extensive vascular calcifications are present. There is soft tissue irregularity overlying the calcaneus.       1. Soft tissue irregularity overlying the left calcaneus, suggestive of an wound. Cortical irregularity at the posterior aspect of the calcaneus, underlying osteomyelitis cannot be excluded. MRI can be obtained for further evaluation. 2. Calcifications at the insertion of the [place]'s tendon, suggestive of calcific tendinopathy. 3. Osteopenia. 4. Extensive vascular calcifications.       MACRO: None.   Signed by: Amber Denton 8/30/2024 6:54 PM Dictation workstation:   [id number]    Scheduled medications  amiodarone, 200 mg, oral, q24h  apixaban, 2.5 mg, oral, BID  atorvastatin, 80 mg, oral, Nightly  busPIRone, 10 mg, oral, BID  clopidogrel, 75 mg, oral,  Daily  dilTIAZem CD, 240 mg, oral, Nightly  docusate sodium, 100 mg, oral, BID  insulin lispro, 0-15 Units, subcutaneous, With meals & nightly  isosorbide dinitrate, 10 mg, oral, TID  mirtazapine, 7.5 mg, oral, Nightly  pantoprazole, 40 mg, oral, Daily before breakfast  piperacillin-tazobactam, 2.25 g, intravenous, q8h    Continuous medications     PRN medications  PRN medications: acetaminophen, albuterol, benzonatate, bisacodyl, bisacodyl, ondansetron, polyethylene glycol, vancomycin  Results for orders placed or performed during the hospital encounter of 08/30/24 (from the past 24 hour(s))   Lactate   Result Value Ref Range    Lactate 3.4 (H) 0.4 - 2.0 mmol/L   POCT GLUCOSE   Result Value Ref Range    POCT Glucose 262 (H) 74 - 99 mg/dL   POCT GLUCOSE   Result Value Ref Range    POCT Glucose 259 (H) 74 - 99 mg/dL   POCT GLUCOSE   Result Value Ref Range    POCT Glucose 190 (H) 74 - 99 mg/dL   POCT GLUCOSE   Result Value Ref Range    POCT Glucose 168 (H) 74 - 99 mg/dL        Assessment/Plan     Stage 3 pressure ulcer left heel  -Wound cleansed today, but did not need debridement. Very healthy wound bed.   -wound is healing and much improved since past admissions.   -soft tissue infection on admission  -no clinically evident deep tissue infection. No exposed bone  -reactive changes on ct/xray.   -clinically lower suspicion of osteomyelitis.   -recommend coverage for soft tissue infection. Initial cultures show mixed bacteria. Pending sensitivities.   -Will be okay for discharge back to nursing home once medically cleared. Site has their own wound care providers. Recommend changing dressing every 2 days.   -No surgery planned.   Raymond Veloz DPM

## 2024-09-02 ENCOUNTER — APPOINTMENT (OUTPATIENT)
Dept: DIALYSIS | Facility: HOSPITAL | Age: 89
End: 2024-09-02
Payer: COMMERCIAL

## 2024-09-02 PROBLEM — M86.9 OSTEOMYELITIS OF LEFT FOOT (MULTI): Status: ACTIVE | Noted: 2023-11-10

## 2024-09-02 PROBLEM — M86.172 ACUTE OSTEOMYELITIS OF LEFT CALCANEUS (MULTI): Status: RESOLVED | Noted: 2024-08-30 | Resolved: 2024-09-02

## 2024-09-02 PROBLEM — R58 RETROPERITONEAL BLEED: Status: ACTIVE | Noted: 2023-12-22

## 2024-09-02 PROBLEM — L03.115 CELLULITIS OF HEEL, RIGHT: Status: ACTIVE | Noted: 2024-09-02

## 2024-09-02 PROBLEM — I16.0 HYPERTENSIVE URGENCY: Status: ACTIVE | Noted: 2023-11-10

## 2024-09-02 LAB
B-LACTAMASE ORGANISM ISLT: POSITIVE
BACTERIA SPEC CULT: ABNORMAL
ERYTHROCYTE [DISTWIDTH] IN BLOOD BY AUTOMATED COUNT: 17.4 % (ref 11.5–14.5)
GLUCOSE BLD MANUAL STRIP-MCNC: 144 MG/DL (ref 74–99)
GLUCOSE BLD MANUAL STRIP-MCNC: 157 MG/DL (ref 74–99)
GLUCOSE BLD MANUAL STRIP-MCNC: 239 MG/DL (ref 74–99)
GLUCOSE BLD MANUAL STRIP-MCNC: 245 MG/DL (ref 74–99)
GRAM STN SPEC: ABNORMAL
HCT VFR BLD AUTO: 29.2 % (ref 36–46)
HGB BLD-MCNC: 8.9 G/DL (ref 12–16)
MCH RBC QN AUTO: 28.2 PG (ref 26–34)
MCHC RBC AUTO-ENTMCNC: 30.5 G/DL (ref 32–36)
MCV RBC AUTO: 92 FL (ref 80–100)
NRBC BLD-RTO: 0 /100 WBCS (ref 0–0)
PLATELET # BLD AUTO: 350 X10*3/UL (ref 150–450)
RBC # BLD AUTO: 3.16 X10*6/UL (ref 4–5.2)
VANCOMYCIN SERPL-MCNC: 15.2 UG/ML (ref 5–20)
WBC # BLD AUTO: 7.6 X10*3/UL (ref 4.4–11.3)

## 2024-09-02 PROCEDURE — 2500000004 HC RX 250 GENERAL PHARMACY W/ HCPCS (ALT 636 FOR OP/ED): Performed by: PHARMACIST

## 2024-09-02 PROCEDURE — 1200000002 HC GENERAL ROOM WITH TELEMETRY DAILY

## 2024-09-02 PROCEDURE — 5A1D70Z PERFORMANCE OF URINARY FILTRATION, INTERMITTENT, LESS THAN 6 HOURS PER DAY: ICD-10-PCS | Performed by: INTERNAL MEDICINE

## 2024-09-02 PROCEDURE — 2500000001 HC RX 250 WO HCPCS SELF ADMINISTERED DRUGS (ALT 637 FOR MEDICARE OP): Performed by: INTERNAL MEDICINE

## 2024-09-02 PROCEDURE — 2500000004 HC RX 250 GENERAL PHARMACY W/ HCPCS (ALT 636 FOR OP/ED): Performed by: INTERNAL MEDICINE

## 2024-09-02 PROCEDURE — 85027 COMPLETE CBC AUTOMATED: CPT | Performed by: INTERNAL MEDICINE

## 2024-09-02 PROCEDURE — 82947 ASSAY GLUCOSE BLOOD QUANT: CPT

## 2024-09-02 PROCEDURE — 80202 ASSAY OF VANCOMYCIN: CPT

## 2024-09-02 PROCEDURE — 2500000002 HC RX 250 W HCPCS SELF ADMINISTERED DRUGS (ALT 637 FOR MEDICARE OP, ALT 636 FOR OP/ED): Performed by: INTERNAL MEDICINE

## 2024-09-02 PROCEDURE — 8010000001 HC DIALYSIS - HEMODIALYSIS PER DAY

## 2024-09-02 PROCEDURE — 99233 SBSQ HOSP IP/OBS HIGH 50: CPT | Performed by: INTERNAL MEDICINE

## 2024-09-02 PROCEDURE — 36415 COLL VENOUS BLD VENIPUNCTURE: CPT

## 2024-09-02 PROCEDURE — 2500000001 HC RX 250 WO HCPCS SELF ADMINISTERED DRUGS (ALT 637 FOR MEDICARE OP): Performed by: STUDENT IN AN ORGANIZED HEALTH CARE EDUCATION/TRAINING PROGRAM

## 2024-09-02 RX ORDER — MORPHINE SULFATE 2 MG/ML
2 INJECTION, SOLUTION INTRAMUSCULAR; INTRAVENOUS EVERY 4 HOURS PRN
Status: DISCONTINUED | OUTPATIENT
Start: 2024-09-02 | End: 2024-09-02

## 2024-09-02 RX ORDER — HEPARIN SODIUM 1000 [USP'U]/ML
2000 INJECTION, SOLUTION INTRAVENOUS; SUBCUTANEOUS
Status: DISCONTINUED | OUTPATIENT
Start: 2024-09-03 | End: 2024-09-04 | Stop reason: HOSPADM

## 2024-09-02 RX ORDER — VANCOMYCIN HYDROCHLORIDE 500 MG/100ML
500 INJECTION, SOLUTION INTRAVENOUS ONCE
Status: COMPLETED | OUTPATIENT
Start: 2024-09-02 | End: 2024-09-02

## 2024-09-02 RX ORDER — HYDROCODONE BITARTRATE AND ACETAMINOPHEN 5; 325 MG/1; MG/1
1 TABLET ORAL EVERY 6 HOURS PRN
Status: DISCONTINUED | OUTPATIENT
Start: 2024-09-02 | End: 2024-09-04 | Stop reason: HOSPADM

## 2024-09-02 ASSESSMENT — PAIN SCALES - GENERAL
PAINLEVEL_OUTOF10: 0 - NO PAIN
PAINLEVEL_OUTOF10: 2
PAINLEVEL_OUTOF10: 10 - WORST POSSIBLE PAIN

## 2024-09-02 ASSESSMENT — PAIN - FUNCTIONAL ASSESSMENT
PAIN_FUNCTIONAL_ASSESSMENT: 0-10
PAIN_FUNCTIONAL_ASSESSMENT: 0-10

## 2024-09-02 ASSESSMENT — COGNITIVE AND FUNCTIONAL STATUS - GENERAL
DRESSING REGULAR UPPER BODY CLOTHING: A LOT
CLIMB 3 TO 5 STEPS WITH RAILING: TOTAL
PERSONAL GROOMING: A LITTLE
DAILY ACTIVITIY SCORE: 11
WALKING IN HOSPITAL ROOM: TOTAL
HELP NEEDED FOR BATHING: TOTAL
MOBILITY SCORE: 10
STANDING UP FROM CHAIR USING ARMS: TOTAL
EATING MEALS: A LITTLE
TOILETING: TOTAL
TURNING FROM BACK TO SIDE WHILE IN FLAT BAD: A LOT
MOVING TO AND FROM BED TO CHAIR: A LOT
DRESSING REGULAR LOWER BODY CLOTHING: TOTAL
MOVING FROM LYING ON BACK TO SITTING ON SIDE OF FLAT BED WITH BEDRAILS: A LITTLE

## 2024-09-02 ASSESSMENT — PAIN DESCRIPTION - ORIENTATION: ORIENTATION: LEFT

## 2024-09-02 ASSESSMENT — PAIN DESCRIPTION - LOCATION: LOCATION: LEG

## 2024-09-02 NOTE — PROGRESS NOTES
"      Nephrology Progress Note      Nephrology following for ESRD.   Events over night: none  Seen at the start of dialysis  Denies chest pain or shortness of breath  Podiatry note reviewed    /60 (BP Location: Left arm, Patient Position: Lying)   Pulse 76   Temp 36.5 °C (97.7 °F) (Temporal)   Resp 18   Ht 1.397 m (4' 7\")   Wt 72 kg (158 lb 11.7 oz)   SpO2 98%   BMI 36.89 kg/m²     Input / Output:  24 HR:   Intake/Output Summary (Last 24 hours) at 9/2/2024 0948  Last data filed at 9/2/2024 0901  Gross per 24 hour   Intake 1570 ml   Output --   Net 1570 ml       Physical Exam   NAD  RIJ TDC  Neck: no JVD  CV: RRR  Lungs: CTA bilaterally  Abd: soft, NT, ND   Ext: no lower extremity edema    Scheduled medications  amiodarone, 200 mg, oral, q24h  apixaban, 2.5 mg, oral, BID  atorvastatin, 80 mg, oral, Nightly  busPIRone, 10 mg, oral, BID  clopidogrel, 75 mg, oral, Daily  dilTIAZem CD, 240 mg, oral, Nightly  docusate sodium, 100 mg, oral, BID  insulin lispro, 0-15 Units, subcutaneous, With meals & nightly  isosorbide dinitrate, 10 mg, oral, TID  mirtazapine, 7.5 mg, oral, Nightly  pantoprazole, 40 mg, oral, Daily before breakfast  piperacillin-tazobactam, 2.25 g, intravenous, q8h      Continuous medications     PRN medications  PRN medications: acetaminophen, albuterol, alum-mag hydroxide-simeth, benzonatate, bisacodyl, bisacodyl, ondansetron, polyethylene glycol, vancomycin   Results from last 7 days   Lab Units 08/31/24  0413 08/30/24  1748   SODIUM mmol/L 133* 136   POTASSIUM mmol/L 4.0 3.7   CHLORIDE mmol/L 96* 96*   CO2 mmol/L 30 31   BUN mg/dL 16 11   CREATININE mg/dL 2.09* 1.47*   CALCIUM mg/dL 7.2* 7.7*   PROTEIN TOTAL g/dL  --  6.5   BILIRUBIN TOTAL mg/dL  --  0.3   ALK PHOS U/L  --  93   ALT U/L  --  11   AST U/L  --  20   GLUCOSE mg/dL 230* 144*      Results from last 7 days   Lab Units 08/31/24  0413   MAGNESIUM mg/dL 1.61      Results from last 7 days   Lab Units 09/02/24  0422 08/31/24  0413 " 08/30/24  1748   WBC AUTO x10*3/uL 7.6 7.5 10.3   HEMOGLOBIN g/dL 8.9* 9.0* 11.8*   HEMATOCRIT % 29.2* 28.5* 37.2   PLATELETS AUTO x10*3/uL 350 291 368        Assessment & Plan:     Patient is 89 y.o. female HFrEF, A-fib, HTN who is admitted to hospital for left heel wound. Nephrology consulted in view of ESRD.     ESRD  -HD Monday Wednesday Friday, [facility]  -Access is right IJ TDC  -Patient has been refusing referral for AV fistula     Anemia of ESRD  -Hemoglobin has been in the 9 range recently. ,  On IV iron as outpatient as well.  -Hemoglobin was 11.8 yesterday but this was directly after dialysis, hemoglobin 9   -She is on NOAH as outpatient with dialysis and this is titrated to a goal of hemoglobin 10-11.  -NOAH is not as effective in setting of infection     CKD-MBD  -Patient does not require binders  -Phos and calcium are at goal.     Left calcaneal ulcer  -Antibiotics per primary service  -Renal dose vancomycin and Zosyn  -No debridement needed per podiatry     Hypoalbuminemia  -Infection related  -Continue protein supplements     Recommendations:      -HD today UF 2 L as blood pressure tolerates  -cont. protein shake with meals  -Antibiotic as per primary service       Please message me through [employer] chat with any questions or concerns.     Mally Lucas DO  9/2/2024  9:48 AM     [facility]    [address], [address]   [place] [address]  Office: [phone]

## 2024-09-02 NOTE — ASSESSMENT & PLAN NOTE
Currently on amiodarone and Cardizem.  Currently on apixaban.  Does have a history of retroperitoneal bleed as a complication.  Rate controlled at present.

## 2024-09-02 NOTE — CONSULTS
Nutrition Initial Assessment:   Nutrition Assessment    Reason for Assessment: Admission nursing screening    Medical history per chart:      89 y.o.  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     9/2:  Pt out of room - in dialysis.  Per notes fair- good PO intake, and patient is receiving supplements.  Pt with multiple wounds - will continue to follow and send supplements.  Nutrition History:    Food and Nutrient History: Patient not available       Current Diet: Adult diet Consistent Carb; CCD 75 gm/meal  Supplement(s): Yes: Nepro TID  Average meal Intake during admission: <75%   Average supplement intake during admission: unable to assess     Nutrition Related Findings:    Teeth: Missing teeth     BM: Last BM Date: 08/29/24  Food allergies: NKFA. is allergic to aspirin, amlodipine, levofloxacin, and sulfamethoxazole-trimethoprim.  Meds/Labs reviewed.  amiodarone, 200 mg, oral, q24h  apixaban, 2.5 mg, oral, BID  atorvastatin, 80 mg, oral, Nightly  busPIRone, 10 mg, oral, BID  clopidogrel, 75 mg, oral, Daily  dilTIAZem CD, 240 mg, oral, Nightly  docusate sodium, 100 mg, oral, BID  [START ON 9/3/2024] heparin, 2,000 Units, intra-catheter, After Dialysis  [START ON 9/3/2024] heparin, 2,000 Units, intra-catheter, After Dialysis  insulin lispro, 0-15 Units, subcutaneous, With meals & nightly  isosorbide dinitrate, 10 mg, oral, TID  mirtazapine, 7.5 mg, oral, Nightly  pantoprazole, 40 mg, oral, Daily before breakfast  piperacillin-tazobactam, 2.25 g, intravenous, q8h             Nutrition Significant Labs:    Results from last 7 days   Lab Units 08/31/24  0413 08/30/24  1748   GLUCOSE mg/dL 230* 144*  "  SODIUM mmol/L 133* 136   POTASSIUM mmol/L 4.0 3.7   CHLORIDE mmol/L 96* 96*   CO2 mmol/L 30 31   BUN mg/dL 16 11   CREATININE mg/dL 2.09* 1.47*   EGFR mL/min/1.73m*2 22* 34*   CALCIUM mg/dL 7.2* 7.7*   PHOSPHORUS mg/dL 3.4  --    MAGNESIUM mg/dL 1.61  --      Lab Results   Component Value Date    HGBA1C 6.1 (H) 07/15/2024    HGBA1C 7.6 (H) 11/11/2023    HGBA1C 7.6 (H) 11/11/2023     Results from last 7 days   Lab Units 09/02/24  1230 09/02/24  0624 09/01/24  2101 09/01/24  1612 09/01/24  1115 09/01/24  0642 08/31/24  2158 08/31/24  1834   POCT GLUCOSE mg/dL 157* 144* 149* 205* 283* 168* 190* 259*       Anthropometrics:  Height: 139.7 cm (4' 7\")   Weight: 72 kg (158 lb 11.7 oz)   BMI (Calculated): 24.85  IBW/kg (Dietitian Calculated): 43 kg       Weight History:   Wt Readings from Last 10 Encounters:   08/30/24 72 kg (158 lb 11.7 oz)   07/16/24 75.3 kg (166 lb 0.1 oz)   11/09/23 78 kg (172 lb)   10/17/23 78 kg (172 lb)   10/09/23 84 kg (185 lb 3 oz)   05/17/23 73 kg (161 lb)   04/10/23 76.2 kg (168 lb)   03/29/23 75.3 kg (166 lb)   03/09/23 73 kg (161 lb)   02/23/23 73.7 kg (162 lb 6 oz)        Weight Change %:  Weight History / % Weight Change: Noted a 4.8% loss x 1 month some of which could be fluid related  Significant Weight Loss:  (Unable to determine)          Nutrition Focused Physical Exam Findings:  defer: Out of room  Subcutaneous Fat Loss:      Muscle Wasting:     Edema:     Physical Findings:  Skin: Positive (Multiple wounds noted; stage II buttocks, unknown stages on coccyx, L heel, and ABD)    Estimated Needs:   Total Energy Estimated Needs (kCal): 1800 kCal  Method for Estimating Needs: 25 kcal/kg  Total Protein Estimated Needs (g): 108 g        Method for Estimating Needs: per MD        Nutrition Diagnosis   Nutrition Diagnosis:  Malnutrition Diagnosis  Patient has Malnutrition Diagnosis:  (unable to determine)    Nutrition Diagnosis  Patient has Nutrition Diagnosis: Yes  Diagnosis Status (1): " New  Nutrition Diagnosis 1: Increased nutrient needs  Related to (1): wound healing and ESRD on HD  As Evidenced by (1): multiple wounds, and on HD 3 times per week       Nutrition Interventions/Recommendations   Nutrition Interventions and Recommendations:        Nutrition Prescription:  Individualized Nutrition Prescription Provided for : Supplements        Nutrition Interventions:   Food and/or Nutrient Delivery Interventions  Interventions: Medical food supplement  Medical Food Supplement: Commercial beverage  Goal: Continue Nepro TID, and add Tone BID         Nutrition Education:   Education Documentation  No documentation found.      Nutrition Counseling  Counseling Theoretical Approach: Other (Comment)  Goal: patient not available       Nutrition Monitoring and Evaluation   Monitoring/Evaluation:   Food/Nutrient Related History Monitoring  Monitoring and Evaluation Plan: Energy intake  Energy Intake: Estimated energy intake  Criteria: PO intake >75%  Additional Plans: Supplement intake >75%    Body Composition/Growth/Weight History  Monitoring and Evaluation Plan: Weight  Weight: Measured weight  Criteria: Stable weight    Biochemical Data, Medical Tests and Procedures  Monitoring and Evaluation Plan: Electrolyte/renal panel, Glucose/endocrine profile  Electrolyte and Renal Panel: BUN, Sodium, Creatinine, Magnesium, Phosphorus, Potassium  Criteria: WNL  Glucose/Endocrine Profile: Glucose, casual  Criteria: WNL    Nutrition Focused Physical Findings  Monitoring and Evaluation Plan: Skin  Skin: Impaired wound healing  Criteria: Promote healing            Time Spent/Follow-up Reminder:   Follow Up  Time Spent (min): 40 minutes  Last Date of Nutrition Visit: 09/02/24  Nutrition Follow-Up Needed?: Dietitian to reassess per policy  Follow up Comment: 9/3-9/4

## 2024-09-02 NOTE — CARE PLAN
Problem: Skin  Goal: Decreased wound size/increased tissue granulation at next dressing change  Outcome: Progressing  Flowsheets (Taken 9/1/2024 2247)  Decreased wound size/increased tissue granulation at next dressing change: Promote sleep for wound healing  Goal: Participates in plan/prevention/treatment measures  Outcome: Progressing  Flowsheets (Taken 9/1/2024 2247)  Participates in plan/prevention/treatment measures: Elevate heels  Goal: Prevent/manage excess moisture  Outcome: Progressing  Flowsheets (Taken 9/1/2024 2247)  Prevent/manage excess moisture:   Monitor for/manage infection if present   Follow provider orders for dressing changes  Goal: Prevent/minimize sheer/friction injuries  Outcome: Progressing  Flowsheets (Taken 9/1/2024 2247)  Prevent/minimize sheer/friction injuries:   Turn/reposition every 2 hours/use positioning/transfer devices   HOB 30 degrees or less  Goal: Promote/optimize nutrition  Outcome: Progressing  Flowsheets (Taken 9/1/2024 2247)  Promote/optimize nutrition:   Consume > 50% meals/supplements   Monitor/record intake including meals  Goal: Promote skin healing  Outcome: Progressing  Flowsheets (Taken 9/1/2024 2247)  Promote skin healing: Assess skin/pad under line(s)/device(s)     Problem: Fall/Injury  Goal: Not fall by end of shift  Outcome: Progressing  Goal: Be free from injury by end of the shift  Outcome: Progressing  Goal: Verbalize understanding of personal risk factors for fall in the hospital  Outcome: Progressing  Goal: Verbalize understanding of risk factor reduction measures to prevent injury from fall in the home  Outcome: Progressing  Goal: Use assistive devices by end of the shift  Outcome: Progressing  Goal: Pace activities to prevent fatigue by end of the shift  Outcome: Progressing     Problem: Nutrition  Goal: Oral intake greater 75%  Outcome: Progressing  Goal: Consume prescribed supplement  Outcome: Progressing  Goal: Promote healing  Outcome: Progressing      Problem: Infection prevention/bleeding  Goal: Infection s/sx managed  Outcome: Progressing  Goal: No further progression of infection  Outcome: Progressing     Problem: Pain - Adult  Goal: Verbalizes/displays adequate comfort level or baseline comfort level  Outcome: Progressing  Flowsheets (Taken 9/1/2024 224)  Verbalizes/displays adequate comfort level or baseline comfort level:   Encourage patient to monitor pain and request assistance   Assess pain using appropriate pain scale   Administer analgesics based on type and severity of pain and evaluate response   Implement non-pharmacological measures as appropriate and evaluate response     Problem: Safety - Adult  Goal: Free from fall injury  Outcome: Progressing     Problem: Discharge Planning  Goal: Discharge to home or other facility with appropriate resources  Outcome: Progressing     Problem: Chronic Conditions and Co-morbidities  Goal: Patient's chronic conditions and co-morbidity symptoms are monitored and maintained or improved  Outcome: Progressing     Problem: Pain  Goal: Takes deep breaths with improved pain control throughout the shift  Outcome: Progressing  Goal: Turns in bed with improved pain control throughout the shift  Outcome: Progressing  Goal: Walks with improved pain control throughout the shift  Outcome: Progressing  Goal: Performs ADL's with improved pain control throughout shift  Outcome: Progressing  Goal: Participates in PT with improved pain control throughout the shift  Outcome: Progressing  Goal: Free from opioid side effects throughout the shift  Outcome: Progressing  Goal: Free from acute confusion related to pain meds throughout the shift  Outcome: Progressing     Problem: Diabetes  Goal: Achieve decreasing blood glucose levels by end of shift  Outcome: Progressing  Goal: Increase stability of blood glucose readings by end of shift  Outcome: Progressing  Goal: Decrease in ketones present in urine by end of shift  Outcome:  Progressing  Goal: Maintain electrolyte levels within acceptable range throughout shift  Outcome: Progressing  Goal: Maintain glucose levels >70mg/dl to <250mg/dl throughout shift  Outcome: Progressing  Goal: No changes in neurological exam by end of shift  Outcome: Progressing  Goal: Learn about and adhere to nutrition recommendations by end of shift  Outcome: Progressing  Goal: Vital signs within normal range for age by end of shift  Outcome: Progressing  Goal: Increase self care and/or family involovement by end of shift  Outcome: Progressing  Goal: Receive DSME education by end of shift  Outcome: Progressing   The patient's goals for the shift include      The clinical goals for the shift include pt remain free from falls and injuries, vitals remain stable, control pain

## 2024-09-02 NOTE — PROGRESS NOTES
Melody Martin is a 89 y.o. female on day 3 of admission presenting with Acute osteomyelitis of left calcaneus (Multi).      Subjective   Melody Martin is a 89 y.o.  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  [facility]  She is quite knowledgeable about her history just does not know slight specific details such as medication dosages and to that matter.  She states that she was told that she had a worsening wound of her left heel which per documentation by the nursing facility nurse practitioner the wound was healing well and a x-ray was performed and showed concerns for osteomyelitis so she was sent into the ED for imaging.  She states that she does not really have much sensation in her left lower extremity foot and her foot all the way up to her knee may be slightly above the knee but will occasionally get sharp stabbing/lightening like sensation going from her lower back down her leg but she does not really say where it stops.  She states that overall she feels well but did express concerns that nursing staff at the facility may not be changing the dressing on her foot as often as they should.  She denies any recent sick contacts, chemical/environmental exposures, changes in dietary habits or any recent traumatic events/falls other noted above.  She denies any fevers, chills, night sweats, vision changes, auditory changes, change in taste/smell, loss of bowel/bladder control, loss consciousness, dizziness, vertigo, syncope, seizure-like activity, chest pain, palpitations, shortness of breath, coughing, wheezing, congestion, hemoptysis, hematemesis, abdominal pain,  nausea, vomiting, diarrhea, constipation, dysuria, hematuria, dyschezia, hematochezia or any lateralizing motor/sensory deficits other than noted above.     ED Course (Summary):   Vitals on presentation: Temperature of 36.7 °C, heart rate 72, respirations of 16, blood pressure 132/65, pulse ox of 99% on room air  Patient was initiated on IV vancomycin/Zosyn due to concerns of osteomyelitis of the left heel  Per chart review an x-ray was performed on 08/22/2024 and noted concerns for possible osteomyelitis.  White blood cell count of 10.3  Hemoglobin/hematocrit 11.8/37.2  Baseline hemoglobin seems to be quite variable.  The only labs we have available seem to be during times of hospitalizations where she has been as low as 6 requiring transfusions.  She does seem to possibly bleed close to her baseline may be possibly hemoconcentrated?  Glucose 144  Sodium 136  Potassium 3.7  BUN/creatinine of 11/1.47  Lactate of 2.6 with a repeat of 2.1  CRP of 3.66  ESR of 79  Blood cultures x 2 pending  Wound culture pending  XR left foot: Soft tissue leg regularity of the left calcaneus concerning for underlying osteomyelitis and calcific tendinopathy and osteopenia with extensive vascular calcifications.  CT foot without contrast noted acute osteomyelitis involving the posterior calcaneal tuberosity with overlying soft tissue ulceration.  Podiatry was made aware of the patient's per my discussion with the ED practitioner.     8/31/2024: no acute events overnight. Patient remained Zosyn and vanco. Nephrology followed for ESRD on HD MWF. Podiatry consult pending.    9/1/2024: no acute events overnight. Patient had wound care with dressing change for bilateral buttock and sacral wound and left heel wound. IV Zosyn and Vanco continued. Podiatry consult still pending.     Addendum: update from Dr. Veloz, left foot wound is doing well, There was a soft tissue infection that is resolving. The wound bed is very healthy. No bone exposed.  no indication for surgical intervention, doubtful for osteomyelitis. She may follow with Dr. Veloz as outpatient.    9/2: Patient seen.  Appreciate input from podiatry.  Reviewed lab results.  Anticipate discharge to facility on doxycycline.  Check with [facility], will need to obtain authorization prior to discharge.  Will begin authorization tomorrow (today is a holiday).  Continue vancomycin.  Will discontinue Zosyn.  Reassess in AM.       Objective     Last Recorded Vitals  /60 (BP Location: Left arm, Patient Position: Lying)   Pulse 85   Temp 36.4 °C (97.6 °F) (Temporal)   Resp 18   Wt 72 kg (158 lb 11.7 oz)   SpO2 98%   Intake/Output last 3 Shifts:    Intake/Output Summary (Last 24 hours) at 9/2/2024 1303  Last data filed at 9/2/2024 1204  Gross per 24 hour   Intake 2170 ml   Output 1413 ml   Net 757 ml       Admission Weight  Weight: 68 kg (150 lb) (08/30/24 1655)    Daily Weight  08/30/24 : 72 kg (158 lb 11.7 oz)    Image Results  CT foot left wo IV contrast  Narrative: Interpreted By:  Rolf Baeza,   STUDY:  CT FOOT LEFT WO IV CONTRAST;  8/30/2024 7:02 pm      INDICATION:  Signs/Symptoms:concern for osteo left heel.      COMPARISON:  None.      ACCESSION NUMBER(S):  [id number]      ORDERING CLINICIAN:  KORINA SMITH      TECHNIQUE:  CT imaging of the left foot was obtained without contrast. Coronal  and sagittal reformatted images were performed. 3D reconstructed  images were not performed at time of dictation therefore not used  during interpretation.      FINDINGS:  There is a soft tissue ulceration at the heel. This extends to the  bone surface. There is ill-defined erosive change of the posterior  calcaneal tuberosity and sclerosis consistent suspicious for  osteomyelitis.      The bones are severely osteopenic. This limits assessment for  nondisplaced fractures though no displaced fracture is identified.      Plantar calcaneal spur. Enthesopathic spurring at the Achilles tendon  insertion.       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      Impression: Acute osteomyelitis at involving the posterior calcaneal tuberosity  with overlying soft tissue ulceration.                  MACRO:  None.      Signed by: Rolf Baeza 8/30/2024 7:41 PM  Dictation workstation:   [id number]  XR foot left 1-2 views  Narrative: Interpreted By:  Amber Denton,   STUDY:  XR FOOT LEFT 1-2 VIEWS;  8/30/2024 6:11 pm      INDICATION:  Signs/Symptoms:Concern for osteo of left heel.      COMPARISON:  None.      ACCESSION NUMBER(S):  [id number]      ORDERING CLINICIAN:  KORINA SMITH      FINDINGS:  AP and lateral views of the left foot were obtained.      There is diffuse osteopenia. No acute fracture or dislocation is  identified. There is calcaneal enthesopathy. There is cortical  irregularity at the posterior aspect of the calcaneus. There are  calcifications at the insertion of the Austin's tendon. Degenerative  changes are noted at the 1st metatarsophalangeal joint. Extensive  vascular calcifications are present. There is soft tissue  irregularity overlying the calcaneus.      Impression: 1. Soft tissue irregularity overlying the left calcaneus, suggestive  of an wound. Cortical irregularity at the posterior aspect of the  calcaneus, underlying osteomyelitis cannot be excluded. MRI can be  obtained for further evaluation.  2. Calcifications at the insertion of the Katina's tendon,  suggestive of calcific tendinopathy.  3. Osteopenia.  4. Extensive vascular calcifications.              MACRO:  None.      Signed by: Amber Denton 8/30/2024 6:54 PM  Dictation workstation:   [id number]      Physical Exam  Constitutional:       General: She is not in acute distress.     Appearance: Normal appearance.   HENT:      Head: Normocephalic.      Mouth/Throat:      Mouth: Mucous membranes are moist.      Pharynx: Oropharynx is clear.   Eyes:      Pupils: Pupils are equal, round, and reactive to  light.   Cardiovascular:      Rate and Rhythm: Normal rate and regular rhythm.      Heart sounds: Normal heart sounds. No murmur heard.     No gallop.   Pulmonary:      Effort: Pulmonary effort is normal.      Breath sounds: Normal breath sounds.   Abdominal:      General: Bowel sounds are normal. There is no distension.      Palpations: Abdomen is soft.      Tenderness: There is no abdominal tenderness.   Musculoskeletal:         General: No swelling. Normal range of motion.      Cervical back: Neck supple. No rigidity.   Skin:     General: Skin is warm and dry.      Comments: Left foot wound / heel ulcer dressed. Buttock and sacral ulcer no drain   Neurological:      General: No focal deficit present.      Mental Status: She is alert.      Cranial Nerves: No cranial nerve deficit.      Sensory: No sensory deficit.   Psychiatric:         Mood and Affect: Mood normal.         Behavior: Behavior normal.         Relevant Results             Results for orders placed or performed during the hospital encounter of 08/30/24 (from the past 96 hour(s))   CBC and Auto Differential   Result Value Ref Range    WBC 10.3 4.4 - 11.3 x10*3/uL    nRBC 0.0 0.0 - 0.0 /100 WBCs    RBC 4.06 4.00 - 5.20 x10*6/uL    Hemoglobin 11.8 (L) 12.0 - 16.0 g/dL    Hematocrit 37.2 36.0 - 46.0 %    MCV 92 80 - 100 fL    MCH 29.1 26.0 - 34.0 pg    MCHC 31.7 (L) 32.0 - 36.0 g/dL    RDW 17.2 (H) 11.5 - 14.5 %    Platelets 368 150 - 450 x10*3/uL    Neutrophils % 75.3 40.0 - 80.0 %    Immature Granulocytes %, Automated 2.4 (H) 0.0 - 0.9 %    Lymphocytes % 10.1 13.0 - 44.0 %    Monocytes % 10.1 2.0 - 10.0 %    Eosinophils % 1.2 0.0 - 6.0 %    Basophils % 0.9 0.0 - 2.0 %    Neutrophils Absolute 7.71 (H) 1.60 - 5.50 x10*3/uL    Immature Granulocytes Absolute, Automated 0.25 0.00 - 0.50 x10*3/uL    Lymphocytes Absolute 1.04 0.80 - 3.00 x10*3/uL    Monocytes Absolute 1.04 (H) 0.05 - 0.80 x10*3/uL    Eosinophils Absolute 0.12 0.00 - 0.40 x10*3/uL    Basophils  Absolute 0.09 0.00 - 0.10 x10*3/uL   Comprehensive Metabolic Panel   Result Value Ref Range    Glucose 144 (H) 74 - 99 mg/dL    Sodium 136 136 - 145 mmol/L    Potassium 3.7 3.5 - 5.3 mmol/L    Chloride 96 (L) 98 - 107 mmol/L    Bicarbonate 31 21 - 32 mmol/L    Anion Gap 13 10 - 20 mmol/L    Urea Nitrogen 11 6 - 23 mg/dL    Creatinine 1.47 (H) 0.50 - 1.05 mg/dL    eGFR 34 (L) >60 mL/min/1.73m*2    Calcium 7.7 (L) 8.6 - 10.3 mg/dL    Albumin 2.8 (L) 3.4 - 5.0 g/dL    Alkaline Phosphatase 93 33 - 136 U/L    Total Protein 6.5 6.4 - 8.2 g/dL    AST 20 9 - 39 U/L    Bilirubin, Total 0.3 0.0 - 1.2 mg/dL    ALT 11 7 - 45 U/L   Lactate   Result Value Ref Range    Lactate 2.6 (H) 0.4 - 2.0 mmol/L   Sedimentation rate, automated   Result Value Ref Range    Sedimentation Rate 79 (H) 0 - 30 mm/h   C-reactive protein   Result Value Ref Range    C-Reactive Protein 3.66 (H) <1.00 mg/dL   Tissue/Wound Culture/Smear    Specimen: Wound/Tissue; Tissue/Biopsy   Result Value Ref Range    Tissue/Wound Culture/Smear (A)      (4+) Abundant Methicillin Resistant Staphylococcus aureus (MRSA)    Tissue/Wound Culture/Smear (4+) Abundant Mixed Gram-Negative Bacteria     Tissue/Wound Culture/Smear (2+) Few Mixed Anaerobic Bacteria     Beta Lactamase (Cefinase) Positive     Gram Stain No polymorphonuclear leukocytes seen (A)     Gram Stain (4+) Abundant Gram negative bacilli (A)     Gram Stain (3+) Moderate Gram positive cocci (A)        Susceptibility    Methicillin Resistant Staphylococcus aureus (MRSA) - MICROSCAN     Clindamycin  Resistant ug/mL     Erythromycin  Resistant ug/mL     Oxacillin  Resistant ug/mL     Tetracycline  Susceptible ug/mL     Trimethoprim/Sulfamethoxazole  Susceptible ug/mL     Vancomycin  Susceptible ug/mL   Blood Culture    Specimen: Peripheral Venipuncture; Blood culture   Result Value Ref Range    Blood Culture No growth at 2 days    Blood Culture    Specimen: Peripheral Venipuncture; Blood culture   Result Value Ref  Range    Blood Culture No growth at 2 days    Lactate   Result Value Ref Range    Lactate 2.1 (H) 0.4 - 2.0 mmol/L   Lactate   Result Value Ref Range    Lactate 2.5 (H) 0.4 - 2.0 mmol/L   CBC   Result Value Ref Range    WBC 7.5 4.4 - 11.3 x10*3/uL    nRBC 0.0 0.0 - 0.0 /100 WBCs    RBC 3.14 (L) 4.00 - 5.20 x10*6/uL    Hemoglobin 9.0 (L) 12.0 - 16.0 g/dL    Hematocrit 28.5 (L) 36.0 - 46.0 %    MCV 91 80 - 100 fL    MCH 28.7 26.0 - 34.0 pg    MCHC 31.6 (L) 32.0 - 36.0 g/dL    RDW 17.4 (H) 11.5 - 14.5 %    Platelets 291 150 - 450 x10*3/uL   Renal Function Panel   Result Value Ref Range    Glucose 230 (H) 74 - 99 mg/dL    Sodium 133 (L) 136 - 145 mmol/L    Potassium 4.0 3.5 - 5.3 mmol/L    Chloride 96 (L) 98 - 107 mmol/L    Bicarbonate 30 21 - 32 mmol/L    Anion Gap 11 10 - 20 mmol/L    Urea Nitrogen 16 6 - 23 mg/dL    Creatinine 2.09 (H) 0.50 - 1.05 mg/dL    eGFR 22 (L) >60 mL/min/1.73m*2    Calcium 7.2 (L) 8.6 - 10.3 mg/dL    Phosphorus 3.4 2.5 - 4.9 mg/dL    Albumin 2.2 (L) 3.4 - 5.0 g/dL   Magnesium   Result Value Ref Range    Magnesium 1.61 1.60 - 2.40 mg/dL   Lactate   Result Value Ref Range    Lactate 2.6 (H) 0.4 - 2.0 mmol/L   C-reactive protein   Result Value Ref Range    C-Reactive Protein 2.44 (H) <1.00 mg/dL   Sedimentation Rate   Result Value Ref Range    Sedimentation Rate 63 (H) 0 - 30 mm/h   POCT GLUCOSE   Result Value Ref Range    POCT Glucose 175 (H) 74 - 99 mg/dL   Lactate   Result Value Ref Range    Lactate 3.4 (H) 0.4 - 2.0 mmol/L   POCT GLUCOSE   Result Value Ref Range    POCT Glucose 262 (H) 74 - 99 mg/dL   POCT GLUCOSE   Result Value Ref Range    POCT Glucose 259 (H) 74 - 99 mg/dL   POCT GLUCOSE   Result Value Ref Range    POCT Glucose 190 (H) 74 - 99 mg/dL   POCT GLUCOSE   Result Value Ref Range    POCT Glucose 168 (H) 74 - 99 mg/dL   POCT GLUCOSE   Result Value Ref Range    POCT Glucose 283 (H) 74 - 99 mg/dL   POCT GLUCOSE   Result Value Ref Range    POCT Glucose 205 (H) 74 - 99 mg/dL   POCT  GLUCOSE   Result Value Ref Range    POCT Glucose 149 (H) 74 - 99 mg/dL   Vancomycin   Result Value Ref Range    Vancomycin 15.2 5.0 - 20.0 ug/mL   CBC   Result Value Ref Range    WBC 7.6 4.4 - 11.3 x10*3/uL    nRBC 0.0 0.0 - 0.0 /100 WBCs    RBC 3.16 (L) 4.00 - 5.20 x10*6/uL    Hemoglobin 8.9 (L) 12.0 - 16.0 g/dL    Hematocrit 29.2 (L) 36.0 - 46.0 %    MCV 92 80 - 100 fL    MCH 28.2 26.0 - 34.0 pg    MCHC 30.5 (L) 32.0 - 36.0 g/dL    RDW 17.4 (H) 11.5 - 14.5 %    Platelets 350 150 - 450 x10*3/uL   POCT GLUCOSE   Result Value Ref Range    POCT Glucose 144 (H) 74 - 99 mg/dL   POCT GLUCOSE   Result Value Ref Range    POCT Glucose 157 (H) 74 - 99 mg/dL     Scheduled medications  amiodarone, 200 mg, oral, q24h  apixaban, 2.5 mg, oral, BID  atorvastatin, 80 mg, oral, Nightly  busPIRone, 10 mg, oral, BID  clopidogrel, 75 mg, oral, Daily  dilTIAZem CD, 240 mg, oral, Nightly  docusate sodium, 100 mg, oral, BID  [START ON 9/3/2024] heparin, 2,000 Units, intra-catheter, After Dialysis  [START ON 9/3/2024] heparin, 2,000 Units, intra-catheter, After Dialysis  insulin lispro, 0-15 Units, subcutaneous, With meals & nightly  isosorbide dinitrate, 10 mg, oral, TID  mirtazapine, 7.5 mg, oral, Nightly  pantoprazole, 40 mg, oral, Daily before breakfast  piperacillin-tazobactam, 2.25 g, intravenous, q8h      Continuous medications     PRN medications  PRN medications: acetaminophen, albuterol, alum-mag hydroxide-simeth, benzonatate, bisacodyl, bisacodyl, ondansetron, polyethylene glycol, vancomycin    Assessment/Plan        * Cellulitis of heel, right  Assessment & Plan  Does not appear to have osteomyelitis per podiatry.  Growing MRSA per culture.  Anticipate switching to doxycycline for discharge.  Continues on vancomycin for now, will discontinue Zosyn.  Continue wound care on discharge.    ESRD on hemodialysis (Multi)  Assessment & Plan  Dialysis MWF.  Appreciate nephrology input.    Atrial fibrillation (Multi)  Assessment &  Plan  Currently on amiodarone and Cardizem.  Currently on apixaban.  Does have a history of retroperitoneal bleed as a complication.  Rate controlled at present.    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    Renal artery stenosis (CMS-[facility])  Assessment & Plan  Continue current medications.    Type 2 diabetes mellitus with chronic kidney disease on chronic dialysis, with long-term current use of insulin (Multi)  Assessment & Plan  Continue insulin sliding scale.                      Chase Iverson MD

## 2024-09-02 NOTE — CARE PLAN
The patient's goals for the shift include  state she is comfortable    The clinical goals for the shift include pt to state she is comfortable throughout this shift    Over the shift, the patient did not make progress toward the following goals. Barriers to progression include buttock, coccyx,heel wound. Recommendations to address these barriers include Q2 turns, dressing changes, heel protectors, mattress overlay.

## 2024-09-02 NOTE — ASSESSMENT & PLAN NOTE
Dialysis MWF.  Appreciate nephrology input.  9/3: Facility requires hepatitis B surface antigen prior to discharge.  9/4: Creatinine elevated at 3.37, scheduled for HD today.

## 2024-09-02 NOTE — PRE-PROCEDURE NOTE
Report from Sending RN:    Report From: Sheyla Ventura RN  Recent Surgery of Procedure: No  Baseline Level of Consciousness (LOC): x4  Oxygen Use: No  Type: RA  Diabetic: Yes  Last BP Med Given Day of Dialysis:   See MAR  Last Pain Med Given: See MAR  Lab Tests to be Obtained with Dialysis: No  Blood Transfusion to be Given During Dialysis: No  Available IV Access: No  Medications to be Administered During Dialysis: No  Continuous IV Infusion Running: No  Restraints on Currently or in the Last 24 Hours: No  Hand-Off Communication: Pt ready for dialysis  Dialysis Catheter Dressing: Will check prior to HD  Last Dressing Change: Will check prior to HD

## 2024-09-02 NOTE — POST-PROCEDURE NOTE
Report to Receiving RN:    Report To: amanda Ventura RN  Time Report Called: 1212  Hand-Off Communication: Pt tolerated tx well. Removed 1L   Post vitals: 151/68 (85) - 83.8 kg - 97.6*F  Complications During Treatment: No  Ultrafiltration Treatment: No  Medications Administered During Dialysis: No  Blood Products Administered During Dialysis: No  Labs Sent During Dialysis: No  Heparin Drip Rate Changes: N/A  Dialysis Catheter Dressing: Dressing changed pre tx. Dated 09/02/2024 Initialed TE  Last Dressing Change: 09/20/2024    Electronic Signatures:  Immanuel Connelly OCDT    Last Updated: 12:33 PM by IMMANUEL CONNELLY

## 2024-09-02 NOTE — ASSESSMENT & PLAN NOTE
Does not appear to have osteomyelitis per podiatry.  Growing MRSA per culture.  Anticipate switching to doxycycline for discharge.  Continues on vancomycin for now, will discontinue Zosyn.  Continue wound care on discharge.  9/3: Continue current treatment.  Switch to doxycycline on discharge.  9/4: Awaiting authorization.  Continue wound care.  Will follow-up with wound care as outpatient.  Bed is available at accepting facility.  Can discharge later today.  Follow-up with wound clinic after discharge.

## 2024-09-03 LAB
ANION GAP SERPL CALC-SCNC: 10 MMOL/L (ref 10–20)
BUN SERPL-MCNC: 16 MG/DL (ref 6–23)
CALCIUM SERPL-MCNC: 7.2 MG/DL (ref 8.6–10.3)
CHLORIDE SERPL-SCNC: 103 MMOL/L (ref 98–107)
CO2 SERPL-SCNC: 29 MMOL/L (ref 21–32)
CREAT SERPL-MCNC: 2.27 MG/DL (ref 0.5–1.05)
EGFRCR SERPLBLD CKD-EPI 2021: 20 ML/MIN/1.73M*2
ERYTHROCYTE [DISTWIDTH] IN BLOOD BY AUTOMATED COUNT: 17.8 % (ref 11.5–14.5)
GLUCOSE BLD MANUAL STRIP-MCNC: 157 MG/DL (ref 74–99)
GLUCOSE BLD MANUAL STRIP-MCNC: 158 MG/DL (ref 74–99)
GLUCOSE BLD MANUAL STRIP-MCNC: 242 MG/DL (ref 74–99)
GLUCOSE BLD MANUAL STRIP-MCNC: 247 MG/DL (ref 74–99)
GLUCOSE BLD MANUAL STRIP-MCNC: 266 MG/DL (ref 74–99)
GLUCOSE SERPL-MCNC: 195 MG/DL (ref 74–99)
HCT VFR BLD AUTO: 28.7 % (ref 36–46)
HGB BLD-MCNC: 9 G/DL (ref 12–16)
MCH RBC QN AUTO: 28.8 PG (ref 26–34)
MCHC RBC AUTO-ENTMCNC: 31.4 G/DL (ref 32–36)
MCV RBC AUTO: 92 FL (ref 80–100)
NRBC BLD-RTO: 0 /100 WBCS (ref 0–0)
PLATELET # BLD AUTO: 403 X10*3/UL (ref 150–450)
POTASSIUM SERPL-SCNC: 3.6 MMOL/L (ref 3.5–5.3)
RBC # BLD AUTO: 3.13 X10*6/UL (ref 4–5.2)
SODIUM SERPL-SCNC: 138 MMOL/L (ref 136–145)
VANCOMYCIN TROUGH SERPL-MCNC: 16.9 UG/ML (ref 5–20)
WBC # BLD AUTO: 10 X10*3/UL (ref 4.4–11.3)

## 2024-09-03 PROCEDURE — 80048 BASIC METABOLIC PNL TOTAL CA: CPT | Performed by: INTERNAL MEDICINE

## 2024-09-03 PROCEDURE — 2500000004 HC RX 250 GENERAL PHARMACY W/ HCPCS (ALT 636 FOR OP/ED): Performed by: INTERNAL MEDICINE

## 2024-09-03 PROCEDURE — 97530 THERAPEUTIC ACTIVITIES: CPT | Mod: GP,CQ | Performed by: PHYSICAL THERAPY ASSISTANT

## 2024-09-03 PROCEDURE — 2500000004 HC RX 250 GENERAL PHARMACY W/ HCPCS (ALT 636 FOR OP/ED): Performed by: PHARMACIST

## 2024-09-03 PROCEDURE — 2500000001 HC RX 250 WO HCPCS SELF ADMINISTERED DRUGS (ALT 637 FOR MEDICARE OP): Performed by: INTERNAL MEDICINE

## 2024-09-03 PROCEDURE — 2500000004 HC RX 250 GENERAL PHARMACY W/ HCPCS (ALT 636 FOR OP/ED): Performed by: STUDENT IN AN ORGANIZED HEALTH CARE EDUCATION/TRAINING PROGRAM

## 2024-09-03 PROCEDURE — 2500000001 HC RX 250 WO HCPCS SELF ADMINISTERED DRUGS (ALT 637 FOR MEDICARE OP): Performed by: STUDENT IN AN ORGANIZED HEALTH CARE EDUCATION/TRAINING PROGRAM

## 2024-09-03 PROCEDURE — 82947 ASSAY GLUCOSE BLOOD QUANT: CPT

## 2024-09-03 PROCEDURE — 36415 COLL VENOUS BLD VENIPUNCTURE: CPT | Performed by: INTERNAL MEDICINE

## 2024-09-03 PROCEDURE — 2500000002 HC RX 250 W HCPCS SELF ADMINISTERED DRUGS (ALT 637 FOR MEDICARE OP, ALT 636 FOR OP/ED): Performed by: INTERNAL MEDICINE

## 2024-09-03 PROCEDURE — 97110 THERAPEUTIC EXERCISES: CPT | Mod: GP,CQ | Performed by: PHYSICAL THERAPY ASSISTANT

## 2024-09-03 PROCEDURE — 99233 SBSQ HOSP IP/OBS HIGH 50: CPT | Performed by: INTERNAL MEDICINE

## 2024-09-03 PROCEDURE — 1200000002 HC GENERAL ROOM WITH TELEMETRY DAILY

## 2024-09-03 PROCEDURE — 87340 HEPATITIS B SURFACE AG IA: CPT | Mod: PORLAB | Performed by: INTERNAL MEDICINE

## 2024-09-03 PROCEDURE — 85027 COMPLETE CBC AUTOMATED: CPT | Performed by: INTERNAL MEDICINE

## 2024-09-03 PROCEDURE — 80202 ASSAY OF VANCOMYCIN: CPT | Performed by: INTERNAL MEDICINE

## 2024-09-03 RX ORDER — HYDROMORPHONE HYDROCHLORIDE 0.2 MG/ML
0.2 INJECTION INTRAMUSCULAR; INTRAVENOUS; SUBCUTANEOUS
Status: DISCONTINUED | OUTPATIENT
Start: 2024-09-03 | End: 2024-09-04 | Stop reason: HOSPADM

## 2024-09-03 RX ORDER — DEXTROMETHORPHAN/PSEUDOEPHED 2.5-7.5/.8
40 DROPS ORAL EVERY 6 HOURS PRN
Status: DISCONTINUED | OUTPATIENT
Start: 2024-09-03 | End: 2024-09-04 | Stop reason: HOSPADM

## 2024-09-03 ASSESSMENT — PAIN DESCRIPTION - LOCATION
LOCATION: RECTUM
LOCATION: RECTUM

## 2024-09-03 ASSESSMENT — COGNITIVE AND FUNCTIONAL STATUS - GENERAL
MOVING TO AND FROM BED TO CHAIR: TOTAL
WALKING IN HOSPITAL ROOM: TOTAL
CLIMB 3 TO 5 STEPS WITH RAILING: TOTAL
STANDING UP FROM CHAIR USING ARMS: TOTAL
MOBILITY SCORE: 7
DRESSING REGULAR UPPER BODY CLOTHING: A LOT
DRESSING REGULAR LOWER BODY CLOTHING: A LOT
TOILETING: TOTAL
STANDING UP FROM CHAIR USING ARMS: TOTAL
DAILY ACTIVITIY SCORE: 13
WALKING IN HOSPITAL ROOM: TOTAL
EATING MEALS: A LITTLE
TURNING FROM BACK TO SIDE WHILE IN FLAT BAD: TOTAL
MOVING FROM LYING ON BACK TO SITTING ON SIDE OF FLAT BED WITH BEDRAILS: A LOT
PERSONAL GROOMING: A LITTLE
CLIMB 3 TO 5 STEPS WITH RAILING: TOTAL
MOBILITY SCORE: 7
MOVING TO AND FROM BED TO CHAIR: TOTAL
TURNING FROM BACK TO SIDE WHILE IN FLAT BAD: TOTAL
HELP NEEDED FOR BATHING: A LOT
MOVING FROM LYING ON BACK TO SITTING ON SIDE OF FLAT BED WITH BEDRAILS: A LOT

## 2024-09-03 ASSESSMENT — PAIN SCALES - GENERAL
PAINLEVEL_OUTOF10: 0 - NO PAIN
PAINLEVEL_OUTOF10: 10 - WORST POSSIBLE PAIN
PAINLEVEL_OUTOF10: 4
PAINLEVEL_OUTOF10: 4
PAINLEVEL_OUTOF10: 9

## 2024-09-03 ASSESSMENT — PAIN - FUNCTIONAL ASSESSMENT
PAIN_FUNCTIONAL_ASSESSMENT: 0-10

## 2024-09-03 NOTE — PROGRESS NOTES
Spiritual Care Visit    Clinical Encounter Type  Visited With: Patient  Routine Visit: Introduction    Hinduism Encounters  Hinduism Needs: Prayer

## 2024-09-03 NOTE — PROGRESS NOTES
09/03/24 1434   Discharge Planning   Living Arrangements Other (Comment)  ([facility])   Support Systems Family members   Assistance Needed ADL's, IADL's   Type of Residence Skilled nursing facility   Home or Post Acute Services Post acute facilities (Rehab/SNF/etc)   Type of Post Acute Facility Services Skilled nursing   Expected Discharge Disposition SNF   Does the patient need discharge transport arranged? Yes   RoundTrip coordination needed? Yes   Patient Choice   Patient / Family choosing to utilize agency / facility established prior to hospitalization Yes     Discharge assessment complete. Spoke with patient who states that she currently resides at [facility] for skilled nursing. Patient confirms she would like to return there when medically appeared. Patient confirms PCP as Ashvin. Patient confirms HD schedule as M,W,F which she gets at [facility]. Patient states that she currently can only get up to the side of the bed. 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 Patient has a son in law and grandson that helps her make medical decisions and she relies on for support. Patient states that her daughter passed away not long ago and her son doesn't have much to do with her. Auth for return to HD and facility pending. TCC following.

## 2024-09-03 NOTE — CARE PLAN
The patient's goals for the shift include      Problem: Skin  Goal: Decreased wound size/increased tissue granulation at next dressing change  Outcome: Progressing  Flowsheets (Taken 9/3/2024 0856)  Decreased wound size/increased tissue granulation at next dressing change:   Protective dressings over bony prominences   Promote sleep for wound healing  Goal: Participates in plan/prevention/treatment measures  Outcome: Progressing  Flowsheets (Taken 9/3/2024 0856)  Participates in plan/prevention/treatment measures: Elevate heels  Goal: Prevent/manage excess moisture  Outcome: Progressing  Flowsheets (Taken 9/3/2024 0856)  Prevent/manage excess moisture:   Cleanse incontinence/protect with barrier cream   Follow provider orders for dressing changes   Monitor for/manage infection if present  Goal: Prevent/minimize sheer/friction injuries  Outcome: Progressing  Flowsheets (Taken 9/3/2024 0856)  Prevent/minimize sheer/friction injuries:   Turn/reposition every 2 hours/use positioning/transfer devices   Use pull sheet   Increase activity/out of bed for meals  Goal: Promote/optimize nutrition  Outcome: Progressing  Flowsheets (Taken 9/3/2024 0856)  Promote/optimize nutrition:   Consume > 50% meals/supplements   Offer water/supplements/favorite foods  Goal: Promote skin healing  Outcome: Progressing  Flowsheets (Taken 9/3/2024 0856)  Promote skin healing: Protective dressings over bony prominences     Problem: Fall/Injury  Goal: Not fall by end of shift  Outcome: Progressing  Goal: Be free from injury by end of the shift  Outcome: Progressing  Goal: Verbalize understanding of personal risk factors for fall in the hospital  Outcome: Progressing  Goal: Verbalize understanding of risk factor reduction measures to prevent injury from fall in the home  Outcome: Progressing  Goal: Use assistive devices by end of the shift  Outcome: Progressing  Goal: Pace activities to prevent fatigue by end of the shift  Outcome: Progressing      Problem: Nutrition  Goal: Oral intake greater 75%  Outcome: Progressing  Goal: Consume prescribed supplement  Outcome: Progressing  Goal: Promote healing  Outcome: Progressing     Problem: Infection prevention/bleeding  Goal: Infection s/sx managed  Outcome: Progressing  Goal: No further progression of infection  Outcome: Progressing     Problem: Pain - Adult  Goal: Verbalizes/displays adequate comfort level or baseline comfort level  Outcome: Progressing     Problem: Safety - Adult  Goal: Free from fall injury  Outcome: Progressing     Problem: Discharge Planning  Goal: Discharge to home or other facility with appropriate resources  Outcome: Progressing     Problem: Chronic Conditions and Co-morbidities  Goal: Patient's chronic conditions and co-morbidity symptoms are monitored and maintained or improved  Outcome: Progressing     Problem: Pain  Goal: Takes deep breaths with improved pain control throughout the shift  Outcome: Progressing  Goal: Turns in bed with improved pain control throughout the shift  Outcome: Progressing  Goal: Walks with improved pain control throughout the shift  Outcome: Progressing  Goal: Performs ADL's with improved pain control throughout shift  Outcome: Progressing  Goal: Participates in PT with improved pain control throughout the shift  Outcome: Progressing  Goal: Free from opioid side effects throughout the shift  Outcome: Progressing  Goal: Free from acute confusion related to pain meds throughout the shift  Outcome: Progressing     Problem: Diabetes  Goal: Achieve decreasing blood glucose levels by end of shift  Outcome: Progressing  Goal: Increase stability of blood glucose readings by end of shift  Outcome: Progressing  Goal: Decrease in ketones present in urine by end of shift  Outcome: Progressing  Goal: Maintain electrolyte levels within acceptable range throughout shift  Outcome: Progressing  Goal: Maintain glucose levels >70mg/dl to <250mg/dl throughout shift  Outcome:  Progressing  Goal: No changes in neurological exam by end of shift  Outcome: Progressing  Goal: Learn about and adhere to nutrition recommendations by end of shift  Outcome: Progressing  Goal: Vital signs within normal range for age by end of shift  Outcome: Progressing  Goal: Increase self care and/or family involovement by end of shift  Outcome: Progressing  Goal: Receive DSME education by end of shift  Outcome: Progressing     Problem: Heart Failure  Goal: Improved gas exchange this shift  Outcome: Progressing  Goal: Improved urinary output this shift  Outcome: Progressing  Goal: Reduction in peripheral edema within 24 hours  Outcome: Progressing  Goal: Report improvement of dyspnea/breathlessness this shift  Outcome: Progressing  Goal: Weight from fluid excess reduced over 2-3 days, then stabilize  Outcome: Progressing  Goal: Increase self care and/or family involvement in 24 hours  Outcome: Progressing       The clinical goals for the shift include decreased number of bowel movements and less loose by end of shift.    See assessment and mar. Remains on room air. See blood sugars. See wound care orders.

## 2024-09-03 NOTE — PROGRESS NOTES
Melody Martin is a 89 y.o. female on day 4 of admission presenting with Cellulitis of heel, right.      Subjective   Melody Martin is a 89 y.o.  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  Patient is from [facility].  She is quite knowledgeable about her history just does not know slight specific details such as medication dosages and to that matter.  She states that she was told that she had a worsening wound of her left heel which per documentation by the nursing facility nurse practitioner the wound was healing well and a x-ray was performed and showed concerns for osteomyelitis so she was sent into the ED for imaging.  She states that she does not really have much sensation in her left lower extremity foot and her foot all the way up to her knee may be slightly above the knee but will occasionally get sharp stabbing/lightening like sensation going from her lower back down her leg but she does not really say where it stops.  She states that overall she feels well but did express concerns that nursing staff at the facility may not be changing the dressing on her foot as often as they should.  She denies any recent sick contacts, chemical/environmental exposures, changes in dietary habits or any recent traumatic events/falls other noted above.  She denies any fevers, chills, night sweats, vision changes, auditory changes, change in taste/smell, loss of bowel/bladder control, loss consciousness, dizziness, vertigo, syncope, seizure-like activity, chest pain, palpitations, shortness of breath, coughing, wheezing, congestion, hemoptysis, hematemesis, abdominal pain, nausea, vomiting,  diarrhea, constipation, dysuria, hematuria, dyschezia, hematochezia or any lateralizing motor/sensory deficits other than noted above.     ED Course (Summary):   Vitals on presentation: Temperature of 36.7 °C, heart rate 72, respirations of 16, blood pressure 132/65, pulse ox of 99% on room air  Patient was initiated on IV vancomycin/Zosyn due to concerns of osteomyelitis of the left heel  Per chart review an x-ray was performed on 08/22/2024 and noted concerns for possible osteomyelitis.  White blood cell count of 10.3  Hemoglobin/hematocrit 11.8/37.2  Baseline hemoglobin seems to be quite variable.  The only labs we have available seem to be during times of hospitalizations where she has been as low as 6 requiring transfusions.  She does seem to possibly bleed close to her baseline may be possibly hemoconcentrated?  Glucose 144  Sodium 136  Potassium 3.7  BUN/creatinine of 11/1.47  Lactate of 2.6 with a repeat of 2.1  CRP of 3.66  ESR of 79  Blood cultures x 2 pending  Wound culture pending  XR left foot: Soft tissue leg regularity of the left calcaneus concerning for underlying osteomyelitis and calcific tendinopathy and osteopenia with extensive vascular calcifications.  CT foot without contrast noted acute osteomyelitis involving the posterior calcaneal tuberosity with overlying soft tissue ulceration.  Podiatry was made aware of the patient's per my discussion with the ED practitioner.     8/31/2024: no acute events overnight. Patient remained Zosyn and vanco. Nephrology followed for ESRD on HD MWF. Podiatry consult pending.    9/1/2024: no acute events overnight. Patient had wound care with dressing change for bilateral buttock and sacral wound and left heel wound. IV Zosyn and Vanco continued. Podiatry consult still pending.     Addendum: update from Dr. Veloz, left foot wound is doing well, There was a soft tissue infection that is resolving. The wound bed is very healthy. No bone exposed. no indication for  surgical intervention, doubtful for osteomyelitis. She may follow with Dr. Veloz as outpatient.    9/2: Patient seen.  Appreciate input from podiatry.  Reviewed lab results.  Anticipate discharge to facility on doxycycline.  Check with [facility], will need to obtain authorization prior to discharge.  Will begin authorization tomorrow (today is a holiday).  Continue vancomycin.  Will discontinue Zosyn.  Reassess in AM.    9/3: Patient seen.  No new complaints.  Will need to obtain hepatitis B surface antigen for dialysis purposes.  Discharge planning in progress.  Return to facility when ready.  Continue treating patient cellulitis.  No evidence of osteomyelitis per podiatry.       Objective     Last Recorded Vitals  /70 (BP Location: Left arm, Patient Position: Lying)   Pulse 78   Temp 37.2 °C (99 °F) (Temporal)   Resp 16   Wt 72 kg (158 lb 11.7 oz)   SpO2 99%   Intake/Output last 3 Shifts:    Intake/Output Summary (Last 24 hours) at 9/3/2024 1654  Last data filed at 9/3/2024 1545  Gross per 24 hour   Intake 860 ml   Output 0 ml   Net 860 ml       Admission Weight  Weight: 68 kg (150 lb) (08/30/24 1655)    Daily Weight  08/30/24 : 72 kg (158 lb 11.7 oz)    Image Results  CT foot left wo IV contrast  Narrative: Interpreted By:  Rolf Baeza,   STUDY:  CT FOOT LEFT WO IV CONTRAST;  8/30/2024 7:02 pm      INDICATION:  Signs/Symptoms:concern for osteo left heel.      COMPARISON:  None.      ACCESSION NUMBER(S):  [id number]      ORDERING CLINICIAN:  KORINA SMITH      TECHNIQUE:  CT imaging of the left foot was obtained without contrast. Coronal  and sagittal reformatted images were performed. 3D reconstructed  images were not performed at time of dictation therefore not used  during interpretation.      FINDINGS:  There is a soft tissue ulceration at the heel. This extends to the  bone surface. There is ill-defined erosive change of the posterior  calcaneal tuberosity and sclerosis consistent suspicious  for  osteomyelitis.      The bones are severely osteopenic. This limits assessment for  nondisplaced fractures though no displaced fracture is identified.      Plantar calcaneal spur. Enthesopathic spurring at the Achilles tendon  insertion.      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      Impression: Acute osteomyelitis at involving the posterior calcaneal tuberosity  with overlying soft tissue ulceration.                  MACRO:  None.      Signed by: Rolf Baeza 8/30/2024 7:41 PM  Dictation workstation:   [id number]  XR foot left 1-2 views  Narrative: Interpreted By:  Amber Denton,   STUDY:  XR FOOT LEFT 1-2 VIEWS;  8/30/2024 6:11 pm      INDICATION:  Signs/Symptoms:Concern for osteo of left heel.      COMPARISON:  None.      ACCESSION NUMBER(S):  [id number]      ORDERING CLINICIAN:  KORINA SMITH      FINDINGS:  AP and lateral views of the left foot were obtained.      There is diffuse osteopenia. No acute fracture or dislocation is  identified. There is calcaneal enthesopathy. There is cortical  irregularity at the posterior aspect of the calcaneus. There are  calcifications at the insertion of the Katina's tendon. Degenerative  changes are noted at the 1st metatarsophalangeal joint. Extensive  vascular calcifications are present. There is soft tissue  irregularity overlying the calcaneus.      Impression: 1. Soft tissue irregularity overlying the left calcaneus, suggestive  of an wound. Cortical irregularity at the posterior aspect of the  calcaneus, underlying osteomyelitis cannot be excluded. MRI can be  obtained for further evaluation.  2. Calcifications at the insertion of the Katina's tendon,  suggestive of calcific tendinopathy.  3. Osteopenia.  4. Extensive vascular calcifications.              MACRO:  None.      Signed by: Amber Denton 8/30/2024 6:54 PM  Dictation workstation:   [id number]      Physical Exam  Constitutional:       General: She is not in  acute distress.     Appearance: Normal appearance.   HENT:      Head: Normocephalic.      Mouth/Throat:      Mouth: Mucous membranes are moist.      Pharynx: Oropharynx is clear.   Eyes:      Pupils: Pupils are equal, round, and reactive to light.   Cardiovascular:      Rate and Rhythm: Normal rate and regular rhythm.      Heart sounds: Normal heart sounds. No murmur heard.     No gallop.   Pulmonary:      Effort: Pulmonary effort is normal.      Breath sounds: Normal breath sounds.   Abdominal:      General: Bowel sounds are normal. There is no distension.      Palpations: Abdomen is soft.      Tenderness: There is no abdominal tenderness.   Musculoskeletal:         General: No swelling. Normal range of motion.      Cervical back: Neck supple. No rigidity.   Skin:     General: Skin is warm and dry.      Comments: Left foot wound / heel ulcer dressed. Buttock and sacral ulcer no drain   Neurological:      General: No focal deficit present.      Mental Status: She is alert.      Cranial Nerves: No cranial nerve deficit.      Sensory: No sensory deficit.   Psychiatric:         Mood and Affect: Mood normal.         Behavior: Behavior normal.         Relevant Results             Results for orders placed or performed during the hospital encounter of 08/30/24 (from the past 96 hour(s))   CBC and Auto Differential   Result Value Ref Range    WBC 10.3 4.4 - 11.3 x10*3/uL    nRBC 0.0 0.0 - 0.0 /100 WBCs    RBC 4.06 4.00 - 5.20 x10*6/uL    Hemoglobin 11.8 (L) 12.0 - 16.0 g/dL    Hematocrit 37.2 36.0 - 46.0 %    MCV 92 80 - 100 fL    MCH 29.1 26.0 - 34.0 pg    MCHC 31.7 (L) 32.0 - 36.0 g/dL    RDW 17.2 (H) 11.5 - 14.5 %    Platelets 368 150 - 450 x10*3/uL    Neutrophils % 75.3 40.0 - 80.0 %    Immature Granulocytes %, Automated 2.4 (H) 0.0 - 0.9 %    Lymphocytes % 10.1 13.0 - 44.0 %    Monocytes % 10.1 2.0 - 10.0 %    Eosinophils % 1.2 0.0 - 6.0 %    Basophils % 0.9 0.0 - 2.0 %    Neutrophils Absolute 7.71 (H) 1.60 - 5.50  x10*3/uL    Immature Granulocytes Absolute, Automated 0.25 0.00 - 0.50 x10*3/uL    Lymphocytes Absolute 1.04 0.80 - 3.00 x10*3/uL    Monocytes Absolute 1.04 (H) 0.05 - 0.80 x10*3/uL    Eosinophils Absolute 0.12 0.00 - 0.40 x10*3/uL    Basophils Absolute 0.09 0.00 - 0.10 x10*3/uL   Comprehensive Metabolic Panel   Result Value Ref Range    Glucose 144 (H) 74 - 99 mg/dL    Sodium 136 136 - 145 mmol/L    Potassium 3.7 3.5 - 5.3 mmol/L    Chloride 96 (L) 98 - 107 mmol/L    Bicarbonate 31 21 - 32 mmol/L    Anion Gap 13 10 - 20 mmol/L    Urea Nitrogen 11 6 - 23 mg/dL    Creatinine 1.47 (H) 0.50 - 1.05 mg/dL    eGFR 34 (L) >60 mL/min/1.73m*2    Calcium 7.7 (L) 8.6 - 10.3 mg/dL    Albumin 2.8 (L) 3.4 - 5.0 g/dL    Alkaline Phosphatase 93 33 - 136 U/L    Total Protein 6.5 6.4 - 8.2 g/dL    AST 20 9 - 39 U/L    Bilirubin, Total 0.3 0.0 - 1.2 mg/dL    ALT 11 7 - 45 U/L   Lactate   Result Value Ref Range    Lactate 2.6 (H) 0.4 - 2.0 mmol/L   Sedimentation rate, automated   Result Value Ref Range    Sedimentation Rate 79 (H) 0 - 30 mm/h   C-reactive protein   Result Value Ref Range    C-Reactive Protein 3.66 (H) <1.00 mg/dL   Tissue/Wound Culture/Smear    Specimen: Wound/Tissue; Tissue/Biopsy   Result Value Ref Range    Tissue/Wound Culture/Smear (A)      (4+) Abundant Methicillin Resistant Staphylococcus aureus (MRSA)    Tissue/Wound Culture/Smear (4+) Abundant Mixed Gram-Negative Bacteria     Tissue/Wound Culture/Smear (2+) Few Mixed Anaerobic Bacteria     Beta Lactamase (Cefinase) Positive     Gram Stain No polymorphonuclear leukocytes seen (A)     Gram Stain (4+) Abundant Gram negative bacilli (A)     Gram Stain (3+) Moderate Gram positive cocci (A)        Susceptibility    Methicillin Resistant Staphylococcus aureus (MRSA) - MICROSCAN     Clindamycin  Resistant ug/mL     Erythromycin  Resistant ug/mL     Oxacillin  Resistant ug/mL     Tetracycline  Susceptible ug/mL     Trimethoprim/Sulfamethoxazole  Susceptible ug/mL      Vancomycin  Susceptible ug/mL   Blood Culture    Specimen: Peripheral Venipuncture; Blood culture   Result Value Ref Range    Blood Culture No growth at 3 days    Blood Culture    Specimen: Peripheral Venipuncture; Blood culture   Result Value Ref Range    Blood Culture No growth at 3 days    Lactate   Result Value Ref Range    Lactate 2.1 (H) 0.4 - 2.0 mmol/L   Lactate   Result Value Ref Range    Lactate 2.5 (H) 0.4 - 2.0 mmol/L   CBC   Result Value Ref Range    WBC 7.5 4.4 - 11.3 x10*3/uL    nRBC 0.0 0.0 - 0.0 /100 WBCs    RBC 3.14 (L) 4.00 - 5.20 x10*6/uL    Hemoglobin 9.0 (L) 12.0 - 16.0 g/dL    Hematocrit 28.5 (L) 36.0 - 46.0 %    MCV 91 80 - 100 fL    MCH 28.7 26.0 - 34.0 pg    MCHC 31.6 (L) 32.0 - 36.0 g/dL    RDW 17.4 (H) 11.5 - 14.5 %    Platelets 291 150 - 450 x10*3/uL   Renal Function Panel   Result Value Ref Range    Glucose 230 (H) 74 - 99 mg/dL    Sodium 133 (L) 136 - 145 mmol/L    Potassium 4.0 3.5 - 5.3 mmol/L    Chloride 96 (L) 98 - 107 mmol/L    Bicarbonate 30 21 - 32 mmol/L    Anion Gap 11 10 - 20 mmol/L    Urea Nitrogen 16 6 - 23 mg/dL    Creatinine 2.09 (H) 0.50 - 1.05 mg/dL    eGFR 22 (L) >60 mL/min/1.73m*2    Calcium 7.2 (L) 8.6 - 10.3 mg/dL    Phosphorus 3.4 2.5 - 4.9 mg/dL    Albumin 2.2 (L) 3.4 - 5.0 g/dL   Magnesium   Result Value Ref Range    Magnesium 1.61 1.60 - 2.40 mg/dL   Lactate   Result Value Ref Range    Lactate 2.6 (H) 0.4 - 2.0 mmol/L   C-reactive protein   Result Value Ref Range    C-Reactive Protein 2.44 (H) <1.00 mg/dL   Sedimentation Rate   Result Value Ref Range    Sedimentation Rate 63 (H) 0 - 30 mm/h   POCT GLUCOSE   Result Value Ref Range    POCT Glucose 175 (H) 74 - 99 mg/dL   Lactate   Result Value Ref Range    Lactate 3.4 (H) 0.4 - 2.0 mmol/L   POCT GLUCOSE   Result Value Ref Range    POCT Glucose 262 (H) 74 - 99 mg/dL   POCT GLUCOSE   Result Value Ref Range    POCT Glucose 259 (H) 74 - 99 mg/dL   POCT GLUCOSE   Result Value Ref Range    POCT Glucose 190 (H) 74 - 99  mg/dL   POCT GLUCOSE   Result Value Ref Range    POCT Glucose 168 (H) 74 - 99 mg/dL   POCT GLUCOSE   Result Value Ref Range    POCT Glucose 283 (H) 74 - 99 mg/dL   POCT GLUCOSE   Result Value Ref Range    POCT Glucose 205 (H) 74 - 99 mg/dL   POCT GLUCOSE   Result Value Ref Range    POCT Glucose 149 (H) 74 - 99 mg/dL   Vancomycin   Result Value Ref Range    Vancomycin 15.2 5.0 - 20.0 ug/mL   CBC   Result Value Ref Range    WBC 7.6 4.4 - 11.3 x10*3/uL    nRBC 0.0 0.0 - 0.0 /100 WBCs    RBC 3.16 (L) 4.00 - 5.20 x10*6/uL    Hemoglobin 8.9 (L) 12.0 - 16.0 g/dL    Hematocrit 29.2 (L) 36.0 - 46.0 %    MCV 92 80 - 100 fL    MCH 28.2 26.0 - 34.0 pg    MCHC 30.5 (L) 32.0 - 36.0 g/dL    RDW 17.4 (H) 11.5 - 14.5 %    Platelets 350 150 - 450 x10*3/uL   POCT GLUCOSE   Result Value Ref Range    POCT Glucose 144 (H) 74 - 99 mg/dL   POCT GLUCOSE   Result Value Ref Range    POCT Glucose 157 (H) 74 - 99 mg/dL   POCT GLUCOSE   Result Value Ref Range    POCT Glucose 245 (H) 74 - 99 mg/dL   POCT GLUCOSE   Result Value Ref Range    POCT Glucose 239 (H) 74 - 99 mg/dL   POCT GLUCOSE   Result Value Ref Range    POCT Glucose 157 (H) 74 - 99 mg/dL   Vancomycin, Trough   Result Value Ref Range    Vancomycin, Trough 16.9 5.0 - 20.0 ug/mL   CBC   Result Value Ref Range    WBC 10.0 4.4 - 11.3 x10*3/uL    nRBC 0.0 0.0 - 0.0 /100 WBCs    RBC 3.13 (L) 4.00 - 5.20 x10*6/uL    Hemoglobin 9.0 (L) 12.0 - 16.0 g/dL    Hematocrit 28.7 (L) 36.0 - 46.0 %    MCV 92 80 - 100 fL    MCH 28.8 26.0 - 34.0 pg    MCHC 31.4 (L) 32.0 - 36.0 g/dL    RDW 17.8 (H) 11.5 - 14.5 %    Platelets 403 150 - 450 x10*3/uL   Basic Metabolic Panel   Result Value Ref Range    Glucose 195 (H) 74 - 99 mg/dL    Sodium 138 136 - 145 mmol/L    Potassium 3.6 3.5 - 5.3 mmol/L    Chloride 103 98 - 107 mmol/L    Bicarbonate 29 21 - 32 mmol/L    Anion Gap 10 10 - 20 mmol/L    Urea Nitrogen 16 6 - 23 mg/dL    Creatinine 2.27 (H) 0.50 - 1.05 mg/dL    eGFR 20 (L) >60 mL/min/1.73m*2    Calcium  7.2 (L) 8.6 - 10.3 mg/dL   POCT GLUCOSE   Result Value Ref Range    POCT Glucose 247 (H) 74 - 99 mg/dL   POCT GLUCOSE   Result Value Ref Range    POCT Glucose 242 (H) 74 - 99 mg/dL   POCT GLUCOSE   Result Value Ref Range    POCT Glucose 266 (H) 74 - 99 mg/dL     Scheduled medications  amiodarone, 200 mg, oral, q24h  apixaban, 2.5 mg, oral, BID  atorvastatin, 80 mg, oral, Nightly  busPIRone, 10 mg, oral, BID  clopidogrel, 75 mg, oral, Daily  dilTIAZem CD, 240 mg, oral, Nightly  [Held by provider] docusate sodium, 100 mg, oral, BID  heparin, 2,000 Units, intra-catheter, After Dialysis  heparin, 2,000 Units, intra-catheter, After Dialysis  insulin lispro, 0-15 Units, subcutaneous, With meals & nightly  isosorbide dinitrate, 10 mg, oral, TID  mirtazapine, 7.5 mg, oral, Nightly  pantoprazole, 40 mg, oral, Daily before breakfast  piperacillin-tazobactam, 2.25 g, intravenous, q8h      Continuous medications     PRN medications  PRN medications: acetaminophen, albuterol, alum-mag hydroxide-simeth, benzonatate, bisacodyl, bisacodyl, HYDROcodone-acetaminophen, HYDROmorphone, ondansetron, polyethylene glycol, simethicone, vancomycin    Assessment/Plan        * Cellulitis of heel, right  Assessment & Plan  Does not appear to have osteomyelitis per podiatry.  Growing MRSA per culture.  Anticipate switching to doxycycline for discharge.  Continues on vancomycin for now, will discontinue Zosyn.  Continue wound care on discharge.  9/3: Continue current treatment.  Switch to Cleen on discharge.    ESRD on hemodialysis (Multi)  Assessment & Plan  Dialysis MWF.  Appreciate nephrology input.    Atrial fibrillation (Multi)  Assessment & Plan  Currently on amiodarone and Cardizem.  Currently on apixaban.  Does have a history of retroperitoneal bleed as a complication.  Rate controlled at present.    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    Renal artery stenosis (CMS-[facility])  Assessment & Plan  Continue current medications.    Type 2 diabetes mellitus with chronic kidney disease on chronic dialysis, with long-term current use of insulin (Multi)  Assessment & Plan  Continue insulin sliding scale.                      Chase Iverson MD

## 2024-09-03 NOTE — PROGRESS NOTES
Social work consult placed for discharge planning/CWI. SW reviewed pt's chart and communicated with [facility]. No SW needs foreseen at this time. SW signing off; available upon request.    Enrike Fontaine, MSW, LSW ([id number])   Care Transitions

## 2024-09-03 NOTE — CONSULTS
Unable to see patient for wound consult at this time. Dressing care orders in place. Will follow up tomorrow.

## 2024-09-03 NOTE — PROGRESS NOTES
Vancomycin Dosing by Pharmacy- FOLLOW UP    Melody Martin is a 89 y.o. year old female who Pharmacy has been consulted for vancomycin dosing for cellulitis, skin and soft tissue. Based on the patient's indication and renal status this patient is being dosed based on a goal pre-HD level of 20-25.     Patient received HD M/W/F.    Patient received 500 mg after HD session yesterday. Random level this morning came back low although HD session is tomorrow.    Most recent random level: 16.9 mcg/mL    Visit Vitals  /56 (BP Location: Left arm, Patient Position: Lying)   Pulse 86   Temp 37.1 °C (98.7 °F) (Temporal)   Resp 18        Lab Results   Component Value Date    CREATININE 2.27 (H) 2024    CREATININE 2.09 (H) 2024    CREATININE 1.47 (H) 2024    CREATININE 3.19 (H) 2024        Patient weight is as follows:   Vitals:    24 2228   Weight: 72 kg (158 lb 11.7 oz)       Cultures:  Susceptibility data for the encounter in last 14 days.  Collected Specimen Info Organism Clindamycin Erythromycin Oxacillin Tetracycline Trimethoprim/Sulfamethoxazole Vancomycin   24 Tissue/Biopsy from Wound/Tissue Methicillin Resistant Staphylococcus aureus (MRSA)  R  R  R  S  S  S     Mixed Anaerobic Bacteria           Mixed Gram-Negative Bacteria               I/O last 3 completed shifts:  In: 0 (28.9 mL/kg) [P.O.:880; I.V.:800 (11.1 mL/kg); Other:400]  Out: 1413 (19.6 mL/kg) [Other:1413]  Weight: 72 kg   I/O during current shift:  No intake/output data recorded.    Temp (24hrs), Av.6 °C (97.9 °F), Min:36.4 °C (97.5 °F), Max:37.1 °C (98.7 °F)      Assessment/Plan    Please plan to administer 750 mg after tomorrows HD session due to weight > 70 kg. Pharmacy will monitor for any changes and watch for HD orders tomorrow to ensure session is happening.  The next level will be obtained on  at 0500. May be obtained sooner if clinically indicated.   Will continue to monitor renal function daily  while on vancomycin and order serum creatinine at least every 48 hours if not already ordered.  Follow for continued vancomycin needs, clinical response, and signs/symptoms of toxicity.       Rafa Hull, PharmD

## 2024-09-03 NOTE — PROGRESS NOTES
Physical Therapy    Physical Therapy Treatment    Patient Name: Melody Martin  MRN: [id number]  Today's Date: 9/3/2024  Time Calculation  Start Time: 1257  Stop Time: 1322  Time Calculation (min): 25 min         Assessment/Plan   PT Assessment  End of Session Communication: Bedside nurse  Assessment Comment: pt agreeable and motivated to perform PT. pt would benefit form further skilled PT services.  End of Session Patient Position: Bed, 3 rail up, Alarm on     PT Plan  Treatment/Interventions: Bed mobility  PT Plan: PT Eval only  PT Eval Only Reason: At baseline function      General Visit Information:   PT  Visit  PT Received On: 09/03/24  Response to Previous Treatment: Patient with no complaints from previous session.  General  Reason for Referral: Gait Training, Impaired Mobility, Impaired Cognition/Safety Awareness  Referred By: Red  Past Medical History Relevant to Rehab: CVA c LLE paralysis/paraesthesia, HTN, HLD, cardiomyopathy, L heel pressure ulcer x1 month, sacrum/buttock pressure ulcer, anemia, AFIB, DM2  Prior to Session Communication: Bedside nurse  Patient Position Received: Bed, 3 rail up, Alarm on  Preferred Learning Style: verbal  General Comment: pt agreeable to PT and cleared by RN. pt stating she is uncomfortable in bed.      Precautions:  Precautions  Medical Precautions: Fall precautions      Pain:  Pain Assessment  Pain Assessment: 0-10  0-10 (Numeric) Pain Score: 4  Pain Type: Acute pain  Pain Location:  (LB and buttocks.)  Cognition:  Cognition  Overall Cognitive Status: Within Functional Limits       Treatments:  Therapeutic Exercise  Therapeutic Exercise Performed: Yes (performed supine with AAROm in LLE and AROM on RLE.)  Therapeutic Exercise Activity 1: ankle pumps x 10 ea  Therapeutic Exercise Activity 2: Heel slides x 10 ea  Therapeutic Exercise Activity 3: hip ab/adduction x 10 ea  Therapeutic Exercise Activity 4: quad sets x 10 ea AROM  Therapeutic Exercise Activity 5: glut sets x  10 ea AROM  Therapeutic Exercise Activity 6: SAQ x 10 ea    Bed Mobility  Bed Mobility: Yes  Bed Mobility 1  Bed Mobility 1: Rolling right, Rolling left, Scooting  Level of Assistance 1: Maximum assistance, +2  Bed Mobility Comments 1: cues for safety and technique.    Outcome Measures:  [facility] Basic Mobility  Turning from your back to your side while in a flat bed without using bedrails: A lot  Moving from lying on your back to sitting on the side of a flat bed without using bedrails: Total  Moving to and from bed to chair (including a wheelchair): Total  Standing up from a chair using your arms (e.g. wheelchair or bedside chair): Total  To walk in hospital room: Total  Climbing 3-5 steps with railing: Total  Basic Mobility - Total Score: 7    Education Documentation  Mobility Training, taught by Angella Howell PTA at 9/3/2024  1:27 PM.  Learner: Patient  Readiness: Acceptance  Method: Explanation  Response: Verbalizes Understanding, Needs Reinforcement    Education Comments  No comments found.            Encounter Problems       Encounter Problems (Active)       Balance       LTG - Patient will maintain balance to allow for safe mobility (Progressing)       Start:  08/31/24               PT Transfers       STG - Transfer from bed to chair (Progressing)       Start:  08/31/24               Pain - Adult

## 2024-09-03 NOTE — PROGRESS NOTES
Physical Therapy    Physical Therapy Treatment    Patient Name: Melody Martin  MRN: [id number]  Today's Date: 9/3/2024        3328/3328-A    Assessment/Plan   PT Assessment  Patient seen by PTA, goals updated to reflect current abilities.      PT Plan  Treatment/Interventions: Bed mobility  PT Eval Only Reason: At baseline function  PT Frequency: 2 times per week  PT Discharge Recommendations:  (return to facility with therapy as appropriate)        General Visit Information:         Subjective     Precautions:  Precautions  Medical Precautions: Fall precautions    Vital Signs:     Objective     Pain:       Cognition:       Postural Control:       Extremity/Trunk Assessments:                Activity Tolerance:       Treatments:                            Outcome Measures:                                            Education Documentation  No documentation found.  Education Comments  No comments found.           EDUCATION:     Encounter Problems       Encounter Problems (Active)       Balance       LTG - Patient will maintain balance to allow for safe mobility (Progressing)       Start:  08/31/24               PT Problem       bed mobility       Start:  09/03/24    Expected End:  09/17/24       Patient will perform all bed mobility (rolling/scooting) with Mod assist x 1 to increase patient's functional independence and decrease stress on caregivers         strengthening        Start:  09/03/24    Expected End:  09/17/24       Patient will perform 20+ reps of AROM/AAROM for HERNANDO LE's to improve safety and functional independence              PT Transfers          Pain - Adult

## 2024-09-03 NOTE — PROGRESS NOTES
"      Nephrology Progress Note      Nephrology following for ESRD.   Events over night: none  Patient had multiple stools overnight after laxative given, says she could not sleep  Eating her lunch well today      /57 (BP Location: Left arm, Patient Position: Lying)   Pulse 86   Temp 36.7 °C (98 °F) (Temporal)   Resp 17   Ht 1.397 m (4' 7\")   Wt 72 kg (158 lb 11.7 oz)   SpO2 98%   BMI 36.89 kg/m²     Input / Output:  24 HR:   Intake/Output Summary (Last 24 hours) at 9/3/2024 1239  Last data filed at 9/3/2024 1125  Gross per 24 hour   Intake 560 ml   Output 0 ml   Net 560 ml       Physical Exam   NAD  RIJ TDC  Neck: no JVD  CV: RRR  Lungs: CTA bilaterally  Abd: soft, NT, ND   Ext: no lower extremity edema    Scheduled medications  amiodarone, 200 mg, oral, q24h  apixaban, 2.5 mg, oral, BID  atorvastatin, 80 mg, oral, Nightly  busPIRone, 10 mg, oral, BID  clopidogrel, 75 mg, oral, Daily  dilTIAZem CD, 240 mg, oral, Nightly  [Held by provider] docusate sodium, 100 mg, oral, BID  heparin, 2,000 Units, intra-catheter, After Dialysis  heparin, 2,000 Units, intra-catheter, After Dialysis  insulin lispro, 0-15 Units, subcutaneous, With meals & nightly  isosorbide dinitrate, 10 mg, oral, TID  mirtazapine, 7.5 mg, oral, Nightly  pantoprazole, 40 mg, oral, Daily before breakfast  piperacillin-tazobactam, 2.25 g, intravenous, q8h      Continuous medications     PRN medications  PRN medications: acetaminophen, albuterol, alum-mag hydroxide-simeth, benzonatate, bisacodyl, bisacodyl, HYDROcodone-acetaminophen, HYDROmorphone, ondansetron, polyethylene glycol, simethicone, vancomycin   Results from last 7 days   Lab Units 09/03/24  0431 08/31/24  0413 08/30/24  1748   SODIUM mmol/L 138   < > 136   POTASSIUM mmol/L 3.6   < > 3.7   CHLORIDE mmol/L 103   < > 96*   CO2 mmol/L 29   < > 31   BUN mg/dL 16   < > 11   CREATININE mg/dL 2.27*   < > 1.47*   CALCIUM mg/dL 7.2*   < > 7.7*   PROTEIN TOTAL g/dL  --   --  6.5   BILIRUBIN " TOTAL mg/dL  --   --  0.3   ALK PHOS U/L  --   --  93   ALT U/L  --   --  11   AST U/L  --   --  20   GLUCOSE mg/dL 195*   < > 144*    < > = values in this interval not displayed.      Results from last 7 days   Lab Units 08/31/24  0413   MAGNESIUM mg/dL 1.61      Results from last 7 days   Lab Units 09/03/24  0431 09/02/24  0422 08/31/24  0413   WBC AUTO x10*3/uL 10.0 7.6 7.5   HEMOGLOBIN g/dL 9.0* 8.9* 9.0*   HEMATOCRIT % 28.7* 29.2* 28.5*   PLATELETS AUTO x10*3/uL 403 350 291        Assessment & Plan:     Patient is 89 y.o. female HFrEF, A-fib, HTN who is admitted to hospital for left heel wound. Nephrology consulted in view of ESRD.     ESRD  -HD Monday Wednesday Friday, [facility]  -Access is right IJ TDC  -Patient has been refusing referral for AV fistula     Anemia of ESRD  -Hemoglobin has been in the 9 range recently. ,  On IV iron as outpatient as well.  -Hemoglobin was 11.8 yesterday but this was directly after dialysis, hemoglobin 9   -She is on NOAH as outpatient with dialysis and this is titrated to a goal of hemoglobin 10-11.  -NOAH is not as effective in setting of infection     CKD-MBD  -Patient does not require binders  -Phos and calcium are at goal.     Left calcaneal ulcer  -Antibiotics per primary service  -Renal dose vancomycin and Zosyn now on doxycycline   -No debridement needed per podiatry     Hypoalbuminemia  -Infection related  -Continue protein supplements     Recommendations:      -HD tomorrow  -cont. protein shake with meals  -Antibiotic as per primary service       Please message me through [employer] chat with any questions or concerns.     Mally Lucas DO  9/3/2024  12:39 PM     [facility]    [address] [place] [address], [address]   [place] [address]  Office: [phone]

## 2024-09-04 ENCOUNTER — APPOINTMENT (OUTPATIENT)
Dept: DIALYSIS | Facility: HOSPITAL | Age: 89
End: 2024-09-04
Payer: COMMERCIAL

## 2024-09-04 VITALS
RESPIRATION RATE: 17 BRPM | SYSTOLIC BLOOD PRESSURE: 117 MMHG | TEMPERATURE: 97.6 F | WEIGHT: 158.73 LBS | HEART RATE: 97 BPM | OXYGEN SATURATION: 99 % | DIASTOLIC BLOOD PRESSURE: 68 MMHG | HEIGHT: 55 IN | BODY MASS INDEX: 36.73 KG/M2

## 2024-09-04 LAB
ANION GAP SERPL CALC-SCNC: 11 MMOL/L (ref 10–20)
BACTERIA BLD CULT: NORMAL
BACTERIA BLD CULT: NORMAL
BUN SERPL-MCNC: 26 MG/DL (ref 6–23)
CALCIUM SERPL-MCNC: 7.4 MG/DL (ref 8.6–10.3)
CHLORIDE SERPL-SCNC: 103 MMOL/L (ref 98–107)
CO2 SERPL-SCNC: 27 MMOL/L (ref 21–32)
CREAT SERPL-MCNC: 3.37 MG/DL (ref 0.5–1.05)
EGFRCR SERPLBLD CKD-EPI 2021: 13 ML/MIN/1.73M*2
ERYTHROCYTE [DISTWIDTH] IN BLOOD BY AUTOMATED COUNT: 17.8 % (ref 11.5–14.5)
GLUCOSE BLD MANUAL STRIP-MCNC: 140 MG/DL (ref 74–99)
GLUCOSE BLD MANUAL STRIP-MCNC: 302 MG/DL (ref 74–99)
GLUCOSE SERPL-MCNC: 149 MG/DL (ref 74–99)
HBV SURFACE AG SERPL QL IA: NONREACTIVE
HCT VFR BLD AUTO: 26.2 % (ref 36–46)
HGB BLD-MCNC: 8.1 G/DL (ref 12–16)
MCH RBC QN AUTO: 28.7 PG (ref 26–34)
MCHC RBC AUTO-ENTMCNC: 30.9 G/DL (ref 32–36)
MCV RBC AUTO: 93 FL (ref 80–100)
NRBC BLD-RTO: 0 /100 WBCS (ref 0–0)
PLATELET # BLD AUTO: 343 X10*3/UL (ref 150–450)
POTASSIUM SERPL-SCNC: 4.1 MMOL/L (ref 3.5–5.3)
RBC # BLD AUTO: 2.82 X10*6/UL (ref 4–5.2)
SODIUM SERPL-SCNC: 137 MMOL/L (ref 136–145)
VANCOMYCIN SERPL-MCNC: 14.7 UG/ML (ref 5–20)
WBC # BLD AUTO: 9.6 X10*3/UL (ref 4.4–11.3)

## 2024-09-04 PROCEDURE — 2500000004 HC RX 250 GENERAL PHARMACY W/ HCPCS (ALT 636 FOR OP/ED): Performed by: INTERNAL MEDICINE

## 2024-09-04 PROCEDURE — 2500000004 HC RX 250 GENERAL PHARMACY W/ HCPCS (ALT 636 FOR OP/ED): Performed by: PHARMACIST

## 2024-09-04 PROCEDURE — 99239 HOSP IP/OBS DSCHRG MGMT >30: CPT | Performed by: INTERNAL MEDICINE

## 2024-09-04 PROCEDURE — 36415 COLL VENOUS BLD VENIPUNCTURE: CPT | Performed by: INTERNAL MEDICINE

## 2024-09-04 PROCEDURE — 2500000001 HC RX 250 WO HCPCS SELF ADMINISTERED DRUGS (ALT 637 FOR MEDICARE OP): Performed by: STUDENT IN AN ORGANIZED HEALTH CARE EDUCATION/TRAINING PROGRAM

## 2024-09-04 PROCEDURE — 8010000001 HC DIALYSIS - HEMODIALYSIS PER DAY

## 2024-09-04 PROCEDURE — 80202 ASSAY OF VANCOMYCIN: CPT

## 2024-09-04 PROCEDURE — 2500000002 HC RX 250 W HCPCS SELF ADMINISTERED DRUGS (ALT 637 FOR MEDICARE OP, ALT 636 FOR OP/ED): Performed by: INTERNAL MEDICINE

## 2024-09-04 PROCEDURE — 2500000001 HC RX 250 WO HCPCS SELF ADMINISTERED DRUGS (ALT 637 FOR MEDICARE OP): Performed by: INTERNAL MEDICINE

## 2024-09-04 PROCEDURE — 80048 BASIC METABOLIC PNL TOTAL CA: CPT | Performed by: INTERNAL MEDICINE

## 2024-09-04 PROCEDURE — 82947 ASSAY GLUCOSE BLOOD QUANT: CPT

## 2024-09-04 PROCEDURE — 85027 COMPLETE CBC AUTOMATED: CPT | Performed by: INTERNAL MEDICINE

## 2024-09-04 RX ORDER — DOXYCYCLINE 100 MG/1
100 CAPSULE ORAL 2 TIMES DAILY
Qty: 20 CAPSULE | Refills: 0 | Status: SHIPPED | OUTPATIENT
Start: 2024-09-04 | End: 2024-09-14

## 2024-09-04 RX ORDER — TRAMADOL HYDROCHLORIDE 50 MG/1
25 TABLET ORAL EVERY 12 HOURS PRN
Qty: 30 TABLET | Refills: 5 | Status: SHIPPED | OUTPATIENT
Start: 2024-09-04

## 2024-09-04 RX ORDER — VANCOMYCIN HYDROCHLORIDE 1 G/200ML
1000 INJECTION, SOLUTION INTRAVENOUS
Status: DISCONTINUED | OUTPATIENT
Start: 2024-09-04 | End: 2024-09-04 | Stop reason: HOSPADM

## 2024-09-04 ASSESSMENT — COGNITIVE AND FUNCTIONAL STATUS - GENERAL
TURNING FROM BACK TO SIDE WHILE IN FLAT BAD: TOTAL
MOVING TO AND FROM BED TO CHAIR: TOTAL
HELP NEEDED FOR BATHING: A LOT
DAILY ACTIVITIY SCORE: 13
TOILETING: TOTAL
MOVING FROM LYING ON BACK TO SITTING ON SIDE OF FLAT BED WITH BEDRAILS: A LOT
STANDING UP FROM CHAIR USING ARMS: TOTAL
DRESSING REGULAR UPPER BODY CLOTHING: A LOT
EATING MEALS: A LITTLE
WALKING IN HOSPITAL ROOM: TOTAL
PERSONAL GROOMING: A LITTLE
CLIMB 3 TO 5 STEPS WITH RAILING: TOTAL
MOBILITY SCORE: 7
DRESSING REGULAR LOWER BODY CLOTHING: A LOT

## 2024-09-04 ASSESSMENT — PAIN SCALES - GENERAL
PAINLEVEL_OUTOF10: 8
PAINLEVEL_OUTOF10: 4

## 2024-09-04 ASSESSMENT — PAIN DESCRIPTION - ORIENTATION: ORIENTATION: LOWER

## 2024-09-04 ASSESSMENT — PAIN DESCRIPTION - LOCATION: LOCATION: BACK

## 2024-09-04 ASSESSMENT — PAIN - FUNCTIONAL ASSESSMENT
PAIN_FUNCTIONAL_ASSESSMENT: 0-10
PAIN_FUNCTIONAL_ASSESSMENT: 0-10

## 2024-09-04 ASSESSMENT — PAIN DESCRIPTION - DESCRIPTORS: DESCRIPTORS: ACHING

## 2024-09-04 NOTE — DISCHARGE SUMMARY
Discharge Diagnosis  * Cellulitis of heel, right  Assessment & Plan  Does not appear to have osteomyelitis per podiatry.  Growing MRSA per culture.  Anticipate switching to doxycycline for discharge.  Continues on vancomycin for now, will discontinue Zosyn.  Continue wound care on discharge.  9/3: Continue current treatment.  Switch to doxycycline on discharge.  9/4: Awaiting authorization.  Continue wound care.  Will follow-up with wound care as outpatient.    ESRD on hemodialysis (Multi)  Assessment & Plan  Dialysis MWF.  Appreciate nephrology input.  9/3: Facility requires hepatitis B surface antigen prior to discharge.  9/4: Creatinine elevated at 3.37, scheduled for HD today.    Atrial fibrillation (Multi)  Assessment & Plan  Currently on amiodarone and Cardizem.  Currently on apixaban.  Does have a history of retroperitoneal bleed as a complication.  Rate controlled at present.    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    Renal artery stenosis (CMS-HCC)  Assessment & Plan  Continue current medications.    Type 2 diabetes mellitus with chronic kidney disease on chronic dialysis, with long-term current use of insulin (Multi)  Assessment & Plan  Continue insulin sliding scale.          Issues Requiring Follow-Up  Follow-up with wound care.  Continue hemodialysis, follow-up with nephrology through hemodialysis.    Discharge Meds     Medication List      START taking these medications     doxycycline 100 mg capsule; Commonly known as: Vibramycin; Take 1   capsule (100 mg) by mouth 2 times a day for 10 days. Take with at least 8   ounces (large glass) of water, do not lie down for 30 minutes after     CHANGE how you take these medications     traMADol 50 mg tablet; Commonly known as: Ultram; Take 0.5 tablets (25   mg) by mouth every 12 hours if needed for  severe pain (7 - 10).; What   changed: how much to take     CONTINUE taking these medications     * acetaminophen 325 mg tablet; Commonly known as: Tylenol   * acetaminophen 325 mg tablet; Commonly known as: Tylenol   albuterol 90 mcg/actuation inhaler   aluminum hydroxide 320 mg/5 mL suspension; Commonly known as: Alternagel   amino acids-protein hydr-fiber 15 gram- 100 kcal/30 mL liquid in packet   amiodarone 200 mg tablet; Commonly known as: Pacerone   apixaban 2.5 mg tablet; Commonly known as: Eliquis   atorvastatin 80 mg tablet; Commonly known as: Lipitor   B complex-vitamin C-folic acid 1 mg capsule; Commonly known as:   Nephrocaps   * benzonatate 200 mg capsule; Commonly known as: Tessalon   * benzonatate 100 mg capsule; Commonly known as: Tessalon   busPIRone 10 mg tablet; Commonly known as: Buspar   clopidogrel 75 mg tablet; Commonly known as: Plavix   diclofenac sodium 1 % gel; Commonly known as: Voltaren   dilTIAZem  mg 24 hr capsule; Commonly known as: Tiazac   diphenhydramine-zinc acetate cream   * Dulcolax (bisacodyl) 10 mg suppository; Generic drug: bisacodyl   * bisacodyl 5 mg EC tablet; Commonly known as: Dulcolax   glucagon 1 mg injection; Commonly known as: Glucagen   glucose 40 % gel oral gel; Commonly known as: Glutose   guaiFENesin 600 mg 12 hr tablet; Commonly known as: Mucinex   insulin glargine 100 unit/mL injection; Commonly known as: Lantus;   Inject 20 Units under the skin once daily at bedtime. Take as directed per   insulin instructions.   insulin lispro 100 unit/mL injection; Commonly known as: HumaLOG; Inject   0-0.15 mL (0-15 Units) under the skin 4 times a day before meals. Take as   directed per insulin instructions.   isosorbide dinitrate 10 mg tablet; Commonly known as: Isordil   lidocaine 4 % cream; Commonly known as: LMX   melatonin 3 mg tablet   metoprolol succinate  mg 24 hr tablet; Commonly known as:   Toprol-XL   mirtazapine 7.5 mg tablet; Commonly known as:  Alvina   OneTouch Ultra Test strip; Generic drug: blood sugar diagnostic; 1 strip   by Does not apply route 3 times a day.   pantoprazole 40 mg EC tablet; Commonly known as: ProtoNix   PSYLLIUM HUSK (ASPARTAME) ORAL   sennosides-docusate sodium 8.6-50 mg tablet; Commonly known as:   Pascale-Colace   sevelamer carbonate 800 mg tablet; Commonly known as: Renvela   simethicone 80 mg chewable tablet; Commonly known as: Mylicon   zinc oxide 20 % ointment  * This list has 6 medication(s) that are the same as other medications   prescribed for you. Read the directions carefully, and ask your doctor or   other care provider to review them with you.     STOP taking these medications     docusate sodium 100 mg capsule; Commonly known as: Colace       Test Results Pending At Discharge  Pending Labs       No current pending labs.            Hospital Course   Melody Martin is a 89 y.o.  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  Patient is from an [facility] in [place].  She is quite knowledgeable about her history just does not know slight specific details such as medication dosages and to that matter.  She states that she was told that she had a worsening wound of her left heel which per documentation by the nursing facility nurse practitioner the wound was healing well and a x-ray was performed and showed concerns for osteomyelitis so she was sent into the ED for imaging.  She states that she does not really have much sensation in her left lower extremity foot and her foot all the way up to her knee may be slightly above the knee but will occasionally get sharp stabbing/lightening like sensation going from her  lower back down her leg but she does not really say where it stops.  She states that overall she feels well but did express concerns that nursing staff at the facility may not be changing the dressing on her foot as often as they should.  She denies any recent sick contacts, chemical/environmental exposures, changes in dietary habits or any recent traumatic events/falls other noted above.  She denies any fevers, chills, night sweats, vision changes, auditory changes, change in taste/smell, loss of bowel/bladder control, loss consciousness, dizziness, vertigo, syncope, seizure-like activity, chest pain, palpitations, shortness of breath, coughing, wheezing, congestion, hemoptysis, hematemesis, abdominal pain, nausea, vomiting, diarrhea, constipation, dysuria, hematuria, dyschezia, hematochezia or any lateralizing motor/sensory deficits other than noted above.     ED Course (Summary):   Vitals on presentation: Temperature of 36.7 °C, heart rate 72, respirations of 16, blood pressure 132/65, pulse ox of 99% on room air  Patient was initiated on IV vancomycin/Zosyn due to concerns of osteomyelitis of the left heel  Per chart review an x-ray was performed on 08/22/2024 and noted concerns for possible osteomyelitis.  White blood cell count of 10.3  Hemoglobin/hematocrit 11.8/37.2  Baseline hemoglobin seems to be quite variable.  The only labs we have available seem to be during times of hospitalizations where she has been as low as 6 requiring transfusions.  She does seem to possibly bleed close to her baseline may be possibly hemoconcentrated?  Glucose 144  Sodium 136  Potassium 3.7  BUN/creatinine of 11/1.47  Lactate of 2.6 with a repeat of 2.1  CRP of 3.66  ESR of 79  Blood cultures x 2 pending  Wound culture pending  XR left foot: Soft tissue leg regularity of the left calcaneus concerning for underlying osteomyelitis and calcific tendinopathy and osteopenia with extensive vascular calcifications.  CT foot without  contrast noted acute osteomyelitis involving the posterior calcaneal tuberosity with overlying soft tissue ulceration.  Podiatry was made aware of the patient's per my discussion with the ED practitioner.     8/31/2024: no acute events overnight. Patient remained Zosyn and vanco. Nephrology followed for ESRD on HD MWF. Podiatry consult pending.     9/1/2024: no acute events overnight. Patient had wound care with dressing change for bilateral buttock and sacral wound and left heel wound. IV Zosyn and Vanco continued. Podiatry consult still pending.      Addendum: update from Dr. Veloz, left foot wound is doing well, There was a soft tissue infection that is resolving. The wound bed is very healthy. No bone exposed. no indication for surgical intervention, doubtful for osteomyelitis. She may follow with Dr. Veloz as outpatient.     9/2: Patient seen.  Appreciate input from podiatry.  Reviewed lab results.  Anticipate discharge to facility on doxycycline.  Check with [facility], will need to obtain authorization prior to discharge.  Will begin authorization tomorrow (today is a holiday).  Continue vancomycin.  Will discontinue Zosyn.  Reassess in AM.     9/3: Patient seen.  No new complaints.  Will need to obtain hepatitis B surface antigen for dialysis purposes.  Discharge planning in progress.  Return to facility when ready.  Continue treating patient cellulitis.  No evidence of osteomyelitis per podiatry.     9/4: Still awaiting authorization for discharge back to skilled nursing facility.  Lab work looks benign.  Creatinine rising but due for hemodialysis today.  Continue antibiotic therapy.  Plan to switch to oral antibiotics for discharge.  Will follow-up with wound care as outpatient.    This discharge took greater than 35 minutes to arrange.    Pertinent Physical Exam At Time of Discharge  Physical Exam  Constitutional:       General: She is not in acute distress.     Appearance: Normal appearance.   HENT:      Head:  Normocephalic.      Mouth/Throat:      Mouth: Mucous membranes are moist.      Pharynx: Oropharynx is clear.   Eyes:      Pupils: Pupils are equal, round, and reactive to light.   Cardiovascular:      Rate and Rhythm: Normal rate and regular rhythm.      Heart sounds: Normal heart sounds. No murmur heard.     No gallop.   Pulmonary:      Effort: Pulmonary effort is normal.      Breath sounds: Normal breath sounds.   Abdominal:      General: Bowel sounds are normal. There is no distension.      Palpations: Abdomen is soft.      Tenderness: There is no abdominal tenderness.   Musculoskeletal:         General: No swelling. Normal range of motion.      Cervical back: Neck supple. No rigidity.   Skin:     General: Skin is warm and dry.      Comments: Left foot wound / heel ulcer dressed. Buttock and sacral ulcer no drain   Neurological:      General: No focal deficit present.      Mental Status: She is alert.      Cranial Nerves: No cranial nerve deficit.      Sensory: No sensory deficit.   Psychiatric:         Mood and Affect: Mood normal.         Behavior: Behavior normal.     Outpatient Follow-Up  Future Appointments   Date Time Provider Department Center   9/4/2024  1:00 PM [facility] HEMODIALYSIS CHAIR 1 [facility] None         Chase Iverson MD

## 2024-09-04 NOTE — NURSING NOTE
Patient is being discharged to the [facility] via ambulance service. Family has been notified. Report called to receiving RN at facility. No s/sx of distress noted.

## 2024-09-04 NOTE — PROGRESS NOTES
Melody Martin is a 89 y.o. female on day 5 of admission presenting with Cellulitis of heel, right.      Subjective   Melody Martin is a 89 y.o.  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  Patient is from [facility].  She is quite knowledgeable about her history just does not know slight specific details such as medication dosages and to that matter.  She states that she was told that she had a worsening wound of her left heel which per documentation by the nursing facility nurse practitioner the wound was healing well and a x-ray was performed and showed concerns for osteomyelitis so she was sent into the ED for imaging.  She states that she does not really have much sensation in her left lower extremity foot and her foot all the way up to her knee may be slightly above the knee but will occasionally get sharp stabbing/lightening like sensation going from her lower back down her leg but she does not really say where it stops.  She states that overall she feels well but did express concerns that nursing staff at the facility may not be changing the dressing on her foot as often as they should.  She denies any recent sick contacts, chemical/environmental exposures, changes in dietary habits or any recent traumatic events/falls other noted above.  She denies any fevers, chills, night sweats, vision changes, auditory changes, change in taste/smell, loss of bowel/bladder control, loss consciousness, dizziness, vertigo, syncope, seizure-like activity, chest pain, palpitations, shortness of breath, coughing, wheezing, congestion, hemoptysis, hematemesis, abdominal pain, nausea, vomiting,  diarrhea, constipation, dysuria, hematuria, dyschezia, hematochezia or any lateralizing motor/sensory deficits other than noted above.     ED Course (Summary):   Vitals on presentation: Temperature of 36.7 °C, heart rate 72, respirations of 16, blood pressure 132/65, pulse ox of 99% on room air  Patient was initiated on IV vancomycin/Zosyn due to concerns of osteomyelitis of the left heel  Per chart review an x-ray was performed on 08/22/2024 and noted concerns for possible osteomyelitis.  White blood cell count of 10.3  Hemoglobin/hematocrit 11.8/37.2  Baseline hemoglobin seems to be quite variable.  The only labs we have available seem to be during times of hospitalizations where she has been as low as 6 requiring transfusions.  She does seem to possibly bleed close to her baseline may be possibly hemoconcentrated?  Glucose 144  Sodium 136  Potassium 3.7  BUN/creatinine of 11/1.47  Lactate of 2.6 with a repeat of 2.1  CRP of 3.66  ESR of 79  Blood cultures x 2 pending  Wound culture pending  XR left foot: Soft tissue leg regularity of the left calcaneus concerning for underlying osteomyelitis and calcific tendinopathy and osteopenia with extensive vascular calcifications.  CT foot without contrast noted acute osteomyelitis involving the posterior calcaneal tuberosity with overlying soft tissue ulceration.  Podiatry was made aware of the patient's per my discussion with the ED practitioner.     8/31/2024: no acute events overnight. Patient remained Zosyn and vanco. Nephrology followed for ESRD on HD MWF. Podiatry consult pending.    9/1/2024: no acute events overnight. Patient had wound care with dressing change for bilateral buttock and sacral wound and left heel wound. IV Zosyn and Vanco continued. Podiatry consult still pending.     Addendum: update from Dr. Veloz, left foot wound is doing well, There was a soft tissue infection that is resolving. The wound bed is very healthy. No bone exposed. no indication for  surgical intervention, doubtful for osteomyelitis. She may follow with Dr. Veloz as outpatient.    9/2: Patient seen.  Appreciate input from podiatry.  Reviewed lab results.  Anticipate discharge to facility on doxycycline.  Check with [facility], will need to obtain authorization prior to discharge.  Will begin authorization tomorrow (today is a holiday).  Continue vancomycin.  Will discontinue Zosyn.  Reassess in AM.    9/3: Patient seen.  No new complaints.  Will need to obtain hepatitis B surface antigen for dialysis purposes.  Discharge planning in progress.  Return to facility when ready.  Continue treating patient cellulitis.  No evidence of osteomyelitis per podiatry.    9/4: Still awaiting authorization for discharge back to skilled nursing facility.  Lab work looks benign.  Creatinine rising but due for hemodialysis today.  Continue antibiotic therapy.  Plan to switch to oral antibiotics for discharge.  Will follow-up with wound care as outpatient.       Objective     Last Recorded Vitals  /68 (BP Location: Left arm, Patient Position: Lying)   Pulse 86   Temp 36.4 °C (97.5 °F) (Temporal)   Resp 17   Wt 72 kg (158 lb 11.7 oz)   SpO2 98%   Intake/Output last 3 Shifts:    Intake/Output Summary (Last 24 hours) at 9/4/2024 1030  Last data filed at 9/4/2024 0945  Gross per 24 hour   Intake 1007 ml   Output 0 ml   Net 1007 ml       Admission Weight  Weight: 68 kg (150 lb) (08/30/24 1655)    Daily Weight  08/30/24 : 72 kg (158 lb 11.7 oz)    Image Results  CT foot left wo IV contrast  Narrative: Interpreted By:  Rolf Baeza,   STUDY:  CT FOOT LEFT WO IV CONTRAST;  8/30/2024 7:02 pm      INDICATION:  Signs/Symptoms:concern for osteo left heel.      COMPARISON:  None.      ACCESSION NUMBER(S):  [id number]      ORDERING CLINICIAN:  KORINA SMITH      TECHNIQUE:  CT imaging of the left foot was obtained without contrast. Coronal  and sagittal reformatted images were performed. 3D reconstructed  images were  not performed at time of dictation therefore not used  during interpretation.      FINDINGS:  There is a soft tissue ulceration at the heel. This extends to the  bone surface. There is ill-defined erosive change of the posterior  calcaneal tuberosity and sclerosis consistent suspicious for  osteomyelitis.      The bones are severely osteopenic. This limits assessment for  nondisplaced fractures though no displaced fracture is identified.      Plantar calcaneal spur. Enthesopathic spurring at the Achilles tendon  insertion.      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      Impression: Acute osteomyelitis at involving the posterior calcaneal tuberosity  with overlying soft tissue ulceration.                  MACRO:  None.      Signed by: Rolf Baeza 8/30/2024 7:41 PM  Dictation workstation:   [id number]  XR foot left 1-2 views  Narrative: Interpreted By:  Amber Denton,   STUDY:  XR FOOT LEFT 1-2 VIEWS;  8/30/2024 6:11 pm      INDICATION:  Signs/Symptoms:Concern for osteo of left heel.      COMPARISON:  None.      ACCESSION NUMBER(S):  [id number]      ORDERING CLINICIAN:  KORINA SMITH      FINDINGS:  AP and lateral views of the left foot were obtained.      There is diffuse osteopenia. No acute fracture or dislocation is  identified. There is calcaneal enthesopathy. There is cortical  irregularity at the posterior aspect of the calcaneus. There are  calcifications at the insertion of the Lyndon's tendon. Degenerative  changes are noted at the 1st metatarsophalangeal joint. Extensive  vascular calcifications are present. There is soft tissue  irregularity overlying the calcaneus.      Impression: 1. Soft tissue irregularity overlying the left calcaneus, suggestive  of an wound. Cortical irregularity at the posterior aspect of the  calcaneus, underlying osteomyelitis cannot be excluded. MRI can be  obtained for further evaluation.  2. Calcifications at the insertion of the  Katina's tendon,  suggestive of calcific tendinopathy.  3. Osteopenia.  4. Extensive vascular calcifications.              MACRO:  None.      Signed by: Amber Denton 8/30/2024 6:54 PM  Dictation workstation:   [id number]      Physical Exam  Constitutional:       General: She is not in acute distress.     Appearance: Normal appearance.   HENT:      Head: Normocephalic.      Mouth/Throat:      Mouth: Mucous membranes are moist.      Pharynx: Oropharynx is clear.   Eyes:      Pupils: Pupils are equal, round, and reactive to light.   Cardiovascular:      Rate and Rhythm: Normal rate and regular rhythm.      Heart sounds: Normal heart sounds. No murmur heard.     No gallop.   Pulmonary:      Effort: Pulmonary effort is normal.      Breath sounds: Normal breath sounds.   Abdominal:      General: Bowel sounds are normal. There is no distension.      Palpations: Abdomen is soft.      Tenderness: There is no abdominal tenderness.   Musculoskeletal:         General: No swelling. Normal range of motion.      Cervical back: Neck supple. No rigidity.   Skin:     General: Skin is warm and dry.      Comments: Left foot wound / heel ulcer dressed. Buttock and sacral ulcer no drain   Neurological:      General: No focal deficit present.      Mental Status: She is alert.      Cranial Nerves: No cranial nerve deficit.      Sensory: No sensory deficit.   Psychiatric:         Mood and Affect: Mood normal.         Behavior: Behavior normal.         Relevant Results             Results for orders placed or performed during the hospital encounter of 08/30/24 (from the past 96 hour(s))   Lactate   Result Value Ref Range    Lactate 3.4 (H) 0.4 - 2.0 mmol/L   POCT GLUCOSE   Result Value Ref Range    POCT Glucose 262 (H) 74 - 99 mg/dL   POCT GLUCOSE   Result Value Ref Range    POCT Glucose 259 (H) 74 - 99 mg/dL   POCT GLUCOSE   Result Value Ref Range    POCT Glucose 190 (H) 74 - 99 mg/dL   POCT GLUCOSE   Result Value Ref Range    POCT  Glucose 168 (H) 74 - 99 mg/dL   POCT GLUCOSE   Result Value Ref Range    POCT Glucose 283 (H) 74 - 99 mg/dL   POCT GLUCOSE   Result Value Ref Range    POCT Glucose 205 (H) 74 - 99 mg/dL   POCT GLUCOSE   Result Value Ref Range    POCT Glucose 149 (H) 74 - 99 mg/dL   Vancomycin   Result Value Ref Range    Vancomycin 15.2 5.0 - 20.0 ug/mL   CBC   Result Value Ref Range    WBC 7.6 4.4 - 11.3 x10*3/uL    nRBC 0.0 0.0 - 0.0 /100 WBCs    RBC 3.16 (L) 4.00 - 5.20 x10*6/uL    Hemoglobin 8.9 (L) 12.0 - 16.0 g/dL    Hematocrit 29.2 (L) 36.0 - 46.0 %    MCV 92 80 - 100 fL    MCH 28.2 26.0 - 34.0 pg    MCHC 30.5 (L) 32.0 - 36.0 g/dL    RDW 17.4 (H) 11.5 - 14.5 %    Platelets 350 150 - 450 x10*3/uL   POCT GLUCOSE   Result Value Ref Range    POCT Glucose 144 (H) 74 - 99 mg/dL   POCT GLUCOSE   Result Value Ref Range    POCT Glucose 157 (H) 74 - 99 mg/dL   POCT GLUCOSE   Result Value Ref Range    POCT Glucose 245 (H) 74 - 99 mg/dL   POCT GLUCOSE   Result Value Ref Range    POCT Glucose 239 (H) 74 - 99 mg/dL   POCT GLUCOSE   Result Value Ref Range    POCT Glucose 157 (H) 74 - 99 mg/dL   Vancomycin, Trough   Result Value Ref Range    Vancomycin, Trough 16.9 5.0 - 20.0 ug/mL   CBC   Result Value Ref Range    WBC 10.0 4.4 - 11.3 x10*3/uL    nRBC 0.0 0.0 - 0.0 /100 WBCs    RBC 3.13 (L) 4.00 - 5.20 x10*6/uL    Hemoglobin 9.0 (L) 12.0 - 16.0 g/dL    Hematocrit 28.7 (L) 36.0 - 46.0 %    MCV 92 80 - 100 fL    MCH 28.8 26.0 - 34.0 pg    MCHC 31.4 (L) 32.0 - 36.0 g/dL    RDW 17.8 (H) 11.5 - 14.5 %    Platelets 403 150 - 450 x10*3/uL   Basic Metabolic Panel   Result Value Ref Range    Glucose 195 (H) 74 - 99 mg/dL    Sodium 138 136 - 145 mmol/L    Potassium 3.6 3.5 - 5.3 mmol/L    Chloride 103 98 - 107 mmol/L    Bicarbonate 29 21 - 32 mmol/L    Anion Gap 10 10 - 20 mmol/L    Urea Nitrogen 16 6 - 23 mg/dL    Creatinine 2.27 (H) 0.50 - 1.05 mg/dL    eGFR 20 (L) >60 mL/min/1.73m*2    Calcium 7.2 (L) 8.6 - 10.3 mg/dL   POCT GLUCOSE   Result Value  Ref Range    POCT Glucose 247 (H) 74 - 99 mg/dL   POCT GLUCOSE   Result Value Ref Range    POCT Glucose 242 (H) 74 - 99 mg/dL   Hepatitis B surface antigen   Result Value Ref Range    Hepatitis B Surface AG Nonreactive Nonreactive   POCT GLUCOSE   Result Value Ref Range    POCT Glucose 266 (H) 74 - 99 mg/dL   POCT GLUCOSE   Result Value Ref Range    POCT Glucose 158 (H) 74 - 99 mg/dL   Vancomycin   Result Value Ref Range    Vancomycin 14.7 5.0 - 20.0 ug/mL   CBC   Result Value Ref Range    WBC 9.6 4.4 - 11.3 x10*3/uL    nRBC 0.0 0.0 - 0.0 /100 WBCs    RBC 2.82 (L) 4.00 - 5.20 x10*6/uL    Hemoglobin 8.1 (L) 12.0 - 16.0 g/dL    Hematocrit 26.2 (L) 36.0 - 46.0 %    MCV 93 80 - 100 fL    MCH 28.7 26.0 - 34.0 pg    MCHC 30.9 (L) 32.0 - 36.0 g/dL    RDW 17.8 (H) 11.5 - 14.5 %    Platelets 343 150 - 450 x10*3/uL   Basic Metabolic Panel   Result Value Ref Range    Glucose 149 (H) 74 - 99 mg/dL    Sodium 137 136 - 145 mmol/L    Potassium 4.1 3.5 - 5.3 mmol/L    Chloride 103 98 - 107 mmol/L    Bicarbonate 27 21 - 32 mmol/L    Anion Gap 11 10 - 20 mmol/L    Urea Nitrogen 26 (H) 6 - 23 mg/dL    Creatinine 3.37 (H) 0.50 - 1.05 mg/dL    eGFR 13 (L) >60 mL/min/1.73m*2    Calcium 7.4 (L) 8.6 - 10.3 mg/dL   POCT GLUCOSE   Result Value Ref Range    POCT Glucose 140 (H) 74 - 99 mg/dL     Scheduled medications  amiodarone, 200 mg, oral, q24h  apixaban, 2.5 mg, oral, BID  atorvastatin, 80 mg, oral, Nightly  busPIRone, 10 mg, oral, BID  clopidogrel, 75 mg, oral, Daily  dilTIAZem CD, 240 mg, oral, Nightly  [Held by provider] docusate sodium, 100 mg, oral, BID  heparin, 2,000 Units, intra-catheter, After Dialysis  heparin, 2,000 Units, intra-catheter, After Dialysis  insulin lispro, 0-15 Units, subcutaneous, With meals & nightly  isosorbide dinitrate, 10 mg, oral, TID  mirtazapine, 7.5 mg, oral, Nightly  pantoprazole, 40 mg, oral, Daily before breakfast  piperacillin-tazobactam, 2.25 g, intravenous, q8h      Continuous medications     PRN  medications  PRN medications: acetaminophen, albuterol, alum-mag hydroxide-simeth, benzonatate, bisacodyl, bisacodyl, HYDROcodone-acetaminophen, HYDROmorphone, ondansetron, polyethylene glycol, simethicone, vancomycin    Assessment/Plan        * Cellulitis of heel, right  Assessment & Plan  Does not appear to have osteomyelitis per podiatry.  Growing MRSA per culture.  Anticipate switching to doxycycline for discharge.  Continues on vancomycin for now, will discontinue Zosyn.  Continue wound care on discharge.  9/3: Continue current treatment.  Switch to doxycycline on discharge.  9/4: Awaiting authorization.  Continue wound care.  Will follow-up with wound care as outpatient.    ESRD on hemodialysis (Multi)  Assessment & Plan  Dialysis MWF.  Appreciate nephrology input.  9/3: Facility requires hepatitis B surface antigen prior to discharge.  9/4: Creatinine elevated at 3.37, scheduled for HD today.    Atrial fibrillation (Multi)  Assessment & Plan  Currently on amiodarone and Cardizem.  Currently on apixaban.  Does have a history of retroperitoneal bleed as a complication.  Rate controlled at present.    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    Renal artery stenosis (CMS-HCC)  Assessment & Plan  Continue current medications.    Type 2 diabetes mellitus with chronic kidney disease on chronic dialysis, with long-term current use of insulin (Multi)  Assessment & Plan  Continue insulin sliding scale.                      Chase Iverson MD

## 2024-09-04 NOTE — CONSULTS
Attempted to see patient for wound consult, patient just got her breakfast and requested she eat before they take her to dialysis. Was able to assess left heel but will return to complete exam/consult after dialysis when able.     Dressing care orders in place.

## 2024-09-04 NOTE — CARE PLAN
The patient's goals for the shift include      The clinical goals for the shift include decreased BMs        Problem: Skin  Goal: Decreased wound size/increased tissue granulation at next dressing change  Outcome: Progressing  Goal: Participates in plan/prevention/treatment measures  Outcome: Progressing  Goal: Prevent/manage excess moisture  Outcome: Progressing  Flowsheets (Taken 9/4/2024 0659)  Prevent/manage excess moisture:   Cleanse incontinence/protect with barrier cream   Monitor for/manage infection if present  Goal: Prevent/minimize sheer/friction injuries  Outcome: Progressing  Goal: Promote/optimize nutrition  Outcome: Progressing  Goal: Promote skin healing  Outcome: Progressing     Problem: Fall/Injury  Goal: Not fall by end of shift  Outcome: Progressing  Goal: Be free from injury by end of the shift  Outcome: Progressing  Goal: Verbalize understanding of personal risk factors for fall in the hospital  Outcome: Progressing  Goal: Verbalize understanding of risk factor reduction measures to prevent injury from fall in the home  Outcome: Progressing  Goal: Use assistive devices by end of the shift  Outcome: Progressing  Goal: Pace activities to prevent fatigue by end of the shift  Outcome: Progressing     Problem: Nutrition  Goal: Oral intake greater 75%  Outcome: Progressing  Goal: Consume prescribed supplement  Outcome: Progressing  Goal: Promote healing  Outcome: Progressing     Problem: Infection prevention/bleeding  Goal: Infection s/sx managed  Outcome: Progressing  Goal: No further progression of infection  Outcome: Progressing     Problem: Pain - Adult  Goal: Verbalizes/displays adequate comfort level or baseline comfort level  Outcome: Progressing     Problem: Safety - Adult  Goal: Free from fall injury  Outcome: Progressing     Problem: Discharge Planning  Goal: Discharge to home or other facility with appropriate resources  Outcome: Progressing     Problem: Chronic Conditions and  Co-morbidities  Goal: Patient's chronic conditions and co-morbidity symptoms are monitored and maintained or improved  Outcome: Progressing     Problem: Pain  Goal: Takes deep breaths with improved pain control throughout the shift  Outcome: Progressing  Goal: Turns in bed with improved pain control throughout the shift  Outcome: Progressing  Goal: Walks with improved pain control throughout the shift  Outcome: Progressing  Goal: Performs ADL's with improved pain control throughout shift  Outcome: Progressing  Goal: Participates in PT with improved pain control throughout the shift  Outcome: Progressing  Goal: Free from opioid side effects throughout the shift  Outcome: Progressing  Goal: Free from acute confusion related to pain meds throughout the shift  Outcome: Progressing     Problem: Diabetes  Goal: Achieve decreasing blood glucose levels by end of shift  Outcome: Progressing  Goal: Increase stability of blood glucose readings by end of shift  Outcome: Progressing  Goal: Decrease in ketones present in urine by end of shift  Outcome: Progressing  Goal: Maintain electrolyte levels within acceptable range throughout shift  Outcome: Progressing  Goal: Maintain glucose levels >70mg/dl to <250mg/dl throughout shift  Outcome: Progressing  Goal: No changes in neurological exam by end of shift  Outcome: Progressing  Goal: Learn about and adhere to nutrition recommendations by end of shift  Outcome: Progressing  Goal: Vital signs within normal range for age by end of shift  Outcome: Progressing  Goal: Increase self care and/or family involovement by end of shift  Outcome: Progressing  Goal: Receive DSME education by end of shift  Outcome: Progressing     Problem: Heart Failure  Goal: Improved gas exchange this shift  Outcome: Progressing  Goal: Improved urinary output this shift  Outcome: Progressing  Goal: Reduction in peripheral edema within 24 hours  Outcome: Progressing  Goal: Report improvement of  dyspnea/breathlessness this shift  Outcome: Progressing  Goal: Weight from fluid excess reduced over 2-3 days, then stabilize  Outcome: Progressing  Goal: Increase self care and/or family involvement in 24 hours  Outcome: Progressing

## 2024-09-04 NOTE — CARE PLAN
The patient's goals for the shift include      The clinical goals for the shift include patient will remain free from falls this shift.    Over the shift, the patient remains free from falls.

## 2024-09-04 NOTE — POST-PROCEDURE NOTE
Report to Receiving RN:    Report To: ALPHONSO RIGGS  Time Report Called: 1640  Hand-Off Communication: LAST BP ,NO ISSUES,PT STABLE, REMOVED 1.5 LITERS  Complications During Treatment: No  Ultrafiltration Treatment: Yes, HD  Medications Administered During Dialysis: No  Blood Products Administered During Dialysis: No  Labs Sent During Dialysis: No  Heparin Drip Rate Changes: No  Dialysis Catheter Dressing: CLEAN AND DRY  Last Dressing Change: 9/2/24    Electronic Signatures:  YFN OCDT    Last Updated: 4:41 PM by ALLY KNIGHT

## 2024-09-04 NOTE — PROGRESS NOTES
Discharge transport confirmed for 4:30 pm via [facility]. Transport time and report # sent to nursing via Secure Chat.

## 2024-09-04 NOTE — PROGRESS NOTES
Vancomycin Dosing by Pharmacy- FOLLOW UP    Melody Martin is a 89 y.o. year old female who Pharmacy has been consulted for vancomycin dosing for cellulitis, skin and soft tissue. Based on the patient's indication and renal status this patient is being dosed based on a goal AUC of 500-600.     Renal function is currently stable. HD patient.    Current vancomycin dose: 500 mg post dialysis.    Most recent random level: 14.7 mcg/mL    Visit Vitals  /68 (BP Location: Left arm, Patient Position: Lying)   Pulse 86   Temp 36.4 °C (97.5 °F) (Temporal)   Resp 17        Lab Results   Component Value Date    CREATININE 3.37 (H) 2024    CREATININE 2.27 (H) 2024    CREATININE 2.09 (H) 2024    CREATININE 1.47 (H) 2024        Patient weight is as follows:   Vitals:    24 2228   Weight: 72 kg (158 lb 11.7 oz)       Cultures:  Susceptibility data for the encounter in last 14 days.  Collected Specimen Info Organism Clindamycin Erythromycin Oxacillin Tetracycline Trimethoprim/Sulfamethoxazole Vancomycin   24 Tissue/Biopsy from Wound/Tissue Methicillin Resistant Staphylococcus aureus (MRSA)  R  R  R  S  S  S     Mixed Anaerobic Bacteria           Mixed Gram-Negative Bacteria               I/O last 3 completed shifts:  In: 907 (12.6 mL/kg) [P.O.:557; IV Piggyback:350]  Out: 0 (0 mL/kg)   Weight: 72 kg   I/O during current shift:  I/O this shift:  In: 220 [P.O.:220]  Out: 0     Temp (24hrs), Av.7 °C (98.1 °F), Min:36.3 °C (97.4 °F), Max:37.2 °C (99 °F)      Assessment/Plan    Below goal level, starting 1000mg after each dialysis session.  The next level will be obtained on  at 5:00. May be obtained sooner if clinically indicated.   Will continue to monitor renal function daily while on vancomycin and order serum creatinine at least every 48 hours if not already ordered.  Follow for continued vancomycin needs, clinical response, and signs/symptoms of toxicity.       Jose Thomas,  PharmD

## 2024-09-04 NOTE — PROGRESS NOTES
Nutrition Follow Up Assessment:   Nutrition Assessment         Medical history per chart:    89 y.o.  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     9/4:  Patient awake, alert at time of visit.  Patient reports a good appetite but is having some difficulty with chewing - needs softer foods, discussed patient preferences to help promote oral intake.  Patient dislikes Nepro and Ensure type supplements, has not yet tried Tone but tolerates Gelatein supplements well per previous admission.   Will adjust supplements as able and per patient preference.       9/2:  Pt out of room - in dialysis.  Per notes fair- good PO intake, and patient is receiving supplements.  Pt with multiple wounds - will continue to follow and send supplements.      Nutrition History:  Food and Nutrient History: Patient not available       Current Diet: Adult diet Consistent Carb; CCD 75 gm/meal  Supplement(s): Yes: Nepro TID, Tone BID   Average meal Intake during admission: %   Average supplement intake during admission: 0%     Nutrition Related Findings:   Oral Symptoms: chewing  Teeth: Missing teeth   GI symptoms: no GI issues at this time.   BM: Last BM Date: 09/04/24  Food allergies: NKFA. is allergic to aspirin, amlodipine, levofloxacin, and sulfamethoxazole-trimethoprim.  Meds/Labs reviewed.  amiodarone, 200 mg, oral, q24h  apixaban, 2.5 mg, oral, BID  atorvastatin, 80 mg, oral, Nightly  busPIRone, 10 mg, oral, BID  clopidogrel, 75 mg, oral, Daily  dilTIAZem CD, 240 mg, oral, Nightly  [Held by provider] docusate sodium, 100 mg, oral, BID  heparin, 2,000 Units, intra-catheter, After Dialysis  heparin, 2,000 Units,  "intra-catheter, After Dialysis  insulin lispro, 0-15 Units, subcutaneous, With meals & nightly  isosorbide dinitrate, 10 mg, oral, TID  mirtazapine, 7.5 mg, oral, Nightly  pantoprazole, 40 mg, oral, Daily before breakfast  piperacillin-tazobactam, 2.25 g, intravenous, q8h  vancomycin, 1,000 mg, intravenous, Once per day on Monday Wednesday Friday             Nutrition Significant Labs:    Results from last 7 days   Lab Units 09/04/24  0417 09/03/24  0431 08/31/24  0413   GLUCOSE mg/dL 149* 195* 230*   SODIUM mmol/L 137 138 133*   POTASSIUM mmol/L 4.1 3.6 4.0   CHLORIDE mmol/L 103 103 96*   CO2 mmol/L 27 29 30   BUN mg/dL 26* 16 16   CREATININE mg/dL 3.37* 2.27* 2.09*   EGFR mL/min/1.73m*2 13* 20* 22*   CALCIUM mg/dL 7.4* 7.2* 7.2*   PHOSPHORUS mg/dL  --   --  3.4   MAGNESIUM mg/dL  --   --  1.61     Lab Results   Component Value Date    HGBA1C 6.1 (H) 07/15/2024    HGBA1C 7.6 (H) 11/11/2023    HGBA1C 7.6 (H) 11/11/2023     Results from last 7 days   Lab Units 09/04/24  1151 09/04/24  0644 09/03/24 2059 09/03/24  1607 09/03/24  1110 09/03/24  0630 09/03/24  0134 09/02/24 2050   POCT GLUCOSE mg/dL 302* 140* 158* 266* 242* 247* 157* 239*       Anthropometrics:  Height: 139.7 cm (4' 7\")   Weight: 72 kg (158 lb 11.7 oz)   BMI (Calculated): 24.85  IBW/kg (Dietitian Calculated): 43 kg          Weight History:   Wt Readings from Last 10 Encounters:   08/30/24 72 kg (158 lb 11.7 oz)   07/16/24 75.3 kg (166 lb 0.1 oz)   11/09/23 78 kg (172 lb)   10/17/23 78 kg (172 lb)   10/09/23 84 kg (185 lb 3 oz)   05/17/23 73 kg (161 lb)   04/10/23 76.2 kg (168 lb)   03/29/23 75.3 kg (166 lb)   03/09/23 73 kg (161 lb)   02/23/23 73.7 kg (162 lb 6 oz)        Weight Change %:  Weight History / % Weight Change: Noted a 4.8% loss x 1 month some of which could be fluid related  Significant Weight Loss:  (Unable to determine)          Nutrition Focused Physical Exam Findings:      Physical Findings:  Skin: Positive (Multiple wounds noted; " stage II buttocks, unknown stages on coccyx, L heel, and ABD)    Estimated Needs:   Total Energy Estimated Needs (kCal): 1800 kCal  Method for Estimating Needs: 25 kcal/kg  Total Protein Estimated Needs (g): 108 g        Method for Estimating Needs: per MD        Nutrition Diagnosis   Nutrition Diagnosis:  Malnutrition Diagnosis  Patient has Malnutrition Diagnosis:  (unable to determine)    Nutrition Diagnosis  Patient has Nutrition Diagnosis: Yes  Diagnosis Status (1): Ongoing  Nutrition Diagnosis 1: Increased nutrient needs  Related to (1): wound healing and ESRD on HD  As Evidenced by (1): multiple wounds, and on HD 3 times per week       Nutrition Interventions/Recommendations   Nutrition Interventions and Recommendations:        Nutrition Prescription:  Individualized Nutrition Prescription Provided for : Consistent carbohydrate diet.  Adjust supplements as able:  Gelatein BID (cherry), continue Tone once daily for patient to trial        Nutrition Interventions:   Food and/or Nutrient Delivery Interventions  Interventions: Meals and snacks, Medical food supplement  Meals and Snacks: Carbohydrate-modified diet  Goal: >75% intake of meals  Medical Food Supplement: Modified food, Commercial beverage  Goal: >75% intake of Gelatein BID and Tone daily    Coordination of Nutrition Care by a Nutrition Professional  Collaboration and Referral of Nutrition Care: Collaboration by nutrition professional with other providers  Goal: Discussed supplements with Dr. Lucas    Nutrition Education:   Education Documentation  No documentation found.      Nutrition Counseling  Counseling Theoretical Approach: Other (Comment)  Goal: Reviewed supplement options       Nutrition Monitoring and Evaluation   Monitoring/Evaluation:   Food/Nutrient Related History Monitoring  Monitoring and Evaluation Plan: Energy intake  Energy Intake: Estimated energy intake  Criteria: meet >75% of estimated needs from diet and ONS    Body  Composition/Growth/Weight History  Monitoring and Evaluation Plan: Weight  Weight: Weight change  Criteria: Maintain stable weight    Biochemical Data, Medical Tests and Procedures  Monitoring and Evaluation Plan: Electrolyte/renal panel  Electrolyte and Renal Panel: BUN, Creatinine, Potassium, Phosphorus, Sodium  Criteria: WNL  Glucose/Endocrine Profile: Glucose, casual  Criteria: WNL    Nutrition Focused Physical Findings  Monitoring and Evaluation Plan: Skin  Skin: Impaired wound healing  Criteria: Promote healing            Time Spent/Follow-up Reminder:   Follow Up  Time Spent (min): 40 minutes  Last Date of Nutrition Visit: 09/04/24  Nutrition Follow-Up Needed?: Dietitian to reassess per policy  Follow up Comment: 9/6-9/9

## 2024-09-04 NOTE — PROGRESS NOTES
"      Nephrology Progress Note      Nephrology following for ESRD.   Events over night: none    Patient was sleeping      /68 (BP Location: Left arm, Patient Position: Lying)   Pulse 86   Temp 36.4 °C (97.5 °F) (Temporal)   Resp 17   Ht 1.397 m (4' 7\")   Wt 72 kg (158 lb 11.7 oz)   SpO2 98%   BMI 36.89 kg/m²     Input / Output:  24 HR:   Intake/Output Summary (Last 24 hours) at 9/4/2024 1248  Last data filed at 9/4/2024 1110  Gross per 24 hour   Intake 1007 ml   Output 0 ml   Net 1007 ml       Physical Exam   NAD      Scheduled medications  amiodarone, 200 mg, oral, q24h  apixaban, 2.5 mg, oral, BID  atorvastatin, 80 mg, oral, Nightly  busPIRone, 10 mg, oral, BID  clopidogrel, 75 mg, oral, Daily  dilTIAZem CD, 240 mg, oral, Nightly  [Held by provider] docusate sodium, 100 mg, oral, BID  heparin, 2,000 Units, intra-catheter, After Dialysis  heparin, 2,000 Units, intra-catheter, After Dialysis  insulin lispro, 0-15 Units, subcutaneous, With meals & nightly  isosorbide dinitrate, 10 mg, oral, TID  mirtazapine, 7.5 mg, oral, Nightly  pantoprazole, 40 mg, oral, Daily before breakfast  piperacillin-tazobactam, 2.25 g, intravenous, q8h  vancomycin, 1,000 mg, intravenous, Once per day on Monday Wednesday Friday      Continuous medications     PRN medications  PRN medications: acetaminophen, albuterol, alum-mag hydroxide-simeth, benzonatate, bisacodyl, bisacodyl, HYDROcodone-acetaminophen, HYDROmorphone, ondansetron, polyethylene glycol, simethicone, vancomycin   Results from last 7 days   Lab Units 09/04/24  0417 08/31/24  0413 08/30/24  1748   SODIUM mmol/L 137   < > 136   POTASSIUM mmol/L 4.1   < > 3.7   CHLORIDE mmol/L 103   < > 96*   CO2 mmol/L 27   < > 31   BUN mg/dL 26*   < > 11   CREATININE mg/dL 3.37*   < > 1.47*   CALCIUM mg/dL 7.4*   < > 7.7*   PROTEIN TOTAL g/dL  --   --  6.5   BILIRUBIN TOTAL mg/dL  --   --  0.3   ALK PHOS U/L  --   --  93   ALT U/L  --   --  11   AST U/L  --   --  20   GLUCOSE " mg/dL 149*   < > 144*    < > = values in this interval not displayed.      Results from last 7 days   Lab Units 08/31/24  0413   MAGNESIUM mg/dL 1.61      Results from last 7 days   Lab Units 09/04/24  0417 09/03/24  0431 09/02/24  0422   WBC AUTO x10*3/uL 9.6 10.0 7.6   HEMOGLOBIN g/dL 8.1* 9.0* 8.9*   HEMATOCRIT % 26.2* 28.7* 29.2*   PLATELETS AUTO x10*3/uL 343 403 350        Assessment & Plan:     Patient is 89 y.o. female HFrEF, A-fib, HTN who is admitted to hospital for left heel wound. Nephrology consulted in view of ESRD.     ESRD  -HD Monday Wednesday Friday, [facility]  -Access is right IJ TDC  -Patient has been refusing referral for AV fistula     Anemia of ESRD  -Hemoglobin has been in the 9 range recently. ,  On IV iron as outpatient as well.  -Hemoglobin was 11.8 yesterday but this was directly after dialysis, hemoglobin 9   -She is on NOAH as outpatient with dialysis and this is titrated to a goal of hemoglobin 10-11.  -NOAH is not as effective in setting of infection     CKD-MBD  -Patient does not require binders  -Phos and calcium are at goal.     Left calcaneal ulcer  -Antibiotics per primary service  -Renal dose vancomycin and Zosyn now on doxycycline   -No debridement needed per podiatry     Hypoalbuminemia  -Infection related  -Continue protein supplements     Recommendations:      -HD today  -cont. protein shake with meals  -Antibiotic as per primary service       Please message me through [employer] chat with any questions or concerns.     Mally Lucas DO  9/4/2024  12:48 PM     [facility]    [address], [address]   [place] [address]  Office: [phone]

## 2024-09-04 NOTE — PRE-PROCEDURE NOTE
Report from Sending RN:    Report From: SCOTT QUINONEZ  Recent Surgery of Procedure: No  Baseline Level of Consciousness (LOC): X3   Oxygen Use: No  Type: RA  Diabetic: Yes (LAST 140)  Last BP Med Given Day of Dialysis: SEE EMAR  Last Pain Med Given: SEE EMAR  Lab Tests to be Obtained with Dialysis: No  Blood Transfusion to be Given During Dialysis: No  Available IV Access: Yes  Medications to be Administered During Dialysis: No  Continuous IV Infusion Running: No  Restraints on Currently or in the Last 24 Hours: No  Hand-Off Communication: PT IS OK TO COME TO TX ROOM FOR DIALYSIS ,LAST BLOOD SUGAR ,ALL MEDS HELD  Dialysis Catheter Dressing: NA  Last Dressing Change: NA

## 2024-09-10 ENCOUNTER — NURSING HOME VISIT (OUTPATIENT)
Dept: POST ACUTE CARE | Facility: EXTERNAL LOCATION | Age: 89
End: 2024-09-10
Payer: COMMERCIAL

## 2024-09-10 DIAGNOSIS — N18.6 HYPERTENSIVE HEART AND KIDNEY DISEASE WITH CHRONIC DIASTOLIC CONGESTIVE HEART FAILURE AND STAGE 5 CHRONIC KIDNEY DISEASE ON CHRONIC DIALYSIS: Primary | ICD-10-CM

## 2024-09-10 DIAGNOSIS — I13.2 HYPERTENSIVE HEART AND KIDNEY DISEASE WITH CHRONIC DIASTOLIC CONGESTIVE HEART FAILURE AND STAGE 5 CHRONIC KIDNEY DISEASE ON CHRONIC DIALYSIS: Primary | ICD-10-CM

## 2024-09-10 DIAGNOSIS — T14.8XXA WOUND INFECTION: ICD-10-CM

## 2024-09-10 DIAGNOSIS — Z99.2 HYPERTENSIVE HEART AND KIDNEY DISEASE WITH CHRONIC DIASTOLIC CONGESTIVE HEART FAILURE AND STAGE 5 CHRONIC KIDNEY DISEASE ON CHRONIC DIALYSIS: Primary | ICD-10-CM

## 2024-09-10 DIAGNOSIS — L89.153 STAGE III PRESSURE ULCER OF SACRAL REGION (MULTI): ICD-10-CM

## 2024-09-10 DIAGNOSIS — I50.32 HYPERTENSIVE HEART AND KIDNEY DISEASE WITH CHRONIC DIASTOLIC CONGESTIVE HEART FAILURE AND STAGE 5 CHRONIC KIDNEY DISEASE ON CHRONIC DIALYSIS: Primary | ICD-10-CM

## 2024-09-10 DIAGNOSIS — L89.623 STAGE III PRESSURE ULCER OF LEFT HEEL (MULTI): ICD-10-CM

## 2024-09-10 DIAGNOSIS — L08.9 WOUND INFECTION: ICD-10-CM

## 2024-09-10 PROCEDURE — 99309 SBSQ NF CARE MODERATE MDM 30: CPT | Performed by: NURSE PRACTITIONER

## 2024-09-10 NOTE — LETTER
Patient: Melody Martin  : 1935    Encounter Date: 09/10/2024    PROGRESS NOTE    Subjective  Chief complaint: Melody Martin is a 89 y.o. female who is a long term care patient being seen and evaluated for readmission.    HPI:  HPI  Patient recently readmitted to nursing facility on .  Patient had hospitalization for cellulitis left heel and was started on antibiotics.  Patient is on Doxy through 9\15.  Patient is tolerating antibiotic without adverse effects.  Patient also seen in follow-up of wounds, sacrum and left heel treatment in place.    Objective  Vital signs: 134/49, 98.1, 58, 18, 97% blood sugar 140    Physical Exam  Constitutional:       General: She is not in acute distress.  Eyes:      Extraocular Movements: Extraocular movements intact.   Cardiovascular:      Rate and Rhythm: Normal rate and regular rhythm.   Pulmonary:      Effort: Pulmonary effort is normal.      Breath sounds: Normal breath sounds.   Musculoskeletal:      Cervical back: Neck supple.      Right lower leg: No edema.      Left lower leg: No edema.   Skin:     Comments:   Wound location - sacrum  Etiology -  stage III pressure ulcer  Granulation -  large  Slough -  small  Edges -  flat  Odor -  no  Drainage -small serosanguineous  Size -1.5 x 1.5 x 0.1 cm     Wound location - left heel  Etiology -stage III pressure ulcer  Granulation - large  Slough -small  Edges -  flat  Odor -no  Drainage -  medium serosanguineous  Size -4.5 x 3.5 x 0.2 cm   Neurological:      Mental Status: She is alert and oriented to person, place, and time.   Psychiatric:         Mood and Affect: Mood normal.         Behavior: Behavior is cooperative.         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  On HD per nephrology  Isosorbide  dinitrate  Metoprolol  Diltiazem   Renal diet  Monitor BP  Remove extra fluid in dialysis         Stage III pressure ulcer of left heel (Multi)     After obtaining verbal concent, I performed subcutaneous tissue debridement with a total area of 15.75 cm2 with curette to remove viable and non-viable tissue/material including subcutaneous tissue, slough, biofilm, and fibrin/exudate after achieving pain control with 2% lidocaine topical gel.  A minimal amount of bleeding was controlled with pressure. The procedure was tolerated well with a pain level of 0 throughout and a pain level of 0 following the procedure.  Post-debridement measurements unchanged. Character of wound/ulcer post-debridement is improved.       TX: irrigate with ns, pat dry, apply collagen powder calcium alginate with ABD, Kerlix daily and as needed    Turn every 2 hours  Prevalon boots  Pressure reducing mattress, patient refusing air mattress, discussed benefits/risks  Cushion to chair  Dietitian consult  Supplements per dietitian  Grab bars to bed to assist with bed mobility and repositioning  Weekly skin checks    No results received yet for culture or xray.  Spoke with nurse who contact lab/imaging company  Wound necrotic tissue and drainage improving         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 (Multi)     After obtaining verbal concent, I performed subcutaneous tissue debridement with a total area of 2.25 cm2 with curette to remove viable and non-viable tissue/material including subcutaneous tissue, slough, biofilm, and fibrin/exudate after achieving pain control with 2% lidocaine topical gel.  A minimal amount of bleeding was controlled with pressure. The procedure was tolerated well with a pain level of 0 throughout and a pain level of 0 following the procedure.  Post-debridement measurements unchanged. Character of wound/ulcer post-debridement is improved.     TX: Irrigate with wound cleanser and apply collagen powder and Calmoseptine  twice daily and as needed     Turn every 2 hours  Prevalon boots  Air mattress  Cushion to chair  Dietitian following  Supplements per dietitian  Grab bars to bed to assist with bed mobility and repositioning  Weekly skin checks  House barrier cream to buttocks    Wound stable         Wound infection     Continue doxycycline until complete          Medications, treatments, and labs reviewed  Continue medications and treatments as listed in EMR    Scribe Attestation  Hien MARRERO Scribe   attest that this documentation has been prepared under the direction and in the presence of MAR Johnson    Provider Attestation - Scribe documentation  All medical record entries made by the Scribe were at my direction and personally dictated by me. I have reviewed the chart and agree that the record accurately reflects my personal performance of the history, physical exam, discussion and plan.   MAR Johnson            Electronically Signed By: MAR Johnson   9/18/24  6:58 PM

## 2024-09-11 ENCOUNTER — APPOINTMENT (OUTPATIENT)
Dept: WOUND CARE | Facility: CLINIC | Age: 89
End: 2024-09-11
Payer: COMMERCIAL

## 2024-09-17 NOTE — ASSESSMENT & PLAN NOTE
After obtaining verbal concent, I performed subcutaneous tissue debridement with a total area of 2.25 cm2 with curette to remove viable and non-viable tissue/material including subcutaneous tissue, slough, biofilm, and fibrin/exudate after achieving pain control with 2% lidocaine topical gel.  A minimal amount of bleeding was controlled with pressure. The procedure was tolerated well with a pain level of 0 throughout and a pain level of 0 following the procedure.  Post-debridement measurements unchanged. Character of wound/ulcer post-debridement is improved.     TX: Irrigate with wound cleanser and apply collagen powder and Calmoseptine twice daily and as needed     Turn every 2 hours  Prevalon boots  Air mattress  Cushion to chair  Dietitian following  Supplements per dietitian  Grab bars to bed to assist with bed mobility and repositioning  Weekly skin checks  House barrier cream to buttocks    Wound stable

## 2024-09-17 NOTE — PROGRESS NOTES
PROGRESS NOTE    Subjective   Chief complaint: Melody Martin is a 89 y.o. female who is a long term care patient being seen and evaluated for readmission.    HPI:  HPI  Patient recently readmitted to nursing facility on 9\4.  Patient had hospitalization for cellulitis left heel and was started on antibiotics.  Patient is on Doxy through 9\15.  Patient is tolerating antibiotic without adverse effects.  Patient also seen in follow-up of wounds, sacrum and left heel treatment in place.    Objective   Vital signs: 134/49, 98.1, 58, 18, 97% blood sugar 140    Physical Exam  Constitutional:       General: She is not in acute distress.  Eyes:      Extraocular Movements: Extraocular movements intact.   Cardiovascular:      Rate and Rhythm: Normal rate and regular rhythm.   Pulmonary:      Effort: Pulmonary effort is normal.      Breath sounds: Normal breath sounds.   Musculoskeletal:      Cervical back: Neck supple.      Right lower leg: No edema.      Left lower leg: No edema.   Skin:     Comments:   Wound location - sacrum  Etiology -  stage III pressure ulcer  Granulation -  large  Slough -  small  Edges -  flat  Odor -  no  Drainage -small serosanguineous  Size -1.5 x 1.5 x 0.1 cm     Wound location - left heel  Etiology -stage III pressure ulcer  Granulation - large  Slough -small  Edges -  flat  Odor -no  Drainage -  medium serosanguineous  Size -4.5 x 3.5 x 0.2 cm   Neurological:      Mental Status: She is alert and oriented to person, place, and time.   Psychiatric:         Mood and Affect: Mood normal.         Behavior: Behavior is cooperative.         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  On HD per nephrology  Isosorbide dinitrate  Metoprolol  Diltiazem   Renal diet  Monitor BP  Remove extra  fluid in dialysis         Stage III pressure ulcer of left heel (Multi)     After obtaining verbal concent, I performed subcutaneous tissue debridement with a total area of 15.75 cm2 with curette to remove viable and non-viable tissue/material including subcutaneous tissue, slough, biofilm, and fibrin/exudate after achieving pain control with 2% lidocaine topical gel.  A minimal amount of bleeding was controlled with pressure. The procedure was tolerated well with a pain level of 0 throughout and a pain level of 0 following the procedure.  Post-debridement measurements unchanged. Character of wound/ulcer post-debridement is improved.       TX: irrigate with ns, pat dry, apply collagen powder calcium alginate with ABD, Kerlix daily and as needed    Turn every 2 hours  Prevalon boots  Pressure reducing mattress, patient refusing air mattress, discussed benefits/risks  Cushion to chair  Dietitian consult  Supplements per dietitian  Grab bars to bed to assist with bed mobility and repositioning  Weekly skin checks    No results received yet for culture or xray.  Spoke with nurse who contact lab/imaging company  Wound necrotic tissue and drainage improving         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 (Multi)     After obtaining verbal concent, I performed subcutaneous tissue debridement with a total area of 2.25 cm2 with curette to remove viable and non-viable tissue/material including subcutaneous tissue, slough, biofilm, and fibrin/exudate after achieving pain control with 2% lidocaine topical gel.  A minimal amount of bleeding was controlled with pressure. The procedure was tolerated well with a pain level of 0 throughout and a pain level of 0 following the procedure.  Post-debridement measurements unchanged. Character of wound/ulcer post-debridement is improved.     TX: Irrigate with wound cleanser and apply collagen powder and Calmoseptine twice daily and as needed     Turn every 2 hours  Prevalon boots  Air  mattress  Cushion to chair  Dietitian following  Supplements per dietitian  Grab bars to bed to assist with bed mobility and repositioning  Weekly skin checks  House barrier cream to buttocks    Wound stable         Wound infection     Continue doxycycline until complete          Medications, treatments, and labs reviewed  Continue medications and treatments as listed in EMR    Scribe Attestation  Hien MARRERO Scribe   attest that this documentation has been prepared under the direction and in the presence of MAR Johnson    Provider Attestation - Scribe documentation  All medical record entries made by the Scribe were at my direction and personally dictated by me. I have reviewed the chart and agree that the record accurately reflects my personal performance of the history, physical exam, discussion and plan.   MAR Johnson

## 2024-09-17 NOTE — ASSESSMENT & PLAN NOTE
After obtaining verbal concent, I performed subcutaneous tissue debridement with a total area of 15.75 cm2 with curette to remove viable and non-viable tissue/material including subcutaneous tissue, slough, biofilm, and fibrin/exudate after achieving pain control with 2% lidocaine topical gel.  A minimal amount of bleeding was controlled with pressure. The procedure was tolerated well with a pain level of 0 throughout and a pain level of 0 following the procedure.  Post-debridement measurements unchanged. Character of wound/ulcer post-debridement is improved.       TX: irrigate with ns, pat dry, apply collagen powder calcium alginate with ABD, Kerlix daily and as needed    Turn every 2 hours  Prevalon boots  Pressure reducing mattress, patient refusing air mattress, discussed benefits/risks  Cushion to chair  Dietitian consult  Supplements per dietitian  Grab bars to bed to assist with bed mobility and repositioning  Weekly skin checks    No results received yet for culture or xray.  Spoke with nurse who contact lab/imaging company  Wound necrotic tissue and drainage improving

## 2024-09-18 ENCOUNTER — NURSING HOME VISIT (OUTPATIENT)
Dept: POST ACUTE CARE | Facility: EXTERNAL LOCATION | Age: 89
End: 2024-09-18
Payer: COMMERCIAL

## 2024-09-18 DIAGNOSIS — N18.6 TYPE 2 DIABETES MELLITUS WITH CHRONIC KIDNEY DISEASE ON CHRONIC DIALYSIS, WITH LONG-TERM CURRENT USE OF INSULIN (MULTI): ICD-10-CM

## 2024-09-18 DIAGNOSIS — Z86.73 HISTORY OF CVA (CEREBROVASCULAR ACCIDENT): ICD-10-CM

## 2024-09-18 DIAGNOSIS — N18.6 HYPERTENSIVE HEART AND KIDNEY DISEASE WITH CHRONIC DIASTOLIC CONGESTIVE HEART FAILURE AND STAGE 5 CHRONIC KIDNEY DISEASE ON CHRONIC DIALYSIS: ICD-10-CM

## 2024-09-18 DIAGNOSIS — Z99.2 TYPE 2 DIABETES MELLITUS WITH CHRONIC KIDNEY DISEASE ON CHRONIC DIALYSIS, WITH LONG-TERM CURRENT USE OF INSULIN (MULTI): ICD-10-CM

## 2024-09-18 DIAGNOSIS — E11.22 TYPE 2 DIABETES MELLITUS WITH CHRONIC KIDNEY DISEASE ON CHRONIC DIALYSIS, WITH LONG-TERM CURRENT USE OF INSULIN (MULTI): ICD-10-CM

## 2024-09-18 DIAGNOSIS — Z99.2 HYPERTENSIVE HEART AND KIDNEY DISEASE WITH CHRONIC DIASTOLIC CONGESTIVE HEART FAILURE AND STAGE 5 CHRONIC KIDNEY DISEASE ON CHRONIC DIALYSIS: ICD-10-CM

## 2024-09-18 DIAGNOSIS — I50.32 HYPERTENSIVE HEART AND KIDNEY DISEASE WITH CHRONIC DIASTOLIC CONGESTIVE HEART FAILURE AND STAGE 5 CHRONIC KIDNEY DISEASE ON CHRONIC DIALYSIS: ICD-10-CM

## 2024-09-18 DIAGNOSIS — I13.2 HYPERTENSIVE HEART AND KIDNEY DISEASE WITH CHRONIC DIASTOLIC CONGESTIVE HEART FAILURE AND STAGE 5 CHRONIC KIDNEY DISEASE ON CHRONIC DIALYSIS: ICD-10-CM

## 2024-09-18 DIAGNOSIS — I48.91 ATRIAL FIBRILLATION, UNSPECIFIED TYPE (MULTI): Primary | ICD-10-CM

## 2024-09-18 DIAGNOSIS — Z79.4 TYPE 2 DIABETES MELLITUS WITH CHRONIC KIDNEY DISEASE ON CHRONIC DIALYSIS, WITH LONG-TERM CURRENT USE OF INSULIN (MULTI): ICD-10-CM

## 2024-09-18 PROCEDURE — 99309 SBSQ NF CARE MODERATE MDM 30: CPT | Performed by: INTERNAL MEDICINE

## 2024-09-18 NOTE — PROGRESS NOTES
PROGRESS NOTE    Subjective   Chief complaint: Melody Martin is a 89 y.o. female who is a long term care patient being seen and evaluated for monthly general medical care and follow-up    HPI:  HPI  Patient presents for general medical care and f/u.  Patient seen and examined at bedside.  No issues per nursing.  Patient has no acute complaints.  Patient recently completed antibiotic for left heel infection, dressing is in place clean dry and intact.  Patient with diagnosis of ESRD, on HD, tolerating treatment well.  AFIB stable, denies palpitations and chest pain.  HTN Denies chest pain and headache.  CHF stable, denies sob, orthopnea, weight gain.  Hx CVA, denies changes in weakness and speech.  DM, denies polydipsia polyuria polyphagia.  Mentation at baseline, no acute distress.    Objective   Vital signs: 134/49, 98.1, 58, 18, 97% blood sugar 79    Physical Exam  Constitutional:       General: She is not in acute distress.  Eyes:      Extraocular Movements: Extraocular movements intact.   Cardiovascular:      Rate and Rhythm: Normal rate and regular rhythm.   Pulmonary:      Effort: Pulmonary effort is normal.      Breath sounds: Normal breath sounds.   Abdominal:      General: Bowel sounds are normal.      Palpations: Abdomen is soft.   Musculoskeletal:      Cervical back: Neck supple.      Right lower leg: No edema.      Left lower leg: No edema.   Skin:     Comments: Dressing to left heel clean dry and intact  Dressing to sacrum clean dry and intact   Neurological:      Mental Status: She is alert and oriented to person, place, and time.   Psychiatric:         Mood and Affect: Mood normal.         Behavior: Behavior is cooperative.         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  On HD per nephrology  Isosorbide dinitrate  Metoprolol  Diltiazem   Renal diet  Monitor BP  Remove extra fluid in dialysis         Atrial fibrillation (Multi) - Primary     Monitor heart rate, controlled  Amiodarone  Metoprolol   Apixaban  Bleeding precautions          Medications, treatments, and labs reviewed  Continue medications and treatments as listed in EMR    Scribe Attestation  I, Carl Martineziboseas   attest that this documentation has been prepared under the direction and in the presence of Wilbert Lock MD    Provider Attestation - Scribe documentation  All medical record entries made by the Scribe were at my direction and personally dictated by me. I have reviewed the chart and agree that the record accurately reflects my personal performance of the history, physical exam, discussion and plan.   Wilbert Lock MD

## 2024-09-18 NOTE — LETTER
Patient: Melody Martin  : 1935    Encounter Date: 2024    PROGRESS NOTE    Subjective  Chief complaint: Melody Martin is a 89 y.o. female who is a long term care patient being seen and evaluated for monthly general medical care and follow-up    HPI:  HPI  Patient presents for general medical care and f/u.  Patient seen and examined at bedside.  No issues per nursing.  Patient has no acute complaints.  Patient recently completed antibiotic for left heel infection, dressing is in place clean dry and intact.  Patient with diagnosis of ESRD, on HD, tolerating treatment well.  AFIB stable, denies palpitations and chest pain.  HTN Denies chest pain and headache.  CHF stable, denies sob, orthopnea, weight gain.  Hx CVA, denies changes in weakness and speech.  DM, denies polydipsia polyuria polyphagia.  Mentation at baseline, no acute distress.    Objective  Vital signs: 134/49, 98.1, 58, 18, 97% blood sugar 79    Physical Exam  Constitutional:       General: She is not in acute distress.  Eyes:      Extraocular Movements: Extraocular movements intact.   Cardiovascular:      Rate and Rhythm: Normal rate and regular rhythm.   Pulmonary:      Effort: Pulmonary effort is normal.      Breath sounds: Normal breath sounds.   Abdominal:      General: Bowel sounds are normal.      Palpations: Abdomen is soft.   Musculoskeletal:      Cervical back: Neck supple.      Right lower leg: No edema.      Left lower leg: No edema.   Skin:     Comments: Dressing to left heel clean dry and intact  Dressing to sacrum clean dry and intact   Neurological:      Mental Status: She is alert and oriented to person, place, and time.   Psychiatric:         Mood and Affect: Mood normal.         Behavior: Behavior is cooperative.         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  On HD per nephrology  Isosorbide dinitrate  Metoprolol  Diltiazem   Renal diet  Monitor BP  Remove extra fluid in dialysis         Atrial fibrillation (Multi) - Primary     Monitor heart rate, controlled  Amiodarone  Metoprolol   Apixaban  Bleeding precautions          Medications, treatments, and labs reviewed  Continue medications and treatments as listed in EMR    Scribe Attestation  I, Carl Martineziboseas   attest that this documentation has been prepared under the direction and in the presence of Wilbert Lock MD    Provider Attestation - Scribe documentation  All medical record entries made by the Scribe were at my direction and personally dictated by me. I have reviewed the chart and agree that the record accurately reflects my personal performance of the history, physical exam, discussion and plan.   Wilbert Lock MD            Electronically Signed By: Wilbert Lock MD   9/19/24  5:56 PM

## 2024-09-19 ENCOUNTER — NURSING HOME VISIT (OUTPATIENT)
Dept: POST ACUTE CARE | Facility: EXTERNAL LOCATION | Age: 89
End: 2024-09-19
Payer: COMMERCIAL

## 2024-09-19 DIAGNOSIS — N18.6 TYPE 2 DIABETES MELLITUS WITH CHRONIC KIDNEY DISEASE ON CHRONIC DIALYSIS, WITH LONG-TERM CURRENT USE OF INSULIN (MULTI): ICD-10-CM

## 2024-09-19 DIAGNOSIS — L89.153 STAGE III PRESSURE ULCER OF SACRAL REGION (MULTI): Primary | ICD-10-CM

## 2024-09-19 DIAGNOSIS — I13.2 HYPERTENSIVE HEART AND KIDNEY DISEASE WITH CHRONIC DIASTOLIC CONGESTIVE HEART FAILURE AND STAGE 5 CHRONIC KIDNEY DISEASE ON CHRONIC DIALYSIS: ICD-10-CM

## 2024-09-19 DIAGNOSIS — Z79.4 TYPE 2 DIABETES MELLITUS WITH CHRONIC KIDNEY DISEASE ON CHRONIC DIALYSIS, WITH LONG-TERM CURRENT USE OF INSULIN (MULTI): ICD-10-CM

## 2024-09-19 DIAGNOSIS — I50.32 HYPERTENSIVE HEART AND KIDNEY DISEASE WITH CHRONIC DIASTOLIC CONGESTIVE HEART FAILURE AND STAGE 5 CHRONIC KIDNEY DISEASE ON CHRONIC DIALYSIS: ICD-10-CM

## 2024-09-19 DIAGNOSIS — L89.623 STAGE III PRESSURE ULCER OF LEFT HEEL (MULTI): ICD-10-CM

## 2024-09-19 DIAGNOSIS — I48.91 ATRIAL FIBRILLATION, UNSPECIFIED TYPE (MULTI): ICD-10-CM

## 2024-09-19 DIAGNOSIS — Z99.2 HYPERTENSIVE HEART AND KIDNEY DISEASE WITH CHRONIC DIASTOLIC CONGESTIVE HEART FAILURE AND STAGE 5 CHRONIC KIDNEY DISEASE ON CHRONIC DIALYSIS: ICD-10-CM

## 2024-09-19 DIAGNOSIS — Z99.2 TYPE 2 DIABETES MELLITUS WITH CHRONIC KIDNEY DISEASE ON CHRONIC DIALYSIS, WITH LONG-TERM CURRENT USE OF INSULIN (MULTI): ICD-10-CM

## 2024-09-19 DIAGNOSIS — E11.22 TYPE 2 DIABETES MELLITUS WITH CHRONIC KIDNEY DISEASE ON CHRONIC DIALYSIS, WITH LONG-TERM CURRENT USE OF INSULIN (MULTI): ICD-10-CM

## 2024-09-19 DIAGNOSIS — N18.6 HYPERTENSIVE HEART AND KIDNEY DISEASE WITH CHRONIC DIASTOLIC CONGESTIVE HEART FAILURE AND STAGE 5 CHRONIC KIDNEY DISEASE ON CHRONIC DIALYSIS: ICD-10-CM

## 2024-09-19 PROCEDURE — 99309 SBSQ NF CARE MODERATE MDM 30: CPT | Performed by: NURSE PRACTITIONER

## 2024-09-19 NOTE — LETTER
Patient: Melody Martin  : 1935    Encounter Date: 2024    PROGRESS NOTE    Subjective  Chief complaint: Melody Martin is a 89 y.o. female who is a long term care patient being seen and evaluated for f/u wound    HPI:  HPI  An interactive audio and/or video telecommunication system which permits real time communications between the patient (at the originating site) and provider (at a distant site) was utilized to provide this telehealth service after obtaining verbal consent.  Patient presents for follow-up wounds.  Patient seen and examined at bedside in no apparent distress.  No new concerns today.    Objective  Vital signs: 134/49, 98.1, 58, 18, 97% blood sugar 200    Physical Exam  Constitutional:       General: She is not in acute distress.  Eyes:      Extraocular Movements: Extraocular movements intact.   Pulmonary:      Effort: Pulmonary effort is normal.   Musculoskeletal:      Cervical back: Neck supple.   Skin:     Comments:     Wound location - left heel  Etiology - stage III pressure ulcer  Granulation - large  Slough - small  Edges - flat  Drainage - medium serosanguineous    Wound location - sacrum  Etiology - stage III pressure ulcer  Granulation - large  Slough - small  Edges - flat  Drainage - small serosanguineous    Neurological:      Mental Status: She is alert.   Psychiatric:         Mood and Affect: Mood normal.         Behavior: Behavior is cooperative.         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 (Multi)     TX:  irrigate with normal saline, apply collagen powder, calcium alginate, ABD, Kerlix daily and as needed    Turn every 2 hours  Prevalon boots  Pressure reducing mattress, patient refusing air mattress, discussed benefits/risks  Cushion to chair  Dietitian consult  Supplements per dietitian  Grab bars to bed to assist with bed mobility and repositioning  Weekly skin checks    Wound necrotic tissue and drainage improving         Atrial fibrillation (Multi)     Monitor heart rate, controlled  Amiodarone  Metoprolol   Apixaban  Bleeding precautions         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 (Multi) - Primary     Improving  TX: Apply collagen powder and Calmoseptine twice daily    Turn every 2 hours  Prevalon boots  Air mattress  Cushion to chair  Dietitian following  Supplements per dietitian  Grab bars to bed to assist with bed mobility and repositioning  Weekly skin checks  House barrier cream to buttocks    Wound stable          Medications, treatments, and labs reviewed  Continue medications and treatments as listed in EMR    Scribe Attestation  IHien Scribe   attest that this documentation has been prepared under the direction and in the presence of MAR Johnson    Provider Attestation - Scribe documentation  All medical record entries made by the Scribe were at my direction and personally dictated by me. I have reviewed the chart and agree that the record accurately reflects my personal performance of the history, physical exam, discussion and plan.   MAR Johnson            Electronically Signed By: MAR Johnson   9/22/24  9:47 PM

## 2024-09-20 NOTE — ASSESSMENT & PLAN NOTE
Improving  TX: Apply collagen powder and Calmoseptine twice daily    Turn every 2 hours  Prevalon boots  Air mattress  Cushion to chair  Dietitian following  Supplements per dietitian  Grab bars to bed to assist with bed mobility and repositioning  Weekly skin checks  House barrier cream to buttocks    Wound stable

## 2024-09-20 NOTE — PROGRESS NOTES
PROGRESS NOTE    Subjective   Chief complaint: Melody Martin is a 89 y.o. female who is a long term care patient being seen and evaluated for f/u wound    HPI:  HPI  An interactive audio and/or video telecommunication system which permits real time communications between the patient (at the originating site) and provider (at a distant site) was utilized to provide this telehealth service after obtaining verbal consent.  Patient presents for follow-up wounds.  Patient seen and examined at bedside in no apparent distress.  No new concerns today.    Objective   Vital signs: 134/49, 98.1, 58, 18, 97% blood sugar 200    Physical Exam  Constitutional:       General: She is not in acute distress.  Eyes:      Extraocular Movements: Extraocular movements intact.   Pulmonary:      Effort: Pulmonary effort is normal.   Musculoskeletal:      Cervical back: Neck supple.   Skin:     Comments:     Wound location - left heel  Etiology - stage III pressure ulcer  Granulation - large  Slough - small  Edges - flat  Drainage - medium serosanguineous    Wound location - sacrum  Etiology - stage III pressure ulcer  Granulation - large  Slough - small  Edges - flat  Drainage - small serosanguineous    Neurological:      Mental Status: She is alert.   Psychiatric:         Mood and Affect: Mood normal.         Behavior: Behavior is cooperative.         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  On HD per nephrology  Isosorbide dinitrate  Metoprolol  Diltiazem   Renal diet  Monitor BP  Remove extra fluid in dialysis         Stage III pressure ulcer  of left heel (Multi)     TX:  irrigate with normal saline, apply collagen powder, calcium alginate, ABD, Kerlix daily and as needed    Turn every 2 hours  Prevalon boots  Pressure reducing mattress, patient refusing air mattress, discussed benefits/risks  Cushion to chair  Dietitian consult  Supplements per dietitian  Grab bars to bed to assist with bed mobility and repositioning  Weekly skin checks    Wound necrotic tissue and drainage improving         Atrial fibrillation (Multi)     Monitor heart rate, controlled  Amiodarone  Metoprolol   Apixaban  Bleeding precautions         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 (Multi) - Primary     Improving  TX: Apply collagen powder and Calmoseptine twice daily    Turn every 2 hours  Prevalon boots  Air mattress  Cushion to chair  Dietitian following  Supplements per dietitian  Grab bars to bed to assist with bed mobility and repositioning  Weekly skin checks  House barrier cream to buttocks    Wound stable          Medications, treatments, and labs reviewed  Continue medications and treatments as listed in EMR    Scribe Attestation  I, Carl Martinezibe   attest that this documentation has been prepared under the direction and in the presence of MAR Johnson    Provider Attestation - Scribe documentation  All medical record entries made by the Scribe were at my direction and personally dictated by me. I have reviewed the chart and agree that the record accurately reflects my personal performance of the history, physical exam, discussion and plan.   MAR Johnson

## 2024-09-20 NOTE — ASSESSMENT & PLAN NOTE
TX:  irrigate with normal saline, apply collagen powder, calcium alginate, ABD, Kerlix daily and as needed    Turn every 2 hours  Prevalon boots  Pressure reducing mattress, patient refusing air mattress, discussed benefits/risks  Cushion to chair  Dietitian consult  Supplements per dietitian  Grab bars to bed to assist with bed mobility and repositioning  Weekly skin checks    Wound necrotic tissue and drainage improving

## 2024-09-24 ENCOUNTER — APPOINTMENT (OUTPATIENT)
Dept: WOUND CARE | Facility: CLINIC | Age: 89
End: 2024-09-24
Payer: COMMERCIAL

## 2024-09-26 ENCOUNTER — NURSING HOME VISIT (OUTPATIENT)
Dept: POST ACUTE CARE | Facility: EXTERNAL LOCATION | Age: 89
End: 2024-09-26
Payer: COMMERCIAL

## 2024-09-26 DIAGNOSIS — N18.6 TYPE 2 DIABETES MELLITUS WITH CHRONIC KIDNEY DISEASE ON CHRONIC DIALYSIS, WITH LONG-TERM CURRENT USE OF INSULIN (MULTI): ICD-10-CM

## 2024-09-26 DIAGNOSIS — E11.22 TYPE 2 DIABETES MELLITUS WITH CHRONIC KIDNEY DISEASE ON CHRONIC DIALYSIS, WITH LONG-TERM CURRENT USE OF INSULIN (MULTI): ICD-10-CM

## 2024-09-26 DIAGNOSIS — I13.2 HYPERTENSIVE HEART AND KIDNEY DISEASE WITH CHRONIC DIASTOLIC CONGESTIVE HEART FAILURE AND STAGE 5 CHRONIC KIDNEY DISEASE ON CHRONIC DIALYSIS: ICD-10-CM

## 2024-09-26 DIAGNOSIS — Z99.2 HYPERTENSIVE HEART AND KIDNEY DISEASE WITH CHRONIC DIASTOLIC CONGESTIVE HEART FAILURE AND STAGE 5 CHRONIC KIDNEY DISEASE ON CHRONIC DIALYSIS: ICD-10-CM

## 2024-09-26 DIAGNOSIS — N18.6 HYPERTENSIVE HEART AND KIDNEY DISEASE WITH CHRONIC DIASTOLIC CONGESTIVE HEART FAILURE AND STAGE 5 CHRONIC KIDNEY DISEASE ON CHRONIC DIALYSIS: ICD-10-CM

## 2024-09-26 DIAGNOSIS — Z79.4 TYPE 2 DIABETES MELLITUS WITH CHRONIC KIDNEY DISEASE ON CHRONIC DIALYSIS, WITH LONG-TERM CURRENT USE OF INSULIN (MULTI): ICD-10-CM

## 2024-09-26 DIAGNOSIS — Z99.2 TYPE 2 DIABETES MELLITUS WITH CHRONIC KIDNEY DISEASE ON CHRONIC DIALYSIS, WITH LONG-TERM CURRENT USE OF INSULIN (MULTI): ICD-10-CM

## 2024-09-26 DIAGNOSIS — L89.624 PRESSURE ULCER OF LEFT HEEL, STAGE 4 (MULTI): Primary | ICD-10-CM

## 2024-09-26 DIAGNOSIS — I50.32 HYPERTENSIVE HEART AND KIDNEY DISEASE WITH CHRONIC DIASTOLIC CONGESTIVE HEART FAILURE AND STAGE 5 CHRONIC KIDNEY DISEASE ON CHRONIC DIALYSIS: ICD-10-CM

## 2024-09-26 DIAGNOSIS — L89.153 STAGE III PRESSURE ULCER OF SACRAL REGION (MULTI): ICD-10-CM

## 2024-09-26 PROCEDURE — 11042 DBRDMT SUBQ TIS 1ST 20SQCM/<: CPT | Performed by: NURSE PRACTITIONER

## 2024-09-26 PROCEDURE — 99309 SBSQ NF CARE MODERATE MDM 30: CPT | Performed by: NURSE PRACTITIONER

## 2024-09-26 NOTE — LETTER
Patient: Melody Martin  : 1935    Encounter Date: 2024    PROGRESS NOTE    Subjective  Chief complaint: Melody Martin is a 89 y.o. female who is a long term care patient being seen and evaluated for follow-up wounds    HPI:  HPI  Patient is seen in follow-up of wounds to sacrum and left heel.  Treatment in place.  Patient denies pain at this time.    Objective  Vital signs: 125/55, 97.4, 60, 18, 97% blood sugar 250    Physical Exam  Constitutional:       General: She is not in acute distress.  Eyes:      Extraocular Movements: Extraocular movements intact.   Pulmonary:      Effort: Pulmonary effort is normal.   Musculoskeletal:      Cervical back: Neck supple.   Skin:     Comments: Wound location - left heel  Etiology - pressure ulcer  Granulation - large  Slough - small  Edges - flat  Odor-no  Drainage - large serosanguineous  Size-4.5 x 3 x 0.2 cm  Bone exposed    Wound location - sacrum  Etiology - pressure ulcer  Granulation - large  Slough - small  Edges - flat  Odor-No  Drainage - small serosanguineous   Size 3 x 2 x 0.2 cm   Neurological:      Mental Status: She is alert.   Psychiatric:         Mood and Affect: Mood normal.         Behavior: Behavior is cooperative.         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  On HD per nephrology  Isosorbide dinitrate  Metoprolol  Diltiazem   Renal diet  Monitor BP  Remove extra fluid in dialysis         Pressure ulcer of left heel, stage 4 (Multi) - Primary     After obtaining verbal concent, I performed subcutaneous tissue debridement with a total area of 13.5 cm2 with curette to remove viable and non-viable tissue/material including subcutaneous tissue, slough, biofilm, and fibrin/exudate after achieving pain control with 2% lidocaine topical gel.  A  minimal amount of bleeding was controlled with pressure. The procedure was tolerated well with a pain level of 0 throughout and a pain level of 0 following the procedure.  Post-debridement measurements unchanged. Character of wound/ulcer post-debridement is improved.     Wound now with exposed bone  Change TX to irrigate with normal saline, pat dry, apply calcium alginate AG, ABD and wrapped loosely with Kerlix nightly and as needed  Obtain X-ray  Obtain culture  Advised to fu with vascular as previously recommended    Turn every 2 hours  Prevalon boots  Pressure reducing mattress, patient refusing air mattress, discussed benefits/risks  Cushion to chair  Dietitian consult  Supplements per dietitian  Grab bars to bed to assist with bed mobility and repositioning  Weekly skin checks           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 (Multi)     TX: Cleanse with normal saline, pat dry, apply calcium alginate and cover with foam nightly and as needed    Turn every 2 hours  Prevalon boots  Air mattress  Cushion to chair  Dietitian following  Supplements per dietitian  Grab bars to bed to assist with bed mobility and repositioning  Weekly skin checks  House barrier cream to buttocks    Wound stable         Type 2 diabetes mellitus with chronic kidney disease on chronic dialysis, with long-term current use of insulin (Multi)     Carb controlled diet  Monitor Glucoscan  Continue insulin            Medications, treatments, and labs reviewed  Continue medications and treatments as listed in EMR    Scribe Attestation  IHien Scribe   attest that this documentation has been prepared under the direction and in the presence of JANELL Johnson-CNP    Provider Attestation - Scribe documentation  All medical record entries made by the Scribe were at my direction and personally dictated by me. I have reviewed the chart and agree that the record accurately reflects my personal performance of the history,  physical exam, discussion and plan.   MAR Johnson            Electronically Signed By: MAR Johnson   10/8/24 12:29 AM

## 2024-09-26 NOTE — ASSESSMENT & PLAN NOTE
TX: Cleanse with normal saline, pat dry, apply calcium alginate and cover with foam nightly and as needed    Turn every 2 hours  Prevalon boots  Air mattress  Cushion to chair  Dietitian following  Supplements per dietitian  Grab bars to bed to assist with bed mobility and repositioning  Weekly skin checks  House barrier cream to buttocks    Wound stable

## 2024-09-26 NOTE — PROGRESS NOTES
PROGRESS NOTE    Subjective   Chief complaint: Melody Martin is a 89 y.o. female who is a long term care patient being seen and evaluated for follow-up wounds    HPI:  HPI  Patient is seen in follow-up of wounds to sacrum and left heel.  Treatment in place.  Patient denies pain at this time.    Objective   Vital signs: 125/55, 97.4, 60, 18, 97% blood sugar 250    Physical Exam  Constitutional:       General: She is not in acute distress.  Eyes:      Extraocular Movements: Extraocular movements intact.   Pulmonary:      Effort: Pulmonary effort is normal.   Musculoskeletal:      Cervical back: Neck supple.   Skin:     Comments: Wound location - left heel  Etiology - pressure ulcer  Granulation - large  Slough - small  Edges - flat  Odor-no  Drainage - large serosanguineous  Size-4.5 x 3 x 0.2 cm  Bone exposed    Wound location - sacrum  Etiology - pressure ulcer  Granulation - large  Slough - small  Edges - flat  Odor-No  Drainage - small serosanguineous   Size 3 x 2 x 0.2 cm   Neurological:      Mental Status: She is alert.   Psychiatric:         Mood and Affect: Mood normal.         Behavior: Behavior is cooperative.         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  On HD per nephrology  Isosorbide dinitrate  Metoprolol  Diltiazem   Renal diet  Monitor BP  Remove extra fluid in dialysis         Pressure ulcer of left heel, stage 4 (Multi) - Primary     After obtaining verbal concent, I performed subcutaneous tissue debridement with a total area of 13.5 cm2 with curette to remove viable and non-viable tissue/material including subcutaneous tissue, slough, biofilm, and fibrin/exudate after achieving pain control with 2% lidocaine topical gel.  A minimal amount of bleeding was controlled with pressure. The procedure was  tolerated well with a pain level of 0 throughout and a pain level of 0 following the procedure.  Post-debridement measurements unchanged. Character of wound/ulcer post-debridement is improved.     Wound now with exposed bone  Change TX to irrigate with normal saline, pat dry, apply calcium alginate AG, ABD and wrapped loosely with Kerlix nightly and as needed  Obtain X-ray  Obtain culture  Advised to fu with vascular as previously recommended    Turn every 2 hours  Prevalon boots  Pressure reducing mattress, patient refusing air mattress, discussed benefits/risks  Cushion to chair  Dietitian consult  Supplements per dietitian  Grab bars to bed to assist with bed mobility and repositioning  Weekly skin checks           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 (Multi)     TX: Cleanse with normal saline, pat dry, apply calcium alginate and cover with foam nightly and as needed    Turn every 2 hours  Prevalon boots  Air mattress  Cushion to chair  Dietitian following  Supplements per dietitian  Grab bars to bed to assist with bed mobility and repositioning  Weekly skin checks  House barrier cream to buttocks    Wound stable         Type 2 diabetes mellitus with chronic kidney disease on chronic dialysis, with long-term current use of insulin (Multi)     Carb controlled diet  Monitor Glucoscan  Continue insulin            Medications, treatments, and labs reviewed  Continue medications and treatments as listed in EMR    Scribe Attestation  IHien Scribe   attest that this documentation has been prepared under the direction and in the presence of MAR Johnson    Provider Attestation - Scribe documentation  All medical record entries made by the Scribe were at my direction and personally dictated by me. I have reviewed the chart and agree that the record accurately reflects my personal performance of the history, physical exam, discussion and plan.   MAR Johnson

## 2024-09-26 NOTE — ASSESSMENT & PLAN NOTE
After obtaining verbal concent, I performed subcutaneous tissue debridement with a total area of 13.5 cm2 with curette to remove viable and non-viable tissue/material including subcutaneous tissue, slough, biofilm, and fibrin/exudate after achieving pain control with 2% lidocaine topical gel.  A minimal amount of bleeding was controlled with pressure. The procedure was tolerated well with a pain level of 0 throughout and a pain level of 0 following the procedure.  Post-debridement measurements unchanged. Character of wound/ulcer post-debridement is improved.     Wound now with exposed bone  Change TX to irrigate with normal saline, pat dry, apply calcium alginate AG, ABD and wrapped loosely with Kerlix nightly and as needed  Obtain X-ray  Obtain culture  Advised to fu with vascular as previously recommended    Turn every 2 hours  Prevalon boots  Pressure reducing mattress, patient refusing air mattress, discussed benefits/risks  Cushion to chair  Dietitian consult  Supplements per dietitian  Grab bars to bed to assist with bed mobility and repositioning  Weekly skin checks     No

## 2024-09-27 ENCOUNTER — NURSING HOME VISIT (OUTPATIENT)
Dept: POST ACUTE CARE | Facility: EXTERNAL LOCATION | Age: 89
End: 2024-09-27
Payer: COMMERCIAL

## 2024-09-27 ENCOUNTER — APPOINTMENT (OUTPATIENT)
Dept: WOUND CARE | Facility: CLINIC | Age: 89
End: 2024-09-27
Payer: COMMERCIAL

## 2024-09-27 DIAGNOSIS — F32.A DEPRESSION, UNSPECIFIED DEPRESSION TYPE: Primary | ICD-10-CM

## 2024-09-27 DIAGNOSIS — N18.6 HYPERTENSIVE HEART AND KIDNEY DISEASE WITH CHRONIC DIASTOLIC CONGESTIVE HEART FAILURE AND STAGE 5 CHRONIC KIDNEY DISEASE ON CHRONIC DIALYSIS: ICD-10-CM

## 2024-09-27 DIAGNOSIS — N18.6 TYPE 2 DIABETES MELLITUS WITH CHRONIC KIDNEY DISEASE ON CHRONIC DIALYSIS, WITH LONG-TERM CURRENT USE OF INSULIN (MULTI): ICD-10-CM

## 2024-09-27 DIAGNOSIS — Z99.2 HYPERTENSIVE HEART AND KIDNEY DISEASE WITH CHRONIC DIASTOLIC CONGESTIVE HEART FAILURE AND STAGE 5 CHRONIC KIDNEY DISEASE ON CHRONIC DIALYSIS: ICD-10-CM

## 2024-09-27 DIAGNOSIS — Z86.73 HISTORY OF CVA (CEREBROVASCULAR ACCIDENT): ICD-10-CM

## 2024-09-27 DIAGNOSIS — I13.2 HYPERTENSIVE HEART AND KIDNEY DISEASE WITH CHRONIC DIASTOLIC CONGESTIVE HEART FAILURE AND STAGE 5 CHRONIC KIDNEY DISEASE ON CHRONIC DIALYSIS: ICD-10-CM

## 2024-09-27 DIAGNOSIS — Z79.4 TYPE 2 DIABETES MELLITUS WITH CHRONIC KIDNEY DISEASE ON CHRONIC DIALYSIS, WITH LONG-TERM CURRENT USE OF INSULIN (MULTI): ICD-10-CM

## 2024-09-27 DIAGNOSIS — Z99.2 TYPE 2 DIABETES MELLITUS WITH CHRONIC KIDNEY DISEASE ON CHRONIC DIALYSIS, WITH LONG-TERM CURRENT USE OF INSULIN (MULTI): ICD-10-CM

## 2024-09-27 DIAGNOSIS — I50.32 HYPERTENSIVE HEART AND KIDNEY DISEASE WITH CHRONIC DIASTOLIC CONGESTIVE HEART FAILURE AND STAGE 5 CHRONIC KIDNEY DISEASE ON CHRONIC DIALYSIS: ICD-10-CM

## 2024-09-27 DIAGNOSIS — E11.22 TYPE 2 DIABETES MELLITUS WITH CHRONIC KIDNEY DISEASE ON CHRONIC DIALYSIS, WITH LONG-TERM CURRENT USE OF INSULIN (MULTI): ICD-10-CM

## 2024-09-27 PROCEDURE — 99308 SBSQ NF CARE LOW MDM 20: CPT | Performed by: INTERNAL MEDICINE

## 2024-09-27 NOTE — ASSESSMENT & PLAN NOTE
Continue mirtazapine and buspirone as currently ordered.  Gradual dose reduction clinically contraindicated at this time.  Continued use is within current practice guidelines and medication reduction may cause return or worsening of symptoms.  Patient is stable and without side effects of therapy and these continue to be monitored.

## 2024-09-27 NOTE — PROGRESS NOTES
PROGRESS NOTE    Subjective   Chief complaint: Melody Martin is a 89 y.o. female who is a long term care patient being seen and evaluated for depression.    HPI:  HPI  Asked to evaluate patient's continued use of mirtazapine and buspirone.  Patient reported to have good response to medications.  Patient denies feeling down.  No adverse effects to medications.  Patient to continue medications as currently ordered.    Objective   Vital signs: 125/55, 97.4, 60, 18, 97% blood sugar 338    Physical Exam  Constitutional:       General: She is not in acute distress.  Eyes:      Extraocular Movements: Extraocular movements intact.   Cardiovascular:      Rate and Rhythm: Normal rate and regular rhythm.   Pulmonary:      Effort: Pulmonary effort is normal.      Breath sounds: Normal breath sounds.   Abdominal:      General: Bowel sounds are normal.      Palpations: Abdomen is soft.   Musculoskeletal:      Cervical back: Neck supple.      Right lower leg: No edema.      Left lower leg: No edema.   Skin:     Comments: Dressing to left heel clean dry and intact  Dressing to sacrum clean dry and intact   Neurological:      Mental Status: She is alert and oriented to person, place, and time.   Psychiatric:         Mood and Affect: Mood normal.         Behavior: Behavior is cooperative.         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  On HD per nephrology  Isosorbide dinitrate  Metoprolol  Diltiazem   Renal diet  Monitor BP  Remove extra fluid  in dialysis         Depression - Primary     Continue mirtazapine and buspirone as currently ordered.  Gradual dose reduction clinically contraindicated at this time.  Continued use is within current practice guidelines and medication reduction may cause return or worsening of symptoms.  Patient is stable and without side effects of therapy and these continue to be monitored.              Medications, treatments, and labs reviewed  Continue medications and treatments as listed in EMR    Scribe Attestation  I, Phoebe Martinez   attest that this documentation has been prepared under the direction and in the presence of Wilbert Lock MD    Provider Attestation - Scribe documentation  All medical record entries made by the Scribe were at my direction and personally dictated by me. I have reviewed the chart and agree that the record accurately reflects my personal performance of the history, physical exam, discussion and plan.   Wilbert Lock MD

## 2024-09-27 NOTE — LETTER
Patient: Melody Martin  : 1935    Encounter Date: 2024    PROGRESS NOTE    Subjective  Chief complaint: Melody Martin is a 89 y.o. female who is a long term care patient being seen and evaluated for depression.    HPI:  HPI  Asked to evaluate patient's continued use of mirtazapine and buspirone.  Patient reported to have good response to medications.  Patient denies feeling down.  No adverse effects to medications.  Patient to continue medications as currently ordered.    Objective  Vital signs: 125/55, 97.4, 60, 18, 97% blood sugar 338    Physical Exam  Constitutional:       General: She is not in acute distress.  Eyes:      Extraocular Movements: Extraocular movements intact.   Cardiovascular:      Rate and Rhythm: Normal rate and regular rhythm.   Pulmonary:      Effort: Pulmonary effort is normal.      Breath sounds: Normal breath sounds.   Abdominal:      General: Bowel sounds are normal.      Palpations: Abdomen is soft.   Musculoskeletal:      Cervical back: Neck supple.      Right lower leg: No edema.      Left lower leg: No edema.   Skin:     Comments: Dressing to left heel clean dry and intact  Dressing to sacrum clean dry and intact   Neurological:      Mental Status: She is alert and oriented to person, place, and time.   Psychiatric:         Mood and Affect: Mood normal.         Behavior: Behavior is cooperative.         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  On HD per nephrology  Isosorbide  dinitrate  Metoprolol  Diltiazem   Renal diet  Monitor BP  Remove extra fluid in dialysis         Depression - Primary     Continue mirtazapine and buspirone as currently ordered.  Gradual dose reduction clinically contraindicated at this time.  Continued use is within current practice guidelines and medication reduction may cause return or worsening of symptoms.  Patient is stable and without side effects of therapy and these continue to be monitored.              Medications, treatments, and labs reviewed  Continue medications and treatments as listed in EMR    Scribe Attestation  I, Carl Martineziboseas   attest that this documentation has been prepared under the direction and in the presence of Wilbert Lock MD    Provider Attestation - Scribe documentation  All medical record entries made by the Scribe were at my direction and personally dictated by me. I have reviewed the chart and agree that the record accurately reflects my personal performance of the history, physical exam, discussion and plan.   Wilbert Lock MD            Electronically Signed By: Wilbert Lock MD   9/28/24 11:58 AM

## 2024-10-02 ENCOUNTER — NURSING HOME VISIT (OUTPATIENT)
Dept: POST ACUTE CARE | Facility: EXTERNAL LOCATION | Age: 89
End: 2024-10-02
Payer: COMMERCIAL

## 2024-10-02 DIAGNOSIS — Z99.2 HYPERTENSIVE HEART AND KIDNEY DISEASE WITH CHRONIC DIASTOLIC CONGESTIVE HEART FAILURE AND STAGE 5 CHRONIC KIDNEY DISEASE ON CHRONIC DIALYSIS: ICD-10-CM

## 2024-10-02 DIAGNOSIS — E11.22 TYPE 2 DIABETES MELLITUS WITH CHRONIC KIDNEY DISEASE ON CHRONIC DIALYSIS, WITH LONG-TERM CURRENT USE OF INSULIN (MULTI): ICD-10-CM

## 2024-10-02 DIAGNOSIS — Z99.2 TYPE 2 DIABETES MELLITUS WITH CHRONIC KIDNEY DISEASE ON CHRONIC DIALYSIS, WITH LONG-TERM CURRENT USE OF INSULIN (MULTI): ICD-10-CM

## 2024-10-02 DIAGNOSIS — Z79.4 TYPE 2 DIABETES MELLITUS WITH CHRONIC KIDNEY DISEASE ON CHRONIC DIALYSIS, WITH LONG-TERM CURRENT USE OF INSULIN (MULTI): ICD-10-CM

## 2024-10-02 DIAGNOSIS — I50.32 HYPERTENSIVE HEART AND KIDNEY DISEASE WITH CHRONIC DIASTOLIC CONGESTIVE HEART FAILURE AND STAGE 5 CHRONIC KIDNEY DISEASE ON CHRONIC DIALYSIS: ICD-10-CM

## 2024-10-02 DIAGNOSIS — L89.623 STAGE III PRESSURE ULCER OF LEFT HEEL (MULTI): Primary | ICD-10-CM

## 2024-10-02 DIAGNOSIS — I13.2 HYPERTENSIVE HEART AND KIDNEY DISEASE WITH CHRONIC DIASTOLIC CONGESTIVE HEART FAILURE AND STAGE 5 CHRONIC KIDNEY DISEASE ON CHRONIC DIALYSIS: ICD-10-CM

## 2024-10-02 DIAGNOSIS — N18.6 HYPERTENSIVE HEART AND KIDNEY DISEASE WITH CHRONIC DIASTOLIC CONGESTIVE HEART FAILURE AND STAGE 5 CHRONIC KIDNEY DISEASE ON CHRONIC DIALYSIS: ICD-10-CM

## 2024-10-02 DIAGNOSIS — N18.6 TYPE 2 DIABETES MELLITUS WITH CHRONIC KIDNEY DISEASE ON CHRONIC DIALYSIS, WITH LONG-TERM CURRENT USE OF INSULIN (MULTI): ICD-10-CM

## 2024-10-02 PROCEDURE — 99308 SBSQ NF CARE LOW MDM 20: CPT | Performed by: INTERNAL MEDICINE

## 2024-10-02 NOTE — ASSESSMENT & PLAN NOTE
Vascular consult due to exposed bone  TX:  irrigate with normal saline, pat dry, apply calcium alginate AG, ABD and wrapped loosely with Kerlix nightly and as needed  Obtain X-ray  Obtain culture    Turn every 2 hours  Prevalon boots  Pressure reducing mattress, patient refusing air mattress, discussed benefits/risks  Cushion to chair  Dietitian consult  Supplements per dietitian  Grab bars to bed to assist with bed mobility and repositioning  Weekly skin checks

## 2024-10-02 NOTE — LETTER
Patient: Melody Martin  : 1935    Encounter Date: 10/02/2024    PROGRESS NOTE    Subjective  Chief complaint: Melody Martin is a 89 y.o. female who is a long term care patient being seen and evaluated for follow-up.    HPI:  HPI  Patient seen in follow-up of left heel x-ray and wound culture due to bone exposed.  Patient is to see vascular outpatient.  Patient appears to be in no acute distress.  Denies nausea vomiting fever chills.    Objective  Vital signs: 151/64, 97.6, 83, 16, 97% blood sugar 198    Physical Exam  Constitutional:       General: She is not in acute distress.  Eyes:      Extraocular Movements: Extraocular movements intact.   Cardiovascular:      Rate and Rhythm: Normal rate and regular rhythm.   Pulmonary:      Effort: Pulmonary effort is normal.      Breath sounds: Normal breath sounds.   Abdominal:      General: Bowel sounds are normal.      Palpations: Abdomen is soft.   Musculoskeletal:      Cervical back: Neck supple.      Right lower leg: No edema.      Left lower leg: No edema.   Skin:     Comments: Dressing to left heel clean dry and intact  Dressing to sacrum clean dry and intact   Neurological:      Mental Status: She is alert and oriented to person, place, and time.   Psychiatric:         Mood and Affect: Mood normal.         Behavior: Behavior is cooperative.         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) - Primary        Vascular consult due to exposed bone  TX:  irrigate with normal saline, pat dry, apply calcium alginate AG, ABD and wrapped loosely with Kerlix nightly and as needed  Obtain X-ray  Obtain culture    Turn every 2 hours  Prevalon boots  Pressure reducing mattress, patient refusing air mattress, discussed benefits/risks  Cushion to chair  Dietitian consult  Supplements per dietitian  Grab bars to bed to assist with bed mobility and repositioning  Weekly skin checks            Medications, treatments, and labs reviewed  Continue medications and treatments as listed in EMR    Scribe Attestation  I, Phoebe Martinez   attest that this documentation has been prepared under the direction and in the presence of Wilbert Lock MD    Provider Attestation - Scribe documentation  All medical record entries made by the Scribe were at my direction and personally dictated by me. I have reviewed the chart and agree that the record accurately reflects my personal performance of the history, physical exam, discussion and plan.   Wilbert Lock MD            Electronically Signed By: Wilbert Lock MD   10/3/24  1:26 PM

## 2024-10-02 NOTE — PROGRESS NOTES
PROGRESS NOTE    Subjective   Chief complaint: Melody Martin is a 89 y.o. female who is a long term care patient being seen and evaluated for follow-up.    HPI:  HPI  Patient seen in follow-up of left heel x-ray and wound culture due to bone exposed.  Patient is to see vascular outpatient.  Patient appears to be in no acute distress.  Denies nausea vomiting fever chills.    Objective   Vital signs: 151/64, 97.6, 83, 16, 97% blood sugar 198    Physical Exam  Constitutional:       General: She is not in acute distress.  Eyes:      Extraocular Movements: Extraocular movements intact.   Cardiovascular:      Rate and Rhythm: Normal rate and regular rhythm.   Pulmonary:      Effort: Pulmonary effort is normal.      Breath sounds: Normal breath sounds.   Abdominal:      General: Bowel sounds are normal.      Palpations: Abdomen is soft.   Musculoskeletal:      Cervical back: Neck supple.      Right lower leg: No edema.      Left lower leg: No edema.   Skin:     Comments: Dressing to left heel clean dry and intact  Dressing to sacrum clean dry and intact   Neurological:      Mental Status: She is alert and oriented to person, place, and time.   Psychiatric:         Mood and Affect: Mood normal.         Behavior: Behavior is cooperative.         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) - Primary       Vascular consult due to exposed bone  TX:  irrigate with normal  saline, pat dry, apply calcium alginate AG, ABD and wrapped loosely with Kerlix nightly and as needed  Obtain X-ray  Obtain culture    Turn every 2 hours  Prevalon boots  Pressure reducing mattress, patient refusing air mattress, discussed benefits/risks  Cushion to chair  Dietitian consult  Supplements per dietitian  Grab bars to bed to assist with bed mobility and repositioning  Weekly skin checks            Medications, treatments, and labs reviewed  Continue medications and treatments as listed in EMR    Scribe Attestation  I, Carl Martinezibe   attest that this documentation has been prepared under the direction and in the presence of Wilbert Lock MD    Provider Attestation - Scribe documentation  All medical record entries made by the Scribe were at my direction and personally dictated by me. I have reviewed the chart and agree that the record accurately reflects my personal performance of the history, physical exam, discussion and plan.   Wilbert Lock MD

## 2024-10-03 ENCOUNTER — NURSING HOME VISIT (OUTPATIENT)
Dept: POST ACUTE CARE | Facility: EXTERNAL LOCATION | Age: 89
End: 2024-10-03
Payer: COMMERCIAL

## 2024-10-03 DIAGNOSIS — I13.2 HYPERTENSIVE HEART AND KIDNEY DISEASE WITH CHRONIC DIASTOLIC CONGESTIVE HEART FAILURE AND STAGE 5 CHRONIC KIDNEY DISEASE ON CHRONIC DIALYSIS: ICD-10-CM

## 2024-10-03 DIAGNOSIS — L89.153 STAGE III PRESSURE ULCER OF SACRAL REGION (MULTI): Primary | ICD-10-CM

## 2024-10-03 DIAGNOSIS — N18.6 HYPERTENSIVE HEART AND KIDNEY DISEASE WITH CHRONIC DIASTOLIC CONGESTIVE HEART FAILURE AND STAGE 5 CHRONIC KIDNEY DISEASE ON CHRONIC DIALYSIS: ICD-10-CM

## 2024-10-03 DIAGNOSIS — Z99.2 HYPERTENSIVE HEART AND KIDNEY DISEASE WITH CHRONIC DIASTOLIC CONGESTIVE HEART FAILURE AND STAGE 5 CHRONIC KIDNEY DISEASE ON CHRONIC DIALYSIS: ICD-10-CM

## 2024-10-03 DIAGNOSIS — L89.624 PRESSURE ULCER OF LEFT HEEL, STAGE 4 (MULTI): ICD-10-CM

## 2024-10-03 DIAGNOSIS — I50.32 HYPERTENSIVE HEART AND KIDNEY DISEASE WITH CHRONIC DIASTOLIC CONGESTIVE HEART FAILURE AND STAGE 5 CHRONIC KIDNEY DISEASE ON CHRONIC DIALYSIS: ICD-10-CM

## 2024-10-03 PROCEDURE — 99309 SBSQ NF CARE MODERATE MDM 30: CPT | Performed by: NURSE PRACTITIONER

## 2024-10-03 NOTE — LETTER
Patient: Melody Martin  : 1935    Encounter Date: 10/03/2024    PROGRESS NOTE    Subjective  Chief complaint: Melody Martin is a 89 y.o. female who is a long term care patient being seen and evaluated for follow-up on wounds.    HPI:  HPIPatient presents for follow-up wounds. Xray and wound culture left heel ordered at last visit d/t bone exposed/wound worsened. No results yet for some reason. Patient seen today in bed with feet on pillow. She has had air mattress in place. Patient is TD for mobility and hygiene and incontinent of bowel and bladder. Awaiting to be seen by vascular.      Objective  Vital signs: 151/64-97.6-83-16-97%     Physical Exam  Constitutional:       General: She is not in acute distress.  Eyes:      Extraocular Movements: Extraocular movements intact.   Pulmonary:      Effort: Pulmonary effort is normal.   Musculoskeletal:      Cervical back: Neck supple.   Skin:     Comments: Wound location -left heel  Etiology - stage IV pressure ulcer  Granulation - medium  Slough - medium eschar and adherent  Edges - flat  Odor - none  Drainage - medium serosanguineous  Size - 6 x 5 x UTD cm  Undermining -none    Wound location -sacrum  Etiology - stage III pressure ulcer  Granulation - large  Slough - small  Edges - flat, macerated  Odor - none  Drainage - small serosanguineous  Size - 5 x 6.5 x 0.2 cm  Undermining -none   Neurological:      Mental Status: She is alert.   Psychiatric:         Mood and Affect: Mood normal.         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  On HD per nephrology  Isosorbide dinitrate  Metoprolol  Diltiazem   Renal diet  Monitor BP  Remove extra fluid in dialysis         Pressure ulcer of left heel, stage 4 (Multi)     The wound has declined  Nursing staff to obtain  results of x-ray and culture, Prevalon boots at all times except for hygiene, will refer to wound care center         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 (Multi) - Primary     Wound has worsened  Will change treatment to irrigate with normal saline, pat dry, apply collagen powder with calmoseptine, continue to offload pressure          Medications, treatments, and labs reviewed  Continue medications and treatments as listed in EMR    Scribe Attestation  I, Phoebe Bennett   attest that this documentation has been prepared under the direction and in the presence of MAR Johnson    Provider Attestation - Scribe documentation  All medical record entries made by the Scribe were at my direction and personally dictated by me. I have reviewed the chart and agree that the record accurately reflects my personal performance of the history, physical exam, discussion and plan.   MAR Johnson            Electronically Signed By: MAR Johnson   10/26/24  6:02 PM

## 2024-10-04 ENCOUNTER — NURSING HOME VISIT (OUTPATIENT)
Dept: POST ACUTE CARE | Facility: EXTERNAL LOCATION | Age: 89
End: 2024-10-04
Payer: COMMERCIAL

## 2024-10-04 DIAGNOSIS — Z79.4 TYPE 2 DIABETES MELLITUS WITH CHRONIC KIDNEY DISEASE ON CHRONIC DIALYSIS, WITH LONG-TERM CURRENT USE OF INSULIN (MULTI): ICD-10-CM

## 2024-10-04 DIAGNOSIS — L89.623 STAGE III PRESSURE ULCER OF LEFT HEEL (MULTI): Primary | ICD-10-CM

## 2024-10-04 DIAGNOSIS — N18.6 TYPE 2 DIABETES MELLITUS WITH CHRONIC KIDNEY DISEASE ON CHRONIC DIALYSIS, WITH LONG-TERM CURRENT USE OF INSULIN (MULTI): ICD-10-CM

## 2024-10-04 DIAGNOSIS — I50.32 HYPERTENSIVE HEART AND KIDNEY DISEASE WITH CHRONIC DIASTOLIC CONGESTIVE HEART FAILURE AND STAGE 5 CHRONIC KIDNEY DISEASE ON CHRONIC DIALYSIS: ICD-10-CM

## 2024-10-04 DIAGNOSIS — Z99.2 HYPERTENSIVE HEART AND KIDNEY DISEASE WITH CHRONIC DIASTOLIC CONGESTIVE HEART FAILURE AND STAGE 5 CHRONIC KIDNEY DISEASE ON CHRONIC DIALYSIS: ICD-10-CM

## 2024-10-04 DIAGNOSIS — E11.22 TYPE 2 DIABETES MELLITUS WITH CHRONIC KIDNEY DISEASE ON CHRONIC DIALYSIS, WITH LONG-TERM CURRENT USE OF INSULIN (MULTI): ICD-10-CM

## 2024-10-04 DIAGNOSIS — Z99.2 TYPE 2 DIABETES MELLITUS WITH CHRONIC KIDNEY DISEASE ON CHRONIC DIALYSIS, WITH LONG-TERM CURRENT USE OF INSULIN (MULTI): ICD-10-CM

## 2024-10-04 DIAGNOSIS — I13.2 HYPERTENSIVE HEART AND KIDNEY DISEASE WITH CHRONIC DIASTOLIC CONGESTIVE HEART FAILURE AND STAGE 5 CHRONIC KIDNEY DISEASE ON CHRONIC DIALYSIS: ICD-10-CM

## 2024-10-04 DIAGNOSIS — N18.6 HYPERTENSIVE HEART AND KIDNEY DISEASE WITH CHRONIC DIASTOLIC CONGESTIVE HEART FAILURE AND STAGE 5 CHRONIC KIDNEY DISEASE ON CHRONIC DIALYSIS: ICD-10-CM

## 2024-10-04 PROCEDURE — 99308 SBSQ NF CARE LOW MDM 20: CPT | Performed by: INTERNAL MEDICINE

## 2024-10-04 NOTE — ASSESSMENT & PLAN NOTE
Vascular consult due to exposed bone  F/u with wound center for wound care  Obtain X-ray  Obtain culture    Turn every 2 hours  Prevalon boots  Pressure reducing mattress, patient refusing air mattress, discussed benefits/risks  Cushion to chair  Dietitian consult  Supplements per dietitian  Grab bars to bed to assist with bed mobility and repositioning  Weekly skin checks

## 2024-10-04 NOTE — LETTER
Patient: Melody Martin  : 1935    Encounter Date: 10/04/2024    PROGRESS NOTE    Subjective  Chief complaint: Melody Martin is a 89 y.o. female who is a long term care patient being seen and evaluated for heel wound.    HPI:  HPI  Patient seen in follow-up of left heel wound, bone is exposed and patient to have vascular evaluation and to follow-up with the wound center for wound care.  Patient appears to be in no acute distress.    Objective  Vital signs: 151/64, 97.6, 83, 16, 97% blood sugar 150    Physical Exam  Constitutional:       General: She is not in acute distress.  Eyes:      Extraocular Movements: Extraocular movements intact.   Cardiovascular:      Rate and Rhythm: Normal rate and regular rhythm.   Pulmonary:      Effort: Pulmonary effort is normal.      Breath sounds: Normal breath sounds.   Abdominal:      General: Bowel sounds are normal.      Palpations: Abdomen is soft.   Musculoskeletal:      Cervical back: Neck supple.      Right lower leg: No edema.      Left lower leg: No edema.   Skin:     Comments: Dressing to left heel clean dry and intact  Dressing to sacrum clean dry and intact   Neurological:      Mental Status: She is alert and oriented to person, place, and time.   Psychiatric:         Mood and Affect: Mood normal.         Behavior: Behavior is cooperative.         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) - Primary        Vascular consult due to exposed bone  F/u with wound center for wound care  Obtain X-ray  Obtain culture    Turn every 2 hours  Prevalon boots  Pressure reducing mattress, patient refusing air mattress, discussed benefits/risks  Cushion to chair  Dietitian consult  Supplements per dietitian  Grab bars to bed to assist with bed mobility and repositioning  Weekly skin checks            Medications, treatments, and labs reviewed  Continue medications and treatments as listed in EMR    Scribe Attestation  I, Phoebe Martinez   attest that this documentation has been prepared under the direction and in the presence of Wilbert Lock MD    Provider Attestation - Scribe documentation  All medical record entries made by the Scribe were at my direction and personally dictated by me. I have reviewed the chart and agree that the record accurately reflects my personal performance of the history, physical exam, discussion and plan.   Wilbert Lock MD            Electronically Signed By: Wilbert Lock MD   10/5/24  1:50 PM

## 2024-10-04 NOTE — PROGRESS NOTES
PROGRESS NOTE    Subjective   Chief complaint: Melody Martin is a 89 y.o. female who is a long term care patient being seen and evaluated for heel wound.    HPI:  HPI  Patient seen in follow-up of left heel wound, bone is exposed and patient to have vascular evaluation and to follow-up with the wound center for wound care.  Patient appears to be in no acute distress.    Objective   Vital signs: 151/64, 97.6, 83, 16, 97% blood sugar 150    Physical Exam  Constitutional:       General: She is not in acute distress.  Eyes:      Extraocular Movements: Extraocular movements intact.   Cardiovascular:      Rate and Rhythm: Normal rate and regular rhythm.   Pulmonary:      Effort: Pulmonary effort is normal.      Breath sounds: Normal breath sounds.   Abdominal:      General: Bowel sounds are normal.      Palpations: Abdomen is soft.   Musculoskeletal:      Cervical back: Neck supple.      Right lower leg: No edema.      Left lower leg: No edema.   Skin:     Comments: Dressing to left heel clean dry and intact  Dressing to sacrum clean dry and intact   Neurological:      Mental Status: She is alert and oriented to person, place, and time.   Psychiatric:         Mood and Affect: Mood normal.         Behavior: Behavior is cooperative.         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) - Primary       Vascular consult due to exposed bone  F/u with wound center for  wound care  Obtain X-ray  Obtain culture    Turn every 2 hours  Prevalon boots  Pressure reducing mattress, patient refusing air mattress, discussed benefits/risks  Cushion to chair  Dietitian consult  Supplements per dietitian  Grab bars to bed to assist with bed mobility and repositioning  Weekly skin checks            Medications, treatments, and labs reviewed  Continue medications and treatments as listed in EMR    Scribe Attestation  I, Hien Conn, Scribe   attest that this documentation has been prepared under the direction and in the presence of Wilbert Lock MD    Provider Attestation - Scribe documentation  All medical record entries made by the Scribe were at my direction and personally dictated by me. I have reviewed the chart and agree that the record accurately reflects my personal performance of the history, physical exam, discussion and plan.   Wilbert Lock MD

## 2024-10-08 ENCOUNTER — APPOINTMENT (OUTPATIENT)
Dept: RADIOLOGY | Facility: HOSPITAL | Age: 89
End: 2024-10-08
Payer: COMMERCIAL

## 2024-10-08 ENCOUNTER — HOSPITAL ENCOUNTER (INPATIENT)
Facility: HOSPITAL | Age: 89
LOS: 2 days | Discharge: NURSING, REHAB OR LONG-TERM CARE | End: 2024-10-11
Attending: EMERGENCY MEDICINE | Admitting: INTERNAL MEDICINE
Payer: COMMERCIAL

## 2024-10-08 DIAGNOSIS — Z79.4 TYPE 2 DIABETES MELLITUS WITHOUT COMPLICATION, WITH LONG-TERM CURRENT USE OF INSULIN (MULTI): ICD-10-CM

## 2024-10-08 DIAGNOSIS — E11.22 TYPE 2 DIABETES MELLITUS WITH CHRONIC KIDNEY DISEASE ON CHRONIC DIALYSIS, WITH LONG-TERM CURRENT USE OF INSULIN (MULTI): ICD-10-CM

## 2024-10-08 DIAGNOSIS — N18.6 TYPE 2 DIABETES MELLITUS WITH CHRONIC KIDNEY DISEASE ON CHRONIC DIALYSIS, WITH LONG-TERM CURRENT USE OF INSULIN (MULTI): ICD-10-CM

## 2024-10-08 DIAGNOSIS — E46 PROTEIN-CALORIE MALNUTRITION, UNSPECIFIED SEVERITY (MULTI): ICD-10-CM

## 2024-10-08 DIAGNOSIS — Z99.2 TYPE 2 DIABETES MELLITUS WITH CHRONIC KIDNEY DISEASE ON CHRONIC DIALYSIS, WITH LONG-TERM CURRENT USE OF INSULIN (MULTI): ICD-10-CM

## 2024-10-08 DIAGNOSIS — E11.9 TYPE 2 DIABETES MELLITUS WITHOUT COMPLICATION, WITH LONG-TERM CURRENT USE OF INSULIN (MULTI): ICD-10-CM

## 2024-10-08 DIAGNOSIS — L03.116 CELLULITIS OF LEFT LOWER EXTREMITY: Primary | ICD-10-CM

## 2024-10-08 DIAGNOSIS — Z78.9 FAILURE OF OUTPATIENT TREATMENT: ICD-10-CM

## 2024-10-08 DIAGNOSIS — Z79.4 TYPE 2 DIABETES MELLITUS WITH CHRONIC KIDNEY DISEASE ON CHRONIC DIALYSIS, WITH LONG-TERM CURRENT USE OF INSULIN (MULTI): ICD-10-CM

## 2024-10-08 PROBLEM — L89.624: Status: ACTIVE | Noted: 2023-10-12

## 2024-10-08 PROCEDURE — 87205 SMEAR GRAM STAIN: CPT | Mod: PORLAB | Performed by: EMERGENCY MEDICINE

## 2024-10-08 PROCEDURE — 36415 COLL VENOUS BLD VENIPUNCTURE: CPT | Performed by: EMERGENCY MEDICINE

## 2024-10-08 PROCEDURE — 80053 COMPREHEN METABOLIC PANEL: CPT | Performed by: EMERGENCY MEDICINE

## 2024-10-08 PROCEDURE — 86140 C-REACTIVE PROTEIN: CPT | Performed by: EMERGENCY MEDICINE

## 2024-10-08 PROCEDURE — 87070 CULTURE OTHR SPECIMN AEROBIC: CPT | Mod: PORLAB | Performed by: EMERGENCY MEDICINE

## 2024-10-08 PROCEDURE — 85025 COMPLETE CBC W/AUTO DIFF WBC: CPT | Performed by: EMERGENCY MEDICINE

## 2024-10-08 PROCEDURE — 85652 RBC SED RATE AUTOMATED: CPT | Performed by: EMERGENCY MEDICINE

## 2024-10-08 PROCEDURE — 99285 EMERGENCY DEPT VISIT HI MDM: CPT

## 2024-10-08 PROCEDURE — 73630 X-RAY EXAM OF FOOT: CPT | Mod: LT

## 2024-10-08 PROCEDURE — 87040 BLOOD CULTURE FOR BACTERIA: CPT | Mod: PORLAB | Performed by: EMERGENCY MEDICINE

## 2024-10-08 PROCEDURE — 83605 ASSAY OF LACTIC ACID: CPT | Performed by: EMERGENCY MEDICINE

## 2024-10-08 PROCEDURE — 73630 X-RAY EXAM OF FOOT: CPT | Mod: LEFT SIDE | Performed by: RADIOLOGY

## 2024-10-08 ASSESSMENT — LIFESTYLE VARIABLES
EVER HAD A DRINK FIRST THING IN THE MORNING TO STEADY YOUR NERVES TO GET RID OF A HANGOVER: NO
HAVE YOU EVER FELT YOU SHOULD CUT DOWN ON YOUR DRINKING: NO
EVER FELT BAD OR GUILTY ABOUT YOUR DRINKING: NO
HAVE PEOPLE ANNOYED YOU BY CRITICIZING YOUR DRINKING: NO
TOTAL SCORE: 0

## 2024-10-08 ASSESSMENT — COLUMBIA-SUICIDE SEVERITY RATING SCALE - C-SSRS
2. HAVE YOU ACTUALLY HAD ANY THOUGHTS OF KILLING YOURSELF?: NO
6. HAVE YOU EVER DONE ANYTHING, STARTED TO DO ANYTHING, OR PREPARED TO DO ANYTHING TO END YOUR LIFE?: NO
1. IN THE PAST MONTH, HAVE YOU WISHED YOU WERE DEAD OR WISHED YOU COULD GO TO SLEEP AND NOT WAKE UP?: NO

## 2024-10-08 ASSESSMENT — PAIN DESCRIPTION - PAIN TYPE: TYPE: ACUTE PAIN

## 2024-10-08 ASSESSMENT — PAIN - FUNCTIONAL ASSESSMENT: PAIN_FUNCTIONAL_ASSESSMENT: 0-10

## 2024-10-08 ASSESSMENT — PAIN SCALES - GENERAL: PAINLEVEL_OUTOF10: 0 - NO PAIN

## 2024-10-09 ENCOUNTER — APPOINTMENT (OUTPATIENT)
Dept: DIALYSIS | Facility: HOSPITAL | Age: 89
End: 2024-10-09
Payer: COMMERCIAL

## 2024-10-09 PROBLEM — L03.116 CELLULITIS OF LEFT LOWER EXTREMITY: Status: ACTIVE | Noted: 2024-10-09

## 2024-10-09 LAB
ALBUMIN SERPL BCP-MCNC: 1.9 G/DL (ref 3.4–5)
ALBUMIN SERPL BCP-MCNC: 2.1 G/DL (ref 3.4–5)
ALP SERPL-CCNC: 87 U/L (ref 33–136)
ALT SERPL W P-5'-P-CCNC: 11 U/L (ref 7–45)
ANION GAP SERPL CALC-SCNC: 10 MMOL/L (ref 10–20)
ANION GAP SERPL CALC-SCNC: 12 MMOL/L (ref 10–20)
AST SERPL W P-5'-P-CCNC: 20 U/L (ref 9–39)
BASOPHILS # BLD AUTO: 0.08 X10*3/UL (ref 0–0.1)
BASOPHILS NFR BLD AUTO: 0.8 %
BILIRUB SERPL-MCNC: 0.3 MG/DL (ref 0–1.2)
BUN SERPL-MCNC: 22 MG/DL (ref 6–23)
BUN SERPL-MCNC: 24 MG/DL (ref 6–23)
CALCIUM SERPL-MCNC: 7.3 MG/DL (ref 8.6–10.3)
CALCIUM SERPL-MCNC: 7.7 MG/DL (ref 8.6–10.3)
CHLORIDE SERPL-SCNC: 102 MMOL/L (ref 98–107)
CHLORIDE SERPL-SCNC: 99 MMOL/L (ref 98–107)
CO2 SERPL-SCNC: 28 MMOL/L (ref 21–32)
CO2 SERPL-SCNC: 32 MMOL/L (ref 21–32)
CREAT SERPL-MCNC: 2.65 MG/DL (ref 0.5–1.05)
CREAT SERPL-MCNC: 2.81 MG/DL (ref 0.5–1.05)
CRP SERPL-MCNC: 2.81 MG/DL
CRP SERPL-MCNC: 3.13 MG/DL
EGFRCR SERPLBLD CKD-EPI 2021: 16 ML/MIN/1.73M*2
EGFRCR SERPLBLD CKD-EPI 2021: 17 ML/MIN/1.73M*2
EOSINOPHIL # BLD AUTO: 0.27 X10*3/UL (ref 0–0.4)
EOSINOPHIL NFR BLD AUTO: 2.7 %
ERYTHROCYTE [DISTWIDTH] IN BLOOD BY AUTOMATED COUNT: 17 % (ref 11.5–14.5)
ERYTHROCYTE [DISTWIDTH] IN BLOOD BY AUTOMATED COUNT: 17 % (ref 11.5–14.5)
ERYTHROCYTE [SEDIMENTATION RATE] IN BLOOD BY WESTERGREN METHOD: 45 MM/H (ref 0–30)
ERYTHROCYTE [SEDIMENTATION RATE] IN BLOOD BY WESTERGREN METHOD: 57 MM/H (ref 0–30)
GLUCOSE BLD MANUAL STRIP-MCNC: 101 MG/DL (ref 74–99)
GLUCOSE BLD MANUAL STRIP-MCNC: 161 MG/DL (ref 74–99)
GLUCOSE BLD MANUAL STRIP-MCNC: 183 MG/DL (ref 74–99)
GLUCOSE BLD MANUAL STRIP-MCNC: 198 MG/DL (ref 74–99)
GLUCOSE SERPL-MCNC: 184 MG/DL (ref 74–99)
GLUCOSE SERPL-MCNC: 251 MG/DL (ref 74–99)
HBV SURFACE AB SER-ACNC: <3.1 MIU/ML
HBV SURFACE AG SERPL QL IA: NONREACTIVE
HCT VFR BLD AUTO: 30.3 % (ref 36–46)
HCT VFR BLD AUTO: 33.4 % (ref 36–46)
HGB BLD-MCNC: 10.2 G/DL (ref 12–16)
HGB BLD-MCNC: 9.3 G/DL (ref 12–16)
IMM GRANULOCYTES # BLD AUTO: 0.09 X10*3/UL (ref 0–0.5)
IMM GRANULOCYTES NFR BLD AUTO: 0.9 % (ref 0–0.9)
LACTATE SERPL-SCNC: 2.1 MMOL/L (ref 0.4–2)
LACTATE SERPL-SCNC: 2.6 MMOL/L (ref 0.4–2)
LACTATE SERPL-SCNC: 3.5 MMOL/L (ref 0.4–2)
LYMPHOCYTES # BLD AUTO: 2.38 X10*3/UL (ref 0.8–3)
LYMPHOCYTES NFR BLD AUTO: 24.2 %
MAGNESIUM SERPL-MCNC: 1.73 MG/DL (ref 1.6–2.4)
MCH RBC QN AUTO: 29 PG (ref 26–34)
MCH RBC QN AUTO: 29.1 PG (ref 26–34)
MCHC RBC AUTO-ENTMCNC: 30.5 G/DL (ref 32–36)
MCHC RBC AUTO-ENTMCNC: 30.7 G/DL (ref 32–36)
MCV RBC AUTO: 94 FL (ref 80–100)
MCV RBC AUTO: 95 FL (ref 80–100)
MONOCYTES # BLD AUTO: 0.91 X10*3/UL (ref 0.05–0.8)
MONOCYTES NFR BLD AUTO: 9.2 %
NEUTROPHILS # BLD AUTO: 6.12 X10*3/UL (ref 1.6–5.5)
NEUTROPHILS NFR BLD AUTO: 62.2 %
NRBC BLD-RTO: 0 /100 WBCS (ref 0–0)
NRBC BLD-RTO: 0 /100 WBCS (ref 0–0)
PHOSPHATE SERPL-MCNC: 3.1 MG/DL (ref 2.5–4.9)
PLATELET # BLD AUTO: 281 X10*3/UL (ref 150–450)
PLATELET # BLD AUTO: 314 X10*3/UL (ref 150–450)
POTASSIUM SERPL-SCNC: 3.6 MMOL/L (ref 3.5–5.3)
POTASSIUM SERPL-SCNC: 3.7 MMOL/L (ref 3.5–5.3)
PROT SERPL-MCNC: 5 G/DL (ref 6.4–8.2)
RBC # BLD AUTO: 3.21 X10*6/UL (ref 4–5.2)
RBC # BLD AUTO: 3.51 X10*6/UL (ref 4–5.2)
SODIUM SERPL-SCNC: 137 MMOL/L (ref 136–145)
SODIUM SERPL-SCNC: 138 MMOL/L (ref 136–145)
WBC # BLD AUTO: 10 X10*3/UL (ref 4.4–11.3)
WBC # BLD AUTO: 9.9 X10*3/UL (ref 4.4–11.3)

## 2024-10-09 PROCEDURE — 96367 TX/PROPH/DG ADDL SEQ IV INF: CPT

## 2024-10-09 PROCEDURE — 99233 SBSQ HOSP IP/OBS HIGH 50: CPT | Performed by: FAMILY MEDICINE

## 2024-10-09 PROCEDURE — 96365 THER/PROPH/DIAG IV INF INIT: CPT | Mod: 59

## 2024-10-09 PROCEDURE — 5A1D70Z PERFORMANCE OF URINARY FILTRATION, INTERMITTENT, LESS THAN 6 HOURS PER DAY: ICD-10-PCS | Performed by: INTERNAL MEDICINE

## 2024-10-09 PROCEDURE — 83735 ASSAY OF MAGNESIUM: CPT | Performed by: INTERNAL MEDICINE

## 2024-10-09 PROCEDURE — 2500000004 HC RX 250 GENERAL PHARMACY W/ HCPCS (ALT 636 FOR OP/ED): Performed by: INTERNAL MEDICINE

## 2024-10-09 PROCEDURE — 2500000001 HC RX 250 WO HCPCS SELF ADMINISTERED DRUGS (ALT 637 FOR MEDICARE OP): Performed by: INTERNAL MEDICINE

## 2024-10-09 PROCEDURE — 96366 THER/PROPH/DIAG IV INF ADDON: CPT

## 2024-10-09 PROCEDURE — 2500000004 HC RX 250 GENERAL PHARMACY W/ HCPCS (ALT 636 FOR OP/ED): Mod: JZ | Performed by: STUDENT IN AN ORGANIZED HEALTH CARE EDUCATION/TRAINING PROGRAM

## 2024-10-09 PROCEDURE — 36415 COLL VENOUS BLD VENIPUNCTURE: CPT | Performed by: INTERNAL MEDICINE

## 2024-10-09 PROCEDURE — 2500000002 HC RX 250 W HCPCS SELF ADMINISTERED DRUGS (ALT 637 FOR MEDICARE OP, ALT 636 FOR OP/ED): Performed by: INTERNAL MEDICINE

## 2024-10-09 PROCEDURE — 87340 HEPATITIS B SURFACE AG IA: CPT | Mod: PORLAB | Performed by: INTERNAL MEDICINE

## 2024-10-09 PROCEDURE — 85652 RBC SED RATE AUTOMATED: CPT | Performed by: INTERNAL MEDICINE

## 2024-10-09 PROCEDURE — 82947 ASSAY GLUCOSE BLOOD QUANT: CPT

## 2024-10-09 PROCEDURE — 8010000001 HC DIALYSIS - HEMODIALYSIS PER DAY

## 2024-10-09 PROCEDURE — 2500000004 HC RX 250 GENERAL PHARMACY W/ HCPCS (ALT 636 FOR OP/ED): Performed by: EMERGENCY MEDICINE

## 2024-10-09 PROCEDURE — 86140 C-REACTIVE PROTEIN: CPT | Performed by: INTERNAL MEDICINE

## 2024-10-09 PROCEDURE — 1200000002 HC GENERAL ROOM WITH TELEMETRY DAILY

## 2024-10-09 PROCEDURE — 80069 RENAL FUNCTION PANEL: CPT | Performed by: INTERNAL MEDICINE

## 2024-10-09 PROCEDURE — 99223 1ST HOSP IP/OBS HIGH 75: CPT | Performed by: INTERNAL MEDICINE

## 2024-10-09 PROCEDURE — 85027 COMPLETE CBC AUTOMATED: CPT | Performed by: INTERNAL MEDICINE

## 2024-10-09 PROCEDURE — 83605 ASSAY OF LACTIC ACID: CPT | Performed by: EMERGENCY MEDICINE

## 2024-10-09 PROCEDURE — 86706 HEP B SURFACE ANTIBODY: CPT | Mod: PORLAB | Performed by: INTERNAL MEDICINE

## 2024-10-09 RX ORDER — ALBUTEROL SULFATE 90 UG/1
2 INHALANT RESPIRATORY (INHALATION) EVERY 4 HOURS PRN
Status: DISCONTINUED | OUTPATIENT
Start: 2024-10-09 | End: 2024-10-11 | Stop reason: HOSPADM

## 2024-10-09 RX ORDER — BUSPIRONE HYDROCHLORIDE 5 MG/1
10 TABLET ORAL 2 TIMES DAILY
Status: DISCONTINUED | OUTPATIENT
Start: 2024-10-09 | End: 2024-10-11 | Stop reason: HOSPADM

## 2024-10-09 RX ORDER — VANCOMYCIN HYDROCHLORIDE 1 G/20ML
INJECTION, POWDER, LYOPHILIZED, FOR SOLUTION INTRAVENOUS DAILY PRN
Status: DISCONTINUED | OUTPATIENT
Start: 2024-10-09 | End: 2024-10-09

## 2024-10-09 RX ORDER — POLYETHYLENE GLYCOL 3350 17 G/17G
17 POWDER, FOR SOLUTION ORAL EVERY 12 HOURS PRN
Status: DISCONTINUED | OUTPATIENT
Start: 2024-10-09 | End: 2024-10-11 | Stop reason: HOSPADM

## 2024-10-09 RX ORDER — HEPARIN SODIUM 1000 [USP'U]/ML
2000 INJECTION, SOLUTION INTRAVENOUS; SUBCUTANEOUS
Status: DISCONTINUED | OUTPATIENT
Start: 2024-10-10 | End: 2024-10-11 | Stop reason: HOSPADM

## 2024-10-09 RX ORDER — AMOXICILLIN 250 MG
1 CAPSULE ORAL 2 TIMES DAILY
Status: DISCONTINUED | OUTPATIENT
Start: 2024-10-09 | End: 2024-10-11 | Stop reason: HOSPADM

## 2024-10-09 RX ORDER — INSULIN LISPRO 100 [IU]/ML
0-10 INJECTION, SOLUTION INTRAVENOUS; SUBCUTANEOUS
Status: DISCONTINUED | OUTPATIENT
Start: 2024-10-09 | End: 2024-10-09

## 2024-10-09 RX ORDER — CLOPIDOGREL BISULFATE 75 MG/1
75 TABLET ORAL DAILY
Status: DISCONTINUED | OUTPATIENT
Start: 2024-10-09 | End: 2024-10-11 | Stop reason: HOSPADM

## 2024-10-09 RX ORDER — METOPROLOL SUCCINATE 50 MG/1
100 TABLET, EXTENDED RELEASE ORAL DAILY
Status: DISCONTINUED | OUTPATIENT
Start: 2024-10-09 | End: 2024-10-11 | Stop reason: HOSPADM

## 2024-10-09 RX ORDER — PANTOPRAZOLE SODIUM 40 MG/1
40 TABLET, DELAYED RELEASE ORAL DAILY
Status: DISCONTINUED | OUTPATIENT
Start: 2024-10-09 | End: 2024-10-11 | Stop reason: HOSPADM

## 2024-10-09 RX ORDER — DILTIAZEM HYDROCHLORIDE 120 MG/1
240 CAPSULE, COATED, EXTENDED RELEASE ORAL NIGHTLY
Status: DISCONTINUED | OUTPATIENT
Start: 2024-10-09 | End: 2024-10-11 | Stop reason: HOSPADM

## 2024-10-09 RX ORDER — ONDANSETRON HYDROCHLORIDE 2 MG/ML
4 INJECTION, SOLUTION INTRAVENOUS EVERY 6 HOURS PRN
Status: DISCONTINUED | OUTPATIENT
Start: 2024-10-09 | End: 2024-10-11 | Stop reason: HOSPADM

## 2024-10-09 RX ORDER — INSULIN LISPRO 100 [IU]/ML
0-10 INJECTION, SOLUTION INTRAVENOUS; SUBCUTANEOUS
Status: DISCONTINUED | OUTPATIENT
Start: 2024-10-09 | End: 2024-10-11 | Stop reason: HOSPADM

## 2024-10-09 RX ORDER — VANCOMYCIN HYDROCHLORIDE 125 MG/1
125 CAPSULE ORAL 4 TIMES DAILY
Status: DISCONTINUED | OUTPATIENT
Start: 2024-10-09 | End: 2024-10-09

## 2024-10-09 RX ORDER — ATORVASTATIN CALCIUM 40 MG/1
80 TABLET, FILM COATED ORAL NIGHTLY
Status: DISCONTINUED | OUTPATIENT
Start: 2024-10-09 | End: 2024-10-11 | Stop reason: HOSPADM

## 2024-10-09 RX ORDER — ISOSORBIDE DINITRATE 10 MG/1
10 TABLET ORAL 3 TIMES DAILY
Status: DISCONTINUED | OUTPATIENT
Start: 2024-10-09 | End: 2024-10-11 | Stop reason: HOSPADM

## 2024-10-09 RX ORDER — AMOXICILLIN AND CLAVULANATE POTASSIUM 500; 125 MG/1; MG/1
1 TABLET, FILM COATED ORAL 2 TIMES DAILY
COMMUNITY
Start: 2024-10-04 | End: 2024-10-11 | Stop reason: HOSPADM

## 2024-10-09 RX ORDER — MIRTAZAPINE 7.5 MG/1
7.5 TABLET, FILM COATED ORAL NIGHTLY
Status: DISCONTINUED | OUTPATIENT
Start: 2024-10-09 | End: 2024-10-11 | Stop reason: HOSPADM

## 2024-10-09 RX ORDER — DOCUSATE SODIUM 100 MG/1
100 CAPSULE, LIQUID FILLED ORAL DAILY
COMMUNITY

## 2024-10-09 RX ORDER — VANCOMYCIN HYDROCHLORIDE 1 G/20ML
INJECTION, POWDER, LYOPHILIZED, FOR SOLUTION INTRAVENOUS DAILY PRN
Status: DISCONTINUED | OUTPATIENT
Start: 2024-10-09 | End: 2024-10-11 | Stop reason: HOSPADM

## 2024-10-09 RX ORDER — CEFEPIME 1 G/50ML
1 INJECTION, SOLUTION INTRAVENOUS EVERY 24 HOURS
Status: DISCONTINUED | OUTPATIENT
Start: 2024-10-09 | End: 2024-10-11 | Stop reason: HOSPADM

## 2024-10-09 RX ORDER — ACETAMINOPHEN 325 MG/1
650 TABLET ORAL EVERY 4 HOURS PRN
Status: DISCONTINUED | OUTPATIENT
Start: 2024-10-09 | End: 2024-10-11 | Stop reason: HOSPADM

## 2024-10-09 RX ORDER — VANCOMYCIN HYDROCHLORIDE 500 MG/100ML
500 INJECTION, SOLUTION INTRAVENOUS ONCE
Status: COMPLETED | OUTPATIENT
Start: 2024-10-09 | End: 2024-10-09

## 2024-10-09 RX ORDER — AMIODARONE HYDROCHLORIDE 200 MG/1
200 TABLET ORAL DAILY
Status: DISCONTINUED | OUTPATIENT
Start: 2024-10-09 | End: 2024-10-11 | Stop reason: HOSPADM

## 2024-10-09 RX ORDER — INSULIN GLARGINE 100 [IU]/ML
20 INJECTION, SOLUTION SUBCUTANEOUS NIGHTLY
Status: DISCONTINUED | OUTPATIENT
Start: 2024-10-09 | End: 2024-10-11 | Stop reason: HOSPADM

## 2024-10-09 SDOH — SOCIAL STABILITY: SOCIAL INSECURITY: WITHIN THE LAST YEAR, HAVE YOU BEEN AFRAID OF YOUR PARTNER OR EX-PARTNER?: NO

## 2024-10-09 SDOH — SOCIAL STABILITY: SOCIAL INSECURITY
WITHIN THE LAST YEAR, HAVE YOU BEEN RAPED OR FORCED TO HAVE ANY KIND OF SEXUAL ACTIVITY BY YOUR PARTNER OR EX-PARTNER?: NO

## 2024-10-09 SDOH — SOCIAL STABILITY: SOCIAL INSECURITY: WITHIN THE LAST YEAR, HAVE YOU BEEN HUMILIATED OR EMOTIONALLY ABUSED IN OTHER WAYS BY YOUR PARTNER OR EX-PARTNER?: NO

## 2024-10-09 SDOH — ECONOMIC STABILITY: INCOME INSECURITY: IN THE PAST 12 MONTHS, HAS THE ELECTRIC, GAS, OIL, OR WATER COMPANY THREATENED TO SHUT OFF SERVICE IN YOUR HOME?: NO

## 2024-10-09 SDOH — ECONOMIC STABILITY: FOOD INSECURITY: WITHIN THE PAST 12 MONTHS, THE FOOD YOU BOUGHT JUST DIDN'T LAST AND YOU DIDN'T HAVE MONEY TO GET MORE.: NEVER TRUE

## 2024-10-09 SDOH — ECONOMIC STABILITY: FOOD INSECURITY: WITHIN THE PAST 12 MONTHS, YOU WORRIED THAT YOUR FOOD WOULD RUN OUT BEFORE YOU GOT THE MONEY TO BUY MORE.: NEVER TRUE

## 2024-10-09 SDOH — SOCIAL STABILITY: SOCIAL INSECURITY: HAVE YOU HAD ANY THOUGHTS OF HARMING ANYONE ELSE?: NO

## 2024-10-09 SDOH — SOCIAL STABILITY: SOCIAL INSECURITY
WITHIN THE LAST YEAR, HAVE YOU BEEN KICKED, HIT, SLAPPED, OR OTHERWISE PHYSICALLY HURT BY YOUR PARTNER OR EX-PARTNER?: NO

## 2024-10-09 SDOH — ECONOMIC STABILITY: INCOME INSECURITY: IN THE PAST 12 MONTHS HAS THE ELECTRIC, GAS, OIL, OR WATER COMPANY THREATENED TO SHUT OFF SERVICES IN YOUR HOME?: NO

## 2024-10-09 SDOH — SOCIAL STABILITY: SOCIAL INSECURITY: HAVE YOU HAD THOUGHTS OF HARMING ANYONE ELSE?: NO

## 2024-10-09 SDOH — SOCIAL STABILITY: SOCIAL INSECURITY: ARE YOU OR HAVE YOU BEEN THREATENED OR ABUSED PHYSICALLY, EMOTIONALLY, OR SEXUALLY BY ANYONE?: NO

## 2024-10-09 SDOH — SOCIAL STABILITY: SOCIAL INSECURITY
WITHIN THE LAST YEAR, HAVE TO BEEN RAPED OR FORCED TO HAVE ANY KIND OF SEXUAL ACTIVITY BY YOUR PARTNER OR EX-PARTNER?: NO

## 2024-10-09 SDOH — SOCIAL STABILITY: SOCIAL INSECURITY: WERE YOU ABLE TO COMPLETE ALL THE BEHAVIORAL HEALTH SCREENINGS?: YES

## 2024-10-09 SDOH — ECONOMIC STABILITY: FOOD INSECURITY: WITHIN THE PAST 12 MONTHS, YOU WORRIED THAT YOUR FOOD WOULD RUN OUT BEFORE YOU GOT MONEY TO BUY MORE.: NEVER TRUE

## 2024-10-09 SDOH — SOCIAL STABILITY: SOCIAL INSECURITY: DO YOU FEEL ANYONE HAS EXPLOITED OR TAKEN ADVANTAGE OF YOU FINANCIALLY OR OF YOUR PERSONAL PROPERTY?: NO

## 2024-10-09 SDOH — SOCIAL STABILITY: SOCIAL INSECURITY: ARE THERE ANY APPARENT SIGNS OF INJURIES/BEHAVIORS THAT COULD BE RELATED TO ABUSE/NEGLECT?: NO

## 2024-10-09 SDOH — SOCIAL STABILITY: SOCIAL INSECURITY: ABUSE: ADULT

## 2024-10-09 SDOH — SOCIAL STABILITY: SOCIAL INSECURITY: HAS ANYONE EVER THREATENED TO HURT YOUR FAMILY OR YOUR PETS?: NO

## 2024-10-09 SDOH — SOCIAL STABILITY: SOCIAL INSECURITY: DOES ANYONE TRY TO KEEP YOU FROM HAVING/CONTACTING OTHER FRIENDS OR DOING THINGS OUTSIDE YOUR HOME?: NO

## 2024-10-09 SDOH — SOCIAL STABILITY: SOCIAL INSECURITY: DO YOU FEEL UNSAFE GOING BACK TO THE PLACE WHERE YOU ARE LIVING?: NO

## 2024-10-09 ASSESSMENT — ENCOUNTER SYMPTOMS
DIAPHORESIS: 0
SHORTNESS OF BREATH: 0
SORE THROAT: 0
DYSURIA: 0
BACK PAIN: 1
SEIZURES: 0
PALPITATIONS: 0
HEADACHES: 0
NECK PAIN: 0
CONFUSION: 0
NERVOUS/ANXIOUS: 1
RHINORRHEA: 0
SINUS PRESSURE: 0
SINUS PAIN: 0
CHEST TIGHTNESS: 0
SPEECH DIFFICULTY: 0
BLOOD IN STOOL: 0
CONSTIPATION: 0
ACTIVITY CHANGE: 1
FLANK PAIN: 0
MYALGIAS: 1
COUGH: 0
FEVER: 0
PHOTOPHOBIA: 0
ABDOMINAL PAIN: 0
WEAKNESS: 1
DIZZINESS: 0
WOUND: 1
UNEXPECTED WEIGHT CHANGE: 0
JOINT SWELLING: 0
NUMBNESS: 0
TREMORS: 0
DIARRHEA: 0
ABDOMINAL DISTENTION: 0
APPETITE CHANGE: 0
NECK STIFFNESS: 0
VOMITING: 0
TROUBLE SWALLOWING: 0
ARTHRALGIAS: 0
DECREASED CONCENTRATION: 0
FATIGUE: 1
FREQUENCY: 0
DIFFICULTY URINATING: 0
WHEEZING: 0
HEMATURIA: 0
NAUSEA: 0
LIGHT-HEADEDNESS: 0
CHILLS: 0
VOICE CHANGE: 0
FACIAL ASYMMETRY: 0

## 2024-10-09 ASSESSMENT — COGNITIVE AND FUNCTIONAL STATUS - GENERAL
MOVING FROM LYING ON BACK TO SITTING ON SIDE OF FLAT BED WITH BEDRAILS: TOTAL
PATIENT BASELINE BEDBOUND: YES
EATING MEALS: A LOT
MOBILITY SCORE: 6
DAILY ACTIVITIY SCORE: 7
TOILETING: TOTAL
TURNING FROM BACK TO SIDE WHILE IN FLAT BAD: TOTAL
WALKING IN HOSPITAL ROOM: TOTAL
MOVING TO AND FROM BED TO CHAIR: TOTAL
HELP NEEDED FOR BATHING: TOTAL
DRESSING REGULAR LOWER BODY CLOTHING: TOTAL
CLIMB 3 TO 5 STEPS WITH RAILING: TOTAL
DRESSING REGULAR UPPER BODY CLOTHING: TOTAL
PERSONAL GROOMING: TOTAL
STANDING UP FROM CHAIR USING ARMS: TOTAL

## 2024-10-09 ASSESSMENT — ACTIVITIES OF DAILY LIVING (ADL)
WALKS IN HOME: DEPENDENT
HEARING - LEFT EAR: FUNCTIONAL
TOILETING: DEPENDENT
FEEDING YOURSELF: NEEDS ASSISTANCE
HEARING - RIGHT EAR: FUNCTIONAL
DRESSING YOURSELF: DEPENDENT
ADEQUATE_TO_COMPLETE_ADL: YES
LACK_OF_TRANSPORTATION: NO
ASSISTIVE_DEVICE: WHEELCHAIR
GROOMING: DEPENDENT
BATHING: DEPENDENT
PATIENT'S MEMORY ADEQUATE TO SAFELY COMPLETE DAILY ACTIVITIES?: YES
JUDGMENT_ADEQUATE_SAFELY_COMPLETE_DAILY_ACTIVITIES: YES

## 2024-10-09 ASSESSMENT — LIFESTYLE VARIABLES
HOW OFTEN DO YOU HAVE A DRINK CONTAINING ALCOHOL: NEVER
AUDIT-C TOTAL SCORE: 0
SKIP TO QUESTIONS 9-10: 1
AUDIT-C TOTAL SCORE: 0
HOW MANY STANDARD DRINKS CONTAINING ALCOHOL DO YOU HAVE ON A TYPICAL DAY: PATIENT DOES NOT DRINK
HOW OFTEN DO YOU HAVE 6 OR MORE DRINKS ON ONE OCCASION: NEVER

## 2024-10-09 ASSESSMENT — PATIENT HEALTH QUESTIONNAIRE - PHQ9
2. FEELING DOWN, DEPRESSED OR HOPELESS: SEVERAL DAYS
SUM OF ALL RESPONSES TO PHQ9 QUESTIONS 1 & 2: 2
1. LITTLE INTEREST OR PLEASURE IN DOING THINGS: SEVERAL DAYS

## 2024-10-09 ASSESSMENT — PAIN - FUNCTIONAL ASSESSMENT: PAIN_FUNCTIONAL_ASSESSMENT: 0-10

## 2024-10-09 ASSESSMENT — PAIN SCALES - GENERAL
PAINLEVEL_OUTOF10: 0 - NO PAIN
PAINLEVEL_OUTOF10: 0 - NO PAIN

## 2024-10-09 NOTE — CONSULTS
Wound Care Consult     Visit Date: 10/9/2024      Patient Name: Melody Martin         MRN: [id number]           YOB: 1935     Pertinent Labs:   Albumin   Date Value Ref Range Status   10/09/2024 2.1 (L) 3.4 - 5.0 g/dL Final     ALBUMIN (MG/L) IN URINE   Date Value Ref Range Status   03/27/2023 1,255.6 Not Established mg/L Final     Attempted to see patient for wound care consult. Patient out of room/off unit. Patient known to this RN from prior exams, reviewed wound photos on admission. Recommendations placed, will reattempt when able.

## 2024-10-09 NOTE — POST-PROCEDURE NOTE
Report to Receiving RN:    Report To: Nati Little RN  Time Report Called: 1220  Hand-Off Communication:   Pt tolerated tx well. Removed 1L Post vitals: 30/39 (64) - 72 kg - 98.1*F.   Complications During Treatment: No  Ultrafiltration Treatment: Yes w/iHD   Medications Administered During Dialysis: No  Blood Products Administered During Dialysis: No  Labs Sent During Dialysis: No  Heparin Drip Rate Changes: N/A  Dialysis Catheter Dressing: Cleanclean, dry and intact. Initialed AD  Last Dressing Change: 10/07/2024    Electronic Signatures:  Monica Connelly OCDT    Last Updated: 1:29 PM by MONICA CONNELLY

## 2024-10-09 NOTE — SIGNIFICANT EVENT
10/09/24 0431   Wound 10/09/24 Heel Left   Date First Assessed/Time First Assessed: 10/09/24 0431   Location: Heel  Wound Location Orientation: Left   Wound Image Images linked

## 2024-10-09 NOTE — CARE PLAN
Problem: Skin  Goal: Decreased wound size/increased tissue granulation at next dressing change  Flowsheets (Taken 10/9/2024 0802)  Decreased wound size/increased tissue granulation at next dressing change:   Promote sleep for wound healing   Protective dressings over bony prominences  Goal: Participates in plan/prevention/treatment measures  Flowsheets (Taken 10/9/2024 0802)  Participates in plan/prevention/treatment measures:   Discuss with provider PT/OT consult   Elevate heels   Increase activity/out of bed for meals  Goal: Prevent/manage excess moisture  Flowsheets (Taken 10/9/2024 0802)  Prevent/manage excess moisture:   Cleanse incontinence/protect with barrier cream   Follow provider orders for dressing changes   Moisturize dry skin   Monitor for/manage infection if present  Goal: Prevent/minimize sheer/friction injuries  Flowsheets (Taken 10/9/2024 0802)  Prevent/minimize sheer/friction injuries:   HOB 30 degrees or less   Increase activity/out of bed for meals   Turn/reposition every 2 hours/use positioning/transfer devices   Use pull sheet  Goal: Promote/optimize nutrition  Flowsheets (Taken 10/9/2024 0802)  Promote/optimize nutrition:   Assist with feeding   Monitor/record intake including meals   Offer water/supplements/favorite foods   Reassess MST if dietician not consulted  Goal: Promote skin healing  Flowsheets (Taken 10/9/2024 0802)  Promote skin healing:   Assess skin/pad under line(s)/device(s)   Protective dressings over bony prominences   Rotate device position/do not position patient on device   Turn/reposition every 2 hours/use positioning/transfer devices   The patient's goals for the shift include      The clinical goals for the shift include pt will remain free of falls or injury this shift

## 2024-10-09 NOTE — CONSULTS
Infectious Disease Inpatient Consult    Inpatient consult to Infectious Diseases  Consult performed by: Sean Bernardo MD  Consult ordered by: Deep Moon DO        Primary MD: Wilbert Lock MD    Reason For Consult  L heel infection      Assessment/Plan:    #L heel cellulitis  Unable to evaluate wound as patient is in dialysis unit.  Looking at the pictures, there is necrotic tissue around the heel and overall is worsened as compared to pictures from August.  CT scan of the ankle done in August was concerning for osteomyelitis.  Pending podiatry evaluation.  Will switch Zosyn to cefepime and continue with vancomycin    #ESRD on HD MWF via R tunneled cath  Estimated Creatinine Clearance: 13.3 mL/min (A) (by C-G formula based on SCr of 2.81 mg/dL (H)).  Will switch Zosyn to cefepime for easier dosing    #IDDM  #ESRD on HD MWF  #HTN, HLD, CAD, CVA      Recommendations:    -DC Zosyn  -Start cefepime 1 g daily  -Continue vancomycin pharmacy to dose  -Renally dose antibiotics  -Pending podiatry evaluation  -Monitor blood cultures, wound cultures  -Thank you for consult.  Will continue to follow    Sean Bernardo MD  Date of service: 10/9/2024  Time of service: 8:53 AM      History Of Present Illness  Melody Martin is a 89 y.o. female with PMHx of anemia, A-fib, CKD, DM, GERD, HTN, CAD, CVA brought to the hospital for evaluation of her left heel wound.  The wound has been present there for months and had CT of the foot done in August which was concerning for heel osteo.  Podiatry was consulted at that time and had low suspicion for osteo and patient was discharged on doxycycline at that time.      Since past few days, patient has been having increased drainage from the heel and Augmentin was prescribed on 10/4.  Patient states that she spends most of the time in her bed or chair and does not really offload which possibly is causing the worsening of the wound.     On admission, vital signs stable.  Labs showed  WBC count 9.9, potassium 3.6, creatinine 2.6, lactic acid 3.5, ESR 45, CRP 2.8.  Blood cultures and tissue cultures were taken and patient was started on vancomycin and Zosyn.  ID consulted for further antibiotic recommendation     Past Medical History  She has a past medical history of Anemia, Atrial fibrillation (Multi), Chronic kidney disease, Diabetes mellitus (Multi), Elevated troponin (08/24/2023), GERD (gastroesophageal reflux disease), Heart murmur, Hypertension, Myocardial infarction (Multi), Old myocardial infarction (09/09/2023), Other conditions influencing health status (12/06/2022), Other conditions influencing health status (04/06/2016), Personal history of other diseases of the circulatory system, Personal history of other diseases of the female genital tract, Personal history of other diseases of the nervous system and sense organs (10/15/2021), Personal history of other endocrine, nutritional and metabolic disease (01/26/2018), Personal history of transient ischemic attack (TIA), and cerebral infarction without residual deficits (09/20/2023), Stroke (Multi), and Urinary tract infection.    Surgical History  She has a past surgical history that includes Hysterectomy (10/10/2018); Back surgery (10/10/2018); IR CVC tunneled (8/28/2023); MR angio neck wo IV contrast (8/31/2023); and MR angio head wo IV contrast (8/31/2023).     Social History     Occupational History    Not on file   Tobacco Use    Smoking status: Never    Smokeless tobacco: Never   Substance and Sexual Activity    Alcohol use: Never    Drug use: Never    Sexual activity: Not on file     Travel History   Travel since 09/09/24    No documented travel since 09/09/24              Family History  Family History   Problem Relation Name Age of Onset    No Known Problems Mother      No Known Problems Father       Allergies  Aspirin, Amlodipine, Levofloxacin, and Sulfamethoxazole-trimethoprim     Immunization History   Administered Date(s)  "Administered    Flu vaccine, quadrivalent, recombinant, preservative free, adult (FLUBLOK) 12/06/2022    Influenza, seasonal, injectable 12/06/2022    Moderna SARS-CoV-2 Vaccination 01/26/2021, 02/23/2021, 12/29/2021     Medications  Home medications:  Medications Prior to Admission   Medication Sig Dispense Refill Last Dose    acetaminophen (Tylenol) 325 mg tablet Take 2 tablets (650 mg) by mouth every 8 hours if needed for mild pain (1 - 3) or moderate pain (4 - 6).       albuterol 90 mcg/actuation inhaler Inhale 2 puffs every 12 hours if needed.       aluminum hydroxide (Alternagel) 320 mg/5 mL suspension Take 10 mL by mouth every 6 hours if needed for indigestion or heartburn.       amino acids-protein hydr-fiber 15 gram- 100 kcal/30 mL liquid in packet Take 30 mL by mouth 2 times a day.       amiodarone (Pacerone) 200 mg tablet Take 1 tablet (200 mg) by mouth once every 24 hours.       amoxicillin-pot clavulanate (Augmentin) 500-125 mg tablet Take 1 tablet by mouth 2 times a day. \"Left wound infection\"       apixaban (Eliquis) 2.5 mg tablet Take 1 tablet (2.5 mg) by mouth 2 times a day.       atorvastatin (Lipitor) 80 mg tablet Take 1 tablet (80 mg) by mouth once daily at bedtime.       B complex-vitamin C-folic acid (Nephrocaps) 1 mg capsule Take 1 capsule by mouth once daily.       benzonatate (Tessalon) 100 mg capsule Take 1 capsule (100 mg) by mouth once daily.       benzonatate (Tessalon) 200 mg capsule Take 1 capsule (200 mg) by mouth every 8 hours if needed for cough. Do not crush or chew.       bisacodyl (Dulcolax) 5 mg EC tablet Take 2 tablets (10 mg) by mouth once daily as needed for constipation. Do not crush, chew, or split.       bisacodyl (Dulcolax, bisacodyl,) 10 mg suppository Insert 1 suppository (10 mg) into the rectum once daily as needed for constipation.       blood sugar diagnostic (OneTouch Ultra Test) strip 1 strip by Does not apply route 3 times a day. 300 strip 3     busPIRone (Buspar) " 10 mg tablet Take 1 tablet (10 mg) by mouth twice a day.       clopidogrel (Plavix) 75 mg tablet Take 1 tablet (75 mg) by mouth once daily.       diclofenac sodium (Voltaren) 1 % gel Apply 2.25 inches (2 g) topically twice a day.       dilTIAZem ER (Tiazac) 240 mg 24 hr capsule Take 1 capsule (240 mg) by mouth once daily at bedtime.       diphenhydramine-zinc acetate cream Apply 1 Application topically once daily as needed for itching.       docusate sodium (Colace) 100 mg capsule Take 1 capsule (100 mg) by mouth once daily.       glucagon 1 mg injection Inject 1 mg into the muscle every 1 hour if needed for low blood sugar - see comments.       glucose (Glutose) 40 % gel oral gel Take 15 g by mouth 1 time if needed for low blood sugar - see comments.       guaiFENesin (Mucinex) 600 mg 12 hr tablet Take 1 tablet (600 mg) by mouth twice a day.       insulin glargine (Lantus) 100 unit/mL injection Inject 20 Units under the skin once daily at bedtime. Take as directed per insulin instructions.       insulin lispro (HumaLOG) 100 unit/mL injection Inject 0-0.15 mL (0-15 Units) under the skin 4 times a day before meals. Take as directed per insulin instructions. (Patient taking differently: Inject 0-0.1 mL (0-10 Units) under the skin 4 times a day before meals. Take as directed per insulin instructions.)       isosorbide dinitrate (Isordil) 10 mg tablet Take 1 tablet (10 mg) by mouth 3 times a day.       lidocaine (LMX) 4 % cream Apply 0.1 g topically once daily. To back       melatonin 3 mg tablet Take 1 tablet (3 mg) by mouth once daily at bedtime.       metoprolol succinate XL (Toprol-XL) 100 mg 24 hr tablet Take 1 tablet (100 mg) by mouth once daily. Do not crush or chew.       mirtazapine (Remeron) 7.5 mg tablet Take 1 tablet (7.5 mg) by mouth once daily at bedtime.       pantoprazole (ProtoNix) 40 mg EC tablet Take 1 tablet (40 mg) by mouth once daily in the morning. Take before meals. Do not crush, chew, or split.        PSYLLIUM HUSK, ASPARTAME, ORAL Take 1 packet by mouth once daily.       sennosides-docusate sodium (Pascale-Colace) 8.6-50 mg tablet Take 1 tablet by mouth 2 times a day.       sevelamer carbonate (Renvela) 800 mg tablet Take 1 tablet (800 mg) by mouth 3 times a day before meals.       simethicone (Mylicon) 80 mg chewable tablet Chew 1 tablet (80 mg) 4 times a day.       zinc oxide 20 % ointment Apply 1 Application topically twice a day. To groin and buttocks        Current medications:  Scheduled medications  amiodarone, 200 mg, oral, Daily  apixaban, 2.5 mg, oral, BID  atorvastatin, 80 mg, oral, Nightly  busPIRone, 10 mg, oral, BID  clopidogrel, 75 mg, oral, Daily  dilTIAZem ER, 240 mg, oral, Nightly  [START ON 10/10/2024] heparin, 2,000 Units, intra-catheter, After Dialysis  [START ON 10/10/2024] heparin, 2,000 Units, intra-catheter, After Dialysis  insulin glargine, 20 Units, subcutaneous, Nightly  insulin lispro, 0-10 Units, subcutaneous, With meals & nightly  isosorbide dinitrate, 10 mg, oral, TID  metoprolol succinate XL, 100 mg, oral, Daily  mirtazapine, 7.5 mg, oral, Nightly  pantoprazole, 40 mg, oral, Daily  piperacillin-tazobactam, 2.25 g, intravenous, q12h  sennosides-docusate sodium, 1 tablet, oral, BID  vancomycin, 500 mg, intravenous, Once      Continuous medications     PRN medications  PRN medications: acetaminophen, albuterol, ondansetron, polyethylene glycol, vancomycin    Review of Systems     Constitutional:  Denies appetite change, chills, fatigue, fever.  HENT:  Denies ear discharge, ear pain, sore throat    Eyes:  Denies photophobia, eye drainage  Respiratory:  Denies cough, choking,  Cardiovascular:  Denies chest pain, palpitations  Gastrointestinal:  Denies abdominal pain, diarrhea,  Musculoskeletal:  Denies joint pain  Skin: Reports left heel ulcer, sacral decubitus ulcer  Neurological:  Denies light-headedness, numbness and headaches.    Objective  Range of Vitals (last 24 hours)  Heart  "Rate:  [59-73]   Temp:  [36.3 °C (97.3 °F)-36.9 °C (98.4 °F)]   Resp:  [14-18]   BP: ()/(36-81)   Height:  [162.6 cm (5' 4\")]   Weight:  [69.9 kg (154 lb)]   SpO2:  [96 %-100 %]   Daily Weight  10/08/24 : 69.9 kg (154 lb)    Body mass index is 26.43 kg/m².     Physical Exam  /63 (BP Location: Left arm, Patient Position: Lying)   Pulse 73   Temp 36.4 °C (97.6 °F) (Temporal)   Resp 17   Ht 1.626 m (5' 4\")   Wt 69.9 kg (154 lb)   SpO2 98%   BMI 26.43 kg/m²   Temp (24hrs), Av.6 °C (97.8 °F), Min:36.3 °C (97.3 °F), Max:36.9 °C (98.4 °F)      General: alert, oriented  HEENT: No conjunctival pallor, no scleral icterus  Neck: No LAD, No JVD  Lungs: bilaterally clear to auscultation, no wheezing  Abdomen: soft, non tender, non distended, BS+  Neuro: AAO x 2, PERRL,  Extremities: LLE dressing  Skin: decubitus ulcer     Relevant Results    Labs  Results from last 72 hours   Lab Units 10/09/24  0554 10/08/24  2347   WBC AUTO x10*3/uL 10.0 9.9   HEMOGLOBIN g/dL 10.2* 9.3*   HEMATOCRIT % 33.4* 30.3*   PLATELETS AUTO x10*3/uL 281 314   NEUTROS PCT AUTO %  --  62.2   LYMPHS PCT AUTO %  --  24.2   MONOS PCT AUTO %  --  9.2   EOS PCT AUTO %  --  2.7     Results from last 72 hours   Lab Units 10/09/24  0554 10/08/24  2347   SODIUM mmol/L 137 138   POTASSIUM mmol/L 3.7 3.6   CHLORIDE mmol/L 99 102   CO2 mmol/L 32 28   BUN mg/dL 24* 22   CREATININE mg/dL 2.81* 2.65*   GLUCOSE mg/dL 184* 251*   CALCIUM mg/dL 7.7* 7.3*   ANION GAP mmol/L 10 12   EGFR mL/min/1.73m*2 16* 17*     Results from last 72 hours   Lab Units 10/09/24  0554 10/08/24  2347   ALK PHOS U/L  --  87   BILIRUBIN TOTAL mg/dL  --  0.3   PROTEIN TOTAL g/dL  --  5.0*   ALT U/L  --  11   AST U/L  --  20   ALBUMIN g/dL 2.1* 1.9*     Estimated Creatinine Clearance: 13.3 mL/min (A) (by C-G formula based on SCr of 2.81 mg/dL (H)).  C-Reactive Protein   Date Value Ref Range Status   10/09/2024 3.13 (H) <1.00 mg/dL Final   10/08/2024 2.81 (H) <1.00 mg/dL Final " "  08/31/2024 2.44 (H) <1.00 mg/dL Final     Sedimentation Rate   Date Value Ref Range Status   10/09/2024 57 (H) 0 - 30 mm/h Final   10/08/2024 45 (H) 0 - 30 mm/h Final   08/31/2024 63 (H) 0 - 30 mm/h Final     No results found for: \"HIV1X2\", \"HIVCONF\", \"LSYNYQ2ON\"  No results found for: \"HEPCABINIT\", \"HEPCAB\", \"HCVPCRQUANT\"    Microbiology  No results found for the last 14 days.      Imaging  XR foot left 3+ views    Result Date: 10/9/2024  1. Soft tissue ulceration at the heel without definite underlying osseous erosion. If there is concern for osteomyelitis an MRI may be performed in further assessment. 2. Questionable fracture through the anterior calcaneus versus projectional artifact.       Signed by: Emery Morris 10/9/2024 12:43 AM Dictation workstation:   [id number]        "

## 2024-10-09 NOTE — ED PROVIDER NOTES
HPI   Chief Complaint   Patient presents with    Wound Check       Patient presents to the emergency department secondary to a left heel wound.  Patient was sent to the emergency department because of worsening left heel wound.  She is at a skilled nursing facility.  She has been on Augmentin for the past 4 days.  She has been in the hospital recently for the same wound.  They are concerned about the possibility of osteomyelitis.  Patient denies that she has pain at the site of the wound.  She denies fever or chills.  No chest pain or shortness of breath.  No cough or congestion.  No abdominal pain.  No nausea or vomiting.  Patient is bedbound.  She can be placed in a wheelchair with a Fabio lift but she states that they do not do it very often for her.                          Get Coma Scale Score: 15                  Patient History   Past Medical History:   Diagnosis Date    Anemia     Atrial fibrillation (Multi)     Chronic kidney disease     Diabetes mellitus (Multi)     Elevated troponin 08/24/2023    GERD (gastroesophageal reflux disease)     Heart murmur     Hypertension     Myocardial infarction (Multi)     Old myocardial infarction 09/09/2023    Other conditions influencing health status 12/06/2022    History of cough    Other conditions influencing health status 04/06/2016    Diabetes mellitus type 1, uncontrolled    Personal history of other diseases of the circulatory system     History of hypertension    Personal history of other diseases of the female genital tract     History of endometriosis    Personal history of other diseases of the nervous system and sense organs 10/15/2021    History of acute otitis media    Personal history of other endocrine, nutritional and metabolic disease 01/26/2018    History of diabetes mellitus    Personal history of transient ischemic attack (TIA), and cerebral infarction without residual deficits 09/20/2023    Stroke (Multi)     Urinary tract infection      Past  Surgical History:   Procedure Laterality Date    BACK SURGERY  10/10/2018    Back Surgery    HYSTERECTOMY  10/10/2018    Hysterectomy    IR CVC TUNNELED  8/28/2023    IR CVC TUNNELED 8/28/2023 POR ANGIO    MR HEAD ANGIO WO IV CONTRAST  8/31/2023    MR HEAD ANGIO WO IV CONTRAST 8/31/2023 POR MRI    MR NECK ANGIO WO IV CONTRAST  8/31/2023    MR NECK ANGIO WO IV CONTRAST 8/31/2023 POR MRI     Family History   Problem Relation Name Age of Onset    No Known Problems Mother      No Known Problems Father       Social History     Tobacco Use    Smoking status: Never    Smokeless tobacco: Never   Substance Use Topics    Alcohol use: Never    Drug use: Never       Physical Exam   ED Triage Vitals [10/08/24 2308]   Temperature Heart Rate Respirations BP   36.9 °C (98.4 °F) 67 16 (!) 110/36      Pulse Ox Temp Source Heart Rate Source Patient Position   96 % Skin -- --      BP Location FiO2 (%)     -- --       Physical Exam  Vitals and nursing note reviewed.   Constitutional:       General: She is not in acute distress.     Appearance: Normal appearance. She is normal weight. She is not ill-appearing.   HENT:      Head: Normocephalic.      Nose: Nose normal.      Mouth/Throat:      Mouth: Mucous membranes are moist.   Eyes:      Extraocular Movements: Extraocular movements intact.      Pupils: Pupils are equal, round, and reactive to light.   Cardiovascular:      Rate and Rhythm: Normal rate and regular rhythm.      Pulses: Normal pulses.      Heart sounds: Normal heart sounds.   Pulmonary:      Effort: Pulmonary effort is normal.      Breath sounds: Normal breath sounds.   Abdominal:      General: Abdomen is flat. Bowel sounds are normal.      Palpations: Abdomen is soft.      Tenderness: There is no abdominal tenderness. There is no guarding or rebound.   Musculoskeletal:         General: Normal range of motion.      Cervical back: Normal range of motion.   Skin:     Capillary Refill: Capillary refill takes less than 2  seconds.      Comments: Patient has skin breakdown with a moist bloody drainage present on dressing on the left heel.  No tunneling.  No fluctuance.  Mild surrounding erythema of the skin.  No pain on palpation.   Neurological:      General: No focal deficit present.      Mental Status: She is alert and oriented to person, place, and time.   Psychiatric:         Mood and Affect: Mood normal.         Behavior: Behavior normal.       Labs Reviewed - No data to display  Pain Management Panel           No data to display              No orders to display     ED Course & MDM   Diagnoses as of 10/09/24 0054   Cellulitis of left lower extremity   Failure of outpatient treatment       Medical Decision Making  Patient presents to the emergency department secondary to left heel wound.  Differential diagnosis is cellulitis, osteomyelitis, decubitus ulceration.  Patient is evaluated in the emergency department laboratory workup including CBC CMP and blood cultures.  ESR and CRP are obtained.  Patient's laboratory workup shows an elevated CRP which is mildly trending up from a month ago.  White count is within normal limits.  Patient has chronic renal insufficiency which is present on her labs.  X-ray shows no evidence of osteomyelitis at this time.  Patient has worsening wound despite being on antibiotics for the last 4 days.  At this time patient would benefit from hospitalization for IV antibiotics and monitoring.  Patient is discussed with Dr. Durham from the [facility] service for admission to the medical floor.        Procedure  Procedures     Leesa Deluna MD  10/09/24 0056

## 2024-10-09 NOTE — CONSULTS
Nutrition Initial Assessment:   Nutrition Assessment    Reason for Assessment: Admission nursing screening    Medical history per chart:   HTN, HLD, CAD, cardiomyopathy, HFrEF, atrial fibrillation, ESRD on HD, IDDM 2, anemia of chronic disease, and chronic stage III pressure ulcer of the left heel and superficial pressure injury of the buttocks/sacrum as well as history of a CVA with chronic left lower extremity decreased sensation/paralysis     HPI:  Patient presents from nursing facility for concern for cellulitis, possible osteomyelitis     10/9:  Patient sleeping at time of visit, did not disturb.  Familiar with patient from previous admissions, meal intake is typically fair-good, prefers softer foods.  Patient with multiple wounds noted.  Patient dislikes milk based supplements (Ensure, Nepro) but tolerated the gelatein supplement well, will add BID.  Patient did not get to trial Tone last admission, will provide BID and monitor for tolerance.          Current Diet: Adult diet Consistent Carb; CCD 75 gm/meal  Supplement(s): No  Average meal Intake during admission:  50% x 1 meal        Nutrition Related Findings:   Oral Symptoms: chewing      GI symptoms: RD unable to assess GI symptoms at this time.   BM:    Food allergies: NKFA. is allergic to aspirin, amlodipine, levofloxacin, and sulfamethoxazole-trimethoprim.  Meds/Labs reviewed.  amiodarone, 200 mg, oral, Daily  apixaban, 2.5 mg, oral, BID  atorvastatin, 80 mg, oral, Nightly  busPIRone, 10 mg, oral, BID  cefepime, 1 g, intravenous, q24h  clopidogrel, 75 mg, oral, Daily  dilTIAZem ER, 240 mg, oral, Nightly  [START ON 10/10/2024] heparin, 2,000 Units, intra-catheter, After Dialysis  [START ON 10/10/2024] heparin, 2,000 Units, intra-catheter, After Dialysis  insulin glargine, 20 Units, subcutaneous, Nightly  insulin lispro, 0-10 Units, subcutaneous, With meals & nightly  isosorbide dinitrate, 10 mg, oral, TID  metoprolol succinate XL, 100 mg, oral,  "Daily  mirtazapine, 7.5 mg, oral, Nightly  pantoprazole, 40 mg, oral, Daily  sennosides-docusate sodium, 1 tablet, oral, BID  vancomycin, 500 mg, intravenous, Once             Nutrition Significant Labs:    Results from last 7 days   Lab Units 10/09/24  0554 10/08/24  2347   GLUCOSE mg/dL 184* 251*   SODIUM mmol/L 137 138   POTASSIUM mmol/L 3.7 3.6   CHLORIDE mmol/L 99 102   CO2 mmol/L 32 28   BUN mg/dL 24* 22   CREATININE mg/dL 2.81* 2.65*   EGFR mL/min/1.73m*2 16* 17*   CALCIUM mg/dL 7.7* 7.3*   PHOSPHORUS mg/dL 3.1  --    MAGNESIUM mg/dL 1.73  --      Lab Results   Component Value Date    HGBA1C 6.1 (H) 07/15/2024    HGBA1C 7.6 (H) 11/11/2023    HGBA1C 7.6 (H) 11/11/2023     Results from last 7 days   Lab Units 10/09/24  1328 10/09/24  0640   POCT GLUCOSE mg/dL 101* 161*       Anthropometrics:  Height: 162.6 cm (5' 4\")   Weight: 69.9 kg (154 lb)   BMI (Calculated): 26.42  IBW/kg (Dietitian Calculated): 54.5 kg          Weight History:   Wt Readings from Last 10 Encounters:   10/08/24 69.9 kg (154 lb)   08/30/24 72 kg (158 lb 11.7 oz)   07/16/24 75.3 kg (166 lb 0.1 oz)   11/09/23 78 kg (172 lb)   10/17/23 78 kg (172 lb)   10/09/23 84 kg (185 lb 3 oz)   05/17/23 73 kg (161 lb)   04/10/23 76.2 kg (168 lb)   03/29/23 75.3 kg (166 lb)   03/09/23 73 kg (161 lb)        Weight Change %:  Weight History / % Weight Change: 7% weight loss x 3 months  Significant Weight Loss: No          Nutrition Focused Physical Exam Findings:  defer: patient asleep       Physical Findings:  Skin: Positive (mutliple wounds noted - wound care on consult)    Estimated Needs:   Total Energy Estimated Needs (kCal):  (1700)  Method for Estimating Needs: 30, IBW  Total Protein Estimated Needs (g):  (80-90)        Method for Estimating Needs: per physician        Nutrition Diagnosis   Nutrition Diagnosis:  Malnutrition Diagnosis  Patient has Malnutrition Diagnosis:  (unable to determine)    Nutrition Diagnosis  Patient has Nutrition Diagnosis: " Yes  Diagnosis Status (1): New  Nutrition Diagnosis 1: Increased nutrient needs  Related to (1): wound healing and ESRD on HD  As Evidenced by (1): multiple wounds, known losses from dialysis       Nutrition Interventions/Recommendations   Nutrition Interventions and Recommendations:        Nutrition Prescription:  Individualized Nutrition Prescription Provided for : Consistent carbohydrate diet.  Add gelatein BID, Trial Tone BID.        Nutrition Interventions:   Food and/or Nutrient Delivery Interventions  Interventions: Meals and snacks, Medical food supplement  Meals and Snacks: Carbohydrate-modified diet  Goal: >75% intake of meals  Medical Food Supplement: Commercial beverage, Modified food  Goal: >75% intake of ONS         Nutrition Education:   Education Documentation  No documentation found.      Nutrition Counseling  Counseling Theoretical Approach: Other (Comment)  Goal: N/A - patient asleep       Nutrition Monitoring and Evaluation   Monitoring/Evaluation:   Food/Nutrient Related History Monitoring  Monitoring and Evaluation Plan: Energy intake  Energy Intake: Estimated energy intake  Criteria: meet >75% of estimated needs from diet and ONS    Body Composition/Growth/Weight History  Monitoring and Evaluation Plan: Weight  Weight: Weight change  Criteria: Maintain stable weight    Biochemical Data, Medical Tests and Procedures  Monitoring and Evaluation Plan: Electrolyte/renal panel, Glucose/endocrine profile  Electrolyte and Renal Panel: BUN, Creatinine, Magnesium, Phosphorus, Potassium, Sodium  Criteria: WNL  Glucose/Endocrine Profile: Glucose, casual  Criteria: WNL    Nutrition Focused Physical Findings  Monitoring and Evaluation Plan: Skin  Skin: Impaired wound healing  Criteria: Promote wound healing            Time Spent/Follow-up Reminder:   Follow Up  Time Spent (min): 45 minutes  Last Date of Nutrition Visit: 10/09/24  Nutrition Follow-Up Needed?: Dietitian to reassess per policy  Follow up  Comment: 10/11-10/14

## 2024-10-09 NOTE — SIGNIFICANT EVENT
10/09/24 0435   Wound 09/01/24 Pressure Injury Coccyx   Date First Assessed/Time First Assessed: 09/01/24 0850   Hand Hygiene Completed: Yes  Primary Wound Type: Pressure Injury  Location: Coccyx   Wound Image Images linked

## 2024-10-09 NOTE — SIGNIFICANT EVENT
10/09/24 0437   Wound 10/09/24 Tibial Dorsal;Right   Date First Assessed/Time First Assessed: 10/09/24 0437   Location: Tibial  Wound Location Orientation: Dorsal;Right   Wound Image Images linked

## 2024-10-09 NOTE — SIGNIFICANT EVENT
10/09/24 0430   Wound 09/01/24 Pressure Injury Heel Left   Date First Assessed/Time First Assessed: 09/01/24 0851   Primary Wound Type: Pressure Injury  Location: Heel  Wound Location Orientation: Left   Wound Image Images linked

## 2024-10-09 NOTE — SIGNIFICANT EVENT
10/09/24 0445   Wound 09/01/24 Moisture Associated Skin Damage Abdomen Medial;Upper   Date First Assessed/Time First Assessed: 09/01/24 1317   Primary Wound Type: Moisture Associated Skin Damage  Location: Abdomen  Wound Location Orientation: Medial;Upper   Wound Image Images linked

## 2024-10-09 NOTE — PROGRESS NOTES
10/09/24 1006   Discharge Planning   Living Arrangements Other (Comment)  ([facility])   Support Systems Family members   Assistance Needed yes   Type of Residence Nursing home/residential care   Home or Post Acute Services Post acute facilities (Rehab/SNF/etc)   Type of Post Acute Facility Services Long term care   Expected Discharge Disposition Inter   Does the patient need discharge transport arranged? Yes   RoundTrip coordination needed? Yes   Patient Choice   Patient / Family choosing to utilize agency / facility established prior to hospitalization Yes     Patient is from [facility] private pay. Will have [facility] send return referral to clarify barriers. Patient currently in dialysis, unable to assess and confirm return. Will follow up later.     Called to patient's room, she is back from dialysis. Son at bedside. Patient is not wanting to return to [facility], she would like to change facilities. [facility] preference is [facility]. Will have [facility] send referral. Patient's son would like get try for a skilled auth with her Medicare plan first so that she can get PT OT. Will add therapy evals on. Son also requested [facility] list of other onsite HD facilities as well. Will provide. TCC working with patient tomorrow will update on acceptance from [facility] or new choices needed.     [facility] able accept. Son in law and patient would like to try for skilled auth with [employer] if possible prior to LTC. Will await PT OT evals. Care Transitions Team to follow

## 2024-10-09 NOTE — PROGRESS NOTES
"Pharmacy Medication History Review    Melody Martin is a 89 y.o. female admitted for Cellulitis of left lower extremity. Pharmacy reviewed the patient's [id number] medications and allergies for accuracy.    Medications ADDED:  Amox-clav 500-125 mg BID x7 days   Docusate 100 mg daily   Medications CHANGED:  None   Medications REMOVED:   Acetaminophen 325 mg duplicate    Tramadol 50 mg - complete     The list below reflects the updated PTA list.   Prior to Admission Medications   Prescriptions Last Dose Informant   B complex-vitamin C-folic acid (Nephrocaps) 1 mg capsule  Other   Sig: Take 1 capsule by mouth once daily.   PSYLLIUM HUSK, ASPARTAME, ORAL  Other   Sig: Take 1 packet by mouth once daily.   acetaminophen (Tylenol) 325 mg tablet  Other   Sig: Take 2 tablets (650 mg) by mouth every 8 hours if needed for mild pain (1 - 3) or moderate pain (4 - 6).   albuterol 90 mcg/actuation inhaler  Other   Sig: Inhale 2 puffs every 12 hours if needed.   aluminum hydroxide (Alternagel) 320 mg/5 mL suspension  Other   Sig: Take 10 mL by mouth every 6 hours if needed for indigestion or heartburn.   amino acids-protein hydr-fiber 15 gram- 100 kcal/30 mL liquid in packet  Other   Sig: Take 30 mL by mouth 2 times a day.   amiodarone (Pacerone) 200 mg tablet  Other   Sig: Take 1 tablet (200 mg) by mouth once every 24 hours.   amoxicillin-pot clavulanate (Augmentin) 500-125 mg tablet  Other   Sig: Take 1 tablet by mouth 2 times a day. \"Left wound infection\"   apixaban (Eliquis) 2.5 mg tablet  Other   Sig: Take 1 tablet (2.5 mg) by mouth 2 times a day.   atorvastatin (Lipitor) 80 mg tablet  Other   Sig: Take 1 tablet (80 mg) by mouth once daily at bedtime.   benzonatate (Tessalon) 100 mg capsule  Other   Sig: Take 1 capsule (100 mg) by mouth once daily.   benzonatate (Tessalon) 200 mg capsule  Other   Sig: Take 1 capsule (200 mg) by mouth every 8 hours if needed for cough. Do not crush or chew.   bisacodyl (Dulcolax) 5 mg " EC tablet  Other   Sig: Take 2 tablets (10 mg) by mouth once daily as needed for constipation. Do not crush, chew, or split.   bisacodyl (Dulcolax, bisacodyl,) 10 mg suppository  Other   Sig: Insert 1 suppository (10 mg) into the rectum once daily as needed for constipation.   blood sugar diagnostic (OneTouch Ultra Test) strip  Other   Si strip by Does not apply route 3 times a day.   busPIRone (Buspar) 10 mg tablet  Other   Sig: Take 1 tablet (10 mg) by mouth twice a day.   clopidogrel (Plavix) 75 mg tablet  Other   Sig: Take 1 tablet (75 mg) by mouth once daily.   diclofenac sodium (Voltaren) 1 % gel  Other   Sig: Apply 2.25 inches (2 g) topically twice a day.   dilTIAZem ER (Tiazac) 240 mg 24 hr capsule  Other   Sig: Take 1 capsule (240 mg) by mouth once daily at bedtime.   diphenhydramine-zinc acetate cream  Other   Sig: Apply 1 Application topically once daily as needed for itching.   docusate sodium (Colace) 100 mg capsule  Other   Sig: Take 1 capsule (100 mg) by mouth once daily.   glucagon 1 mg injection  Other   Sig: Inject 1 mg into the muscle every 1 hour if needed for low blood sugar - see comments.   glucose (Glutose) 40 % gel oral gel  Other   Sig: Take 15 g by mouth 1 time if needed for low blood sugar - see comments.   guaiFENesin (Mucinex) 600 mg 12 hr tablet  Other   Sig: Take 1 tablet (600 mg) by mouth twice a day.   insulin glargine (Lantus) 100 unit/mL injection  Other   Sig: Inject 20 Units under the skin once daily at bedtime. Take as directed per insulin instructions.   insulin lispro (HumaLOG) 100 unit/mL injection  Other   Sig: Inject 0-0.15 mL (0-15 Units) under the skin 4 times a day before meals. Take as directed per insulin instructions.   Patient taking differently: Inject 0-0.1 mL (0-10 Units) under the skin 4 times a day before meals. Take as directed per insulin instructions.   isosorbide dinitrate (Isordil) 10 mg tablet  Other   Sig: Take 1 tablet (10 mg) by mouth 3 times a  day.   lidocaine (LMX) 4 % cream  Other   Sig: Apply 0.1 g topically once daily. To back   melatonin 3 mg tablet  Other   Sig: Take 1 tablet (3 mg) by mouth once daily at bedtime.   metoprolol succinate XL (Toprol-XL) 100 mg 24 hr tablet  Other   Sig: Take 1 tablet (100 mg) by mouth once daily. Do not crush or chew.   mirtazapine (Remeron) 7.5 mg tablet  Other   Sig: Take 1 tablet (7.5 mg) by mouth once daily at bedtime.   pantoprazole (ProtoNix) 40 mg EC tablet  Other   Sig: Take 1 tablet (40 mg) by mouth once daily in the morning. Take before meals. Do not crush, chew, or split.   sennosides-docusate sodium (Pascale-Colace) 8.6-50 mg tablet  Other   Sig: Take 1 tablet by mouth 2 times a day.   sevelamer carbonate (Renvela) 800 mg tablet  Other   Sig: Take 1 tablet (800 mg) by mouth 3 times a day before meals.   simethicone (Mylicon) 80 mg chewable tablet  Other   Sig: Chew 1 tablet (80 mg) 4 times a day.   zinc oxide 20 % ointment  Other   Sig: Apply 1 Application topically twice a day. To groin and buttocks      Facility-Administered Medications: None        The list below reflects the updated allergy list. Please review each documented allergy for additional clarification and justification.  Allergies  Reviewed by Nati Little RN on 10/9/2024        Severity Reactions Comments    Aspirin High Shortness of breath, Unknown     Amlodipine Not Specified Dizziness, Unknown     Levofloxacin Not Specified Unknown     Sulfamethoxazole-trimethoprim Not Specified Diarrhea, Other, Unknown                 Sources:   Order summary report from [facility] (scanned to media tab)  [facility]    Additional Comments:  Benztropine - has orders for both 100 mg and 200 mg on [facility] paperwork.   Tramadol 50 mg mg Q12H is on [facility] med list, has not been filled since August for 30 tablets.       Angelito Stroud, PharmD  10/09/24 7:08 AM     [phone]

## 2024-10-09 NOTE — H&P
[facility] - GENERAL MEDICINE HISTORY AND PHYSICAL    History Obtained From: Patient, Chart Review and Discussion with the ED Physician     Recent Admission:  A.  08/30/2024 till 09/04/2024 = cellulitis of the right heel without any grossly evident signs of osteomyelitis.  Podiatry, ESRD on HD, atrial fibrillation,     B.  07/14/2024 till 07/18/2024 = urinary tract infection with hematuria, sepsis responsive to IV fluids, fecal impaction with inflammatory colitis/possible bowel ischemia, ESRD     History Of Present Illness:  Melody Martin is a 89 y.o.  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  Patient presented from [facility] or [place] for continued left medial wound was concerns for cellulitis and possible osteomyelitis.  At baseline is bedridden.  After her most recent hospitalization she was placed on Augmentin and has been on this for about 4 days but the symptoms have continued to persist with concerns for growing osteomyelitis and increased wound drainage of some purulence.  At baseline the patient is essentially confined to a wheelchair with a Fabio lift otherwise cannot move on her own accord.  The patient had mentioned to the ER physician that they do not often move her from a Fabio lift to a wheelchair at the facility.  She states that she does not really have much sensation in her left lower extremity foot and her foot all the way up to her knee may be slightly above the knee will occasionally get sharp stabbing/lightening like sensation going from her lower back down her entire leg but unclear where it stops she states.  She states that overall she feels well but  did express concern that nursing staff at the facility may not be caring for the foot as often as he should.  She denies any recent sick contacts, chemical/environmental exposures, changes in dietary habits or any recent traumatic event/falls other than noted above.  She denies any fevers, chills, night sweats, vision changes, auditory changes, change in taste/smell, loss of bowel/bladder control, loss consciousness, dizziness, vertigo, syncope, seizure-like activity, chest pain, palpitations, shortness of breath, coughing, wheezing, congestion, hemoptysis, hematemesis, abdominal pain, nausea or vomiting, diarrhea, constipation, dysuria, hematuria, dyschezia, hematochezia or any lateralizing motor/sensory deficits other than noted above.    ED Course (Summary):   Vitals on presentation: Temperature 36.9 °C, heart rate 67, respirations 16, blood pressure 110/36, pulse ox 96% on room air  WBC = 9.9 -->  Lactate = 3.5 -->  ESR = 45 -->  CRP = 2.81 -->  Hgb/Hct = 9.3/30.3 -->  Baseline hemoglobin seems to be quite variable. The only labs we have available seem to be during times of hospitalizations where she has been as low as 6 requiring transfusions.   BUN/Creatinine = 22/2.65 -->  Glucose = 251  Wound culture pending  Blood cultures x 2 pending  XR left foot: Soft tissue ulceration at the heel without definitive underlying osseous erosion, questionable fracture through the anterior calcaneus versus projectional artifact.  Given Zosyn/Vancomycin     ED Course (From Provider):  Diagnoses as of 10/09/24 0521   Cellulitis of left lower extremity   Failure of outpatient treatment     Relevant Results  Results for orders placed or performed during the hospital encounter of 10/08/24 (from the past 24 hour(s))   CBC and Auto Differential   Result Value Ref Range    WBC 9.9 4.4 - 11.3 x10*3/uL    nRBC 0.0 0.0 - 0.0 /100 WBCs    RBC 3.21 (L) 4.00 - 5.20 x10*6/uL    Hemoglobin 9.3 (L) 12.0 - 16.0 g/dL    Hematocrit 30.3 (L)  36.0 - 46.0 %    MCV 94 80 - 100 fL    MCH 29.0 26.0 - 34.0 pg    MCHC 30.7 (L) 32.0 - 36.0 g/dL    RDW 17.0 (H) 11.5 - 14.5 %    Platelets 314 150 - 450 x10*3/uL    Neutrophils % 62.2 40.0 - 80.0 %    Immature Granulocytes %, Automated 0.9 0.0 - 0.9 %    Lymphocytes % 24.2 13.0 - 44.0 %    Monocytes % 9.2 2.0 - 10.0 %    Eosinophils % 2.7 0.0 - 6.0 %    Basophils % 0.8 0.0 - 2.0 %    Neutrophils Absolute 6.12 (H) 1.60 - 5.50 x10*3/uL    Immature Granulocytes Absolute, Automated 0.09 0.00 - 0.50 x10*3/uL    Lymphocytes Absolute 2.38 0.80 - 3.00 x10*3/uL    Monocytes Absolute 0.91 (H) 0.05 - 0.80 x10*3/uL    Eosinophils Absolute 0.27 0.00 - 0.40 x10*3/uL    Basophils Absolute 0.08 0.00 - 0.10 x10*3/uL   Comprehensive Metabolic Panel   Result Value Ref Range    Glucose 251 (H) 74 - 99 mg/dL    Sodium 138 136 - 145 mmol/L    Potassium 3.6 3.5 - 5.3 mmol/L    Chloride 102 98 - 107 mmol/L    Bicarbonate 28 21 - 32 mmol/L    Anion Gap 12 10 - 20 mmol/L    Urea Nitrogen 22 6 - 23 mg/dL    Creatinine 2.65 (H) 0.50 - 1.05 mg/dL    eGFR 17 (L) >60 mL/min/1.73m*2    Calcium 7.3 (L) 8.6 - 10.3 mg/dL    Albumin 1.9 (L) 3.4 - 5.0 g/dL    Alkaline Phosphatase 87 33 - 136 U/L    Total Protein 5.0 (L) 6.4 - 8.2 g/dL    AST 20 9 - 39 U/L    Bilirubin, Total 0.3 0.0 - 1.2 mg/dL    ALT 11 7 - 45 U/L   Lactate   Result Value Ref Range    Lactate 3.5 (H) 0.4 - 2.0 mmol/L   Sedimentation rate, automated   Result Value Ref Range    Sedimentation Rate 45 (H) 0 - 30 mm/h   C-reactive protein   Result Value Ref Range    C-Reactive Protein 2.81 (H) <1.00 mg/dL      XR foot left 3+ views    Result Date: 10/9/2024  Interpreted By:  Emery Morris, STUDY: XR FOOT LEFT 3+ VIEWS;  10/8/2024 11:56 pm   INDICATION: Signs/Symptoms:heel wound.     COMPARISON: Radiographs left foot 08/30/2024   ACCESSION NUMBER(S): [id number]   ORDERING CLINICIAN: MAMIE MCDONALD   FINDINGS: Three views left foot.   Bone mineral density is diffusely diminished.  Questionable fracture through the anterior calcaneus versus positional artifact. Soft tissue ulceration and adjacent subcutaneous air of the heel. No definite underlying osseous erosion. Diffuse vascular calcifications throughout the foot and ankle.       1. Soft tissue ulceration at the heel without definite underlying osseous erosion. If there is concern for osteomyelitis an MRI may be performed in further assessment. 2. Questionable fracture through the anterior calcaneus versus projectional artifact.       Signed by: Emery Morris 10/9/2024 12:43 AM Dictation workstation:   [id number]      Scheduled medications:  amiodarone, 200 mg, oral, Daily  apixaban, 2.5 mg, oral, BID  atorvastatin, 80 mg, oral, Nightly  busPIRone, 10 mg, oral, BID  clopidogrel, 75 mg, oral, Daily  dilTIAZem ER, 240 mg, oral, Nightly  [START ON 10/10/2024] heparin, 2,000 Units, intra-catheter, After Dialysis  [START ON 10/10/2024] heparin, 2,000 Units, intra-catheter, After Dialysis  insulin glargine, 20 Units, subcutaneous, Nightly  insulin lispro, 0-10 Units, subcutaneous, TID  isosorbide dinitrate, 10 mg, oral, TID  metoprolol succinate XL, 100 mg, oral, Daily  mirtazapine, 7.5 mg, oral, Nightly  pantoprazole, 40 mg, oral, Daily  piperacillin-tazobactam, 2.25 g, intravenous, q12h  sennosides-docusate sodium, 1 tablet, oral, BID  vancomycin, 500 mg, intravenous, Once      Continuous medications:     PRN medications:  PRN medications: acetaminophen, albuterol, ondansetron, polyethylene glycol, vancomycin     Past Medical History  She has a past medical history of Anemia, Atrial fibrillation (Multi), Chronic kidney disease, Diabetes mellitus (Multi), Elevated troponin (08/24/2023), GERD (gastroesophageal reflux disease), Heart murmur, Hypertension, Myocardial infarction (Multi), Old myocardial infarction (09/09/2023), Other conditions influencing health status (12/06/2022), Other conditions influencing health status (04/06/2016), Personal  history of other diseases of the circulatory system, Personal history of other diseases of the female genital tract, Personal history of other diseases of the nervous system and sense organs (10/15/2021), Personal history of other endocrine, nutritional and metabolic disease (01/26/2018), Personal history of transient ischemic attack (TIA), and cerebral infarction without residual deficits (09/20/2023), Stroke (Multi), and Urinary tract infection.    Surgical History  She has a past surgical history that includes Hysterectomy (10/10/2018); Back surgery (10/10/2018); IR CVC tunneled (8/28/2023); MR angio neck wo IV contrast (8/31/2023); and MR angio head wo IV contrast (8/31/2023).     Social History  She reports that she has never smoked. She has never used smokeless tobacco. She reports that she does not drink alcohol and does not use drugs.    Family History  Family History   Problem Relation Name Age of Onset    No Known Problems Mother      No Known Problems Father         Allergies  Aspirin, Amlodipine, Levofloxacin, and Sulfamethoxazole-trimethoprim    Code Status  Full Code     Review of Systems   Constitutional:  Positive for activity change and fatigue. Negative for appetite change, chills, diaphoresis, fever and unexpected weight change.   HENT:  Negative for congestion, drooling, hearing loss, postnasal drip, rhinorrhea, sinus pressure, sinus pain, sneezing, sore throat, tinnitus, trouble swallowing and voice change.    Eyes:  Negative for photophobia and visual disturbance.   Respiratory:  Negative for cough, chest tightness, shortness of breath and wheezing.    Cardiovascular:  Negative for chest pain, palpitations and leg swelling.   Gastrointestinal:  Negative for abdominal distention, abdominal pain, blood in stool, constipation, diarrhea, nausea and vomiting.   Genitourinary:  Negative for decreased urine volume, difficulty urinating, dysuria, flank pain, frequency, hematuria and urgency.    Musculoskeletal:  Positive for back pain, gait problem and myalgias. Negative for arthralgias, joint swelling, neck pain and neck stiffness.   Skin:  Positive for wound.   Neurological:  Positive for weakness. Negative for dizziness, tremors, seizures, syncope, facial asymmetry, speech difficulty, light-headedness, numbness and headaches.   Psychiatric/Behavioral:  Negative for confusion and decreased concentration. The patient is nervous/anxious.    All other systems reviewed and are negative.      Last Recorded Vitals  /55 (BP Location: Left arm, Patient Position: Lying)   Pulse 59   Temp 36.3 °C (97.3 °F) (Temporal)   Resp 15   Wt 69.9 kg (154 lb)   SpO2 100%      Physical Exam  Vitals and nursing note reviewed.     Constitutional:       General: She is not in acute distress.     Appearance: She is not ill-appearing or diaphoretic.      Comments: Frail/Elderly and tearful appearing  female   HENT:      Head: Normocephalic and atraumatic.      Mouth/Throat:      Mouth: Mucous membranes are moist.      Pharynx: Oropharynx is clear.   Eyes:      General: No scleral icterus.     Extraocular Movements: Extraocular movements intact.      Conjunctiva/sclera: Conjunctivae normal.      Pupils: Pupils are equal, round, and reactive to light.   Neck:      Vascular: No carotid bruit.      Comments: Mild tenderness within the shoulder and into the lower neck bilaterally   Cardiovascular:      Rate and Rhythm: Normal rate and regular rhythm.      Pulses: Normal pulses.      Heart sounds: Normal heart sounds. No murmur heard.  Pulmonary:      Effort: No respiratory distress.      Breath sounds: No wheezing, rhonchi or rales.      Comments: Diminished throughout with faint crackles in the bases but otherwise no other signs of distress  Chest:      Chest wall: No tenderness.   Abdominal:      General: Abdomen is flat. Bowel sounds are normal. There is no distension.      Palpations: Abdomen is soft. There is  no mass.      Tenderness: There is no abdominal tenderness. There is no right CVA tenderness, left CVA tenderness, guarding or rebound.   Musculoskeletal:      Cervical back: Normal range of motion and neck supple. Tenderness present. No rigidity.      Comments: Diminished range of motion/strength of bilateral upper and lower extremities with the upper being slightly below a 5 out of 5 in the lower extremities notably weaker and unable to move the left lower extremity below the knee and does attempt movement at the hip with the left lower extremity with some muscle spasming, she was able to try to hold her right lower extremity against gravity for about a 2 to 3-second timeframe.   Skin:     Findings: Bruising, erythema, lesion and rash present.      Comments: Left heel ulceration with some granulation tissue and purulence/bloody drainage with a foul odor and erythema extending into the foot as well as above the ankle, she has little to no sensation except for deep pressure, the buttock does have small superficial ulcerations as well as a deep pressure injury with a purpleish/bruised appearance throughout the sacrum and gluteal cleft into the perineum.  There are some mild erythema but actually improved in appearance compared to prior examination.  Please see media imaging by nursing.  Neurological:      General: No focal deficit present.      Mental Status: She is alert and oriented to person, place, and time. Mental status is at baseline.      Cranial Nerves: No cranial nerve deficit.      Sensory: Sensory deficit present.      Motor: Weakness present.      Coordination: Coordination abnormal.      Gait: Gait abnormal.      Comments: AO x3, finger-to-nose intact, attempted heel-to-shin with the RLE but notably weak, feels some deep pressure of the LLE but unable to move it below the knee, she has some muscle spasms when attempting movement at the hip of the LLE, unable to hold against gravity for but a few seconds  with the RLE, fluent speech, no noted lateralizing gaze deviations, no noted facial asymmetry   Psychiatric:         Behavior: Behavior normal.         Thought Content: Thought content normal.         Judgment: Judgment normal.      Comments: Tearful but appropriately interactive, appears similar demeanor to previous admission       Assessment/Plan   Assessment & Plan  Cellulitis of left lower extremity    Cellulitis of Left Foot  Left Calcaneus Ulcer  Osteomyelitis of the Left Calcaneous?  -Imaging as noted above  -WBC = 9.9 -->  -Lactate = 3.5 -->  -ESR = 45 -->  -CRP = 2.81 -->  -Wound Culture = pending  -Blood Culture x2 = pending  -Continued on IV antibiotics with Vancomycin/Zosyn --> renally dosed   -Podiatry Consult appreciated  -Infectious Disease Consult appreciated     Sacral Decubitus Ulcer / Pressure Injury  -Wound Care Consult appreciated  -antibiotics in setting of above  -does appear erythematous though no fluctuance or hardened areas or pain with palpation  -will continue to monitor and if worsening condition then will need to image the abdomen/pelvis further     ESRD on HD (Mon/Wed/Fri)  -HD via right chest wall catheter since ~10/07/24  -Nephrology Consult appreciated  -BUN/Creatinine = 22/2.65 -->  -no grossly evident signs of volume overload at this time     Hypertension  Hyperlipidemia   HFpEF  Atrial Fibrillation   Coronary Artery Disease  -Continued on home amiodarone, Eliquis, Plavix, Cardizem, isosorbide, Toprol-XL  -Telemetry monitoring     IDDM2 w/ Hyperglycemia  -ISS AC/HS  -Diabetic Diet  -Hypoglycemia Protocol  -continued on home lantus qhs     History of CVA with chronic left lower extremity weakness  Acute on Chronic Deconditioning & Ambulatory Dysfunction  -PT/OT appreciated     Anemia  -suspect in setting of chronic disease and renal dysfunction  -Hgb/Hct = 8.1/26.2 (09/04/24) --> 9.3/30.3 --> 10.2/33.4  -Baseline hemoglobin seems to be quite variable. The only labs we have  available seem to be during times of hospitalizations where she has been as low as 6 requiring transfusions.      Chronic Constipation  -suspect in setting of poor mobility and functional status  -continued bowel regimen      Code Status: DNR/DNI confirmed during previous admission, [place] DNR paperwork and discussion with patient      DO Jacob Mckinney dictation software was used to dictate this note and thus there may be minor errors in translation/transcription including garbled speech or misspellings. Please contact for clarification if needed.

## 2024-10-09 NOTE — PROGRESS NOTES
Social work consult placed for discharge planning/CWI. SW reviewed pt's chart and communicated with TCC, pt from [facility] and receives dialysis three days a week. Bedside RN consulted SW stating pt would like to look into other facilities, TCC to address SNF needs. No SW needs foreseen at this time. SW signing off; available upon request.    Enrike Fontaine, MSW, LSW ([id number])   Care Transitions

## 2024-10-09 NOTE — ED TRIAGE NOTES
Pt presents from the [facility] for a possible left heel wound infection. Per EMS, she was sent out to rule out osteomyelitis. She is bedridden since a stroke. She is alert and oriented x's 4. Denies any pain. Denies any fever, chills.

## 2024-10-09 NOTE — CONSULTS
Nephrology Consult Note                                                                                                                                         Inpatient consult to Nephrology  Consult performed by: Mally Lucas DO  Consult ordered by: Deep Moon DO                                                                                                               HPI  Patient is a 89 y.o. female history of ESRD, HTN, stroke, CAD, A-fib who is admitted to hospital with complaints of   worsening left heel wound. Nephrology consulted in view of ESRD.   Patient well-known to me.  She resides at [facility].  She receives dialysis there Monday Wednesday Friday.  I arranged and saw patient on dialysis.  She is tearful because she did not get to eat her breakfast this morning.  Denies shortness of breath.  No nausea or vomiting  Past Medical History:   Diagnosis Date    Anemia     Atrial fibrillation (Multi)     Chronic kidney disease     Diabetes mellitus (Multi)     Elevated troponin 08/24/2023    GERD (gastroesophageal reflux disease)     Heart murmur     Hypertension     Myocardial infarction (Multi)     Old myocardial infarction 09/09/2023    Other conditions influencing health status 12/06/2022    History of cough    Other conditions influencing health status 04/06/2016    Diabetes mellitus type 1, uncontrolled    Personal history of other diseases of the circulatory system     History of hypertension    Personal history of other diseases of the female genital tract     History of endometriosis    Personal history of other diseases of the nervous system and sense organs 10/15/2021    History of acute otitis media    Personal history of other endocrine, nutritional and metabolic disease 01/26/2018    History of diabetes mellitus    Personal history of transient  ischemic attack (TIA), and cerebral infarction without residual deficits 09/20/2023    Stroke (Multi)     Urinary tract infection       Social History     Socioeconomic History    Marital status:      Spouse name: Not on file    Number of children: Not on file    Years of education: Not on file    Highest education level: Not on file   Occupational History    Not on file   Tobacco Use    Smoking status: Never    Smokeless tobacco: Never   Substance and Sexual Activity    Alcohol use: Never    Drug use: Never    Sexual activity: Not on file   Other Topics Concern    Not on file   Social History Narrative    Not on file     Social Determinants of Health     Financial Resource Strain: Low Risk  (10/9/2024)    Overall Financial Resource Strain (CARDIA)     Difficulty of Paying Living Expenses: Not very hard   Food Insecurity: No Food Insecurity (10/9/2024)    Hunger Vital Sign     Worried About Running Out of Food in the Last Year: Never true     Ran Out of Food in the Last Year: Never true   Transportation Needs: No Transportation Needs (10/9/2024)    PRAPARE - Transportation     Lack of Transportation (Medical): No     Lack of Transportation (Non-Medical): No   Physical Activity: Not on file   Stress: Not on file   Social Connections: Not on file   Intimate Partner Violence: Not At Risk (10/9/2024)    Humiliation, Afraid, Rape, and Kick questionnaire     Fear of Current or Ex-Partner: No     Emotionally Abused: No     Physically Abused: No     Sexually Abused: No   Housing Stability: Low Risk  (10/9/2024)    Housing Stability Vital Sign     Unable to Pay for Housing in the Last Year: No     Number of Times Moved in the Last Year: 1     Homeless in the Last Year: No      Family History   Problem Relation Name Age of Onset    No Known Problems Mother      No Known Problems Father        No current facility-administered medications on file prior to encounter.     Current Outpatient Medications on File Prior to  "Encounter   Medication Sig Dispense Refill    acetaminophen (Tylenol) 325 mg tablet Take 2 tablets (650 mg) by mouth every 8 hours if needed for mild pain (1 - 3) or moderate pain (4 - 6).      albuterol 90 mcg/actuation inhaler Inhale 2 puffs every 12 hours if needed.      aluminum hydroxide (Alternagel) 320 mg/5 mL suspension Take 10 mL by mouth every 6 hours if needed for indigestion or heartburn.      amino acids-protein hydr-fiber 15 gram- 100 kcal/30 mL liquid in packet Take 30 mL by mouth 2 times a day.      amiodarone (Pacerone) 200 mg tablet Take 1 tablet (200 mg) by mouth once every 24 hours.      amoxicillin-pot clavulanate (Augmentin) 500-125 mg tablet Take 1 tablet by mouth 2 times a day. \"Left wound infection\"      apixaban (Eliquis) 2.5 mg tablet Take 1 tablet (2.5 mg) by mouth 2 times a day.      atorvastatin (Lipitor) 80 mg tablet Take 1 tablet (80 mg) by mouth once daily at bedtime.      B complex-vitamin C-folic acid (Nephrocaps) 1 mg capsule Take 1 capsule by mouth once daily.      benzonatate (Tessalon) 100 mg capsule Take 1 capsule (100 mg) by mouth once daily.      benzonatate (Tessalon) 200 mg capsule Take 1 capsule (200 mg) by mouth every 8 hours if needed for cough. Do not crush or chew.      bisacodyl (Dulcolax) 5 mg EC tablet Take 2 tablets (10 mg) by mouth once daily as needed for constipation. Do not crush, chew, or split.      bisacodyl (Dulcolax, bisacodyl,) 10 mg suppository Insert 1 suppository (10 mg) into the rectum once daily as needed for constipation.      blood sugar diagnostic (OneTouch Ultra Test) strip 1 strip by Does not apply route 3 times a day. 300 strip 3    busPIRone (Buspar) 10 mg tablet Take 1 tablet (10 mg) by mouth twice a day.      clopidogrel (Plavix) 75 mg tablet Take 1 tablet (75 mg) by mouth once daily.      diclofenac sodium (Voltaren) 1 % gel Apply 2.25 inches (2 g) topically twice a day.      dilTIAZem ER (Tiazac) 240 mg 24 hr capsule Take 1 capsule (240 " mg) by mouth once daily at bedtime.      diphenhydramine-zinc acetate cream Apply 1 Application topically once daily as needed for itching.      docusate sodium (Colace) 100 mg capsule Take 1 capsule (100 mg) by mouth once daily.      glucagon 1 mg injection Inject 1 mg into the muscle every 1 hour if needed for low blood sugar - see comments.      glucose (Glutose) 40 % gel oral gel Take 15 g by mouth 1 time if needed for low blood sugar - see comments.      guaiFENesin (Mucinex) 600 mg 12 hr tablet Take 1 tablet (600 mg) by mouth twice a day.      insulin glargine (Lantus) 100 unit/mL injection Inject 20 Units under the skin once daily at bedtime. Take as directed per insulin instructions.      insulin lispro (HumaLOG) 100 unit/mL injection Inject 0-0.15 mL (0-15 Units) under the skin 4 times a day before meals. Take as directed per insulin instructions. (Patient taking differently: Inject 0-0.1 mL (0-10 Units) under the skin 4 times a day before meals. Take as directed per insulin instructions.)      isosorbide dinitrate (Isordil) 10 mg tablet Take 1 tablet (10 mg) by mouth 3 times a day.      lidocaine (LMX) 4 % cream Apply 0.1 g topically once daily. To back      melatonin 3 mg tablet Take 1 tablet (3 mg) by mouth once daily at bedtime.      metoprolol succinate XL (Toprol-XL) 100 mg 24 hr tablet Take 1 tablet (100 mg) by mouth once daily. Do not crush or chew.      mirtazapine (Remeron) 7.5 mg tablet Take 1 tablet (7.5 mg) by mouth once daily at bedtime.      pantoprazole (ProtoNix) 40 mg EC tablet Take 1 tablet (40 mg) by mouth once daily in the morning. Take before meals. Do not crush, chew, or split.      PSYLLIUM HUSK, ASPARTAME, ORAL Take 1 packet by mouth once daily.      sennosides-docusate sodium (Pascale-Colace) 8.6-50 mg tablet Take 1 tablet by mouth 2 times a day.      sevelamer carbonate (Renvela) 800 mg tablet Take 1 tablet (800 mg) by mouth 3 times a day before meals.      simethicone (Mylicon) 80  "mg chewable tablet Chew 1 tablet (80 mg) 4 times a day.      zinc oxide 20 % ointment Apply 1 Application topically twice a day. To groin and buttocks      [DISCONTINUED] acetaminophen (Tylenol) 325 mg tablet Take 2 tablets (650 mg) by mouth every 6 hours if needed for mild pain (1 - 3) or fever (temp greater than 38.0 C).      [DISCONTINUED] traMADol (Ultram) 50 mg tablet Take 0.5 tablets (25 mg) by mouth every 12 hours if needed for severe pain (7 - 10). 30 tablet 5      Scheduled medications  amiodarone, 200 mg, oral, Daily  apixaban, 2.5 mg, oral, BID  atorvastatin, 80 mg, oral, Nightly  busPIRone, 10 mg, oral, BID  cefepime, 1 g, intravenous, q24h  clopidogrel, 75 mg, oral, Daily  dilTIAZem ER, 240 mg, oral, Nightly  [START ON 10/10/2024] heparin, 2,000 Units, intra-catheter, After Dialysis  [START ON 10/10/2024] heparin, 2,000 Units, intra-catheter, After Dialysis  insulin glargine, 20 Units, subcutaneous, Nightly  insulin lispro, 0-10 Units, subcutaneous, With meals & nightly  isosorbide dinitrate, 10 mg, oral, TID  metoprolol succinate XL, 100 mg, oral, Daily  mirtazapine, 7.5 mg, oral, Nightly  pantoprazole, 40 mg, oral, Daily  sennosides-docusate sodium, 1 tablet, oral, BID  vancomycin, 500 mg, intravenous, Once      Continuous medications     PRN medications  PRN medications: acetaminophen, albuterol, ondansetron, polyethylene glycol, vancomycin     Review of systems  as per HPI otherwise 10 point review systems negative    /64 (BP Location: Left arm, Patient Position: Lying) Comment (BP Location): rn notified pt has right port and left sided stroke rn stated get bp on left  Pulse 66   Temp 36.1 °C (97 °F) (Temporal)   Resp 17   Ht 1.626 m (5' 4\")   Wt 69.9 kg (154 lb)   SpO2 97%   BMI 26.43 kg/m²     Input / Output:  24 HR:   Intake/Output Summary (Last 24 hours) at 10/9/2024 1307  Last data filed at 10/9/2024 1202  Gross per 24 hour   Intake 800 ml   Output 1404 ml   Net -604 ml "       Physical Exam   Alert and oriented x 3, NAD  Elderly and looks chronically ill  EOMI  OP clear  Neck: supple, No JVD  Right IJ TDC  CV: RRR without m/r/g  Lungs: CTA bilaterally  Abd: soft NT/ND +BS  Ext: No lower extremity edema   Neuro: grossly intact  Skin: no rashes    Results from last 7 days   Lab Units 10/09/24  0554 10/08/24  2347   SODIUM mmol/L 137 138   POTASSIUM mmol/L 3.7 3.6   CHLORIDE mmol/L 99 102   CO2 mmol/L 32 28   BUN mg/dL 24* 22   CREATININE mg/dL 2.81* 2.65*   GLUCOSE mg/dL 184* 251*   CALCIUM mg/dL 7.7* 7.3*        Results from last 7 days   Lab Units 10/09/24  0554 10/08/24  2347   SODIUM mmol/L 137 138   POTASSIUM mmol/L 3.7 3.6   CHLORIDE mmol/L 99 102   CO2 mmol/L 32 28   BUN mg/dL 24* 22   CREATININE mg/dL 2.81* 2.65*   CALCIUM mg/dL 7.7* 7.3*   PROTEIN TOTAL g/dL  --  5.0*   BILIRUBIN TOTAL mg/dL  --  0.3   ALK PHOS U/L  --  87   ALT U/L  --  11   AST U/L  --  20   GLUCOSE mg/dL 184* 251*      Results from last 7 days   Lab Units 10/09/24  0554   MAGNESIUM mg/dL 1.73      Results from last 7 days   Lab Units 10/09/24  0554 10/08/24  2347   WBC AUTO x10*3/uL 10.0 9.9   HEMOGLOBIN g/dL 10.2* 9.3*   HEMATOCRIT % 33.4* 30.3*   PLATELETS AUTO x10*3/uL 281 314        XR foot left 3+ views   Final Result   1. Soft tissue ulceration at the heel without definite underlying   osseous erosion. If there is concern for osteomyelitis an MRI may be   performed in further assessment.   2. Questionable fracture through the anterior calcaneus versus   projectional artifact.                  Signed by: Emery Morris 10/9/2024 12:43 AM   Dictation workstation:   [id number]           Assessment:   Patient is 89 y.o. female with DM2, A-fib who is admitted to hospital for left foot cellulitis with chronic heel wound. Nephrology consulted in view of ESRD.    ESRD  -HD Monday Wednesday Friday, [facility]  -Access is right IJ TDC  -Patient has been refusing referral for AV fistula     Anemia  of ESRD  -Hemoglobin is at goal  -She is on NOAH as outpatient with dialysis and this is titrated to a goal of hemoglobin 10-11.       CKD-MBD  -Patient does not require binders  -Phos and calcium have been  at goal.     Left calcaneal ulcer and cellulitis  -Antibiotics per ID, currently on renal dose of Vanco and cefepime     Hypoalbuminemia  -Infection related  -Continue protein supplements    Recommendations:   -Seen on hemodialysis today  -Will keep on Monday Wednesday Friday HD schedule while she is here      Please message me through [employer] chat with any questions or concerns.     Mally Lucas DO  10/9/2024  1:07 PM         [facility]    [address], [address]   [place] [address]  Office: [phone]

## 2024-10-09 NOTE — PROGRESS NOTES
Vancomycin Dosing by Pharmacy- INITIAL    Melody Martin is a 89 y.o. year old female who Pharmacy has been consulted for vancomycin dosing for osteomyelitis/septic arthritis. Based on the patient's indication and renal status this patient will be dosed based on a goal pre-HD level of 20-25.     Renal function is currently stable.    Visit Vitals  /52   Pulse 61   Temp 36.9 °C (98.4 °F) (Skin)   Resp 18        Lab Results   Component Value Date    CREATININE 2.65 (H) 10/08/2024    CREATININE 3.37 (H) 2024    CREATININE 2.27 (H) 2024    CREATININE 2.09 (H) 2024        Patient weight is as follows:   Vitals:    10/08/24 2308   Weight: 69.9 kg (154 lb)       Cultures:  No results found for the encounter in last 14 days.        No intake/output data recorded.  I/O during current shift:  No intake/output data recorded.    Temp (24hrs), Av.9 °C (98.4 °F), Min:36.9 °C (98.4 °F), Max:36.9 °C (98.4 °F)         Assessment/Plan     Patient has already been given a loading dose of 1500 mg.  Will initiate vancomycin maintenance as 500 mg after each HD session. Patient's current HD schedule is [facility].   Follow-up level will be ordered on 10/11 at AM Labs unless clinically indicated sooner.  Will continue to monitor renal function daily while on vancomycin and order serum creatinine at least every 48 hours if not already ordered.  Follow for continued vancomycin needs, clinical response, and signs/symptoms of toxicity.       Maribell Deshpande, PharmD

## 2024-10-09 NOTE — SIGNIFICANT EVENT
10/09/24 0435   Wound 09/01/24  Buttocks   Date First Assessed/Time First Assessed: 09/01/24 1308   Primary Wound Type: (c)   Location: Buttocks   Wound Image Images linked

## 2024-10-09 NOTE — PROGRESS NOTES
Medication Adjustment    The following medication(s) was/were adjusted for Melody Martin per protocol/policy due to altered renal function.    Medication(s) adjusted:   Zosyn 3.375g q 6 hrs changed to 2.25g q 12 hrs for the patient being ESRD on HD.    Maribell Desphande, PharmD

## 2024-10-09 NOTE — PROGRESS NOTES
Melody Martin is a 89 y.o. female on day 0 of admission presenting with Cellulitis of left lower extremity.      Subjective   89 y.o.  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  Patient presented from [facility] or [facility] for continued left medial wound was concerns for cellulitis and possible osteomyelitis.  At baseline is bedridden.  After her most recent hospitalization she was placed on Augmentin and has been on this for about 4 days but the symptoms have continued to persist with concerns for growing osteomyelitis and increased wound drainage of some purulence.  At baseline the patient is essentially confined to a wheelchair with a Fabio lift otherwise cannot move on her own accord.  The patient had mentioned to the ER physician that they do not often move her from a Fabio lift to a wheelchair at the facility.  She states that she does not really have much sensation in her left lower extremity foot and her foot all the way up to her knee may be slightly above the knee will occasionally get sharp stabbing/lightening like sensation going from her lower back down her entire leg but unclear where it stops she states.  She states that overall she feels well but did express concern that nursing staff at the facility may not be caring for the foot as often as he should.  She denies any recent sick contacts, chemical/environmental exposures, changes in dietary habits or any recent traumatic event/falls other than noted above.  She denies any fevers, chills, night sweats, vision changes, auditory changes, change in taste/smell, loss of bowel/bladder control, loss consciousness, dizziness, vertigo,  syncope, seizure-like activity, chest pain, palpitations, shortness of breath, coughing, wheezing, congestion, hemoptysis, hematemesis, abdominal pain, nausea or vomiting, diarrhea, constipation, dysuria, hematuria, dyschezia, hematochezia or any lateralizing motor/sensory deficits other than noted above.     ED Course (Summary):   Vitals on presentation: Temperature 36.9 °C, heart rate 67, respirations 16, blood pressure 110/36, pulse ox 96% on room air  WBC = 9.9 -->  Lactate = 3.5 -->  ESR = 45 -->  CRP = 2.81 -->  Hgb/Hct = 9.3/30.3 -->  Baseline hemoglobin seems to be quite variable. The only labs we have available seem to be during times of hospitalizations where she has been as low as 6 requiring transfusions.   BUN/Creatinine = 22/2.65 -->  Glucose = 251  Wound culture pending  Blood cultures x 2 pending  XR left foot: Soft tissue ulceration at the heel without definitive underlying osseous erosion, questionable fracture through the anterior calcaneus versus projectional artifact.  Given Zosyn/Vancomycin        10/9:               Today patient is awake alert and interactive appropriately.  She does complain that she is hungry as apparently has not been able to eat yet today.  Describes having intermittent spasms of pain in the left lower extremity.  Has remained afebrile.  WBC at this point remains normal.  Underwent HD today and will continue Monday Wednesday Friday schedule through IJ catheter.  ID discontinuing Zosyn and adding cefepime to the vancomycin and awaiting culture results and podiatry evaluation.      Review of Systems   Constitutional:  Positive for activity change and fatigue. Negative for appetite change, chills, diaphoresis, fever and unexpected weight change.   HENT:  Negative for congestion, drooling, hearing loss, postnasal drip, rhinorrhea, sinus pressure, sinus pain, sneezing, sore throat, tinnitus, trouble swallowing and voice change.    Eyes:  Negative for photophobia and visual  disturbance.   Respiratory:  Negative for cough, chest tightness, shortness of breath and wheezing.    Cardiovascular:  Negative for chest pain, palpitations and leg swelling.   Gastrointestinal:  Negative for abdominal distention, abdominal pain, blood in stool, constipation, diarrhea, nausea and vomiting.   Genitourinary:  Negative for decreased urine volume, difficulty urinating, dysuria, flank pain, frequency, hematuria and urgency.   Musculoskeletal:  Positive for back pain, gait problem and myalgias. Negative for arthralgias, joint swelling, neck pain and neck stiffness.   Skin:  Positive for wound.   Neurological:  Positive for weakness. Negative for dizziness, tremors, seizures, syncope, facial asymmetry, speech difficulty, light-headedness, numbness and headaches.   Psychiatric/Behavioral:  Negative for confusion and decreased concentration. The patient is nervous/anxious.    All other systems reviewed and are negative.         Objective     Last Recorded Vitals  /65 (BP Location: Left arm, Patient Position: Lying)   Pulse 74   Temp 36.1 °C (96.9 °F) (Temporal)   Resp 17   Wt 69.9 kg (154 lb)   SpO2 96%   Intake/Output last 3 Shifts:    Intake/Output Summary (Last 24 hours) at 10/9/2024 1756  Last data filed at 10/9/2024 1726  Gross per 24 hour   Intake 1150 ml   Output 1404 ml   Net -254 ml       Admission Weight  Weight: 69.9 kg (154 lb) (10/08/24 2308)    Daily Weight  10/08/24 : 69.9 kg (154 lb)    Image Results  XR foot left 3+ views  Narrative: Interpreted By:  Emery Morris,   STUDY:  XR FOOT LEFT 3+ VIEWS;  10/8/2024 11:56 pm      INDICATION:  Signs/Symptoms:heel wound.          COMPARISON:  Radiographs left foot 08/30/2024      ACCESSION NUMBER(S):  [id number]      ORDERING CLINICIAN:  MAMIE MCDONALD      FINDINGS:  Three views left foot.      Bone mineral density is diffusely diminished.  Questionable fracture through the anterior calcaneus versus  positional artifact. Soft  tissue ulceration and adjacent subcutaneous  air of the heel. No definite underlying osseous erosion.  Diffuse vascular calcifications throughout the foot and ankle.      Impression: 1. Soft tissue ulceration at the heel without definite underlying  osseous erosion. If there is concern for osteomyelitis an MRI may be  performed in further assessment.  2. Questionable fracture through the anterior calcaneus versus  projectional artifact.              Signed by: Emery Morris 10/9/2024 12:43 AM  Dictation workstation:   [id number]      Physical Exam  Constitutional:       General: She is not in acute distress.     Appearance: She is not ill-appearing or diaphoretic.      Comments: Frail/Elderly  appearing  female   HENT:      Head: Normocephalic and atraumatic.      Mouth/Throat:      Mouth: Mucous membranes are moist.      Pharynx: Oropharynx is clear.   Eyes:      General: No scleral icterus.     Extraocular Movements: Extraocular movements intact.      Conjunctiva/sclera: Conjunctivae normal.      Pupils: Pupils are equal, round, and reactive to light.   Neck:      Vascular: No carotid bruit.      Comments:   Cardiovascular:      Rate and Rhythm: Normal rate and regular rhythm.      Pulses: Normal pulses.      Heart sounds: Normal heart sounds. No murmur heard.  Pulmonary:      Effort: No respiratory distress.      Breath sounds: No wheezing, rhonchi or rales.      Comments: Diminished throughout with faint crackles in the bases but otherwise no other signs of distress  Chest:      Chest wall: No tenderness.   Abdominal:      General: Abdomen is flat. Bowel sounds are normal. There is no distension.      Palpations: Abdomen is soft. There is no mass.      Tenderness: There is no abdominal tenderness. There is no right CVA tenderness, left CVA tenderness, guarding or rebound.   Musculoskeletal:      Cervical back: Normal range of motion and neck supple. Tenderness present. No rigidity.      Comments:  Diminished range of motion/strength of bilateral upper and lower extremities .   Skin:     Findings: Bruising, erythema, lesion and rash present.      Comments: Left heel ulceration with some granulation tissue and purulence/bloody drainage with a foul odor and erythema extending into the foot as well as above the ankle, she has little to no sensation except for deep pressure, the buttock does have small superficial ulcerations as well as a deep pressure injury with a purpleish/bruised appearance throughout the sacrum and gluteal cleft into the perineum.  There are some mild erythema but actually improved in appearance compared to prior examination.  Please see media imaging by nursing.  Neurological:      General: No focal deficit present.      Mental Status: She is alert and oriented to person, place, and time. Mental status is at baseline.      Cranial Nerves: No cranial nerve deficit.      Sensory: Sensory deficit present.      Motor: Weakness present.      Coordination: Coordination abnormal.      Gait: Gait abnormal.      Comments: AO x3,   Psychiatric:         Behavior: Behavior normal.         Thought Content: Thought content normal.         Judgment: Judgment normal.      Comments: Pleasant and cooperative to exam.  Affect mildly flat.        Relevant Results               Assessment/Plan        This patient has a central line   Reason for the central line remaining today? Dialysis/Hemapheresis            Assessment & Plan  Cellulitis of left lower extremity    Cellulitis of Left Foot  Left Calcaneus Ulcer  Osteomyelitis of the Left Calcaneous?  -Imaging as noted above  -WBC = 9.9 -->  -Lactate = 3.5 -->  -ESR = 45 -->  -CRP = 2.81 -->  -Wound Culture = pending  -Blood Culture x2 = pending  -Continued on IV antibiotics with Vancomycin/Zosyn --> renally dosed   -Podiatry Consult appreciated  -Infectious Disease Consult appreciated  10/9: ID changed Zosyn to cefepime and continuing vancomycin.  Awaiting  podiatry evaluation.     Sacral Decubitus Ulcer / Pressure Injury  -Wound Care Consult appreciated  -antibiotics in setting of above  -does appear erythematous though no fluctuance or hardened areas or pain with palpation  -will continue to monitor and if worsening condition then will need to image the abdomen/pelvis further  10/9: Wound care consult    ESRD on HD (Mon/Wed/Fri)  -HD via right chest wall catheter since ~10/07/24  -Nephrology Consult appreciated  -BUN/Creatinine = 22/2.65 -->  -no grossly evident signs of volume overload at this time  10/9: HD today and will continue Monday Wednesday Friday schedule.     Hypertension  Hyperlipidemia   HFpEF  Atrial Fibrillation   Coronary Artery Disease  -Continued on home amiodarone, Eliquis, Plavix, Cardizem, isosorbide, Toprol-XL  -Telemetry monitoring     IDDM2 w/ Hyperglycemia  -ISS AC/HS  -Diabetic Diet  -Hypoglycemia Protocol  -continued on home lantus qhs     History of CVA with chronic left lower extremity weakness  Acute on Chronic Deconditioning & Ambulatory Dysfunction  -PT/OT appreciated      Anemia  -suspect in setting of chronic disease and renal dysfunction  -Hgb/Hct = 8.1/26.2 (09/04/24) --> 9.3/30.3 --> 10.2/33.4  -Baseline hemoglobin seems to be quite variable. The only labs we have available seem to be during times of hospitalizations where she has been as low as 6 requiring transfusions.   10/9: Hemoglobin appears stable at this point.  Continue to monitor.     Chronic Constipation  -suspect in setting of poor mobility and functional status and ESRD  -continued bowel regimen                  Manav Cancino MD

## 2024-10-09 NOTE — PRE-PROCEDURE NOTE
Report from Sending RN:    Report From: gavi schmid   Recent Surgery of Procedure: No  Baseline Level of Consciousness (LOC): x3  Oxygen Use: No  Type: na  Diabetic: Yes  Last BP Med Given Day of Dialysis: see emar  Last Pain Med Given: see emar   Lab Tests to be Obtained with Dialysis: Yes  Blood Transfusion to be Given During Dialysis: No  Available IV Access: Yes  Medications to be Administered During Dialysis: No  Continuous IV Infusion Running: No  Restraints on Currently or in the Last 24 Hours: No  Hand-Off Communication: stable for hd no issues   Dialysis Catheter Dressing: clean dry intact   Last Dressing Change: needs changed 10/7/24

## 2024-10-09 NOTE — PROGRESS NOTES
Pharmacy Admission Order Reconciliation Review    Melody Martin is a 89 y.o. female admitted for Cellulitis of left lower extremity. Pharmacy reviewed the patient's unreconciled admission medications.    Prior to admission medications that were reviewed and acted on by the pharmacist include:  Docusate 100 mg - OTC  Amox-clav 500-125 mg - Zosyn ordered by team   These medications have been reconciled.     Any other unreconcilied medications have been addressed and will be ordered or held by the patient's medical team. Medications addressed by the pharmacist may be added or changed by the patient's medical team at any time.    Angelito Stroud, PharmD  Transitions of Care Pharmacist

## 2024-10-10 LAB
ANION GAP SERPL CALC-SCNC: 8 MMOL/L (ref 10–20)
BUN SERPL-MCNC: 14 MG/DL (ref 6–23)
CALCIUM SERPL-MCNC: 7.5 MG/DL (ref 8.6–10.3)
CHLORIDE SERPL-SCNC: 103 MMOL/L (ref 98–107)
CO2 SERPL-SCNC: 29 MMOL/L (ref 21–32)
CREAT SERPL-MCNC: 1.85 MG/DL (ref 0.5–1.05)
EGFRCR SERPLBLD CKD-EPI 2021: 26 ML/MIN/1.73M*2
ERYTHROCYTE [DISTWIDTH] IN BLOOD BY AUTOMATED COUNT: 17.1 % (ref 11.5–14.5)
GLUCOSE BLD MANUAL STRIP-MCNC: 216 MG/DL (ref 74–99)
GLUCOSE BLD MANUAL STRIP-MCNC: 224 MG/DL (ref 74–99)
GLUCOSE BLD MANUAL STRIP-MCNC: 257 MG/DL (ref 74–99)
GLUCOSE BLD MANUAL STRIP-MCNC: 92 MG/DL (ref 74–99)
GLUCOSE SERPL-MCNC: 108 MG/DL (ref 74–99)
HCT VFR BLD AUTO: 28.7 % (ref 36–46)
HGB BLD-MCNC: 9 G/DL (ref 12–16)
LACTATE SERPL-SCNC: 2.2 MMOL/L (ref 0.4–2)
MCH RBC QN AUTO: 29.4 PG (ref 26–34)
MCHC RBC AUTO-ENTMCNC: 31.4 G/DL (ref 32–36)
MCV RBC AUTO: 94 FL (ref 80–100)
NRBC BLD-RTO: 0 /100 WBCS (ref 0–0)
PLATELET # BLD AUTO: 279 X10*3/UL (ref 150–450)
POTASSIUM SERPL-SCNC: 3.8 MMOL/L (ref 3.5–5.3)
RBC # BLD AUTO: 3.06 X10*6/UL (ref 4–5.2)
SODIUM SERPL-SCNC: 136 MMOL/L (ref 136–145)
WBC # BLD AUTO: 10 X10*3/UL (ref 4.4–11.3)

## 2024-10-10 PROCEDURE — 99233 SBSQ HOSP IP/OBS HIGH 50: CPT | Performed by: FAMILY MEDICINE

## 2024-10-10 PROCEDURE — 2500000004 HC RX 250 GENERAL PHARMACY W/ HCPCS (ALT 636 FOR OP/ED): Performed by: INTERNAL MEDICINE

## 2024-10-10 PROCEDURE — 83605 ASSAY OF LACTIC ACID: CPT | Performed by: INTERNAL MEDICINE

## 2024-10-10 PROCEDURE — 2500000004 HC RX 250 GENERAL PHARMACY W/ HCPCS (ALT 636 FOR OP/ED): Mod: JZ | Performed by: STUDENT IN AN ORGANIZED HEALTH CARE EDUCATION/TRAINING PROGRAM

## 2024-10-10 PROCEDURE — 97162 PT EVAL MOD COMPLEX 30 MIN: CPT | Mod: GP

## 2024-10-10 PROCEDURE — 2500000001 HC RX 250 WO HCPCS SELF ADMINISTERED DRUGS (ALT 637 FOR MEDICARE OP)

## 2024-10-10 PROCEDURE — 82947 ASSAY GLUCOSE BLOOD QUANT: CPT

## 2024-10-10 PROCEDURE — 36415 COLL VENOUS BLD VENIPUNCTURE: CPT | Performed by: FAMILY MEDICINE

## 2024-10-10 PROCEDURE — 82374 ASSAY BLOOD CARBON DIOXIDE: CPT | Performed by: FAMILY MEDICINE

## 2024-10-10 PROCEDURE — 97530 THERAPEUTIC ACTIVITIES: CPT | Mod: GO | Performed by: OCCUPATIONAL THERAPIST

## 2024-10-10 PROCEDURE — 2500000001 HC RX 250 WO HCPCS SELF ADMINISTERED DRUGS (ALT 637 FOR MEDICARE OP): Performed by: INTERNAL MEDICINE

## 2024-10-10 PROCEDURE — 97166 OT EVAL MOD COMPLEX 45 MIN: CPT | Mod: GO | Performed by: OCCUPATIONAL THERAPIST

## 2024-10-10 PROCEDURE — 85027 COMPLETE CBC AUTOMATED: CPT | Performed by: FAMILY MEDICINE

## 2024-10-10 PROCEDURE — 1200000002 HC GENERAL ROOM WITH TELEMETRY DAILY

## 2024-10-10 PROCEDURE — 2500000002 HC RX 250 W HCPCS SELF ADMINISTERED DRUGS (ALT 637 FOR MEDICARE OP, ALT 636 FOR OP/ED): Performed by: INTERNAL MEDICINE

## 2024-10-10 PROCEDURE — 97530 THERAPEUTIC ACTIVITIES: CPT | Mod: GP

## 2024-10-10 RX ORDER — OXYCODONE HYDROCHLORIDE 5 MG/1
5 TABLET ORAL EVERY 4 HOURS PRN
Status: DISCONTINUED | OUTPATIENT
Start: 2024-10-10 | End: 2024-10-11 | Stop reason: HOSPADM

## 2024-10-10 RX ORDER — INSULIN LISPRO 100 [IU]/ML
4 INJECTION, SOLUTION INTRAVENOUS; SUBCUTANEOUS ONCE
Status: DISCONTINUED | OUTPATIENT
Start: 2024-10-10 | End: 2024-10-11 | Stop reason: HOSPADM

## 2024-10-10 RX ORDER — OXYCODONE HYDROCHLORIDE 10 MG/1
10 TABLET ORAL EVERY 4 HOURS PRN
Status: DISCONTINUED | OUTPATIENT
Start: 2024-10-10 | End: 2024-10-11 | Stop reason: HOSPADM

## 2024-10-10 ASSESSMENT — PAIN DESCRIPTION - LOCATION: LOCATION: ANKLE

## 2024-10-10 ASSESSMENT — PAIN SCALES - GENERAL
PAINLEVEL_OUTOF10: 0 - NO PAIN
PAINLEVEL_OUTOF10: 9
PAINLEVEL_OUTOF10: 5 - MODERATE PAIN
PAINLEVEL_OUTOF10: 2

## 2024-10-10 ASSESSMENT — COGNITIVE AND FUNCTIONAL STATUS - GENERAL
CLIMB 3 TO 5 STEPS WITH RAILING: TOTAL
PERSONAL GROOMING: A LOT
MOVING TO AND FROM BED TO CHAIR: TOTAL
DRESSING REGULAR UPPER BODY CLOTHING: TOTAL
TOILETING: TOTAL
TOILETING: TOTAL
DRESSING REGULAR LOWER BODY CLOTHING: TOTAL
DRESSING REGULAR LOWER BODY CLOTHING: TOTAL
TURNING FROM BACK TO SIDE WHILE IN FLAT BAD: TOTAL
DAILY ACTIVITIY SCORE: 9
MOBILITY SCORE: 6
MOBILITY SCORE: 6
CLIMB 3 TO 5 STEPS WITH RAILING: TOTAL
TURNING FROM BACK TO SIDE WHILE IN FLAT BAD: TOTAL
WALKING IN HOSPITAL ROOM: TOTAL
HELP NEEDED FOR BATHING: TOTAL
WALKING IN HOSPITAL ROOM: TOTAL
MOVING TO AND FROM BED TO CHAIR: TOTAL
EATING MEALS: A LOT
STANDING UP FROM CHAIR USING ARMS: TOTAL
STANDING UP FROM CHAIR USING ARMS: TOTAL
PERSONAL GROOMING: A LOT
MOVING FROM LYING ON BACK TO SITTING ON SIDE OF FLAT BED WITH BEDRAILS: TOTAL
EATING MEALS: A LITTLE
MOVING FROM LYING ON BACK TO SITTING ON SIDE OF FLAT BED WITH BEDRAILS: TOTAL
HELP NEEDED FOR BATHING: TOTAL
DRESSING REGULAR UPPER BODY CLOTHING: A LOT
DAILY ACTIVITIY SCORE: 9

## 2024-10-10 ASSESSMENT — ACTIVITIES OF DAILY LIVING (ADL)
ADL_ASSISTANCE: NEEDS ASSISTANCE
TOILETING_ASSISTANCE: TOTAL
BATHING_ASSISTANCE: TOTAL
ADL_ASSISTANCE: NEEDS ASSISTANCE

## 2024-10-10 ASSESSMENT — PAIN - FUNCTIONAL ASSESSMENT
PAIN_FUNCTIONAL_ASSESSMENT: 0-10
PAIN_FUNCTIONAL_ASSESSMENT: CRIES (CRYING REQUIRES OXYGEN INCREASED VITAL SIGNS EXPRESSION SLEEP)

## 2024-10-10 ASSESSMENT — PAIN DESCRIPTION - ORIENTATION: ORIENTATION: LEFT

## 2024-10-10 NOTE — CONSULTS
Wound Care Consult     Visit Date: 10/10/2024      Patient Name: Melody Martin         MRN: [id number]           YOB: 1935      Pertinent Labs:   Albumin   Date Value Ref Range Status   10/09/2024 2.1 (L) 3.4 - 5.0 g/dL Final     ALBUMIN (MG/L) IN URINE   Date Value Ref Range Status   03/27/2023 1,255.6 Not Established mg/L Final   Nutrition on consult.     Wound Assessment:  Wound 09/01/24 Pressure Injury Coccyx (Active)   Wound Image   10/10/24 0937   Site Assessment White;Yellow;Sloughing;Red;Pink;Painful 10/10/24 0937   Pascale-Wound Assessment Blanchable erythema;Excoriated 10/10/24 0937   Pressure Injury Stage 3 10/10/24 0937   Shape cluster of stage 3 pressure injuries with surrounding blanchable erythema 10/10/24 0937   Wound Length (cm) 8 cm 10/10/24 0937   Wound Width (cm) 10 cm 10/10/24 0937   Wound Surface Area (cm^2) 80 cm^2 10/10/24 0937   Wound Depth (cm) 0.2 cm 10/10/24 0937   Wound Volume (cm^3) 16 cm^3 10/10/24 0937   State of Healing Non-healing 10/09/24 0740   Margins Epibole (Rolled edges) 10/10/24 0937   Drainage Description Serosanguineous 10/10/24 0937   Drainage Amount Scant 10/10/24 0937   Dressing Barrier film;Foam 10/10/24 0937   Dressing Changed Changed 10/10/24 0937   Dressing Status Clean;Dry 10/09/24 2300       Wound 09/01/24 Pressure Injury Heel Left (Active)   Wound Image   10/09/24 0430   Site Assessment Unable to assess 10/10/24 0937   Pascale-Wound Assessment Painful;Pink;Red 10/09/24 0740   State of Healing Non-healing 10/09/24 0740   Drainage Description Red;Serosanguineous;Yellow 10/09/24 0740   Drainage Amount Small 10/09/24 0740   Dressing Petroleum gauze;Foam;Kerlix/rolled gauze 10/09/24 0740   Dressing Changed New 10/09/24 0740   Dressing Status Clean;Occlusive;Dry 10/10/24 0937       Wound 09/01/24 Moisture Associated Skin Damage Abdomen Medial;Upper (Active)   Wound Image    10/09/24 0445   Shape minimal MASD yo skin folds- triad applied 10/10/24 0937    Drainage Description None 10/10/24 0937   Drainage Amount None 10/10/24 0937   Dressing Barrier film 10/10/24 0937     Patient seen for sacral wound (present on admission) complicated by PMH: HTN, HLD, CAD, cardiomyopathy, HFrEF, atrial fibrillation, ESRD on HD, IDDM 2, anemia of chronic disease, and chronic pressure ulcer of the left heel and pressure injury of the buttocks/sacrum as well as history of a CVA with chronic left lower extremity decreased sensation/paralysis per chart. Exam conducted with MELISA Amaro to assist with positioning during exam. Patient alert and oriented, known to this RN from prior admissions. Podiatry on consult and seen patient yesterday for chronic left heel wound- orders in place by Dr. Veloz, left heel wound approx 8cm by 4cm. Minimal MASD noted to skin folds during exam. Cluster of stage 3 pressure injuries noted to sacrum/bilateral buttocks with surrounding blanchable erythema and excoriation.  Patient states this area is painful but reported some relief after skin hygiene and dressing care. Wounds overall appear worse then prior admissions. Preventative foam in place to right heel. Left heel dressing dry and intact. See detailed assessment above from flowsheet. Recommendations below, reviewed with Dr. Cancino.     Wound location: Sacrum    Treatment protocol recommended:  Cleanse with vashe, apply Triad dime thickness and cover with mepilex foam every 3 days/prn  Continue to off load, turning at least every 2 hours. Offload heels.    Wound location: Skin folds   Treatment protocol recommended:  Apply Triad, dime thickness to skin folds daily.  Keep skin clean and dry.     Therapeutic surface: Patient on standard pressure relieving mattress during exam with turn and reposition system placed. Positioned patient onto right side after exam. Staff to continue to turn/reposition patient atleast every 2 hours. Offload heels with 2 pillows per podiatry. Recommend waffle overlay to mattress,  Sondra to obtain and place with bath/linen change, bedside RN Clare aware.        See above recommendations for treatment. Patient will continue to require skilled assistance with skin hygiene and dressing care upon discharge, would recommend resume follow up [facility] upon discharge for heel wound.     Please contact me with questions or changes in patient condition.  Natalie Kauffman RN  [facility]   [phone]

## 2024-10-10 NOTE — CARE PLAN
The patient's goals for the shift include      The clinical goals for the shift include Patient will remain free from falls and injury throughout shift      Problem: Nutrition  Goal: Less than 5 days NPO/clear liquids  Outcome: Progressing  Goal: Oral intake greater than 50%  Outcome: Progressing  Goal: Oral intake greater 75%  Outcome: Progressing  Goal: Consume prescribed supplement  Outcome: Progressing  Goal: Adequate PO fluid intake  Outcome: Progressing  Goal: Nutrition support goals are met within 48 hrs  Outcome: Progressing  Goal: Nutrition support is meeting 75% of nutrient needs  Outcome: Progressing  Goal: Tube feed tolerance  Outcome: Progressing  Goal: BG  mg/dL  Outcome: Progressing  Goal: Lab values WNL  Outcome: Progressing  Goal: Electrolytes WNL  Outcome: Progressing  Goal: Promote healing  Outcome: Progressing  Goal: Maintain stable weight  Outcome: Progressing  Goal: Reduce weight from edema/fluid  Outcome: Progressing  Goal: Gradual weight gain  Outcome: Progressing  Goal: Improve ostomy output  Outcome: Progressing     Problem: Pain - Adult  Goal: Verbalizes/displays adequate comfort level or baseline comfort level  Outcome: Progressing     Problem: Safety - Adult  Goal: Free from fall injury  Outcome: Progressing     Problem: Discharge Planning  Goal: Discharge to home or other facility with appropriate resources  Outcome: Progressing     Problem: Chronic Conditions and Co-morbidities  Goal: Patient's chronic conditions and co-morbidity symptoms are monitored and maintained or improved  Outcome: Progressing     Problem: Skin  Goal: Decreased wound size/increased tissue granulation at next dressing change  Outcome: Progressing  Goal: Participates in plan/prevention/treatment measures  Outcome: Progressing  Goal: Prevent/manage excess moisture  Outcome: Progressing  Goal: Prevent/minimize sheer/friction injuries  Outcome: Progressing  Goal: Promote/optimize nutrition  Outcome:  Progressing  Goal: Promote skin healing  10/10/2024 0736 by Nyasia Bangura RN  Flowsheets (Taken 10/10/2024 0736)  Promote skin healing: Turn/reposition every 2 hours/use positioning/transfer devices  10/10/2024 0735 by Nyasia Bangura RN  Outcome: Progressing     Problem: Diabetes  Goal: Achieve decreasing blood glucose levels by end of shift  Outcome: Progressing  Goal: Increase stability of blood glucose readings by end of shift  Outcome: Progressing  Goal: Decrease in ketones present in urine by end of shift  Outcome: Progressing  Goal: Maintain electrolyte levels within acceptable range throughout shift  Outcome: Progressing  Goal: Maintain glucose levels >70mg/dl to <250mg/dl throughout shift  Outcome: Progressing  Goal: No changes in neurological exam by end of shift  Outcome: Progressing  Goal: Learn about and adhere to nutrition recommendations by end of shift  Outcome: Progressing  Goal: Vital signs within normal range for age by end of shift  Outcome: Progressing  Goal: Increase self care and/or family involovement by end of shift  Outcome: Progressing  Goal: Receive DSME education by end of shift  Outcome: Progressing     Problem: Fall/Injury  Goal: Not fall by end of shift  Outcome: Progressing  Goal: Be free from injury by end of the shift  Outcome: Progressing  Goal: Verbalize understanding of personal risk factors for fall in the hospital  Outcome: Progressing  Goal: Verbalize understanding of risk factor reduction measures to prevent injury from fall in the home  Outcome: Progressing  Goal: Use assistive devices by end of the shift  Outcome: Progressing  Goal: Pace activities to prevent fatigue by end of the shift  Outcome: Progressing

## 2024-10-10 NOTE — CARE PLAN
The patient's goals for the shift include      The clinical goals for the shift include pt will remain comfortable    Problem: Nutrition  Goal: Less than 5 days NPO/clear liquids  Outcome: Progressing  Goal: Oral intake greater than 50%  Outcome: Progressing  Goal: Oral intake greater 75%  Outcome: Progressing  Goal: Consume prescribed supplement  Outcome: Progressing  Goal: Adequate PO fluid intake  Outcome: Progressing  Goal: Nutrition support goals are met within 48 hrs  Outcome: Progressing  Goal: Nutrition support is meeting 75% of nutrient needs  Outcome: Progressing  Goal: Tube feed tolerance  Outcome: Progressing  Goal: BG  mg/dL  Outcome: Progressing  Goal: Lab values WNL  Outcome: Progressing  Goal: Electrolytes WNL  Outcome: Progressing  Goal: Promote healing  Outcome: Progressing  Goal: Maintain stable weight  Outcome: Progressing  Goal: Reduce weight from edema/fluid  Outcome: Progressing  Goal: Gradual weight gain  Outcome: Progressing  Goal: Improve ostomy output  Outcome: Progressing     Problem: Pain - Adult  Goal: Verbalizes/displays adequate comfort level or baseline comfort level  Outcome: Progressing     Problem: Safety - Adult  Goal: Free from fall injury  Outcome: Progressing     Problem: Discharge Planning  Goal: Discharge to home or other facility with appropriate resources  Outcome: Progressing     Problem: Chronic Conditions and Co-morbidities  Goal: Patient's chronic conditions and co-morbidity symptoms are monitored and maintained or improved  Outcome: Progressing     Problem: Skin  Goal: Decreased wound size/increased tissue granulation at next dressing change  Outcome: Progressing  Goal: Participates in plan/prevention/treatment measures  Outcome: Progressing  Goal: Prevent/manage excess moisture  Outcome: Progressing  Goal: Prevent/minimize sheer/friction injuries  10/10/2024 0643 by Camryn Pinto RN  Outcome: Progressing  10/10/2024 0643 by Camryn Pinto RN  Flowsheets  (Taken 10/10/2024 8043)  Prevent/minimize sheer/friction injuries:   Turn/reposition every 2 hours/use positioning/transfer devices   HOB 30 degrees or less  Goal: Promote/optimize nutrition  Outcome: Progressing  Goal: Promote skin healing  Outcome: Progressing     Problem: Diabetes  Goal: Achieve decreasing blood glucose levels by end of shift  Outcome: Progressing  Goal: Increase stability of blood glucose readings by end of shift  Outcome: Progressing  Goal: Decrease in ketones present in urine by end of shift  Outcome: Progressing  Goal: Maintain electrolyte levels within acceptable range throughout shift  Outcome: Progressing  Goal: Maintain glucose levels >70mg/dl to <250mg/dl throughout shift  Outcome: Progressing  Goal: No changes in neurological exam by end of shift  Outcome: Progressing  Goal: Learn about and adhere to nutrition recommendations by end of shift  Outcome: Progressing  Goal: Vital signs within normal range for age by end of shift  Outcome: Progressing  Goal: Increase self care and/or family involovement by end of shift  Outcome: Progressing  Goal: Receive DSME education by end of shift  Outcome: Progressing     Problem: Fall/Injury  Goal: Not fall by end of shift  Outcome: Progressing  Goal: Be free from injury by end of the shift  Outcome: Progressing  Goal: Verbalize understanding of personal risk factors for fall in the hospital  Outcome: Progressing  Goal: Verbalize understanding of risk factor reduction measures to prevent injury from fall in the home  Outcome: Progressing  Goal: Use assistive devices by end of the shift  Outcome: Progressing  Goal: Pace activities to prevent fatigue by end of the shift  Outcome: Progressing

## 2024-10-10 NOTE — PROGRESS NOTES
Physical Therapy    Physical Therapy Evaluation    Patient Name: Melody Martin  MRN: [id number]  Today's Date: 10/10/2024   Time Calculation  Start Time: 0956  Stop Time: 1025  Time Calculation (min): 29 min  3331/3331-A    Assessment/Plan   PT Assessment  PT Assessment Results: Decreased strength, Decreased range of motion, Decreased endurance, Impaired balance, Decreased mobility, Impaired sensation, Decreased skin integrity, Pain  Rehab Prognosis: Good  Barriers to Discharge: None  Evaluation/Treatment Tolerance: Patient tolerated treatment well, Patient limited by pain  Medical Staff Made Aware: Yes  End of Session Communication: Bedside nurse  Assessment Comment: This patient was admitted with dx of cellulitis L LE with concern for osteomyelitis L foot. Hemoglobin 9.0. Deficits include strength, posture, ankle ROM, endurance, balance, pain (not used to sitting up). This patient would benefit from continued PT to focus on impairments with gains noted upon last hospital admission and her high motivation to sit up. Recommend moderate intensity rehab.  End of Session Patient Position: Bed, 3 rail up, Alarm on  IP OR SWING BED PT PLAN  Inpatient or Swing Bed: Inpatient     10/10/24 0956   PT Plan   Treatment/Interventions Bed mobility;Transfer training;Balance training;Neuromuscular re-education;Strengthening;Endurance training;Range of motion;Therapeutic exercise;Therapeutic activity;Home exercise program;Postural re-education   PT Plan Ongoing PT   PT Frequency 4 times per week   PT Discharge Recommendations Moderate intensity level of continued care   Equipment Recommended upon Discharge Wheelchair;Lift  (TBD)   PT Recommended Transfer Status Total assist   PT - OK to Discharge Yes  (To next level of care when medically cleared.)       Subjective     Current Problem:  1. Cellulitis of left lower extremity        2. Failure of outpatient treatment          Patient Active Problem List   Diagnosis    Aortic valve  stenosis    Atrial bigeminy    Atrial premature complex    Heart murmur    Back pain    Type 2 diabetes mellitus with chronic kidney disease on chronic dialysis, with long-term current use of insulin (Multi)    Dyslipidemia    GERD without esophagitis    Paroxysmal SVT (supraventricular tachycardia) (CMS-[facility])    Renal artery stenosis (CMS-[facility])    Iron deficiency anemia due to chronic blood loss    Anemia    History of anaphylactic shock    Atherosclerotic heart disease of native coronary artery without angina pectoris    Cardiomyopathy    Carotid bruit    History of CVA (cerebrovascular accident)    Coagulation defect, unspecified    Cough    Hyperkalemia    Hyperlipidemia, unspecified    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    Left hemiparesis (Multi)    Ventricular tachycardia, unspecified (Multi)    Pressure ulcer of left heel, stage 4 (Multi)    Unspecified protein-calorie malnutrition (Multi)    Weakness    Atrial fibrillation (Multi)    Complaint of nasal congestion    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 (Multi)    Pain    Hypokalemia    Dysphagia    Constipation    Anxiety    Tooth pain    Wound infection    Vaginal discharge    ESBL (extended spectrum beta-lactamase) producing bacteria infection    Vaginal yeast infection    Bacterial vaginosis    Knee pain    Muscle spasm    Carbapenem-resistant Klebsiella pneumoniae infection    Atypical chest pain    Depression    Weight loss    Excoriation    Hyperglycemia    Candidiasis    Encounter for medication review    Confusion    Weight gain    Increased pain    Adult osteochondrosis of spine, lumbar region    Alteration in skin integrity due to moisture    Cognitive communication deficit    ESRD on hemodialysis (Multi)    Hemiplegia and hemiparesis following cerebral infarction affecting left non-dominant side (Multi)    Idiopathic stenosis of lumbar  "spine    Muscle weakness (generalized)    Physical deconditioning    Severe muscle deconditioning    Urinary tract infection with hematuria, site unspecified    Acute cystitis with hematuria    Peripheral artery disease (CMS-HCC)    Deep tissue injury    Status post tooth extraction    Skin irritation    Long toenail    Discoloration of skin    Hypertensive urgency    Osteomyelitis of left foot (Multi)    Retroperitoneal bleed    Cellulitis of heel, right    Cellulitis of left lower extremity       General Visit Information:  General  Reason for Referral: Cellulitis L foot. Hemoglobin 9.0.  Referred By: Manav Cancino MD  Past Medical History Relevant to Rehab: PMHx: OM L calcaneus, ESRD on HD, NIDDM, chronic stage 3 pressure ulcer L heel, superficial pressure injury buttock/sacrum (L per pt), HTN, CVA with residual L LE weakness, anemia. (89 y/o female admitted with cellulitis L LE. Hemoglobin 9.0. Question for osteomyelitis L calcaneus.)  Family/Caregiver Present: No  Co-Treatment: OT  Co-Treatment Reason: To optimize safe functional mobility and conserve energy with consideration of current and prior medical status.  Prior to Session Communication: Bedside nurse  Patient Position Received: Alarm off, not on at start of session, Bed, 3 rail up  General Comment: Patient alert, emotional about wanting to move but staff at [facility] will not get her up in a wheelchair or chair. Patient reports, \"a couple weeks ago I asked to get up in a wheelchair and they said they couldn't get me up because my wound needed changed. No one ever came to change it and I never got up.\" When asked how long it had been since she was up out of bed in a chair, she said \"weeks\". Pt states she asked to get up another time and the aide said dinner was coming soon so she couldn't get up, \"it was 3pm\".  \"I asked every day over a week to get in a chair when I first got there and I just gave up.\" Pt began to weep.    Home Living:  Home Living  Type " of Home: Long Term Care facility  Home Adaptive Equipment: Wheelchair-manual, Hospital bed    Prior Level of Function:  Prior Function Per Pt/Caregiver Report  Level of [place]: Needs assistance with functional transfers, Needs assistance with ADLs  Receives Help From: Personal care attendant  ADL Assistance: Needs assistance  Homemaking Assistance: Needs assistance  Prior Function Comments: Fabio lift for transfers. Patient reports she has not been out of bed for weeks.    Precautions:  Precautions  Precautions Comment: Fall precautions. L RANJAN cellulitis, podiatry to consult for possible osteomyelitis of calcaneus.    Vital Signs:     Objective     Pain:  Pain Assessment  0-10 (Numeric) Pain Score: 5 - Moderate pain (Patient reports LBP when sitting EOB. Also noted neck and L hand IV site pain but did not rate.)  Pain Type: Acute pain    Cognition:  Cognition  Overall Cognitive Status: Within Functional Limits  Orientation Level: Oriented X4    General Assessments:  General Observation  General Observation: Provided pt with faciliation and VC for reach with either UE and R hip and knee flexion to assist in hip extension for rolling to L. Cue to pull hip forward to hold sidelying position with pt able to activate abdominals. Instructed pt in R UE placement and sequencing to assist with supine> sit with pt able to follow cues. VC for upright posture sitting EOB. Notified nurse of patient desire to sit up, recommended staff assist into recliner with fabio lift.   Activity Tolerance  Endurance: Endurance does not limit participation in activity  Sensation  Light Touch: Partial deficits in the LLE  Strength  Strength Comments: Hip flexion (L 2-/5, R 3-/5), abduction (L 0/5, R 4-/5), adduction (L 2-/5, R 4-/5), quads (L 2-/5, R 3-/5), hamstrings (L 2-/5, R 4-/5), df (L 0/5, R 3/5).  Perception  Inattention/Neglect: Appears intact  Coordination  Movements are Fluid and Coordinated: Yes  Postural Control  Protective  Responses: Reaches L hand back with trunk LOB.  Posture Comment: Flexed posture with difficulty lifting head to look forward.  Static Sitting Balance  Static Sitting-Balance Support: Feet unsupported, Bilateral upper extremity supported  Static Sitting-Level of Assistance: Minimum assistance (L lean)  Static Sitting-Comment/Number of Minutes: 5  Dynamic Sitting Balance  Dynamic Sitting-Balance Support: Feet unsupported (1 UE support)  Dynamic Sitting-Level of Assistance: Minimum assistance  Dynamic Sitting-Balance: Forward lean, Reaching for objects  Static Standing Balance  Static Standing-Balance Support: Bilateral upper extremity supported  Static Standing-Level of Assistance: Maximum assistance (of 2 with clearance of pelvis from bed with flexed posture, L knee block, <10 seconds.)    Functional Assessments:     Bed Mobility  Bed Mobility:  (Rolling side to side, supine > sit MAX A of 2. Sit > supine dependent of 2.)  Transfers  Transfer:  (Attempted sit > stand MAX A of 2 with L knee block. Clearance of pelvis but severely flexed posture.)             Extremity/Trunk Assessments:        RLE   RLE :  (R ankle dorsiflexion grossly 10 degrees from neutral.)  LLE   LLE :  (Ankle dorsiflexion limited.)    Outcome Measures:     [facility] Basic Mobility  Turning from your back to your side while in a flat bed without using bedrails: Total  Moving from lying on your back to sitting on the side of a flat bed without using bedrails: Total  Moving to and from bed to chair (including a wheelchair): Total  Standing up from a chair using your arms (e.g. wheelchair or bedside chair): Total  To walk in hospital room: Total  Climbing 3-5 steps with railing: Total  Basic Mobility - Total Score: 6                                                             Goals:  Encounter Problems       Encounter Problems (Active)       Pain - Adult          To improve strength, ROM, balance, and endurance to assist caregiver in mobility and  improve quality of life:        Patient will improve core strength and use of R LE to roll side to side with MAX A of 1.       Start:  10/10/24    Expected End:  10/24/24            Patient will complete supine > sit with MOD A of 2 and increase posture and tolerance for sitting EOB with CGA for 10 minutes.        Start:  10/10/24    Expected End:  10/24/24            Patient will sit > stand with MAX A of 2 and maintain static stand for 30 seconds.       Start:  10/10/24    Expected End:  10/24/24                 Education Documentation  Home Exercise Program, taught by Raine Fernandez PT at 10/10/2024 11:35 AM.  Learner: Patient  Readiness: Acceptance  Method: Explanation  Response: Verbalizes Understanding    Mobility Training, taught by Raine Fernandez PT at 10/10/2024 11:35 AM.  Learner: Patient  Readiness: Acceptance  Method: Explanation  Response: Verbalizes Understanding    Education Comments  No comments found.

## 2024-10-10 NOTE — PROGRESS NOTES
Melody Martin is a 89 y.o. female on day 1 of admission presenting with Cellulitis of left lower extremity.      Subjective   89 y.o.  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  Patient presented from [facility] or [facility] for continued left medial wound was concerns for cellulitis and possible osteomyelitis.  At baseline is bedridden.  After her most recent hospitalization she was placed on Augmentin and has been on this for about 4 days but the symptoms have continued to persist with concerns for growing osteomyelitis and increased wound drainage of some purulence.  At baseline the patient is essentially confined to a wheelchair with a Fabio lift otherwise cannot move on her own accord.  The patient had mentioned to the ER physician that they do not often move her from a Fabio lift to a wheelchair at the facility.  She states that she does not really have much sensation in her left lower extremity foot and her foot all the way up to her knee may be slightly above the knee will occasionally get sharp stabbing/lightening like sensation going from her lower back down her entire leg but unclear where it stops she states.  She states that overall she feels well but did express concern that nursing staff at the facility may not be caring for the foot as often as he should.  She denies any recent sick contacts, chemical/environmental exposures, changes in dietary habits or any recent traumatic event/falls other than noted above.  She denies any fevers, chills, night sweats, vision changes, auditory changes, change in taste/smell, loss of bowel/bladder control, loss consciousness, dizziness, vertigo,  syncope, seizure-like activity, chest pain, palpitations, shortness of breath, coughing, wheezing, congestion, hemoptysis, hematemesis, abdominal pain, nausea or vomiting, diarrhea, constipation, dysuria, hematuria, dyschezia, hematochezia or any lateralizing motor/sensory deficits other than noted above.     ED Course (Summary):   Vitals on presentation: Temperature 36.9 °C, heart rate 67, respirations 16, blood pressure 110/36, pulse ox 96% on room air  WBC = 9.9 -->  Lactate = 3.5 -->  ESR = 45 -->  CRP = 2.81 -->  Hgb/Hct = 9.3/30.3 -->  Baseline hemoglobin seems to be quite variable. The only labs we have available seem to be during times of hospitalizations where she has been as low as 6 requiring transfusions.   BUN/Creatinine = 22/2.65 -->  Glucose = 251  Wound culture pending  Blood cultures x 2 pending  XR left foot: Soft tissue ulceration at the heel without definitive underlying osseous erosion, questionable fracture through the anterior calcaneus versus projectional artifact.  Given Zosyn/Vancomycin        10/9:               Today patient is awake alert and interactive appropriately.  She does complain that she is hungry as apparently has not been able to eat yet today.  Describes having intermittent spasms of pain in the left lower extremity.  Has remained afebrile.  WBC at this point remains normal.  Underwent HD today and will continue Monday Wednesday Friday schedule through IJ catheter.  ID discontinuing Zosyn and adding cefepime to the vancomycin and awaiting culture results and podiatry evaluation.    10/10:               Today patient remains awake alert and interactive appropriately.  Voices no specific complaints and no acute events overnight.  ID and podiatry evaluated her left foot cellulitis wound and did not feel there was good evidence for active infection.  Did not feel MRI is warranted.  Recommended if blood cultures no growth 48 hours could discontinue antibiotics.  PT with AM-PAC of  6 and transitional care working towards discharge to SNF.      Review of Systems   Constitutional:  Positive for activity change and fatigue. Negative for appetite change, chills, diaphoresis, fever and unexpected weight change.   HENT:  Negative for congestion, drooling, hearing loss, postnasal drip, rhinorrhea, sinus pressure, sinus pain, sneezing, sore throat, tinnitus, trouble swallowing and voice change.    Eyes:  Negative for photophobia and visual disturbance.   Respiratory:  Negative for cough, chest tightness, shortness of breath and wheezing.    Cardiovascular:  Negative for chest pain, palpitations and leg swelling.   Gastrointestinal:  Negative for abdominal distention, abdominal pain, blood in stool, constipation, diarrhea, nausea and vomiting.   Genitourinary:  Negative for decreased urine volume, difficulty urinating, dysuria, flank pain, frequency, hematuria and urgency.   Musculoskeletal:  Positive for back pain, gait problem and myalgias. Negative for arthralgias, joint swelling, neck pain and neck stiffness.   Skin:  Positive for wound.   Neurological:  Positive for weakness. Negative for dizziness, tremors, seizures, syncope, facial asymmetry, speech difficulty, light-headedness, numbness and headaches.   Psychiatric/Behavioral:  Negative for confusion and decreased concentration. The patient is nervous/anxious.    All other systems reviewed and are negative.         Objective     Last Recorded Vitals  /65 (BP Location: Left arm, Patient Position: Lying)   Pulse 61   Temp 36.2 °C (97.1 °F) (Temporal)   Resp 17   Wt 69.9 kg (154 lb)   SpO2 99%   Intake/Output last 3 Shifts:    Intake/Output Summary (Last 24 hours) at 10/10/2024 1645  Last data filed at 10/10/2024 1340  Gross per 24 hour   Intake 150 ml   Output --   Net 150 ml       Admission Weight  Weight: 69.9 kg (154 lb) (10/08/24 2308)    Daily Weight  10/08/24 : 69.9 kg (154 lb)    Image Results  XR foot left 3+ views  Narrative:  Interpreted By:  Emery Morris,   STUDY:  XR FOOT LEFT 3+ VIEWS;  10/8/2024 11:56 pm      INDICATION:  Signs/Symptoms:heel wound.          COMPARISON:  Radiographs left foot 08/30/2024      ACCESSION NUMBER(S):  [id number]      ORDERING CLINICIAN:  MAMIE MCDONALD      FINDINGS:  Three views left foot.      Bone mineral density is diffusely diminished.  Questionable fracture through the anterior calcaneus versus  positional artifact. Soft tissue ulceration and adjacent subcutaneous  air of the heel. No definite underlying osseous erosion.  Diffuse vascular calcifications throughout the foot and ankle.      Impression: 1. Soft tissue ulceration at the heel without definite underlying  osseous erosion. If there is concern for osteomyelitis an MRI may be  performed in further assessment.  2. Questionable fracture through the anterior calcaneus versus  projectional artifact.              Signed by: Emery Morris 10/9/2024 12:43 AM  Dictation workstation:   [id number]      Physical Exam  Constitutional:       General: She is not in acute distress.     Appearance: She is not ill-appearing or diaphoretic.      Comments: Frail/Elderly  appearing  female   HENT:      Head: Normocephalic and atraumatic.      Mouth/Throat:      Mouth: Mucous membranes are moist.      Pharynx: Oropharynx is clear.   Eyes:      General: No scleral icterus.     Extraocular Movements: Extraocular movements intact.      Conjunctiva/sclera: Conjunctivae normal.      Pupils: Pupils are equal, round, and reactive to light.   Neck:      Vascular: No carotid bruit.      Comments:   Cardiovascular:      Rate and Rhythm: Normal rate and regular rhythm.      Pulses: Normal pulses.      Heart sounds: Normal heart sounds. No murmur heard.  Pulmonary:      Effort: No respiratory distress.      Breath sounds: No wheezing, rhonchi or rales.      Comments: Diminished throughout with faint crackles in the bases but otherwise no other signs of  distress  Chest:      Chest wall: No tenderness.   Abdominal:      General: Abdomen is flat. Bowel sounds are normal. There is no distension.      Palpations: Abdomen is soft. There is no mass.      Tenderness: There is no abdominal tenderness. There is no right CVA tenderness, left CVA tenderness, guarding or rebound.   Musculoskeletal:      Cervical back: Normal range of motion and neck supple. Tenderness present. No rigidity.      Comments: Diminished range of motion/strength of bilateral upper and lower extremities .   Skin:     Findings: Bruising, erythema, lesion and rash present.      Comments: Left heel ulceration with some granulation tissue   Neurological:      General: No focal deficit present.      Mental Status: She is alert and oriented to person, place, and time. Mental status is at baseline.      Cranial Nerves: No cranial nerve deficit.      Sensory: Sensory deficit present.      Motor: Weakness present.      Coordination: Coordination abnormal.      Gait: Gait abnormal.      Comments: AO x3,   Psychiatric:         Behavior: Behavior normal.         Thought Content: Thought content normal.         Judgment: Judgment normal.      Comments: Pleasant and cooperative to exam.  Affect mildly flat.        Relevant Results               Assessment/Plan        This patient has a central line   Reason for the central line remaining today? Dialysis/Hemapheresis            Assessment & Plan  Cellulitis of left lower extremity    Cellulitis of Left Foot  Left Calcaneus Ulcer  Osteomyelitis of the Left Calcaneous?  -Imaging as noted above  -WBC = 9.9 -->  -Lactate = 3.5 -->  -ESR = 45 -->  -CRP = 2.81 -->  -Wound Culture = pending  -Blood Culture x2 = pending  -Continued on IV antibiotics with Vancomycin/Zosyn --> renally dosed   -Podiatry Consult appreciated  -Infectious Disease Consult appreciated  10/9: ID changed Zosyn to cefepime and continuing vancomycin.  Awaiting podiatry evaluation.  10/10: ID and  podiatry evaluation feel no good evidence for active infection.  Recommend if blood cultures no growth 48 hours could discontinue antibiotics.     Sacral Decubitus Ulcer / Pressure Injury  -Wound Care Consult appreciated  -antibiotics in setting of above  -does appear erythematous though no fluctuance or hardened areas or pain with palpation  -will continue to monitor and if worsening condition then will need to image the abdomen/pelvis further  10/9: Wound care consult    ESRD on HD (Mon/Wed/Fri)  -HD via right chest wall catheter since ~10/07/24  -Nephrology Consult appreciated  -BUN/Creatinine = 22/2.65 -->  -no grossly evident signs of volume overload at this time  10/9: HD today and will continue Monday Wednesday Friday schedule.     Hypertension  Hyperlipidemia   HFpEF  Atrial Fibrillation   Coronary Artery Disease  -Continued on home amiodarone, Eliquis, Plavix, Cardizem, isosorbide, Toprol-XL  -Telemetry monitoring     IDDM2 w/ Hyperglycemia  -ISS AC/HS  -Diabetic Diet  -Hypoglycemia Protocol  -continued on home lantus qhs     History of CVA with chronic left lower extremity weakness  Acute on Chronic Deconditioning & Ambulatory Dysfunction  -PT/OT appreciated      Anemia  -suspect in setting of chronic disease and renal dysfunction  -Hgb/Hct = 8.1/26.2 (09/04/24) --> 9.3/30.3 --> 10.2/33.4  -Baseline hemoglobin seems to be quite variable. The only labs we have available seem to be during times of hospitalizations where she has been as low as 6 requiring transfusions.   10/9: Hemoglobin appears stable at this point.  Continue to monitor.     Chronic Constipation  -suspect in setting of poor mobility and functional status and ESRD  -continued bowel regimen                  Manav Cancino MD

## 2024-10-10 NOTE — PROGRESS NOTES
Spiritual Care Visit    Clinical Encounter Type  Visited With: Patient  Routine Visit: Introduction    Presybeterian Encounters  Presybeterian Needs: Prayer

## 2024-10-10 NOTE — PROGRESS NOTES
"      Nephrology Progress Note      Nephrology following for ESRD.   Events over night:     Patient is feeling a little better today.  -She remains emotional and tearful almost every time I talk with her  -Today she was upset about the death of her daughter  -She was also complaining of the time of her dialysis at [facility].  She starts at 7 AM and she cannot get her breakfast and before that.  -      /65 (BP Location: Left arm, Patient Position: Lying)   Pulse 61   Temp 36.2 °C (97.1 °F) (Temporal)   Resp 17   Ht 1.626 m (5' 4\")   Wt 69.9 kg (154 lb)   SpO2 99%   BMI 26.43 kg/m²     Input / Output:  24 HR:   Intake/Output Summary (Last 24 hours) at 10/10/2024 1713  Last data filed at 10/10/2024 1340  Gross per 24 hour   Intake 150 ml   Output --   Net 150 ml       Physical Exam   Alert and oriented x 3 NAD  Neck: no JVD  CV: RRR  Lungs: CTA bilaterally  Abd: soft, NT, ND   Ext: no lower extremity edema    Scheduled medications  amiodarone, 200 mg, oral, Daily  apixaban, 2.5 mg, oral, BID  atorvastatin, 80 mg, oral, Nightly  busPIRone, 10 mg, oral, BID  cefepime, 1 g, intravenous, q24h  clopidogrel, 75 mg, oral, Daily  dilTIAZem ER, 240 mg, oral, Nightly  heparin, 2,000 Units, intra-catheter, After Dialysis  heparin, 2,000 Units, intra-catheter, After Dialysis  insulin glargine, 20 Units, subcutaneous, Nightly  insulin lispro, 0-10 Units, subcutaneous, With meals & nightly  isosorbide dinitrate, 10 mg, oral, TID  metoprolol succinate XL, 100 mg, oral, Daily  mirtazapine, 7.5 mg, oral, Nightly  pantoprazole, 40 mg, oral, Daily  sennosides-docusate sodium, 1 tablet, oral, BID      Continuous medications     PRN medications  PRN medications: acetaminophen, albuterol, ondansetron, polyethylene glycol, vancomycin   Results from last 7 days   Lab Units 10/10/24  0416 10/09/24  0554 10/08/24  2347   SODIUM mmol/L 136   < > 138   POTASSIUM mmol/L 3.8   < > 3.6   CHLORIDE mmol/L 103   < > 102   CO2 " mmol/L 29   < > 28   BUN mg/dL 14   < > 22   CREATININE mg/dL 1.85*   < > 2.65*   CALCIUM mg/dL 7.5*   < > 7.3*   PROTEIN TOTAL g/dL  --   --  5.0*   BILIRUBIN TOTAL mg/dL  --   --  0.3   ALK PHOS U/L  --   --  87   ALT U/L  --   --  11   AST U/L  --   --  20   GLUCOSE mg/dL 108*   < > 251*    < > = values in this interval not displayed.      Results from last 7 days   Lab Units 10/09/24  0554   MAGNESIUM mg/dL 1.73      Results from last 7 days   Lab Units 10/10/24  0416 10/09/24  0554 10/08/24  2347   WBC AUTO x10*3/uL 10.0 10.0 9.9   HEMOGLOBIN g/dL 9.0* 10.2* 9.3*   HEMATOCRIT % 28.7* 33.4* 30.3*   PLATELETS AUTO x10*3/uL 279 281 314        Assessment & Plan:  Patient is 89 y.o. female with DM2, A-fib who is admitted to hospital for left foot cellulitis with chronic heel wound. Nephrology consulted in view of ESRD.     ESRD  -HD Monday Wednesday Friday, [facility]  -Access is right IJ TDC  -Patient has been refusing referral for AV fistula     Anemia of ESRD  -Hemoglobin is at goal  -She is on NOAH as outpatient with dialysis and this is titrated to a goal of hemoglobin 10-11.        CKD-MBD  -Patient does not require binders  -Phos and calcium have been  at goal.     Left calcaneal ulcer and cellulitis  -Antibiotics per ID, currently on renal dose of Vanco and cefepime     Hypoalbuminemia  -Infection related  -Continue protein supplements     Recommendations:   -HD tomorrow  -Will keep on Monday Wednesday Friday HD schedule while she is here  -Noted plans for [facility] which will do short daily hemodialysis Monday through Friday    Please message me through EPIC chat with any questions or concerns.     Mally Lucas DO  10/10/2024  5:13 PM     [facility]    [address], [address]   [place] [address]  Office: [phone]

## 2024-10-10 NOTE — PROGRESS NOTES
"Occupational Therapy    Evaluation    Patient Name: Melody Martin  MRN: [id number]  Today's Date: 10/10/2024  Time Calculation  Start Time: 0955  Stop Time: 1022  Time Calculation (min): 27 min  3331/3331-A    Assessment  IP OT Assessment  OT Assessment: Pt. sat EOB 5 mins. with support, particpated in UE and LE ROM ex., Max.A X 2 for bed mobility , dep. trfr.  Prognosis: Good  Medical Staff Made Aware: Yes  End of Session Communication: PCT/NA/CTA  End of Session Patient Position: Bed, 3 rail up, Alarm on       10/10/24 0955   Inpatient Plan   Treatment Interventions ADL retraining;Endurance training;UE strengthening/ROM   OT Frequency 3 times per week   OT - OK to Discharge Yes  ((when medically approp.))   OT Discharge Recommendations Moderate intensity level of continued care   OT Recommended Transfer Status Dependent     Subjective     Current Problem:  1. Cellulitis of left lower extremity        2. Failure of outpatient treatment            General:  General  Reason for Referral: LLE cellulitis  Referred By: Barak  Past Medical History Relevant to Rehab: Hx. of CVA with L-sided weakness, ESRD with HD, L heel ulcer, CAD  Co-Treatment: PT  Co-Treatment Reason: to maximize sfe moiblity  Prior to Session Communication: Bedside nurse  Patient Position Received: Bed, 3 rail up, Alarm on  General Comment: Pt. tearful when answering questions regarding prior functioning, stating \"I've asked every day to get me up and they don't\"    Precautions:  Medical Precautions: Fall precautions  Precautions Comment: Fabio lift trfrs. (L foot gauze-wrapped)    Vital Signs:see nurses notes        Pain:  Pain Assessment  Pain Assessment: CRIES (Crying Requires oxygen Increased vital signs Expression Sleep)  0-10 (Numeric) Pain Score:  (states that her bottom hurts , c/o neck and back pain when seated EOB with support)    Objective     Cognition:  Overall Cognitive Status: Within Functional Limits (states \"thankyou\" very often, seems " "very motivated to improve her functioning , states \"I'll try whatever I can\")  Orientation Level: Oriented X4         Home Living:  Type of Home: Long Term Care facility ([facility])  Home Living Comments: does not currently receive therapy, primarily bedbound     Prior Function:  Level of [place]: Needs assistance with ADLs, Needs assistance with functional transfers  ADL Assistance: Needs assistance  Bath: Total  Toileting: Total (states mostly incont. of B & B , goes in diaper)  Feeding:  (set-up)  Ambulatory Assistance:  (non-amb., stevo lifted)  Hand Dominance: Right    IADL History:  Homemaking Responsibilities: No    ADL:  Eating Assistance:  (set-up with HOB raised)  Toileting Assistance with Device:  (dep.)    Activity Tolerance:  Endurance: Tolerates less than 10 min exercise with changes in vital signs    Bed Mobility/Transfers:   Bed Mobility  Bed Mobility: Yes (Max.A X 2 to roll side-to-side and sup.-sit and sit-to-sup.)  Transfers  Transfer:  (dep., Max.A X 2 for partial stand at bedside)    Ambulation/Gait Training:non-amb.        Sitting Balance:  Static Sitting Balance  Static Sitting-Balance Support:  (Min.A support while seated at bedside approx. 5 mins. with R hand support on bed)       Vision: Vision - Basic Assessment  Current Vision: No visual deficits     Sensation:  Sensation Comment: decreased sensation L leg/foot    Strength:  Strength Comments: bilat. shoulders < 90, L shoulder AAROM to WNL    Perception:  Inattention/Neglect: Appears intact    Coordination:  Movements are Fluid and Coordinated: Yes     Hand Function:  Hand Function  Gross Grasp: Functional      Outcome Measures: [facility] Daily Activity  Putting on and taking off regular lower body clothing: Total  Bathing (including washing, rinsing, drying): Total  Putting on and taking off regular upper body clothing: Total  Toileting, which includes using toilet, bedpan or urinal: Total  Taking care of personal grooming " such as brushing teeth: A lot  Eating Meals: A little  Daily Activity - Total Score: 9                       EDUCATION:     Education Documentation  Home Exercise Program, taught by Alana Kelly OT at 10/10/2024 11:41 AM.  Learner: Patient  Readiness: Acceptance  Method: Demonstration  Response: Needs Reinforcement  Comment: bedlevel AROM ex.    Education Comments  No comments found.        Goals:   Encounter Problems       Encounter Problems (Active)       ADLs       Demo. UB bathing while seated bedside with CGA (Progressing)       Start:  10/10/24    Expected End:  10/24/24               BALANCE       Marck. at least 10 mins. dyn. sitting EOB with CGA  (Progressing)       Start:  10/10/24    Expected End:  10/24/24               EXERCISE/STRENGTHENING       Marck. UE ex. all planes 10 reps./plane with at least 75% participation  (Progressing)       Start:  10/10/24    Expected End:  10/24/24

## 2024-10-10 NOTE — PROGRESS NOTES
I met with pt and let her know [facility] has accepted.  I explained that PT/OT will come by to work with her as they are needed for insurance authorization.  She expressed understanding and appreciation.      1355  ADOD 24-48 hours.  Asked facility to start auth.

## 2024-10-10 NOTE — PROGRESS NOTES
Melody Martin is a 89 y.o. female on day 1 of admission presenting with Cellulitis of left lower extremity.      Assessment/Plan:     #L heel stage III decubitus ulcer  Ulcer overall does not appear to be infected.  Patient needs his appropriate wound care and frequent offloading for better wound healing.  Will hold off on MRI foot at this time as most likely show some reactive changes     #ESRD on HD MWF via R tunneled cath  Estimated Creatinine Clearance: 13.3 mL/min (A) (by C-G formula based on SCr of 2.81 mg/dL (H)).  Will switch Zosyn to cefepime for easier dosing     #IDDM  #ESRD on HD MWF  #HTN, HLD, CAD, CVA        Recommendations:     -Cont cefepime 1 g daily  -Continue vancomycin pharmacy to dose  -Renally dose antibiotics  -If blood cultures negative for 48 hours, will DC the antibiotics  -Will continue to follow      Sean Bernardo MD  Date of service: 10/10/2024  Time of service: 11:43 AM      Subjective   Interval History: No acute events overnight.  Patient denies any new complaint.        Review of Systems  Denies: fever, chills, nausea, vomiting, diarrhea    Objective   Range of Vitals (last 24 hours)  Heart Rate:  [61-74]   Temp:  [35.6 °C (96.1 °F)-36.7 °C (98.1 °F)]   Resp:  [17-18]   BP: (101-138)/(63-72)   SpO2:  [96 %-100 %]  Daily Weight  10/08/24 : 69.9 kg (154 lb)   Body mass index is 26.43 kg/m².      General: alert, oriented  HEENT: No conjunctival pallor, no scleral icterus  Neck: No LAD, No JVD  Chest: R subclavian tunneled catheter without surrounding rhythm or drainage  Lungs: bilaterally clear to auscultation, no wheezing  Abdomen: soft, non tender, non distended, BS+  Neuro: AAO x 3  Extremities: L heel stage III ulcer with some eschar without surrounding erythema or drainage  Skin: Sacral moisture and pressure injury    Antibiotics  amoxicillin-pot clavulanate - 500-125 mg  cefepime - 1 gram/50 mL  vancomycin      Relevant Results  Labs  Results from last 72 hours   Lab Units  10/10/24  0416 10/09/24  0554 10/08/24  2347   WBC AUTO x10*3/uL 10.0 10.0 9.9   HEMOGLOBIN g/dL 9.0* 10.2* 9.3*   HEMATOCRIT % 28.7* 33.4* 30.3*   PLATELETS AUTO x10*3/uL 279 281 314   NEUTROS PCT AUTO %  --   --  62.2   LYMPHS PCT AUTO %  --   --  24.2   MONOS PCT AUTO %  --   --  9.2   EOS PCT AUTO %  --   --  2.7     Results from last 72 hours   Lab Units 10/10/24  0416 10/09/24  0554 10/08/24  2347   SODIUM mmol/L 136 137 138   POTASSIUM mmol/L 3.8 3.7 3.6   CHLORIDE mmol/L 103 99 102   CO2 mmol/L 29 32 28   BUN mg/dL 14 24* 22   CREATININE mg/dL 1.85* 2.81* 2.65*   GLUCOSE mg/dL 108* 184* 251*   CALCIUM mg/dL 7.5* 7.7* 7.3*   ANION GAP mmol/L 8* 10 12   EGFR mL/min/1.73m*2 26* 16* 17*   PHOSPHORUS mg/dL  --  3.1  --      Results from last 72 hours   Lab Units 10/09/24  0554 10/08/24  2347   ALK PHOS U/L  --  87   BILIRUBIN TOTAL mg/dL  --  0.3   PROTEIN TOTAL g/dL  --  5.0*   ALT U/L  --  11   AST U/L  --  20   ALBUMIN g/dL 2.1* 1.9*     Estimated Creatinine Clearance: 20.2 mL/min (A) (by C-G formula based on SCr of 1.85 mg/dL (H)).  C-Reactive Protein   Date Value Ref Range Status   10/09/2024 3.13 (H) <1.00 mg/dL Final   10/08/2024 2.81 (H) <1.00 mg/dL Final   08/31/2024 2.44 (H) <1.00 mg/dL Final       Microbiology  Susceptibility data from last 14 days.  Collected Specimen Info Organism   10/08/24 Tissue/Biopsy from Wound/Tissue Mixed Gram-Positive and Gram-Negative Bacteria       Imaging    XR foot left 3+ views    Result Date: 10/9/2024  1. Soft tissue ulceration at the heel without definite underlying osseous erosion. If there is concern for osteomyelitis an MRI may be performed in further assessment. 2. Questionable fracture through the anterior calcaneus versus projectional artifact.       Signed by: Emery Morris 10/9/2024 12:43 AM Dictation workstation:   [id number]

## 2024-10-10 NOTE — CONSULTS
Reason For Consult  Cellulitis left heel. Chronic pressure ulcer    History Of Present Illness  Melody Martin is a 89 y.o. female presenting with Stage 3 pressure ulcer on the left heel. She has been dealing with the wound since at least June of this year. I have evaluated her at past admissions for the wound. She resides at a nursing facility in [place]. She reports that she has pressure offloading boots, but does not always wear then due to it rubbing her ankle.      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    Surgical History  She has a past surgical history that includes Hysterectomy (10/10/2018); Back surgery (10/10/2018); IR CVC tunneled (8/28/2023); MR angio neck wo IV contrast (8/31/2023); and MR angio head wo IV contrast (8/31/2023).     Social History  She reports that she has never smoked. She has never used smokeless tobacco. She reports that she does not drink alcohol and does not use drugs.    Family History  Family History   Problem Relation Name Age of Onset    No Known Problems Mother      No Known Problems Father          Allergies  Aspirin, Amlodipine,  "Levofloxacin, and Sulfamethoxazole-trimethoprim    Review of Systems  Awake. Alert and oriented.   Able to answer all questions appropriately.   No fever. No nausea or vomiting.   Breathing room air. No coughing or wheezing.        Physical Exam  Stage 3 pressure ulcer on posterior left heel. Approximately 8cm length including surrounding superficial skin eschar with center of the ulcer open to subcutaneous tissue. Width approximately 4cm. No purulence. No malodor. No current active erythema at time of evaluation. Does not probe to bone. No tracking, undermining or tunneling.   Adequate capillary refill to the periwound skin. Skin is warm and otherwise well perfused. Dopplerable pulses. Wound bed is well perfused, granular in center.      Last Recorded Vitals  Blood pressure 104/65, pulse 67, temperature 36 °C (96.8 °F), temperature source Temporal, resp. rate 17, height 1.626 m (5' 4\"), weight 69.9 kg (154 lb), SpO2 99%.    Relevant Results    XR foot left 3+ views    Result Date: 10/9/2024  Interpreted By:  Emery Morris, STUDY: XR FOOT LEFT 3+ VIEWS;  10/8/2024 11:56 pm   INDICATION: Signs/Symptoms:heel wound.     COMPARISON: Radiographs left foot 08/30/2024   ACCESSION NUMBER(S): [id number]   ORDERING CLINICIAN: MAMIE MCDONALD   FINDINGS: Three views left foot.   Bone mineral density is diffusely diminished. Questionable fracture through the anterior calcaneus versus positional artifact. Soft tissue ulceration and adjacent subcutaneous air of the heel. No definite underlying osseous erosion. Diffuse vascular calcifications throughout the foot and ankle.       1. Soft tissue ulceration at the heel without definite underlying osseous erosion. If there is concern for osteomyelitis an MRI may be performed in further assessment. 2. Questionable fracture through the anterior calcaneus versus projectional artifact.       Signed by: Emery Morris 10/9/2024 12:43 AM Dictation workstation:   [id number]  "   Scheduled medications  amiodarone, 200 mg, oral, Daily  apixaban, 2.5 mg, oral, BID  atorvastatin, 80 mg, oral, Nightly  busPIRone, 10 mg, oral, BID  cefepime, 1 g, intravenous, q24h  clopidogrel, 75 mg, oral, Daily  dilTIAZem ER, 240 mg, oral, Nightly  heparin, 2,000 Units, intra-catheter, After Dialysis  heparin, 2,000 Units, intra-catheter, After Dialysis  insulin glargine, 20 Units, subcutaneous, Nightly  insulin lispro, 0-10 Units, subcutaneous, With meals & nightly  isosorbide dinitrate, 10 mg, oral, TID  metoprolol succinate XL, 100 mg, oral, Daily  mirtazapine, 7.5 mg, oral, Nightly  pantoprazole, 40 mg, oral, Daily  sennosides-docusate sodium, 1 tablet, oral, BID      Continuous medications     PRN medications  PRN medications: acetaminophen, albuterol, ondansetron, polyethylene glycol, vancomycin  Results for orders placed or performed during the hospital encounter of 10/08/24 (from the past 24 hour(s))   POCT GLUCOSE   Result Value Ref Range    POCT Glucose 101 (H) 74 - 99 mg/dL   POCT GLUCOSE   Result Value Ref Range    POCT Glucose 183 (H) 74 - 99 mg/dL   POCT GLUCOSE   Result Value Ref Range    POCT Glucose 198 (H) 74 - 99 mg/dL   Lactate   Result Value Ref Range    Lactate 2.2 (H) 0.4 - 2.0 mmol/L   CBC   Result Value Ref Range    WBC 10.0 4.4 - 11.3 x10*3/uL    nRBC 0.0 0.0 - 0.0 /100 WBCs    RBC 3.06 (L) 4.00 - 5.20 x10*6/uL    Hemoglobin 9.0 (L) 12.0 - 16.0 g/dL    Hematocrit 28.7 (L) 36.0 - 46.0 %    MCV 94 80 - 100 fL    MCH 29.4 26.0 - 34.0 pg    MCHC 31.4 (L) 32.0 - 36.0 g/dL    RDW 17.1 (H) 11.5 - 14.5 %    Platelets 279 150 - 450 x10*3/uL   Basic Metabolic Panel   Result Value Ref Range    Glucose 108 (H) 74 - 99 mg/dL    Sodium 136 136 - 145 mmol/L    Potassium 3.8 3.5 - 5.3 mmol/L    Chloride 103 98 - 107 mmol/L    Bicarbonate 29 21 - 32 mmol/L    Anion Gap 8 (L) 10 - 20 mmol/L    Urea Nitrogen 14 6 - 23 mg/dL    Creatinine 1.85 (H) 0.50 - 1.05 mg/dL    eGFR 26 (L) >60 mL/min/1.73m*2     Calcium 7.5 (L) 8.6 - 10.3 mg/dL   POCT GLUCOSE   Result Value Ref Range    POCT Glucose 92 74 - 99 mg/dL        Assessment/Plan     Stage 3 pressure wound left heel  -No evidence of deep infection. No clinical suspicion of osteomyelitis.   -Wound bed is granular and has shown improvement in the past with proper offloading.   -Offloading is critical to healing. Recommended better floating the heel and keeping padded dressing on.   -No need for MRI in my judgement.   -Treat as soft tissue infection.   -Patient should follow up with wound center given the ongoing issues with the heel wound. She has established in the past with  [facility]    Raymond Veloz DPM

## 2024-10-11 ENCOUNTER — APPOINTMENT (OUTPATIENT)
Dept: DIALYSIS | Facility: HOSPITAL | Age: 89
End: 2024-10-11
Payer: COMMERCIAL

## 2024-10-11 VITALS
DIASTOLIC BLOOD PRESSURE: 41 MMHG | HEART RATE: 75 BPM | WEIGHT: 154 LBS | OXYGEN SATURATION: 99 % | RESPIRATION RATE: 18 BRPM | BODY MASS INDEX: 26.29 KG/M2 | TEMPERATURE: 99.9 F | SYSTOLIC BLOOD PRESSURE: 104 MMHG | HEIGHT: 64 IN

## 2024-10-11 PROBLEM — L03.116 CELLULITIS OF LEFT LOWER EXTREMITY: Status: RESOLVED | Noted: 2024-10-09 | Resolved: 2024-10-11

## 2024-10-11 LAB
ANION GAP SERPL CALC-SCNC: 10 MMOL/L (ref 10–20)
BACTERIA SPEC CULT: ABNORMAL
BASOPHILS # BLD AUTO: 0.09 X10*3/UL (ref 0–0.1)
BASOPHILS NFR BLD AUTO: 1 %
BUN SERPL-MCNC: 35 MG/DL (ref 6–23)
CALCIUM SERPL-MCNC: 7.4 MG/DL (ref 8.6–10.3)
CHLORIDE SERPL-SCNC: 102 MMOL/L (ref 98–107)
CO2 SERPL-SCNC: 27 MMOL/L (ref 21–32)
CREAT SERPL-MCNC: 2.73 MG/DL (ref 0.5–1.05)
EGFRCR SERPLBLD CKD-EPI 2021: 16 ML/MIN/1.73M*2
EOSINOPHIL # BLD AUTO: 0.49 X10*3/UL (ref 0–0.4)
EOSINOPHIL NFR BLD AUTO: 5.7 %
ERYTHROCYTE [DISTWIDTH] IN BLOOD BY AUTOMATED COUNT: 16.9 % (ref 11.5–14.5)
GLUCOSE BLD MANUAL STRIP-MCNC: 105 MG/DL (ref 74–99)
GLUCOSE BLD MANUAL STRIP-MCNC: 173 MG/DL (ref 74–99)
GLUCOSE BLD MANUAL STRIP-MCNC: 49 MG/DL (ref 74–99)
GLUCOSE BLD MANUAL STRIP-MCNC: 74 MG/DL (ref 74–99)
GLUCOSE SERPL-MCNC: 193 MG/DL (ref 74–99)
GRAM STN SPEC: ABNORMAL
GRAM STN SPEC: ABNORMAL
HCT VFR BLD AUTO: 29.6 % (ref 36–46)
HGB BLD-MCNC: 9.1 G/DL (ref 12–16)
IMM GRANULOCYTES # BLD AUTO: 0.08 X10*3/UL (ref 0–0.5)
IMM GRANULOCYTES NFR BLD AUTO: 0.9 % (ref 0–0.9)
LACTATE SERPL-SCNC: 2.9 MMOL/L (ref 0.4–2)
LYMPHOCYTES # BLD AUTO: 2.06 X10*3/UL (ref 0.8–3)
LYMPHOCYTES NFR BLD AUTO: 23.9 %
MCH RBC QN AUTO: 29.4 PG (ref 26–34)
MCHC RBC AUTO-ENTMCNC: 30.7 G/DL (ref 32–36)
MCV RBC AUTO: 96 FL (ref 80–100)
MONOCYTES # BLD AUTO: 0.69 X10*3/UL (ref 0.05–0.8)
MONOCYTES NFR BLD AUTO: 8 %
NEUTROPHILS # BLD AUTO: 5.21 X10*3/UL (ref 1.6–5.5)
NEUTROPHILS NFR BLD AUTO: 60.5 %
NRBC BLD-RTO: 0 /100 WBCS (ref 0–0)
PLATELET # BLD AUTO: 363 X10*3/UL (ref 150–450)
POTASSIUM SERPL-SCNC: 3.9 MMOL/L (ref 3.5–5.3)
RBC # BLD AUTO: 3.1 X10*6/UL (ref 4–5.2)
SODIUM SERPL-SCNC: 135 MMOL/L (ref 136–145)
VANCOMYCIN SERPL-MCNC: 16.6 UG/ML (ref 5–20)
WBC # BLD AUTO: 8.6 X10*3/UL (ref 4.4–11.3)

## 2024-10-11 PROCEDURE — 8010000001 HC DIALYSIS - HEMODIALYSIS PER DAY

## 2024-10-11 PROCEDURE — 36415 COLL VENOUS BLD VENIPUNCTURE: CPT | Performed by: FAMILY MEDICINE

## 2024-10-11 PROCEDURE — 2500000001 HC RX 250 WO HCPCS SELF ADMINISTERED DRUGS (ALT 637 FOR MEDICARE OP): Performed by: INTERNAL MEDICINE

## 2024-10-11 PROCEDURE — 85025 COMPLETE CBC W/AUTO DIFF WBC: CPT | Performed by: FAMILY MEDICINE

## 2024-10-11 PROCEDURE — 2500000004 HC RX 250 GENERAL PHARMACY W/ HCPCS (ALT 636 FOR OP/ED)

## 2024-10-11 PROCEDURE — 2500000002 HC RX 250 W HCPCS SELF ADMINISTERED DRUGS (ALT 637 FOR MEDICARE OP, ALT 636 FOR OP/ED): Performed by: INTERNAL MEDICINE

## 2024-10-11 PROCEDURE — 99239 HOSP IP/OBS DSCHRG MGMT >30: CPT | Performed by: FAMILY MEDICINE

## 2024-10-11 PROCEDURE — 2500000004 HC RX 250 GENERAL PHARMACY W/ HCPCS (ALT 636 FOR OP/ED): Performed by: INTERNAL MEDICINE

## 2024-10-11 PROCEDURE — 83605 ASSAY OF LACTIC ACID: CPT | Performed by: FAMILY MEDICINE

## 2024-10-11 PROCEDURE — 2500000004 HC RX 250 GENERAL PHARMACY W/ HCPCS (ALT 636 FOR OP/ED): Mod: JZ | Performed by: STUDENT IN AN ORGANIZED HEALTH CARE EDUCATION/TRAINING PROGRAM

## 2024-10-11 PROCEDURE — 80048 BASIC METABOLIC PNL TOTAL CA: CPT | Performed by: FAMILY MEDICINE

## 2024-10-11 PROCEDURE — 97530 THERAPEUTIC ACTIVITIES: CPT | Mod: CQ,GP

## 2024-10-11 PROCEDURE — 80202 ASSAY OF VANCOMYCIN: CPT | Performed by: FAMILY MEDICINE

## 2024-10-11 PROCEDURE — 82947 ASSAY GLUCOSE BLOOD QUANT: CPT

## 2024-10-11 RX ORDER — INSULIN LISPRO 100 [IU]/ML
0-10 INJECTION, SOLUTION INTRAVENOUS; SUBCUTANEOUS
Start: 2024-10-11

## 2024-10-11 RX ORDER — VANCOMYCIN HYDROCHLORIDE 500 MG/100ML
500 INJECTION, SOLUTION INTRAVENOUS ONCE
Status: COMPLETED | OUTPATIENT
Start: 2024-10-11 | End: 2024-10-11

## 2024-10-11 ASSESSMENT — COGNITIVE AND FUNCTIONAL STATUS - GENERAL
MOVING FROM LYING ON BACK TO SITTING ON SIDE OF FLAT BED WITH BEDRAILS: A LOT
MOVING TO AND FROM BED TO CHAIR: TOTAL
STANDING UP FROM CHAIR USING ARMS: A LOT
DRESSING REGULAR UPPER BODY CLOTHING: TOTAL
MOVING TO AND FROM BED TO CHAIR: A LOT
EATING MEALS: A LITTLE
MOVING FROM LYING ON BACK TO SITTING ON SIDE OF FLAT BED WITH BEDRAILS: TOTAL
MOBILITY SCORE: 10
TURNING FROM BACK TO SIDE WHILE IN FLAT BAD: A LOT
TURNING FROM BACK TO SIDE WHILE IN FLAT BAD: TOTAL
WALKING IN HOSPITAL ROOM: TOTAL
HELP NEEDED FOR BATHING: TOTAL
CLIMB 3 TO 5 STEPS WITH RAILING: TOTAL
STANDING UP FROM CHAIR USING ARMS: A LOT
TURNING FROM BACK TO SIDE WHILE IN FLAT BAD: A LOT
DAILY ACTIVITIY SCORE: 8
WALKING IN HOSPITAL ROOM: TOTAL
MOVING TO AND FROM BED TO CHAIR: A LOT
WALKING IN HOSPITAL ROOM: TOTAL
CLIMB 3 TO 5 STEPS WITH RAILING: TOTAL
STANDING UP FROM CHAIR USING ARMS: TOTAL
MOBILITY SCORE: 6
TOILETING: TOTAL
CLIMB 3 TO 5 STEPS WITH RAILING: TOTAL
MOBILITY SCORE: 10
PERSONAL GROOMING: TOTAL
MOVING FROM LYING ON BACK TO SITTING ON SIDE OF FLAT BED WITH BEDRAILS: A LOT
DRESSING REGULAR LOWER BODY CLOTHING: TOTAL

## 2024-10-11 ASSESSMENT — PAIN - FUNCTIONAL ASSESSMENT: PAIN_FUNCTIONAL_ASSESSMENT: NO/DENIES PAIN

## 2024-10-11 ASSESSMENT — PAIN SCALES - GENERAL
PAINLEVEL_OUTOF10: 0 - NO PAIN
PAINLEVEL_OUTOF10: 0 - NO PAIN
PAINLEVEL_OUTOF10: 3

## 2024-10-11 NOTE — DISCHARGE SUMMARY
Discharge Diagnosis  Cellulitis of left lower extremity    Issues Requiring Follow-Up  Cellulitis left foot, sacral ulcer    This discharge took greater than 35 minutes.    Test Results Pending At Discharge  Pending Labs       Order Current Status    Lactate Collected (10/11/24 1056)    Vancomycin, Trough Collected (10/11/24 1056)    Blood Culture Preliminary result    Blood Culture Preliminary result            Hospital Course   89 y.o.  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  Patient presented from [facility] or [place] for continued left medial wound was concerns for cellulitis and possible osteomyelitis.  At baseline is bedridden.  After her most recent hospitalization she was placed on Augmentin and has been on this for about 4 days but the symptoms have continued to persist with concerns for growing osteomyelitis and increased wound drainage of some purulence.  At baseline the patient is essentially confined to a wheelchair with a Fabio lift otherwise cannot move on her own accord.  The patient had mentioned to the ER physician that they do not often move her from a Fabio lift to a wheelchair at the facility.  She states that she does not really have much sensation in her left lower extremity foot and her foot all the way up to her knee may be slightly above the knee will occasionally get sharp stabbing/lightening like sensation going from her lower back down her entire leg but unclear where it stops she states.  She states that overall she feels well but did express concern that nursing staff at the facility may not be caring for the foot as often as he should.  She denies any recent  sick contacts, chemical/environmental exposures, changes in dietary habits or any recent traumatic event/falls other than noted above.  She denies any fevers, chills, night sweats, vision changes, auditory changes, change in taste/smell, loss of bowel/bladder control, loss consciousness, dizziness, vertigo, syncope, seizure-like activity, chest pain, palpitations, shortness of breath, coughing, wheezing, congestion, hemoptysis, hematemesis, abdominal pain, nausea or vomiting, diarrhea, constipation, dysuria, hematuria, dyschezia, hematochezia or any lateralizing motor/sensory deficits other than noted above.     ED Course (Summary):   Vitals on presentation: Temperature 36.9 °C, heart rate 67, respirations 16, blood pressure 110/36, pulse ox 96% on room air  WBC = 9.9 -->  Lactate = 3.5 -->  ESR = 45 -->  CRP = 2.81 -->  Hgb/Hct = 9.3/30.3 -->  Baseline hemoglobin seems to be quite variable. The only labs we have available seem to be during times of hospitalizations where she has been as low as 6 requiring transfusions.   BUN/Creatinine = 22/2.65 -->  Glucose = 251  Wound culture pending  Blood cultures x 2 pending  XR left foot: Soft tissue ulceration at the heel without definitive underlying osseous erosion, questionable fracture through the anterior calcaneus versus projectional artifact.  Given Zosyn/Vancomycin         10/9:               Today patient is awake alert and interactive appropriately.  She does complain that she is hungry as apparently has not been able to eat yet today.  Describes having intermittent spasms of pain in the left lower extremity.  Has remained afebrile.  WBC at this point remains normal.  Underwent HD today and will continue Monday Wednesday Friday schedule through IJ catheter.  ID discontinuing Zosyn and adding cefepime to the vancomycin and awaiting culture results and podiatry evaluation.     10/10:               Today patient remains awake alert and interactive appropriately.   Voices no specific complaints and no acute events overnight.  ID and podiatry evaluated her left foot cellulitis wound and did not feel there was good evidence for active infection.  Did not feel MRI is warranted.  Recommended if blood cultures no growth 48 hours could discontinue antibiotics.  PT with AM-PAC of 6 and transitional care working towards discharge to SNF.    10/11:               Today patient continues awake alert and interactive appropriately at her baseline.  Voices no specific complaints and no acute events overnight.  Underwent dialysis today and plan is to continue Monday Wednesday Friday schedule at SNF.  As blood cultures have remained no growth ID feels patient can discontinue antibiotics at this point as did not feel it was significant evidence for active infection.  [facility]          Review of Systems   Constitutional:  Positive for activity change and fatigue. Negative for appetite change, chills, diaphoresis, fever and unexpected weight change.   HENT:  Negative for congestion, drooling, hearing loss, postnasal drip, rhinorrhea, sinus pressure, sinus pain, sneezing, sore throat, tinnitus, trouble swallowing and voice change.    Eyes:  Negative for photophobia and visual disturbance.   Respiratory:  Negative for cough, chest tightness, shortness of breath and wheezing.    Cardiovascular:  Negative for chest pain, palpitations and leg swelling.   Gastrointestinal:  Negative for abdominal distention, abdominal pain, blood in stool, constipation, diarrhea, nausea and vomiting.   Genitourinary:  Negative for decreased urine volume, difficulty urinating, dysuria, flank pain, frequency, hematuria and urgency.   Musculoskeletal:  Positive for back pain, gait problem and myalgias. Negative for arthralgias, joint swelling, neck pain and neck stiffness.   Skin:  Positive for wound.   Neurological:  Positive for weakness. Negative for dizziness, tremors,  seizures, syncope, facial asymmetry, speech difficulty, light-headedness, numbness and headaches.   Psychiatric/Behavioral:  Negative for confusion and decreased concentration. The patient is nervous/anxious.    All other systems reviewed and are negative.      Cellulitis of Left Foot  Left Calcaneus Ulcer  Osteomyelitis of the Left Calcaneous?  -Imaging as noted above  -WBC = 9.9 -->  -Lactate = 3.5 -->  -ESR = 45 -->  -CRP = 2.81 -->  -Wound Culture = pending  -Blood Culture x2 = pending  -Continued on IV antibiotics with Vancomycin/Zosyn --> renally dosed   -Podiatry Consult appreciated  -Infectious Disease Consult appreciated  10/9: ID changed Zosyn to cefepime and continuing vancomycin.  Awaiting podiatry evaluation.  10/10: ID and podiatry evaluation feel no good evidence for active infection.  Recommend if blood cultures no growth 48 hours could discontinue antibiotics.  10/11: Blood cultures remain no growth and antibiotics will be discontinued.     Sacral Decubitus Ulcer / Pressure Injury  -Wound Care Consult appreciated  -antibiotics in setting of above  -does appear erythematous though no fluctuance or hardened areas or pain with palpation  -will continue to monitor and if worsening condition then will need to image the abdomen/pelvis further  10/9: Wound care consult  10/11: Wound care should continue at SNF with recommendation for follow-up [facility]     ESRD on HD (Mon/Wed/Fri)  -HD via right chest wall catheter since ~10/07/24  -Nephrology Consult appreciated  -BUN/Creatinine = 22/2.65 -->  -no grossly evident signs of volume overload at this time  10/9: HD today and will continue Monday Wednesday Friday schedule.     Hypertension  Hyperlipidemia   HFpEF  Atrial Fibrillation   Coronary Artery Disease  -Continued on home amiodarone, Eliquis, Plavix, Cardizem, isosorbide, Toprol-XL  -Telemetry monitoring     IDDM2 w/ Hyperglycemia  -ISS AC/HS  -Diabetic Diet  -Hypoglycemia  Protocol  -continued on home lantus hospitals     History of CVA with chronic left lower extremity weakness  Acute on Chronic Deconditioning & Ambulatory Dysfunction  -PT/OT appreciated      Anemia  -suspect in setting of chronic disease and renal dysfunction  -Hgb/Hct = 8.1/26.2 (09/04/24) --> 9.3/30.3 --> 10.2/33.4  -Baseline hemoglobin seems to be quite variable. The only labs we have available seem to be during times of hospitalizations where she has been as low as 6 requiring transfusions.   10/9: Hemoglobin appears stable at this point.  Continue to monitor.     Chronic Constipation  -suspect in setting of poor mobility and functional status and ESRD  -continued bowel regimen                 Pertinent Physical Exam At Time of Discharge  Physical Exam  Constitutional:       General: She is not in acute distress.     Appearance: She is not ill-appearing or diaphoretic.      Comments: Frail/Elderly  appearing  female   HENT:      Head: Normocephalic and atraumatic.      Mouth/Throat:      Mouth: Mucous membranes are moist.      Pharynx: Oropharynx is clear.   Eyes:      General: No scleral icterus.     Extraocular Movements: Extraocular movements intact.      Conjunctiva/sclera: Conjunctivae normal.      Pupils: Pupils are equal, round, and reactive to light.   Neck:      Vascular: No carotid bruit.      Comments:   Cardiovascular:      Rate and Rhythm: Normal rate and regular rhythm.      Pulses: Normal pulses.      Heart sounds: Normal heart sounds. No murmur heard.  Pulmonary:      Effort: No respiratory distress.      Breath sounds: No wheezing, rhonchi or rales.      Comments: Diminished throughout with faint crackles in the bases but otherwise no other signs of distress  Chest:      Chest wall: No tenderness.   Abdominal:      General: Abdomen is flat. Bowel sounds are normal. There is no distension.      Palpations: Abdomen is soft. There is no mass.      Tenderness: There is no abdominal tenderness.  There is no right CVA tenderness, left CVA tenderness, guarding or rebound.   Musculoskeletal:      Cervical back: Normal range of motion and neck supple. Tenderness present. No rigidity.      Comments: Diminished range of motion/strength of bilateral upper and lower extremities .   Skin:     Findings: Bruising, erythema, lesion and rash present.      Comments: Left heel ulceration with some granulation tissue   Neurological:      General: No focal deficit present.      Mental Status: She is alert and oriented to person, place, and time. Mental status is at baseline.      Cranial Nerves: No cranial nerve deficit.      Sensory: Sensory deficit present.      Motor: Weakness present.      Coordination: Coordination abnormal.      Gait: Gait abnormal.      Comments: AO x3,   Psychiatric:         Behavior: Behavior normal.         Thought Content: Thought content normal.         Judgment: Judgment normal.      Comments: Pleasant and cooperative to exam.  Affect mildly flat.        Home Medications     Medication List      CONTINUE taking these medications     acetaminophen 325 mg tablet; Commonly known as: Tylenol   albuterol 90 mcg/actuation inhaler   aluminum hydroxide 320 mg/5 mL suspension; Commonly known as: Alternagel   amino acids-protein hydr-fiber 15 gram- 100 kcal/30 mL liquid in packet   amiodarone 200 mg tablet; Commonly known as: Pacerone   apixaban 2.5 mg tablet; Commonly known as: Eliquis   atorvastatin 80 mg tablet; Commonly known as: Lipitor   * benzonatate 200 mg capsule; Commonly known as: Tessalon   * benzonatate 100 mg capsule; Commonly known as: Tessalon   busPIRone 10 mg tablet; Commonly known as: Buspar   clopidogrel 75 mg tablet; Commonly known as: Plavix   dilTIAZem  mg 24 hr capsule; Commonly known as: Tiazac   diphenhydramine-zinc acetate cream   docusate sodium 100 mg capsule; Commonly known as: Colace   * Dulcolax (bisacodyl) 10 mg suppository; Generic drug: bisacodyl   * bisacodyl 5  mg EC tablet; Commonly known as: Dulcolax   glucagon 1 mg injection; Commonly known as: Glucagen   glucose 40 % gel oral gel; Commonly known as: Glutose   guaiFENesin 600 mg 12 hr tablet; Commonly known as: Mucinex   insulin glargine 100 unit/mL injection; Commonly known as: Lantus;   Inject 20 Units under the skin once daily at bedtime. Take as directed per   insulin instructions.   insulin lispro 100 unit/mL injection; Commonly known as: HumaLOG; Inject   0-10 Units under the skin 4 times a day before meals. Take as directed per   insulin instructions.   isosorbide dinitrate 10 mg tablet; Commonly known as: Isordil   lidocaine 4 % cream; Commonly known as: LMX   melatonin 3 mg tablet   metoprolol succinate  mg 24 hr tablet; Commonly known as:   Toprol-XL   mirtazapine 7.5 mg tablet; Commonly known as: Remeron   OneTouch Ultra Test strip; Generic drug: blood sugar diagnostic; 1 strip   by Does not apply route 3 times a day.   pantoprazole 40 mg EC tablet; Commonly known as: ProtoNix   PSYLLIUM HUSK (ASPARTAME) ORAL   sennosides-docusate sodium 8.6-50 mg tablet; Commonly known as:   Pascale-Colace   sevelamer carbonate 800 mg tablet; Commonly known as: Renvela   simethicone 80 mg chewable tablet; Commonly known as: Mylicon   vitamin B complex-vitamin C-folic acid 1 mg capsule; Commonly known as:   Nephrocaps   zinc oxide 20 % ointment  * This list has 4 medication(s) that are the same as other medications   prescribed for you. Read the directions carefully, and ask your doctor or   other care provider to review them with you.     STOP taking these medications     amoxicillin-pot clavulanate 500-125 mg tablet; Commonly known as:   Augmentin   diclofenac sodium 1 % gel; Commonly known as: Voltaren       Outpatient Follow-Up  Discharge to [facility].  Recommendation to arrange follow-up at [facility].    Manav Cancino MD

## 2024-10-11 NOTE — PROGRESS NOTES
Melody Martin is a 89 y.o. female on day 2 of admission presenting with Cellulitis of left lower extremity.      Assessment/Plan:     #L heel stage III decubitus ulcer  Ulcer overall does not appear to be infected.  Patient needs his appropriate wound care and frequent offloading for better wound healing.  Will hold off on MRI foot at this time as most likely show some reactive changes     #ESRD on HD MWF via R tunneled cath  Estimated Creatinine Clearance: 13.3 mL/min (A) (by C-G formula based on SCr of 2.81 mg/dL (H)).  Will switch Zosyn to cefepime for easier dosing     #IDDM  #ESRD on HD MWF  #HTN, HLD, CAD, CVA        Recommendations:     -Stop cefepime 1 g daily  -Discontinue vancomycin pharmacy to dose  -Wound care  -Monitor for signs of infection off antibiotics  -Will continue to follow  Discharge planning    Shaggy Montero MD  [phone]  10/11/2024  5:29 PM      Subjective   Interval History: No acute events overnight.  Patient denies any new complaint.        Review of Systems  Denies: fever, chills, nausea, vomiting, diarrhea    Objective   Range of Vitals (last 24 hours)  Heart Rate:  [57-70]   Temp:  [35.7 °C (96.3 °F)-37.2 °C (99 °F)]   Resp:  [16]   BP: (106-130)/(57-65)   SpO2:  [97 %-100 %]  Daily Weight  10/08/24 : 69.9 kg (154 lb)   Body mass index is 26.43 kg/m².      General: alert, oriented  HEENT: No conjunctival pallor, no scleral icterus  Neck: No LAD, No JVD  Chest: R subclavian tunneled catheter without surrounding rhythm or drainage  Lungs: bilaterally clear to auscultation, no wheezing  Abdomen: soft, non tender, non distended, BS+  Neuro: AAO x 3  Extremities: L heel stage III ulcer with some eschar without surrounding erythema or drainage  Skin: Sacral moisture and pressure injury    Antibiotics  amoxicillin-pot clavulanate - 500-125 mg  cefepime - 1 gram/50 mL  vancomycin      Relevant Results  Labs  Results from last 72 hours   Lab Units 10/11/24  1027 10/10/24  0416 10/09/24  0555  10/08/24  2347   WBC AUTO x10*3/uL 8.6 10.0 10.0 9.9   HEMOGLOBIN g/dL 9.1* 9.0* 10.2* 9.3*   HEMATOCRIT % 29.6* 28.7* 33.4* 30.3*   PLATELETS AUTO x10*3/uL 363 279 281 314   NEUTROS PCT AUTO % 60.5  --   --  62.2   LYMPHS PCT AUTO % 23.9  --   --  24.2   MONOS PCT AUTO % 8.0  --   --  9.2   EOS PCT AUTO % 5.7  --   --  2.7     Results from last 72 hours   Lab Units 10/11/24  1020 10/10/24  0416 10/09/24  0554   SODIUM mmol/L 135* 136 137   POTASSIUM mmol/L 3.9 3.8 3.7   CHLORIDE mmol/L 102 103 99   CO2 mmol/L 27 29 32   BUN mg/dL 35* 14 24*   CREATININE mg/dL 2.73* 1.85* 2.81*   GLUCOSE mg/dL 193* 108* 184*   CALCIUM mg/dL 7.4* 7.5* 7.7*   ANION GAP mmol/L 10 8* 10   EGFR mL/min/1.73m*2 16* 26* 16*   PHOSPHORUS mg/dL  --   --  3.1     Results from last 72 hours   Lab Units 10/09/24  0554 10/08/24  2347   ALK PHOS U/L  --  87   BILIRUBIN TOTAL mg/dL  --  0.3   PROTEIN TOTAL g/dL  --  5.0*   ALT U/L  --  11   AST U/L  --  20   ALBUMIN g/dL 2.1* 1.9*     Estimated Creatinine Clearance: 13.7 mL/min (A) (by C-G formula based on SCr of 2.73 mg/dL (H)).  C-Reactive Protein   Date Value Ref Range Status   10/09/2024 3.13 (H) <1.00 mg/dL Final   10/08/2024 2.81 (H) <1.00 mg/dL Final   08/31/2024 2.44 (H) <1.00 mg/dL Final       Microbiology  Susceptibility data from last 14 days.  Collected Specimen Info Organism   10/08/24 Tissue/Biopsy from Wound/Tissue Mixed Gram-Positive and Gram-Negative Bacteria       Imaging    XR foot left 3+ views    Result Date: 10/9/2024  1. Soft tissue ulceration at the heel without definite underlying osseous erosion. If there is concern for osteomyelitis an MRI may be performed in further assessment. 2. Questionable fracture through the anterior calcaneus versus projectional artifact.       Signed by: Emery Morris 10/9/2024 12:43 AM Dictation workstation:   [id number]

## 2024-10-11 NOTE — PROGRESS NOTES
"Vancomycin Dosing by Pharmacy- FOLLOW UP    Melody Martin is a 89 y.o. year old female who Pharmacy has been consulted for vancomycin dosing for Vancomycin Indications: Bone & Joint. Based on the patient's indication and renal status this patient will be dosed based on a goal pre-HD level of 20-25.     Current vancomycin dose:  500 mg after each dialysis session MWF    Most recent random level: 16.6 mcg/mL. Per RN, this lab was drawn during dialysis, which may result in an inaccurate vancomycin level result.     Visit Vitals  /65 (BP Location: Left arm, Patient Position: Lying)   Pulse 66   Temp 35.7 °C (96.3 °F) (Temporal)   Resp 16           Lab Results   Component Value Date    CREATININE 2.73 (H) 10/11/2024    CREATININE 1.85 (H) 10/10/2024    CREATININE 2.81 (H) 10/09/2024    CREATININE 2.65 (H) 10/08/2024       Patient weight is No results found for: \"PTWEIGHT\"    No results found for: \"CULTURE\"    I/O last 3 completed shifts:  In: 50 (0.7 mL/kg) [IV Piggyback:50]  Out: - (0 mL/kg)   Weight: 69.9 kg     Lab Results   Component Value Date    PATIENTTEMP 37.0 07/14/2024    PATIENTTEMP 37.0 08/28/2023          Assessment/Plan  Plan to administer 500 mg of vancomycin after HD today.   Pharmacy to watch for changes in HD schedule  The next level will be obtained on 10/14 at 0500. May be obtained sooner if clinically indicated.   Will continue to monitor renal function daily while on vancomycin and order serum creatinine at least every 48 hours if not already ordered.  Follow for continued vancomycin needs, clinical response, and signs/symptoms of toxicity.     Rafa Hull, PharmD      "

## 2024-10-11 NOTE — CARE PLAN
The patient's goals for the shift include      The clinical goals for the shift include Patient will be free from pain this shift.    Over the shift, the patient remains free from pain. States she has been comfortable this shift.

## 2024-10-11 NOTE — PROGRESS NOTES
Occupational Therapy                 Therapy Communication Note    Patient Name: Melody Martin  MRN: [id number]  Department: [facility] DIALYSIS  Room: [facility]/3331-A  Today's Date: 10/11/2024     Discipline: Occupational Therapy    Missed Visit Reason: Missed Visit Reason: Patient in a medical procedure    Missed Time: Attempt    Comment:

## 2024-10-11 NOTE — POST-PROCEDURE NOTE
Report to Receiving RN:    Report To: Layne FERMIN  Time Report Called: 1317  Hand-Off Communication: Pt removed 1L, /56 HR 65  Complications During Treatment: No  Ultrafiltration Treatment: No  Medications Administered During Dialysis: No  Blood Products Administered During Dialysis: No  Labs Sent During Dialysis: Yes  Heparin Drip Rate Changes: No  Dialysis Catheter Dressing: Clean and Dry  Last Dressing Change: 10/11/24    Electronic Signatures:   (Signed Armando Guardado OCDT)   Authored:    (Signed )   Authored:     Last Updated: 1:17 PM by ARMANDO GUARDADO

## 2024-10-11 NOTE — NURSING NOTE
Patient is discharged to the [facility] via ambulance service. No s/sx of distress noted. Family has been notifed. Report was called to RN at facility.

## 2024-10-11 NOTE — PROGRESS NOTES
Physical Therapy  Physical Therapy Treatment    Patient Name: Melody Martin  MRN: [id number]  Department: [facility] 3 E  Room: [facility]333-A  Today's Date: 10/11/2024  Time Calculation  Start Time: 0804  Stop Time: 0829  Time Calculation (min): 25 min     Assessment/Plan   PT Plan  Treatment/Interventions: Bed mobility, Transfer training, Balance training, Neuromuscular re-education, Strengthening, Endurance training, Range of motion, Therapeutic exercise, Therapeutic activity, Home exercise program, Postural re-education  PT Plan: Ongoing PT  PT Frequency: 4 times per week  PT Discharge Recommendations: Moderate intensity level of continued care  Equipment Recommended upon Discharge: Wheelchair, Lift (TBD)  PT Recommended Transfer Status: Total assist  PT - OK to Discharge: Yes (To next level of care when medically cleared.)    General Visit Information:   PT  Visit  PT Received On: 10/11/24  Response to Previous Treatment: Patient with no complaints from previous session.  Reason for Referral: Cellulitis L foot    Precautions:  Falls    Pain:  3/10 L foot/heel pain    Cognition:  Oriented X4    Treatments:  Therapeutic Exercise  7 minute sit EOB, [place]. Various reaching and self righting tass from EOB    Bed Mobility  Supine to sitting: Maximum assistance  Rolling: Maximum assistance  Scooting: Maximum assistance    Transfers  Sit to stand: Maximum assistance  Stand to sit: Maximum assistance  Stand pivot: Maximum assistance  X2 reps sit<>stand. Approx 10 second stand.  X1 rep stand pivot bed to chair      Pt sitting in chair following tx. Alarm on and calll ight in reach.      Outcome Measures:  [facility] Basic Mobility  Turning from your back to your side while in a flat bed without using bedrails: A lot  Moving from lying on your back to sitting on the side of a flat bed without using bedrails: A lot  Moving to and from bed to chair (including a wheelchair): A lot  Standing up from a chair using your arms (e.g. wheelchair or  bedside chair): A lot  To walk in hospital room: Total  Climbing 3-5 steps with railing: Total  Basic Mobility - Total Score: 10    Education Documentation  Mobility Training, taught by Magdiel Barnes PTA at 10/11/2024  9:18 AM.  Learner: Patient  Readiness: Acceptance  Method: Explanation, Demonstration  Response: Needs Reinforcement    Education Comments  No comments found.        OP EDUCATION:       Encounter Problems       Encounter Problems (Active)       Pain - Adult          To improve strength, ROM, balance, and endurance to assist caregiver in mobility and improve quality of life:        Patient will improve core strength and use of R LE to roll side to side with MAX A of 1. (Progressing)       Start:  10/10/24    Expected End:  10/24/24            Patient will complete supine > sit with MOD A of 2 and increase posture and tolerance for sitting EOB with CGA for 10 minutes.  (Progressing)       Start:  10/10/24    Expected End:  10/24/24            Patient will sit > stand with MAX A of 2 and maintain static stand for 30 seconds. (Progressing)       Start:  10/10/24    Expected End:  10/24/24

## 2024-10-11 NOTE — PROGRESS NOTES
Physical Therapy  Physical Therapy Treatment    Patient Name: Melody Martin  MRN: [id number]  Department: [facility]IAL[facility]  Room: [facility]333-A  Today's Date: 10/11/2024  Time Calculation  Start Time: 0926  Stop Time: 0940  Time Calculation (min): 14 min     Assessment/Plan   PT Plan  Treatment/Interventions: Bed mobility, Transfer training, Balance training, Neuromuscular re-education, Strengthening, Endurance training, Range of motion, Therapeutic exercise, Therapeutic activity, Home exercise program, Postural re-education  PT Plan: Ongoing PT  PT Frequency: 4 times per week  PT Discharge Recommendations: Moderate intensity level of continued care  Equipment Recommended upon Discharge: Wheelchair, Lift (TBD)  PT Recommended Transfer Status: Total assist  PT - OK to Discharge: Yes (To next level of care when medically cleared.)    General Visit Information:   PT  Visit  PT Received On: 10/11/24  Response to Previous Treatment: Patient with no complaints from previous session.  Reason for Referral: Cellulitis L foot    Precautions:  Falls    Pain:  No pain    Cognition:  Oriented X4    Activity Tolerance:  Activity Tolerance  Endurance: Tolerates 10 - 20 min exercise with multiple rests    Treatments:  Bed Mobility  Sitting to supine: Maximum assistance  Rolling: Maximum assistance  Scooting: Dependent    Transfers  Stand pivot: Maximum assistance  Stand to sit: Maximum assistance  Stand pivot: Maximum assistance      Pt assisted BTB per nursing request. Pt positioned for comfort and pressure relief. Alarm on and call light in reach.       Outcome Measures:  [facility] Basic Mobility  Turning from your back to your side while in a flat bed without using bedrails: A lot  Moving from lying on your back to sitting on the side of a flat bed without using bedrails: A lot  Moving to and from bed to chair (including a wheelchair): A lot  Standing up from a chair using your arms (e.g. wheelchair or bedside chair): A lot  To walk in  hospital room: Total  Climbing 3-5 steps with railing: Total  Basic Mobility - Total Score: 10    Education Documentation  Mobility Training, taught by Magdiel Barnes PTA at 10/11/2024  9:48 AM.  Learner: Patient  Readiness: Acceptance  Method: Explanation, Demonstration  Response: Needs Reinforcement    Mobility Training, taught by Magdiel Barnes PTA at 10/11/2024  9:18 AM.  Learner: Patient  Readiness: Acceptance  Method: Explanation, Demonstration  Response: Needs Reinforcement    Education Comments  No comments found.        OP EDUCATION:       Encounter Problems       Encounter Problems (Active)       Pain - Adult          To improve strength, ROM, balance, and endurance to assist caregiver in mobility and improve quality of life:        Patient will improve core strength and use of R LE to roll side to side with MAX A of 1. (Progressing)       Start:  10/10/24    Expected End:  10/24/24            Patient will complete supine > sit with MOD A of 2 and increase posture and tolerance for sitting EOB with CGA for 10 minutes.  (Progressing)       Start:  10/10/24    Expected End:  10/24/24            Patient will sit > stand with MAX A of 2 and maintain static stand for 30 seconds. (Progressing)       Start:  10/10/24    Expected End:  10/24/24

## 2024-10-11 NOTE — PROGRESS NOTES
"      Nephrology Progress Note      Nephrology following for ESRD.   Events over night:     Seen on dialysis today  Had some pain in her left ankle last night that kept her up        /65 (BP Location: Left arm, Patient Position: Lying)   Pulse 66   Temp 35.7 °C (96.3 °F) (Temporal)   Resp 16   Ht 1.626 m (5' 4\")   Wt 69.9 kg (154 lb)   SpO2 97%   BMI 26.43 kg/m²     Input / Output:  24 HR:   Intake/Output Summary (Last 24 hours) at 10/11/2024 1237  Last data filed at 10/11/2024 1200  Gross per 24 hour   Intake 600 ml   Output --   Net 600 ml       Physical Exam   Alert and oriented x 3 NAD, elderly  Neck: no JVD  CV: RRR  Lungs: CTA bilaterally  Abd: soft, NT, ND   Ext: no lower extremity edema    Scheduled medications  amiodarone, 200 mg, oral, Daily  apixaban, 2.5 mg, oral, BID  atorvastatin, 80 mg, oral, Nightly  busPIRone, 10 mg, oral, BID  cefepime, 1 g, intravenous, q24h  clopidogrel, 75 mg, oral, Daily  dilTIAZem ER, 240 mg, oral, Nightly  heparin, 2,000 Units, intra-catheter, After Dialysis  heparin, 2,000 Units, intra-catheter, After Dialysis  insulin glargine, 20 Units, subcutaneous, Nightly  insulin lispro, 0-10 Units, subcutaneous, With meals & nightly  insulin lispro, 4 Units, subcutaneous, Once  isosorbide dinitrate, 10 mg, oral, TID  metoprolol succinate XL, 100 mg, oral, Daily  mirtazapine, 7.5 mg, oral, Nightly  pantoprazole, 40 mg, oral, Daily  sennosides-docusate sodium, 1 tablet, oral, BID  vancomycin, 500 mg, intravenous, Once      Continuous medications     PRN medications  PRN medications: acetaminophen, albuterol, ondansetron, oxyCODONE, oxyCODONE, polyethylene glycol, vancomycin   Results from last 7 days   Lab Units 10/11/24  1020 10/09/24  0554 10/08/24  2347   SODIUM mmol/L 135*   < > 138   POTASSIUM mmol/L 3.9   < > 3.6   CHLORIDE mmol/L 102   < > 102   CO2 mmol/L 27   < > 28   BUN mg/dL 35*   < > 22   CREATININE mg/dL 2.73*   < > 2.65*   CALCIUM mg/dL 7.4*   < > 7.3* "   PROTEIN TOTAL g/dL  --   --  5.0*   BILIRUBIN TOTAL mg/dL  --   --  0.3   ALK PHOS U/L  --   --  87   ALT U/L  --   --  11   AST U/L  --   --  20   GLUCOSE mg/dL 193*   < > 251*    < > = values in this interval not displayed.      Results from last 7 days   Lab Units 10/09/24  0554   MAGNESIUM mg/dL 1.73      Results from last 7 days   Lab Units 10/11/24  1027 10/10/24  0416 10/09/24  0554   WBC AUTO x10*3/uL 8.6 10.0 10.0   HEMOGLOBIN g/dL 9.1* 9.0* 10.2*   HEMATOCRIT % 29.6* 28.7* 33.4*   PLATELETS AUTO x10*3/uL 363 279 281        Assessment & Plan:  Patient is 89 y.o. female with DM2, A-fib who is admitted to hospital for left foot cellulitis with chronic heel wound. Nephrology consulted in view of ESRD.     ESRD  -HD Monday Wednesday Friday, [facility]  -Access is right IJ TDC  -Patient has been refusing referral for AV fistula     Anemia of ESRD  -Hemoglobin is at goal  -She is on NOAH as outpatient with dialysis and this is titrated to a goal of hemoglobin 10-11.        CKD-MBD  -Patient does not require binders  -Phos and calcium have been  at goal.     Left calcaneal ulcer and cellulitis  -Antibiotics per ID, currently on renal dose of Vanco and cefepime     Hypoalbuminemia  -Infection related  -Continue protein supplements     Recommendations:   -HD today  -Will keep on Monday Wednesday Friday HD schedule while she is here  -Noted plans for [facility] which will do short daily hemodialysis Monday through Friday      Please message me through [employer] chat with any questions or concerns.     Mally Lucas DO  10/11/2024  12:37 PM     [facility]    [address], [address]   [place] [address]  Office: [phone]

## 2024-10-11 NOTE — PRE-PROCEDURE NOTE
Report from Sending RN:    Report From: Layne FERMIN  Recent Surgery of Procedure: No  Baseline Level of Consciousness (LOC): Aox3  Oxygen Use: No  Type: ROOM AIR  Diabetic: Yes  Last BP Med Given Day of Dialysis: SEE MAR  Last Pain Med Given: SEE MAR  Lab Tests to be Obtained with Dialysis: Yes  Blood Transfusion to be Given During Dialysis: No  Available IV Access: Yes  Medications to be Administered During Dialysis: Yes  Continuous IV Infusion Running: No  Restraints on Currently or in the Last 24 Hours: No  Hand-Off Communication: Pt is ready for tx, Report taking from THIERRY FERMIN  Dialysis Catheter Dressing: Clean and dry  Last Dressing Change: Dressing not dated, New dressing applied

## 2024-10-11 NOTE — CARE PLAN
Problem: Nutrition  Goal: Less than 5 days NPO/clear liquids  Outcome: Progressing  Goal: Oral intake greater than 50%  Outcome: Progressing  Goal: Oral intake greater 75%  Outcome: Progressing  Goal: Consume prescribed supplement  Outcome: Progressing  Goal: Adequate PO fluid intake  Outcome: Progressing  Goal: Nutrition support goals are met within 48 hrs  Outcome: Progressing  Goal: Nutrition support is meeting 75% of nutrient needs  Outcome: Progressing  Goal: Tube feed tolerance  Outcome: Progressing  Goal: BG  mg/dL  Outcome: Progressing  Goal: Lab values WNL  Outcome: Progressing  Goal: Electrolytes WNL  Outcome: Progressing  Goal: Promote healing  Outcome: Progressing  Goal: Maintain stable weight  Outcome: Progressing  Goal: Reduce weight from edema/fluid  Outcome: Progressing  Goal: Gradual weight gain  Outcome: Progressing  Goal: Improve ostomy output  Outcome: Progressing     Problem: Pain - Adult  Goal: Verbalizes/displays adequate comfort level or baseline comfort level  Outcome: Progressing  Flowsheets (Taken 10/11/2024 0221)  Verbalizes/displays adequate comfort level or baseline comfort level:   Assess pain using appropriate pain scale   Administer analgesics based on type and severity of pain and evaluate response   Implement non-pharmacological measures as appropriate and evaluate response     Problem: Safety - Adult  Goal: Free from fall injury  Outcome: Progressing     Problem: Discharge Planning  Goal: Discharge to home or other facility with appropriate resources  Outcome: Progressing     Problem: Chronic Conditions and Co-morbidities  Goal: Patient's chronic conditions and co-morbidity symptoms are monitored and maintained or improved  Outcome: Progressing     Problem: Skin  Goal: Decreased wound size/increased tissue granulation at next dressing change  Outcome: Progressing  Flowsheets (Taken 10/11/2024 0221)  Decreased wound size/increased tissue granulation at next dressing  change:   Promote sleep for wound healing   Protective dressings over bony prominences  Goal: Participates in plan/prevention/treatment measures  Outcome: Progressing  Flowsheets (Taken 10/11/2024 0221)  Participates in plan/prevention/treatment measures:   Discuss with provider PT/OT consult   Elevate heels   Increase activity/out of bed for meals  Goal: Prevent/manage excess moisture  Outcome: Progressing  Flowsheets (Taken 10/11/2024 0221)  Prevent/manage excess moisture:   Cleanse incontinence/protect with barrier cream   Follow provider orders for dressing changes   Monitor for/manage infection if present  Goal: Prevent/minimize sheer/friction injuries  Outcome: Progressing  Flowsheets (Taken 10/11/2024 0221)  Prevent/minimize sheer/friction injuries:   Complete micro-shifts as needed if patient unable. Adjust patient position to relieve pressure points, not a full turn   Turn/reposition every 2 hours/use positioning/transfer devices   Use pull sheet  Goal: Promote/optimize nutrition  Outcome: Progressing  Flowsheets (Taken 10/11/2024 0221)  Promote/optimize nutrition:   Consume > 50% meals/supplements   Monitor/record intake including meals   Assist with feeding   Offer water/supplements/favorite foods  Goal: Promote skin healing  Outcome: Progressing  Flowsheets (Taken 10/11/2024 0221)  Promote skin healing:   Assess skin/pad under line(s)/device(s)   Rotate device position/do not position patient on device   Protective dressings over bony prominences   Turn/reposition every 2 hours/use positioning/transfer devices     Problem: Diabetes  Goal: Achieve decreasing blood glucose levels by end of shift  Outcome: Progressing  Goal: Increase stability of blood glucose readings by end of shift  Outcome: Progressing  Goal: Decrease in ketones present in urine by end of shift  Outcome: Progressing  Goal: Maintain electrolyte levels within acceptable range throughout shift  Outcome: Progressing  Goal: Maintain glucose  levels >70mg/dl to <250mg/dl throughout shift  Outcome: Progressing  Goal: No changes in neurological exam by end of shift  Outcome: Progressing  Goal: Learn about and adhere to nutrition recommendations by end of shift  Outcome: Progressing  Goal: Vital signs within normal range for age by end of shift  Outcome: Progressing  Goal: Increase self care and/or family involovement by end of shift  Outcome: Progressing  Goal: Receive DSME education by end of shift  Outcome: Progressing     Problem: Fall/Injury  Goal: Not fall by end of shift  Outcome: Progressing  Goal: Be free from injury by end of the shift  Outcome: Progressing  Goal: Verbalize understanding of personal risk factors for fall in the hospital  Outcome: Progressing  Goal: Verbalize understanding of risk factor reduction measures to prevent injury from fall in the home  Outcome: Progressing  Goal: Use assistive devices by end of the shift  Outcome: Progressing  Goal: Pace activities to prevent fatigue by end of the shift  Outcome: Progressing     Problem: Pain  Goal: Takes deep breaths with improved pain control throughout the shift  Outcome: Progressing  Goal: Turns in bed with improved pain control throughout the shift  Outcome: Progressing  Goal: Walks with improved pain control throughout the shift  Outcome: Progressing  Goal: Performs ADL's with improved pain control throughout shift  Outcome: Progressing  Goal: Participates in PT with improved pain control throughout the shift  Outcome: Progressing  Goal: Free from opioid side effects throughout the shift  Outcome: Progressing  Goal: Free from acute confusion related to pain meds throughout the shift  Outcome: Progressing

## 2024-10-11 NOTE — PROGRESS NOTES
Both skilled and dialysis auth has been obtained.  MD has discharged pt.  Transportation has been arranged via [facility] for 1700.  THIERRY Boggs has been notified of transport time and  provided phone number for report. Pt states she has called her family and let them know she is leaving today.

## 2024-10-13 LAB
BACTERIA BLD CULT: NORMAL
BACTERIA BLD CULT: NORMAL

## 2024-10-13 NOTE — DOCUMENTATION CLARIFICATION NOTE
"    PATIENT:               NIRU SIMS  ACCT #:                  [id number]  MRN:                       [id number]  :                       1935  ADMIT DATE:       10/8/2024 11:04 PM  DISCH DATE:        10/11/2024 8:00 PM  RESPONDING PROVIDER #:        [id number]          PROVIDER RESPONSE TEXT:    Left foot cellulitis is related to Diabetes    CDI QUERY TEXT:    Clarification    Instruction:    Based on your assessment of the patient and the clinical information, please provide the requested documentation by clicking on the appropriate radio button and enter any additional information if prompted.    Question: Please further clarify the relationship between DM and Left foot cellulitis diagnosis    When answering this query, please exercise your independent professional judgment. The fact that a question is being asked, does not imply that any particular answer is desired or expected.    The patient's clinical indicators include:  Clinical Information: Patient admitted with cellulitis with noted DM    Clinical Indicators: Per ED, \"Cellulitis of left lower extremity  Failure of outpatient treatment.\"    Per H and P, \"Cellulitis of Left Foot  IDDM2 w/ Hyperglycemia  -ISS AC/HS  -Diabetic Diet  -Hypoglycemia Protocol  -continued on home lantus qhs.\"    Treatment: IV cefepime- active, IV vanco- active, IV zosyn- d/jae, monitoring blood glucose levels, sliding scale insulin, diabetic diet    Risk Factors: 89yof admitted with cellulitis with noted DM  Options provided:  -- Left foot cellulitis is related to Diabetes  -- Left foot cellulitis is unrelated to Diabetes  -- Other - I will add my own diagnosis  -- Refer to Clinical Documentation Reviewer    Query created by: Jayne Parrish on 10/11/2024 3:59 PM      Electronically signed by:  SEVERIANO MONTEZ MD 10/13/2024 5:17 PM          "

## 2024-10-13 NOTE — DOCUMENTATION CLARIFICATION NOTE
"    PATIENT:               NIRU SIMS  ACCT #:                  [id number]  MRN:                       [id number]  :                       1935  ADMIT DATE:       10/8/2024 11:04 PM  DISCH DATE:        10/11/2024 8:00 PM  RESPONDING PROVIDER #:        [id number]          PROVIDER RESPONSE TEXT:    Type 2 diabetes mellitus    CDI QUERY TEXT:    Clarification    Instruction:    Based on your assessment of the patient and the clinical information, please provide the requested documentation by clicking on the appropriate radio button and enter any additional information if prompted.    Question: Based on your medical judgment, can you please clarify which of these conditions is the most clinically supported    When answering this query, please exercise your independent professional judgment. The fact that a question is being asked, does not imply that any particular answer is desired or expected.    The patient's clinical indicators include:  Clinical Information: There is conflicting documentation in the medical record which requires clarification.    Per ED, \"Diabetes mellitus type 1, uncontrolled.\"    Per H and P, \"IDDM2 w/ Hyperglycemia.\"    Clinical Indicators: Per ED, \"Diabetes mellitus type 1, uncontrolled    Per H and P, \"IDDM2 w/ Hyperglycemia  -ISS AC/HS  -Diabetic Diet  -Hypoglycemia Protocol  -continued on home lantus qhs.\"    Treatment: monitoring blood glucose, sliding scale insulin, lantus, diabetic diet    Risk Factors: 89yof with note diabetes  Options provided:  -- Type 1 diabetes mellitus  -- Type 2 diabetes mellitus  -- Other - I will add my own diagnosis  -- Refer to Clinical Documentation Reviewer    Query created by: Jayne Parrish on 10/11/2024 4:04 PM      Electronically signed by:  SEVERIANO MONTEZ MD 10/13/2024 5:17 PM          "

## 2024-10-14 ENCOUNTER — NURSING HOME VISIT (OUTPATIENT)
Dept: POST ACUTE CARE | Facility: EXTERNAL LOCATION | Age: 89
End: 2024-10-14
Payer: COMMERCIAL

## 2024-10-14 DIAGNOSIS — Z99.2 HYPERTENSIVE HEART AND KIDNEY DISEASE WITH CHRONIC DIASTOLIC CONGESTIVE HEART FAILURE AND STAGE 5 CHRONIC KIDNEY DISEASE ON CHRONIC DIALYSIS: ICD-10-CM

## 2024-10-14 DIAGNOSIS — I13.2 HYPERTENSIVE HEART AND KIDNEY DISEASE WITH CHRONIC DIASTOLIC CONGESTIVE HEART FAILURE AND STAGE 5 CHRONIC KIDNEY DISEASE ON CHRONIC DIALYSIS: ICD-10-CM

## 2024-10-14 DIAGNOSIS — I50.32 HYPERTENSIVE HEART AND KIDNEY DISEASE WITH CHRONIC DIASTOLIC CONGESTIVE HEART FAILURE AND STAGE 5 CHRONIC KIDNEY DISEASE ON CHRONIC DIALYSIS: ICD-10-CM

## 2024-10-14 DIAGNOSIS — N18.6 HYPERTENSIVE HEART AND KIDNEY DISEASE WITH CHRONIC DIASTOLIC CONGESTIVE HEART FAILURE AND STAGE 5 CHRONIC KIDNEY DISEASE ON CHRONIC DIALYSIS: ICD-10-CM

## 2024-10-14 DIAGNOSIS — I48.91 ATRIAL FIBRILLATION, UNSPECIFIED TYPE (MULTI): ICD-10-CM

## 2024-10-14 DIAGNOSIS — R53.1 WEAKNESS: Primary | ICD-10-CM

## 2024-10-14 PROCEDURE — 99309 SBSQ NF CARE MODERATE MDM 30: CPT | Performed by: NURSE PRACTITIONER

## 2024-10-14 NOTE — LETTER
Patient: Melody Martin  : 1935    Encounter Date: 10/14/2024    PROGRESS NOTE    Subjective  Chief complaint: Melody Martin is a 89 y.o. female who is an acute skilled patient being seen and evaluated for weakness    HPI:  HPIPatient admitted to SNF for therapy d/t weakness after recent hospitalization. Patient requires assist with ADLs and transfers. Total dependent for mobility. Therapy to eval and treat. No new complaints.      Objective  Vital signs: 119/59 - 98.7-77-16-98%     Physical Exam  Constitutional:       General: She is not in acute distress.  Eyes:      Extraocular Movements: Extraocular movements intact.   Cardiovascular:      Rate and Rhythm: Normal rate and regular rhythm.   Pulmonary:      Effort: Pulmonary effort is normal.      Breath sounds: Normal breath sounds.   Abdominal:      General: Bowel sounds are normal.      Palpations: Abdomen is soft.   Musculoskeletal:      Cervical back: Neck supple.      Right lower leg: No edema.      Left lower leg: No edema.   Neurological:      Mental Status: She is alert.      Motor: Weakness present.   Psychiatric:         Mood and Affect: Mood normal.         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  On HD per nephrology  Isosorbide dinitrate  Metoprolol  Diltiazem   Renal diet  Monitor BP  Remove extra fluid in dialysis         Weakness - Primary     Therapy  Air mattress  Prevalon boots  Turn schedule          Medications, treatments, and labs reviewed  Continue medications and treatments as listed in EMR      Scribe Attestation  I, Phoebe Bennett   attest that this documentation has been  prepared under the direction and in the presence of MAR Johnson    Provider Attestation - Scribe documentation  All medical record entries made by the Scribe were at my direction and personally dictated by me. I have reviewed the chart and agree that the record accurately reflects my personal performance of the history, physical exam, discussion and plan.   MAR Johnson        Electronically Signed By: MAR Johnson   10/27/24  7:10 PM

## 2024-10-15 ENCOUNTER — NURSING HOME VISIT (OUTPATIENT)
Dept: POST ACUTE CARE | Facility: EXTERNAL LOCATION | Age: 89
End: 2024-10-15

## 2024-10-15 DIAGNOSIS — Z79.4 TYPE 2 DIABETES MELLITUS WITH CHRONIC KIDNEY DISEASE ON CHRONIC DIALYSIS, WITH LONG-TERM CURRENT USE OF INSULIN (MULTI): ICD-10-CM

## 2024-10-15 DIAGNOSIS — L03.115 CELLULITIS OF HEEL, RIGHT: Primary | ICD-10-CM

## 2024-10-15 DIAGNOSIS — N18.6 TYPE 2 DIABETES MELLITUS WITH CHRONIC KIDNEY DISEASE ON CHRONIC DIALYSIS, WITH LONG-TERM CURRENT USE OF INSULIN (MULTI): ICD-10-CM

## 2024-10-15 DIAGNOSIS — I13.2 HYPERTENSIVE HEART AND KIDNEY DISEASE WITH CHRONIC DIASTOLIC CONGESTIVE HEART FAILURE AND STAGE 5 CHRONIC KIDNEY DISEASE ON CHRONIC DIALYSIS: ICD-10-CM

## 2024-10-15 DIAGNOSIS — I48.91 ATRIAL FIBRILLATION, UNSPECIFIED TYPE (MULTI): ICD-10-CM

## 2024-10-15 DIAGNOSIS — E11.22 TYPE 2 DIABETES MELLITUS WITH CHRONIC KIDNEY DISEASE ON CHRONIC DIALYSIS, WITH LONG-TERM CURRENT USE OF INSULIN (MULTI): ICD-10-CM

## 2024-10-15 DIAGNOSIS — Z99.2 HYPERTENSIVE HEART AND KIDNEY DISEASE WITH CHRONIC DIASTOLIC CONGESTIVE HEART FAILURE AND STAGE 5 CHRONIC KIDNEY DISEASE ON CHRONIC DIALYSIS: ICD-10-CM

## 2024-10-15 DIAGNOSIS — Z86.73 HISTORY OF CVA (CEREBROVASCULAR ACCIDENT): ICD-10-CM

## 2024-10-15 DIAGNOSIS — L89.153 STAGE III PRESSURE ULCER OF SACRAL REGION (MULTI): ICD-10-CM

## 2024-10-15 DIAGNOSIS — Z99.2 TYPE 2 DIABETES MELLITUS WITH CHRONIC KIDNEY DISEASE ON CHRONIC DIALYSIS, WITH LONG-TERM CURRENT USE OF INSULIN (MULTI): ICD-10-CM

## 2024-10-15 DIAGNOSIS — I50.32 HYPERTENSIVE HEART AND KIDNEY DISEASE WITH CHRONIC DIASTOLIC CONGESTIVE HEART FAILURE AND STAGE 5 CHRONIC KIDNEY DISEASE ON CHRONIC DIALYSIS: ICD-10-CM

## 2024-10-15 DIAGNOSIS — L89.624 PRESSURE ULCER OF LEFT HEEL, STAGE 4 (MULTI): ICD-10-CM

## 2024-10-15 DIAGNOSIS — N18.6 HYPERTENSIVE HEART AND KIDNEY DISEASE WITH CHRONIC DIASTOLIC CONGESTIVE HEART FAILURE AND STAGE 5 CHRONIC KIDNEY DISEASE ON CHRONIC DIALYSIS: ICD-10-CM

## 2024-10-15 PROCEDURE — 99305 1ST NF CARE MODERATE MDM 35: CPT | Performed by: INTERNAL MEDICINE

## 2024-10-15 NOTE — LETTER
Patient: Melody Martin  : 1935    Encounter Date: 10/15/2024    HISTORY & PHYSICAL    Subjective  Chief complaint: Melody Martin is a 89 y.o. female who is being seen and evaluated for multiple medical problems.  Patient admitted to SNF for therapy due to weakness after recent hospitalization.    HPI:  HPI  Patient is an 89-year-old female presenting for admission to SNF following hospitalization.  Patient had presented to hospital from another facility due to continued left medial wound concerning for cellulitis or OM.  Patient was followed by ID and started on antibiotics.  Patient also evaluated by podiatry for left foot cellulitis with no evidence of active infection.  Patient did continue on HD for diagnosis of ESRD throughout hospitalization.  Patient to be followed by wound team for sacral stage III pressure ulcer and left heel stage IV pressure ulcer.  Patient was hemodynamically stable to discharge to SNF for continued medical management and further therapy.  Patient was seen and examined at the bedside, appears to be in no acute distress.  Nursing staff voicing no new concerns.  Past Medical History:   Diagnosis Date   • Anemia    • Atrial fibrillation (Multi)    • Chronic kidney disease    • Diabetes mellitus (Multi)    • Elevated troponin 2023   • GERD (gastroesophageal reflux disease)    • Heart murmur    • Hypertension    • Myocardial infarction (Multi)    • Old myocardial infarction 2023   • Other conditions influencing health status 2022    History of cough   • Other conditions influencing health status 2016    Diabetes mellitus type 1, uncontrolled   • Personal history of other diseases of the circulatory system     History of hypertension   • Personal history of other diseases of the female genital tract     History of endometriosis   • Personal history of other diseases of the nervous system and sense organs 10/15/2021    History of acute otitis media   • Personal  history of other endocrine, nutritional and metabolic disease 01/26/2018    History of diabetes mellitus   • Personal history of transient ischemic attack (TIA), and cerebral infarction without residual deficits 09/20/2023   • Stroke (Multi)    • Urinary tract infection        Past Surgical History:   Procedure Laterality Date   • BACK SURGERY  10/10/2018    Back Surgery   • HYSTERECTOMY  10/10/2018    Hysterectomy   • IR CVC TUNNELED  8/28/2023    IR CVC TUNNELED 8/28/2023 POR ANGIO   • MR HEAD ANGIO WO IV CONTRAST  8/31/2023    MR HEAD ANGIO WO IV CONTRAST 8/31/2023 POR MRI   • MR NECK ANGIO WO IV CONTRAST  8/31/2023    MR NECK ANGIO WO IV CONTRAST 8/31/2023 POR MRI       Family History   Problem Relation Name Age of Onset   • No Known Problems Mother     • No Known Problems Father         Social History     Socioeconomic History   • Marital status:    Tobacco Use   • Smoking status: Never   • Smokeless tobacco: Never   Substance and Sexual Activity   • Alcohol use: Never   • Drug use: Never     Social Determinants of Health     Financial Resource Strain: Low Risk  (10/9/2024)    Overall Financial Resource Strain (CARDIA)    • Difficulty of Paying Living Expenses: Not very hard   Food Insecurity: No Food Insecurity (10/9/2024)    Hunger Vital Sign    • Worried About Running Out of Food in the Last Year: Never true    • Ran Out of Food in the Last Year: Never true   Transportation Needs: No Transportation Needs (10/9/2024)    PRAPARE - Transportation    • Lack of Transportation (Medical): No    • Lack of Transportation (Non-Medical): No   Intimate Partner Violence: Not At Risk (10/9/2024)    Humiliation, Afraid, Rape, and Kick questionnaire    • Fear of Current or Ex-Partner: No    • Emotionally Abused: No    • Physically Abused: No    • Sexually Abused: No   Housing Stability: Low Risk  (10/9/2024)    Housing Stability Vital Sign    • Unable to Pay for Housing in the Last Year: No    • Number of Times Moved  in the Last Year: 1    • Homeless in the Last Year: No       Vital signs: 119/50, 77, 16, 98.7, 98% blood sugar 206    Objective  Physical Exam  Constitutional:       General: She is not in acute distress.  Eyes:      Extraocular Movements: Extraocular movements intact.   Cardiovascular:      Rate and Rhythm: Normal rate and regular rhythm.   Pulmonary:      Effort: Pulmonary effort is normal.      Breath sounds: Normal breath sounds.   Abdominal:      General: Bowel sounds are normal.      Palpations: Abdomen is soft.   Musculoskeletal:      Cervical back: Neck supple.      Right lower leg: No edema.      Left lower leg: No edema.   Skin:     Comments: Dressing to left heel clean dry and intact  Dressing to sacrum clean dry and intact   Neurological:      Mental Status: She is alert and oriented to person, place, and time.   Psychiatric:         Mood and Affect: Mood normal.         Behavior: Behavior is cooperative.         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  On HD per nephrology  Isosorbide dinitrate  Metoprolol  Diltiazem   Renal diet  Monitor BP  Remove extra fluid in dialysis         Pressure ulcer of left heel, stage 4 (Multi)     Treatment per EMR           Atrial fibrillation (Multi)     Monitor heart rate, controlled  Amiodarone  Metoprolol   Apixaban  Bleeding precautions         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 (Multi)     Treatment: Per EMR         Cellulitis of heel, right - Primary     Treated with antibiotics  in hospital.  Monitor for recurrence          Hospital records reviewed  Medications, treatments, and labs reviewed  Continue medications and treatments as listed in EMR  Discussed with nursing and therapy      Scribe Attestation  I, Phoebe Martinez   attest that this documentation has been prepared under the direction and in the presence of Wilbert Lock MD    Provider Attestation - Scribe documentation  All medical record entries made by the Scribe were at my direction and personally dictated by me. I have reviewed the chart and agree that the record accurately reflects my personal performance of the history, physical exam, discussion and plan.   Wilbert Lock MD      Electronically Signed By: Wilbert Lock MD   10/16/24  3:14 PM

## 2024-10-15 NOTE — PROGRESS NOTES
HISTORY & PHYSICAL    Subjective   Chief complaint: Melody Martin is a 89 y.o. female who is being seen and evaluated for multiple medical problems.  Patient admitted to SNF for therapy due to weakness after recent hospitalization.    HPI:  HPI  Patient is an 89-year-old female presenting for admission to SNF following hospitalization.  Patient had presented to hospital from another facility due to continued left medial wound concerning for cellulitis or OM.  Patient was followed by ID and started on antibiotics.  Patient also evaluated by podiatry for left foot cellulitis with no evidence of active infection.  Patient did continue on HD for diagnosis of ESRD throughout hospitalization.  Patient to be followed by wound team for sacral stage III pressure ulcer and left heel stage IV pressure ulcer.  Patient was hemodynamically stable to discharge to SNF for continued medical management and further therapy.  Patient was seen and examined at the bedside, appears to be in no acute distress.  Nursing staff voicing no new concerns.  Past Medical History:   Diagnosis Date    Anemia     Atrial fibrillation (Multi)     Chronic kidney disease     Diabetes mellitus (Multi)     Elevated troponin 08/24/2023    GERD (gastroesophageal reflux disease)     Heart murmur     Hypertension     Myocardial infarction (Multi)     Old myocardial infarction 09/09/2023    Other conditions influencing health status 12/06/2022    History of cough    Other conditions influencing health status 04/06/2016    Diabetes mellitus type 1, uncontrolled    Personal history of other diseases of the circulatory system     History of hypertension    Personal history of other diseases of the female genital tract     History of endometriosis    Personal history of other diseases of the nervous system and sense organs 10/15/2021    History of acute otitis media    Personal history of other endocrine, nutritional and metabolic disease 01/26/2018    History of  diabetes mellitus    Personal history of transient ischemic attack (TIA), and cerebral infarction without residual deficits 09/20/2023    Stroke (Multi)     Urinary tract infection        Past Surgical History:   Procedure Laterality Date    BACK SURGERY  10/10/2018    Back Surgery    HYSTERECTOMY  10/10/2018    Hysterectomy    IR CVC TUNNELED  8/28/2023    IR CVC TUNNELED 8/28/2023 POR ANGIO    MR HEAD ANGIO WO IV CONTRAST  8/31/2023    MR HEAD ANGIO WO IV CONTRAST 8/31/2023 POR MRI    MR NECK ANGIO WO IV CONTRAST  8/31/2023    MR NECK ANGIO WO IV CONTRAST 8/31/2023 POR MRI       Family History   Problem Relation Name Age of Onset    No Known Problems Mother      No Known Problems Father         Social History     Socioeconomic History    Marital status:    Tobacco Use    Smoking status: Never    Smokeless tobacco: Never   Substance and Sexual Activity    Alcohol use: Never    Drug use: Never     Social Determinants of Health     Financial Resource Strain: Low Risk  (10/9/2024)    Overall Financial Resource Strain (CARDIA)     Difficulty of Paying Living Expenses: Not very hard   Food Insecurity: No Food Insecurity (10/9/2024)    Hunger Vital Sign     Worried About Running Out of Food in the Last Year: Never true     Ran Out of Food in the Last Year: Never true   Transportation Needs: No Transportation Needs (10/9/2024)    PRAPARE - Transportation     Lack of Transportation (Medical): No     Lack of Transportation (Non-Medical): No   Intimate Partner Violence: Not At Risk (10/9/2024)    Humiliation, Afraid, Rape, and Kick questionnaire     Fear of Current or Ex-Partner: No     Emotionally Abused: No     Physically Abused: No     Sexually Abused: No   Housing Stability: Low Risk  (10/9/2024)    Housing Stability Vital Sign     Unable to Pay for Housing in the Last Year: No     Number of Times Moved in the Last Year: 1     Homeless in the Last Year: No       Vital signs: 119/50, 77, 16, 98.7, 98% blood sugar  206    Objective   Physical Exam  Constitutional:       General: She is not in acute distress.  Eyes:      Extraocular Movements: Extraocular movements intact.   Cardiovascular:      Rate and Rhythm: Normal rate and regular rhythm.   Pulmonary:      Effort: Pulmonary effort is normal.      Breath sounds: Normal breath sounds.   Abdominal:      General: Bowel sounds are normal.      Palpations: Abdomen is soft.   Musculoskeletal:      Cervical back: Neck supple.      Right lower leg: No edema.      Left lower leg: No edema.   Skin:     Comments: Dressing to left heel clean dry and intact  Dressing to sacrum clean dry and intact   Neurological:      Mental Status: She is alert and oriented to person, place, and time.   Psychiatric:         Mood and Affect: Mood normal.         Behavior: Behavior is cooperative.         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  Monitor for recurrence          Hospital records reviewed  Medications, treatments, and labs  reviewed  Continue medications and treatments as listed in EMR  Discussed with nursing and therapy      Scribe Attestation  I, Phoebe Martinez   attest that this documentation has been prepared under the direction and in the presence of Wilbert Lock MD    Provider Attestation - Scribe documentation  All medical record entries made by the Scribe were at my direction and personally dictated by me. I have reviewed the chart and agree that the record accurately reflects my personal performance of the history, physical exam, discussion and plan.   Wilbert Lock MD

## 2024-10-15 NOTE — ASSESSMENT & PLAN NOTE
Stable, no shortness of breath.  On HD per nephrology  Isosorbide dinitrate  Metoprolol  Diltiazem   Renal diet  Monitor BP  Remove extra fluid in dialysis   No

## 2024-10-16 ENCOUNTER — NURSING HOME VISIT (OUTPATIENT)
Dept: POST ACUTE CARE | Facility: EXTERNAL LOCATION | Age: 89
End: 2024-10-16
Payer: COMMERCIAL

## 2024-10-16 DIAGNOSIS — N18.6 TYPE 2 DIABETES MELLITUS WITH CHRONIC KIDNEY DISEASE ON CHRONIC DIALYSIS, WITH LONG-TERM CURRENT USE OF INSULIN (MULTI): ICD-10-CM

## 2024-10-16 DIAGNOSIS — Z79.4 TYPE 2 DIABETES MELLITUS WITH CHRONIC KIDNEY DISEASE ON CHRONIC DIALYSIS, WITH LONG-TERM CURRENT USE OF INSULIN (MULTI): ICD-10-CM

## 2024-10-16 DIAGNOSIS — Z99.2 TYPE 2 DIABETES MELLITUS WITH CHRONIC KIDNEY DISEASE ON CHRONIC DIALYSIS, WITH LONG-TERM CURRENT USE OF INSULIN (MULTI): ICD-10-CM

## 2024-10-16 DIAGNOSIS — R53.1 WEAKNESS: Primary | ICD-10-CM

## 2024-10-16 DIAGNOSIS — Z99.2 HYPERTENSIVE HEART AND KIDNEY DISEASE WITH CHRONIC DIASTOLIC CONGESTIVE HEART FAILURE AND STAGE 5 CHRONIC KIDNEY DISEASE ON CHRONIC DIALYSIS: ICD-10-CM

## 2024-10-16 DIAGNOSIS — Z86.73 HISTORY OF CVA (CEREBROVASCULAR ACCIDENT): ICD-10-CM

## 2024-10-16 DIAGNOSIS — I13.2 HYPERTENSIVE HEART AND KIDNEY DISEASE WITH CHRONIC DIASTOLIC CONGESTIVE HEART FAILURE AND STAGE 5 CHRONIC KIDNEY DISEASE ON CHRONIC DIALYSIS: ICD-10-CM

## 2024-10-16 DIAGNOSIS — I50.32 HYPERTENSIVE HEART AND KIDNEY DISEASE WITH CHRONIC DIASTOLIC CONGESTIVE HEART FAILURE AND STAGE 5 CHRONIC KIDNEY DISEASE ON CHRONIC DIALYSIS: ICD-10-CM

## 2024-10-16 DIAGNOSIS — N18.6 HYPERTENSIVE HEART AND KIDNEY DISEASE WITH CHRONIC DIASTOLIC CONGESTIVE HEART FAILURE AND STAGE 5 CHRONIC KIDNEY DISEASE ON CHRONIC DIALYSIS: ICD-10-CM

## 2024-10-16 DIAGNOSIS — E11.22 TYPE 2 DIABETES MELLITUS WITH CHRONIC KIDNEY DISEASE ON CHRONIC DIALYSIS, WITH LONG-TERM CURRENT USE OF INSULIN (MULTI): ICD-10-CM

## 2024-10-16 PROCEDURE — 99309 SBSQ NF CARE MODERATE MDM 30: CPT | Performed by: NURSE PRACTITIONER

## 2024-10-16 NOTE — LETTER
Patient: Melody Martin  : 1935    Encounter Date: 10/16/2024    PROGRESS NOTE    Subjective  Chief complaint: Melody Martin is a 89 y.o. female who is an acute skilled patient being seen and evaluated for weakness    HPI:  HPIPatient seen and examined at bedside. Patient denies n/v/f/c. Continues working in therapy. Patient is nonambulatory at baseline. She is total dependent for transfers and requires maximum to total dependent care for ADLs. Patient is working with speech therapy for dysphagia. No new complaints      Objective  Vital signs: 106/48-97.8-89-16-97%     Physical Exam  Constitutional:       General: She is not in acute distress.  Eyes:      Extraocular Movements: Extraocular movements intact.   Cardiovascular:      Rate and Rhythm: Normal rate and regular rhythm.   Pulmonary:      Effort: Pulmonary effort is normal.      Breath sounds: Normal breath sounds.   Abdominal:      General: Bowel sounds are normal.      Palpations: Abdomen is soft.   Musculoskeletal:      Cervical back: Neck supple.      Right lower leg: No edema.      Left lower leg: No edema.   Neurological:      Mental Status: She is alert.      Motor: Weakness present.   Psychiatric:         Mood and Affect: Mood normal.         Assessment/Plan  Problem List Items Addressed This Visit       History of CVA (cerebrovascular accident)     Statin  Monitor for changes and weakness.  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  On HD per nephrology  Isosorbide dinitrate  Metoprolol  Diltiazem   Renal diet  Monitor BP  Remove extra fluid in dialysis         Type 2 diabetes mellitus with chronic kidney disease on chronic dialysis, with long-term current use of insulin (Multi)     Carb controlled diet  Monitor Glucoscan  Continue insulin  HD per nephrology  Renal  diet  Monitor labs  FBG at goal         Weakness - Primary     Work with therapist per POC.          Medications, treatments, and labs reviewed  Continue medications and treatments as listed in EMR      Scribe Attestation  ILeonid Scribe   attest that this documentation has been prepared under the direction and in the presence of MAR Johnson    Provider Attestation - Scribe documentation  All medical record entries made by the Scribe were at my direction and personally dictated by me. I have reviewed the chart and agree that the record accurately reflects my personal performance of the history, physical exam, discussion and plan.   MAR Johnson        Electronically Signed By: MAR Johnson   10/27/24  7:24 PM

## 2024-10-17 ENCOUNTER — NURSING HOME VISIT (OUTPATIENT)
Dept: POST ACUTE CARE | Facility: EXTERNAL LOCATION | Age: 89
End: 2024-10-17
Payer: COMMERCIAL

## 2024-10-17 DIAGNOSIS — R53.1 WEAKNESS: ICD-10-CM

## 2024-10-17 DIAGNOSIS — Z99.2 TYPE 2 DIABETES MELLITUS WITH CHRONIC KIDNEY DISEASE ON CHRONIC DIALYSIS, WITH LONG-TERM CURRENT USE OF INSULIN (MULTI): ICD-10-CM

## 2024-10-17 DIAGNOSIS — I13.2 HYPERTENSIVE HEART AND KIDNEY DISEASE WITH CHRONIC DIASTOLIC CONGESTIVE HEART FAILURE AND STAGE 5 CHRONIC KIDNEY DISEASE ON CHRONIC DIALYSIS: ICD-10-CM

## 2024-10-17 DIAGNOSIS — Z99.2 HYPERTENSIVE HEART AND KIDNEY DISEASE WITH CHRONIC DIASTOLIC CONGESTIVE HEART FAILURE AND STAGE 5 CHRONIC KIDNEY DISEASE ON CHRONIC DIALYSIS: ICD-10-CM

## 2024-10-17 DIAGNOSIS — N18.6 HYPERTENSIVE HEART AND KIDNEY DISEASE WITH CHRONIC DIASTOLIC CONGESTIVE HEART FAILURE AND STAGE 5 CHRONIC KIDNEY DISEASE ON CHRONIC DIALYSIS: ICD-10-CM

## 2024-10-17 DIAGNOSIS — E11.22 TYPE 2 DIABETES MELLITUS WITH CHRONIC KIDNEY DISEASE ON CHRONIC DIALYSIS, WITH LONG-TERM CURRENT USE OF INSULIN (MULTI): ICD-10-CM

## 2024-10-17 DIAGNOSIS — I50.32 HYPERTENSIVE HEART AND KIDNEY DISEASE WITH CHRONIC DIASTOLIC CONGESTIVE HEART FAILURE AND STAGE 5 CHRONIC KIDNEY DISEASE ON CHRONIC DIALYSIS: ICD-10-CM

## 2024-10-17 DIAGNOSIS — Z79.4 TYPE 2 DIABETES MELLITUS WITH CHRONIC KIDNEY DISEASE ON CHRONIC DIALYSIS, WITH LONG-TERM CURRENT USE OF INSULIN (MULTI): ICD-10-CM

## 2024-10-17 DIAGNOSIS — N18.6 TYPE 2 DIABETES MELLITUS WITH CHRONIC KIDNEY DISEASE ON CHRONIC DIALYSIS, WITH LONG-TERM CURRENT USE OF INSULIN (MULTI): ICD-10-CM

## 2024-10-17 PROCEDURE — 99309 SBSQ NF CARE MODERATE MDM 30: CPT | Performed by: NURSE PRACTITIONER

## 2024-10-17 NOTE — LETTER
Patient: Melody Martin  : 1935    Encounter Date: 10/17/2024    PROGRESS NOTE    Subjective  Chief complaint: Melody Martin is a 89 y.o. female who is an acute skilled patient being seen and evaluated for weakness    HPI:  HPI  Patient has no new complaints or concerns today.  Patient is working in therapy.  Denies n/v/f/c.    Objective  Vital signs: 106/48, 97.8, 89, 16, 97%     Physical Exam  Constitutional:       General: She is not in acute distress.  Eyes:      Extraocular Movements: Extraocular movements intact.   Cardiovascular:      Rate and Rhythm: Normal rate and regular rhythm.   Pulmonary:      Effort: Pulmonary effort is normal.      Breath sounds: Normal breath sounds.   Abdominal:      General: Bowel sounds are normal.      Palpations: Abdomen is soft.   Musculoskeletal:      Cervical back: Neck supple.      Right lower leg: No edema.      Left lower leg: No edema.   Neurological:      Mental Status: She is alert.      Motor: Weakness present.   Psychiatric:         Mood and Affect: Mood normal.         Behavior: Behavior is cooperative.         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  On HD per nephrology  Isosorbide dinitrate  Metoprolol  Diltiazem   Renal diet  Monitor BP  Remove extra fluid in dialysis         Type 2 diabetes mellitus with chronic kidney disease on chronic dialysis, with long-term current use of insulin (Multi)     Carb controlled diet  Monitor Glucoscan  Continue insulin  HD per nephrology  Renal diet  Monitor labs  FBG at goal         Weakness     Continue therapy, work towards goals          Medications, treatments, and labs reviewed  Continue medications and treatments as listed in EMR    JANELL Johnson-CNP    Scribe Attestation  I, Bibiana  Jaylen Sandhu   attest that this documentation has been prepared under the direction and in the presence of MAR Johnson    Provider Attestation - Scribe documentation  All medical record entries made by the Scribe were at my direction and personally dictated by me. I have reviewed the chart and agree that the record accurately reflects my personal performance of the history, physical exam, discussion and plan.      Electronically Signed By: MAR Johnson   11/28/24  4:32 PM

## 2024-10-18 ENCOUNTER — NURSING HOME VISIT (OUTPATIENT)
Dept: POST ACUTE CARE | Facility: EXTERNAL LOCATION | Age: 89
End: 2024-10-18

## 2024-10-18 DIAGNOSIS — I48.91 ATRIAL FIBRILLATION, UNSPECIFIED TYPE (MULTI): ICD-10-CM

## 2024-10-18 DIAGNOSIS — N18.6 HYPERTENSIVE HEART AND KIDNEY DISEASE WITH CHRONIC DIASTOLIC CONGESTIVE HEART FAILURE AND STAGE 5 CHRONIC KIDNEY DISEASE ON CHRONIC DIALYSIS: Primary | ICD-10-CM

## 2024-10-18 DIAGNOSIS — Z99.2 TYPE 2 DIABETES MELLITUS WITH CHRONIC KIDNEY DISEASE ON CHRONIC DIALYSIS, WITH LONG-TERM CURRENT USE OF INSULIN (MULTI): ICD-10-CM

## 2024-10-18 DIAGNOSIS — Z99.2 HYPERTENSIVE HEART AND KIDNEY DISEASE WITH CHRONIC DIASTOLIC CONGESTIVE HEART FAILURE AND STAGE 5 CHRONIC KIDNEY DISEASE ON CHRONIC DIALYSIS: Primary | ICD-10-CM

## 2024-10-18 DIAGNOSIS — E11.22 TYPE 2 DIABETES MELLITUS WITH CHRONIC KIDNEY DISEASE ON CHRONIC DIALYSIS, WITH LONG-TERM CURRENT USE OF INSULIN (MULTI): ICD-10-CM

## 2024-10-18 DIAGNOSIS — R53.1 WEAKNESS: ICD-10-CM

## 2024-10-18 DIAGNOSIS — I13.2 HYPERTENSIVE HEART AND KIDNEY DISEASE WITH CHRONIC DIASTOLIC CONGESTIVE HEART FAILURE AND STAGE 5 CHRONIC KIDNEY DISEASE ON CHRONIC DIALYSIS: Primary | ICD-10-CM

## 2024-10-18 DIAGNOSIS — N18.6 TYPE 2 DIABETES MELLITUS WITH CHRONIC KIDNEY DISEASE ON CHRONIC DIALYSIS, WITH LONG-TERM CURRENT USE OF INSULIN (MULTI): ICD-10-CM

## 2024-10-18 DIAGNOSIS — I50.32 HYPERTENSIVE HEART AND KIDNEY DISEASE WITH CHRONIC DIASTOLIC CONGESTIVE HEART FAILURE AND STAGE 5 CHRONIC KIDNEY DISEASE ON CHRONIC DIALYSIS: Primary | ICD-10-CM

## 2024-10-18 DIAGNOSIS — Z79.4 TYPE 2 DIABETES MELLITUS WITH CHRONIC KIDNEY DISEASE ON CHRONIC DIALYSIS, WITH LONG-TERM CURRENT USE OF INSULIN (MULTI): ICD-10-CM

## 2024-10-18 PROCEDURE — 99309 SBSQ NF CARE MODERATE MDM 30: CPT | Performed by: INTERNAL MEDICINE

## 2024-10-18 NOTE — ASSESSMENT & PLAN NOTE
Carb controlled diet  Monitor Glucoscan  Continue insulin    HD per nephrology  Renal diet  Monitor labs

## 2024-10-18 NOTE — PROGRESS NOTES
PROGRESS NOTE    Subjective   Chief complaint: Melody Martin is a 89 y.o. female who is an acute skilled patient being seen and evaluated for weakness    HPI:  HPI  Therapy is currently working with patient due to generalized weakness.  Patient is nonambulatory.  Patient is total dependent for completing transfers.  Therapy is working patient on completing ADL tasks and patient requires max assist to total dependence to complete.  Speech therapy working patient to address dysphagia.  Patient appears to be in no acute distress.  Seen and examined at the bedside.  Patient denies constitutional symptoms at this time.  Nursing staff voicing no new concerns.    Objective   Vital signs: 106/48, 89, 16, 97.8 blood sugar 166    Physical Exam  Constitutional:       General: She is not in acute distress.  Eyes:      Extraocular Movements: Extraocular movements intact.   Cardiovascular:      Rate and Rhythm: Normal rate and regular rhythm.   Pulmonary:      Effort: Pulmonary effort is normal.      Breath sounds: Normal breath sounds.   Abdominal:      General: Bowel sounds are normal.      Palpations: Abdomen is soft.   Musculoskeletal:      Cervical back: Neck supple.      Right lower leg: No edema.      Left lower leg: No edema.   Skin:     Comments: Dressing to left heel clean dry and intact  Dressing to sacrum clean dry and intact   Neurological:      Mental Status: She is alert and oriented to person, place, and time.   Psychiatric:         Mood and Affect: Mood normal.         Behavior: Behavior is cooperative.         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  On HD per nephrology  Isosorbide dinitrate  Metoprolol  Diltiazem   Renal diet  Monitor BP  Remove extra fluid in dialysis         Weakness     Continue therapy, work towards goals         Atrial fibrillation (Multi)     Medications, treatments, and labs reviewed  Continue medications and treatments as listed in EMR      Scribe Attestation  Hien MARRERO Scribe   attest that this documentation has been prepared under the direction and in the presence of Wilbert Lock MD    Provider Attestation - Scribe documentation  All medical record entries made by the Scribe were at my direction and personally dictated by me. I have reviewed the chart and agree that the record accurately reflects my personal performance of the history, physical exam, discussion and plan.   Wilbert Lock MD

## 2024-10-18 NOTE — LETTER
Patient: Melody Martin  : 1935    Encounter Date: 10/18/2024    PROGRESS NOTE    Subjective  Chief complaint: Melody Martin is a 89 y.o. female who is an acute skilled patient being seen and evaluated for weakness    HPI:  HPI  Therapy is currently working with patient due to generalized weakness.  Patient is nonambulatory.  Patient is total dependent for completing transfers.  Therapy is working patient on completing ADL tasks and patient requires max assist to total dependence to complete.  Speech therapy working patient to address dysphagia.  Patient appears to be in no acute distress.  Seen and examined at the bedside.  Patient denies constitutional symptoms at this time.  Nursing staff voicing no new concerns.    Objective  Vital signs: 106/48, 89, 16, 97.8 blood sugar 166    Physical Exam  Constitutional:       General: She is not in acute distress.  Eyes:      Extraocular Movements: Extraocular movements intact.   Cardiovascular:      Rate and Rhythm: Normal rate and regular rhythm.   Pulmonary:      Effort: Pulmonary effort is normal.      Breath sounds: Normal breath sounds.   Abdominal:      General: Bowel sounds are normal.      Palpations: Abdomen is soft.   Musculoskeletal:      Cervical back: Neck supple.      Right lower leg: No edema.      Left lower leg: No edema.   Skin:     Comments: Dressing to left heel clean dry and intact  Dressing to sacrum clean dry and intact   Neurological:      Mental Status: She is alert and oriented to person, place, and time.   Psychiatric:         Mood and Affect: Mood normal.         Behavior: Behavior is cooperative.         Assessment/Plan  Problem List Items Addressed This Visit       Type 2 diabetes mellitus with chronic kidney disease on chronic dialysis, with long-term current use of insulin (Multi)     Carb controlled diet  Monitor Glucoscan  Continue insulin    HD per nephrology  Renal diet  Monitor labs         Hypertensive heart and kidney disease  with chronic diastolic congestive heart failure and stage 5 chronic kidney disease on chronic dialysis - Primary     Stable, no shortness of breath.  On HD per nephrology  Isosorbide dinitrate  Metoprolol  Diltiazem   Renal diet  Monitor BP  Remove extra fluid in dialysis         Weakness     Continue therapy, work towards goals         Atrial fibrillation (Multi)     Medications, treatments, and labs reviewed  Continue medications and treatments as listed in EMR      Scribe Attestation  I, Carl Martineziboseas   attest that this documentation has been prepared under the direction and in the presence of Wilbert Lock MD    Provider Attestation - Scribe documentation  All medical record entries made by the Scribe were at my direction and personally dictated by me. I have reviewed the chart and agree that the record accurately reflects my personal performance of the history, physical exam, discussion and plan.   Wilbert Lock MD        Electronically Signed By: Wilbert Lock MD   10/19/24 10:41 AM

## 2024-10-21 ENCOUNTER — NURSING HOME VISIT (OUTPATIENT)
Dept: POST ACUTE CARE | Facility: EXTERNAL LOCATION | Age: 89
End: 2024-10-21
Payer: COMMERCIAL

## 2024-10-21 DIAGNOSIS — N18.6 HYPERTENSIVE HEART AND KIDNEY DISEASE WITH CHRONIC DIASTOLIC CONGESTIVE HEART FAILURE AND STAGE 5 CHRONIC KIDNEY DISEASE ON CHRONIC DIALYSIS: ICD-10-CM

## 2024-10-21 DIAGNOSIS — Z99.2 HYPERTENSIVE HEART AND KIDNEY DISEASE WITH CHRONIC DIASTOLIC CONGESTIVE HEART FAILURE AND STAGE 5 CHRONIC KIDNEY DISEASE ON CHRONIC DIALYSIS: ICD-10-CM

## 2024-10-21 DIAGNOSIS — Z99.2 TYPE 2 DIABETES MELLITUS WITH CHRONIC KIDNEY DISEASE ON CHRONIC DIALYSIS, WITH LONG-TERM CURRENT USE OF INSULIN (MULTI): ICD-10-CM

## 2024-10-21 DIAGNOSIS — Z79.4 TYPE 2 DIABETES MELLITUS WITH CHRONIC KIDNEY DISEASE ON CHRONIC DIALYSIS, WITH LONG-TERM CURRENT USE OF INSULIN (MULTI): ICD-10-CM

## 2024-10-21 DIAGNOSIS — N18.6 TYPE 2 DIABETES MELLITUS WITH CHRONIC KIDNEY DISEASE ON CHRONIC DIALYSIS, WITH LONG-TERM CURRENT USE OF INSULIN (MULTI): ICD-10-CM

## 2024-10-21 DIAGNOSIS — I50.32 HYPERTENSIVE HEART AND KIDNEY DISEASE WITH CHRONIC DIASTOLIC CONGESTIVE HEART FAILURE AND STAGE 5 CHRONIC KIDNEY DISEASE ON CHRONIC DIALYSIS: ICD-10-CM

## 2024-10-21 DIAGNOSIS — R53.1 WEAKNESS: ICD-10-CM

## 2024-10-21 DIAGNOSIS — E11.22 TYPE 2 DIABETES MELLITUS WITH CHRONIC KIDNEY DISEASE ON CHRONIC DIALYSIS, WITH LONG-TERM CURRENT USE OF INSULIN (MULTI): ICD-10-CM

## 2024-10-21 DIAGNOSIS — I13.2 HYPERTENSIVE HEART AND KIDNEY DISEASE WITH CHRONIC DIASTOLIC CONGESTIVE HEART FAILURE AND STAGE 5 CHRONIC KIDNEY DISEASE ON CHRONIC DIALYSIS: ICD-10-CM

## 2024-10-21 PROCEDURE — 99309 SBSQ NF CARE MODERATE MDM 30: CPT | Performed by: NURSE PRACTITIONER

## 2024-10-22 ENCOUNTER — NURSING HOME VISIT (OUTPATIENT)
Dept: POST ACUTE CARE | Facility: EXTERNAL LOCATION | Age: 89
End: 2024-10-22
Payer: COMMERCIAL

## 2024-10-22 DIAGNOSIS — I48.91 ATRIAL FIBRILLATION, UNSPECIFIED TYPE (MULTI): Primary | ICD-10-CM

## 2024-10-22 DIAGNOSIS — N18.6 TYPE 2 DIABETES MELLITUS WITH CHRONIC KIDNEY DISEASE ON CHRONIC DIALYSIS, WITH LONG-TERM CURRENT USE OF INSULIN (MULTI): ICD-10-CM

## 2024-10-22 DIAGNOSIS — Z79.4 TYPE 2 DIABETES MELLITUS WITH CHRONIC KIDNEY DISEASE ON CHRONIC DIALYSIS, WITH LONG-TERM CURRENT USE OF INSULIN (MULTI): ICD-10-CM

## 2024-10-22 DIAGNOSIS — Z99.2 HYPERTENSIVE HEART AND KIDNEY DISEASE WITH CHRONIC DIASTOLIC CONGESTIVE HEART FAILURE AND STAGE 5 CHRONIC KIDNEY DISEASE ON CHRONIC DIALYSIS: ICD-10-CM

## 2024-10-22 DIAGNOSIS — R53.1 WEAKNESS: ICD-10-CM

## 2024-10-22 DIAGNOSIS — I13.2 HYPERTENSIVE HEART AND KIDNEY DISEASE WITH CHRONIC DIASTOLIC CONGESTIVE HEART FAILURE AND STAGE 5 CHRONIC KIDNEY DISEASE ON CHRONIC DIALYSIS: ICD-10-CM

## 2024-10-22 DIAGNOSIS — N18.6 HYPERTENSIVE HEART AND KIDNEY DISEASE WITH CHRONIC DIASTOLIC CONGESTIVE HEART FAILURE AND STAGE 5 CHRONIC KIDNEY DISEASE ON CHRONIC DIALYSIS: ICD-10-CM

## 2024-10-22 DIAGNOSIS — Z86.73 HISTORY OF CVA (CEREBROVASCULAR ACCIDENT): ICD-10-CM

## 2024-10-22 DIAGNOSIS — E11.22 TYPE 2 DIABETES MELLITUS WITH CHRONIC KIDNEY DISEASE ON CHRONIC DIALYSIS, WITH LONG-TERM CURRENT USE OF INSULIN (MULTI): ICD-10-CM

## 2024-10-22 DIAGNOSIS — F32.A DEPRESSION, UNSPECIFIED DEPRESSION TYPE: ICD-10-CM

## 2024-10-22 DIAGNOSIS — Z99.2 TYPE 2 DIABETES MELLITUS WITH CHRONIC KIDNEY DISEASE ON CHRONIC DIALYSIS, WITH LONG-TERM CURRENT USE OF INSULIN (MULTI): ICD-10-CM

## 2024-10-22 DIAGNOSIS — K21.9 GERD WITHOUT ESOPHAGITIS: ICD-10-CM

## 2024-10-22 DIAGNOSIS — D64.9 ANEMIA, UNSPECIFIED TYPE: ICD-10-CM

## 2024-10-22 DIAGNOSIS — I50.32 HYPERTENSIVE HEART AND KIDNEY DISEASE WITH CHRONIC DIASTOLIC CONGESTIVE HEART FAILURE AND STAGE 5 CHRONIC KIDNEY DISEASE ON CHRONIC DIALYSIS: ICD-10-CM

## 2024-10-22 PROCEDURE — 99309 SBSQ NF CARE MODERATE MDM 30: CPT | Performed by: INTERNAL MEDICINE

## 2024-10-22 NOTE — ASSESSMENT & PLAN NOTE
Wound has worsened  Will change treatment to irrigate with normal saline, pat dry, apply collagen powder with calmoseptine, continue to offload pressure

## 2024-10-22 NOTE — PROGRESS NOTES
PROGRESS NOTE    Subjective   Chief complaint: Melody Martin is a 89 y.o. female who is an acute skilled patient being seen and evaluated for weakness and monthly general medical care and follow-up.    HPI:  HPI  Patient presents for general medical care and f/u.  Patient seen and examined at bedside.  No issues per nursing.  Patient has no acute complaints.  Therapy is currently working with patient and patient is requiring total dependence for transfers and max assist to total dependence for ADLs and self-care.  Patient with diagnosis of ESRD on HD, tolerating treatment well.  AFIB stable, denies palpitations and chest pain.  HTN BP at goal.  Denies chest pain and headache.  Anemia stable, denies fatigue, sob, and palpitations.  GERD controlled.  Denies heartburn, regurgitation, epigastric discomfort, sour taste, and cough.  DM, denies polydipsia polyuria polyphagia.  Patient with diagnosis of depression.  Mood is stable.  Denies feeling down and thoughts of harming self or others.  CHF stable, denies sob, orthopnea, weight gain.  Hx CVA, denies changes in weakness and speech.  Patient does require assistance with ADLs.  Mentation at baseline, no acute distress.    Objective   Vital signs: 112/62, 66, 16, 97.9, 95% blood sugar 151    Physical Exam  Constitutional:       General: She is not in acute distress.  Eyes:      Extraocular Movements: Extraocular movements intact.   Cardiovascular:      Rate and Rhythm: Normal rate and regular rhythm.   Pulmonary:      Effort: Pulmonary effort is normal.      Breath sounds: Normal breath sounds.   Abdominal:      General: Bowel sounds are normal.      Palpations: Abdomen is soft.   Musculoskeletal:      Cervical back: Neck supple.      Right lower leg: No edema.      Left lower leg: No edema.   Skin:     Comments: Dressing to left heel clean dry and intact  Dressing to sacrum clean dry and intact   Neurological:      Mental Status: She is alert and oriented to person,  place, and time.   Psychiatric:         Mood and Affect: Mood normal.         Behavior: Behavior is cooperative.         Assessment/Plan   Problem List Items Addressed This Visit       Type 2 diabetes mellitus with chronic kidney disease on chronic dialysis, with long-term current use of insulin (Multi)     Carb controlled diet  Monitor Glucoscan  Continue insulin    HD per nephrology  Renal diet  Monitor labs         GERD without esophagitis     PPI  Monitor GI symptoms         Anemia     Stable on recent labs.  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  On HD per nephrology  Isosorbide dinitrate  Metoprolol  Diltiazem   Renal diet  Monitor BP  Remove extra fluid in dialysis         Weakness     Continue therapy, work towards goals established         Atrial fibrillation (Multi) - Primary     Monitor heart rate, controlled  Amiodarone  Metoprolol   Apixaban  Bleeding precautions         Depression     Continue antidepressants  Monitor mood and behaviors          Medications, treatments, and labs reviewed  Continue medications and treatments as listed in EMR      Scribe Attestation  I, Carl Martinezibe   attest that this documentation has been prepared under the direction and in the presence of Wilbert Lock MD    Provider Attestation - Scribe documentation  All medical record entries made by the Scribe were at my direction and personally dictated by me. I have reviewed the chart and agree that the record accurately reflects my personal performance of the history, physical exam, discussion and plan.   Wilbert Lock MD

## 2024-10-22 NOTE — ASSESSMENT & PLAN NOTE
The wound has declined  Nursing staff to obtain results of x-ray and culture, Prevalon boots at all times except for hygiene, will refer to wound care center

## 2024-10-22 NOTE — PROGRESS NOTES
PROGRESS NOTE    Subjective   Chief complaint: Melody Martin is a 89 y.o. female who is a long term care patient being seen and evaluated for follow-up on wounds.    HPI:  HPIPatient presents for follow-up wounds. Xray and wound culture left heel ordered at last visit d/t bone exposed/wound worsened. No results yet for some reason. Patient seen today in bed with feet on pillow. She has had air mattress in place. Patient is TD for mobility and hygiene and incontinent of bowel and bladder. Awaiting to be seen by vascular.      Objective   Vital signs: 151/64-97.6-83-16-97%     Physical Exam  Constitutional:       General: She is not in acute distress.  Eyes:      Extraocular Movements: Extraocular movements intact.   Pulmonary:      Effort: Pulmonary effort is normal.   Musculoskeletal:      Cervical back: Neck supple.   Skin:     Comments: Wound location -left heel  Etiology - stage IV pressure ulcer  Granulation - medium  Slough - medium eschar and adherent  Edges - flat  Odor - none  Drainage - medium serosanguineous  Size - 6 x 5 x UTD cm  Undermining -none    Wound location -sacrum  Etiology - stage III pressure ulcer  Granulation - large  Slough - small  Edges - flat, macerated  Odor - none  Drainage - small serosanguineous  Size - 5 x 6.5 x 0.2 cm  Undermining -none   Neurological:      Mental Status: She is alert.   Psychiatric:         Mood and Affect: Mood normal.         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 I have reviewed the chart and agree that the record accurately reflects my personal performance of the history, physical exam, discussion and plan.   MAR Johnson

## 2024-10-23 ENCOUNTER — NURSING HOME VISIT (OUTPATIENT)
Dept: POST ACUTE CARE | Facility: EXTERNAL LOCATION | Age: 89
End: 2024-10-23
Payer: COMMERCIAL

## 2024-10-23 DIAGNOSIS — R53.1 WEAKNESS: ICD-10-CM

## 2024-10-23 DIAGNOSIS — Z99.2 HYPERTENSIVE HEART AND KIDNEY DISEASE WITH CHRONIC DIASTOLIC CONGESTIVE HEART FAILURE AND STAGE 5 CHRONIC KIDNEY DISEASE ON CHRONIC DIALYSIS: ICD-10-CM

## 2024-10-23 DIAGNOSIS — N18.6 TYPE 2 DIABETES MELLITUS WITH CHRONIC KIDNEY DISEASE ON CHRONIC DIALYSIS, WITH LONG-TERM CURRENT USE OF INSULIN (MULTI): ICD-10-CM

## 2024-10-23 DIAGNOSIS — I13.2 HYPERTENSIVE HEART AND KIDNEY DISEASE WITH CHRONIC DIASTOLIC CONGESTIVE HEART FAILURE AND STAGE 5 CHRONIC KIDNEY DISEASE ON CHRONIC DIALYSIS: ICD-10-CM

## 2024-10-23 DIAGNOSIS — N18.6 ESRD ON HEMODIALYSIS (MULTI): Primary | ICD-10-CM

## 2024-10-23 DIAGNOSIS — I50.32 HYPERTENSIVE HEART AND KIDNEY DISEASE WITH CHRONIC DIASTOLIC CONGESTIVE HEART FAILURE AND STAGE 5 CHRONIC KIDNEY DISEASE ON CHRONIC DIALYSIS: ICD-10-CM

## 2024-10-23 DIAGNOSIS — Z99.2 ESRD ON HEMODIALYSIS (MULTI): Primary | ICD-10-CM

## 2024-10-23 DIAGNOSIS — Z99.2 TYPE 2 DIABETES MELLITUS WITH CHRONIC KIDNEY DISEASE ON CHRONIC DIALYSIS, WITH LONG-TERM CURRENT USE OF INSULIN (MULTI): ICD-10-CM

## 2024-10-23 DIAGNOSIS — Z86.73 HISTORY OF CVA (CEREBROVASCULAR ACCIDENT): ICD-10-CM

## 2024-10-23 DIAGNOSIS — Z79.4 TYPE 2 DIABETES MELLITUS WITH CHRONIC KIDNEY DISEASE ON CHRONIC DIALYSIS, WITH LONG-TERM CURRENT USE OF INSULIN (MULTI): ICD-10-CM

## 2024-10-23 DIAGNOSIS — N18.6 HYPERTENSIVE HEART AND KIDNEY DISEASE WITH CHRONIC DIASTOLIC CONGESTIVE HEART FAILURE AND STAGE 5 CHRONIC KIDNEY DISEASE ON CHRONIC DIALYSIS: ICD-10-CM

## 2024-10-23 DIAGNOSIS — E11.22 TYPE 2 DIABETES MELLITUS WITH CHRONIC KIDNEY DISEASE ON CHRONIC DIALYSIS, WITH LONG-TERM CURRENT USE OF INSULIN (MULTI): ICD-10-CM

## 2024-10-23 PROCEDURE — 99309 SBSQ NF CARE MODERATE MDM 30: CPT | Performed by: NURSE PRACTITIONER

## 2024-10-24 ENCOUNTER — NURSING HOME VISIT (OUTPATIENT)
Dept: POST ACUTE CARE | Facility: EXTERNAL LOCATION | Age: 89
End: 2024-10-24
Payer: COMMERCIAL

## 2024-10-24 DIAGNOSIS — Z86.73 HISTORY OF CVA (CEREBROVASCULAR ACCIDENT): ICD-10-CM

## 2024-10-24 DIAGNOSIS — N18.6 TYPE 2 DIABETES MELLITUS WITH CHRONIC KIDNEY DISEASE ON CHRONIC DIALYSIS, WITH LONG-TERM CURRENT USE OF INSULIN (MULTI): ICD-10-CM

## 2024-10-24 DIAGNOSIS — Z99.2 HYPERTENSIVE HEART AND KIDNEY DISEASE WITH CHRONIC DIASTOLIC CONGESTIVE HEART FAILURE AND STAGE 5 CHRONIC KIDNEY DISEASE ON CHRONIC DIALYSIS: ICD-10-CM

## 2024-10-24 DIAGNOSIS — Z79.4 TYPE 2 DIABETES MELLITUS WITH CHRONIC KIDNEY DISEASE ON CHRONIC DIALYSIS, WITH LONG-TERM CURRENT USE OF INSULIN (MULTI): ICD-10-CM

## 2024-10-24 DIAGNOSIS — I13.2 HYPERTENSIVE HEART AND KIDNEY DISEASE WITH CHRONIC DIASTOLIC CONGESTIVE HEART FAILURE AND STAGE 5 CHRONIC KIDNEY DISEASE ON CHRONIC DIALYSIS: ICD-10-CM

## 2024-10-24 DIAGNOSIS — D64.9 ANEMIA, UNSPECIFIED TYPE: Primary | ICD-10-CM

## 2024-10-24 DIAGNOSIS — Z99.2 TYPE 2 DIABETES MELLITUS WITH CHRONIC KIDNEY DISEASE ON CHRONIC DIALYSIS, WITH LONG-TERM CURRENT USE OF INSULIN (MULTI): ICD-10-CM

## 2024-10-24 DIAGNOSIS — N18.6 HYPERTENSIVE HEART AND KIDNEY DISEASE WITH CHRONIC DIASTOLIC CONGESTIVE HEART FAILURE AND STAGE 5 CHRONIC KIDNEY DISEASE ON CHRONIC DIALYSIS: ICD-10-CM

## 2024-10-24 DIAGNOSIS — E11.22 TYPE 2 DIABETES MELLITUS WITH CHRONIC KIDNEY DISEASE ON CHRONIC DIALYSIS, WITH LONG-TERM CURRENT USE OF INSULIN (MULTI): ICD-10-CM

## 2024-10-24 DIAGNOSIS — I50.32 HYPERTENSIVE HEART AND KIDNEY DISEASE WITH CHRONIC DIASTOLIC CONGESTIVE HEART FAILURE AND STAGE 5 CHRONIC KIDNEY DISEASE ON CHRONIC DIALYSIS: ICD-10-CM

## 2024-10-24 PROCEDURE — 99308 SBSQ NF CARE LOW MDM 20: CPT | Performed by: INTERNAL MEDICINE

## 2024-10-24 NOTE — PROGRESS NOTES
PROGRESS NOTE    Subjective   Chief complaint: Melody Martin is a 89 y.o. female who is an acute skilled patient being seen and evaluated for weakness    HPI:  HPI  Patient is currently working with therapy, working on ADLs for upper and lower body and patient is requiring max assist to swallow dependence to complete.  Patient is total dependent for transfers.  Speech therapy working patient to address dysphagia.  Patient with diagnosis of the ESRD, on HD, tolerating treatment well.  Patient had recent labs obtained to follow-up on anemia and patient's H&H 7.5 and 23.8.  Patient appears to be in no acute distress.    Objective   Vital signs: 112/60, 76, 18, 97.2, 95% blood sugar 138    Physical Exam  Constitutional:       General: She is not in acute distress.  Eyes:      Extraocular Movements: Extraocular movements intact.   Cardiovascular:      Rate and Rhythm: Normal rate and regular rhythm.   Pulmonary:      Effort: Pulmonary effort is normal.      Breath sounds: Normal breath sounds.   Abdominal:      General: Bowel sounds are normal.      Palpations: Abdomen is soft.   Musculoskeletal:      Cervical back: Neck supple.      Right lower leg: No edema.      Left lower leg: No edema.   Skin:     Comments: Dressing to left heel clean dry and intact  Dressing to sacrum clean dry and intact   Neurological:      Mental Status: She is alert and oriented to person, place, and time.   Psychiatric:         Mood and Affect: Mood normal.         Behavior: Behavior is cooperative.         Assessment/Plan   Problem List Items Addressed This Visit       Type 2 diabetes mellitus with chronic kidney disease on chronic dialysis, with long-term current use of insulin (Multi)     Carb controlled diet  Monitor Glucoscan  Continue insulin    HD per nephrology  Renal diet  Monitor labs         Anemia - Primary     Supplements  Monitor labs   recent H&H 7.5 and 23.8         History of CVA (cerebrovascular accident)     Statin  Monitor  for changes and weakness.  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  On HD per nephrology  Isosorbide dinitrate  Metoprolol  Diltiazem   Renal diet  Monitor BP  Remove extra fluid in dialysis          Medications, treatments, and labs reviewed  Continue medications and treatments as listed in EMR      Scribe Attestation  I, Hien Conn Scribe   attest that this documentation has been prepared under the direction and in the presence of Wilbert Lock MD    Provider Attestation - Scribe documentation  All medical record entries made by the Scribe were at my direction and personally dictated by me. I have reviewed the chart and agree that the record accurately reflects my personal performance of the history, physical exam, discussion and plan.   Wilbert Lock MD

## 2024-10-24 NOTE — LETTER
Patient: Melody Martin  : 1935    Encounter Date: 10/24/2024    PROGRESS NOTE    Subjective  Chief complaint: Melody Martin is a 89 y.o. female who is an acute skilled patient being seen and evaluated for weakness    HPI:  HPI  Patient is currently working with therapy, working on ADLs for upper and lower body and patient is requiring max assist to swallow dependence to complete.  Patient is total dependent for transfers.  Speech therapy working patient to address dysphagia.  Patient with diagnosis of the ESRD, on HD, tolerating treatment well.  Patient had recent labs obtained to follow-up on anemia and patient's H&H 7.5 and 23.8.  Patient appears to be in no acute distress.    Objective  Vital signs: 112/60, 76, 18, 97.2, 95% blood sugar 138    Physical Exam  Constitutional:       General: She is not in acute distress.  Eyes:      Extraocular Movements: Extraocular movements intact.   Cardiovascular:      Rate and Rhythm: Normal rate and regular rhythm.   Pulmonary:      Effort: Pulmonary effort is normal.      Breath sounds: Normal breath sounds.   Abdominal:      General: Bowel sounds are normal.      Palpations: Abdomen is soft.   Musculoskeletal:      Cervical back: Neck supple.      Right lower leg: No edema.      Left lower leg: No edema.   Skin:     Comments: Dressing to left heel clean dry and intact  Dressing to sacrum clean dry and intact   Neurological:      Mental Status: She is alert and oriented to person, place, and time.   Psychiatric:         Mood and Affect: Mood normal.         Behavior: Behavior is cooperative.         Assessment/Plan  Problem List Items Addressed This Visit       Type 2 diabetes mellitus with chronic kidney disease on chronic dialysis, with long-term current use of insulin (Multi)     Carb controlled diet  Monitor Glucoscan  Continue insulin    HD per nephrology  Renal diet  Monitor labs         Anemia - Primary     Supplements  Monitor labs   recent H&H 7.5 and  23.8         History of CVA (cerebrovascular accident)     Statin  Monitor for changes and weakness.  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  On HD per nephrology  Isosorbide dinitrate  Metoprolol  Diltiazem   Renal diet  Monitor BP  Remove extra fluid in dialysis          Medications, treatments, and labs reviewed  Continue medications and treatments as listed in EMR      Scribe Attestation  I, Phoebe Martinez   attest that this documentation has been prepared under the direction and in the presence of Wilbert Lock MD    Provider Attestation - Scribe documentation  All medical record entries made by the Scribe were at my direction and personally dictated by me. I have reviewed the chart and agree that the record accurately reflects my personal performance of the history, physical exam, discussion and plan.   Wilbert Lock MD        Electronically Signed By: Wilbert Lock MD   10/25/24  2:30 PM

## 2024-10-25 ENCOUNTER — NURSING HOME VISIT (OUTPATIENT)
Dept: POST ACUTE CARE | Facility: EXTERNAL LOCATION | Age: 89
End: 2024-10-25
Payer: COMMERCIAL

## 2024-10-25 DIAGNOSIS — I50.32 HYPERTENSIVE HEART AND KIDNEY DISEASE WITH CHRONIC DIASTOLIC CONGESTIVE HEART FAILURE AND STAGE 5 CHRONIC KIDNEY DISEASE ON CHRONIC DIALYSIS: ICD-10-CM

## 2024-10-25 DIAGNOSIS — I13.2 HYPERTENSIVE HEART AND KIDNEY DISEASE WITH CHRONIC DIASTOLIC CONGESTIVE HEART FAILURE AND STAGE 5 CHRONIC KIDNEY DISEASE ON CHRONIC DIALYSIS: ICD-10-CM

## 2024-10-25 DIAGNOSIS — Z99.2 HYPERTENSIVE HEART AND KIDNEY DISEASE WITH CHRONIC DIASTOLIC CONGESTIVE HEART FAILURE AND STAGE 5 CHRONIC KIDNEY DISEASE ON CHRONIC DIALYSIS: ICD-10-CM

## 2024-10-25 DIAGNOSIS — I48.91 ATRIAL FIBRILLATION, UNSPECIFIED TYPE (MULTI): Primary | ICD-10-CM

## 2024-10-25 DIAGNOSIS — N18.6 TYPE 2 DIABETES MELLITUS WITH CHRONIC KIDNEY DISEASE ON CHRONIC DIALYSIS, WITH LONG-TERM CURRENT USE OF INSULIN (MULTI): ICD-10-CM

## 2024-10-25 DIAGNOSIS — E11.22 TYPE 2 DIABETES MELLITUS WITH CHRONIC KIDNEY DISEASE ON CHRONIC DIALYSIS, WITH LONG-TERM CURRENT USE OF INSULIN (MULTI): ICD-10-CM

## 2024-10-25 DIAGNOSIS — N18.6 HYPERTENSIVE HEART AND KIDNEY DISEASE WITH CHRONIC DIASTOLIC CONGESTIVE HEART FAILURE AND STAGE 5 CHRONIC KIDNEY DISEASE ON CHRONIC DIALYSIS: ICD-10-CM

## 2024-10-25 DIAGNOSIS — Z79.4 TYPE 2 DIABETES MELLITUS WITH CHRONIC KIDNEY DISEASE ON CHRONIC DIALYSIS, WITH LONG-TERM CURRENT USE OF INSULIN (MULTI): ICD-10-CM

## 2024-10-25 DIAGNOSIS — Z99.2 TYPE 2 DIABETES MELLITUS WITH CHRONIC KIDNEY DISEASE ON CHRONIC DIALYSIS, WITH LONG-TERM CURRENT USE OF INSULIN (MULTI): ICD-10-CM

## 2024-10-25 DIAGNOSIS — R53.1 WEAKNESS: ICD-10-CM

## 2024-10-25 PROCEDURE — 99308 SBSQ NF CARE LOW MDM 20: CPT | Performed by: INTERNAL MEDICINE

## 2024-10-25 NOTE — LETTER
Patient: Melody Martin  : 1935    Encounter Date: 10/25/2024    PROGRESS NOTE    Subjective  Chief complaint: Melody Martin is a 89 y.o. female who is an acute skilled patient being seen and evaluated for weakness    HPI:  HPI  Therapy has been working with the patient to improve strength and endurance with ADLs, transfers, and mobility.  Patient continues to work toward goals.  Patient is stable and has no new complaints.  Nursing staff voices no new concerns today.  Patient was seen and examined at the bedside, appears to be in no acute distress.    Objective  Vital signs: 112/6076, 18, 97.2, 95% blood sugar 212    Physical Exam  Constitutional:       General: She is not in acute distress.  Eyes:      Extraocular Movements: Extraocular movements intact.   Cardiovascular:      Rate and Rhythm: Normal rate and regular rhythm.   Pulmonary:      Effort: Pulmonary effort is normal.      Breath sounds: Normal breath sounds.   Abdominal:      General: Bowel sounds are normal.      Palpations: Abdomen is soft.   Musculoskeletal:      Cervical back: Neck supple.      Right lower leg: No edema.      Left lower leg: No edema.   Skin:     Comments: Dressing to left heel clean dry and intact  Dressing to sacrum clean dry and intact   Neurological:      Mental Status: She is alert and oriented to person, place, and time.      Motor: Weakness present.   Psychiatric:         Mood and Affect: Mood normal.         Behavior: Behavior is cooperative.         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  On HD per nephrology  Isosorbide dinitrate  Metoprolol  Diltiazem   Renal diet  Monitor BP  Remove extra fluid in dialysis         Weakness     Work with therapist per POC.         Atrial fibrillation (Multi) - Primary     Monitor heart rate, controlled  Amiodarone  Metoprolol   Apixaban  Bleeding precautions          Medications, treatments, and labs reviewed  Continue medications and treatments as listed in EMR      Scribe Attestation  IHien Scribe   attest that this documentation has been prepared under the direction and in the presence of Wilbert Lock MD    Provider Attestation - Scribe documentation  All medical record entries made by the Scribe were at my direction and personally dictated by me. I have reviewed the chart and agree that the record accurately reflects my personal performance of the history, physical exam, discussion and plan.   Wilbert Lock MD        Electronically Signed By: Wilbert Lock MD   10/26/24  1:34 PM

## 2024-10-25 NOTE — PROGRESS NOTES
PROGRESS NOTE    Subjective   Chief complaint: Melody Martin is a 89 y.o. female who is an acute skilled patient being seen and evaluated for weakness    HPI:  HPI  Therapy has been working with the patient to improve strength and endurance with ADLs, transfers, and mobility.  Patient continues to work toward goals.  Patient is stable and has no new complaints.  Nursing staff voices no new concerns today.  Patient was seen and examined at the bedside, appears to be in no acute distress.    Objective   Vital signs: 112/6076, 18, 97.2, 95% blood sugar 212    Physical Exam  Constitutional:       General: She is not in acute distress.  Eyes:      Extraocular Movements: Extraocular movements intact.   Cardiovascular:      Rate and Rhythm: Normal rate and regular rhythm.   Pulmonary:      Effort: Pulmonary effort is normal.      Breath sounds: Normal breath sounds.   Abdominal:      General: Bowel sounds are normal.      Palpations: Abdomen is soft.   Musculoskeletal:      Cervical back: Neck supple.      Right lower leg: No edema.      Left lower leg: No edema.   Skin:     Comments: Dressing to left heel clean dry and intact  Dressing to sacrum clean dry and intact   Neurological:      Mental Status: She is alert and oriented to person, place, and time.      Motor: Weakness present.   Psychiatric:         Mood and Affect: Mood normal.         Behavior: Behavior is cooperative.         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  On HD per nephrology  Isosorbide dinitrate  Metoprolol  Diltiazem    Renal diet  Monitor BP  Remove extra fluid in dialysis         Weakness     Work with therapist per POC.         Atrial fibrillation (Multi) - Primary     Monitor heart rate, controlled  Amiodarone  Metoprolol   Apixaban  Bleeding precautions          Medications, treatments, and labs reviewed  Continue medications and treatments as listed in EMR      Scribe Attestation  Hien MARRERO Scribe   attest that this documentation has been prepared under the direction and in the presence of Wilbert Lock MD    Provider Attestation - Scribe documentation  All medical record entries made by the Scribe were at my direction and personally dictated by me. I have reviewed the chart and agree that the record accurately reflects my personal performance of the history, physical exam, discussion and plan.   Wilbert Lock MD

## 2024-10-27 NOTE — PROGRESS NOTES
PROGRESS NOTE    Subjective   Chief complaint: Melody Martin is a 89 y.o. female who is an acute skilled patient being seen and evaluated for weakness    HPI:  HPIPatient admitted to SNF for therapy d/t weakness after recent hospitalization. Patient requires assist with ADLs and transfers. Total dependent for mobility. Therapy to eval and treat. No new complaints.      Objective   Vital signs: 119/59 - 98.7-77-16-98%     Physical Exam  Constitutional:       General: She is not in acute distress.  Eyes:      Extraocular Movements: Extraocular movements intact.   Cardiovascular:      Rate and Rhythm: Normal rate and regular rhythm.   Pulmonary:      Effort: Pulmonary effort is normal.      Breath sounds: Normal breath sounds.   Abdominal:      General: Bowel sounds are normal.      Palpations: Abdomen is soft.   Musculoskeletal:      Cervical back: Neck supple.      Right lower leg: No edema.      Left lower leg: No edema.   Neurological:      Mental Status: She is alert.      Motor: Weakness present.   Psychiatric:         Mood and Affect: Mood normal.         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  On HD per nephrology  Isosorbide dinitrate  Metoprolol  Diltiazem   Renal diet  Monitor BP  Remove extra fluid in dialysis         Weakness - Primary     Therapy  Air mattress  Prevalon boots  Turn schedule          Medications, treatments, and labs reviewed  Continue medications and treatments as listed in EMR      Scribe Attestation  I, Phoebe Bennett   attest that this documentation has been prepared under the direction and in the presence of Karina Patel,  APRN-CNP    Provider Attestation - Scribe documentation  All medical record entries made by the Scribe were at my direction and personally dictated by me. I have reviewed the chart and agree that the record accurately reflects my personal performance of the history, physical exam, discussion and plan.   Karina Patel, JANELL-CNP

## 2024-10-27 NOTE — ASSESSMENT & PLAN NOTE
Carb controlled diet  Monitor Glucoscan  Continue insulin  HD per nephrology  Renal diet  Monitor labs  FBG at goal

## 2024-10-27 NOTE — ASSESSMENT & PLAN NOTE
BP at goal  Stable, no shortness of breath.  On HD per nephrology  Isosorbide dinitrate  Metoprolol  Diltiazem   Renal diet  Monitor BP  Remove extra fluid in dialysis

## 2024-10-28 ENCOUNTER — NURSING HOME VISIT (OUTPATIENT)
Dept: POST ACUTE CARE | Facility: EXTERNAL LOCATION | Age: 89
End: 2024-10-28
Payer: COMMERCIAL

## 2024-10-28 DIAGNOSIS — N18.6 TYPE 2 DIABETES MELLITUS WITH CHRONIC KIDNEY DISEASE ON CHRONIC DIALYSIS, WITH LONG-TERM CURRENT USE OF INSULIN (MULTI): ICD-10-CM

## 2024-10-28 DIAGNOSIS — E11.22 TYPE 2 DIABETES MELLITUS WITH CHRONIC KIDNEY DISEASE ON CHRONIC DIALYSIS, WITH LONG-TERM CURRENT USE OF INSULIN (MULTI): ICD-10-CM

## 2024-10-28 DIAGNOSIS — R53.1 WEAKNESS: ICD-10-CM

## 2024-10-28 DIAGNOSIS — I13.2 HYPERTENSIVE HEART AND KIDNEY DISEASE WITH CHRONIC DIASTOLIC CONGESTIVE HEART FAILURE AND STAGE 5 CHRONIC KIDNEY DISEASE ON CHRONIC DIALYSIS: ICD-10-CM

## 2024-10-28 DIAGNOSIS — N18.6 HYPERTENSIVE HEART AND KIDNEY DISEASE WITH CHRONIC DIASTOLIC CONGESTIVE HEART FAILURE AND STAGE 5 CHRONIC KIDNEY DISEASE ON CHRONIC DIALYSIS: ICD-10-CM

## 2024-10-28 DIAGNOSIS — Z99.2 HYPERTENSIVE HEART AND KIDNEY DISEASE WITH CHRONIC DIASTOLIC CONGESTIVE HEART FAILURE AND STAGE 5 CHRONIC KIDNEY DISEASE ON CHRONIC DIALYSIS: ICD-10-CM

## 2024-10-28 DIAGNOSIS — Z79.4 TYPE 2 DIABETES MELLITUS WITH CHRONIC KIDNEY DISEASE ON CHRONIC DIALYSIS, WITH LONG-TERM CURRENT USE OF INSULIN (MULTI): ICD-10-CM

## 2024-10-28 DIAGNOSIS — Z99.2 TYPE 2 DIABETES MELLITUS WITH CHRONIC KIDNEY DISEASE ON CHRONIC DIALYSIS, WITH LONG-TERM CURRENT USE OF INSULIN (MULTI): ICD-10-CM

## 2024-10-28 DIAGNOSIS — I50.32 HYPERTENSIVE HEART AND KIDNEY DISEASE WITH CHRONIC DIASTOLIC CONGESTIVE HEART FAILURE AND STAGE 5 CHRONIC KIDNEY DISEASE ON CHRONIC DIALYSIS: ICD-10-CM

## 2024-10-28 PROCEDURE — 99309 SBSQ NF CARE MODERATE MDM 30: CPT | Performed by: NURSE PRACTITIONER

## 2024-10-29 ENCOUNTER — NURSING HOME VISIT (OUTPATIENT)
Dept: POST ACUTE CARE | Facility: EXTERNAL LOCATION | Age: 89
End: 2024-10-29
Payer: COMMERCIAL

## 2024-10-29 DIAGNOSIS — N18.6 HYPERTENSIVE HEART AND KIDNEY DISEASE WITH CHRONIC DIASTOLIC CONGESTIVE HEART FAILURE AND STAGE 5 CHRONIC KIDNEY DISEASE ON CHRONIC DIALYSIS: Primary | ICD-10-CM

## 2024-10-29 DIAGNOSIS — Z79.4 TYPE 2 DIABETES MELLITUS WITH CHRONIC KIDNEY DISEASE ON CHRONIC DIALYSIS, WITH LONG-TERM CURRENT USE OF INSULIN (MULTI): ICD-10-CM

## 2024-10-29 DIAGNOSIS — Z99.2 TYPE 2 DIABETES MELLITUS WITH CHRONIC KIDNEY DISEASE ON CHRONIC DIALYSIS, WITH LONG-TERM CURRENT USE OF INSULIN (MULTI): ICD-10-CM

## 2024-10-29 DIAGNOSIS — E11.22 TYPE 2 DIABETES MELLITUS WITH CHRONIC KIDNEY DISEASE ON CHRONIC DIALYSIS, WITH LONG-TERM CURRENT USE OF INSULIN (MULTI): ICD-10-CM

## 2024-10-29 DIAGNOSIS — Z99.2 HYPERTENSIVE HEART AND KIDNEY DISEASE WITH CHRONIC DIASTOLIC CONGESTIVE HEART FAILURE AND STAGE 5 CHRONIC KIDNEY DISEASE ON CHRONIC DIALYSIS: Primary | ICD-10-CM

## 2024-10-29 DIAGNOSIS — Z86.73 HISTORY OF CVA (CEREBROVASCULAR ACCIDENT): ICD-10-CM

## 2024-10-29 DIAGNOSIS — I50.32 HYPERTENSIVE HEART AND KIDNEY DISEASE WITH CHRONIC DIASTOLIC CONGESTIVE HEART FAILURE AND STAGE 5 CHRONIC KIDNEY DISEASE ON CHRONIC DIALYSIS: Primary | ICD-10-CM

## 2024-10-29 DIAGNOSIS — I13.2 HYPERTENSIVE HEART AND KIDNEY DISEASE WITH CHRONIC DIASTOLIC CONGESTIVE HEART FAILURE AND STAGE 5 CHRONIC KIDNEY DISEASE ON CHRONIC DIALYSIS: Primary | ICD-10-CM

## 2024-10-29 DIAGNOSIS — N18.6 TYPE 2 DIABETES MELLITUS WITH CHRONIC KIDNEY DISEASE ON CHRONIC DIALYSIS, WITH LONG-TERM CURRENT USE OF INSULIN (MULTI): ICD-10-CM

## 2024-10-29 DIAGNOSIS — R53.1 WEAKNESS: ICD-10-CM

## 2024-10-29 PROCEDURE — 99308 SBSQ NF CARE LOW MDM 20: CPT | Performed by: INTERNAL MEDICINE

## 2024-10-30 ENCOUNTER — NURSING HOME VISIT (OUTPATIENT)
Dept: POST ACUTE CARE | Facility: EXTERNAL LOCATION | Age: 89
End: 2024-10-30
Payer: COMMERCIAL

## 2024-10-30 DIAGNOSIS — Z79.4 TYPE 2 DIABETES MELLITUS WITH CHRONIC KIDNEY DISEASE ON CHRONIC DIALYSIS, WITH LONG-TERM CURRENT USE OF INSULIN (MULTI): ICD-10-CM

## 2024-10-30 DIAGNOSIS — I50.32 HYPERTENSIVE HEART AND KIDNEY DISEASE WITH CHRONIC DIASTOLIC CONGESTIVE HEART FAILURE AND STAGE 5 CHRONIC KIDNEY DISEASE ON CHRONIC DIALYSIS: ICD-10-CM

## 2024-10-30 DIAGNOSIS — Z99.2 TYPE 2 DIABETES MELLITUS WITH CHRONIC KIDNEY DISEASE ON CHRONIC DIALYSIS, WITH LONG-TERM CURRENT USE OF INSULIN (MULTI): ICD-10-CM

## 2024-10-30 DIAGNOSIS — N18.6 HYPERTENSIVE HEART AND KIDNEY DISEASE WITH CHRONIC DIASTOLIC CONGESTIVE HEART FAILURE AND STAGE 5 CHRONIC KIDNEY DISEASE ON CHRONIC DIALYSIS: ICD-10-CM

## 2024-10-30 DIAGNOSIS — Z99.2 HYPERTENSIVE HEART AND KIDNEY DISEASE WITH CHRONIC DIASTOLIC CONGESTIVE HEART FAILURE AND STAGE 5 CHRONIC KIDNEY DISEASE ON CHRONIC DIALYSIS: ICD-10-CM

## 2024-10-30 DIAGNOSIS — I13.2 HYPERTENSIVE HEART AND KIDNEY DISEASE WITH CHRONIC DIASTOLIC CONGESTIVE HEART FAILURE AND STAGE 5 CHRONIC KIDNEY DISEASE ON CHRONIC DIALYSIS: ICD-10-CM

## 2024-10-30 DIAGNOSIS — N18.6 TYPE 2 DIABETES MELLITUS WITH CHRONIC KIDNEY DISEASE ON CHRONIC DIALYSIS, WITH LONG-TERM CURRENT USE OF INSULIN (MULTI): ICD-10-CM

## 2024-10-30 DIAGNOSIS — R53.1 WEAKNESS: Primary | ICD-10-CM

## 2024-10-30 DIAGNOSIS — I48.91 ATRIAL FIBRILLATION, UNSPECIFIED TYPE (MULTI): ICD-10-CM

## 2024-10-30 DIAGNOSIS — E11.22 TYPE 2 DIABETES MELLITUS WITH CHRONIC KIDNEY DISEASE ON CHRONIC DIALYSIS, WITH LONG-TERM CURRENT USE OF INSULIN (MULTI): ICD-10-CM

## 2024-10-30 PROCEDURE — 99309 SBSQ NF CARE MODERATE MDM 30: CPT | Performed by: NURSE PRACTITIONER

## 2024-10-31 ENCOUNTER — NURSING HOME VISIT (OUTPATIENT)
Dept: POST ACUTE CARE | Facility: EXTERNAL LOCATION | Age: 89
End: 2024-10-31
Payer: COMMERCIAL

## 2024-10-31 DIAGNOSIS — Z79.4 TYPE 2 DIABETES MELLITUS WITH CHRONIC KIDNEY DISEASE ON CHRONIC DIALYSIS, WITH LONG-TERM CURRENT USE OF INSULIN (MULTI): ICD-10-CM

## 2024-10-31 DIAGNOSIS — I50.32 HYPERTENSIVE HEART AND KIDNEY DISEASE WITH CHRONIC DIASTOLIC CONGESTIVE HEART FAILURE AND STAGE 5 CHRONIC KIDNEY DISEASE ON CHRONIC DIALYSIS: Primary | ICD-10-CM

## 2024-10-31 DIAGNOSIS — E11.22 TYPE 2 DIABETES MELLITUS WITH CHRONIC KIDNEY DISEASE ON CHRONIC DIALYSIS, WITH LONG-TERM CURRENT USE OF INSULIN (MULTI): ICD-10-CM

## 2024-10-31 DIAGNOSIS — Z99.2 HYPERTENSIVE HEART AND KIDNEY DISEASE WITH CHRONIC DIASTOLIC CONGESTIVE HEART FAILURE AND STAGE 5 CHRONIC KIDNEY DISEASE ON CHRONIC DIALYSIS: Primary | ICD-10-CM

## 2024-10-31 DIAGNOSIS — Z86.73 HISTORY OF CVA (CEREBROVASCULAR ACCIDENT): ICD-10-CM

## 2024-10-31 DIAGNOSIS — R53.1 WEAKNESS: ICD-10-CM

## 2024-10-31 DIAGNOSIS — I13.2 HYPERTENSIVE HEART AND KIDNEY DISEASE WITH CHRONIC DIASTOLIC CONGESTIVE HEART FAILURE AND STAGE 5 CHRONIC KIDNEY DISEASE ON CHRONIC DIALYSIS: Primary | ICD-10-CM

## 2024-10-31 DIAGNOSIS — Z99.2 TYPE 2 DIABETES MELLITUS WITH CHRONIC KIDNEY DISEASE ON CHRONIC DIALYSIS, WITH LONG-TERM CURRENT USE OF INSULIN (MULTI): ICD-10-CM

## 2024-10-31 DIAGNOSIS — N18.6 HYPERTENSIVE HEART AND KIDNEY DISEASE WITH CHRONIC DIASTOLIC CONGESTIVE HEART FAILURE AND STAGE 5 CHRONIC KIDNEY DISEASE ON CHRONIC DIALYSIS: Primary | ICD-10-CM

## 2024-10-31 DIAGNOSIS — N18.6 TYPE 2 DIABETES MELLITUS WITH CHRONIC KIDNEY DISEASE ON CHRONIC DIALYSIS, WITH LONG-TERM CURRENT USE OF INSULIN (MULTI): ICD-10-CM

## 2024-10-31 PROCEDURE — 99309 SBSQ NF CARE MODERATE MDM 30: CPT | Performed by: NURSE PRACTITIONER

## 2024-10-31 NOTE — LETTER
Patient: Melody Martin  : 1935    Encounter Date: 10/31/2024    PROGRESS NOTE    Subjective  Chief complaint: Melody Martin is a 89 y.o. female who is an acute skilled patient being seen and evaluated for weakness    HPI:  HPI    Patient is currently working with therapy due to generalized weakness.  Therapy is noting patient is total dependent for ambulation and completing transfers from various surfaces.  Patient is also total dependent for ADL task for upper and lower body.  Speech therapy working with patient to address dysphagia. Patient appears to be in no acute distress.  Denies chest pain shortness breath nausea vomiting fever chills.    Objective  Vital signs: 128/66, 71, 16, 96% blood sugar 175    Physical Exam  Constitutional:       General: She is not in acute distress.  Eyes:      Extraocular Movements: Extraocular movements intact.   Cardiovascular:      Rate and Rhythm: Normal rate and regular rhythm.   Pulmonary:      Effort: Pulmonary effort is normal.      Breath sounds: Normal breath sounds.   Abdominal:      General: Bowel sounds are normal.      Palpations: Abdomen is soft.   Musculoskeletal:      Cervical back: Neck supple.      Right lower leg: No edema.      Left lower leg: No edema.   Neurological:      Mental Status: She is alert and oriented to person, place, and time.      Motor: Weakness present.   Psychiatric:         Mood and Affect: Mood normal.         Behavior: Behavior is cooperative.         Assessment/Plan  Problem List Items Addressed This Visit       Type 2 diabetes mellitus with chronic kidney disease on chronic dialysis, with long-term current use of insulin (Multi)     Carb controlled diet  Monitor Glucoscan  Continue insulin  HD per nephrology  Renal diet  Monitor labs  FBG at goal         History of CVA (cerebrovascular accident)     Statin  Monitor for changes and weakness.         Hypertensive heart and kidney disease with chronic diastolic congestive heart  failure and stage 5 chronic kidney disease on chronic dialysis - Primary     Stable, no shortness of breath.  On HD per nephrology  Isosorbide dinitrate  Metoprolol  Diltiazem   Renal diet  Monitor BP  Remove extra fluid in dialysis         Weakness     Continue therapy, total dependence for care and mobility          Medications, treatments, and labs reviewed  Continue medications and treatments as listed in EMR      Scribe Attestation  Hien MARRERO Scribe   attest that this documentation has been prepared under the direction and in the presence of MAR Johnson    Provider Attestation - Scribe documentation  All medical record entries made by the Scribe were at my direction and personally dictated by me. I have reviewed the chart and agree that the record accurately reflects my personal performance of the history, physical exam, discussion and plan.   MAR Johnson        Electronically Signed By: MAR Johnson   12/19/24  8:31 PM

## 2024-11-01 ENCOUNTER — NURSING HOME VISIT (OUTPATIENT)
Dept: POST ACUTE CARE | Facility: EXTERNAL LOCATION | Age: 89
End: 2024-11-01
Payer: COMMERCIAL

## 2024-11-01 DIAGNOSIS — I13.2 HYPERTENSIVE HEART AND KIDNEY DISEASE WITH CHRONIC DIASTOLIC CONGESTIVE HEART FAILURE AND STAGE 5 CHRONIC KIDNEY DISEASE ON CHRONIC DIALYSIS: Primary | ICD-10-CM

## 2024-11-01 DIAGNOSIS — N18.6 HYPERTENSIVE HEART AND KIDNEY DISEASE WITH CHRONIC DIASTOLIC CONGESTIVE HEART FAILURE AND STAGE 5 CHRONIC KIDNEY DISEASE ON CHRONIC DIALYSIS: Primary | ICD-10-CM

## 2024-11-01 DIAGNOSIS — I50.32 HYPERTENSIVE HEART AND KIDNEY DISEASE WITH CHRONIC DIASTOLIC CONGESTIVE HEART FAILURE AND STAGE 5 CHRONIC KIDNEY DISEASE ON CHRONIC DIALYSIS: Primary | ICD-10-CM

## 2024-11-01 DIAGNOSIS — Z79.4 TYPE 2 DIABETES MELLITUS WITH CHRONIC KIDNEY DISEASE ON CHRONIC DIALYSIS, WITH LONG-TERM CURRENT USE OF INSULIN (MULTI): ICD-10-CM

## 2024-11-01 DIAGNOSIS — R53.1 WEAKNESS: ICD-10-CM

## 2024-11-01 DIAGNOSIS — E11.22 TYPE 2 DIABETES MELLITUS WITH CHRONIC KIDNEY DISEASE ON CHRONIC DIALYSIS, WITH LONG-TERM CURRENT USE OF INSULIN (MULTI): ICD-10-CM

## 2024-11-01 DIAGNOSIS — Z99.2 TYPE 2 DIABETES MELLITUS WITH CHRONIC KIDNEY DISEASE ON CHRONIC DIALYSIS, WITH LONG-TERM CURRENT USE OF INSULIN (MULTI): ICD-10-CM

## 2024-11-01 DIAGNOSIS — N18.6 TYPE 2 DIABETES MELLITUS WITH CHRONIC KIDNEY DISEASE ON CHRONIC DIALYSIS, WITH LONG-TERM CURRENT USE OF INSULIN (MULTI): ICD-10-CM

## 2024-11-01 DIAGNOSIS — Z86.73 HISTORY OF CVA (CEREBROVASCULAR ACCIDENT): ICD-10-CM

## 2024-11-01 DIAGNOSIS — Z99.2 HYPERTENSIVE HEART AND KIDNEY DISEASE WITH CHRONIC DIASTOLIC CONGESTIVE HEART FAILURE AND STAGE 5 CHRONIC KIDNEY DISEASE ON CHRONIC DIALYSIS: Primary | ICD-10-CM

## 2024-11-18 NOTE — PROGRESS NOTES
PROGRESS NOTE    Subjective   Chief complaint: Melody Martin is a 89 y.o. female who is an acute skilled patient being seen and evaluated for weakness    HPI:  HPI  Patient has no new complaints or concerns today.  Patient is working in therapy.  Denies n/v/f/c.    Objective   Vital signs: 106/48, 97.8, 89, 16, 97%     Physical Exam  Constitutional:       General: She is not in acute distress.  Eyes:      Extraocular Movements: Extraocular movements intact.   Cardiovascular:      Rate and Rhythm: Normal rate and regular rhythm.   Pulmonary:      Effort: Pulmonary effort is normal.      Breath sounds: Normal breath sounds.   Abdominal:      General: Bowel sounds are normal.      Palpations: Abdomen is soft.   Musculoskeletal:      Cervical back: Neck supple.      Right lower leg: No edema.      Left lower leg: No edema.   Neurological:      Mental Status: She is alert.      Motor: Weakness present.   Psychiatric:         Mood and Affect: Mood normal.         Behavior: Behavior is cooperative.         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  On HD per nephrology  Isosorbide dinitrate  Metoprolol  Diltiazem   Renal diet  Monitor BP  Remove extra fluid in dialysis         Type 2 diabetes mellitus with chronic kidney disease on chronic dialysis, with long-term current use of insulin (Multi)     Carb controlled diet  Monitor Glucoscan  Continue insulin  HD per nephrology  Renal diet  Monitor labs  FBG at goal         Weakness     Continue therapy, work towards goals          Medications, treatments, and labs reviewed  Continue medications and treatments as listed in EMR    JANELL Johnson-CNP    Scribe Attestation  I, Bibiana Henryibe   attest that this documentation has been prepared under the  direction and in the presence of JANELL Johnson-CNP    Provider Attestation - Scribe documentation  All medical record entries made by the Scribe were at my direction and personally dictated by me. I have reviewed the chart and agree that the record accurately reflects my personal performance of the history, physical exam, discussion and plan.

## 2024-11-20 DIAGNOSIS — M48.061 IDIOPATHIC STENOSIS OF LUMBAR SPINE: ICD-10-CM

## 2024-11-20 RX ORDER — OXYCODONE HYDROCHLORIDE 5 MG/1
2.5 TABLET ORAL EVERY 8 HOURS
COMMUNITY
Start: 2024-11-18 | End: 2024-11-20 | Stop reason: SDUPTHER

## 2024-11-20 RX ORDER — OXYCODONE HYDROCHLORIDE 5 MG/1
2.5 TABLET ORAL EVERY 8 HOURS
Qty: 45 TABLET | Refills: 0 | Status: SHIPPED | OUTPATIENT
Start: 2024-11-20

## 2024-11-26 ENCOUNTER — NURSING HOME VISIT (OUTPATIENT)
Dept: POST ACUTE CARE | Facility: EXTERNAL LOCATION | Age: 89
End: 2024-11-26
Payer: COMMERCIAL

## 2024-11-26 DIAGNOSIS — Z79.4 TYPE 2 DIABETES MELLITUS WITH CHRONIC KIDNEY DISEASE ON CHRONIC DIALYSIS, WITH LONG-TERM CURRENT USE OF INSULIN (MULTI): Primary | ICD-10-CM

## 2024-11-26 DIAGNOSIS — Z99.2 HYPERTENSIVE HEART AND KIDNEY DISEASE WITH CHRONIC DIASTOLIC CONGESTIVE HEART FAILURE AND STAGE 5 CHRONIC KIDNEY DISEASE ON CHRONIC DIALYSIS: ICD-10-CM

## 2024-11-26 DIAGNOSIS — I50.32 HYPERTENSIVE HEART AND KIDNEY DISEASE WITH CHRONIC DIASTOLIC CONGESTIVE HEART FAILURE AND STAGE 5 CHRONIC KIDNEY DISEASE ON CHRONIC DIALYSIS: ICD-10-CM

## 2024-11-26 DIAGNOSIS — F32.A DEPRESSION, UNSPECIFIED DEPRESSION TYPE: ICD-10-CM

## 2024-11-26 DIAGNOSIS — I13.2 HYPERTENSIVE HEART AND KIDNEY DISEASE WITH CHRONIC DIASTOLIC CONGESTIVE HEART FAILURE AND STAGE 5 CHRONIC KIDNEY DISEASE ON CHRONIC DIALYSIS: ICD-10-CM

## 2024-11-26 DIAGNOSIS — K21.9 GERD WITHOUT ESOPHAGITIS: ICD-10-CM

## 2024-11-26 DIAGNOSIS — I48.91 ATRIAL FIBRILLATION, UNSPECIFIED TYPE (MULTI): ICD-10-CM

## 2024-11-26 DIAGNOSIS — Z86.73 HISTORY OF CVA (CEREBROVASCULAR ACCIDENT): ICD-10-CM

## 2024-11-26 DIAGNOSIS — E11.22 TYPE 2 DIABETES MELLITUS WITH CHRONIC KIDNEY DISEASE ON CHRONIC DIALYSIS, WITH LONG-TERM CURRENT USE OF INSULIN (MULTI): Primary | ICD-10-CM

## 2024-11-26 DIAGNOSIS — N18.6 TYPE 2 DIABETES MELLITUS WITH CHRONIC KIDNEY DISEASE ON CHRONIC DIALYSIS, WITH LONG-TERM CURRENT USE OF INSULIN (MULTI): Primary | ICD-10-CM

## 2024-11-26 DIAGNOSIS — Z99.2 TYPE 2 DIABETES MELLITUS WITH CHRONIC KIDNEY DISEASE ON CHRONIC DIALYSIS, WITH LONG-TERM CURRENT USE OF INSULIN (MULTI): Primary | ICD-10-CM

## 2024-11-26 DIAGNOSIS — N18.6 HYPERTENSIVE HEART AND KIDNEY DISEASE WITH CHRONIC DIASTOLIC CONGESTIVE HEART FAILURE AND STAGE 5 CHRONIC KIDNEY DISEASE ON CHRONIC DIALYSIS: ICD-10-CM

## 2024-11-26 PROCEDURE — 99309 SBSQ NF CARE MODERATE MDM 30: CPT | Performed by: INTERNAL MEDICINE

## 2024-11-26 NOTE — PROGRESS NOTES
PROGRESS NOTE    Subjective   Chief complaint: Melody Martin is a 89 y.o. female who is a long term care patient being seen and evaluated for monthly general medical care and follow-up    HPI:  HPI  Patient presents for general medical care and f/u.  Patient seen and examined at bedside.  No issues per nursing.  Patient has no acute complaints.  ESRD on HD, tolerating treatment well.  DM, denies Lidex-E polyuria polyphagia.  AFIB stable, denies palpitations and chest pain.  HTN BP at goal.  Denies chest pain and headache.  CHF stable, denies sob, orthopnea, weight gain.  GERD controlled.  Denies heartburn, regurgitation, epigastric discomfort, sour taste, and cough.  Hx CVA, denies changes in weakness and speech.  Insert depression.  Mentation at baseline, no acute distress.     Objective   Vital signs: 108/76, 88, 18, 97.7, 96% blood sugar 105    Physical Exam  Constitutional:       General: She is not in acute distress.  Eyes:      Extraocular Movements: Extraocular movements intact.   Cardiovascular:      Rate and Rhythm: Normal rate and regular rhythm.   Pulmonary:      Effort: Pulmonary effort is normal.      Breath sounds: Normal breath sounds.   Abdominal:      General: Bowel sounds are normal.      Palpations: Abdomen is soft.   Musculoskeletal:      Cervical back: Neck supple.      Right lower leg: No edema.      Left lower leg: No edema.   Neurological:      Mental Status: She is alert and oriented to person, place, and time.   Psychiatric:         Mood and Affect: Mood normal.         Behavior: Behavior is cooperative.         Assessment/Plan   Problem List Items Addressed This Visit       Type 2 diabetes mellitus with chronic kidney disease on chronic dialysis, with long-term current use of insulin (Multi) - Primary     Carb controlled diet  Monitor Glucoscan  Continue insulin  HD per nephrology  Renal diet  Monitor labs  FBG at goal         GERD without esophagitis     PPI  Monitor GI symptoms          History of CVA (cerebrovascular accident)     Statin  Monitor for changes and weakness.         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  On HD per nephrology  Isosorbide dinitrate  Metoprolol  Diltiazem   Renal diet  Monitor BP  Remove extra fluid in dialysis         Atrial fibrillation (Multi)     Monitor heart rate, controlled  Amiodarone  Metoprolol   Apixaban  Bleeding precautions         Depression     Continue antidepressants  Monitor mood and behaviors          Medications, treatments, and labs reviewed  Continue medications and treatments as listed in EMR    Scribe Attestation  I, Carl Martineziboseas   attest that this documentation has been prepared under the direction and in the presence of Wilbert Lock MD    Provider Attestation - Scribe documentation  All medical record entries made by the Scribe were at my direction and personally dictated by me. I have reviewed the chart and agree that the record accurately reflects my personal performance of the history, physical exam, discussion and plan.   Wilbert Lock MD

## 2024-11-26 NOTE — LETTER
Patient: Melody Martin  : 1935    Encounter Date: 2024    PROGRESS NOTE    Subjective  Chief complaint: Melody Martin is a 89 y.o. female who is a long term care patient being seen and evaluated for monthly general medical care and follow-up    HPI:  HPI  Patient presents for general medical care and f/u.  Patient seen and examined at bedside.  No issues per nursing.  Patient has no acute complaints.  ESRD on HD, tolerating treatment well.  DM, denies Lidex-E polyuria polyphagia.  AFIB stable, denies palpitations and chest pain.  HTN BP at goal.  Denies chest pain and headache.  CHF stable, denies sob, orthopnea, weight gain.  GERD controlled.  Denies heartburn, regurgitation, epigastric discomfort, sour taste, and cough.  Hx CVA, denies changes in weakness and speech.  Insert depression.  Mentation at baseline, no acute distress.     Objective  Vital signs: 108/76, 88, 18, 97.7, 96% blood sugar 105    Physical Exam  Constitutional:       General: She is not in acute distress.  Eyes:      Extraocular Movements: Extraocular movements intact.   Cardiovascular:      Rate and Rhythm: Normal rate and regular rhythm.   Pulmonary:      Effort: Pulmonary effort is normal.      Breath sounds: Normal breath sounds.   Abdominal:      General: Bowel sounds are normal.      Palpations: Abdomen is soft.   Musculoskeletal:      Cervical back: Neck supple.      Right lower leg: No edema.      Left lower leg: No edema.   Neurological:      Mental Status: She is alert and oriented to person, place, and time.   Psychiatric:         Mood and Affect: Mood normal.         Behavior: Behavior is cooperative.         Assessment/Plan  Problem List Items Addressed This Visit       Type 2 diabetes mellitus with chronic kidney disease on chronic dialysis, with long-term current use of insulin (Multi) - Primary     Carb controlled diet  Monitor Glucoscan  Continue insulin  HD per nephrology  Renal diet  Monitor labs  FBG at  goal         GERD without esophagitis     PPI  Monitor GI symptoms         History of CVA (cerebrovascular accident)     Statin  Monitor for changes and weakness.         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  On HD per nephrology  Isosorbide dinitrate  Metoprolol  Diltiazem   Renal diet  Monitor BP  Remove extra fluid in dialysis         Atrial fibrillation (Multi)     Monitor heart rate, controlled  Amiodarone  Metoprolol   Apixaban  Bleeding precautions         Depression     Continue antidepressants  Monitor mood and behaviors          Medications, treatments, and labs reviewed  Continue medications and treatments as listed in EMR    Scribe Attestation  I, Phoebe Martinez   attest that this documentation has been prepared under the direction and in the presence of Wilbert Lock MD    Provider Attestation - Scribe documentation  All medical record entries made by the Scribe were at my direction and personally dictated by me. I have reviewed the chart and agree that the record accurately reflects my personal performance of the history, physical exam, discussion and plan.   Wilbert Lock MD            Electronically Signed By: Wilbert Lock MD   11/27/24  2:57 PM

## 2024-11-27 ENCOUNTER — NURSING HOME VISIT (OUTPATIENT)
Dept: POST ACUTE CARE | Facility: EXTERNAL LOCATION | Age: 89
End: 2024-11-27
Payer: COMMERCIAL

## 2024-11-27 DIAGNOSIS — I50.32 HYPERTENSIVE HEART AND KIDNEY DISEASE WITH CHRONIC DIASTOLIC CONGESTIVE HEART FAILURE AND STAGE 5 CHRONIC KIDNEY DISEASE ON CHRONIC DIALYSIS: ICD-10-CM

## 2024-11-27 DIAGNOSIS — I48.91 ATRIAL FIBRILLATION, UNSPECIFIED TYPE (MULTI): ICD-10-CM

## 2024-11-27 DIAGNOSIS — F41.9 ANXIETY: Primary | ICD-10-CM

## 2024-11-27 DIAGNOSIS — N18.6 HYPERTENSIVE HEART AND KIDNEY DISEASE WITH CHRONIC DIASTOLIC CONGESTIVE HEART FAILURE AND STAGE 5 CHRONIC KIDNEY DISEASE ON CHRONIC DIALYSIS: ICD-10-CM

## 2024-11-27 DIAGNOSIS — I13.2 HYPERTENSIVE HEART AND KIDNEY DISEASE WITH CHRONIC DIASTOLIC CONGESTIVE HEART FAILURE AND STAGE 5 CHRONIC KIDNEY DISEASE ON CHRONIC DIALYSIS: ICD-10-CM

## 2024-11-27 DIAGNOSIS — Z99.2 HYPERTENSIVE HEART AND KIDNEY DISEASE WITH CHRONIC DIASTOLIC CONGESTIVE HEART FAILURE AND STAGE 5 CHRONIC KIDNEY DISEASE ON CHRONIC DIALYSIS: ICD-10-CM

## 2024-11-27 PROCEDURE — 99309 SBSQ NF CARE MODERATE MDM 30: CPT | Performed by: NURSE PRACTITIONER

## 2024-11-27 NOTE — LETTER
Patient: Melody Martin  : 1935    Encounter Date: 2024    PROGRESS NOTE    Subjective  Chief complaint: Melody Martin is a 89 y.o. female who is a long term care patient being seen and evaluated for medicine review.    HPI:  HPIAsked to see patient to evaluate for continued use of BuSpar. Patient with history of anxiety. Mood has been stable. Patient denies feeling down or thoughts of harming self.      Objective  Vital signs: 140/46, 97.4, 66, 18, 97%    Physical Exam  Constitutional:       General: She is not in acute distress.  Eyes:      Extraocular Movements: Extraocular movements intact.   Cardiovascular:      Rate and Rhythm: Normal rate and regular rhythm.   Pulmonary:      Effort: Pulmonary effort is normal.      Breath sounds: Normal breath sounds.   Abdominal:      General: Bowel sounds are normal.      Palpations: Abdomen is soft.   Musculoskeletal:      Cervical back: Neck supple.      Right lower leg: No edema.      Left lower leg: No edema.   Neurological:      Mental Status: She is alert and oriented to person, place, and time.   Psychiatric:         Mood and Affect: Mood normal.         Behavior: Behavior is cooperative.         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  On HD per nephrology  Isosorbide dinitrate  Metoprolol  Diltiazem   Renal diet  Monitor BP  Remove extra fluid in dialysis         Atrial fibrillation (Multi)     Monitor heart rate, controlled  Amiodarone  Metoprolol   Apixaban  Bleeding precautions         Anxiety - Primary     Will trial decreasing BuSpar to 5 mg 3 times daily  Monitor mood and behaviors          Medications, treatments, and labs reviewed  Continue medications and treatments as listed in EMR    Carlibe Attestation  Leonid MARRERO Scribe   attest  that this documentation has been prepared under the direction and in the presence of MAR Johnson    Provider Attestation - Scribe documentation  All medical record entries made by the Scribe were at my direction and personally dictated by me. I have reviewed the chart and agree that the record accurately reflects my personal performance of the history, physical exam, discussion and plan.   MAR Johnson            Electronically Signed By: MAR Johnson   12/22/24  9:38 PM

## 2024-12-02 NOTE — PROGRESS NOTES
PROGRESS NOTE    Subjective   Chief complaint: Melody Martin is a 89 y.o. female who is an acute skilled patient being seen and evaluated for weakness    HPI:  HPI    Patient is currently working with therapy due to generalized weakness.  Therapy is noting patient is total dependent for ambulation and completing transfers from various surfaces.  Patient is also total dependent for ADL task for upper and lower body.  Speech therapy working with patient to address dysphagia. Patient appears to be in no acute distress.  Denies chest pain shortness breath nausea vomiting fever chills.    Objective   Vital signs: 128/66, 71, 16, 96% blood sugar 175    Physical Exam  Constitutional:       General: She is not in acute distress.  Eyes:      Extraocular Movements: Extraocular movements intact.   Cardiovascular:      Rate and Rhythm: Normal rate and regular rhythm.   Pulmonary:      Effort: Pulmonary effort is normal.      Breath sounds: Normal breath sounds.   Abdominal:      General: Bowel sounds are normal.      Palpations: Abdomen is soft.   Musculoskeletal:      Cervical back: Neck supple.      Right lower leg: No edema.      Left lower leg: No edema.   Neurological:      Mental Status: She is alert and oriented to person, place, and time.      Motor: Weakness present.   Psychiatric:         Mood and Affect: Mood normal.         Behavior: Behavior is cooperative.         Assessment/Plan   Problem List Items Addressed This Visit       Type 2 diabetes mellitus with chronic kidney disease on chronic dialysis, with long-term current use of insulin (Multi)     Carb controlled diet  Monitor Glucoscan  Continue insulin  HD per nephrology  Renal diet  Monitor labs  FBG at goal         History of CVA (cerebrovascular accident)     Statin  Monitor for changes and weakness.         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  On HD per nephrology  Isosorbide dinitrate  Metoprolol  Diltiazem   Renal diet  Monitor BP  Remove extra fluid in dialysis         Weakness     Continue therapy, total dependence for care and mobility          Medications, treatments, and labs reviewed  Continue medications and treatments as listed in EMR      Scribe Attestation  Hien MARRERO Scribe   attest that this documentation has been prepared under the direction and in the presence of MAR Johnson    Provider Attestation - Scribe documentation  All medical record entries made by the Scribe were at my direction and personally dictated by me. I have reviewed the chart and agree that the record accurately reflects my personal performance of the history, physical exam, discussion and plan.   MAR Johnson

## 2024-12-06 ENCOUNTER — NURSING HOME VISIT (OUTPATIENT)
Dept: POST ACUTE CARE | Facility: EXTERNAL LOCATION | Age: 89
End: 2024-12-06
Payer: COMMERCIAL

## 2024-12-06 DIAGNOSIS — I50.32 HYPERTENSIVE HEART AND KIDNEY DISEASE WITH CHRONIC DIASTOLIC CONGESTIVE HEART FAILURE AND STAGE 5 CHRONIC KIDNEY DISEASE ON CHRONIC DIALYSIS: ICD-10-CM

## 2024-12-06 DIAGNOSIS — Z99.2 HYPERTENSIVE HEART AND KIDNEY DISEASE WITH CHRONIC DIASTOLIC CONGESTIVE HEART FAILURE AND STAGE 5 CHRONIC KIDNEY DISEASE ON CHRONIC DIALYSIS: ICD-10-CM

## 2024-12-06 DIAGNOSIS — N18.6 HYPERTENSIVE HEART AND KIDNEY DISEASE WITH CHRONIC DIASTOLIC CONGESTIVE HEART FAILURE AND STAGE 5 CHRONIC KIDNEY DISEASE ON CHRONIC DIALYSIS: ICD-10-CM

## 2024-12-06 DIAGNOSIS — D72.829 LEUKOCYTOSIS, UNSPECIFIED TYPE: Primary | ICD-10-CM

## 2024-12-06 DIAGNOSIS — I13.2 HYPERTENSIVE HEART AND KIDNEY DISEASE WITH CHRONIC DIASTOLIC CONGESTIVE HEART FAILURE AND STAGE 5 CHRONIC KIDNEY DISEASE ON CHRONIC DIALYSIS: ICD-10-CM

## 2024-12-06 DIAGNOSIS — Z86.73 HISTORY OF CVA (CEREBROVASCULAR ACCIDENT): ICD-10-CM

## 2024-12-06 PROCEDURE — 99309 SBSQ NF CARE MODERATE MDM 30: CPT | Performed by: INTERNAL MEDICINE

## 2024-12-06 NOTE — PROGRESS NOTES
PROGRESS NOTE    Subjective   Chief complaint: Melody Martin is a 89 y.o. female who is a long term care patient being seen and evaluated for leukocytosis.    HPI:  HPI  Patient did have labs obtained significant for leukocytosis and orders were placed for chest x-ray and patient had suspicion for OM on imaging and patient started on Augmentin and MRI to be obtained per wound doctor.  Patient's chest x-ray was negative for acute process.    Objective   Vital signs: 139/74, 80, 16, 97.9, 98% blood sugar 155    Physical Exam  Constitutional:       General: She is not in acute distress.  Eyes:      Extraocular Movements: Extraocular movements intact.   Cardiovascular:      Rate and Rhythm: Normal rate and regular rhythm.   Pulmonary:      Effort: Pulmonary effort is normal.      Breath sounds: Normal breath sounds.   Abdominal:      General: Bowel sounds are normal.      Palpations: Abdomen is soft.   Musculoskeletal:      Cervical back: Neck supple.      Right lower leg: No edema.      Left lower leg: No edema.   Neurological:      Mental Status: She is alert and oriented to person, place, and time.   Psychiatric:         Mood and Affect: Mood normal.         Behavior: Behavior is cooperative.         Assessment/Plan   Problem List Items Addressed This Visit       History of CVA (cerebrovascular accident)     Statin  Monitor for changes and weakness.         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  On HD per nephrology  Isosorbide dinitrate  Metoprolol  Diltiazem   Renal diet  Monitor BP  Remove extra fluid in dialysis         Leukocytosis - Primary     Augmentin  Obtain MRI per wound doctor  Chest x-ray was negative          Medications, treatments, and labs reviewed  Continue medications and treatments as listed in EMR    Scribe Attestlisset MARRERO, Carl Martinezibe   attest  that this documentation has been prepared under the direction and in the presence of Wilbert Lock MD    Provider Attestation - Scribe documentation  All medical record entries made by the Scribe were at my direction and personally dictated by me. I have reviewed the chart and agree that the record accurately reflects my personal performance of the history, physical exam, discussion and plan.   Wilbert Lock MD

## 2024-12-06 NOTE — LETTER
Patient: Melody Martin  : 1935    Encounter Date: 2024    PROGRESS NOTE    Subjective  Chief complaint: Melody Martin is a 89 y.o. female who is a long term care patient being seen and evaluated for leukocytosis.    HPI:  HPI  Patient did have labs obtained significant for leukocytosis and orders were placed for chest x-ray and patient had suspicion for OM on imaging and patient started on Augmentin and MRI to be obtained per wound doctor.  Patient's chest x-ray was negative for acute process.    Objective  Vital signs: 139/74, 80, 16, 97.9, 98% blood sugar 155    Physical Exam  Constitutional:       General: She is not in acute distress.  Eyes:      Extraocular Movements: Extraocular movements intact.   Cardiovascular:      Rate and Rhythm: Normal rate and regular rhythm.   Pulmonary:      Effort: Pulmonary effort is normal.      Breath sounds: Normal breath sounds.   Abdominal:      General: Bowel sounds are normal.      Palpations: Abdomen is soft.   Musculoskeletal:      Cervical back: Neck supple.      Right lower leg: No edema.      Left lower leg: No edema.   Neurological:      Mental Status: She is alert and oriented to person, place, and time.   Psychiatric:         Mood and Affect: Mood normal.         Behavior: Behavior is cooperative.         Assessment/Plan  Problem List Items Addressed This Visit       History of CVA (cerebrovascular accident)     Statin  Monitor for changes and weakness.         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  On HD per nephrology  Isosorbide dinitrate  Metoprolol  Diltiazem   Renal diet  Monitor BP  Remove extra fluid in dialysis         Leukocytosis - Primary     Augmentin  Obtain MRI per wound doctor  Chest x-ray was negative          Medications, treatments, and labs reviewed  Continue medications and treatments as listed  in EMR    Scribe Attestation  I, Phoebe Martinez   attest that this documentation has been prepared under the direction and in the presence of Wilbert Lock MD    Provider Attestation - Scribe documentation  All medical record entries made by the Scribe were at my direction and personally dictated by me. I have reviewed the chart and agree that the record accurately reflects my personal performance of the history, physical exam, discussion and plan.   Wilbert Lock MD            Electronically Signed By: Wilbert Lock MD   12/7/24 11:19 AM

## 2024-12-06 NOTE — ASSESSMENT & PLAN NOTE
Augmentin  Obtain MRI per wound doctor  Chest x-ray was negative  
Stable, no shortness of breath.  On HD per nephrology  Isosorbide dinitrate  Metoprolol  Diltiazem   Renal diet  Monitor BP  Remove extra fluid in dialysis  
Statin  Monitor for changes and weakness.  
DASH Diet/Consistent Carbohydrate Diabetic Diets

## 2024-12-10 ENCOUNTER — NURSING HOME VISIT (OUTPATIENT)
Dept: POST ACUTE CARE | Facility: EXTERNAL LOCATION | Age: 89
End: 2024-12-10
Payer: COMMERCIAL

## 2024-12-10 DIAGNOSIS — I13.2 HYPERTENSIVE HEART AND KIDNEY DISEASE WITH CHRONIC DIASTOLIC CONGESTIVE HEART FAILURE AND STAGE 5 CHRONIC KIDNEY DISEASE ON CHRONIC DIALYSIS: ICD-10-CM

## 2024-12-10 DIAGNOSIS — N18.6 HYPERTENSIVE HEART AND KIDNEY DISEASE WITH CHRONIC DIASTOLIC CONGESTIVE HEART FAILURE AND STAGE 5 CHRONIC KIDNEY DISEASE ON CHRONIC DIALYSIS: ICD-10-CM

## 2024-12-10 DIAGNOSIS — D72.829 LEUKOCYTOSIS, UNSPECIFIED TYPE: Primary | ICD-10-CM

## 2024-12-10 DIAGNOSIS — N18.6 TYPE 2 DIABETES MELLITUS WITH CHRONIC KIDNEY DISEASE ON CHRONIC DIALYSIS, WITH LONG-TERM CURRENT USE OF INSULIN (MULTI): ICD-10-CM

## 2024-12-10 DIAGNOSIS — I50.32 HYPERTENSIVE HEART AND KIDNEY DISEASE WITH CHRONIC DIASTOLIC CONGESTIVE HEART FAILURE AND STAGE 5 CHRONIC KIDNEY DISEASE ON CHRONIC DIALYSIS: ICD-10-CM

## 2024-12-10 DIAGNOSIS — Z79.4 TYPE 2 DIABETES MELLITUS WITH CHRONIC KIDNEY DISEASE ON CHRONIC DIALYSIS, WITH LONG-TERM CURRENT USE OF INSULIN (MULTI): ICD-10-CM

## 2024-12-10 DIAGNOSIS — Z99.2 HYPERTENSIVE HEART AND KIDNEY DISEASE WITH CHRONIC DIASTOLIC CONGESTIVE HEART FAILURE AND STAGE 5 CHRONIC KIDNEY DISEASE ON CHRONIC DIALYSIS: ICD-10-CM

## 2024-12-10 DIAGNOSIS — Z99.2 TYPE 2 DIABETES MELLITUS WITH CHRONIC KIDNEY DISEASE ON CHRONIC DIALYSIS, WITH LONG-TERM CURRENT USE OF INSULIN (MULTI): ICD-10-CM

## 2024-12-10 DIAGNOSIS — E11.22 TYPE 2 DIABETES MELLITUS WITH CHRONIC KIDNEY DISEASE ON CHRONIC DIALYSIS, WITH LONG-TERM CURRENT USE OF INSULIN (MULTI): ICD-10-CM

## 2024-12-10 PROCEDURE — 99308 SBSQ NF CARE LOW MDM 20: CPT | Performed by: INTERNAL MEDICINE

## 2024-12-10 NOTE — PROGRESS NOTES
PROGRESS NOTE    Subjective   Chief complaint: Melody Martin is a 89 y.o. female who is a long term care patient being seen and evaluated for elevated WBC.    HPI:  HPI  Patient is seen in follow-up of elevated WBC, started on antibiotic, amoxicillin, continues without adverse effects.    Objective   Vital signs: 122/64, 68, 18, 98%    Physical Exam  Constitutional:       General: She is not in acute distress.  Eyes:      Extraocular Movements: Extraocular movements intact.   Cardiovascular:      Rate and Rhythm: Normal rate and regular rhythm.   Pulmonary:      Effort: Pulmonary effort is normal.      Breath sounds: Normal breath sounds.   Abdominal:      General: Bowel sounds are normal.      Palpations: Abdomen is soft.   Musculoskeletal:      Cervical back: Neck supple.      Right lower leg: No edema.      Left lower leg: No edema.   Neurological:      Mental Status: She is alert and oriented to person, place, and time.   Psychiatric:         Mood and Affect: Mood normal.         Behavior: Behavior is cooperative.         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  On HD per nephrology  Isosorbide dinitrate  Metoprolol  Diltiazem   Renal diet  Monitor BP  Remove extra fluid in dialysis         Leukocytosis - Primary     Continue amoxicillin until complete 12/20/2024          Medications, treatments, and labs reviewed  Continue medications and treatments as listed in EMR    Scribe Attestation  I, Phoebe Martinez   attest that this documentation has been prepared  under the direction and in the presence of Wilbert Lock MD    Provider Attestation - Scribe documentation  All medical record entries made by the Scribe were at my direction and personally dictated by me. I have reviewed the chart and agree that the record accurately reflects my personal performance of the history, physical exam, discussion and plan.   Wilbert Lock MD

## 2024-12-10 NOTE — LETTER
Patient: Melody Martin  : 1935    Encounter Date: 12/10/2024    PROGRESS NOTE    Subjective  Chief complaint: Melody Martin is a 89 y.o. female who is a long term care patient being seen and evaluated for elevated WBC.    HPI:  HPI  Patient is seen in follow-up of elevated WBC, started on antibiotic, amoxicillin, continues without adverse effects.    Objective  Vital signs: 122/64, 68, 18, 98%    Physical Exam  Constitutional:       General: She is not in acute distress.  Eyes:      Extraocular Movements: Extraocular movements intact.   Cardiovascular:      Rate and Rhythm: Normal rate and regular rhythm.   Pulmonary:      Effort: Pulmonary effort is normal.      Breath sounds: Normal breath sounds.   Abdominal:      General: Bowel sounds are normal.      Palpations: Abdomen is soft.   Musculoskeletal:      Cervical back: Neck supple.      Right lower leg: No edema.      Left lower leg: No edema.   Neurological:      Mental Status: She is alert and oriented to person, place, and time.   Psychiatric:         Mood and Affect: Mood normal.         Behavior: Behavior is cooperative.         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  On HD per nephrology  Isosorbide dinitrate  Metoprolol  Diltiazem   Renal diet  Monitor BP  Remove extra fluid in dialysis         Leukocytosis - Primary     Continue amoxicillin until complete 2024          Medications, treatments, and labs reviewed  Continue medications and treatments as listed in EMR    Scribe Attestation  IHien  Phoebe Conn   attest that this documentation has been prepared under the direction and in the presence of Wilbert Lock MD    Provider Attestation - Scribe documentation  All medical record entries made by the Scribe were at my direction and personally dictated by me. I have reviewed the chart and agree that the record accurately reflects my personal performance of the history, physical exam, discussion and plan.   Wilbert Lock MD            Electronically Signed By: Wilbert Lock MD   12/11/24  1:59 PM

## 2024-12-15 ENCOUNTER — APPOINTMENT (OUTPATIENT)
Dept: RADIOLOGY | Facility: HOSPITAL | Age: 89
End: 2024-12-15
Payer: COMMERCIAL

## 2024-12-15 ENCOUNTER — HOSPITAL ENCOUNTER (EMERGENCY)
Facility: HOSPITAL | Age: 89
Discharge: HOME | End: 2024-12-16
Attending: EMERGENCY MEDICINE
Payer: COMMERCIAL

## 2024-12-15 ENCOUNTER — APPOINTMENT (OUTPATIENT)
Dept: CARDIOLOGY | Facility: HOSPITAL | Age: 89
End: 2024-12-15
Payer: COMMERCIAL

## 2024-12-15 VITALS
SYSTOLIC BLOOD PRESSURE: 127 MMHG | TEMPERATURE: 97.1 F | HEIGHT: 64 IN | HEART RATE: 70 BPM | WEIGHT: 170 LBS | BODY MASS INDEX: 29.02 KG/M2 | RESPIRATION RATE: 19 BRPM | OXYGEN SATURATION: 100 % | DIASTOLIC BLOOD PRESSURE: 44 MMHG

## 2024-12-15 DIAGNOSIS — K30 INDIGESTION: ICD-10-CM

## 2024-12-15 DIAGNOSIS — R07.89 OTHER CHEST PAIN: Primary | ICD-10-CM

## 2024-12-15 LAB
ALBUMIN SERPL BCP-MCNC: 1.8 G/DL (ref 3.4–5)
ALP SERPL-CCNC: 115 U/L (ref 33–136)
ALT SERPL W P-5'-P-CCNC: 20 U/L (ref 7–45)
ANION GAP SERPL CALC-SCNC: 12 MMOL/L (ref 10–20)
AST SERPL W P-5'-P-CCNC: 41 U/L (ref 9–39)
BASOPHILS # BLD AUTO: 0.06 X10*3/UL (ref 0–0.1)
BASOPHILS NFR BLD AUTO: 0.7 %
BILIRUB SERPL-MCNC: 0.3 MG/DL (ref 0–1.2)
BNP SERPL-MCNC: 250 PG/ML (ref 0–99)
BUN SERPL-MCNC: 19 MG/DL (ref 6–23)
CALCIUM SERPL-MCNC: 6.7 MG/DL (ref 8.6–10.3)
CARDIAC TROPONIN I PNL SERPL HS: 11 NG/L (ref 0–13)
CARDIAC TROPONIN I PNL SERPL HS: 9 NG/L (ref 0–13)
CHLORIDE SERPL-SCNC: 95 MMOL/L (ref 98–107)
CO2 SERPL-SCNC: 28 MMOL/L (ref 21–32)
CREAT SERPL-MCNC: 2.26 MG/DL (ref 0.5–1.05)
EGFRCR SERPLBLD CKD-EPI 2021: 20 ML/MIN/1.73M*2
EOSINOPHIL # BLD AUTO: 0.17 X10*3/UL (ref 0–0.4)
EOSINOPHIL NFR BLD AUTO: 1.9 %
ERYTHROCYTE [DISTWIDTH] IN BLOOD BY AUTOMATED COUNT: 16.6 % (ref 11.5–14.5)
GLUCOSE SERPL-MCNC: 194 MG/DL (ref 74–99)
HCT VFR BLD AUTO: 23 % (ref 36–46)
HGB BLD-MCNC: 7.3 G/DL (ref 12–16)
IMM GRANULOCYTES # BLD AUTO: 0.13 X10*3/UL (ref 0–0.5)
IMM GRANULOCYTES NFR BLD AUTO: 1.4 % (ref 0–0.9)
INR PPP: 1.5 (ref 0.9–1.1)
LYMPHOCYTES # BLD AUTO: 2.19 X10*3/UL (ref 0.8–3)
LYMPHOCYTES NFR BLD AUTO: 24.1 %
MAGNESIUM SERPL-MCNC: 1.73 MG/DL (ref 1.6–2.4)
MCH RBC QN AUTO: 28.4 PG (ref 26–34)
MCHC RBC AUTO-ENTMCNC: 31.7 G/DL (ref 32–36)
MCV RBC AUTO: 90 FL (ref 80–100)
MONOCYTES # BLD AUTO: 0.99 X10*3/UL (ref 0.05–0.8)
MONOCYTES NFR BLD AUTO: 10.9 %
NEUTROPHILS # BLD AUTO: 5.53 X10*3/UL (ref 1.6–5.5)
NEUTROPHILS NFR BLD AUTO: 61 %
NRBC BLD-RTO: 0 /100 WBCS (ref 0–0)
PLATELET # BLD AUTO: 285 X10*3/UL (ref 150–450)
POTASSIUM SERPL-SCNC: 4.1 MMOL/L (ref 3.5–5.3)
PROT SERPL-MCNC: 4.9 G/DL (ref 6.4–8.2)
PROTHROMBIN TIME: 16.7 SECONDS (ref 9.8–12.8)
RBC # BLD AUTO: 2.57 X10*6/UL (ref 4–5.2)
SODIUM SERPL-SCNC: 131 MMOL/L (ref 136–145)
WBC # BLD AUTO: 9.1 X10*3/UL (ref 4.4–11.3)

## 2024-12-15 PROCEDURE — 71045 X-RAY EXAM CHEST 1 VIEW: CPT

## 2024-12-15 PROCEDURE — 2500000004 HC RX 250 GENERAL PHARMACY W/ HCPCS (ALT 636 FOR OP/ED): Performed by: EMERGENCY MEDICINE

## 2024-12-15 PROCEDURE — 96374 THER/PROPH/DIAG INJ IV PUSH: CPT

## 2024-12-15 PROCEDURE — 2500000005 HC RX 250 GENERAL PHARMACY W/O HCPCS: Performed by: EMERGENCY MEDICINE

## 2024-12-15 PROCEDURE — 83735 ASSAY OF MAGNESIUM: CPT | Performed by: EMERGENCY MEDICINE

## 2024-12-15 PROCEDURE — 84075 ASSAY ALKALINE PHOSPHATASE: CPT | Performed by: EMERGENCY MEDICINE

## 2024-12-15 PROCEDURE — 85025 COMPLETE CBC W/AUTO DIFF WBC: CPT | Performed by: EMERGENCY MEDICINE

## 2024-12-15 PROCEDURE — 36415 COLL VENOUS BLD VENIPUNCTURE: CPT | Performed by: EMERGENCY MEDICINE

## 2024-12-15 PROCEDURE — 74018 RADEX ABDOMEN 1 VIEW: CPT | Mod: FOREIGN READ | Performed by: RADIOLOGY

## 2024-12-15 PROCEDURE — 2500000001 HC RX 250 WO HCPCS SELF ADMINISTERED DRUGS (ALT 637 FOR MEDICARE OP): Performed by: EMERGENCY MEDICINE

## 2024-12-15 PROCEDURE — 84484 ASSAY OF TROPONIN QUANT: CPT | Performed by: EMERGENCY MEDICINE

## 2024-12-15 PROCEDURE — 85610 PROTHROMBIN TIME: CPT | Performed by: EMERGENCY MEDICINE

## 2024-12-15 PROCEDURE — 83880 ASSAY OF NATRIURETIC PEPTIDE: CPT | Performed by: EMERGENCY MEDICINE

## 2024-12-15 PROCEDURE — 93005 ELECTROCARDIOGRAM TRACING: CPT

## 2024-12-15 PROCEDURE — 71045 X-RAY EXAM CHEST 1 VIEW: CPT | Mod: FOREIGN READ | Performed by: RADIOLOGY

## 2024-12-15 PROCEDURE — 99285 EMERGENCY DEPT VISIT HI MDM: CPT | Mod: 25 | Performed by: EMERGENCY MEDICINE

## 2024-12-15 PROCEDURE — 74018 RADEX ABDOMEN 1 VIEW: CPT

## 2024-12-15 RX ORDER — ONDANSETRON HYDROCHLORIDE 2 MG/ML
4 INJECTION, SOLUTION INTRAVENOUS ONCE
Status: COMPLETED | OUTPATIENT
Start: 2024-12-15 | End: 2024-12-15

## 2024-12-15 RX ORDER — LIDOCAINE HYDROCHLORIDE 20 MG/ML
15 SOLUTION OROPHARYNGEAL ONCE
Status: COMPLETED | OUTPATIENT
Start: 2024-12-15 | End: 2024-12-15

## 2024-12-15 RX ORDER — ALUMINUM HYDROXIDE, MAGNESIUM HYDROXIDE, AND SIMETHICONE 1200; 120; 1200 MG/30ML; MG/30ML; MG/30ML
30 SUSPENSION ORAL ONCE
Status: COMPLETED | OUTPATIENT
Start: 2024-12-15 | End: 2024-12-15

## 2024-12-15 ASSESSMENT — LIFESTYLE VARIABLES
EVER HAD A DRINK FIRST THING IN THE MORNING TO STEADY YOUR NERVES TO GET RID OF A HANGOVER: NO
TOTAL SCORE: 0
EVER FELT BAD OR GUILTY ABOUT YOUR DRINKING: NO
HAVE PEOPLE ANNOYED YOU BY CRITICIZING YOUR DRINKING: NO
HAVE YOU EVER FELT YOU SHOULD CUT DOWN ON YOUR DRINKING: NO

## 2024-12-15 ASSESSMENT — PAIN - FUNCTIONAL ASSESSMENT: PAIN_FUNCTIONAL_ASSESSMENT: 0-10

## 2024-12-15 ASSESSMENT — HEART SCORE
HISTORY: SLIGHTLY SUSPICIOUS
AGE: 65+
HEART SCORE: 3
TROPONIN: LESS THAN OR EQUAL TO NORMAL LIMIT
ECG: NORMAL
RISK FACTORS: 1-2 RISK FACTORS

## 2024-12-15 ASSESSMENT — PAIN SCALES - GENERAL: PAINLEVEL_OUTOF10: 0 - NO PAIN

## 2024-12-16 LAB
ATRIAL RATE: 64 BPM
P AXIS: 77 DEGREES
PR INTERVAL: 166 MS
Q ONSET: 249 MS
QRS COUNT: 11 BEATS
QRS DURATION: 141 MS
QT INTERVAL: 401 MS
QTC CALCULATION(BAZETT): 421 MS
QTC FREDERICIA: 414 MS
R AXIS: 1 DEGREES
T AXIS: 180 DEGREES
T OFFSET: 450 MS
VENTRICULAR RATE: 66 BPM

## 2024-12-16 NOTE — PROGRESS NOTES
PROGRESS NOTE    Subjective   Chief complaint: Melody Martin is a 89 y.o. female who is a long term care patient being seen and evaluated for medicine review.    HPI:  HPIAsked to see patient to evaluate for continued use of BuSpar. Patient with history of anxiety. Mood has been stable. Patient denies feeling down or thoughts of harming self.      Objective   Vital signs: 140/46, 97.4, 66, 18, 97%    Physical Exam  Constitutional:       General: She is not in acute distress.  Eyes:      Extraocular Movements: Extraocular movements intact.   Cardiovascular:      Rate and Rhythm: Normal rate and regular rhythm.   Pulmonary:      Effort: Pulmonary effort is normal.      Breath sounds: Normal breath sounds.   Abdominal:      General: Bowel sounds are normal.      Palpations: Abdomen is soft.   Musculoskeletal:      Cervical back: Neck supple.      Right lower leg: No edema.      Left lower leg: No edema.   Neurological:      Mental Status: She is alert and oriented to person, place, and time.   Psychiatric:         Mood and Affect: Mood normal.         Behavior: Behavior is cooperative.         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  On HD per nephrology  Isosorbide dinitrate  Metoprolol  Diltiazem   Renal diet  Monitor BP  Remove extra fluid in dialysis         Atrial fibrillation (Multi)     Monitor heart rate, controlled  Amiodarone  Metoprolol   Apixaban  Bleeding precautions         Anxiety - Primary     Will trial decreasing BuSpar to 5 mg 3 times daily  Monitor mood and behaviors          Medications, treatments, and labs reviewed  Continue medications and treatments as listed in EMR    Phoebe Attestation  I, Phoebe Bennett   attest that this documentation has been prepared under the direction and in the  presence of MAR Johnson    Provider Attestation - Scribe documentation  All medical record entries made by the Scribe were at my direction and personally dictated by me. I have reviewed the chart and agree that the record accurately reflects my personal performance of the history, physical exam, discussion and plan.   MAR Johnson

## 2024-12-16 NOTE — ED PROVIDER NOTES
Emergency Department Provider Note        MEDICAL DECISION MAKING:  Medical Decision Making       12/15/24     Chief Complaint   Patient presents with    Chest Pain     Starting around 5pm this evening. Also c/o SOB. Denies pain radiating anywhere. Pt no longer c/o CP but says she is nauseous and has a headache. Got zofran with EMS. Hx of stroke and ESRD      History/Exam limitations: none.   Additional history was obtained from patient and EMS personnel.    HPI: Melody Martin  is a 89 y.o. presents with chest discomfort that occurred with nausea and vomiting.  Feels better now.  No chest pain.  Left midsternal, no shortness of breath.  States now she feels like she just has gas.  Denies any actual abdominal pain.  Unsure of last bowel movement.  Denies any shortness of breath currently, no fever, chills or vomiting.  Past medical records, Past medical history and surgical history reviewed and as documented.  History reviewed and as noted. past medical records reviewed.    Active Ambulatory Problems     Diagnosis Date Noted    Aortic valve stenosis 02/03/2023    Atrial bigeminy 02/03/2023    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/2023    Heart murmur 02/03/2023    Back pain 02/03/2023    Type 2 diabetes mellitus with chronic kidney disease on chronic dialysis, with long-term current use of insulin (Multi) 02/03/2023    Dyslipidemia 02/03/2023    GERD without esophagitis 02/03/2023    Paroxysmal SVT (supraventricular tachycardia) (CMS-[facility]) 02/03/2023    Renal artery stenosis (CMS-HCC) 02/03/2023    Iron deficiency anemia due to chronic blood loss 09/09/2023    Anemia 10/03/2023    History of anaphylactic shock 09/20/2023    Atherosclerotic heart disease of native coronary artery without angina pectoris 09/20/2023    Cardiomyopathy 08/29/2023    Carotid bruit 10/12/2023    History of CVA (cerebrovascular accident) 09/09/2023    Coagulation defect, unspecified 09/20/2023    Cough 10/12/2023    Hyperkalemia 10/12/2023     Hyperlipidemia, unspecified 09/20/2023    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 09/20/2023    Left hemiparesis (Multi) 10/12/2023    Ventricular tachycardia, unspecified (Multi) 09/09/2023    Pressure ulcer of left heel, stage 3 (Multi) 10/12/2023    Unspecified protein-calorie malnutrition (Multi) 09/27/2023    Weakness 08/23/2023    Atrial fibrillation (Multi) 10/17/2023    Complaint of nasal congestion 12/05/2023    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 (Multi) 01/16/2024    Pain 01/24/2024    Hypokalemia 01/26/2024    Dysphagia 01/26/2024    Constipation 02/01/2024    Anxiety 02/07/2024    Tooth pain 02/12/2024    Wound infection 02/12/2024    Vaginal discharge 02/14/2024    ESBL (extended spectrum beta-lactamase) producing bacteria infection 02/21/2024    Vaginal yeast infection 02/26/2024    Bacterial vaginosis 02/26/2024    Knee pain 02/26/2024    Muscle spasm 02/27/2024    Carbapenem-resistant Klebsiella pneumoniae infection 02/27/2024    Atypical chest pain 02/27/2024    Depression 03/12/2024    Weight loss 04/04/2024    Excoriation 04/04/2024    Hyperglycemia 04/08/2024    Candidiasis 04/30/2024    Encounter for medication review 05/14/2024    Confusion 05/30/2024    Weight gain 06/14/2024    Increased pain 07/10/2024    Adult osteochondrosis of spine, lumbar region 10/17/2023    Alteration in skin integrity due to moisture 12/27/2023    Cognitive communication deficit 09/24/2023    ESRD on hemodialysis (Multi) 12/23/2023    Hemiplegia and hemiparesis following cerebral infarction affecting left non-dominant side (Multi) 09/24/2023    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/11/2023    Muscle weakness (generalized) 09/24/2023    Physical deconditioning 11/10/2023    Severe muscle deconditioning 12/27/2023    Urinary tract infection with hematuria, site unspecified 07/14/2024    Acute cystitis with  hematuria 07/28/2024    Peripheral artery disease (CMS-HCC) 07/30/2024    Deep tissue injury 07/30/2024    Status post tooth extraction 08/05/2024    Skin irritation 08/14/2024    Long toenail 08/15/2024    Discoloration of skin 08/19/2024    Hypertensive urgency 11/10/2023    Osteomyelitis of left foot (Multi) 11/10/2023    Retroperitoneal bleed 12/22/2023    Cellulitis of heel, right 09/02/2024    Leukocytosis 12/06/2024     Resolved Ambulatory Problems     Diagnosis Date Noted    Acute UTI 02/03/2023    Benign essential HTN 02/03/2023    Abdominal pain 02/03/2023    Chest pain 02/03/2023    Dysuria 02/03/2023    Shortness of breath 02/03/2023    Fatigue 02/03/2023    Palpitations 02/03/2023    Renal insufficiency 02/03/2023    Shingles 02/03/2023    Sinus headache 02/03/2023    Yeast vaginitis 02/03/2023    Routine medical exam 02/03/2023    Oral thrush 04/10/2023    Stage 3b chronic kidney disease (Multi) 04/10/2023    Dizziness 04/10/2023    Pneumonia 10/03/2023    Lactic acidosis 10/04/2023    Acute kidney failure, unspecified (CMS-HCC) 09/20/2023    Acute otitis media 10/12/2023    Allergy, unspecified, sequela 09/20/2023    Hemiparesis as late effect of cerebrovascular accident (CVA) (Multi) 09/24/2023    CVA (cerebral vascular accident) (Multi) 10/12/2023    Contact with and (suspected) exposure to covid-19 09/09/2023    Dependence on renal dialysis (CMS-HCC) 09/20/2023    Elevated troponin 08/24/2023    Hypertension 10/12/2023    Nausea 09/28/2023    Old myocardial infarction 09/09/2023    Pain, unspecified 09/20/2023    History of hypertension 10/12/2023    Personal history of transient ischemic attack (TIA), and cerebral infarction without residual deficits 09/20/2023    ESRD (end stage renal disease) (Multi) 11/20/2023    Non-healing open wound of right heel 11/21/2023    Sepsis (Multi) 04/24/2024    Altered mental status 05/22/2024    Colitis 07/15/2024    Acute osteomyelitis of left calcaneus (Multi)  08/30/2024    Cellulitis of left lower extremity 10/09/2024     Past Medical History:   Diagnosis Date    Chronic kidney disease     Diabetes mellitus (Multi)     GERD (gastroesophageal reflux disease)     Myocardial infarction (Multi)     Other conditions influencing health status 12/06/2022    Other conditions influencing health status 04/06/2016    Personal history of other diseases of the circulatory system     Personal history of other diseases of the female genital tract     Personal history of other diseases of the nervous system and sense organs 10/15/2021    Personal history of other endocrine, nutritional and metabolic disease 01/26/2018    Stroke (Multi)     Urinary tract infection         Past Surgical History:   Procedure Laterality Date    BACK SURGERY  10/10/2018    Back Surgery    HYSTERECTOMY  10/10/2018    Hysterectomy    IR CVC TUNNELED  8/28/2023    IR CVC TUNNELED 8/28/2023 POR ANGIO    MR HEAD ANGIO WO IV CONTRAST  8/31/2023    MR HEAD ANGIO WO IV CONTRAST 8/31/2023 POR MRI    MR NECK ANGIO WO IV CONTRAST  8/31/2023    MR NECK ANGIO WO IV CONTRAST 8/31/2023 POR MRI        Family History   Problem Relation Name Age of Onset    No Known Problems Mother      No Known Problems Father          Social History     Tobacco Use    Smoking status: Never    Smokeless tobacco: Never   Substance Use Topics    Alcohol use: Never    Drug use: Never        Allergies   Allergen Reactions    Aspirin Shortness of breath and Unknown    Amlodipine Dizziness and Unknown    Levofloxacin Unknown    Sulfamethoxazole-Trimethoprim Diarrhea, Other and Unknown        Unless otherwise stated in this report the patient's positive and negative responses for review of systems for constitutional, eyes, ENT, cardiovascular, respiratory, gastrointestinal, neurological, genitourinary, musculoskeletal, and integument systems and related systems to the presenting problem are either as stated in the HPI or were not pertinent or were  "negative for the symptoms and/or complaints related to the presenting medical problem.    PHYSICAL EXAM  Triage/nursing notes and vital signs reviewed as available and as noted    Vitals:    12/15/24 1847 12/15/24 2015   BP: (!) 120/44 (!) 123/42   Pulse: 69 65   Resp: 14 18   Temp: 36.2 °C (97.1 °F)    TempSrc: Temporal    SpO2: 97% 99%   Weight: 77.1 kg (170 lb)    Height: 1.626 m (5' 4\")            Constitutional:       Appearance: Patient not ill-appearing or toxic-appearing.   HENT:      Head: Atraumatic.      Mouth/Throat:      Mouth: Mucous membranes are moist.      Pharynx: Oropharynx is clear. No pharyngeal swelling.      Neck: No Obvious JVD. Trachea midline. No neck swelling.  Eyes:      Normal Ocular tracking  Cardiovascular:      Rate and Rhythm: Normal rate and regular rhythm.      Pulses: Normal pulses.      Heart sounds: No murmur heard.  Pulmonary:      Effort: Pulmonary effort is normal.      Breath sounds: Normal breath sounds.   Abdominal:      General: Bowel sounds are normal.      Palpations: Abdomen is soft.      Tenderness: There is no abdominal tenderness. There is no guarding or rebound.   Musculoskeletal:      Cervical back: Neck supple.      Right lower leg: No tenderness. No edema.      Left lower leg: No tenderness. No edema.   Skin:     General: Skin is warm and dry.      Capillary Refill: Capillary refill takes less than 2 seconds.   Neurological:      General: No focal deficit present.      Mental Status: Patient is alert.  Appropriate conversant     Sensory: Gross Sensation is intact.      Motor: Gross Motor function is intact symmetrically  Psychiatric:         Mood and Affect: Mood normal.      Cooperative, no apparent risk to self or others    ED COURSE        Work-up performed to evaluate for differential diagnosis as clinically indicated   Orders Placed This Encounter   Procedures    XR chest 1 view    CBC and Auto Differential    Magnesium    Comprehensive metabolic panel    " Protime-INR    Troponin I Series, High Sensitivity (0, 1 HR)    B-Type Natriuretic Peptide    Troponin I, High Sensitivity, Initial    NPO Diet; Effective now    ECG 12 lead    Insert and maintain peripheral IV          Labs and imaging reviewed by me  and note         Labs Reviewed   CBC WITH AUTO DIFFERENTIAL - Abnormal       Result Value    WBC 9.1      nRBC 0.0      RBC 2.57 (*)     Hemoglobin 7.3 (*)     Hematocrit 23.0 (*)     MCV 90      MCH 28.4      MCHC 31.7 (*)     RDW 16.6 (*)     Platelets 285      Neutrophils % 61.0      Immature Granulocytes %, Automated 1.4 (*)     Lymphocytes % 24.1      Monocytes % 10.9      Eosinophils % 1.9      Basophils % 0.7      Neutrophils Absolute 5.53 (*)     Immature Granulocytes Absolute, Automated 0.13      Lymphocytes Absolute 2.19      Monocytes Absolute 0.99 (*)     Eosinophils Absolute 0.17      Basophils Absolute 0.06     COMPREHENSIVE METABOLIC PANEL - Abnormal    Glucose 194 (*)     Sodium 131 (*)     Potassium 4.1      Chloride 95 (*)     Bicarbonate 28      Anion Gap 12      Urea Nitrogen 19      Creatinine 2.26 (*)     eGFR 20 (*)     Calcium 6.7 (*)     Albumin 1.8 (*)     Alkaline Phosphatase 115      Total Protein 4.9 (*)     AST 41 (*)     Bilirubin, Total 0.3      ALT 20     PROTIME-INR - Abnormal    Protime 16.7 (*)     INR 1.5 (*)    MAGNESIUM - Normal    Magnesium 1.73     SERIAL TROPONIN-INITIAL - Normal    Troponin I, High Sensitivity 11      Narrative:     Less than 99th percentile of normal range cutoff-  Female and children under 18 years old <14 ng/L; Male <21 ng/L: Negative  Repeat testing should be performed if clinically indicated.     Female and children under 18 years old 14-50 ng/L; Male 21-50 ng/L:  Consistent with possible cardiac damage and possible increased clinical   risk. Serial measurements may help to assess extent of myocardial damage.     >50 ng/L: Consistent with cardiac damage, increased clinical risk and  myocardial  infarction. Serial measurements may help assess extent of   myocardial damage.      NOTE: Children less than 1 year old may have higher baseline troponin   levels and results should be interpreted in conjunction with the overall   clinical context.     NOTE: Troponin I testing is performed using a different   testing methodology at [facility] than at other   [facility]. Direct result comparisons should only   be made within the same method.   SERIAL TROPONIN, 1 HOUR - Normal    Troponin I, High Sensitivity 9      Narrative:     Less than 99th percentile of normal range cutoff-  Female and children under 18 years old <14 ng/L; Male <21 ng/L: Negative  Repeat testing should be performed if clinically indicated.     Female and children under 18 years old 14-50 ng/L; Male 21-50 ng/L:  Consistent with possible cardiac damage and possible increased clinical   risk. Serial measurements may help to assess extent of myocardial damage.     >50 ng/L: Consistent with cardiac damage, increased clinical risk and  myocardial infarction. Serial measurements may help assess extent of   myocardial damage.      NOTE: Children less than 1 year old may have higher baseline troponin   levels and results should be interpreted in conjunction with the overall   clinical context.     NOTE: Troponin I testing is performed using a different   testing methodology at [facility] than at other   [facility]. Direct result comparisons should only   be made within the same method.   TROPONIN SERIES- (INITIAL, 1 HR)    Narrative:     The following orders were created for panel order Troponin I Series, High Sensitivity (0, 1 HR).  Procedure                               Abnormality         Status                     ---------                               -----------         ------                     Troponin I, High Sensiti...[[id number]]  Normal              Final result               Troponin, High  Sensitivi...[[id number]]  Normal              Final result                 Please view results for these tests on the individual orders.   B-TYPE NATRIURETIC PEPTIDE        XR chest 1 view   Final Result   Mild blunting of the left costophrenic angle suggesting small left   pleural effusion and mild left basilar platelike atelectasis.   Nonspecific bowel gas pattern without distinct evidence for bowel   obstruction.    Signed by Gio Corona MD      XR abdomen 1 view   Final Result   Mild blunting of the left costophrenic angle suggesting small left   pleural effusion and mild left basilar platelike atelectasis.   Nonspecific bowel gas pattern without distinct evidence for bowel   obstruction.    Signed by Gio Corona MD               Pt course which Intervention and treatment included :    Procedure  Procedures    Medications   ondansetron (Zofran) injection 4 mg (4 mg intravenous Given 12/15/24 1931)   alum-mag hydroxide-simeth (Mylanta) 200-200-20 mg/5 mL oral suspension 30 mL (30 mL oral Given 12/15/24 1929)   lidocaine (Xylocaine) 2 % mouth solution 15 mL (15 mL oral Given 12/15/24 1928)        ED Course as of 12/15/24 2117   Sun Dec 15, 2024   1914 Twelve-lead EKG notes normal sinus rhythm C6 beats per minute.  Normal intervals.  No ST elevations or depressions.  T wave flattening likely due to body habitus laterally. [ML]   1915 Patient symptoms could be related to GI cause.  Will give GI cocktail and Zofran.  Chest pain-free at this time.  Cardiac workup is performed.  Patient's blood pressure is stable.  Her abdominal exam is benign. [ML]   2115 Patient have troponin negative x 2.  She does have a hemoglobin of 7.3 but has been in this range in the past. [ML]   2115 No evidence of acute blood loss. [ML]      ED Course User Index  [ML] Marty R Lejeune,          Diagnoses as of 12/15/24 2117   Other chest pain   Indigestion          DISPOSITION: Patient is stable for discharge.  Based upon my history,  physical exam, evaluation and judgement regarding the aforementioned differentials, at the time of this assessment, I do not see evidence to support the presence of any life, neurologic, or limb threatening pathology in my considered differentials. Alternate non life threatening pathology has been considered, and has been ruled out based upon the findings of my evaluation. While there may be remaining pathology considered within the differential, this is not life threatening, not limb threatening, and does not warrant further emergent evaluation or treatment at this time.  Shared decision making made with the patient as applicable.  It is my judgement that further treatment and evaluation can safely proceed in the outpatient setting. Shared decision making was utilized to arrive at all clinical decisions. At this time I do not see evidence of an emergency medical condition based upon my medical screening examination.  All imaging and laboratory results were discussed with the patient in detail including acute as well as incidental findings.  I discussed the differential, results and discharge plan with the patient and/or family/friend/caregiver if present. Education and reassurance provided regards to presumed diagnosis. I emphasized the importance of follow-up with the physician I referred them to in the timeframe recommended. I explained reasons for the patient to return to the Emergency Department. Additional verbal discharge instructions were also given and discussed with the patient to supplement those generated by the EMR. We also discussed medications that were prescribed (if any) including common side effects and interactions as well as proper dosing and administration. The patient was advised to abstain from driving, operating heavy machinery or making significant decisions while taking medications such as opiates and muscle relaxers that may impair this. All questions were addressed. They understand return  precautions and discharge instructions. The patient and/or family/friend/caregiver expressed understanding      -------------------------------------------------------------------    12/15/24 at 7:08 PM - Marty R LeJeune, DO   Internal & Emergency Medicine          Marty R Lejeune, DO  Resident  12/15/24 [id number]

## 2024-12-17 ENCOUNTER — NURSING HOME VISIT (OUTPATIENT)
Dept: POST ACUTE CARE | Facility: EXTERNAL LOCATION | Age: 89
End: 2024-12-17
Payer: COMMERCIAL

## 2024-12-17 DIAGNOSIS — N18.6 HYPERTENSIVE HEART AND KIDNEY DISEASE WITH CHRONIC DIASTOLIC CONGESTIVE HEART FAILURE AND STAGE 5 CHRONIC KIDNEY DISEASE ON CHRONIC DIALYSIS: Primary | ICD-10-CM

## 2024-12-17 DIAGNOSIS — D72.829 LEUKOCYTOSIS, UNSPECIFIED TYPE: ICD-10-CM

## 2024-12-17 DIAGNOSIS — I13.2 HYPERTENSIVE HEART AND KIDNEY DISEASE WITH CHRONIC DIASTOLIC CONGESTIVE HEART FAILURE AND STAGE 5 CHRONIC KIDNEY DISEASE ON CHRONIC DIALYSIS: Primary | ICD-10-CM

## 2024-12-17 DIAGNOSIS — I48.91 ATRIAL FIBRILLATION, UNSPECIFIED TYPE (MULTI): ICD-10-CM

## 2024-12-17 DIAGNOSIS — I50.32 HYPERTENSIVE HEART AND KIDNEY DISEASE WITH CHRONIC DIASTOLIC CONGESTIVE HEART FAILURE AND STAGE 5 CHRONIC KIDNEY DISEASE ON CHRONIC DIALYSIS: Primary | ICD-10-CM

## 2024-12-17 DIAGNOSIS — Z99.2 HYPERTENSIVE HEART AND KIDNEY DISEASE WITH CHRONIC DIASTOLIC CONGESTIVE HEART FAILURE AND STAGE 5 CHRONIC KIDNEY DISEASE ON CHRONIC DIALYSIS: Primary | ICD-10-CM

## 2024-12-17 PROCEDURE — 99308 SBSQ NF CARE LOW MDM 20: CPT | Performed by: INTERNAL MEDICINE

## 2024-12-17 NOTE — LETTER
Patient: Melody Martin  : 1935    Encounter Date: 2024    PROGRESS NOTE    Subjective  Chief complaint: Melody Martin is a 89 y.o. female who is a long term care patient being seen and evaluated for leukocytosis on anticoagulant.    HPI:  HPI  An interactive audio and/or video telecommunication system which permits real time communications between the patient (at the originating site) and provider (at a distant site) was utilized to provide this telehealth service after obtaining verbal consent.  Asked to evaluate patient's continued use of anticoagulation.  Patient is receiving Eliquis and clopidogrel.  Pharmacy recommendations to discontinue Plavix due to increased risk of bleeding.  Orders placed.  Discontinue Plavix.  Asked to evaluate patient's continued use of amoxicillin for leukocytosis.  Pharmacy recommending decreasing amoxicillin to 500 mg daily.    Objective  Vital signs: 98/53, 56, 18, 98.8, 97% blood sugar 95    Physical Exam  Constitutional:       General: She is not in acute distress.  Eyes:      Extraocular Movements: Extraocular movements intact.   Pulmonary:      Effort: Pulmonary effort is normal.   Musculoskeletal:      Cervical back: Neck supple.   Neurological:      Mental Status: She is alert.   Psychiatric:         Mood and Affect: Mood normal.         Behavior: Behavior is cooperative.         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  On HD per nephrology  Isosorbide dinitrate  Metoprolol  Diltiazem   Renal diet  Monitor BP  Remove extra fluid in dialysis         Atrial fibrillation (Multi)     Monitor heart rate, controlled  Amiodarone  Metoprolol   Apixaban  Bleeding precautions         Leukocytosis     Per pharmacy recommendations decrease amoxicillin to 500 mg  daily, 12/20/2024          Medications, treatments, and labs reviewed  Continue medications and treatments as listed in EMR    Scribe Attestation  I, Phoebe Martinez   attest that this documentation has been prepared under the direction and in the presence of Wilbert Lock MD    Provider Attestation - Scribe documentation  All medical record entries made by the Scribe were at my direction and personally dictated by me. I have reviewed the chart and agree that the record accurately reflects my personal performance of the history, physical exam, discussion and plan.   Wilbert Lock MD            Electronically Signed By: Wilbert Lock MD   12/18/24 10:54 AM

## 2024-12-17 NOTE — PROGRESS NOTES
PROGRESS NOTE    Subjective   Chief complaint: Melody Martin is a 89 y.o. female who is a long term care patient being seen and evaluated for leukocytosis on anticoagulant.    HPI:  HPI  An interactive audio and/or video telecommunication system which permits real time communications between the patient (at the originating site) and provider (at a distant site) was utilized to provide this telehealth service after obtaining verbal consent.  Asked to evaluate patient's continued use of anticoagulation.  Patient is receiving Eliquis and clopidogrel.  Pharmacy recommendations to discontinue Plavix due to increased risk of bleeding.  Orders placed.  Discontinue Plavix.  Asked to evaluate patient's continued use of amoxicillin for leukocytosis.  Pharmacy recommending decreasing amoxicillin to 500 mg daily.    Objective   Vital signs: 98/53, 56, 18, 98.8, 97% blood sugar 95    Physical Exam  Constitutional:       General: She is not in acute distress.  Eyes:      Extraocular Movements: Extraocular movements intact.   Pulmonary:      Effort: Pulmonary effort is normal.   Musculoskeletal:      Cervical back: Neck supple.   Neurological:      Mental Status: She is alert.   Psychiatric:         Mood and Affect: Mood normal.         Behavior: Behavior is cooperative.         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  On HD per nephrology  Isosorbide dinitrate  Metoprolol  Diltiazem   Renal diet  Monitor BP  Remove extra fluid in dialysis         Atrial fibrillation (Multi)     Monitor heart rate, controlled  Amiodarone  Metoprolol   Apixaban  Bleeding precautions         Leukocytosis     Per pharmacy recommendations decrease amoxicillin to 500 mg daily, 12/20/2024          Medications, treatments, and labs  reviewed  Continue medications and treatments as listed in EMR    Scribe Attestation  I, Phoebe Martinez   attest that this documentation has been prepared under the direction and in the presence of Wilbert Lock MD    Provider Attestation - Scribe documentation  All medical record entries made by the Scribe were at my direction and personally dictated by me. I have reviewed the chart and agree that the record accurately reflects my personal performance of the history, physical exam, discussion and plan.   Wilbert Lock MD

## 2024-12-20 ENCOUNTER — NURSING HOME VISIT (OUTPATIENT)
Dept: POST ACUTE CARE | Facility: EXTERNAL LOCATION | Age: 89
End: 2024-12-20
Payer: COMMERCIAL

## 2024-12-20 DIAGNOSIS — E11.22 TYPE 2 DIABETES MELLITUS WITH CHRONIC KIDNEY DISEASE ON CHRONIC DIALYSIS, WITH LONG-TERM CURRENT USE OF INSULIN (MULTI): ICD-10-CM

## 2024-12-20 DIAGNOSIS — I50.32 HYPERTENSIVE HEART AND KIDNEY DISEASE WITH CHRONIC DIASTOLIC CONGESTIVE HEART FAILURE AND STAGE 5 CHRONIC KIDNEY DISEASE ON CHRONIC DIALYSIS: ICD-10-CM

## 2024-12-20 DIAGNOSIS — N18.6 TYPE 2 DIABETES MELLITUS WITH CHRONIC KIDNEY DISEASE ON CHRONIC DIALYSIS, WITH LONG-TERM CURRENT USE OF INSULIN (MULTI): ICD-10-CM

## 2024-12-20 DIAGNOSIS — Z99.2 TYPE 2 DIABETES MELLITUS WITH CHRONIC KIDNEY DISEASE ON CHRONIC DIALYSIS, WITH LONG-TERM CURRENT USE OF INSULIN (MULTI): ICD-10-CM

## 2024-12-20 DIAGNOSIS — D72.829 LEUKOCYTOSIS, UNSPECIFIED TYPE: Primary | ICD-10-CM

## 2024-12-20 DIAGNOSIS — Z99.2 HYPERTENSIVE HEART AND KIDNEY DISEASE WITH CHRONIC DIASTOLIC CONGESTIVE HEART FAILURE AND STAGE 5 CHRONIC KIDNEY DISEASE ON CHRONIC DIALYSIS: ICD-10-CM

## 2024-12-20 DIAGNOSIS — Z79.4 TYPE 2 DIABETES MELLITUS WITH CHRONIC KIDNEY DISEASE ON CHRONIC DIALYSIS, WITH LONG-TERM CURRENT USE OF INSULIN (MULTI): ICD-10-CM

## 2024-12-20 DIAGNOSIS — I13.2 HYPERTENSIVE HEART AND KIDNEY DISEASE WITH CHRONIC DIASTOLIC CONGESTIVE HEART FAILURE AND STAGE 5 CHRONIC KIDNEY DISEASE ON CHRONIC DIALYSIS: ICD-10-CM

## 2024-12-20 DIAGNOSIS — N18.6 HYPERTENSIVE HEART AND KIDNEY DISEASE WITH CHRONIC DIASTOLIC CONGESTIVE HEART FAILURE AND STAGE 5 CHRONIC KIDNEY DISEASE ON CHRONIC DIALYSIS: ICD-10-CM

## 2024-12-20 PROCEDURE — 99308 SBSQ NF CARE LOW MDM 20: CPT | Performed by: INTERNAL MEDICINE

## 2024-12-20 NOTE — PROGRESS NOTES
PROGRESS NOTE    Subjective   Chief complaint: Melody Martin is a 89 y.o. female who is a long term care patient being seen and evaluated for osteomyelitis.    HPI:  HPI  Patient is seen in follow-up of suspected wound infection Due to elevated WBC.  Patient is completing antibiotics today.  Patient is WBC is trending down.  Patient is tolerating antibiotic without adverse effects.  Patient denies pain at this time.    Objective   Vital signs: 126/66, 97.0, 97% blood sugar 132    Physical Exam  Constitutional:       General: She is not in acute distress.  Eyes:      Extraocular Movements: Extraocular movements intact.   Cardiovascular:      Rate and Rhythm: Normal rate and regular rhythm.   Pulmonary:      Effort: Pulmonary effort is normal.      Breath sounds: Normal breath sounds.   Abdominal:      General: Bowel sounds are normal.      Palpations: Abdomen is soft.   Musculoskeletal:      Cervical back: Neck supple.      Right lower leg: No edema.      Left lower leg: No edema.   Neurological:      Mental Status: She is alert and oriented to person, place, and time.   Psychiatric:         Mood and Affect: Mood normal.         Behavior: Behavior is cooperative.         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  On HD per nephrology  Isosorbide dinitrate  Metoprolol  Diltiazem   Renal diet  Monitor BP  Remove extra fluid in dialysis         Leukocytosis - Primary     Completing amoxicillin today.  WBC trending down          Medications, treatments, and labs reviewed  Continue  medications and treatments as listed in EMR    Scribe Attestation  I, Phoebe Martinez   attest that this documentation has been prepared under the direction and in the presence of Wilbert Lock MD    Provider Attestation - Scribe documentation  All medical record entries made by the Scribe were at my direction and personally dictated by me. I have reviewed the chart and agree that the record accurately reflects my personal performance of the history, physical exam, discussion and plan.   Wilbert Lock MD

## 2024-12-20 NOTE — LETTER
Patient: Melody Martin  : 1935    Encounter Date: 2024    PROGRESS NOTE    Subjective  Chief complaint: Melody Martin is a 89 y.o. female who is a long term care patient being seen and evaluated for osteomyelitis.    HPI:  HPI  Patient is seen in follow-up of suspected wound infection Due to elevated WBC.  Patient is completing antibiotics today.  Patient is WBC is trending down.  Patient is tolerating antibiotic without adverse effects.  Patient denies pain at this time.    Objective  Vital signs: 126/66, 97.0, 97% blood sugar 132    Physical Exam  Constitutional:       General: She is not in acute distress.  Eyes:      Extraocular Movements: Extraocular movements intact.   Cardiovascular:      Rate and Rhythm: Normal rate and regular rhythm.   Pulmonary:      Effort: Pulmonary effort is normal.      Breath sounds: Normal breath sounds.   Abdominal:      General: Bowel sounds are normal.      Palpations: Abdomen is soft.   Musculoskeletal:      Cervical back: Neck supple.      Right lower leg: No edema.      Left lower leg: No edema.   Neurological:      Mental Status: She is alert and oriented to person, place, and time.   Psychiatric:         Mood and Affect: Mood normal.         Behavior: Behavior is cooperative.         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  On HD per nephrology  Isosorbide dinitrate  Metoprolol  Diltiazem   Renal diet  Monitor BP  Remove extra fluid in dialysis         Leukocytosis - Primary     Completing amoxicillin today.  WBC  trending down          Medications, treatments, and labs reviewed  Continue medications and treatments as listed in EMR    Scribe Attestation  I, Phoebe Martinez   attest that this documentation has been prepared under the direction and in the presence of Wilbert Lock MD    Provider Attestation - Scribe documentation  All medical record entries made by the Scribe were at my direction and personally dictated by me. I have reviewed the chart and agree that the record accurately reflects my personal performance of the history, physical exam, discussion and plan.   Wilbert Lock MD            Electronically Signed By: Wilbert Lock MD   12/21/24  1:17 PM

## 2024-12-24 ENCOUNTER — NURSING HOME VISIT (OUTPATIENT)
Dept: POST ACUTE CARE | Facility: EXTERNAL LOCATION | Age: 89
End: 2024-12-24
Payer: COMMERCIAL

## 2024-12-24 DIAGNOSIS — Z99.2 HYPERTENSIVE HEART AND KIDNEY DISEASE WITH CHRONIC DIASTOLIC CONGESTIVE HEART FAILURE AND STAGE 5 CHRONIC KIDNEY DISEASE ON CHRONIC DIALYSIS: ICD-10-CM

## 2024-12-24 DIAGNOSIS — E11.22 TYPE 2 DIABETES MELLITUS WITH CHRONIC KIDNEY DISEASE ON CHRONIC DIALYSIS, WITH LONG-TERM CURRENT USE OF INSULIN (MULTI): ICD-10-CM

## 2024-12-24 DIAGNOSIS — N18.6 TYPE 2 DIABETES MELLITUS WITH CHRONIC KIDNEY DISEASE ON CHRONIC DIALYSIS, WITH LONG-TERM CURRENT USE OF INSULIN (MULTI): ICD-10-CM

## 2024-12-24 DIAGNOSIS — K21.9 GERD WITHOUT ESOPHAGITIS: ICD-10-CM

## 2024-12-24 DIAGNOSIS — I48.91 ATRIAL FIBRILLATION, UNSPECIFIED TYPE (MULTI): Primary | ICD-10-CM

## 2024-12-24 DIAGNOSIS — I50.32 HYPERTENSIVE HEART AND KIDNEY DISEASE WITH CHRONIC DIASTOLIC CONGESTIVE HEART FAILURE AND STAGE 5 CHRONIC KIDNEY DISEASE ON CHRONIC DIALYSIS: ICD-10-CM

## 2024-12-24 DIAGNOSIS — I13.2 HYPERTENSIVE HEART AND KIDNEY DISEASE WITH CHRONIC DIASTOLIC CONGESTIVE HEART FAILURE AND STAGE 5 CHRONIC KIDNEY DISEASE ON CHRONIC DIALYSIS: ICD-10-CM

## 2024-12-24 DIAGNOSIS — Z99.2 TYPE 2 DIABETES MELLITUS WITH CHRONIC KIDNEY DISEASE ON CHRONIC DIALYSIS, WITH LONG-TERM CURRENT USE OF INSULIN (MULTI): ICD-10-CM

## 2024-12-24 DIAGNOSIS — F41.9 ANXIETY: ICD-10-CM

## 2024-12-24 DIAGNOSIS — Z79.4 TYPE 2 DIABETES MELLITUS WITH CHRONIC KIDNEY DISEASE ON CHRONIC DIALYSIS, WITH LONG-TERM CURRENT USE OF INSULIN (MULTI): ICD-10-CM

## 2024-12-24 DIAGNOSIS — S31.809D OPEN WOUND OF BUTTOCK, UNSPECIFIED LATERALITY, SUBSEQUENT ENCOUNTER: ICD-10-CM

## 2024-12-24 DIAGNOSIS — N18.6 HYPERTENSIVE HEART AND KIDNEY DISEASE WITH CHRONIC DIASTOLIC CONGESTIVE HEART FAILURE AND STAGE 5 CHRONIC KIDNEY DISEASE ON CHRONIC DIALYSIS: ICD-10-CM

## 2024-12-24 PROBLEM — S31.809A OPEN WOUND OF BUTTOCK: Status: ACTIVE | Noted: 2024-12-24

## 2024-12-24 PROCEDURE — 99309 SBSQ NF CARE MODERATE MDM 30: CPT | Performed by: INTERNAL MEDICINE

## 2024-12-24 NOTE — LETTER
Patient: Melody Martin  : 1935    Encounter Date: 2024    PROGRESS NOTE    Subjective  Chief complaint: Melody Martin is a 89 y.o. female who is a long term care patient being seen and evaluated for monthly general medical care and follow-up    HPI:  HPI  Patient presents for general medical care and f/u.  Patient seen and examined at bedside.  No issues per nursing.  Patient noted with increased anxiety.  Patient has failed recent GDR for BuSpar.  Orders placed to increase BuSpar to 10 mg 3 times daily.  Patient is also noted with a small open area to buttocks, followed by facility wound team, treatment in place.  HTN BP at goal.  Denies chest pain and headache.  CHF stable, denies sob, orthopnea, weight gain.  AFIB stable, denies palpitations and chest pain.  ESRD on HD, tolerating treatment well.  GERD controlled.  Denies heartburn, regurgitation, epigastric discomfort, sour taste, and cough.  Hx CVA, denies changes in weakness and speech.  DM, denies polydipsia polyuria polyphagia.  Mentation at baseline, no acute distress.    Objective  Vital signs: 105/55, 84, 14, 97.0, 94% blood sugar 139    Physical Exam  Constitutional:       General: She is not in acute distress.  Eyes:      Extraocular Movements: Extraocular movements intact.   Cardiovascular:      Rate and Rhythm: Normal rate and regular rhythm.   Pulmonary:      Effort: Pulmonary effort is normal.      Breath sounds: Normal breath sounds.   Abdominal:      General: Bowel sounds are normal.      Palpations: Abdomen is soft.   Musculoskeletal:      Cervical back: Neck supple.      Right lower leg: No edema.      Left lower leg: No edema.   Skin:     Comments: Open wound to buttocks   Neurological:      Mental Status: She is alert and oriented to person, place, and time.   Psychiatric:         Mood and Affect: Mood normal.         Behavior: Behavior is cooperative.         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  On HD per nephrology  Isosorbide dinitrate  Metoprolol  Diltiazem   Renal diet  Monitor BP  Remove extra fluid in dialysis         Atrial fibrillation (Multi) - Primary     Monitor heart rate, controlled  Amiodarone  Metoprolol   Apixaban  Bleeding precautions         Anxiety     Failed recent GDR, increase Buspar to 10 mg three times daily.         Open wound of buttock     Wound doctor has been consulted and will be responsible for the evaluation and comprehensive management of of the wound including appropriate control of complicating factors such as unrelieved pressure, infection, vascular and/or uncontrolled metabolic derangement, and/or nutritional deficiency in addition to appropriate debridement          Medications, treatments, and labs reviewed  Continue medications and treatments as listed in EMR    Scribe Attestation  I, Carl Martineziboseas   attest that this documentation has been prepared under the direction and in the presence of Wilbert Lock MD    Provider Attestation - Scribe documentation  All medical record entries made by the Scribe were at my direction and personally dictated by me. I have reviewed the chart and agree that the record accurately reflects my personal performance of the history, physical exam, discussion and plan.   Wilbert Lock MD            Electronically Signed By: Wilbert Lock MD   12/25/24 12:58 PM

## 2024-12-24 NOTE — PROGRESS NOTES
PROGRESS NOTE    Subjective   Chief complaint: Melody Martin is a 89 y.o. female who is a long term care patient being seen and evaluated for monthly general medical care and follow-up    HPI:  HPI  Patient presents for general medical care and f/u.  Patient seen and examined at bedside.  No issues per nursing.  Patient noted with increased anxiety.  Patient has failed recent GDR for BuSpar.  Orders placed to increase BuSpar to 10 mg 3 times daily.  Patient is also noted with a small open area to buttocks, followed by facility wound team, treatment in place.  HTN BP at goal.  Denies chest pain and headache.  CHF stable, denies sob, orthopnea, weight gain.  AFIB stable, denies palpitations and chest pain.  ESRD on HD, tolerating treatment well.  GERD controlled.  Denies heartburn, regurgitation, epigastric discomfort, sour taste, and cough.  Hx CVA, denies changes in weakness and speech.  DM, denies polydipsia polyuria polyphagia.  Mentation at baseline, no acute distress.    Objective   Vital signs: 105/55, 84, 14, 97.0, 94% blood sugar 139    Physical Exam  Constitutional:       General: She is not in acute distress.  Eyes:      Extraocular Movements: Extraocular movements intact.   Cardiovascular:      Rate and Rhythm: Normal rate and regular rhythm.   Pulmonary:      Effort: Pulmonary effort is normal.      Breath sounds: Normal breath sounds.   Abdominal:      General: Bowel sounds are normal.      Palpations: Abdomen is soft.   Musculoskeletal:      Cervical back: Neck supple.      Right lower leg: No edema.      Left lower leg: No edema.   Skin:     Comments: Open wound to buttocks   Neurological:      Mental Status: She is alert and oriented to person, place, and time.   Psychiatric:         Mood and Affect: Mood normal.         Behavior: Behavior is cooperative.         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  On HD per nephrology  Isosorbide dinitrate  Metoprolol  Diltiazem   Renal diet  Monitor BP  Remove extra fluid in dialysis         Atrial fibrillation (Multi) - Primary     Monitor heart rate, controlled  Amiodarone  Metoprolol   Apixaban  Bleeding precautions         Anxiety     Failed recent GDR, increase Buspar to 10 mg three times daily.         Open wound of buttock     Wound doctor has been consulted and will be responsible for the evaluation and comprehensive management of of the wound including appropriate control of complicating factors such as unrelieved pressure, infection, vascular and/or uncontrolled metabolic derangement, and/or nutritional deficiency in addition to appropriate debridement          Medications, treatments, and labs reviewed  Continue medications and treatments as listed in EMR    Scribe Attestation  I, Hien Conn Scribe   attest that this documentation has been prepared under the direction and in the presence of Wilbert Lock MD    Provider Attestation - Scribe documentation  All medical record entries made by the Scribe were at my direction and personally dictated by me. I have reviewed the chart and agree that the record accurately reflects my personal performance of the history, physical exam, discussion and plan.   Wilbert Lock MD

## 2024-12-24 NOTE — ASSESSMENT & PLAN NOTE
Wound doctor has been consulted and will be responsible for the evaluation and comprehensive management of of the wound including appropriate control of complicating factors such as unrelieved pressure, infection, vascular and/or uncontrolled metabolic derangement, and/or nutritional deficiency in addition to appropriate debridement

## 2024-12-26 ENCOUNTER — HOSPITAL ENCOUNTER (OUTPATIENT)
Dept: RADIOLOGY | Facility: HOSPITAL | Age: 89
End: 2024-12-26
Payer: COMMERCIAL

## 2025-02-12 ENCOUNTER — HOSPITAL ENCOUNTER (OUTPATIENT)
Dept: RADIOLOGY | Facility: HOSPITAL | Age: 89
End: 2025-02-12
Payer: COMMERCIAL